# Patient Record
Sex: FEMALE | Race: WHITE | Employment: OTHER | URBAN - METROPOLITAN AREA
[De-identification: names, ages, dates, MRNs, and addresses within clinical notes are randomized per-mention and may not be internally consistent; named-entity substitution may affect disease eponyms.]

---

## 2017-03-15 ENCOUNTER — TELEPHONE (OUTPATIENT)
Dept: INTERNAL MEDICINE CLINIC | Facility: CLINIC | Age: 77
End: 2017-03-15

## 2017-03-15 DIAGNOSIS — Z00.00 ROUTINE HEALTH MAINTENANCE: ICD-10-CM

## 2017-03-15 DIAGNOSIS — E55.9 VITAMIN D DEFICIENCY: Primary | ICD-10-CM

## 2017-03-15 DIAGNOSIS — D72.819 LEUKOPENIA, UNSPECIFIED TYPE: ICD-10-CM

## 2017-03-15 DIAGNOSIS — R53.83 OTHER FATIGUE: ICD-10-CM

## 2017-03-15 DIAGNOSIS — E78.00 HYPERCHOLESTEROLEMIA: ICD-10-CM

## 2017-03-15 NOTE — TELEPHONE ENCOUNTER
Scheduled for a medicare annual with Dr Solomon Cornejo on 3/22  Should pt go for labs prior to appt?  Please call to advise [phone]

## 2017-03-15 NOTE — TELEPHONE ENCOUNTER
Please advise on labs -akbar venegas labs done October 2016 and an order is in system for B 12 and CBC - to DR. Padma Dove

## 2017-03-20 ENCOUNTER — LAB ENCOUNTER (OUTPATIENT)
Dept: LAB | Age: 77
End: 2017-03-20
Attending: INTERNAL MEDICINE
Payer: MEDICARE

## 2017-03-20 DIAGNOSIS — E78.00 HYPERCHOLESTEROLEMIA: ICD-10-CM

## 2017-03-20 DIAGNOSIS — E55.9 VITAMIN D DEFICIENCY: ICD-10-CM

## 2017-03-20 DIAGNOSIS — R53.83 OTHER FATIGUE: ICD-10-CM

## 2017-03-20 DIAGNOSIS — D72.819 LEUKOPENIA, UNSPECIFIED TYPE: ICD-10-CM

## 2017-03-20 LAB
25(OH)D3 SERPL-MCNC: 29.2 NG/ML
ALT SERPL-CCNC: 20 U/L (ref 14–54)
ANION GAP SERPL CALC-SCNC: 7 MMOL/L (ref 0–18)
AST SERPL-CCNC: 26 U/L (ref 15–41)
BASOPHILS # BLD: 0.1 K/UL (ref 0–0.2)
BASOPHILS NFR BLD: 1 %
BUN SERPL-MCNC: 18 MG/DL (ref 8–20)
BUN/CREAT SERPL: 22.8 (ref 10–20)
CALCIUM SERPL-MCNC: 9.3 MG/DL (ref 8.5–10.5)
CHLORIDE SERPL-SCNC: 108 MMOL/L (ref 95–110)
CHOLEST SERPL-MCNC: 219 MG/DL (ref 110–200)
CO2 SERPL-SCNC: 27 MMOL/L (ref 22–32)
CREAT SERPL-MCNC: 0.79 MG/DL (ref 0.5–1.5)
EOSINOPHIL # BLD: 0.2 K/UL (ref 0–0.7)
EOSINOPHIL NFR BLD: 4 %
ERYTHROCYTE [DISTWIDTH] IN BLOOD BY AUTOMATED COUNT: 13.5 % (ref 11–15)
GLUCOSE SERPL-MCNC: 91 MG/DL (ref 70–99)
HCT VFR BLD AUTO: 37.9 % (ref 35–48)
HDLC SERPL-MCNC: 64 MG/DL
HGB BLD-MCNC: 12.7 G/DL (ref 12–16)
LDLC SERPL CALC-MCNC: 144 MG/DL (ref 0–99)
LYMPHOCYTES # BLD: 1.3 K/UL (ref 1–4)
LYMPHOCYTES NFR BLD: 29 %
MCH RBC QN AUTO: 30.7 PG (ref 27–32)
MCHC RBC AUTO-ENTMCNC: 33.5 G/DL (ref 32–37)
MCV RBC AUTO: 91.7 FL (ref 80–100)
MONOCYTES # BLD: 0.3 K/UL (ref 0–1)
MONOCYTES NFR BLD: 7 %
NEUTROPHILS # BLD AUTO: 2.6 K/UL (ref 1.8–7.7)
NEUTROPHILS NFR BLD: 59 %
NONHDLC SERPL-MCNC: 155 MG/DL
OSMOLALITY UR CALC.SUM OF ELEC: 295 MOSM/KG (ref 275–295)
PLATELET # BLD AUTO: 266 K/UL (ref 140–400)
PMV BLD AUTO: 7.5 FL (ref 7.4–10.3)
POTASSIUM SERPL-SCNC: 4.1 MMOL/L (ref 3.3–5.1)
RBC # BLD AUTO: 4.14 M/UL (ref 3.7–5.4)
SODIUM SERPL-SCNC: 142 MMOL/L (ref 136–144)
TRIGL SERPL-MCNC: 54 MG/DL (ref 1–149)
TSH SERPL-ACNC: 0.93 UIU/ML (ref 0.34–5.6)
VIT B12 SERPL-MCNC: 349 PG/ML (ref 181–914)
WBC # BLD AUTO: 4.4 K/UL (ref 4–11)

## 2017-03-20 PROCEDURE — 80061 LIPID PANEL: CPT

## 2017-03-20 PROCEDURE — 84450 TRANSFERASE (AST) (SGOT): CPT

## 2017-03-20 PROCEDURE — 82607 VITAMIN B-12: CPT

## 2017-03-20 PROCEDURE — 82306 VITAMIN D 25 HYDROXY: CPT

## 2017-03-20 PROCEDURE — 36415 COLL VENOUS BLD VENIPUNCTURE: CPT

## 2017-03-20 PROCEDURE — 85025 COMPLETE CBC W/AUTO DIFF WBC: CPT

## 2017-03-20 PROCEDURE — 84443 ASSAY THYROID STIM HORMONE: CPT

## 2017-03-20 PROCEDURE — 84460 ALANINE AMINO (ALT) (SGPT): CPT

## 2017-03-20 PROCEDURE — 80048 BASIC METABOLIC PNL TOTAL CA: CPT

## 2017-03-22 ENCOUNTER — OFFICE VISIT (OUTPATIENT)
Dept: INTERNAL MEDICINE CLINIC | Facility: CLINIC | Age: 77
End: 2017-03-22

## 2017-03-22 VITALS
DIASTOLIC BLOOD PRESSURE: 74 MMHG | TEMPERATURE: 98 F | OXYGEN SATURATION: 98 % | BODY MASS INDEX: 28.1 KG/M2 | SYSTOLIC BLOOD PRESSURE: 138 MMHG | HEIGHT: 68.25 IN | WEIGHT: 185.38 LBS | HEART RATE: 74 BPM

## 2017-03-22 DIAGNOSIS — I34.0 NON-RHEUMATIC MITRAL REGURGITATION: ICD-10-CM

## 2017-03-22 DIAGNOSIS — Z78.0 POSTMENOPAUSE: ICD-10-CM

## 2017-03-22 DIAGNOSIS — E78.00 HYPERCHOLESTEROLEMIA: ICD-10-CM

## 2017-03-22 DIAGNOSIS — Z00.00 ROUTINE HEALTH MAINTENANCE: Primary | ICD-10-CM

## 2017-03-22 DIAGNOSIS — Z12.31 ENCOUNTER FOR SCREENING MAMMOGRAM FOR BREAST CANCER: ICD-10-CM

## 2017-03-22 DIAGNOSIS — E55.9 VITAMIN D DEFICIENCY: ICD-10-CM

## 2017-03-22 DIAGNOSIS — M85.80 OSTEOPENIA, UNSPECIFIED LOCATION: ICD-10-CM

## 2017-03-22 PROCEDURE — 99214 OFFICE O/P EST MOD 30 MIN: CPT | Performed by: INTERNAL MEDICINE

## 2017-03-22 PROCEDURE — 90715 TDAP VACCINE 7 YRS/> IM: CPT | Performed by: INTERNAL MEDICINE

## 2017-03-22 PROCEDURE — G0463 HOSPITAL OUTPT CLINIC VISIT: HCPCS | Performed by: INTERNAL MEDICINE

## 2017-03-22 PROCEDURE — 90471 IMMUNIZATION ADMIN: CPT | Performed by: INTERNAL MEDICINE

## 2017-03-22 RX ORDER — NEPAFENAC 0.3 %
SUSPENSION, DROPS(FINAL DOSAGE FORM)(ML) OPHTHALMIC (EYE) DAILY
Refills: 1 | COMMUNITY
Start: 2017-03-08 | End: 2019-04-10

## 2017-03-22 RX ORDER — BESIFLOXACIN 6 MG/ML
SUSPENSION OPHTHALMIC DAILY
Refills: 1 | COMMUNITY
Start: 2017-03-08 | End: 2019-04-10

## 2017-03-22 RX ORDER — TRIPROLIDINE/PSEUDOEPHEDRINE 2.5MG-60MG
TABLET ORAL DAILY
Refills: 1 | COMMUNITY
Start: 2017-03-08 | End: 2019-04-10

## 2017-03-22 NOTE — PROGRESS NOTES
Kylie Huang is a 68year old female. Patient presents with:  Physical: Patient presents for annual physical exam. Recently had bilateral cataract surgery in March. Last Mammogram April 2016. She reports she stopped fosamax per MD instructions.        HP Comment: wine, occasionally         REVIEW OF SYSTEMS:   GENERAL: feels well otherwise  SKIN: denies any unusual skin lesions  EYES:denies blurred vision or double vision  HEENT: denies nasal congestion, sinus pain or ST  LUNGS: denies shortness of gordon

## 2017-05-10 ENCOUNTER — HOSPITAL ENCOUNTER (OUTPATIENT)
Dept: CV DIAGNOSTICS | Facility: HOSPITAL | Age: 77
Discharge: HOME | End: 2017-05-10
Attending: INTERNAL MEDICINE
Payer: MEDICARE

## 2017-05-10 ENCOUNTER — HOSPITAL ENCOUNTER (OUTPATIENT)
Dept: BONE DENSITY | Facility: HOSPITAL | Age: 77
Discharge: HOME | End: 2017-05-10
Attending: INTERNAL MEDICINE
Payer: MEDICARE

## 2017-05-10 ENCOUNTER — HOSPITAL ENCOUNTER (OUTPATIENT)
Dept: MAMMOGRAPHY | Facility: HOSPITAL | Age: 77
Discharge: HOME | End: 2017-05-10
Attending: INTERNAL MEDICINE
Payer: MEDICARE

## 2017-05-10 DIAGNOSIS — Z12.31 ENCOUNTER FOR SCREENING MAMMOGRAM FOR BREAST CANCER: ICD-10-CM

## 2017-05-10 DIAGNOSIS — I34.0 NON-RHEUMATIC MITRAL REGURGITATION: ICD-10-CM

## 2017-05-10 DIAGNOSIS — Z78.0 POSTMENOPAUSE: ICD-10-CM

## 2017-05-10 PROCEDURE — 77067 SCR MAMMO BI INCL CAD: CPT | Performed by: INTERNAL MEDICINE

## 2017-05-10 PROCEDURE — 77080 DXA BONE DENSITY AXIAL: CPT | Performed by: INTERNAL MEDICINE

## 2017-05-10 PROCEDURE — 93306 TTE W/DOPPLER COMPLETE: CPT | Performed by: INTERNAL MEDICINE

## 2017-06-22 ENCOUNTER — HOSPITAL ENCOUNTER (OUTPATIENT)
Age: 77
Discharge: HOME | End: 2017-06-22
Attending: EMERGENCY MEDICINE
Payer: MEDICARE

## 2017-06-22 ENCOUNTER — APPOINTMENT (OUTPATIENT)
Dept: GENERAL RADIOLOGY | Age: 77
End: 2017-06-22
Attending: EMERGENCY MEDICINE
Payer: MEDICARE

## 2017-06-22 VITALS
WEIGHT: 185 LBS | OXYGEN SATURATION: 97 % | BODY MASS INDEX: 28.04 KG/M2 | HEIGHT: 68 IN | TEMPERATURE: 98 F | HEART RATE: 85 BPM | RESPIRATION RATE: 18 BRPM | SYSTOLIC BLOOD PRESSURE: 131 MMHG | DIASTOLIC BLOOD PRESSURE: 56 MMHG

## 2017-06-22 DIAGNOSIS — J40 BRONCHITIS: Primary | ICD-10-CM

## 2017-06-22 PROCEDURE — 99213 OFFICE O/P EST LOW 20 MIN: CPT

## 2017-06-22 PROCEDURE — 99204 OFFICE O/P NEW MOD 45 MIN: CPT

## 2017-06-22 PROCEDURE — 71020 XR CHEST PA + LAT CHEST (CPT=71020): CPT | Performed by: EMERGENCY MEDICINE

## 2017-06-22 RX ORDER — CODEINE PHOSPHATE AND GUAIFENESIN 10; 100 MG/5ML; MG/5ML
5 SOLUTION ORAL EVERY 6 HOURS PRN
Qty: 100 ML | Refills: 0 | Status: SHIPPED | OUTPATIENT
Start: 2017-06-22 | End: 2018-04-04

## 2017-06-22 RX ORDER — AZITHROMYCIN 250 MG/1
TABLET, FILM COATED ORAL
Qty: 1 PACKAGE | Refills: 0 | Status: SHIPPED | OUTPATIENT
Start: 2017-06-22 | End: 2017-06-27

## 2017-06-22 RX ORDER — BENZONATATE 100 MG/1
100 CAPSULE ORAL 3 TIMES DAILY PRN
Qty: 21 CAPSULE | Refills: 0 | Status: SHIPPED | OUTPATIENT
Start: 2017-06-22 | End: 2018-04-04

## 2017-06-22 NOTE — ED PROVIDER NOTES
Patient presents with:  Cough/URI      HPI:     Ashely Denis is a 68year old female who presents for evaluation of a chief complaint of  a cough Onset of symptoms was 4 days ago. The patient also complains of nasal and sinus congestion.   Care prior to What is the Relevant Clinical Indication / Reason for Exam?  Answer: SINUS  azithromycin (ZITHROMAX Z-NICOLE) 250 MG Oral Tab   Si mg once followed by 250 mg daily x 4 days   Dispense:  1 Package   Refill:  0  benzonatate 100 MG Oral Cap   Sig: Take 1 c today.    Follow Up with:  Felicia Patrick MD  [address] [phone]    In 5 days  if symptoms do not improve

## 2018-03-30 ENCOUNTER — TELEPHONE (OUTPATIENT)
Dept: INTERNAL MEDICINE CLINIC | Facility: CLINIC | Age: 78
End: 2018-03-30

## 2018-03-30 DIAGNOSIS — Z00.00 ANNUAL PHYSICAL EXAM: Primary | ICD-10-CM

## 2018-03-30 DIAGNOSIS — R53.83 OTHER FATIGUE: ICD-10-CM

## 2018-03-30 DIAGNOSIS — E78.00 HYPERCHOLESTEROLEMIA: ICD-10-CM

## 2018-03-30 DIAGNOSIS — E55.9 VITAMIN D DEFICIENCY: ICD-10-CM

## 2018-03-30 NOTE — TELEPHONE ENCOUNTER
Please call pt, has appt with Dr Emeka Guerrero on 4/4/18  Does pt need labs prior?   Please call to advise  Tasked to nursing

## 2018-04-02 NOTE — TELEPHONE ENCOUNTER
Called patient and notified her fasting labs are ordered and she should fast for 12 hours prior. She verbalized understanding.

## 2018-04-03 ENCOUNTER — LAB ENCOUNTER (OUTPATIENT)
Dept: LAB | Age: 78
End: 2018-04-03
Attending: INTERNAL MEDICINE
Payer: MEDICARE

## 2018-04-03 DIAGNOSIS — R53.83 OTHER FATIGUE: ICD-10-CM

## 2018-04-03 DIAGNOSIS — E78.00 HYPERCHOLESTEROLEMIA: ICD-10-CM

## 2018-04-03 DIAGNOSIS — E55.9 VITAMIN D DEFICIENCY: ICD-10-CM

## 2018-04-03 PROCEDURE — 36415 COLL VENOUS BLD VENIPUNCTURE: CPT

## 2018-04-03 PROCEDURE — 80061 LIPID PANEL: CPT

## 2018-04-03 PROCEDURE — 85025 COMPLETE CBC W/AUTO DIFF WBC: CPT

## 2018-04-03 PROCEDURE — 80053 COMPREHEN METABOLIC PANEL: CPT

## 2018-04-03 PROCEDURE — 82306 VITAMIN D 25 HYDROXY: CPT

## 2018-04-03 PROCEDURE — 84443 ASSAY THYROID STIM HORMONE: CPT | Performed by: INTERNAL MEDICINE

## 2018-04-04 ENCOUNTER — OFFICE VISIT (OUTPATIENT)
Dept: INTERNAL MEDICINE CLINIC | Facility: CLINIC | Age: 78
End: 2018-04-04

## 2018-04-04 VITALS
DIASTOLIC BLOOD PRESSURE: 78 MMHG | HEIGHT: 68.5 IN | HEART RATE: 96 BPM | WEIGHT: 188 LBS | SYSTOLIC BLOOD PRESSURE: 142 MMHG | TEMPERATURE: 98 F | BODY MASS INDEX: 28.17 KG/M2

## 2018-04-04 DIAGNOSIS — E55.9 VITAMIN D DEFICIENCY: ICD-10-CM

## 2018-04-04 DIAGNOSIS — Z12.31 ENCOUNTER FOR SCREENING MAMMOGRAM FOR BREAST CANCER: ICD-10-CM

## 2018-04-04 DIAGNOSIS — M85.80 OSTEOPENIA, UNSPECIFIED LOCATION: ICD-10-CM

## 2018-04-04 DIAGNOSIS — Z00.00 ROUTINE HEALTH MAINTENANCE: ICD-10-CM

## 2018-04-04 DIAGNOSIS — E78.00 HYPERCHOLESTEROLEMIA: Primary | ICD-10-CM

## 2018-04-04 DIAGNOSIS — I34.0 MITRAL VALVE INSUFFICIENCY, UNSPECIFIED ETIOLOGY: ICD-10-CM

## 2018-04-04 PROCEDURE — G0463 HOSPITAL OUTPT CLINIC VISIT: HCPCS | Performed by: INTERNAL MEDICINE

## 2018-04-04 PROCEDURE — 99214 OFFICE O/P EST MOD 30 MIN: CPT | Performed by: INTERNAL MEDICINE

## 2018-04-04 RX ORDER — CHOLECALCIFEROL (VITAMIN D3) 125 MCG
500 CAPSULE ORAL DAILY
COMMUNITY

## 2018-04-04 NOTE — PROGRESS NOTES
Yamileth Altamirano is a 68year old female. Patient presents with:  Physical: Patient is here today for a PE. She states that she has been feeling good lately No major issues at this time.        HPI:   Yamileth Altamirano is a 68year old female who presents for Former Smoker                                                              Packs/day: 0.00      Years: 0.00         Types: Cigarettes     Quit date: 1/1/1990  Smokeless tobacco: Former User                     Alcohol use:  Yes              Comment: wine, o vaccine. Tdap done  3/22/17. Encouraged more exercise. BP normal at home.      Mitral regurgitaton  Mild-moderate MR (last Echo 5/10/17) -- Rx given for repeat.      RTC 1 year.     Cecily Dill MD  4/4/2018  10:49 AM

## 2018-04-25 ENCOUNTER — HOSPITAL ENCOUNTER (OUTPATIENT)
Age: 78
Discharge: HOME | End: 2018-04-25
Attending: EMERGENCY MEDICINE
Payer: MEDICARE

## 2018-04-25 VITALS
BODY MASS INDEX: 26.66 KG/M2 | DIASTOLIC BLOOD PRESSURE: 64 MMHG | WEIGHT: 180 LBS | RESPIRATION RATE: 18 BRPM | SYSTOLIC BLOOD PRESSURE: 143 MMHG | TEMPERATURE: 98 F | HEIGHT: 69 IN | OXYGEN SATURATION: 99 % | HEART RATE: 88 BPM

## 2018-04-25 DIAGNOSIS — J06.9 VIRAL UPPER RESPIRATORY TRACT INFECTION WITH COUGH: Primary | ICD-10-CM

## 2018-04-25 DIAGNOSIS — J04.0 ACUTE LARYNGITIS: ICD-10-CM

## 2018-04-25 PROCEDURE — 99214 OFFICE O/P EST MOD 30 MIN: CPT

## 2018-04-25 PROCEDURE — 99213 OFFICE O/P EST LOW 20 MIN: CPT

## 2018-04-25 RX ORDER — BENZONATATE 200 MG/1
200 CAPSULE ORAL 3 TIMES DAILY PRN
Qty: 15 CAPSULE | Refills: 0 | Status: SHIPPED | OUTPATIENT
Start: 2018-04-25 | End: 2018-04-30

## 2018-04-25 NOTE — ED PROVIDER NOTES
Patient Seen in: [facility]    History   Patient presents with:  Cough/URI    Stated Complaint: cough    HPI  Patient complains of 2 days of a runny nose, postnasal drip, dry cough and hoarse voice.   Her voice was almost 18  Temp: 97.9 °F (36.6 °C)  Temp src: Oral  SpO2: 99 %  O2 Device: None (Room air)    Current:/64   Pulse 88   Temp 97.9 °F (36.6 °C) (Oral)   Resp 18   Ht 175.3 cm (5' 9\")   Wt 81.6 kg   SpO2 99%   BMI 26.58 kg/m²         Physical Exam   Constitut respiratory tract infection with cough  (primary encounter diagnosis)  Acute laryngitis    Disposition:  Discharge  4/25/2018 12:53 pm    Follow-up:  Denton Lema MD  [phone]    In 3 days  For follow-up

## 2018-04-25 NOTE — ED INITIAL ASSESSMENT (HPI)
PATIENT ARRIVED AMBULATORY TO ROOM C/O A NON PRODUCTIVE COUGH THAT STARTED 2 DAYS AGO. SLIGHT NASAL CONGESTION. PATIENT STATES \"I FEEL LIKE MY VOICE HAS BEEN GOING SINCE YESTERDAY\" NO FEVERS. NO N/V/D. EASY NON LABORED RESPIRATIONS.

## 2018-04-30 ENCOUNTER — OFFICE VISIT (OUTPATIENT)
Dept: INTERNAL MEDICINE CLINIC | Facility: CLINIC | Age: 78
End: 2018-04-30

## 2018-04-30 VITALS
SYSTOLIC BLOOD PRESSURE: 142 MMHG | BODY MASS INDEX: 28.82 KG/M2 | DIASTOLIC BLOOD PRESSURE: 70 MMHG | HEIGHT: 68.25 IN | TEMPERATURE: 99 F | WEIGHT: 190.19 LBS | HEART RATE: 88 BPM

## 2018-04-30 DIAGNOSIS — J06.9 ACUTE UPPER RESPIRATORY INFECTION, UNSPECIFIED: Primary | ICD-10-CM

## 2018-04-30 PROCEDURE — 99213 OFFICE O/P EST LOW 20 MIN: CPT | Performed by: INTERNAL MEDICINE

## 2018-04-30 PROCEDURE — G0463 HOSPITAL OUTPT CLINIC VISIT: HCPCS | Performed by: INTERNAL MEDICINE

## 2018-04-30 RX ORDER — AZITHROMYCIN 250 MG/1
TABLET, FILM COATED ORAL
Qty: 6 TABLET | Refills: 0 | Status: SHIPPED | OUTPATIENT
Start: 2018-04-30 | End: 2019-04-10

## 2018-04-30 RX ORDER — BENZONATATE 200 MG/1
200 CAPSULE ORAL 3 TIMES DAILY PRN
Qty: 15 CAPSULE | Refills: 0 | Status: SHIPPED | OUTPATIENT
Start: 2018-04-30 | End: 2018-05-05

## 2018-04-30 RX ORDER — POLYMYXIN B SULFATE AND TRIMETHOPRIM 1; 10000 MG/ML; [USP'U]/ML
1 SOLUTION OPHTHALMIC EVERY 6 HOURS
Qty: 1 BOTTLE | Refills: 0 | Status: SHIPPED | OUTPATIENT
Start: 2018-04-30 | End: 2019-04-10

## 2018-04-30 NOTE — PROGRESS NOTES
Divya Barnard is a 68year old female. HPI:   Patient presents with: Follow - Up: went to  last wednesday - acute laryngitis ,cough,  Eye Problem: right eye discharge        Patient for 1 week has had nasal congestion. Postnasal drip. No fever.   Emeli Collins Rfl: 1   aspirin (ASPIRIN ADULT LOW STRENGTH) 81 MG Oral Tab EC Take 1 tablet by mouth daily. Disp:  Rfl:    Calcium Carb-Cholecalciferol (CALCIUM + D3) 600-200 MG-UNIT Oral Tab Take 1 tablet by mouth daily.  Disp:  Rfl:    Multiple Vitamins-Minerals (Tony Sarmiento conjunctivitis. She is not toxic. Since symptoms have been persisting we will go ahead and give her Zithromax Z-NICOLE. Also refill on benzonatate. Lastly polymyxin B trimethoprim eyedrops. Patient to call if she does not feel better in a few days.   Anuradha

## 2018-08-22 ENCOUNTER — HOSPITAL ENCOUNTER (OUTPATIENT)
Dept: CV DIAGNOSTICS | Facility: HOSPITAL | Age: 78
Discharge: HOME | End: 2018-08-22
Attending: INTERNAL MEDICINE
Payer: MEDICARE

## 2018-08-22 ENCOUNTER — HOSPITAL ENCOUNTER (OUTPATIENT)
Dept: MAMMOGRAPHY | Facility: HOSPITAL | Age: 78
Discharge: HOME | End: 2018-08-22
Attending: INTERNAL MEDICINE
Payer: MEDICARE

## 2018-08-22 DIAGNOSIS — I34.0 MITRAL VALVE INSUFFICIENCY, UNSPECIFIED ETIOLOGY: ICD-10-CM

## 2018-08-22 DIAGNOSIS — Z12.31 ENCOUNTER FOR SCREENING MAMMOGRAM FOR BREAST CANCER: ICD-10-CM

## 2018-08-22 PROCEDURE — 93306 TTE W/DOPPLER COMPLETE: CPT | Performed by: INTERNAL MEDICINE

## 2018-08-22 PROCEDURE — 77067 SCR MAMMO BI INCL CAD: CPT | Performed by: INTERNAL MEDICINE

## 2019-04-03 ENCOUNTER — TELEPHONE (OUTPATIENT)
Dept: INTERNAL MEDICINE CLINIC | Facility: CLINIC | Age: 79
End: 2019-04-03

## 2019-04-03 DIAGNOSIS — Z00.00 ROUTINE HEALTH MAINTENANCE: ICD-10-CM

## 2019-04-03 DIAGNOSIS — E78.00 HYPERCHOLESTEROLEMIA: Primary | ICD-10-CM

## 2019-04-03 DIAGNOSIS — R53.83 OTHER FATIGUE: ICD-10-CM

## 2019-04-03 NOTE — TELEPHONE ENCOUNTER
Patient has appointment for yearly physical with Dr. Alanna Damon Wednesday 4/10. Needs lab orders entered.      Please call patient at [phone]

## 2019-04-05 ENCOUNTER — LAB ENCOUNTER (OUTPATIENT)
Dept: LAB | Age: 79
End: 2019-04-05
Attending: INTERNAL MEDICINE
Payer: MEDICARE

## 2019-04-05 ENCOUNTER — TELEPHONE (OUTPATIENT)
Dept: INTERNAL MEDICINE CLINIC | Facility: CLINIC | Age: 79
End: 2019-04-05

## 2019-04-05 DIAGNOSIS — R53.83 OTHER FATIGUE: ICD-10-CM

## 2019-04-05 DIAGNOSIS — E78.00 HYPERCHOLESTEROLEMIA: ICD-10-CM

## 2019-04-05 PROCEDURE — 80061 LIPID PANEL: CPT

## 2019-04-05 PROCEDURE — 84443 ASSAY THYROID STIM HORMONE: CPT

## 2019-04-05 PROCEDURE — 36415 COLL VENOUS BLD VENIPUNCTURE: CPT

## 2019-04-05 PROCEDURE — 85025 COMPLETE CBC W/AUTO DIFF WBC: CPT

## 2019-04-05 PROCEDURE — 80053 COMPREHEN METABOLIC PANEL: CPT

## 2019-04-10 ENCOUNTER — OFFICE VISIT (OUTPATIENT)
Dept: INTERNAL MEDICINE CLINIC | Facility: CLINIC | Age: 79
End: 2019-04-10
Payer: MEDICARE

## 2019-04-10 VITALS
TEMPERATURE: 98 F | SYSTOLIC BLOOD PRESSURE: 178 MMHG | HEIGHT: 68.25 IN | WEIGHT: 186.81 LBS | DIASTOLIC BLOOD PRESSURE: 80 MMHG | HEART RATE: 71 BPM | OXYGEN SATURATION: 97 % | BODY MASS INDEX: 28.31 KG/M2

## 2019-04-10 DIAGNOSIS — Z00.00 ROUTINE HEALTH MAINTENANCE: ICD-10-CM

## 2019-04-10 DIAGNOSIS — Z12.31 ENCOUNTER FOR SCREENING MAMMOGRAM FOR BREAST CANCER: ICD-10-CM

## 2019-04-10 DIAGNOSIS — I34.0 MITRAL VALVE INSUFFICIENCY, UNSPECIFIED ETIOLOGY: ICD-10-CM

## 2019-04-10 DIAGNOSIS — R01.1 MURMUR, CARDIAC: ICD-10-CM

## 2019-04-10 DIAGNOSIS — R03.0 WHITE COAT SYNDROME WITHOUT HYPERTENSION: Primary | ICD-10-CM

## 2019-04-10 DIAGNOSIS — E78.00 HYPERCHOLESTEROLEMIA: ICD-10-CM

## 2019-04-10 DIAGNOSIS — Z78.0 POSTMENOPAUSE: ICD-10-CM

## 2019-04-10 DIAGNOSIS — M85.80 OSTEOPENIA, UNSPECIFIED LOCATION: ICD-10-CM

## 2019-04-10 PROCEDURE — 99214 OFFICE O/P EST MOD 30 MIN: CPT | Performed by: INTERNAL MEDICINE

## 2019-04-10 PROCEDURE — G0463 HOSPITAL OUTPT CLINIC VISIT: HCPCS | Performed by: INTERNAL MEDICINE

## 2019-04-10 NOTE — PROGRESS NOTES
Vikki Gomes is a 66year old female. Patient presents with:  Physical      HPI:   Vikki Gomes is a 66year old female who presents for a complete physical exam.   Feels well. Walks her [place] 1 mile/day.    Checks her BP at home periodically --130 No         REVIEW OF SYSTEMS:   GENERAL: feels well otherwise  SKIN: denies any unusual skin lesions  EYES:denies blurred vision or double vision  HEENT: denies nasal congestion, sinus pain or ST  LUNGS: denies shortness of breath with exertion or cough  C

## 2019-05-09 ENCOUNTER — HOSPITAL ENCOUNTER (OUTPATIENT)
Age: 79
Discharge: HOME | End: 2019-05-09
Attending: EMERGENCY MEDICINE
Payer: MEDICARE

## 2019-05-09 VITALS
BODY MASS INDEX: 25.92 KG/M2 | WEIGHT: 175 LBS | TEMPERATURE: 98 F | HEIGHT: 69 IN | RESPIRATION RATE: 20 BRPM | DIASTOLIC BLOOD PRESSURE: 74 MMHG | OXYGEN SATURATION: 100 % | SYSTOLIC BLOOD PRESSURE: 177 MMHG | HEART RATE: 72 BPM

## 2019-05-09 DIAGNOSIS — H11.31 SUBCONJUNCTIVAL HEMORRHAGE OF RIGHT EYE: Primary | ICD-10-CM

## 2019-05-09 PROCEDURE — 99212 OFFICE O/P EST SF 10 MIN: CPT

## 2019-05-09 NOTE — ED PROVIDER NOTES
Patient Seen in: [facility]    History   Patient presents with:   Eye Visual Problem (opthalmic)    Stated Complaint: eye redness    HPI    Pt complains of right eye redness that started after she sneezed no vision abnorma reviewed from today and agreed except as otherwise stated in HPI.     Physical Exam     ED Triage Vitals [05/09/19 1354]   BP (!) 177/74   Pulse 72   Resp 20   Temp 98.2 °F (36.8 °C)   Temp src Oral   SpO2 100 %   O2 Device None (Room air)       Current:BP

## 2019-05-16 ENCOUNTER — TELEPHONE (OUTPATIENT)
Dept: INTERNAL MEDICINE CLINIC | Facility: CLINIC | Age: 79
End: 2019-05-16

## 2019-05-17 NOTE — TELEPHONE ENCOUNTER
Please let pt know I received her BP readings. Overall okay -- mostly 120's-130's/60's-70's (occ 140's/80's). Continue to exercise, limit sodium. Recommend repeating a week of blood pressure monitoring like this every few months.   If more readings in th

## 2019-09-16 ENCOUNTER — HOSPITAL ENCOUNTER (OUTPATIENT)
Dept: CV DIAGNOSTICS | Facility: HOSPITAL | Age: 79
Discharge: HOME | End: 2019-09-16
Attending: INTERNAL MEDICINE
Payer: MEDICARE

## 2019-09-16 DIAGNOSIS — I34.0 MITRAL VALVE INSUFFICIENCY, UNSPECIFIED ETIOLOGY: ICD-10-CM

## 2019-09-16 PROCEDURE — 93306 TTE W/DOPPLER COMPLETE: CPT | Performed by: INTERNAL MEDICINE

## 2019-09-23 ENCOUNTER — HOSPITAL ENCOUNTER (OUTPATIENT)
Dept: MAMMOGRAPHY | Facility: HOSPITAL | Age: 79
Discharge: HOME | End: 2019-09-23
Attending: INTERNAL MEDICINE
Payer: MEDICARE

## 2019-09-23 ENCOUNTER — HOSPITAL ENCOUNTER (OUTPATIENT)
Dept: BONE DENSITY | Facility: HOSPITAL | Age: 79
Discharge: HOME | End: 2019-09-23
Attending: INTERNAL MEDICINE
Payer: MEDICARE

## 2019-09-23 DIAGNOSIS — Z12.31 ENCOUNTER FOR SCREENING MAMMOGRAM FOR BREAST CANCER: ICD-10-CM

## 2019-09-23 DIAGNOSIS — Z78.0 POSTMENOPAUSE: ICD-10-CM

## 2019-09-23 PROCEDURE — 77067 SCR MAMMO BI INCL CAD: CPT | Performed by: INTERNAL MEDICINE

## 2019-09-23 PROCEDURE — 77080 DXA BONE DENSITY AXIAL: CPT | Performed by: INTERNAL MEDICINE

## 2019-09-23 PROCEDURE — 77063 BREAST TOMOSYNTHESIS BI: CPT | Performed by: INTERNAL MEDICINE

## 2020-02-07 ENCOUNTER — HOSPITAL ENCOUNTER (OUTPATIENT)
Age: 80
Discharge: HOME | End: 2020-02-07
Attending: EMERGENCY MEDICINE
Payer: MEDICARE

## 2020-02-07 VITALS
OXYGEN SATURATION: 97 % | HEART RATE: 80 BPM | WEIGHT: 170 LBS | BODY MASS INDEX: 25 KG/M2 | SYSTOLIC BLOOD PRESSURE: 146 MMHG | DIASTOLIC BLOOD PRESSURE: 65 MMHG | RESPIRATION RATE: 18 BRPM | TEMPERATURE: 98 F

## 2020-02-07 DIAGNOSIS — H11.32 SUBCONJUNCTIVAL HEMORRHAGE OF LEFT EYE: Primary | ICD-10-CM

## 2020-02-07 PROCEDURE — 99213 OFFICE O/P EST LOW 20 MIN: CPT

## 2020-02-07 PROCEDURE — 99212 OFFICE O/P EST SF 10 MIN: CPT

## 2020-02-07 NOTE — ED INITIAL ASSESSMENT (HPI)
Left conjunctival hemorrhage since Wednesday, denies trauma or injury, no drainage, denies contact lens use

## 2020-02-07 NOTE — ED PROVIDER NOTES
Patient Seen in: [facility]      History   Patient presents with:   Eye Visual Problem    Stated Complaint: LEFT RED EYE    HPI    The patient is a 40-year-old female with past history of hypertension, cataracts, chronic l distress  Head: Normocephalic, no swelling or tenderness  Eyes: Nonicteric sclera, diffuse left subconjunctival hemorrhage, most pronounced in the inferior/medial aspect of the left eye. There is no hyphema.   Intraocular pressures are 18-20 in the affecte

## 2020-06-10 ENCOUNTER — OFFICE VISIT (OUTPATIENT)
Dept: INTERNAL MEDICINE CLINIC | Facility: CLINIC | Age: 80
End: 2020-06-10
Payer: MEDICARE

## 2020-06-10 VITALS
HEIGHT: 68 IN | SYSTOLIC BLOOD PRESSURE: 144 MMHG | WEIGHT: 185.19 LBS | TEMPERATURE: 98 F | HEART RATE: 82 BPM | OXYGEN SATURATION: 98 % | BODY MASS INDEX: 28.07 KG/M2 | DIASTOLIC BLOOD PRESSURE: 88 MMHG

## 2020-06-10 DIAGNOSIS — E78.00 HYPERCHOLESTEROLEMIA: ICD-10-CM

## 2020-06-10 DIAGNOSIS — Z00.00 ROUTINE HEALTH MAINTENANCE: ICD-10-CM

## 2020-06-10 DIAGNOSIS — E55.9 VITAMIN D DEFICIENCY: ICD-10-CM

## 2020-06-10 DIAGNOSIS — I34.0 MITRAL VALVE INSUFFICIENCY, UNSPECIFIED ETIOLOGY: ICD-10-CM

## 2020-06-10 DIAGNOSIS — R53.83 OTHER FATIGUE: ICD-10-CM

## 2020-06-10 DIAGNOSIS — Z12.31 ENCOUNTER FOR SCREENING MAMMOGRAM FOR BREAST CANCER: Primary | ICD-10-CM

## 2020-06-10 DIAGNOSIS — M85.80 OSTEOPENIA, UNSPECIFIED LOCATION: ICD-10-CM

## 2020-06-10 DIAGNOSIS — R03.0 WHITE COAT SYNDROME WITHOUT HYPERTENSION: ICD-10-CM

## 2020-06-10 DIAGNOSIS — I34.0 MILD MITRAL REGURGITATION BY PRIOR ECHOCARDIOGRAM: ICD-10-CM

## 2020-06-10 PROCEDURE — G0463 HOSPITAL OUTPT CLINIC VISIT: HCPCS | Performed by: INTERNAL MEDICINE

## 2020-06-10 PROCEDURE — 99214 OFFICE O/P EST MOD 30 MIN: CPT | Performed by: INTERNAL MEDICINE

## 2020-06-10 NOTE — PROGRESS NOTES
Sravan Vidal is a 78year old female. Patient presents with: Well Adult: Medicare Annual      HPI:   Sravan Vidal is a 78year old female who presents for a complete physical exam.     Feels well.   Walks her Yorkie regularly and does exercises at h 1990        Years since quittin.4      Smokeless tobacco: Former User    Alcohol use: Yes      Comment: wine, occasionally    Drug use: No         REVIEW OF SYSTEMS:   GENERAL: feels well otherwise  SKIN: denies any unusual skin lesions  EYES:dai Tdap done  3/22/17. Encouraged more exercise. BP normal at home.      Mitral regurgitaton  Mild MR -- stable on echo 9/16/19. Check repeat this fall. White coat hypertension  Normal at home.       RTC 1 year.     Corazon Lara, 06/10/20, 11:31 AM

## 2020-06-17 ENCOUNTER — LAB ENCOUNTER (OUTPATIENT)
Dept: LAB | Facility: HOSPITAL | Age: 80
End: 2020-06-17
Attending: INTERNAL MEDICINE
Payer: MEDICARE

## 2020-06-17 DIAGNOSIS — E78.00 HYPERCHOLESTEROLEMIA: ICD-10-CM

## 2020-06-17 DIAGNOSIS — E55.9 VITAMIN D DEFICIENCY: ICD-10-CM

## 2020-06-17 DIAGNOSIS — R53.83 OTHER FATIGUE: ICD-10-CM

## 2020-06-17 PROCEDURE — 84443 ASSAY THYROID STIM HORMONE: CPT | Performed by: INTERNAL MEDICINE

## 2020-06-17 PROCEDURE — 36415 COLL VENOUS BLD VENIPUNCTURE: CPT

## 2020-06-17 PROCEDURE — 85025 COMPLETE CBC W/AUTO DIFF WBC: CPT

## 2020-06-17 PROCEDURE — 82306 VITAMIN D 25 HYDROXY: CPT

## 2020-06-17 PROCEDURE — 80061 LIPID PANEL: CPT

## 2020-06-17 PROCEDURE — 80053 COMPREHEN METABOLIC PANEL: CPT

## 2020-06-30 ENCOUNTER — PATIENT MESSAGE (OUTPATIENT)
Dept: INTERNAL MEDICINE CLINIC | Facility: CLINIC | Age: 80
End: 2020-06-30

## 2020-08-19 NOTE — TELEPHONE ENCOUNTER
In regards to testing [facility] has been unsuccessful in reaching Slime Melendez to set up [employer]. My chart sent to pt regarding this as well.

## 2020-10-05 ENCOUNTER — HOSPITAL ENCOUNTER (OUTPATIENT)
Dept: CV DIAGNOSTICS | Facility: HOSPITAL | Age: 80
Discharge: HOME | End: 2020-10-05
Attending: INTERNAL MEDICINE
Payer: MEDICARE

## 2020-10-05 DIAGNOSIS — I34.0 MILD MITRAL REGURGITATION BY PRIOR ECHOCARDIOGRAM: ICD-10-CM

## 2020-10-05 PROCEDURE — 93306 TTE W/DOPPLER COMPLETE: CPT | Performed by: INTERNAL MEDICINE

## 2020-10-22 PROBLEM — I35.0 NONRHEUMATIC AORTIC VALVE STENOSIS: Status: ACTIVE | Noted: 2020-10-22

## 2020-10-29 ENCOUNTER — HOSPITAL ENCOUNTER (OUTPATIENT)
Dept: MAMMOGRAPHY | Facility: HOSPITAL | Age: 80
Discharge: HOME | End: 2020-10-29
Attending: INTERNAL MEDICINE
Payer: MEDICARE

## 2020-10-29 DIAGNOSIS — Z12.31 ENCOUNTER FOR SCREENING MAMMOGRAM FOR BREAST CANCER: ICD-10-CM

## 2020-10-29 PROCEDURE — 77067 SCR MAMMO BI INCL CAD: CPT | Performed by: INTERNAL MEDICINE

## 2020-10-29 PROCEDURE — 77063 BREAST TOMOSYNTHESIS BI: CPT | Performed by: INTERNAL MEDICINE

## 2020-12-02 ENCOUNTER — TELEPHONE (OUTPATIENT)
Dept: INTERNAL MEDICINE CLINIC | Facility: CLINIC | Age: 80
End: 2020-12-02

## 2020-12-02 DIAGNOSIS — Z12.11 SCREENING FOR COLON CANCER: Primary | ICD-10-CM

## 2020-12-02 NOTE — TELEPHONE ENCOUNTER
[facility] - Lisa Report is on MD desk for review. Hoda's Result Read (+) Positive. MD to advise.

## 2020-12-03 NOTE — TELEPHONE ENCOUNTER
Spoke to patient and relayed MD message and instructions, patient verbalizes understanding and agrees with plan. Provided contact information to Dr. Marbella Freeman. Patient agrees to call and schedule. She denies any questions at this time.  Patient doesn't think sh

## 2020-12-03 NOTE — TELEPHONE ENCOUNTER
Cologuard testing is positive -- this could potentially indicate a benign polyp or a colon cancer, though unable to distinguish based on a cologuard test.    She will need to schedule a colonoscopy. Please refer to GI Dr. Sachin Daley.

## 2021-01-04 PROCEDURE — 88305 TISSUE EXAM BY PATHOLOGIST: CPT | Performed by: INTERNAL MEDICINE

## 2021-03-12 DIAGNOSIS — Z23 NEED FOR VACCINATION: ICD-10-CM

## 2021-06-08 ENCOUNTER — PATIENT MESSAGE (OUTPATIENT)
Dept: INTERNAL MEDICINE CLINIC | Facility: CLINIC | Age: 81
End: 2021-06-08

## 2021-06-08 DIAGNOSIS — E78.00 HYPERCHOLESTEROLEMIA: ICD-10-CM

## 2021-06-08 DIAGNOSIS — Z00.00 ROUTINE HEALTH MAINTENANCE: ICD-10-CM

## 2021-06-08 DIAGNOSIS — R53.83 OTHER FATIGUE: ICD-10-CM

## 2021-06-08 DIAGNOSIS — E55.9 VITAMIN D DEFICIENCY: Primary | ICD-10-CM

## 2021-06-08 NOTE — TELEPHONE ENCOUNTER
From: Dominique Stauffer  To: Cecily Dill MD  Sent: 6/8/2021 10:46 AM CDT  Subject: Other    Do I need to have a blood test before my visit on [facility] [address]

## 2021-06-10 ENCOUNTER — LAB ENCOUNTER (OUTPATIENT)
Dept: LAB | Age: 81
End: 2021-06-10
Attending: INTERNAL MEDICINE
Payer: MEDICARE

## 2021-06-10 DIAGNOSIS — R53.83 OTHER FATIGUE: ICD-10-CM

## 2021-06-10 DIAGNOSIS — E78.00 HYPERCHOLESTEROLEMIA: ICD-10-CM

## 2021-06-10 DIAGNOSIS — E55.9 VITAMIN D DEFICIENCY: ICD-10-CM

## 2021-06-10 PROCEDURE — 80061 LIPID PANEL: CPT

## 2021-06-10 PROCEDURE — 36415 COLL VENOUS BLD VENIPUNCTURE: CPT

## 2021-06-10 PROCEDURE — 84443 ASSAY THYROID STIM HORMONE: CPT

## 2021-06-10 PROCEDURE — 85025 COMPLETE CBC W/AUTO DIFF WBC: CPT

## 2021-06-10 PROCEDURE — 82306 VITAMIN D 25 HYDROXY: CPT

## 2021-06-10 PROCEDURE — 80053 COMPREHEN METABOLIC PANEL: CPT

## 2021-06-15 ENCOUNTER — OFFICE VISIT (OUTPATIENT)
Dept: INTERNAL MEDICINE CLINIC | Facility: CLINIC | Age: 81
End: 2021-06-15
Payer: MEDICARE

## 2021-06-15 VITALS
HEART RATE: 88 BPM | SYSTOLIC BLOOD PRESSURE: 162 MMHG | BODY MASS INDEX: 28.34 KG/M2 | HEIGHT: 68 IN | WEIGHT: 187 LBS | DIASTOLIC BLOOD PRESSURE: 76 MMHG | TEMPERATURE: 98 F

## 2021-06-15 DIAGNOSIS — Z78.0 POSTMENOPAUSE: Primary | ICD-10-CM

## 2021-06-15 DIAGNOSIS — I34.0 MITRAL VALVE INSUFFICIENCY, UNSPECIFIED ETIOLOGY: ICD-10-CM

## 2021-06-15 DIAGNOSIS — R03.0 WHITE COAT SYNDROME WITHOUT HYPERTENSION: ICD-10-CM

## 2021-06-15 DIAGNOSIS — Z12.31 ENCOUNTER FOR SCREENING MAMMOGRAM FOR MALIGNANT NEOPLASM OF BREAST: ICD-10-CM

## 2021-06-15 DIAGNOSIS — E78.00 HYPERCHOLESTEROLEMIA: ICD-10-CM

## 2021-06-15 DIAGNOSIS — Z00.00 ROUTINE HEALTH MAINTENANCE: ICD-10-CM

## 2021-06-15 DIAGNOSIS — E55.9 VITAMIN D DEFICIENCY: ICD-10-CM

## 2021-06-15 DIAGNOSIS — M85.80 OSTEOPENIA, UNSPECIFIED LOCATION: ICD-10-CM

## 2021-06-15 DIAGNOSIS — I35.0 NONRHEUMATIC AORTIC VALVE STENOSIS: ICD-10-CM

## 2021-06-15 PROCEDURE — G0439 PPPS, SUBSEQ VISIT: HCPCS | Performed by: INTERNAL MEDICINE

## 2021-06-15 NOTE — PROGRESS NOTES
Bryn Aguila is a [de-identified]year old female. Patient presents with:  Physical: Patient is here today for a PE. She states that she has been feeling good lately. No new or major issues at this time.        HPI:   Bryn Aguila is a [de-identified]year old female who pres Social History:   Social History    Tobacco Use      Smoking status: Former Smoker        Packs/day: 1.00        Years: 0.00        Pack years: 0        Types: Cigarettes        Quit date: 1990        Years since quittin.4      Smokeless toba medications?: 0-No    Does pain affect your day to day activities?: 0-No     Have you had any memory issues?: 0-No    Fall/Risk Scorin          Depression Screening (PHQ-2/PHQ-9): Over the LAST 2 WEEKS                      Advance Directives     Do you DEXA BONE DENSITOMETRY (CPT=77080) 09/23/2019   No flowsheet data found.    Pap and Pelvic      Pap: Every 3 yrs age 21-65 or Pap+HPV every 5 yrs age 33-67, age 72 and older at high risk   No recommendations at this time Update Health Maintenance if applica found.  No flowsheet data found. COPD      Spirometry Testing Annually No results found for this or any previous visit. No flowsheet data found.             REVIEW OF SYSTEMS:   GENERAL: feels well otherwise  SKIN: denies any unusual skin lesions  EYES:d colonoscopy in 3 months but pt is reluctant; encouraged her to consider and schedule.     Mitral regurgitaton, mild AS  Mild MR -- stable on echo 9/16/19 and 10/5/20 (though AV gradient slightly increased). Repeat echo ordered.     White coat hypertension

## 2021-11-02 ENCOUNTER — HOSPITAL ENCOUNTER (OUTPATIENT)
Dept: BONE DENSITY | Facility: HOSPITAL | Age: 81
Discharge: HOME | End: 2021-11-02
Attending: INTERNAL MEDICINE
Payer: MEDICARE

## 2021-11-02 DIAGNOSIS — Z78.0 POSTMENOPAUSE: ICD-10-CM

## 2021-11-02 PROCEDURE — 77080 DXA BONE DENSITY AXIAL: CPT | Performed by: INTERNAL MEDICINE

## 2021-11-03 ENCOUNTER — HOSPITAL ENCOUNTER (OUTPATIENT)
Dept: MAMMOGRAPHY | Age: 81
Discharge: HOME | End: 2021-11-03
Attending: INTERNAL MEDICINE
Payer: MEDICARE

## 2021-11-03 DIAGNOSIS — Z12.31 ENCOUNTER FOR SCREENING MAMMOGRAM FOR MALIGNANT NEOPLASM OF BREAST: ICD-10-CM

## 2021-11-03 PROCEDURE — 77067 SCR MAMMO BI INCL CAD: CPT | Performed by: INTERNAL MEDICINE

## 2021-11-03 PROCEDURE — 77063 BREAST TOMOSYNTHESIS BI: CPT | Performed by: INTERNAL MEDICINE

## 2021-11-03 NOTE — PROGRESS NOTES
Dilshad He,    There was some improvement in the femur bone density on DEXA scan. Let's hold off on fosamax for now. Please be sure to get plenty of regular weight-bearing exercise, take vitamin D, and try to get 1200mg/day of calcium in your diet.   Let's

## 2021-11-15 ENCOUNTER — HOSPITAL ENCOUNTER (OUTPATIENT)
Dept: MAMMOGRAPHY | Facility: HOSPITAL | Age: 81
Discharge: HOME | End: 2021-11-15
Attending: INTERNAL MEDICINE
Payer: MEDICARE

## 2021-11-15 DIAGNOSIS — R92.8 ABNORMAL MAMMOGRAM: ICD-10-CM

## 2021-11-15 PROCEDURE — 77061 BREAST TOMOSYNTHESIS UNI: CPT | Performed by: INTERNAL MEDICINE

## 2021-11-15 PROCEDURE — 77065 DX MAMMO INCL CAD UNI: CPT | Performed by: INTERNAL MEDICINE

## 2022-05-03 ENCOUNTER — HOSPITAL ENCOUNTER (OUTPATIENT)
Age: 82
Discharge: HOME | End: 2022-05-03
Attending: EMERGENCY MEDICINE
Payer: MEDICARE

## 2022-05-03 VITALS
OXYGEN SATURATION: 99 % | HEART RATE: 76 BPM | RESPIRATION RATE: 20 BRPM | SYSTOLIC BLOOD PRESSURE: 166 MMHG | TEMPERATURE: 98 F | DIASTOLIC BLOOD PRESSURE: 75 MMHG

## 2022-05-03 DIAGNOSIS — J06.9 VIRAL URI: Primary | ICD-10-CM

## 2022-05-03 LAB — SARS-COV-2 RNA RESP QL NAA+PROBE: NOT DETECTED

## 2022-05-03 PROCEDURE — 99213 OFFICE O/P EST LOW 20 MIN: CPT

## 2022-05-03 PROCEDURE — 99212 OFFICE O/P EST SF 10 MIN: CPT

## 2022-05-03 RX ORDER — FLUTICASONE PROPIONATE 50 MCG
2 SPRAY, SUSPENSION (ML) NASAL DAILY
Qty: 16 G | Refills: 0 | Status: SHIPPED | OUTPATIENT
Start: 2022-05-03 | End: 2022-06-02

## 2022-06-17 ENCOUNTER — TELEPHONE (OUTPATIENT)
Dept: INTERNAL MEDICINE CLINIC | Facility: CLINIC | Age: 82
End: 2022-06-17

## 2022-06-17 DIAGNOSIS — E78.00 HYPERCHOLESTEROLEMIA: Primary | ICD-10-CM

## 2022-06-17 DIAGNOSIS — R53.83 OTHER FATIGUE: ICD-10-CM

## 2022-06-17 DIAGNOSIS — E55.9 VITAMIN D DEFICIENCY: ICD-10-CM

## 2022-06-17 NOTE — TELEPHONE ENCOUNTER
Lab orders entered per MD protocol as Dr. Anner Duane will not be back in office until 6/21/22. Left voicemail on patient's home phone (OK per HIPPA) informing her this was done.

## 2022-06-17 NOTE — TELEPHONE ENCOUNTER
Pt is calling and states she would like Labs done prior to her 6/21/22 appt with Dr. Aristides Gant.     Please put orders in for a blood draw

## 2022-06-18 ENCOUNTER — LAB ENCOUNTER (OUTPATIENT)
Dept: LAB | Facility: HOSPITAL | Age: 82
End: 2022-06-18
Attending: INTERNAL MEDICINE
Payer: MEDICARE

## 2022-06-18 LAB
ALBUMIN SERPL-MCNC: 3.8 G/DL (ref 3.4–5)
ALBUMIN/GLOB SERPL: 1.2 {RATIO} (ref 1–2)
ALP LIVER SERPL-CCNC: 89 U/L
ALT SERPL-CCNC: 21 U/L
ANION GAP SERPL CALC-SCNC: 8 MMOL/L (ref 0–18)
AST SERPL-CCNC: 21 U/L (ref 15–37)
BASOPHILS # BLD AUTO: 0.06 X10(3) UL (ref 0–0.2)
BASOPHILS NFR BLD AUTO: 1 %
BILIRUB SERPL-MCNC: 0.9 MG/DL (ref 0.1–2)
BUN BLD-MCNC: 21 MG/DL (ref 7–18)
BUN/CREAT SERPL: 28.4 (ref 10–20)
CALCIUM BLD-MCNC: 9.3 MG/DL (ref 8.5–10.1)
CHLORIDE SERPL-SCNC: 109 MMOL/L (ref 98–112)
CHOLEST SERPL-MCNC: 191 MG/DL (ref ?–200)
CO2 SERPL-SCNC: 24 MMOL/L (ref 21–32)
CREAT BLD-MCNC: 0.74 MG/DL
DEPRECATED RDW RBC AUTO: 44.8 FL (ref 35.1–46.3)
EOSINOPHIL # BLD AUTO: 0.28 X10(3) UL (ref 0–0.7)
EOSINOPHIL NFR BLD AUTO: 4.6 %
ERYTHROCYTE [DISTWIDTH] IN BLOOD BY AUTOMATED COUNT: 13 % (ref 11–15)
FASTING PATIENT LIPID ANSWER: YES
FASTING STATUS PATIENT QL REPORTED: YES
GLOBULIN PLAS-MCNC: 3.2 G/DL (ref 2.8–4.4)
GLUCOSE BLD-MCNC: 97 MG/DL (ref 70–99)
HCT VFR BLD AUTO: 39.7 %
HDLC SERPL-MCNC: 66 MG/DL (ref 40–59)
HGB BLD-MCNC: 12.8 G/DL
IMM GRANULOCYTES # BLD AUTO: 0.02 X10(3) UL (ref 0–1)
IMM GRANULOCYTES NFR BLD: 0.3 %
LDLC SERPL CALC-MCNC: 111 MG/DL (ref ?–100)
LYMPHOCYTES # BLD AUTO: 1.49 X10(3) UL (ref 1–4)
LYMPHOCYTES NFR BLD AUTO: 24.6 %
MCH RBC QN AUTO: 30.2 PG (ref 26–34)
MCHC RBC AUTO-ENTMCNC: 32.2 G/DL (ref 31–37)
MCV RBC AUTO: 93.6 FL
MONOCYTES # BLD AUTO: 0.49 X10(3) UL (ref 0.1–1)
MONOCYTES NFR BLD AUTO: 8.1 %
NEUTROPHILS # BLD AUTO: 3.71 X10 (3) UL (ref 1.5–7.7)
NEUTROPHILS # BLD AUTO: 3.71 X10(3) UL (ref 1.5–7.7)
NEUTROPHILS NFR BLD AUTO: 61.4 %
NONHDLC SERPL-MCNC: 125 MG/DL (ref ?–130)
OSMOLALITY SERPL CALC.SUM OF ELEC: 295 MOSM/KG (ref 275–295)
PLATELET # BLD AUTO: 360 10(3)UL (ref 150–450)
POTASSIUM SERPL-SCNC: 4 MMOL/L (ref 3.5–5.1)
PROT SERPL-MCNC: 7 G/DL (ref 6.4–8.2)
RBC # BLD AUTO: 4.24 X10(6)UL
SODIUM SERPL-SCNC: 141 MMOL/L (ref 136–145)
TRIGL SERPL-MCNC: 74 MG/DL (ref 30–149)
TSI SER-ACNC: 1.29 MIU/ML (ref 0.36–3.74)
VIT D+METAB SERPL-MCNC: 32.1 NG/ML (ref 30–100)
VLDLC SERPL CALC-MCNC: 13 MG/DL (ref 0–30)
WBC # BLD AUTO: 6.1 X10(3) UL (ref 4–11)

## 2022-06-18 PROCEDURE — 84443 ASSAY THYROID STIM HORMONE: CPT | Performed by: INTERNAL MEDICINE

## 2022-06-18 PROCEDURE — 80061 LIPID PANEL: CPT | Performed by: INTERNAL MEDICINE

## 2022-06-18 PROCEDURE — 36415 COLL VENOUS BLD VENIPUNCTURE: CPT | Performed by: INTERNAL MEDICINE

## 2022-06-18 PROCEDURE — 82306 VITAMIN D 25 HYDROXY: CPT | Performed by: INTERNAL MEDICINE

## 2022-06-18 PROCEDURE — 80053 COMPREHEN METABOLIC PANEL: CPT | Performed by: INTERNAL MEDICINE

## 2022-06-18 PROCEDURE — 85025 COMPLETE CBC W/AUTO DIFF WBC: CPT | Performed by: INTERNAL MEDICINE

## 2022-06-21 ENCOUNTER — OFFICE VISIT (OUTPATIENT)
Dept: INTERNAL MEDICINE CLINIC | Facility: CLINIC | Age: 82
End: 2022-06-21
Payer: MEDICARE

## 2022-06-21 VITALS
HEIGHT: 68 IN | SYSTOLIC BLOOD PRESSURE: 122 MMHG | TEMPERATURE: 99 F | HEART RATE: 87 BPM | RESPIRATION RATE: 16 BRPM | WEIGHT: 181 LBS | OXYGEN SATURATION: 97 % | BODY MASS INDEX: 27.43 KG/M2 | DIASTOLIC BLOOD PRESSURE: 58 MMHG

## 2022-06-21 DIAGNOSIS — M85.80 OSTEOPENIA, UNSPECIFIED LOCATION: Primary | ICD-10-CM

## 2022-06-21 DIAGNOSIS — E78.00 HYPERCHOLESTEROLEMIA: ICD-10-CM

## 2022-06-21 DIAGNOSIS — I34.0 MITRAL VALVE INSUFFICIENCY, UNSPECIFIED ETIOLOGY: ICD-10-CM

## 2022-06-21 DIAGNOSIS — E55.9 VITAMIN D DEFICIENCY: ICD-10-CM

## 2022-06-21 DIAGNOSIS — R03.0 WHITE COAT SYNDROME WITHOUT HYPERTENSION: ICD-10-CM

## 2022-06-21 DIAGNOSIS — I35.0 NONRHEUMATIC AORTIC VALVE STENOSIS: ICD-10-CM

## 2022-06-21 DIAGNOSIS — Z00.00 ROUTINE HEALTH MAINTENANCE: ICD-10-CM

## 2022-06-21 PROCEDURE — G0439 PPPS, SUBSEQ VISIT: HCPCS | Performed by: INTERNAL MEDICINE

## 2022-06-21 PROCEDURE — 1126F AMNT PAIN NOTED NONE PRSNT: CPT | Performed by: INTERNAL MEDICINE

## 2022-06-21 RX ORDER — CICLOPIROX 1 G/100ML
SHAMPOO TOPICAL
COMMUNITY
Start: 2022-06-11

## 2022-06-21 RX ORDER — CLOBETASOL PROPIONATE 0.05 MG/G
GEL TOPICAL
COMMUNITY
Start: 2022-04-12

## 2022-06-24 ENCOUNTER — TELEPHONE (OUTPATIENT)
Dept: INTERNAL MEDICINE CLINIC | Facility: CLINIC | Age: 82
End: 2022-06-24

## 2022-06-24 NOTE — TELEPHONE ENCOUNTER
[facility] faxed over a notification    Lab records indicate that the patient has had a Cologuard test within the last three years. Last test was 11/27/2020.  CMS will cover the Cologuard test one every three years for patients who meet all the criteria     See fax placed in Dr. Jason Aponte

## 2022-07-04 NOTE — TELEPHONE ENCOUNTER
Noted -- it was positive at that time. Pt did colonoscopy in 1/2021 -- showed adenomatous polyp but was a poor prep. Pt was to have a repeat colonoscopy in 3 months but did not schedule, so thought maybe we could just do another Cologuard. Please let pt know that her insurance will not cover another Cologuard, so I recommend repeating the colonoscopy and calling GI (Dr. Liz Carmona) to reschedule.

## 2022-10-17 ENCOUNTER — PATIENT MESSAGE (OUTPATIENT)
Dept: INTERNAL MEDICINE CLINIC | Facility: CLINIC | Age: 82
End: 2022-10-17

## 2022-10-17 DIAGNOSIS — Z12.31 ENCOUNTER FOR SCREENING MAMMOGRAM FOR MALIGNANT NEOPLASM OF BREAST: Primary | ICD-10-CM

## 2022-10-17 DIAGNOSIS — R92.2 DENSE BREASTS: ICD-10-CM

## 2022-10-17 DIAGNOSIS — I34.0 MILD MITRAL REGURGITATION BY PRIOR ECHOCARDIOGRAM: ICD-10-CM

## 2022-10-17 NOTE — TELEPHONE ENCOUNTER
Per office visit 6/21/22:  \"Mitral regurgitaton, mild AS  Mild MR -- stable on echo 9/16/19 and 10/5/20 (though AV gradient slightly increased). Repeat echo ordered in 2021 but not done. Echo reordered. \"    Orders placed.

## 2022-10-25 ENCOUNTER — HOSPITAL ENCOUNTER (OUTPATIENT)
Dept: CV DIAGNOSTICS | Facility: HOSPITAL | Age: 82
Discharge: HOME | End: 2022-10-25
Attending: INTERNAL MEDICINE
Payer: MEDICARE

## 2022-10-25 DIAGNOSIS — I34.0 MILD MITRAL REGURGITATION BY PRIOR ECHOCARDIOGRAM: ICD-10-CM

## 2022-10-25 PROCEDURE — 93306 TTE W/DOPPLER COMPLETE: CPT | Performed by: INTERNAL MEDICINE

## 2022-11-12 DIAGNOSIS — I35.0 AORTIC STENOSIS, MODERATE: Primary | ICD-10-CM

## 2022-11-12 DIAGNOSIS — I34.0 MILD MITRAL REGURGITATION: ICD-10-CM

## 2022-11-19 ENCOUNTER — HOSPITAL ENCOUNTER (OUTPATIENT)
Dept: MAMMOGRAPHY | Facility: HOSPITAL | Age: 82
Discharge: HOME | End: 2022-11-19
Attending: INTERNAL MEDICINE
Payer: MEDICARE

## 2022-11-19 DIAGNOSIS — Z12.31 ENCOUNTER FOR SCREENING MAMMOGRAM FOR MALIGNANT NEOPLASM OF BREAST: ICD-10-CM

## 2022-11-19 DIAGNOSIS — R92.2 DENSE BREASTS: ICD-10-CM

## 2022-11-19 PROCEDURE — 77067 SCR MAMMO BI INCL CAD: CPT | Performed by: INTERNAL MEDICINE

## 2022-11-19 PROCEDURE — 77063 BREAST TOMOSYNTHESIS BI: CPT | Performed by: INTERNAL MEDICINE

## 2023-06-13 ENCOUNTER — PATIENT MESSAGE (OUTPATIENT)
Dept: INTERNAL MEDICINE CLINIC | Facility: CLINIC | Age: 83
End: 2023-06-13

## 2023-06-13 DIAGNOSIS — E78.00 HYPERCHOLESTEROLEMIA: ICD-10-CM

## 2023-06-13 DIAGNOSIS — E55.9 VITAMIN D DEFICIENCY: Primary | ICD-10-CM

## 2023-06-13 DIAGNOSIS — Z00.00 ROUTINE HEALTH MAINTENANCE: ICD-10-CM

## 2023-06-13 NOTE — TELEPHONE ENCOUNTER
From: Pam Evans  To: Mario Vegas MD  Sent: 6/13/2023 9:35 AM CDT  Subject: Do I need a blood test before my appointment next week    Do I need a blood before next week

## 2023-06-19 ENCOUNTER — LAB ENCOUNTER (OUTPATIENT)
Dept: LAB | Age: 83
End: 2023-06-19
Attending: INTERNAL MEDICINE
Payer: MEDICARE

## 2023-06-19 DIAGNOSIS — Z00.00 ROUTINE HEALTH MAINTENANCE: ICD-10-CM

## 2023-06-19 DIAGNOSIS — E55.9 VITAMIN D DEFICIENCY: ICD-10-CM

## 2023-06-19 DIAGNOSIS — E78.00 HYPERCHOLESTEROLEMIA: ICD-10-CM

## 2023-06-19 LAB
ALBUMIN SERPL-MCNC: 3.8 G/DL (ref 3.4–5)
ALBUMIN/GLOB SERPL: 1.2 {RATIO} (ref 1–2)
ALP LIVER SERPL-CCNC: 94 U/L
ALT SERPL-CCNC: 25 U/L
ANION GAP SERPL CALC-SCNC: 9 MMOL/L (ref 0–18)
AST SERPL-CCNC: 25 U/L (ref 15–37)
BASOPHILS # BLD AUTO: 0.06 X10(3) UL (ref 0–0.2)
BASOPHILS NFR BLD AUTO: 1 %
BILIRUB SERPL-MCNC: 0.6 MG/DL (ref 0.1–2)
BUN BLD-MCNC: 24 MG/DL (ref 7–18)
BUN/CREAT SERPL: 31.2 (ref 10–20)
CALCIUM BLD-MCNC: 9 MG/DL (ref 8.5–10.1)
CHLORIDE SERPL-SCNC: 111 MMOL/L (ref 98–112)
CHOLEST SERPL-MCNC: 197 MG/DL (ref ?–200)
CO2 SERPL-SCNC: 22 MMOL/L (ref 21–32)
CREAT BLD-MCNC: 0.77 MG/DL
DEPRECATED RDW RBC AUTO: 46.9 FL (ref 35.1–46.3)
EOSINOPHIL # BLD AUTO: 0.23 X10(3) UL (ref 0–0.7)
EOSINOPHIL NFR BLD AUTO: 3.8 %
ERYTHROCYTE [DISTWIDTH] IN BLOOD BY AUTOMATED COUNT: 13.2 % (ref 11–15)
FASTING PATIENT LIPID ANSWER: YES
FASTING STATUS PATIENT QL REPORTED: YES
GFR SERPLBLD BASED ON 1.73 SQ M-ARVRAT: 77 ML/MIN/1.73M2 (ref 60–?)
GLOBULIN PLAS-MCNC: 3.1 G/DL (ref 2.8–4.4)
GLUCOSE BLD-MCNC: 90 MG/DL (ref 70–99)
HCT VFR BLD AUTO: 39.8 %
HDLC SERPL-MCNC: 69 MG/DL (ref 40–59)
HGB BLD-MCNC: 12.9 G/DL
IMM GRANULOCYTES # BLD AUTO: 0.02 X10(3) UL (ref 0–1)
IMM GRANULOCYTES NFR BLD: 0.3 %
LDLC SERPL CALC-MCNC: 116 MG/DL (ref ?–100)
LYMPHOCYTES # BLD AUTO: 1.53 X10(3) UL (ref 1–4)
LYMPHOCYTES NFR BLD AUTO: 25.5 %
MCH RBC QN AUTO: 31.2 PG (ref 26–34)
MCHC RBC AUTO-ENTMCNC: 32.4 G/DL (ref 31–37)
MCV RBC AUTO: 96.1 FL
MONOCYTES # BLD AUTO: 0.48 X10(3) UL (ref 0.1–1)
MONOCYTES NFR BLD AUTO: 8 %
NEUTROPHILS # BLD AUTO: 3.67 X10 (3) UL (ref 1.5–7.7)
NEUTROPHILS # BLD AUTO: 3.67 X10(3) UL (ref 1.5–7.7)
NEUTROPHILS NFR BLD AUTO: 61.4 %
NONHDLC SERPL-MCNC: 128 MG/DL (ref ?–130)
OSMOLALITY SERPL CALC.SUM OF ELEC: 298 MOSM/KG (ref 275–295)
PLATELET # BLD AUTO: 380 10(3)UL (ref 150–450)
POTASSIUM SERPL-SCNC: 4.1 MMOL/L (ref 3.5–5.1)
PROT SERPL-MCNC: 6.9 G/DL (ref 6.4–8.2)
RBC # BLD AUTO: 4.14 X10(6)UL
SODIUM SERPL-SCNC: 142 MMOL/L (ref 136–145)
TRIGL SERPL-MCNC: 64 MG/DL (ref 30–149)
TSI SER-ACNC: 1 MIU/ML (ref 0.36–3.74)
VIT D+METAB SERPL-MCNC: 23.3 NG/ML (ref 30–100)
VLDLC SERPL CALC-MCNC: 11 MG/DL (ref 0–30)
WBC # BLD AUTO: 6 X10(3) UL (ref 4–11)

## 2023-06-19 PROCEDURE — 82306 VITAMIN D 25 HYDROXY: CPT

## 2023-06-19 PROCEDURE — 85025 COMPLETE CBC W/AUTO DIFF WBC: CPT

## 2023-06-19 PROCEDURE — 80053 COMPREHEN METABOLIC PANEL: CPT

## 2023-06-19 PROCEDURE — 36415 COLL VENOUS BLD VENIPUNCTURE: CPT

## 2023-06-19 PROCEDURE — 84443 ASSAY THYROID STIM HORMONE: CPT

## 2023-06-19 PROCEDURE — 80061 LIPID PANEL: CPT

## 2023-06-21 ENCOUNTER — OFFICE VISIT (OUTPATIENT)
Dept: INTERNAL MEDICINE CLINIC | Facility: CLINIC | Age: 83
End: 2023-06-21

## 2023-06-21 VITALS
WEIGHT: 179 LBS | DIASTOLIC BLOOD PRESSURE: 80 MMHG | HEIGHT: 68 IN | SYSTOLIC BLOOD PRESSURE: 140 MMHG | OXYGEN SATURATION: 98 % | BODY MASS INDEX: 27.13 KG/M2 | TEMPERATURE: 98 F | HEART RATE: 78 BPM

## 2023-06-21 DIAGNOSIS — I34.0 MITRAL VALVE INSUFFICIENCY, UNSPECIFIED ETIOLOGY: ICD-10-CM

## 2023-06-21 DIAGNOSIS — Z12.31 ENCOUNTER FOR SCREENING MAMMOGRAM FOR MALIGNANT NEOPLASM OF BREAST: Primary | ICD-10-CM

## 2023-06-21 DIAGNOSIS — I35.0 NONRHEUMATIC AORTIC VALVE STENOSIS: ICD-10-CM

## 2023-06-21 DIAGNOSIS — R03.0 WHITE COAT SYNDROME WITHOUT HYPERTENSION: ICD-10-CM

## 2023-06-21 DIAGNOSIS — Z00.00 ROUTINE HEALTH MAINTENANCE: ICD-10-CM

## 2023-06-21 DIAGNOSIS — E78.00 HYPERCHOLESTEROLEMIA: ICD-10-CM

## 2023-06-21 DIAGNOSIS — E55.9 VITAMIN D DEFICIENCY: ICD-10-CM

## 2023-06-21 DIAGNOSIS — Z78.0 POSTMENOPAUSE: ICD-10-CM

## 2023-06-21 DIAGNOSIS — M85.80 OSTEOPENIA, UNSPECIFIED LOCATION: ICD-10-CM

## 2023-08-21 DIAGNOSIS — Z01.818 PREOP EXAMINATION: Primary | ICD-10-CM

## 2023-08-21 DIAGNOSIS — D68.9 COAGULOPATHY (HCC): ICD-10-CM

## 2023-09-08 ENCOUNTER — LAB ENCOUNTER (OUTPATIENT)
Dept: LAB | Age: 83
End: 2023-09-08
Attending: INTERNAL MEDICINE
Payer: MEDICARE

## 2023-09-08 ENCOUNTER — TELEPHONE (OUTPATIENT)
Dept: INTERNAL MEDICINE CLINIC | Facility: CLINIC | Age: 83
End: 2023-09-08

## 2023-09-08 DIAGNOSIS — Z01.818 PREOP EXAMINATION: ICD-10-CM

## 2023-09-08 DIAGNOSIS — D68.9 COAGULOPATHY (HCC): ICD-10-CM

## 2023-09-08 LAB
ALBUMIN SERPL-MCNC: 3.6 G/DL (ref 3.4–5)
ALBUMIN/GLOB SERPL: 1.2 {RATIO} (ref 1–2)
ALP LIVER SERPL-CCNC: 90 U/L
ALT SERPL-CCNC: 22 U/L
ANION GAP SERPL CALC-SCNC: 7 MMOL/L (ref 0–18)
APTT PPP: 27.9 SECONDS (ref 23.3–35.6)
AST SERPL-CCNC: 22 U/L (ref 15–37)
BASOPHILS # BLD AUTO: 0.05 X10(3) UL (ref 0–0.2)
BASOPHILS NFR BLD AUTO: 0.8 %
BILIRUB SERPL-MCNC: 0.7 MG/DL (ref 0.1–2)
BUN BLD-MCNC: 18 MG/DL (ref 7–18)
BUN/CREAT SERPL: 22.5 (ref 10–20)
CALCIUM BLD-MCNC: 8.9 MG/DL (ref 8.5–10.1)
CHLORIDE SERPL-SCNC: 110 MMOL/L (ref 98–112)
CO2 SERPL-SCNC: 24 MMOL/L (ref 21–32)
CREAT BLD-MCNC: 0.8 MG/DL
DEPRECATED RDW RBC AUTO: 45.6 FL (ref 35.1–46.3)
EGFRCR SERPLBLD CKD-EPI 2021: 73 ML/MIN/1.73M2 (ref 60–?)
EOSINOPHIL # BLD AUTO: 0.22 X10(3) UL (ref 0–0.7)
EOSINOPHIL NFR BLD AUTO: 3.5 %
ERYTHROCYTE [DISTWIDTH] IN BLOOD BY AUTOMATED COUNT: 13.2 % (ref 11–15)
FASTING STATUS PATIENT QL REPORTED: YES
GLOBULIN PLAS-MCNC: 3.1 G/DL (ref 2.8–4.4)
GLUCOSE BLD-MCNC: 88 MG/DL (ref 70–99)
HCT VFR BLD AUTO: 39 %
HGB BLD-MCNC: 12.7 G/DL
IMM GRANULOCYTES # BLD AUTO: 0.01 X10(3) UL (ref 0–1)
IMM GRANULOCYTES NFR BLD: 0.2 %
INR BLD: 1.06 (ref 0.85–1.16)
LYMPHOCYTES # BLD AUTO: 1.43 X10(3) UL (ref 1–4)
LYMPHOCYTES NFR BLD AUTO: 22.5 %
MCH RBC QN AUTO: 30.9 PG (ref 26–34)
MCHC RBC AUTO-ENTMCNC: 32.6 G/DL (ref 31–37)
MCV RBC AUTO: 94.9 FL
MONOCYTES # BLD AUTO: 0.43 X10(3) UL (ref 0.1–1)
MONOCYTES NFR BLD AUTO: 6.8 %
MRSA NASAL: NEGATIVE
NEUTROPHILS # BLD AUTO: 4.22 X10 (3) UL (ref 1.5–7.7)
NEUTROPHILS # BLD AUTO: 4.22 X10(3) UL (ref 1.5–7.7)
NEUTROPHILS NFR BLD AUTO: 66.2 %
OSMOLALITY SERPL CALC.SUM OF ELEC: 293 MOSM/KG (ref 275–295)
PLATELET # BLD AUTO: 352 10(3)UL (ref 150–450)
POTASSIUM SERPL-SCNC: 4.1 MMOL/L (ref 3.5–5.1)
PROT SERPL-MCNC: 6.7 G/DL (ref 6.4–8.2)
PROTHROMBIN TIME: 13.7 SECONDS (ref 11.6–14.8)
RBC # BLD AUTO: 4.11 X10(6)UL
SODIUM SERPL-SCNC: 141 MMOL/L (ref 136–145)
STAPH A BY PCR: POSITIVE
WBC # BLD AUTO: 6.4 X10(3) UL (ref 4–11)

## 2023-09-08 PROCEDURE — 93010 ELECTROCARDIOGRAM REPORT: CPT | Performed by: INTERNAL MEDICINE

## 2023-09-08 PROCEDURE — 87640 STAPH A DNA AMP PROBE: CPT

## 2023-09-08 PROCEDURE — 80053 COMPREHEN METABOLIC PANEL: CPT

## 2023-09-08 PROCEDURE — 85730 THROMBOPLASTIN TIME PARTIAL: CPT

## 2023-09-08 PROCEDURE — 87641 MR-STAPH DNA AMP PROBE: CPT

## 2023-09-08 PROCEDURE — 85610 PROTHROMBIN TIME: CPT

## 2023-09-08 PROCEDURE — 85025 COMPLETE CBC W/AUTO DIFF WBC: CPT

## 2023-09-08 PROCEDURE — 93005 ELECTROCARDIOGRAM TRACING: CPT

## 2023-09-08 PROCEDURE — 36415 COLL VENOUS BLD VENIPUNCTURE: CPT

## 2023-09-08 NOTE — TELEPHONE ENCOUNTER
On call telephone call to pt. Lab Services paged me about a lab result. When I called back they were gone for the day. I checked her lab results and found that her nasal swab was positive for Staph but was negative for MRSA. I have sent a prescription for Bactroban ointment to pt's pharmacy to swab into her nares BID for  5 days. Discussed with pt. I will forward this message to Dr Lizeth Markham.

## 2023-09-09 LAB
ATRIAL RATE: 73 BPM
P AXIS: 54 DEGREES
P-R INTERVAL: 162 MS
Q-T INTERVAL: 360 MS
QRS DURATION: 84 MS
QTC CALCULATION (BEZET): 396 MS
R AXIS: 11 DEGREES
T AXIS: 61 DEGREES
VENTRICULAR RATE: 73 BPM

## 2023-09-10 NOTE — TELEPHONE ENCOUNTER
Please call pt with lab results -- her nasal swab was positive for staph (pre-op testing)    I have pended an order for nasal bactroban -- apply small amount with a Q-tip to each nostril BID x 5 days  Please confirm pharmacy

## 2023-09-10 NOTE — TELEPHONE ENCOUNTER
Never mind.   Guess I should've check my phone messages first.  Looks like Dr. PHILIPPE beat me to the punch :)

## 2023-09-14 ENCOUNTER — OFFICE VISIT (OUTPATIENT)
Dept: INTERNAL MEDICINE CLINIC | Facility: CLINIC | Age: 83
End: 2023-09-14

## 2023-09-14 VITALS
BODY MASS INDEX: 27.28 KG/M2 | HEART RATE: 82 BPM | DIASTOLIC BLOOD PRESSURE: 92 MMHG | WEIGHT: 180 LBS | HEIGHT: 68 IN | OXYGEN SATURATION: 97 % | TEMPERATURE: 98 F | SYSTOLIC BLOOD PRESSURE: 162 MMHG

## 2023-09-14 DIAGNOSIS — R03.0 ELEVATED BLOOD PRESSURE READING: ICD-10-CM

## 2023-09-14 DIAGNOSIS — R94.31 ABNORMAL EKG: Primary | ICD-10-CM

## 2023-09-14 DIAGNOSIS — Z01.818 PREOP EXAMINATION: ICD-10-CM

## 2023-09-14 DIAGNOSIS — M16.11 PRIMARY OSTEOARTHRITIS OF RIGHT HIP: ICD-10-CM

## 2023-09-14 PROBLEM — H35.30 MACULAR DEGENERATION: Status: ACTIVE | Noted: 2023-09-14

## 2023-09-14 PROCEDURE — 99214 OFFICE O/P EST MOD 30 MIN: CPT | Performed by: INTERNAL MEDICINE

## 2023-09-14 PROCEDURE — 1126F AMNT PAIN NOTED NONE PRSNT: CPT | Performed by: INTERNAL MEDICINE

## 2023-09-20 ENCOUNTER — HOSPITAL ENCOUNTER (OUTPATIENT)
Dept: NUCLEAR MEDICINE | Facility: HOSPITAL | Age: 83
Discharge: HOME | End: 2023-09-20
Attending: INTERNAL MEDICINE
Payer: MEDICARE

## 2023-09-20 ENCOUNTER — VIRTUAL PHONE E/M (OUTPATIENT)
Dept: INTERNAL MEDICINE CLINIC | Facility: CLINIC | Age: 83
End: 2023-09-20

## 2023-09-20 ENCOUNTER — HOSPITAL ENCOUNTER (OUTPATIENT)
Dept: CV DIAGNOSTICS | Facility: HOSPITAL | Age: 83
Discharge: HOME | End: 2023-09-20
Attending: INTERNAL MEDICINE
Payer: MEDICARE

## 2023-09-20 DIAGNOSIS — R94.39 ABNORMAL STRESS TEST: ICD-10-CM

## 2023-09-20 DIAGNOSIS — I10 PRIMARY HYPERTENSION: Primary | ICD-10-CM

## 2023-09-20 DIAGNOSIS — R94.31 ABNORMAL EKG: ICD-10-CM

## 2023-09-20 DIAGNOSIS — I35.0 NONRHEUMATIC AORTIC VALVE STENOSIS: ICD-10-CM

## 2023-09-20 LAB
% OF MAX PREDICTED HR: 100 %
MAX DIASTOLIC BP: 70 MMHG
MAX HEART RATE: 100 BPM
MAX PREDICTED HEART RATE: 137 BPM
MAX SYSTOLIC BP: 159 MMHG
MAX WORK LOAD: 10

## 2023-09-20 PROCEDURE — 99443 PHONE E/M BY PHYS 21-30 MIN: CPT | Performed by: INTERNAL MEDICINE

## 2023-09-20 PROCEDURE — 93017 CV STRESS TEST TRACING ONLY: CPT | Performed by: INTERNAL MEDICINE

## 2023-09-20 PROCEDURE — 93016 CV STRESS TEST SUPVJ ONLY: CPT | Performed by: INTERNAL MEDICINE

## 2023-09-20 PROCEDURE — 93018 CV STRESS TEST I&R ONLY: CPT | Performed by: INTERNAL MEDICINE

## 2023-09-20 PROCEDURE — 78452 HT MUSCLE IMAGE SPECT MULT: CPT | Performed by: INTERNAL MEDICINE

## 2023-09-20 RX ORDER — AMLODIPINE BESYLATE 2.5 MG/1
2.5 TABLET ORAL DAILY
Qty: 90 TABLET | Refills: 3 | Status: SHIPPED | OUTPATIENT
Start: 2023-09-20

## 2023-09-20 RX ORDER — REGADENOSON 0.08 MG/ML
INJECTION, SOLUTION INTRAVENOUS
Status: COMPLETED
Start: 2023-09-20 | End: 2023-09-20

## 2023-09-20 RX ADMIN — REGADENOSON 0.4 MG: 0.08 INJECTION, SOLUTION INTRAVENOUS at 11:24:00

## 2023-09-20 NOTE — PROGRESS NOTES
Virtual Telephone Check-In    Hoang Sims verbally consents to a Virtual/Telephone Check-In visit on 09/20/23. Patient has been referred to the [facility] website at [email or web address] to review the yearly Consent to Treat document. Patient understands and accepts financial responsibility for any deductible, co-insurance and/or co-pays associated with this service. Duration of the service: 25 minutes      Summary of topics discussed: Follow up of blood pressure.   In the am, 120's/70's  In the pm, 130's-140's/70's  Feels fine otherwise       A/P:    Elevated blood pressure  -evening BP's still high  -start amlodipine 2.5mg/day  -phone visit in 1 week    Abnormal pre-op EKG (9/14/23)  -NucGXT done today  -EKG portion reveals 1-1.5mm ST seg depression in inferolateral leads with prolonged recovery  -nuclear portion still pending  -given abnormal EKG response, rec cardiology eval.    -Refer to Dr. Wylie Flatten    R hip OA  -scheduled for R BEATRIZ on 10/5/23 by Dr. Kenzie Lozano  -abnormal preop EKG on 9/14/23-question of septal infarct (q wave in V2); no previous EKG's available  -may need to postpone surgery pending cardiac w/u     Hx of aortic stenosis  -moderate A.S on last echo in 10/2022 (prev mild A.S in 2020)  -scheduled for repeat echo in 12/2023 (due to [address] [place] [address] scheduling difficulty); pt to see cardiologist Dr. Clementina Xiao; may be able to get echo sooner in his office      Major Morales MD

## 2023-09-28 ENCOUNTER — VIRTUAL PHONE E/M (OUTPATIENT)
Dept: INTERNAL MEDICINE CLINIC | Facility: CLINIC | Age: 83
End: 2023-09-28

## 2023-09-28 DIAGNOSIS — I10 PRIMARY HYPERTENSION: Primary | ICD-10-CM

## 2023-09-28 DIAGNOSIS — R94.39 ABNORMAL STRESS TEST: ICD-10-CM

## 2023-09-28 PROCEDURE — 99442 PHONE E/M BY PHYS 11-20 MIN: CPT | Performed by: INTERNAL MEDICINE

## 2023-09-28 PROCEDURE — 1125F AMNT PAIN NOTED PAIN PRSNT: CPT | Performed by: INTERNAL MEDICINE

## 2023-09-28 NOTE — PROGRESS NOTES
Virtual Telephone Check-In    Silvestre Castañeda verbally consents to a Virtual/Telephone Check-In visit on 09/20/23. Patient has been referred to the [facility] website at [email or web address] to review the yearly Consent to Treat document. Patient understands and accepts financial responsibility for any deductible, co-insurance and/or co-pays associated with this service. Duration of the service: 11 minutes      Summary of topics discussed: Follow up of blood pressure. Started on amlodipine last week  BP's 120's-low 130's/70's.   Feels well    Had echo at Dr. Natali Spaulding office yesterday; results pending       A/P:    Hypertension, primary  -started amlodipine 2.5mg/day on 9/20/23  -BP's now well-controlled    Abnormal pre-op EKG (9/14/23)  -Nuc GXT done 9/20/23 -- EKG portion revealed 1-1.5mm ST seg depression in inferolateral leads with prolonged recovery; nuclear portion with normal perfusion  -saw cardiologist Dr. Ary Martin  -had echo yesterday 9/29/23 at [facility] office at [facility]    R hip OA  -scheduled for R BEATRIZ on 10/5/23 by Dr. Marty Ruiz  -has appt with Dr. Marty Ruiz tomorrow 9/29  -if above echo okay and if okay with Dr. Daisy Overton, pt may proceed with BEATRIZ     Hx of aortic stenosis  -moderate A.S on last echo in 10/2022 (prev mild A.S in 2020)  -scheduled for repeat echo in 12/2023 (due to [address] [place] [address] scheduling difficulty); pt to see cardiologist Dr. Daisy Overton; may be able to get echo sooner in his office      Shwetha Aguirre MD

## 2023-10-02 RX ORDER — ACETAMINOPHEN 500 MG
500 TABLET ORAL EVERY 6 HOURS PRN
COMMUNITY

## 2023-10-02 NOTE — PAT NURSING NOTE
SOPHIE Reeves to follow up re: patient had 2D Echo with Dr. Lauren Villegas on 9/29. Per Dr. Torin Sanon note (9/28), if okay with Dr. Lauren Villegas, pt may proceed with BEATRIZ.

## 2023-10-03 ENCOUNTER — LAB ENCOUNTER (OUTPATIENT)
Dept: LAB | Age: 83
End: 2023-10-03
Attending: ORTHOPAEDIC SURGERY
Payer: MEDICARE

## 2023-10-03 DIAGNOSIS — Z01.818 PREOP TESTING: ICD-10-CM

## 2023-10-03 LAB
ANTIBODY SCREEN: NEGATIVE
RH BLOOD TYPE: POSITIVE
RH BLOOD TYPE: POSITIVE

## 2023-10-03 PROCEDURE — 86901 BLOOD TYPING SEROLOGIC RH(D): CPT

## 2023-10-03 PROCEDURE — 86850 RBC ANTIBODY SCREEN: CPT

## 2023-10-03 PROCEDURE — 86900 BLOOD TYPING SEROLOGIC ABO: CPT

## 2023-10-03 PROCEDURE — 36415 COLL VENOUS BLD VENIPUNCTURE: CPT

## 2023-10-05 ENCOUNTER — ANESTHESIA (OUTPATIENT)
Dept: SURGERY | Facility: HOSPITAL | Age: 83
End: 2023-10-05
Payer: MEDICARE

## 2023-10-05 ENCOUNTER — APPOINTMENT (OUTPATIENT)
Dept: GENERAL RADIOLOGY | Facility: HOSPITAL | Age: 83
End: 2023-10-05
Attending: ORTHOPAEDIC SURGERY
Payer: MEDICARE

## 2023-10-05 ENCOUNTER — HOSPITAL ENCOUNTER (OUTPATIENT)
Facility: HOSPITAL | Age: 83
Discharge: HOME HEALTH | End: 2023-10-07
Attending: ORTHOPAEDIC SURGERY | Admitting: ORTHOPAEDIC SURGERY
Payer: MEDICARE

## 2023-10-05 ENCOUNTER — ANESTHESIA EVENT (OUTPATIENT)
Dept: SURGERY | Facility: HOSPITAL | Age: 83
End: 2023-10-05
Payer: MEDICARE

## 2023-10-05 DIAGNOSIS — Z01.818 PREOP TESTING: Primary | ICD-10-CM

## 2023-10-05 DIAGNOSIS — I34.0 MITRAL VALVE INSUFFICIENCY, UNSPECIFIED ETIOLOGY: ICD-10-CM

## 2023-10-05 PROCEDURE — 72170 X-RAY EXAM OF PELVIS: CPT | Performed by: ORTHOPAEDIC SURGERY

## 2023-10-05 PROCEDURE — 0SR904A REPLACEMENT OF RIGHT HIP JOINT WITH CERAMIC ON POLYETHYLENE SYNTHETIC SUBSTITUTE, UNCEMENTED, OPEN APPROACH: ICD-10-PCS | Performed by: ORTHOPAEDIC SURGERY

## 2023-10-05 DEVICE — EMPHASYS POLYETHYLENE LINER AOX NEUTRAL 52MM 36MM
Type: IMPLANTABLE DEVICE | Status: FUNCTIONAL
Brand: EMPHASYS

## 2023-10-05 DEVICE — BIOLOX DELTA CERAMIC FEMORAL HEAD +1.5 36MM DIA 12/14 TAPER
Type: IMPLANTABLE DEVICE | Site: HIP | Status: FUNCTIONAL
Brand: BIOLOX DELTA

## 2023-10-05 DEVICE — PINNACLE CANCELLOUS BONE SCREW 6.5MM X 30MM
Type: IMPLANTABLE DEVICE | Site: HIP | Status: FUNCTIONAL
Brand: PINNACLE

## 2023-10-05 DEVICE — SUMMIT FEMORAL STEM 12/14 TAPER TAPER ED W/POROCOAT SIZE 6 STD 150MM
Type: IMPLANTABLE DEVICE | Site: HIP | Status: FUNCTIONAL
Brand: SUMMIT POROCOAT

## 2023-10-05 DEVICE — EMPHASYS ACETABULAR SHELL THREE-HOLE 52MM CEMENTLESS
Type: IMPLANTABLE DEVICE | Status: FUNCTIONAL
Brand: EMPHASYS

## 2023-10-05 RX ORDER — HYDROMORPHONE HYDROCHLORIDE 1 MG/ML
0.4 INJECTION, SOLUTION INTRAMUSCULAR; INTRAVENOUS; SUBCUTANEOUS EVERY 5 MIN PRN
Status: DISCONTINUED | OUTPATIENT
Start: 2023-10-05 | End: 2023-10-05 | Stop reason: HOSPADM

## 2023-10-05 RX ORDER — HYDROCODONE BITARTRATE AND ACETAMINOPHEN 10; 325 MG/1; MG/1
1 TABLET ORAL EVERY 4 HOURS PRN
Status: DISCONTINUED | OUTPATIENT
Start: 2023-10-05 | End: 2023-10-07

## 2023-10-05 RX ORDER — CELECOXIB 200 MG/1
200 CAPSULE ORAL ONCE
Status: COMPLETED | OUTPATIENT
Start: 2023-10-05 | End: 2023-10-05

## 2023-10-05 RX ORDER — ACETAMINOPHEN 500 MG
1000 TABLET ORAL ONCE
Status: COMPLETED | OUTPATIENT
Start: 2023-10-05 | End: 2023-10-05

## 2023-10-05 RX ORDER — DEXAMETHASONE SODIUM PHOSPHATE 4 MG/ML
VIAL (ML) INJECTION AS NEEDED
Status: DISCONTINUED | OUTPATIENT
Start: 2023-10-05 | End: 2023-10-05 | Stop reason: SURG

## 2023-10-05 RX ORDER — DIPHENHYDRAMINE HCL 25 MG
25 CAPSULE ORAL EVERY 4 HOURS PRN
Status: DISCONTINUED | OUTPATIENT
Start: 2023-10-05 | End: 2023-10-07

## 2023-10-05 RX ORDER — DIPHENHYDRAMINE HYDROCHLORIDE 50 MG/ML
25 INJECTION INTRAMUSCULAR; INTRAVENOUS ONCE AS NEEDED
Status: ACTIVE | OUTPATIENT
Start: 2023-10-05 | End: 2023-10-05

## 2023-10-05 RX ORDER — HYDROMORPHONE HYDROCHLORIDE 1 MG/ML
0.6 INJECTION, SOLUTION INTRAMUSCULAR; INTRAVENOUS; SUBCUTANEOUS EVERY 5 MIN PRN
Status: DISCONTINUED | OUTPATIENT
Start: 2023-10-05 | End: 2023-10-05 | Stop reason: HOSPADM

## 2023-10-05 RX ORDER — HYDROCODONE BITARTRATE AND ACETAMINOPHEN 10; 325 MG/1; MG/1
2 TABLET ORAL EVERY 4 HOURS PRN
Status: DISCONTINUED | OUTPATIENT
Start: 2023-10-05 | End: 2023-10-07

## 2023-10-05 RX ORDER — HYDROMORPHONE HYDROCHLORIDE 1 MG/ML
0.5 INJECTION, SOLUTION INTRAMUSCULAR; INTRAVENOUS; SUBCUTANEOUS EVERY 4 HOURS PRN
Status: DISCONTINUED | OUTPATIENT
Start: 2023-10-05 | End: 2023-10-07

## 2023-10-05 RX ORDER — HYDROMORPHONE HYDROCHLORIDE 1 MG/ML
0.6 INJECTION, SOLUTION INTRAMUSCULAR; INTRAVENOUS; SUBCUTANEOUS
Status: ACTIVE | OUTPATIENT
Start: 2023-10-05 | End: 2023-10-06

## 2023-10-05 RX ORDER — CEFAZOLIN SODIUM/WATER 2 G/20 ML
2 SYRINGE (ML) INTRAVENOUS EVERY 8 HOURS
Qty: 40 ML | Refills: 0 | Status: COMPLETED | OUTPATIENT
Start: 2023-10-05 | End: 2023-10-06

## 2023-10-05 RX ORDER — ACETAMINOPHEN 325 MG/1
650 TABLET ORAL EVERY 6 HOURS PRN
Status: ACTIVE | OUTPATIENT
Start: 2023-10-05 | End: 2023-10-06

## 2023-10-05 RX ORDER — DOCUSATE SODIUM 100 MG/1
100 CAPSULE, LIQUID FILLED ORAL 2 TIMES DAILY
Status: DISCONTINUED | OUTPATIENT
Start: 2023-10-05 | End: 2023-10-07

## 2023-10-05 RX ORDER — PROCHLORPERAZINE EDISYLATE 5 MG/ML
5 INJECTION INTRAMUSCULAR; INTRAVENOUS ONCE AS NEEDED
Status: ACTIVE | OUTPATIENT
Start: 2023-10-05 | End: 2023-10-05

## 2023-10-05 RX ORDER — DIPHENHYDRAMINE HYDROCHLORIDE 50 MG/ML
12.5 INJECTION INTRAMUSCULAR; INTRAVENOUS EVERY 4 HOURS PRN
Status: ACTIVE | OUTPATIENT
Start: 2023-10-05 | End: 2023-10-06

## 2023-10-05 RX ORDER — MORPHINE SULFATE 4 MG/ML
4 INJECTION, SOLUTION INTRAMUSCULAR; INTRAVENOUS EVERY 10 MIN PRN
Status: DISCONTINUED | OUTPATIENT
Start: 2023-10-05 | End: 2023-10-05 | Stop reason: HOSPADM

## 2023-10-05 RX ORDER — HYDROCODONE BITARTRATE AND ACETAMINOPHEN 7.5; 325 MG/1; MG/1
1 TABLET ORAL EVERY 6 HOURS PRN
Status: DISPENSED | OUTPATIENT
Start: 2023-10-05 | End: 2023-10-06

## 2023-10-05 RX ORDER — ONDANSETRON 2 MG/ML
INJECTION INTRAMUSCULAR; INTRAVENOUS AS NEEDED
Status: DISCONTINUED | OUTPATIENT
Start: 2023-10-05 | End: 2023-10-05 | Stop reason: SURG

## 2023-10-05 RX ORDER — METOCLOPRAMIDE HYDROCHLORIDE 5 MG/ML
10 INJECTION INTRAMUSCULAR; INTRAVENOUS EVERY 8 HOURS PRN
Status: DISCONTINUED | OUTPATIENT
Start: 2023-10-05 | End: 2023-10-07

## 2023-10-05 RX ORDER — HYDROMORPHONE HYDROCHLORIDE 1 MG/ML
0.4 INJECTION, SOLUTION INTRAMUSCULAR; INTRAVENOUS; SUBCUTANEOUS
Status: ACTIVE | OUTPATIENT
Start: 2023-10-05 | End: 2023-10-06

## 2023-10-05 RX ORDER — CEFAZOLIN SODIUM/WATER 2 G/20 ML
2 SYRINGE (ML) INTRAVENOUS ONCE
Status: COMPLETED | OUTPATIENT
Start: 2023-10-05 | End: 2023-10-05

## 2023-10-05 RX ORDER — HYDROCODONE BITARTRATE AND ACETAMINOPHEN 5; 325 MG/1; MG/1
1 TABLET ORAL EVERY 4 HOURS PRN
Status: DISCONTINUED | OUTPATIENT
Start: 2023-10-05 | End: 2023-10-07

## 2023-10-05 RX ORDER — POLYETHYLENE GLYCOL 3350 17 G/17G
17 POWDER, FOR SOLUTION ORAL DAILY PRN
Status: DISCONTINUED | OUTPATIENT
Start: 2023-10-05 | End: 2023-10-07

## 2023-10-05 RX ORDER — MORPHINE SULFATE 4 MG/ML
2 INJECTION, SOLUTION INTRAMUSCULAR; INTRAVENOUS EVERY 10 MIN PRN
Status: DISCONTINUED | OUTPATIENT
Start: 2023-10-05 | End: 2023-10-05 | Stop reason: HOSPADM

## 2023-10-05 RX ORDER — HYDROCODONE BITARTRATE AND ACETAMINOPHEN 7.5; 325 MG/1; MG/1
2 TABLET ORAL EVERY 6 HOURS PRN
Status: ACTIVE | OUTPATIENT
Start: 2023-10-05 | End: 2023-10-06

## 2023-10-05 RX ORDER — EPHEDRINE SULFATE 50 MG/ML
INJECTION INTRAVENOUS AS NEEDED
Status: DISCONTINUED | OUTPATIENT
Start: 2023-10-05 | End: 2023-10-05 | Stop reason: SURG

## 2023-10-05 RX ORDER — SENNOSIDES 8.6 MG
17.2 TABLET ORAL NIGHTLY
Status: DISCONTINUED | OUTPATIENT
Start: 2023-10-05 | End: 2023-10-07

## 2023-10-05 RX ORDER — FAMOTIDINE 10 MG/ML
20 INJECTION, SOLUTION INTRAVENOUS 2 TIMES DAILY
Status: DISCONTINUED | OUTPATIENT
Start: 2023-10-05 | End: 2023-10-07

## 2023-10-05 RX ORDER — MIDAZOLAM HYDROCHLORIDE 1 MG/ML
INJECTION INTRAMUSCULAR; INTRAVENOUS AS NEEDED
Status: DISCONTINUED | OUTPATIENT
Start: 2023-10-05 | End: 2023-10-05 | Stop reason: SURG

## 2023-10-05 RX ORDER — DIPHENHYDRAMINE HYDROCHLORIDE 50 MG/ML
12.5 INJECTION INTRAMUSCULAR; INTRAVENOUS EVERY 4 HOURS PRN
Status: DISCONTINUED | OUTPATIENT
Start: 2023-10-05 | End: 2023-10-07

## 2023-10-05 RX ORDER — SODIUM CHLORIDE, SODIUM LACTATE, POTASSIUM CHLORIDE, CALCIUM CHLORIDE 600; 310; 30; 20 MG/100ML; MG/100ML; MG/100ML; MG/100ML
INJECTION, SOLUTION INTRAVENOUS CONTINUOUS
Status: DISCONTINUED | OUTPATIENT
Start: 2023-10-05 | End: 2023-10-05 | Stop reason: HOSPADM

## 2023-10-05 RX ORDER — MORPHINE SULFATE 10 MG/ML
6 INJECTION, SOLUTION INTRAMUSCULAR; INTRAVENOUS EVERY 10 MIN PRN
Status: DISCONTINUED | OUTPATIENT
Start: 2023-10-05 | End: 2023-10-05 | Stop reason: HOSPADM

## 2023-10-05 RX ORDER — LIDOCAINE HYDROCHLORIDE 10 MG/ML
INJECTION, SOLUTION EPIDURAL; INFILTRATION; INTRACAUDAL; PERINEURAL AS NEEDED
Status: DISCONTINUED | OUTPATIENT
Start: 2023-10-05 | End: 2023-10-05 | Stop reason: SURG

## 2023-10-05 RX ORDER — MORPHINE SULFATE 1 MG/ML
INJECTION, SOLUTION EPIDURAL; INTRATHECAL; INTRAVENOUS AS NEEDED
Status: DISCONTINUED | OUTPATIENT
Start: 2023-10-05 | End: 2023-10-05 | Stop reason: SURG

## 2023-10-05 RX ORDER — TRANEXAMIC ACID 650 MG/1
1300 TABLET ORAL ONCE
Status: COMPLETED | OUTPATIENT
Start: 2023-10-05 | End: 2023-10-05

## 2023-10-05 RX ORDER — HALOPERIDOL 5 MG/ML
0.5 INJECTION INTRAMUSCULAR ONCE AS NEEDED
Status: ACTIVE | OUTPATIENT
Start: 2023-10-05 | End: 2023-10-05

## 2023-10-05 RX ORDER — NALOXONE HYDROCHLORIDE 0.4 MG/ML
0.08 INJECTION, SOLUTION INTRAMUSCULAR; INTRAVENOUS; SUBCUTANEOUS
Status: ACTIVE | OUTPATIENT
Start: 2023-10-05 | End: 2023-10-06

## 2023-10-05 RX ORDER — NALOXONE HYDROCHLORIDE 0.4 MG/ML
0.08 INJECTION, SOLUTION INTRAMUSCULAR; INTRAVENOUS; SUBCUTANEOUS AS NEEDED
Status: DISCONTINUED | OUTPATIENT
Start: 2023-10-05 | End: 2023-10-05 | Stop reason: HOSPADM

## 2023-10-05 RX ORDER — FAMOTIDINE 20 MG/1
20 TABLET, FILM COATED ORAL 2 TIMES DAILY
Status: DISCONTINUED | OUTPATIENT
Start: 2023-10-05 | End: 2023-10-07

## 2023-10-05 RX ORDER — HYDROMORPHONE HYDROCHLORIDE 1 MG/ML
0.2 INJECTION, SOLUTION INTRAMUSCULAR; INTRAVENOUS; SUBCUTANEOUS EVERY 5 MIN PRN
Status: DISCONTINUED | OUTPATIENT
Start: 2023-10-05 | End: 2023-10-05 | Stop reason: HOSPADM

## 2023-10-05 RX ORDER — ONDANSETRON 2 MG/ML
4 INJECTION INTRAMUSCULAR; INTRAVENOUS ONCE AS NEEDED
Status: ACTIVE | OUTPATIENT
Start: 2023-10-05 | End: 2023-10-05

## 2023-10-05 RX ORDER — SODIUM CHLORIDE, SODIUM LACTATE, POTASSIUM CHLORIDE, CALCIUM CHLORIDE 600; 310; 30; 20 MG/100ML; MG/100ML; MG/100ML; MG/100ML
INJECTION, SOLUTION INTRAVENOUS CONTINUOUS
Status: DISCONTINUED | OUTPATIENT
Start: 2023-10-05 | End: 2023-10-07

## 2023-10-05 RX ORDER — NALBUPHINE HYDROCHLORIDE 10 MG/ML
2.5 INJECTION, SOLUTION INTRAMUSCULAR; INTRAVENOUS; SUBCUTANEOUS EVERY 4 HOURS PRN
Status: ACTIVE | OUTPATIENT
Start: 2023-10-05 | End: 2023-10-06

## 2023-10-05 RX ORDER — ONDANSETRON 2 MG/ML
4 INJECTION INTRAMUSCULAR; INTRAVENOUS EVERY 6 HOURS PRN
Status: DISCONTINUED | OUTPATIENT
Start: 2023-10-05 | End: 2023-10-07

## 2023-10-05 RX ORDER — ENEMA 19; 7 G/133ML; G/133ML
1 ENEMA RECTAL ONCE AS NEEDED
Status: DISCONTINUED | OUTPATIENT
Start: 2023-10-05 | End: 2023-10-07

## 2023-10-05 RX ORDER — BUPIVACAINE HYDROCHLORIDE 7.5 MG/ML
INJECTION, SOLUTION INTRASPINAL AS NEEDED
Status: DISCONTINUED | OUTPATIENT
Start: 2023-10-05 | End: 2023-10-05 | Stop reason: SURG

## 2023-10-05 RX ORDER — BISACODYL 10 MG
10 SUPPOSITORY, RECTAL RECTAL
Status: DISCONTINUED | OUTPATIENT
Start: 2023-10-05 | End: 2023-10-07

## 2023-10-05 RX ORDER — DIPHENHYDRAMINE HCL 25 MG
25 CAPSULE ORAL EVERY 4 HOURS PRN
Status: ACTIVE | OUTPATIENT
Start: 2023-10-05 | End: 2023-10-06

## 2023-10-05 RX ORDER — BUPIVACAINE HYDROCHLORIDE 2.5 MG/ML
INJECTION, SOLUTION EPIDURAL; INFILTRATION; INTRACAUDAL AS NEEDED
Status: DISCONTINUED | OUTPATIENT
Start: 2023-10-05 | End: 2023-10-05 | Stop reason: HOSPADM

## 2023-10-05 RX ORDER — OXYCODONE HYDROCHLORIDE 5 MG/1
5 TABLET ORAL EVERY 4 HOURS PRN
Status: DISCONTINUED | OUTPATIENT
Start: 2023-10-05 | End: 2023-10-07

## 2023-10-05 RX ADMIN — CEFAZOLIN SODIUM/WATER 2 G: 2 G/20 ML SYRINGE (ML) INTRAVENOUS at 07:30:00

## 2023-10-05 RX ADMIN — EPHEDRINE SULFATE 10 MG: 50 INJECTION INTRAVENOUS at 08:03:00

## 2023-10-05 RX ADMIN — SODIUM CHLORIDE, SODIUM LACTATE, POTASSIUM CHLORIDE, CALCIUM CHLORIDE: 600; 310; 30; 20 INJECTION, SOLUTION INTRAVENOUS at 07:08:00

## 2023-10-05 RX ADMIN — LIDOCAINE HYDROCHLORIDE 10 MG: 10 INJECTION, SOLUTION EPIDURAL; INFILTRATION; INTRACAUDAL; PERINEURAL at 07:17:00

## 2023-10-05 RX ADMIN — SODIUM CHLORIDE, SODIUM LACTATE, POTASSIUM CHLORIDE, CALCIUM CHLORIDE: 600; 310; 30; 20 INJECTION, SOLUTION INTRAVENOUS at 08:03:00

## 2023-10-05 RX ADMIN — MIDAZOLAM HYDROCHLORIDE 1 MG: 1 INJECTION INTRAMUSCULAR; INTRAVENOUS at 07:11:00

## 2023-10-05 RX ADMIN — SODIUM CHLORIDE, SODIUM LACTATE, POTASSIUM CHLORIDE, CALCIUM CHLORIDE: 600; 310; 30; 20 INJECTION, SOLUTION INTRAVENOUS at 08:42:00

## 2023-10-05 RX ADMIN — BUPIVACAINE HYDROCHLORIDE 1.5 ML: 7.5 INJECTION, SOLUTION INTRASPINAL at 07:20:00

## 2023-10-05 RX ADMIN — MORPHINE SULFATE 0.25 MG: 1 INJECTION, SOLUTION EPIDURAL; INTRATHECAL; INTRAVENOUS at 07:20:00

## 2023-10-05 RX ADMIN — SODIUM CHLORIDE, SODIUM LACTATE, POTASSIUM CHLORIDE, CALCIUM CHLORIDE: 600; 310; 30; 20 INJECTION, SOLUTION INTRAVENOUS at 07:11:00

## 2023-10-05 RX ADMIN — ONDANSETRON 4 MG: 2 INJECTION INTRAMUSCULAR; INTRAVENOUS at 07:25:00

## 2023-10-05 RX ADMIN — MIDAZOLAM HYDROCHLORIDE 1 MG: 1 INJECTION INTRAMUSCULAR; INTRAVENOUS at 07:24:00

## 2023-10-05 RX ADMIN — DEXAMETHASONE SODIUM PHOSPHATE 4 MG: 4 MG/ML VIAL (ML) INJECTION at 07:25:00

## 2023-10-05 NOTE — PHYSICAL THERAPY NOTE
PHYSICAL THERAPY HIP EVALUATION - INPATIENT     Room Number: FKQGI43/XBYXK57-Y  Evaluation Date: 10/5/2023  Type of Evaluation: Initial  Physician Order: PT Eval and Treat    Presenting Problem: s/p R BEATRIZ on 10/5     Reason for Therapy: Mobility Dysfunction and Discharge Planning    PHYSICAL THERAPY ASSESSMENT     Patient is a 80year old female admitted 10/5/2023 for R BEATRIZ. Patient's current functional deficits include weakness, hip rom restrictions, impaired balance and functional mobility, which are below the patient's pre-admission status. Patient will benefit from continued IP PT services to address these deficits in preparation for discharge. RN approved participation with physical therapy. Pt was received resting in bed and agreeable to activity. Niece at bedside. Educated on WBAT, posterior hip precautions, BEATRIZ mobility and exercise protocol, role of PT and PT plan of care. Pt verbalized understanding. Pt with no c/o pain this date. Handouts of precautions and exercises provided and reviewed. Bed mobility: SBA supine>sit and scooting to EOB with HOB elevated. Supervision for static sitting balance at EOB. Transfers: Min A for STS with RW. Verbal cues provided for safe hand placement and BLE placement prior to transfer with poor carryover noted. Initially with posterior lean when standing, able to correct with verbal cues. Poor eccentric control noted on stand>sit, min A for controlled descent. Gait: ambulated 50 ft with RW and Min A. Pt with slow pace, unsteady and shuffled gait, slightly tremulous with posterior lean. Decreased weight bearing and stance on RLE; step-to pattern and unable to progress this date. No LOB. Pt was left resting in bed with needs within reach, ice applied, handoff to RN complete. The patient's Approx Degree of Impairment: 46.58% has been calculated based on documentation in the [facility] '6 clicks' Inpatient Basic Mobility Short Form.   Research supports that patients with this level of impairment may benefit from home with [facility] PT. PT anticipates pt is on track to DC home with [facility] PT and 24 hour supervision. DISCHARGE RECOMMENDATIONS  PT Discharge Recommendations: 24 hour care/supervision;Home with home health PT    PLAN  PT Treatment Plan: Body mechanics; Bed mobility; Energy conservation;Patient education;Gait training;Strengthening;Stair training;Balance training;Transfer training; Endurance  Rehab Potential : Good  Frequency (Obs): BID    PHYSICAL THERAPY MEDICAL/SOCIAL HISTORY     Problem List  Active Problems:    * No active hospital problems. *    HOME SITUATION  Home Situation  Type of Home: House  Home Layout: One level  Stairs to Enter : 3  Railing: No  Lives With: Alone  Drives: Yes  Patient Owned Equipment: None  Patient Regularly Uses: None     Prior Level of Perry: independent with ADLs and mobility without assistive device. SUBJECTIVE  \"I had my other hip replaced 15 years ago\"    PHYSICAL THERAPY EXAMINATION     OBJECTIVE  Precautions: BEATRIZ - posterior;Hip abduction pillow  Fall Risk: High fall risk    WEIGHT BEARING RESTRICTION  R Lower Extremity: Weight Bearing as Tolerated    PAIN ASSESSMENT  Ratin    COGNITION  Overall Cognitive Status:  WFL - within functional limits    RANGE OF MOTION AND STRENGTH ASSESSMENT  Upper extremity ROM and strength are within functional limits. Lower extremity ROM is within functional limits. Lower extremity strength is within functional limits. BALANCE  Static Sitting: Good  Dynamic Sitting: Fair +  Static Standing: Fair -  Dynamic Standing: Poor +    AM-PAC '6-Clicks' INPATIENT SHORT FORM - BASIC MOBILITY  How much difficulty does the patient currently have. ..   Patient Difficulty: Turning over in bed (including adjusting bedclothes, sheets and blankets)?: A Little   Patient Difficulty: Sitting down on and standing up from a chair with arms (e.g., wheelchair, bedside commode, etc.): A Little   Patient Difficulty: Moving from lying on back to sitting on the side of the bed?: A Little   How much help from another person does the patient currently need. .. Help from Another: Moving to and from a bed to a chair (including a wheelchair)?: A Little   Help from Another: Need to walk in hospital room?: A Little   Help from Another: Climbing 3-5 steps with a railing?: A Little     AM-PAC Score:  Raw Score: 18   Approx Degree of Impairment: 46.58%   Standardized Score (AM-PAC Scale): 43.63   CMS Modifier (G-Code): CK    FUNCTIONAL ABILITY STATUS  Functional Mobility/Gait Assessment  Gait Assistance: Minimum assistance  Distance (ft): 50  Assistive Device: Rolling walker  Pattern: Shuffle;R Decreased stance time (L decreased step length, decreased weight bearing RLE, step-to pattern)    Bed Mobility: SBA    Transfers: Min A     Exercise/Education Provided:  Bed mobility  Body mechanics  Energy conservation  Functional activity tolerated  Gait training  Posture  Lower therapeutic exercise: Ankle pumps  Hip AB/AD  Heel slides  LAQ  Quad sets  Transfer training    Patient End of Session: In bed;Needs met;Call light within reach;RN aware of session/findings; All patient questions and concerns addressed; Ice applied; Family present    CURRENT GOALS    Goals to be met by: 10/12/23  Patient Goal Patient's self-stated goal is: go home   Goal #1 Patient is able to demonstrate supine - sit EOB @ level: modified independent   Goal #1   Current Status    Goal #2 Patient is able to demonstrate transfers Sit to/from Stand at assistance level: supervision     Goal #2  Current Status    Goal #3 Patient is able to ambulate 150 feet with assistive device at assistance level: supervision   Goal #3   Current Status    Goal #4 Patient will negotiate 3 stairs/one curb w/ assistive device and supervision   Goal #4   Current Status    Goal #5 Patient verbalizes and/or demonstrates all precautions and safety concerns independently   Goal #5   Current Status Goal #6 Patient independently performs home exercise program for ROM/strengthening per the instructions provided in preparation for discharge.    Goal #6  Current Status        Patient Evaluation Complexity Level:  History Low - no personal factors and/or co-morbidities   Examination of body systems Low - addressing 1-2 elements   Clinical Presentation Low - Stable   Clinical Decision Making Low Complexity       Gait Trainin minutes  Therapeutic Activity: 15 minutes

## 2023-10-05 NOTE — OPERATIVE REPORT
[facility]    BEATRIZ Brief Operative Note    Amadeo Romero Patient Status:  Outpatient in a Bed    1940 MRN [id number]   Location [place] [address] Attending Daniel Lock MD     PCP Santy Henderson MD       Preop DX: right hip degenerative joint disease   Postop DX: right hip degenerative joint disease  Procedure:  right total hip arthroplasty  Surgeon: Norman Spencer MD  Assistants:  Dr. Robert Lara, NP; Nick Alabama  Anesthesia: Spinal Anesthesia  EBL: 350 mL  Fluids: 1200 mL  Findings: DJD  right hip  Drain: None  Specimens: Bone right hip  Implants: Aroda, emphasys   Complications: none  All needle and sponge counts correct prior to leaving the operating room. All assistants were a medical necessity to complete this procedure. Plan: Transfer to recovery room in stable condition. Transition to floor when stable.        Spencer Blackwell MD  [phone] (c)  [phone] (o)  10/5/2023

## 2023-10-05 NOTE — ANESTHESIA PROCEDURE NOTES
Spinal Block    Date/Time: 10/5/2023 7:17 AM    Performed by: Justin Ng CRNA  Authorized by: Fara Parsons DO      General Information and Staff    Start Time:  10/5/2023 7:17 AM  End Time:  10/5/2023 7:20 AM  Anesthesiologist:  Fara Parsons DO  CRNA:  Justin Ng CRNA  Performed by:  CRNA  Patient Location:  OR  Site identification: surface landmarks    Reason for Block: at surgeon's request, post-op pain management and surgical anesthesia    Preanesthetic Checklist: patient identified, IV checked, risks and benefits discussed, monitors and equipment checked, pre-op evaluation, timeout performed, anesthesia consent and sterile technique used      Procedure Details    Patient Position:  Sitting  Prep: ChloraPrep    Monitoring:  Cardiac monitor, heart rate and continuous pulse ox  Approach:  Midline  Location:  L4-5  Injection Technique:  Single-shot    Needle    Needle Type:  Pencil-tip  Needle Gauge:  24 G  Needle Length:  3.5 in    Assessment    Sensory Level:  T10  Events: clear CSF, CSF aspirated, well tolerated and blood negative      Additional Comments

## 2023-10-05 NOTE — PLAN OF CARE
Report received from [facility]. Patient worked with physical therapy and ambulated with standby assist in perez. Neuro assessment WNL, CMS intact. Patient denies pain. Will begin [employer]. Incision is CDI. Saline locked, antibiotic given. Family at bedside and aware of plan.  Report given to receiving RN Agusto.

## 2023-10-05 NOTE — OPERATIVE REPORT
[address] [place] [address] 244, 2229 iMlena BIRD    OPERATIVE REPORT    PATIENT NAME: Chao Tripp  MR#: [id number]    DATE OF PROCEDURE: 10/5/2023  PREOPERATIVE DIAGNOSIS: Degenerative Arthritis (e.g., OA) of the Right hip  POSTOPERATIVE DIAGNOSIS: Degenerative Arthritis (e.g., OA) of the Right hip  SECONDARY DIAGNOSIS: None  OPERATION PERFORMED: Right total hip arthroplasty  SURGEON: Cristo Grider  ASSISTANT(S): 1st: Caryle Balder, 2nd: Aleksandra Hanley and 3rd: Gaby Le  YOB: 1960  ANESTHESIA: Spinal  BLOOD LOSS: 350  DRAIN: None  FLUIDS: 7046  COMPLICATIONS: None  FINDINGS: Degenerative Arthritis (e.g., OA)  SPECIMENS: The Right femoral head was sent to pathology  IMPLANTS:  Femoral Stem: Kanu and Kanu/DePuy - Hinds Porocoat Hip Stem (Size 6, Standard Offset 12/14 Taper , Lot#: [id number])  Femoral Head: Malagasy Dowdy and Kanu/DePuy - ARTICUL/NIMO Biolox Delta Head (36mm, 1.5, 12/14, Lot#: [id number])  Acetabular Cup: Catalog Number Unmatched (See Other Devices) or Missing  Acetabular Liner: Catalog Number Unmatched (See Other Devices) or Missing  Other Devices:  Kanu & Kanu/DePuy - EMPHASYS Acetabular Shell (Three-Hole 52mm Cementless, Lot#: [id number])  Depuy - Emphasys (Polyethylene Liner, 52mm, 36mm, Lot#: [id number])  Kanu and Kanu/DePuy - Cancellous Bone Screw (6.5mm, Length 30mm, Lot#: [id number])  Malagasy Dowdy and Kanu/DePuy - Cancellous Bone Screw (6.5mm, Length 30mm, Lot#: [id number])      DISPOSITION: The patient was taken to the recovery room in stable condition. BMI: 26.3    INDICATIONS: The patient is a 15-year-old woman who presented to the office with progressively worsening right hip pain. Her pain was refractory to all forms on non-operative management including anti-inflammatories, activity modification of the hip and corticosteroid injection.  The patient`s pain progressed to the point that her activities of daily living are limited and she has a very limited range of motion and limited ability to walk. This has negatively affected her ability to perform the basic activities of daily living. Based upon her radiographs, that showed severe degenerative joint disease of the right hip, she was indicated for a right total hip arthroplasty. We reviewed the risks, benefits and alternatives to the procedure and informed consent was obtained. The risks discussed included, but were not limited, to infection, nerve injury (sciatic and femoral nerves), arterial injury (femoral artery and it's branches), deep venous thrombosis, pulmonary embolus, wear, lysis, need for reoperation, incision numbness, dislocation, leg length discrepancy, loss of limb and loss of life. The patient understood these and informed consent was obtained as was medical clearance and there was no contraindications to surgery today. OPERATIVE TECHNIQUE: On Thursday, October 5, 2023, the patient was identified in the holding area and taken to the operating room. Prior to going to the operating room 1300mg of oral tranexamic acid was administered in the holding area for intra-operative and post-operative blood management. After preoperative antibiotics 2 grams of Cefazolin were ordered to be completed within 24 hours of the surgery) were administered, Ms. Gurpreet Hansen was given an Spinal anestheticand a ecehvarria catheter inserted under sterile conditions. The patient was then laid in the lateral decubitus position with her right hip facing up. Her pelvis was securely affixed to the operating room table and the bony prominences on the contralateral side were well padded. We then sterilely prepped and draped the right lower extremity in standard surgical fashion and a timeout was called. It was noted that the right side was the appropriate side for the surgery and we were allowed to proceed.  We then effected an incision over the posterior aspect of the patient's right hip, dissecting down through the dermal and epidermal layers into the subcutaneous tissue. Bovie cautery was used to maintain hemostasis as we dissected sharply down to the level of the deep fascia. The deep fascia was identified and incised in line with our incision and our Charnley retractor was placed deep to this layer. We then identified the external rotators on the posterior aspect of the proximal femur and elevated these muscles and the capsular layer off the posterior aspect of the proximal femur as a single sleeve of tissue using the bovie cautery. This gave us excellent visualization of the hip joint and protection of the sciatic nerve. We then dislocated the hip with flexion, adduction and internal rotation and measured approximately a 15mm neck osteotomy proximal to the lesser trochanter as we templated pre-operatively. A sagittal saw was then used to make this cut and the femoral head was then removed and sent off to pathology. We then placed retractors circumferentially around the acetabulum and debrided the ligamentum teres, labrum and pulvinar. Once these were debrided, we had excellent visualization of the hip joint and we were able to successfully ream up to a size 52 reamer for a 52mm Emphasys acetabular component. At this point we had an excellent bleeding bed of cancellous bone for component implantation. We then opened up our component and implanted in approximately 40 degrees of abduction and 20 degrees of anteversion. Once this was achieved we placed 2 screws through the posterior-superior quadrant of the cup obtaining good purchase with these screws. We then irrigated the wound with pulsatile lavage and placed our Emphasys 52mm polyethylene liner for a 36mm diameter femoral head. We then impacted the liner into place assuring the locking mechanism was engaged and directed our attention towards the femur.     We lifted the femur from the wound using a femoral elevator and removed the lateral aspect of the femoral neck using the box osteotome. We then used a Charnley awl to open the medullary canal and a lateralizing reamer proximally. We then successfully broached up to a size after using tapered reamers up to a size 6 and noted that we had excellent fit and fill proximally within the femur and good rotational and axial stability. We then trialed our 36mm, 1.5mm offset head and noted that we had good range of motion, no impingement and good stability of the hip and that this would be our final implant. We then opened up our size offset Tillman Porocoat Hip Stem primary hip prosthesis and impacted it in approximately 15 degrees of anteversion. Once the stem was fully seated, we then cleaned and dried the trunion of the stem and placed our 36mm, 1.5mm offset trial femoral head in place and impacted it into position. Once it was fully seated, we then reduced the hip and took it through a range of motion. We had excellent range of motion, good stability and no impingement. Our leg lengths appeared to be close to equal, as measured at the medial malleoli, and at this point in time we inserted our final size 36mm, 1.5mm offset head. The mario taper was cleaned and dried. This was impacted into place and once the [facility] taper was engaged, we again reduced the hip and then closed our short external rotator and capsular layer to the posterior aspect of the proximal femur and the gluteus medius tendon. The capsule was closed with a Ethibond #5 in interrupted fashion. Fascia closure was accomplished with a Quill #2 in continuous fashion. The subcutaneous layer was closed with Monocryl #2-0 in interrupted fashion. Skin closure was performed with a Monocryl #3-0 in interrupted fashion. A hemovac drain was not required. A sterile dressing was then placed over the hip. The patient was aroused in the operating room and taken to the recovery room in stable condition. Prior to leaving the OR all needle and sponge counts were correct.     Postoperatively, she will be weight bearing as tolerated. She will work with physical therapy. She will be on deep venous thrombosis prophylaxis for the next three weeks and susie-operative antibiotics for the next 24 hours. Based on medical history and extent of the surgery, the patient will be admitted to the hospital as an inpatient with an anticipated length of stay of 2-3 nights prior to discharge. Surgery was performed on 10/5/2023. The procedure performed was a Primary BEATRIZ. The surgical assistant was Nikhil Angulo. They were an active participant in the case and participated as a surgical assistant in all critical and noncritical steps including:  - Retractor placement and holding  - Operating room setup and patient positioning  - Closure of the various layers of soft tissue and skin  - Insertion of pins and holding screws of guides  - Dressing placement  - Transfer of patient to the stretcher and transport to the recovery room  Nikhil Angulo was a critical part of the case, and the surgery could not be performed without a qualified surgical assistant. I was present for all critical portions of the surgery and a backup surgeon was available at all times in case of emergency.     DICTATED BY: Malia Pinedo,  2023-10-05    SIGNED: 2023-10-05    DISTRIBUTION LIST:  Malia Pinedo

## 2023-10-05 NOTE — INTERVAL H&P NOTE
Pre-op Diagnosis: Right hip degenerative joint disease    The above referenced H&P was reviewed by Essie Carrizales MD on 10/5/2023, the patient was examined and no significant changes have occurred in the patient's condition since the H&P was performed. I discussed with the patient and/or legal representative the potential benefits, risks and side effects of this procedure; the likelihood of the patient achieving goals; and potential problems that might occur during recuperation. I discussed reasonable alternatives to the procedure, including risks, benefits and side effects related to the alternatives and risks related to not receiving this procedure. We will proceed with procedure as planned.

## 2023-10-06 PROBLEM — D64.9 ANEMIA: Status: ACTIVE | Noted: 2023-10-06

## 2023-10-06 PROBLEM — M16.11 PRIMARY OSTEOARTHRITIS OF RIGHT HIP: Status: ACTIVE | Noted: 2023-10-06

## 2023-10-06 LAB
ANION GAP SERPL CALC-SCNC: 4 MMOL/L (ref 0–18)
BUN BLD-MCNC: 23 MG/DL (ref 7–18)
BUN/CREAT SERPL: 28.4 (ref 10–20)
CALCIUM BLD-MCNC: 8.3 MG/DL (ref 8.5–10.1)
CHLORIDE SERPL-SCNC: 112 MMOL/L (ref 98–112)
CO2 SERPL-SCNC: 26 MMOL/L (ref 21–32)
CREAT BLD-MCNC: 0.81 MG/DL
DEPRECATED RDW RBC AUTO: 45.4 FL (ref 35.1–46.3)
EGFRCR SERPLBLD CKD-EPI 2021: 72 ML/MIN/1.73M2 (ref 60–?)
ERYTHROCYTE [DISTWIDTH] IN BLOOD BY AUTOMATED COUNT: 13.2 % (ref 11–15)
GLUCOSE BLD-MCNC: 112 MG/DL (ref 70–99)
HCT VFR BLD AUTO: 32.2 %
HGB BLD-MCNC: 10.7 G/DL
MCH RBC QN AUTO: 31.3 PG (ref 26–34)
MCHC RBC AUTO-ENTMCNC: 33.2 G/DL (ref 31–37)
MCV RBC AUTO: 94.2 FL
OSMOLALITY SERPL CALC.SUM OF ELEC: 298 MOSM/KG (ref 275–295)
PLATELET # BLD AUTO: 272 10(3)UL (ref 150–450)
POTASSIUM SERPL-SCNC: 4.3 MMOL/L (ref 3.5–5.1)
RBC # BLD AUTO: 3.42 X10(6)UL
SODIUM SERPL-SCNC: 142 MMOL/L (ref 136–145)
WBC # BLD AUTO: 8.4 X10(3) UL (ref 4–11)

## 2023-10-06 PROCEDURE — 99232 SBSQ HOSP IP/OBS MODERATE 35: CPT | Performed by: INTERNAL MEDICINE

## 2023-10-06 NOTE — PHYSICAL THERAPY NOTE
PHYSICAL THERAPY HIP TREATMENT NOTE - INPATIENT    Room Number: 433/433-A            Presenting Problem: s/p R BEATRIZ on 10/5  Co-Morbidities : L BEATRIZ 15 years ago    Problem List  Active Problems:    Primary osteoarthritis of right hip    Anemia      PHYSICAL THERAPY ASSESSMENT     Pm session. Min a for bed mobility and transfer;Extra time provided to complete task. EOB sitting balance activity with emphasis on core stabilization. Hip precautions reviewed. Education. Pt amb 2 x 100 ft with RW and CGA;provided cuing for gait pattern as well as for postural awareness. Navigated 4 stairs with CGA. There ex. The patient's Approx Degree of Impairment: 46.58% has been calculated based on documentation in the [facility] '6 clicks' Inpatient Basic Mobility Short Form. Research supports that patients with this level of impairment may benefit from 24 hour care/supervision. Home with Home Health PT    DISCHARGE RECOMMENDATIONS  PT Discharge Recommendations: 24 hour care/supervision    PLAN  PT Treatment Plan: Body mechanics; Bed mobility; Don/doff brace;Gait training  Frequency (Obs): Daily    SUBJECTIVE  Pt reports being ready for PT RX    OBJECTIVE  Precautions: BEATRIZ - posterior    WEIGHT BEARING RESTRICTION        R Lower Extremity: Weight Bearing as Tolerated       PAIN ASSESSMENT   Ratin          BALANCE  Static Sitting: Good  Dynamic Sitting: Fair +  Static Standing: Fair -  Dynamic Standing: Poor +  ACTIVITY TOLERANCE                         O2 WALK       AM-PAC '6-Clicks' INPATIENT SHORT FORM - BASIC MOBILITY  How much difficulty does the patient currently have. ..   Patient Difficulty: Turning over in bed (including adjusting bedclothes, sheets and blankets)?: A Little   Patient Difficulty: Sitting down on and standing up from a chair with arms (e.g., wheelchair, bedside commode, etc.): A Little   Patient Difficulty: Moving from lying on back to sitting on the side of the bed?: A Little   How much help from another person does the patient currently need. .. Help from Another: Moving to and from a bed to a chair (including a wheelchair)?: A Little   Help from Another: Need to walk in hospital room?: A Little   Help from Another: Climbing 3-5 steps with a railing?: A Little     AM-PAC Score:  Raw Score: 18   Approx Degree of Impairment: 46.58%   Standardized Score (AM-PAC Scale): 43.63   CMS Modifier (G-Code): CK    FUNCTIONAL ABILITY STATUS  Functional Mobility/Gait Assessment  Gait Assistance: Contact guard assist  Distance (ft): 2 x 100  Assistive Device: Rolling walker  Pattern: Shuffle  Stairs: Stairs  How Many Stairs: 4  Device: 2 Rails  Assist: Contact guard assist  Pattern: Ascend and Descend    Additional Information:     Exercises AM Session PM Session   Ankle Pumps     20 reps 20 reps   Quad Sets 20 reps  20 reps   Glut Sets 20 reps  20 reps   Patient End of Session: Up in chair;Call light within reach;RN aware of session/findings; All patient questions and concerns addressed    CURRENT GOALS   Patient Goal Patient's self-stated goal is: go home   Goal #1 Patient is able to demonstrate supine - sit EOB @ level: modified independent   Goal #1   Current Status Min a   Goal #2 Patient is able to demonstrate transfers Sit to/from Stand at assistance level: supervision     Goal #2  Current Status Min a   Goal #3 Patient is able to ambulate 150 feet with assistive device at assistance level: supervision   Goal #3   Current Status Pt amb 2 x 100 ft with RW and CGA   Goal #4 Patient will negotiate 3 stairs/one curb w/ assistive device and supervision   Goal #4   Current Status Navigated 4 stairs with CGA   Goal #5 Patient verbalizes and/or demonstrates all precautions and safety concerns independently   Goal #5   Current Status In progress   Goal #6 Patient independently performs home exercise program for ROM/strengthening per the instructions provided in preparation for discharge.    Goal #6  Current Status In progress   Gait-15 minutes; There activity-15 minutes

## 2023-10-06 NOTE — PHYSICAL THERAPY NOTE
PHYSICAL THERAPY HIP TREATMENT NOTE - INPATIENT    Room Number: 433/433-A            Presenting Problem: s/p R BEATRIZ on 10/5  Co-Morbidities : L BEATRIZ 15 years ago    Problem List  Active Problems:    Primary osteoarthritis of right hip    Anemia      PHYSICAL THERAPY ASSESSMENT     Am session. Min a for bed mobility and transfer;Extra time provided to complete task. EOB sitting balance activity with emphasis on core stabilization. Hip precautions reviewed. Education. Pt amb 2 x 75 ft with RW and CGA;provided cuing for gait pattern as well as for postural awareness. There ex. The patient's Approx Degree of Impairment: 46.58% has been calculated based on documentation in the [facility] '6 clicks' Inpatient Basic Mobility Short Form. Research supports that patients with this level of impairment may benefit from 24 hour care/supervision. Home with Home Health PT    DISCHARGE RECOMMENDATIONS  PT Discharge Recommendations: 24 hour care/supervision;Home with home health PT    PLAN  PT Treatment Plan: Bed mobility; Body mechanics; Endurance; Patient education;Gait training  Frequency (Obs): BID    SUBJECTIVE  Pt reports being ready for PT RX    OBJECTIVE  Precautions: BEATRIZ - posterior;Hip abduction pillow    WEIGHT BEARING RESTRICTION        R Lower Extremity: Weight Bearing as Tolerated       PAIN ASSESSMENT   Ratin          BALANCE  Static Sitting: Good  Dynamic Sitting: Fair +  Static Standing: Fair -  Dynamic Standing: Poor +  ACTIVITY TOLERANCE                         O2 WALK       AM-PAC '6-Clicks' INPATIENT SHORT FORM - BASIC MOBILITY  How much difficulty does the patient currently have. ..   Patient Difficulty: Turning over in bed (including adjusting bedclothes, sheets and blankets)?: A Little   Patient Difficulty: Sitting down on and standing up from a chair with arms (e.g., wheelchair, bedside commode, etc.): A Little   Patient Difficulty: Moving from lying on back to sitting on the side of the bed?: A Little   How much help from another person does the patient currently need. .. Help from Another: Moving to and from a bed to a chair (including a wheelchair)?: A Little   Help from Another: Need to walk in hospital room?: A Little   Help from Another: Climbing 3-5 steps with a railing?: A Little     AM-PAC Score:  Raw Score: 18   Approx Degree of Impairment: 46.58%   Standardized Score (AM-PAC Scale): 43.63   CMS Modifier (G-Code): CK    FUNCTIONAL ABILITY STATUS  Functional Mobility/Gait Assessment  Gait Assistance: Contact guard assist  Distance (ft): 2 x 75  Assistive Device: Rolling walker  Pattern: Shuffle    Additional Information:     Exercises AM Session PM Session   Ankle Pumps     20 reps  reps   Quad Sets 20 reps  reps   Glut Sets 20 reps  reps   Patient End of Session: Up in chair;Call light within reach;RN aware of session/findings; All patient questions and concerns addressed    CURRENT GOALS   Patient Goal Patient's self-stated goal is: go home   Goal #1 Patient is able to demonstrate supine - sit EOB @ level: modified independent   Goal #1   Current Status Min a   Goal #2 Patient is able to demonstrate transfers Sit to/from Stand at assistance level: supervision     Goal #2  Current Status Min a   Goal #3 Patient is able to ambulate 150 feet with assistive device at assistance level: supervision   Goal #3   Current Status Pt amb 2 x 75 ft with RW and CGA   Goal #4 Patient will negotiate 3 stairs/one curb w/ assistive device and supervision   Goal #4   Current Status NT   Goal #5 Patient verbalizes and/or demonstrates all precautions and safety concerns independently   Goal #5   Current Status In progress   Goal #6 Patient independently performs home exercise program for ROM/strengthening per the instructions provided in preparation for discharge. Goal #6  Current Status In progress   Gait-15 minutes; There activity-15 minutes

## 2023-10-06 NOTE — PLAN OF CARE
Patient is alert and oriented. RA. SCDs on for DVT prophylaxis. Funk in place, to be removed this AM, then patient will be check void. PRN norco given for pain management. Up x1 with walker. Dressing in place to right hip, intact. Call light within reach, bed alarm active.   Problem: Patient Centered Care  Goal: Patient preferences are identified and integrated in the patient's plan of care  Description: Interventions:  - Provide timely, complete, and accurate information to patient/family  - Incorporate patient and family knowledge, values, beliefs, and cultural backgrounds into the planning and delivery of care  - Encourage patient/family to participate in care and decision-making at the level they choose  - Honor patient and family perspectives and choices  Outcome: Progressing     Problem: PAIN - ADULT  Goal: Verbalizes/displays adequate comfort level or patient's stated pain goal  Description: INTERVENTIONS:  - Encourage pt to monitor pain and request assistance  - Assess pain using appropriate pain scale  - Administer analgesics based on type and severity of pain and evaluate response  - Implement non-pharmacological measures as appropriate and evaluate response  - Consider cultural and social influences on pain and pain management  - Manage/alleviate anxiety  - Utilize distraction and/or relaxation techniques  - Monitor for opioid side effects  - Notify MD/LIP if interventions unsuccessful or patient reports new pain  - Anticipate increased pain with activity and pre-medicate as appropriate  Outcome: Progressing     Problem: RISK FOR INFECTION - ADULT  Goal: Absence of fever/infection during anticipated neutropenic period  Description: INTERVENTIONS  - Monitor WBC  - Administer growth factors as ordered  - Implement neutropenic guidelines  Outcome: Progressing     Problem: SAFETY ADULT - FALL  Goal: Free from fall injury  Description: INTERVENTIONS:  - Assess pt frequently for physical needs  - Identify cognitive and physical deficits and behaviors that affect risk of falls.   - [place] fall precautions as indicated by assessment.  - Educate pt/family on patient safety including physical limitations  - Instruct pt to call for assistance with activity based on assessment  - Modify environment to reduce risk of injury  - Provide assistive devices as appropriate  - Consider OT/PT consult to assist with strengthening/mobility  - Encourage toileting schedule  Outcome: Progressing     Problem: DISCHARGE PLANNING  Goal: Discharge to home or other facility with appropriate resources  Description: INTERVENTIONS:  - Identify barriers to discharge w/pt and caregiver  - Include patient/family/discharge partner in discharge planning  - Arrange for needed discharge resources and transportation as appropriate  - Identify discharge learning needs (meds, wound care, etc)  - Arrange for interpreters to assist at discharge as needed  - Consider post-discharge preferences of patient/family/discharge partner  - Complete POLST form as appropriate  - Assess patient's ability to be responsible for managing their own health  - Refer to Case Management Department for coordinating discharge planning if the patient needs post-hospital services based on physician/LIP order or complex needs related to functional status, cognitive ability or social support system  Outcome: Progressing

## 2023-10-06 NOTE — DISCHARGE INSTRUCTIONS
You are seen in the hospital for scheduled hip replacement with Dr. Matti Luis. You did well with surgery and you were monitored closely in the postoperative setting. You did well with pain control. PT and OT were recommending home health services upon discharge    New medications on discharge:  - Your blood thinner has been changed to aspirin 81 mg twice a day. Take this for 2 weeks, then after 2 weeks you may resume once a day as you have been taking at home  - You can take your Norco for severe episodes of pain  - We also sent for tramadol as an alternative in the event you have significant side effects of Norco (severe constipation, abdominal discomfort, history of confusion in the past)      Do not use both Norco and tramadol at the same time. Try to spread out the medications by at least 4-6 hours for only severe episodes of pain  Try to stick to Tylenol every 4-6 hours as needed for mild-moderate pain      Monitor blood pressures daily; take amlodipine only if sbp>140. Log your blood pressures for your visit with Dr. Calvin Christine in 1-2 weeks for post-hospital follow-up    Follow-up with Dr. Matti Luis in orthopedic clinic    The patient will call for an appointment in the next 2 weeks for follow up    The patient has been instructed on wound care and may shower if wound is clean and dry. ?If the wound has drainage then dry dressing changes should be performed daily until the wound is dry. ?The patient has been instructed to contact the office if increasing drainage, redness, or swelling to the operative wound/extremity occurs. ?Similarly, if they develop fevers and chills they should call the office ASAP. The patient will continue to wear CRISTA hose to both legs for 3 weeks. ? They may remove them at night or if they are causing skin irritation. ?Prescribed DVT prophylaxis should be taken for 2-3 weeks after discharge (as written on prescription).  ?Oral pain medications have been provided and instruction on administration given to the patient. ?If there are any questions or increasing or uncontrolled pain, the patient should call the office [phone]. The patient will resume a normal diet. ? They should anticipate a slight decrease in appetite after surgery, but should notify the office if there are any problems with nausea, vomiting, constipation or diarrhea. ?A stool softner has been ordered and should be taken regularly while on pain medications.      - ambulate with a walker  - take pain medications as needed for pain  - ice as needed  - while taking pain medications, take a stool softener or laxative to keep bowel movements regular  - use incentive spirometer 10x per hour  - monitor for signs of infection (fever, chills, nausea, vomiting, increased pain, swelling, redness, odor)  - contact office with additional questions      [address] [place], [place] [address] Velarde [address], [address]  Phone: [phone]  Fax: [phone]

## 2023-10-06 NOTE — PLAN OF CARE
Pt is alert and oriented x4. On room air. Vital signs stable. Ambulating with 1 assist and a walker. On general diet. Tolerating well. Funk removed at 7am. Pt on voiding trial. Pt complains of pain. Oxycodone and norco given. Safety measures maintained. Will continue to monitor.      Problem: Patient Centered Care  Goal: Patient preferences are identified and integrated in the patient's plan of care  Description: Interventions:  - Provide timely, complete, and accurate information to patient/family  - Incorporate patient and family knowledge, values, beliefs, and cultural backgrounds into the planning and delivery of care  - Encourage patient/family to participate in care and decision-making at the level they choose  - Honor patient and family perspectives and choices  Outcome: Progressing     Problem: Patient/Family Goals  Goal: Patient/Family Long Term Goal  Description: Patient's Long Term Goal: Discharge home    Interventions:  - Identify barriers to discharge w/pt and caregiver  - Include patient/family/discharge partner in discharge planning  - Arrange for needed discharge resources and transportation as appropriate  - Identify discharge learning needs (meds, wound care, etc)  - Arrange for interpreters to assist at discharge as needed  - Consider post-discharge preferences of patient/family/discharge partner  - Complete POLST form as appropriate  - Assess patient's ability to be responsible for managing their own health  - Refer to Case Management Department for coordinating discharge planning if the patient needs post-hospital services based on physician/LIP order or complex needs related to functional status, cognitive ability or social support system    - See additional Care Plan goals for specific interventions  Outcome: Progressing  Goal: Patient/Family Short Term Goal  Description: Patient's Short Term Goal: Pain control    Interventions:   -Assess pain level  -Encourage pt to notify of increasing pain level  -Administer pain medication as prescribed and prn  -Provide non-pharmacological intervention  -Follow plan of care    - See additional Care Plan goals for specific interventions  Outcome: Progressing     Problem: PAIN - ADULT  Goal: Verbalizes/displays adequate comfort level or patient's stated pain goal  Description: INTERVENTIONS:  - Encourage pt to monitor pain and request assistance  - Assess pain using appropriate pain scale  - Administer analgesics based on type and severity of pain and evaluate response  - Implement non-pharmacological measures as appropriate and evaluate response  - Consider cultural and social influences on pain and pain management  - Manage/alleviate anxiety  - Utilize distraction and/or relaxation techniques  - Monitor for opioid side effects  - Notify MD/LIP if interventions unsuccessful or patient reports new pain  - Anticipate increased pain with activity and pre-medicate as appropriate  Outcome: Progressing     Problem: RISK FOR INFECTION - ADULT  Goal: Absence of fever/infection during anticipated neutropenic period  Description: INTERVENTIONS  - Monitor WBC  - Administer growth factors as ordered  - Implement neutropenic guidelines  Outcome: Progressing     Problem: SAFETY ADULT - FALL  Goal: Free from fall injury  Description: INTERVENTIONS:  - Assess pt frequently for physical needs  - Identify cognitive and physical deficits and behaviors that affect risk of falls.   - [place] fall precautions as indicated by assessment.  - Educate pt/family on patient safety including physical limitations  - Instruct pt to call for assistance with activity based on assessment  - Modify environment to reduce risk of injury  - Provide assistive devices as appropriate  - Consider OT/PT consult to assist with strengthening/mobility  - Encourage toileting schedule  Outcome: Progressing     Problem: DISCHARGE PLANNING  Goal: Discharge to home or other facility with appropriate resources  Description: INTERVENTIONS:  - Identify barriers to discharge w/pt and caregiver  - Include patient/family/discharge partner in discharge planning  - Arrange for needed discharge resources and transportation as appropriate  - Identify discharge learning needs (meds, wound care, etc)  - Arrange for interpreters to assist at discharge as needed  - Consider post-discharge preferences of patient/family/discharge partner  - Complete POLST form as appropriate  - Assess patient's ability to be responsible for managing their own health  - Refer to Case Management Department for coordinating discharge planning if the patient needs post-hospital services based on physician/LIP order or complex needs related to functional status, cognitive ability or social support system  Outcome: Progressing

## 2023-10-06 NOTE — PLAN OF CARE
POD 0 of RTHA, pt alert and oriented x4. On room air. VSS. Saline locked. Telfa abd tegaderm to incision. No pain medication given this shift. Wbat ambulating x1 with walker.  Plan is home with 24 hr care and home with [facility] PT.

## 2023-10-06 NOTE — CM/SW NOTE
Department  notified of request for [facility] and [facility], matt referrals started. Assigned CM/SW to follow up with pt/family on further discharge planning.      Nicole Obrien [facility]

## 2023-10-06 NOTE — ANESTHESIA POST-OP FOLLOW-UP NOTE
Patient: Barbra Servinr  Right total hip arthroplasty  Anesthesia type: spinal    Patient location: floor  Last vitals:    10/06/23  1036   BP: 99/76   Pulse: 90   Resp:    Temp:    SpO2:      Level of consciousness: full    Post-anesthesia pain: controlled  Airway patency: patent  Respiratory: unassisted  Cardiovascular: stable and blood pressure at baseline  Hydration: euvolemic    Anesthetic complications: no    Domo Glez MD, 10/06/23, 11:09 AM

## 2023-10-06 NOTE — CM/SW NOTE
CM received e-mail from [address] [place] [address] office, pt pre-op dc plan is:  Home with [facility]    CM requested department  [facility]) to initiate [address] referral for [address] [place] [address] Lb Lagos on admission. F2F entered. Plan: Home with [facility]. Belkys Bradley.  Gabriela Perez RN, BSN  Nurse   [phone]

## 2023-10-07 ENCOUNTER — TELEPHONE (OUTPATIENT)
Dept: INTERNAL MEDICINE CLINIC | Facility: CLINIC | Age: 83
End: 2023-10-07

## 2023-10-07 VITALS
DIASTOLIC BLOOD PRESSURE: 63 MMHG | BODY MASS INDEX: 26.22 KG/M2 | TEMPERATURE: 98 F | WEIGHT: 173 LBS | HEIGHT: 68 IN | OXYGEN SATURATION: 95 % | HEART RATE: 77 BPM | SYSTOLIC BLOOD PRESSURE: 127 MMHG | RESPIRATION RATE: 18 BRPM

## 2023-10-07 LAB
BASOPHILS # BLD AUTO: 0.05 X10(3) UL (ref 0–0.2)
BASOPHILS NFR BLD AUTO: 0.4 %
DEPRECATED RDW RBC AUTO: 45.4 FL (ref 35.1–46.3)
EOSINOPHIL # BLD AUTO: 0.03 X10(3) UL (ref 0–0.7)
EOSINOPHIL NFR BLD AUTO: 0.2 %
ERYTHROCYTE [DISTWIDTH] IN BLOOD BY AUTOMATED COUNT: 13.2 % (ref 11–15)
HCT VFR BLD AUTO: 33.9 %
HGB BLD-MCNC: 11.3 G/DL
IMM GRANULOCYTES # BLD AUTO: 0.07 X10(3) UL (ref 0–1)
IMM GRANULOCYTES NFR BLD: 0.6 %
LYMPHOCYTES # BLD AUTO: 1.29 X10(3) UL (ref 1–4)
LYMPHOCYTES NFR BLD AUTO: 10.5 %
MCH RBC QN AUTO: 31.5 PG (ref 26–34)
MCHC RBC AUTO-ENTMCNC: 33.3 G/DL (ref 31–37)
MCV RBC AUTO: 94.4 FL
MONOCYTES # BLD AUTO: 1.25 X10(3) UL (ref 0.1–1)
MONOCYTES NFR BLD AUTO: 10.1 %
NEUTROPHILS # BLD AUTO: 9.64 X10 (3) UL (ref 1.5–7.7)
NEUTROPHILS # BLD AUTO: 9.64 X10(3) UL (ref 1.5–7.7)
NEUTROPHILS NFR BLD AUTO: 78.2 %
PLATELET # BLD AUTO: 317 10(3)UL (ref 150–450)
RBC # BLD AUTO: 3.59 X10(6)UL
WBC # BLD AUTO: 12.3 X10(3) UL (ref 4–11)

## 2023-10-07 PROCEDURE — 99239 HOSP IP/OBS DSCHRG MGMT >30: CPT | Performed by: INTERNAL MEDICINE

## 2023-10-07 RX ORDER — ACETAMINOPHEN 325 MG/1
650 TABLET ORAL EVERY 6 HOURS PRN
Status: DISCONTINUED | OUTPATIENT
Start: 2023-10-07 | End: 2023-10-07

## 2023-10-07 RX ORDER — ASPIRIN 81 MG/1
81 TABLET ORAL 2 TIMES DAILY
Qty: 28 TABLET | Refills: 0 | Status: SHIPPED | OUTPATIENT
Start: 2023-10-07 | End: 2023-10-21

## 2023-10-07 RX ORDER — TRAMADOL HYDROCHLORIDE 50 MG/1
50 TABLET ORAL EVERY 12 HOURS PRN
Qty: 30 TABLET | Refills: 0 | Status: SHIPPED | OUTPATIENT
Start: 2023-10-07

## 2023-10-07 NOTE — PLAN OF CARE
Post-op day #2. Dressing in place to right hip. Monitoring vital signs- stable at this time. No acute changes noted throughout shift. Tolerating general diet. Voiding up to bathroom. Patient is on Room Air. SCDs and xarelto for DVT prophylaxis. Pain medication controlled with tylenol. Up with standby assist and a walker. Fall precautions maintained- bed alarm on, bed locked in lowest position, call light and personal belongings within reach, non-skid socks in place to bilateral feet. Frequent rounding by nursing staff. Plan for discharge home. Patient cleared by internal medicine, ortho surgery, PT/OT, and social work. Going home with Home Health. Verified that medications- aspirin and tramadol are at patient's pharmacy. IV removed, discharge education provided, patient sent home with all personal belongings, scripts, and discharge instructions. Addressed additional questions.       Problem: Patient Centered Care  Goal: Patient preferences are identified and integrated in the patient's plan of care  Description: Interventions:  - What would you like us to know as we care for you?   - Provide timely, complete, and accurate information to patient/family  - Incorporate patient and family knowledge, values, beliefs, and cultural backgrounds into the planning and delivery of care  - Encourage patient/family to participate in care and decision-making at the level they choose  - Honor patient and family perspectives and choices  Outcome: Adequate for Discharge     Problem: Patient/Family Goals  Goal: Patient/Family Long Term Goal  Description: Patient's Long Term Goal: Discharge home    Interventions:  - Clearance from PT, SW, internal medicine, and ortho surgery  - Discharge planning  - See additional Care Plan goals for specific interventions  Outcome: Adequate for Discharge  Goal: Patient/Family Short Term Goal  Description: Patient's Short Term Goal:    Interventions:   - See additional Care Plan goals for specific interventions  Outcome: Adequate for Discharge     Problem: PAIN - ADULT  Goal: Verbalizes/displays adequate comfort level or patient's stated pain goal  Description: INTERVENTIONS:  - Encourage pt to monitor pain and request assistance  - Assess pain using appropriate pain scale  - Administer analgesics based on type and severity of pain and evaluate response  - Implement non-pharmacological measures as appropriate and evaluate response  - Consider cultural and social influences on pain and pain management  - Manage/alleviate anxiety  - Utilize distraction and/or relaxation techniques  - Monitor for opioid side effects  - Notify MD/LIP if interventions unsuccessful or patient reports new pain  - Anticipate increased pain with activity and pre-medicate as appropriate  Outcome: Adequate for Discharge     Problem: RISK FOR INFECTION - ADULT  Goal: Absence of fever/infection during anticipated neutropenic period  Description: INTERVENTIONS  - Monitor WBC  - Administer growth factors as ordered  - Implement neutropenic guidelines  Outcome: Adequate for Discharge     Problem: SAFETY ADULT - FALL  Goal: Free from fall injury  Description: INTERVENTIONS:  - Assess pt frequently for physical needs  - Identify cognitive and physical deficits and behaviors that affect risk of falls.   - [place] fall precautions as indicated by assessment.  - Educate pt/family on patient safety including physical limitations  - Instruct pt to call for assistance with activity based on assessment  - Modify environment to reduce risk of injury  - Provide assistive devices as appropriate  - Consider OT/PT consult to assist with strengthening/mobility  - Encourage toileting schedule  Outcome: Adequate for Discharge     Problem: DISCHARGE PLANNING  Goal: Discharge to home or other facility with appropriate resources  Description: INTERVENTIONS:  - Identify barriers to discharge w/pt and caregiver  - Include patient/family/discharge partner in discharge planning  - Arrange for needed discharge resources and transportation as appropriate  - Identify discharge learning needs (meds, wound care, etc)  - Arrange for interpreters to assist at discharge as needed  - Consider post-discharge preferences of patient/family/discharge partner  - Complete POLST form as appropriate  - Assess patient's ability to be responsible for managing their own health  - Refer to Case Management Department for coordinating discharge planning if the patient needs post-hospital services based on physician/LIP order or complex needs related to functional status, cognitive ability or social support system  Outcome: Adequate for Discharge

## 2023-10-07 NOTE — TELEPHONE ENCOUNTER
Left VM on patient's cell - [phone]    I was able to call home number - [phone] to speak with Cuco Blackmon directly    Clarified that ASA 81 mg BID should be for a total of 3 weeks rather than 2 weeks per DC instructions/paperwork.  She acknowledged  the change and in agreement

## 2023-10-07 NOTE — PHYSICAL THERAPY NOTE
PHYSICAL THERAPY HIP TREATMENT NOTE - INPATIENT    Room Number: 433/433-A            Presenting Problem: s/p R BEATRIZ on 10/5  Co-Morbidities : L BEATRIZ 15 years ago    Problem List  Active Problems:    Hypercholesterolemia    Primary hypertension    Primary osteoarthritis of right hip    Anemia      PHYSICAL THERAPY ASSESSMENT     RN approved PT session, pt sitting in chair with pillow between her leges. Instructed on importance to maintain hip posterior precautions and observed with activity. Pt able to recall them. Performing sitting thera exs with to improve muscles strength. Sit to stand with SBA and pt amb outside of room with RW and needs cues to increased BERNA with amb. Taking rest break in sitting position on bench and amb back to pt's room. Pt posiiton in chair with all needs in reach and soft pillow to observed hip posterior precautions. Instructed on safety at home and cont with mobility. Pt with good carryover and working towards goals. The patient's Approx Degree of Impairment: 41.77% has been calculated based on documentation in the [facility] '6 clicks' Inpatient Basic Mobility Short Form. Research supports that patients with this level of impairment may benefit from 14 hrs of care/supervision. Fan Lipoma DISCHARGE RECOMMENDATIONS  PT Discharge Recommendations: 24 hour care/supervision    PLAN  PT Treatment Plan: Body mechanics; Patient education;Gait training;Strengthening;Transfer training;Balance training  Frequency (Obs): Daily    SUBJECTIVE  I have a caregiver at home    OBJECTIVE  Precautions: BEATRIZ - posterior    WEIGHT BEARING RESTRICTION        R Lower Extremity: Weight Bearing as Tolerated       PAIN ASSESSMENT   Ratin  Location: R hip area  Management Techniques:  Activity promotion;Breathing techniques;Relaxation;Repositioning    BALANCE  Static Sitting: Good  Dynamic Sitting: Fair +  Static Standing: Fair +  Dynamic Standing: Fair -  ACTIVITY TOLERANCE                         O2 WALK       AM-PAC '6-Clicks' INPATIENT SHORT FORM - BASIC MOBILITY  How much difficulty does the patient currently have. .. Patient Difficulty: Turning over in bed (including adjusting bedclothes, sheets and blankets)?: A Little   Patient Difficulty: Sitting down on and standing up from a chair with arms (e.g., wheelchair, bedside commode, etc.): None   Patient Difficulty: Moving from lying on back to sitting on the side of the bed?: A Little   How much help from another person does the patient currently need. .. Help from Another: Moving to and from a bed to a chair (including a wheelchair)?: A Little   Help from Another: Need to walk in hospital room?: A Little   Help from Another: Climbing 3-5 steps with a railing?: A Little     AM-PAC Score:  Raw Score: 19   Approx Degree of Impairment: 41.77%   Standardized Score (AM-PAC Scale): 45.44   CMS Modifier (G-Code): CK    FUNCTIONAL ABILITY STATUS  Functional Mobility/Gait Assessment  Gait Assistance: Supervision  Distance (ft): 100 ft x2  Assistive Device: Rolling walker  Pattern: Shuffle;R Decreased stance time  Stairs: Other (comment)  How Many Stairs: -  Device: 2 Rails  Assist: Contact guard assist  Pattern: Ascend and Descend    Additional Information:     Exercises AM Session    Ankle Pumps     10 reps    Quad Sets 10 reps    Glut Sets 10 reps    Hip Abd/Add 10 reps    Heel slides 0 reps    Saq 0 reps    Sitting Knee Extension 10 reps    Standing heel/toe raises 0 reps    Hamstring Curls 0 reps    Forward, back steps 0 reps    Short Squats 0 reps      Patient End of Session: Up in chair;Needs met;Call light within reach;RN aware of session/findings; All patient questions and concerns addressed; Alarm set    CURRENT GOALS     CURRENT GOALS   Patient Goal Patient's self-stated goal is: go home   Goal #1 Patient is able to demonstrate supine - sit EOB @ level: modified independent   Goal #1   Current Status CGA   Goal #2 Patient is able to demonstrate transfers Sit to/from Stand at assistance level: supervision     Goal #2  Current Status SBA   Goal #3 Patient is able to ambulate 150 feet with assistive device at assistance level: supervision   Goal #3   Current Status Pt amb 2 x 100 ft with RW and Supervision   Goal #4 Patient will negotiate 3 stairs/one curb w/ assistive device and supervision   Goal #4   Current Status Discussed steps negotiation with correct technique   Goal #5 Patient verbalizes and/or demonstrates all precautions and safety concerns independently   Goal #5   Current Status In progress   Goal #6 Patient independently performs home exercise program for ROM/strengthening per the instructions provided in preparation for discharge. Goal #6  Current Status In progress   Gait-15 minutes; There activity-15 minutes

## 2023-10-07 NOTE — CM/SW NOTE
10/07/23 1100   Discharge disposition   Expected discharge disposition Home-Health   Post Acute Care Provider [address]  ([id number])   Discharge transportation Private car     [id number] notified via Aidin of dc today. [address], [address]  Phone: [phone]  Fax: [phone]    Miracle Briones.  Sandee Mendez RN, BSN  Nurse   [phone]

## 2023-10-07 NOTE — PLAN OF CARE
Patient is POD 3. Alert and oriented x4 on room air. SCDs, TEDs, and xarelto for DVT prophylaxis. Up x1 with walker, voiding freely in bathroom. After PRN norco given before shift change, pt became confused during the nighttime. Needed reorientation. Pt and family stated that pt has become confused in the past around nighttime with pain meds. Order for PRN tylenol obtained, given instead for pain. Saline locked. Call light within reach. Plan for home with [facility] when cleared.   Problem: Patient Centered Care  Goal: Patient preferences are identified and integrated in the patient's plan of care  Description: Interventions:  - What would you like us to know as we care for you?   - Provide timely, complete, and accurate information to patient/family  - Incorporate patient and family knowledge, values, beliefs, and cultural backgrounds into the planning and delivery of care  - Encourage patient/family to participate in care and decision-making at the level they choose  - Honor patient and family perspectives and choices  Outcome: Progressing     Problem: Patient/Family Goals  Goal: Patient/Family Long Term Goal  Description: Patient's Long Term Goal:     Interventions:  -   - See additional Care Plan goals for specific interventions  Outcome: Progressing  Goal: Patient/Family Short Term Goal  Description: Patient's Short Term Goal: Pain free by end of shift    Interventions:   - Pain meds per [facility]  - See additional Care Plan goals for specific interventions  Outcome: Progressing     Problem: PAIN - ADULT  Goal: Verbalizes/displays adequate comfort level or patient's stated pain goal  Description: INTERVENTIONS:  - Encourage pt to monitor pain and request assistance  - Assess pain using appropriate pain scale  - Administer analgesics based on type and severity of pain and evaluate response  - Implement non-pharmacological measures as appropriate and evaluate response  - Consider cultural and social influences on pain and pain management  - Manage/alleviate anxiety  - Utilize distraction and/or relaxation techniques  - Monitor for opioid side effects  - Notify MD/LIP if interventions unsuccessful or patient reports new pain  - Anticipate increased pain with activity and pre-medicate as appropriate  Outcome: Progressing     Problem: RISK FOR INFECTION - ADULT  Goal: Absence of fever/infection during anticipated neutropenic period  Description: INTERVENTIONS  - Monitor WBC  - Administer growth factors as ordered  - Implement neutropenic guidelines  Outcome: Progressing     Problem: SAFETY ADULT - FALL  Goal: Free from fall injury  Description: INTERVENTIONS:  - Assess pt frequently for physical needs  - Identify cognitive and physical deficits and behaviors that affect risk of falls.   - [place] fall precautions as indicated by assessment.  - Educate pt/family on patient safety including physical limitations  - Instruct pt to call for assistance with activity based on assessment  - Modify environment to reduce risk of injury  - Provide assistive devices as appropriate  - Consider OT/PT consult to assist with strengthening/mobility  - Encourage toileting schedule  Outcome: Progressing     Problem: DISCHARGE PLANNING  Goal: Discharge to home or other facility with appropriate resources  Description: INTERVENTIONS:  - Identify barriers to discharge w/pt and caregiver  - Include patient/family/discharge partner in discharge planning  - Arrange for needed discharge resources and transportation as appropriate  - Identify discharge learning needs (meds, wound care, etc)  - Arrange for interpreters to assist at discharge as needed  - Consider post-discharge preferences of patient/family/discharge partner  - Complete POLST form as appropriate  - Assess patient's ability to be responsible for managing their own health  - Refer to Case Management Department for coordinating discharge planning if the patient needs post-hospital services based on physician/LIP order or complex needs related to functional status, cognitive ability or social support system  Outcome: Progressing

## 2023-11-06 ENCOUNTER — OFFICE VISIT (OUTPATIENT)
Dept: INTERNAL MEDICINE CLINIC | Facility: CLINIC | Age: 83
End: 2023-11-06

## 2023-11-06 ENCOUNTER — TELEPHONE (OUTPATIENT)
Dept: INTERNAL MEDICINE CLINIC | Facility: CLINIC | Age: 83
End: 2023-11-06

## 2023-11-06 VITALS
TEMPERATURE: 99 F | OXYGEN SATURATION: 98 % | DIASTOLIC BLOOD PRESSURE: 76 MMHG | WEIGHT: 180.38 LBS | HEART RATE: 79 BPM | HEIGHT: 68 IN | BODY MASS INDEX: 27.34 KG/M2 | SYSTOLIC BLOOD PRESSURE: 152 MMHG

## 2023-11-06 DIAGNOSIS — I35.0 NONRHEUMATIC AORTIC VALVE STENOSIS: ICD-10-CM

## 2023-11-06 DIAGNOSIS — I10 ESSENTIAL (PRIMARY) HYPERTENSION: Primary | ICD-10-CM

## 2023-11-06 DIAGNOSIS — Z96.641 STATUS POST HIP REPLACEMENT, RIGHT: ICD-10-CM

## 2023-11-06 DIAGNOSIS — R60.0 BILATERAL LEG EDEMA: ICD-10-CM

## 2023-11-06 RX ORDER — TRIAMTERENE AND HYDROCHLOROTHIAZIDE 37.5; 25 MG/1; MG/1
1 CAPSULE ORAL EVERY MORNING
Qty: 90 CAPSULE | Refills: 3 | Status: SHIPPED | OUTPATIENT
Start: 2023-11-06

## 2023-11-06 RX ORDER — ASPIRIN 81 MG/1
81 TABLET ORAL DAILY
COMMUNITY

## 2023-11-06 NOTE — TELEPHONE ENCOUNTER
Pt had an echocardiogram at Dr. Bird's office ([facility]) on 9/29/23.  Please call to have copy faxed to us

## 2023-11-06 NOTE — TELEPHONE ENCOUNTER
As FYi to  - called DR Bird office spoke with Gilbert and relayed  message - fax number given and she will fax it today - awaiting fax

## 2023-11-29 ENCOUNTER — PATIENT MESSAGE (OUTPATIENT)
Dept: INTERNAL MEDICINE CLINIC | Facility: CLINIC | Age: 83
End: 2023-11-29

## 2023-11-30 ENCOUNTER — LAB ENCOUNTER (OUTPATIENT)
Dept: LAB | Age: 83
End: 2023-11-30
Attending: INTERNAL MEDICINE
Payer: MEDICARE

## 2023-11-30 ENCOUNTER — OFFICE VISIT (OUTPATIENT)
Dept: INTERNAL MEDICINE CLINIC | Facility: CLINIC | Age: 83
End: 2023-11-30

## 2023-11-30 VITALS
OXYGEN SATURATION: 99 % | RESPIRATION RATE: 20 BRPM | HEIGHT: 68 IN | HEART RATE: 77 BPM | SYSTOLIC BLOOD PRESSURE: 132 MMHG | BODY MASS INDEX: 26.98 KG/M2 | WEIGHT: 178 LBS | TEMPERATURE: 98 F | DIASTOLIC BLOOD PRESSURE: 80 MMHG

## 2023-11-30 DIAGNOSIS — I10 PRIMARY HYPERTENSION: Primary | ICD-10-CM

## 2023-11-30 DIAGNOSIS — E55.9 VITAMIN D DEFICIENCY: ICD-10-CM

## 2023-11-30 DIAGNOSIS — R60.0 BILATERAL LEG EDEMA: ICD-10-CM

## 2023-11-30 DIAGNOSIS — I10 ESSENTIAL (PRIMARY) HYPERTENSION: ICD-10-CM

## 2023-11-30 DIAGNOSIS — E78.00 HYPERCHOLESTEROLEMIA: ICD-10-CM

## 2023-11-30 LAB
ANION GAP SERPL CALC-SCNC: 7 MMOL/L (ref 0–18)
BUN BLD-MCNC: 22 MG/DL (ref 9–23)
BUN/CREAT SERPL: 21 (ref 10–20)
CALCIUM BLD-MCNC: 9.4 MG/DL (ref 8.7–10.4)
CHLORIDE SERPL-SCNC: 103 MMOL/L (ref 98–112)
CO2 SERPL-SCNC: 26 MMOL/L (ref 21–32)
CREAT BLD-MCNC: 1.05 MG/DL
EGFRCR SERPLBLD CKD-EPI 2021: 53 ML/MIN/1.73M2 (ref 60–?)
FASTING STATUS PATIENT QL REPORTED: NO
GLUCOSE BLD-MCNC: 125 MG/DL (ref 70–99)
OSMOLALITY SERPL CALC.SUM OF ELEC: 287 MOSM/KG (ref 275–295)
POTASSIUM SERPL-SCNC: 4 MMOL/L (ref 3.5–5.1)
SODIUM SERPL-SCNC: 136 MMOL/L (ref 136–145)

## 2023-11-30 PROCEDURE — 36415 COLL VENOUS BLD VENIPUNCTURE: CPT

## 2023-11-30 PROCEDURE — 1126F AMNT PAIN NOTED NONE PRSNT: CPT | Performed by: INTERNAL MEDICINE

## 2023-11-30 PROCEDURE — 80048 BASIC METABOLIC PNL TOTAL CA: CPT

## 2023-11-30 PROCEDURE — 99214 OFFICE O/P EST MOD 30 MIN: CPT | Performed by: INTERNAL MEDICINE

## 2023-11-30 NOTE — TELEPHONE ENCOUNTER
From: Phuong Broderick  To: Doug Dumas  Sent: 11/29/2023 4:53 PM CST  Subject: Blood test    Am I to go for a blood test before my appointment

## 2024-01-05 ENCOUNTER — APPOINTMENT (OUTPATIENT)
Dept: ULTRASOUND IMAGING | Age: 84
End: 2024-01-05
Attending: NURSE PRACTITIONER
Payer: MEDICARE

## 2024-01-05 ENCOUNTER — TELEPHONE (OUTPATIENT)
Dept: INTERNAL MEDICINE CLINIC | Facility: CLINIC | Age: 84
End: 2024-01-05

## 2024-01-05 ENCOUNTER — HOSPITAL ENCOUNTER (OUTPATIENT)
Age: 84
Discharge: HOME | End: 2024-01-05
Payer: MEDICARE

## 2024-01-05 VITALS
RESPIRATION RATE: 18 BRPM | SYSTOLIC BLOOD PRESSURE: 147 MMHG | DIASTOLIC BLOOD PRESSURE: 73 MMHG | TEMPERATURE: 98 F | HEART RATE: 93 BPM | OXYGEN SATURATION: 97 %

## 2024-01-05 DIAGNOSIS — M79.89 LEG SWELLING: Primary | ICD-10-CM

## 2024-01-05 LAB
BASOPHILS # BLD AUTO: 0.06 X10(3) UL (ref 0–0.2)
BASOPHILS NFR BLD AUTO: 0.7 %
DEPRECATED RDW RBC AUTO: 44.6 FL (ref 35.1–46.3)
EOSINOPHIL # BLD AUTO: 0.07 X10(3) UL (ref 0–0.7)
EOSINOPHIL NFR BLD AUTO: 0.9 %
ERYTHROCYTE [DISTWIDTH] IN BLOOD BY AUTOMATED COUNT: 13.5 % (ref 11–15)
HCT VFR BLD AUTO: 33.5 %
HCT VFR BLD AUTO: 34.8 %
HGB BLD-MCNC: 11.6 G/DL
HGB BLD-MCNC: 11.9 G/DL
IMM GRANULOCYTES # BLD AUTO: 0.02 X10(3) UL (ref 0–1)
IMM GRANULOCYTES NFR BLD: 0.2 %
LYMPHOCYTES # BLD AUTO: 1.15 X10(3) UL (ref 1–4)
LYMPHOCYTES NFR BLD AUTO: 14.1 %
MCH RBC QN AUTO: 30.2 PG (ref 26–34)
MCH RBC QN AUTO: 32 PG (ref 26–34)
MCHC RBC AUTO-ENTMCNC: 33.3 G/DL (ref 31–37)
MCHC RBC AUTO-ENTMCNC: 35.5 G/DL (ref 31–37)
MCV RBC AUTO: 90.1 FL
MCV RBC AUTO: 90.6 FL (ref 80–100)
MONOCYTES # BLD AUTO: 0.49 X10(3) UL (ref 0.1–1)
MONOCYTES NFR BLD AUTO: 6 %
NEUTROPHILS # BLD AUTO: 6.39 X10 (3) UL (ref 1.5–7.7)
NEUTROPHILS # BLD AUTO: 6.39 X10(3) UL (ref 1.5–7.7)
NEUTROPHILS NFR BLD AUTO: 78.1 %
PLATELET # BLD AUTO: 393 10(3)UL (ref 150–450)
PLATELET # BLD AUTO: 411 X10ˆ3/UL (ref 150–450)
RBC # BLD AUTO: 3.72 X10(6)UL
RBC # BLD AUTO: 3.84 X10ˆ6/UL
WBC # BLD AUTO: 7.8 X10ˆ3/UL (ref 4–11)
WBC # BLD AUTO: 8.2 X10(3) UL (ref 4–11)

## 2024-01-05 PROCEDURE — 99214 OFFICE O/P EST MOD 30 MIN: CPT

## 2024-01-05 PROCEDURE — 93971 EXTREMITY STUDY: CPT | Performed by: NURSE PRACTITIONER

## 2024-01-05 PROCEDURE — 85025 COMPLETE CBC W/AUTO DIFF WBC: CPT | Performed by: NURSE PRACTITIONER

## 2024-01-05 PROCEDURE — 36415 COLL VENOUS BLD VENIPUNCTURE: CPT

## 2024-01-05 RX ORDER — AMLODIPINE BESYLATE 5 MG/1
5 TABLET ORAL DAILY
COMMUNITY

## 2024-01-05 NOTE — ED INITIAL ASSESSMENT (HPI)
Patient arrives ambulatory with c/o right thigh pain since Wednesday and right sided leg bruising and swelling since last night. Had right hip replacement 3 months ago. Called ortho who suggested US to r/o blood clot.

## 2024-01-05 NOTE — TELEPHONE ENCOUNTER
Patient calling for advise  Had right hip surgery in October 2023. No pain in the hip.    2 Days ago out of the blue pain in upper right leg between hip and knee. Black blue near the knee. Small amount of swelling above the knee  No trauma to leg or knee.  Only hurts to walk,  Sitting no pain.   Not warm to touch  Patient wants to be sure it's not a blood clot    Best call back number [phone]

## 2024-01-05 NOTE — ED PROVIDER NOTES
Patient Seen in: [facility]      History     Chief Complaint   Patient presents with    Leg Pain     Stated Complaint: leg pain  recently had hip a replacement    Subjective:   HPI    This is a well-appearing 83-year-old who presents with a history of hypertension, right hip replacement 3 months ago who presents with right leg swelling, ecchymosis.  Patient states it started last night, no new injury.  No shortness of breath or chest pain.  No injury that she recalls.  No hip pain.  She has no history of DVT or PE, but was not placed on prophylactic anticoagulant prior to surgery.    Objective:   Past Medical History:   Diagnosis Date    High blood pressure     Osteopenia     Visual impairment     Readers    White coat hypertension               Past Surgical History:   Procedure Laterality Date    CATARACT  3/2&3/16 2017    COLONOSCOPY & POLYPECTOMY  2021         HIP REPLACEMENT SURGERY Left Around     OTHER SURGICAL HISTORY  [place] 2017    Bilateral cataract surgery                Social History     Socioeconomic History    Marital status:    Tobacco Use    Smoking status: Former     Packs/day: 1.00     Years: 0.00     Additional pack years: 0.00     Total pack years: 0.00     Types: Cigarettes     Quit date: 1990     Years since quittin.0     Passive exposure: Past    Smokeless tobacco: Former   Vaping Use    Vaping Use: Never used   Substance and Sexual Activity    Alcohol use: Yes     Comment: wine, occasionally    Drug use: Never   Other Topics Concern    Caffeine Concern Yes     Comment: coffee/soda - 2cups/day     Social Determinants of Health     Food Insecurity: No Food Insecurity (10/5/2023)    Food Insecurity     Food Insecurity: Never true   Transportation Needs: No Transportation Needs (10/5/2023)    Transportation Needs     Lack of Transportation: No   Housing Stability: Low Risk  (10/5/2023)    Housing Stability     Housing Instability: No              Review of  Systems   Musculoskeletal:  Positive for arthralgias and joint swelling.   All other systems reviewed and are negative.      Positive for stated complaint: leg pain  recently had hip a replacement  Other systems are as noted in HPI.  Constitutional and vital signs reviewed.      All other systems reviewed and negative except as noted above.    Physical Exam     ED Triage Vitals [01/05/24 1037]   /73   Pulse 93   Resp 18   Temp 97.6 °F (36.4 °C)   Temp src Temporal   SpO2 97 %   O2 Device None (Room air)       Current:/73   Pulse 93   Temp 97.6 °F (36.4 °C) (Temporal)   Resp 18   SpO2 97%         Physical Exam  Vitals and nursing note reviewed.   Constitutional:       General: She is awake. She is not in acute distress.     Appearance: Normal appearance. She is not ill-appearing, toxic-appearing or diaphoretic.   HENT:      Head: Normocephalic and atraumatic.      Right Ear: Tympanic membrane, ear canal and external ear normal.      Left Ear: Tympanic membrane, ear canal and external ear normal.      Nose: Nose normal.      Mouth/Throat:      Mouth: Mucous membranes are moist.      Pharynx: Oropharynx is clear. Uvula midline.   Eyes:      General: Lids are normal.      Extraocular Movements: Extraocular movements intact.      Conjunctiva/sclera: Conjunctivae normal.      Pupils: Pupils are equal, round, and reactive to light.   Cardiovascular:      Rate and Rhythm: Normal rate and regular rhythm.      Pulses: Normal pulses.      Heart sounds: Normal heart sounds.   Pulmonary:      Effort: Pulmonary effort is normal.      Breath sounds: Normal breath sounds and air entry. No stridor, decreased air movement or transmitted upper airway sounds.   Musculoskeletal:        Legs:       Comments: +ecchymosis. Swelling from the thigh to the calf, +soft compartments. +pedal pulse    Skin:     General: Skin is warm and dry.      Capillary Refill: Capillary refill takes less than 2 seconds.   Neurological:       General: No focal deficit present.      Mental Status: She is alert and oriented to person, place, and time.   Psychiatric:         Mood and Affect: Mood normal.         Behavior: Behavior normal. Behavior is cooperative.         Thought Content: Thought content normal.         Judgment: Judgment normal.       ED Course   CBC reviewed,  Labs Reviewed   POCT CBC - Abnormal; Notable for the following components:       Result Value    HGB IC 11.6 (*)     HCT IC 34.8 (*)     All other components within normal limits   CBC W AUTO DIFF     Ultrasound and reevaluate.    Ultrasound reviewed, no evidence of acute deep venous thrombosis in the right lower extremity.  Small echogenic focus within the distal right popliteal vein measuring a few millimeters in size may suggest a chronic thrombus formation but is minimal and is more likely not clinically significant.  Small Vinson's cyst as well.    CBC ordered, platelet 411.  Hemoglobin 11.6.  Improved since previous hemoglobin 10/7 was 11.3.      US VENOUS DOPPLER LEG RIGHT - DIAG IMG (CPT=93971)    Result Date: 1/5/2024  CONCLUSION:  1. No evidence of acute deep venous thrombosis in the right lower extremity. 2. Small echogenic focus within the distal right popliteal vein measuring a few mm in size may suggest a site of chronic thrombus formation but is minimal and is more likely not clinically significant. 3. Small probable Vinson's cyst in the popliteal fossa with mild internal debris.     Dictated by (CST): Alfredo Hodges MD on 1/05/2024 at 11:49 AM     Finalized by (CST): Alfredo Hodges MD on 1/05/2024 at 11:54 AM            MDM          Medical Decision Making  Patient is well-appearing on exam, nontoxic in appearance, exam as noted above.  Discussed with the patient and her daughter no evidence of DVT on ultrasound, her CBC here was reassuring.  Hemoglobin stable in addition to platelets.  I did discuss with both patient and daughter close follow-up with both PCP and  orthopedics as recommended.  Any worsening swelling, ecchymosis, any other concerns she should immediately go to the emergency department.  Extremity neurovascular intact at discharge. Case discussed with Dr. Vasques who agrees with plan of care.     Problems Addressed:  Leg swelling: acute illness or injury    Amount and/or Complexity of Data Reviewed  Labs: ordered. Decision-making details documented in ED Course.  Radiology: ordered and independent interpretation performed. Decision-making details documented in ED Course.        Disposition and Plan     Clinical Impression:  1. Leg swelling         Disposition:  Discharge  1/5/2024 12:43 pm    Follow-up:  Malathi Stuart MD  [address] [place] [id number]  [phone]                Medications Prescribed:  Discharge Medication List as of 1/5/2024 12:47 PM

## 2024-01-05 NOTE — DISCHARGE INSTRUCTIONS
Please call your primary care provider and surgeon to schedule a follow-up appointment.  Any worsening symptoms please immediately go to the emergency department.

## 2024-01-05 NOTE — TELEPHONE ENCOUNTER
Patient is calling to let the doctor know that the results of the scan are in and it is Negative.    Patient also wanted the doctor to know that she is making an appt with the hip surgeon.     Any questions call patient

## 2024-01-05 NOTE — TELEPHONE ENCOUNTER
Spoke to patient who reports sudden onset pain in the R thigh on the outside of leg. Reports on the L side of the R thigh is noting bruising from knee half way up. Does report there is swelling in the thigh. Pain is with walking and palpation and 6/10. She is additionally postop R hip sx    Denies hot to touch, redness, shortness of breath, heart palpitations. Denies fall/injury, heavy lifting. Is not on blood thinners.     RN advised Pt to ER for eval of possible clot. She is agreeable to  [facility]. RN advised it is her discretion, however she may get sent to ER anyway. She agrees to be seen today. No appts available in office

## 2024-02-20 ENCOUNTER — HOSPITAL ENCOUNTER (OUTPATIENT)
Dept: MAMMOGRAPHY | Facility: HOSPITAL | Age: 84
Discharge: HOME | End: 2024-02-20
Attending: INTERNAL MEDICINE
Payer: MEDICARE

## 2024-02-20 DIAGNOSIS — Z12.31 ENCOUNTER FOR SCREENING MAMMOGRAM FOR MALIGNANT NEOPLASM OF BREAST: ICD-10-CM

## 2024-02-20 PROCEDURE — 77063 BREAST TOMOSYNTHESIS BI: CPT | Performed by: INTERNAL MEDICINE

## 2024-02-20 PROCEDURE — 77067 SCR MAMMO BI INCL CAD: CPT | Performed by: INTERNAL MEDICINE

## 2024-07-01 ENCOUNTER — TELEPHONE (OUTPATIENT)
Dept: INTERNAL MEDICINE CLINIC | Facility: CLINIC | Age: 84
End: 2024-07-01

## 2024-07-01 DIAGNOSIS — Z78.0 POSTMENOPAUSE: ICD-10-CM

## 2024-07-01 DIAGNOSIS — Z78.0 ASYMPTOMATIC UNCOMPLICATED MENOPAUSE: Primary | ICD-10-CM

## 2024-07-01 NOTE — TELEPHONE ENCOUNTER
Received via fax from [facility] request for Dexascan order.    Placed request in Dr. Stuart mailbox.

## 2024-07-16 ENCOUNTER — LAB ENCOUNTER (OUTPATIENT)
Dept: LAB | Age: 84
End: 2024-07-16
Attending: INTERNAL MEDICINE
Payer: MEDICARE

## 2024-07-16 DIAGNOSIS — E78.00 HYPERCHOLESTEROLEMIA: ICD-10-CM

## 2024-07-16 DIAGNOSIS — E55.9 VITAMIN D DEFICIENCY: ICD-10-CM

## 2024-07-16 DIAGNOSIS — I10 PRIMARY HYPERTENSION: ICD-10-CM

## 2024-07-16 LAB
ALBUMIN SERPL-MCNC: 4.4 G/DL (ref 3.2–4.8)
ALBUMIN/GLOB SERPL: 1.7 {RATIO} (ref 1–2)
ALP LIVER SERPL-CCNC: 72 U/L
ALT SERPL-CCNC: 15 U/L
ANION GAP SERPL CALC-SCNC: 8 MMOL/L (ref 0–18)
AST SERPL-CCNC: 26 U/L (ref ?–34)
BASOPHILS # BLD AUTO: 0.06 X10(3) UL (ref 0–0.2)
BASOPHILS NFR BLD AUTO: 0.8 %
BILIRUB SERPL-MCNC: 1 MG/DL (ref 0.2–1.1)
BUN BLD-MCNC: 30 MG/DL (ref 9–23)
BUN/CREAT SERPL: 22.2 (ref 10–20)
CALCIUM BLD-MCNC: 9.3 MG/DL (ref 8.7–10.4)
CHLORIDE SERPL-SCNC: 108 MMOL/L (ref 98–112)
CHOLEST SERPL-MCNC: 222 MG/DL (ref ?–200)
CO2 SERPL-SCNC: 26 MMOL/L (ref 21–32)
CREAT BLD-MCNC: 1.35 MG/DL
DEPRECATED RDW RBC AUTO: 44.9 FL (ref 35.1–46.3)
EGFRCR SERPLBLD CKD-EPI 2021: 39 ML/MIN/1.73M2 (ref 60–?)
EOSINOPHIL # BLD AUTO: 0.24 X10(3) UL (ref 0–0.7)
EOSINOPHIL NFR BLD AUTO: 3 %
ERYTHROCYTE [DISTWIDTH] IN BLOOD BY AUTOMATED COUNT: 13 % (ref 11–15)
FASTING PATIENT LIPID ANSWER: YES
FASTING STATUS PATIENT QL REPORTED: YES
GLOBULIN PLAS-MCNC: 2.6 G/DL (ref 2–3.5)
GLUCOSE BLD-MCNC: 91 MG/DL (ref 70–99)
HCT VFR BLD AUTO: 37.4 %
HDLC SERPL-MCNC: 62 MG/DL (ref 40–59)
HGB BLD-MCNC: 12.4 G/DL
IMM GRANULOCYTES # BLD AUTO: 0.02 X10(3) UL (ref 0–1)
IMM GRANULOCYTES NFR BLD: 0.3 %
LDLC SERPL CALC-MCNC: 146 MG/DL (ref ?–100)
LYMPHOCYTES # BLD AUTO: 1.64 X10(3) UL (ref 1–4)
LYMPHOCYTES NFR BLD AUTO: 20.8 %
MCH RBC QN AUTO: 31.1 PG (ref 26–34)
MCHC RBC AUTO-ENTMCNC: 33.2 G/DL (ref 31–37)
MCV RBC AUTO: 93.7 FL
MONOCYTES # BLD AUTO: 0.63 X10(3) UL (ref 0.1–1)
MONOCYTES NFR BLD AUTO: 8 %
NEUTROPHILS # BLD AUTO: 5.28 X10 (3) UL (ref 1.5–7.7)
NEUTROPHILS # BLD AUTO: 5.28 X10(3) UL (ref 1.5–7.7)
NEUTROPHILS NFR BLD AUTO: 67.1 %
NONHDLC SERPL-MCNC: 160 MG/DL (ref ?–130)
OSMOLALITY SERPL CALC.SUM OF ELEC: 300 MOSM/KG (ref 275–295)
PLATELET # BLD AUTO: 420 10(3)UL (ref 150–450)
POTASSIUM SERPL-SCNC: 3.9 MMOL/L (ref 3.5–5.1)
PROT SERPL-MCNC: 7 G/DL (ref 5.7–8.2)
RBC # BLD AUTO: 3.99 X10(6)UL
SODIUM SERPL-SCNC: 142 MMOL/L (ref 136–145)
TRIGL SERPL-MCNC: 78 MG/DL (ref 30–149)
TSI SER-ACNC: 1.53 MIU/ML (ref 0.55–4.78)
VIT D+METAB SERPL-MCNC: 26 NG/ML (ref 30–100)
VLDLC SERPL CALC-MCNC: 15 MG/DL (ref 0–30)
WBC # BLD AUTO: 7.9 X10(3) UL (ref 4–11)

## 2024-07-16 PROCEDURE — 36415 COLL VENOUS BLD VENIPUNCTURE: CPT

## 2024-07-16 PROCEDURE — 84443 ASSAY THYROID STIM HORMONE: CPT

## 2024-07-16 PROCEDURE — 80053 COMPREHEN METABOLIC PANEL: CPT

## 2024-07-16 PROCEDURE — 85025 COMPLETE CBC W/AUTO DIFF WBC: CPT

## 2024-07-16 PROCEDURE — 82306 VITAMIN D 25 HYDROXY: CPT

## 2024-07-16 PROCEDURE — 80061 LIPID PANEL: CPT

## 2024-07-23 ENCOUNTER — OFFICE VISIT (OUTPATIENT)
Dept: INTERNAL MEDICINE CLINIC | Facility: CLINIC | Age: 84
End: 2024-07-23
Payer: MEDICARE

## 2024-07-23 VITALS
HEART RATE: 76 BPM | WEIGHT: 183.19 LBS | RESPIRATION RATE: 14 BRPM | HEIGHT: 68 IN | BODY MASS INDEX: 27.76 KG/M2 | OXYGEN SATURATION: 96 % | DIASTOLIC BLOOD PRESSURE: 72 MMHG | SYSTOLIC BLOOD PRESSURE: 158 MMHG

## 2024-07-23 DIAGNOSIS — M85.89 OSTEOPENIA OF MULTIPLE SITES: ICD-10-CM

## 2024-07-23 DIAGNOSIS — E55.9 VITAMIN D DEFICIENCY: ICD-10-CM

## 2024-07-23 DIAGNOSIS — I10 WHITE COAT SYNDROME WITH DIAGNOSIS OF HYPERTENSION: ICD-10-CM

## 2024-07-23 DIAGNOSIS — H35.30 MACULAR DEGENERATION, UNSPECIFIED LATERALITY, UNSPECIFIED TYPE: ICD-10-CM

## 2024-07-23 DIAGNOSIS — I35.0 NONRHEUMATIC AORTIC VALVE STENOSIS: ICD-10-CM

## 2024-07-23 DIAGNOSIS — E78.00 HYPERCHOLESTEROLEMIA: ICD-10-CM

## 2024-07-23 DIAGNOSIS — I10 PRIMARY HYPERTENSION: ICD-10-CM

## 2024-07-23 DIAGNOSIS — N17.9 AKI (ACUTE KIDNEY INJURY) (HCC): Primary | ICD-10-CM

## 2024-07-23 DIAGNOSIS — I34.0 MITRAL VALVE INSUFFICIENCY, UNSPECIFIED ETIOLOGY: ICD-10-CM

## 2024-07-23 PROBLEM — D64.9 ANEMIA: Status: RESOLVED | Noted: 2023-10-06 | Resolved: 2024-07-23

## 2024-07-23 PROBLEM — M16.11 PRIMARY OSTEOARTHRITIS OF RIGHT HIP: Status: RESOLVED | Noted: 2023-10-06 | Resolved: 2024-07-23

## 2024-07-23 PROCEDURE — G0439 PPPS, SUBSEQ VISIT: HCPCS | Performed by: INTERNAL MEDICINE

## 2024-07-23 PROCEDURE — 99214 OFFICE O/P EST MOD 30 MIN: CPT | Performed by: INTERNAL MEDICINE

## 2024-07-23 RX ORDER — CHOLECALCIFEROL (VITAMIN D3) 50 MCG
4000 TABLET ORAL DAILY
Qty: 1 TABLET | Refills: 0 | COMMUNITY
Start: 2024-07-23

## 2024-07-23 RX ORDER — TRIAMTERENE AND HYDROCHLOROTHIAZIDE 37.5; 25 MG/1; MG/1
1 CAPSULE ORAL EVERY MORNING
Qty: 90 CAPSULE | Refills: 3 | Status: SHIPPED | OUTPATIENT
Start: 2024-07-23

## 2024-07-23 NOTE — PROGRESS NOTES
Hoda Busby is a 84 year old female.  Chief Complaint   Patient presents with    Wellness Visit     Annual medicare previous 06/21/23    Lab     Blood work        HPI:   Hoda Busby is a 84 year old female who presents for a complete physical exam.       Hx of   Now with right hip pain -- was referred by her nieces Vondahiro Vincent and Lisbeth Phillip.    Has macular degeneration (bilaterally) -- seeing ophtho at [facility]    BP today 137/68.  Normal at home.    Wt Readings from Last 6 Encounters:   07/23/24 183 lb 3.2 oz (83.1 kg)   11/30/23 178 lb (80.7 kg)   11/06/23 180 lb 6.4 oz (81.8 kg)   10/05/23 173 lb (78.5 kg)   09/14/23 180 lb (81.6 kg)   06/21/23 179 lb (81.2 kg)     Body mass index is 27.86 kg/m².       Current Outpatient Medications   Medication Sig Dispense Refill    triamterene-hydroCHLOROthiazide 37.5-25 MG Oral Cap Take 1 capsule by mouth every morning. 90 capsule 3    Cholecalciferol (VITAMIN D) 50 MCG (2000 UT) Oral Tab Take 4,000 Units by mouth daily. 1 tablet 0    aspirin 81 MG Oral Tab EC Take 1 tablet (81 mg total) by mouth daily.      acetaminophen 500 MG Oral Tab Take 1 tablet (500 mg total) by mouth every 6 (six) hours as needed for Pain.      Vitamin B-12 500 MCG Oral Tab Take 1 tablet (500 mcg total) by mouth daily.      Calcium Carb-Cholecalciferol (CALCIUM + D3) 600-200 MG-UNIT Oral Tab Take 1 tablet by mouth daily.      Multiple Vitamins-Minerals (OCUVITE ADULT 50+) Oral Cap Take 1 capsule by mouth daily.        Past Medical History:    High blood pressure    Osteopenia    Visual impairment    Readers    White coat hypertension      Past Surgical History:   Procedure Laterality Date    Cataract  3/2&3/16 2017    Colonoscopy & polypectomy  1/4/2021         Hip replacement surgery Left Around 2006    Other surgical history  [place] 2017    Bilateral cataract surgery      Family History   Problem Relation Age of Onset    Cancer Father         bone (cause of death)    Stroke  Mother         CVA (cause of death)    Diabetes Mother     Hypertension Mother     Dementia Brother         Alzheimer's    Heart Disease Brother         CAD - x2 brothers    Heart Disease Brother       Social History:   Social History     Socioeconomic History    Marital status:    Tobacco Use    Smoking status: Former     Current packs/day: 0.00     Types: Cigarettes     Start date: 1990     Quit date: 1990     Years since quittin.5     Passive exposure: Past    Smokeless tobacco: Former   Vaping Use    Vaping status: Never Used   Substance and Sexual Activity    Alcohol use: Yes     Comment: wine, occasionally    Drug use: Never   Other Topics Concern    Caffeine Concern Yes     Comment: coffee/soda - 2cups/day     Social Determinants of Health     Food Insecurity: No Food Insecurity (10/5/2023)    Food Insecurity     Food Insecurity: Never true   Transportation Needs: No Transportation Needs (10/5/2023)    Transportation Needs     Lack of Transportation: No   Housing Stability: Low Risk  (10/5/2023)    Housing Stability     Housing Instability: No            General Health     In the past six months, have you lost more than 10 pounds without trying?: 2 - No    Has your appetite been poor?: No    Type of Diet: Balanced    How does the patient maintain a good energy level?: Appropriate Exercise    How would you describe your daily physical activity?: Moderate    How would you describe your current health state?: Good    How do you maintain positive mental well-being?: Social Interaction;Visiting Friends;Visiting Family         Have you had any immunizations at another office such as Influenza, Hepatitis B, Tetanus, or Pneumococcal?: No     Functional Ability     Bathing or Showering: Able without help    Toileting: Able without help    Dressing: Able without help    Eating: Able without help    Driving: Able without help    Preparing your meals: Able without help    Managing money/bills: Able  without help    Taking medications as prescribed: Able without help    Are you able to afford your medications?: Yes    Hearing Problems?: No     Functional Status     Hearing Problems?: No    Vision Problems? : Yes    Difficulty walking?: No    Difficulty dressing or bathing?: No    Problems with daily activities? : No    Memory Problems?: No      Fall/Risk Assessment                                                              Depression Screening (PHQ-2/PHQ-9): Over the LAST 2 WEEKS                      Advance Directives     Do you have a healthcare power of ?: Yes    Do you have a living will?: Yes     Hearing Assessment (Required for AWV/SWV)      Hearing Screening    Time taken: 7/23/2024  1:42 PM  Screening Method: Finger Rub  Finger Rub Result: Pass         Visual Acuity                   Cognitive Assessment     What day of the week is this?: Correct    What month is it?: Correct    What year is it?: Correct    Recall \"Ball\": Correct    Recall \"Flag\": Correct    Recall \"Tree\": Correct        Hoda Busby's SCREENING SCHEDULE   Tests on this list are recommended by your physician but may not be covered, or covered at this frequency, by your insurer. Please check with your insurance carrier before scheduling to verify coverage.   PREVENTATIVE SERVICES  INDICATIONS AND SCHEDULE Internal Lab or Procedure External Lab or Procedure   Diabetes Screening      HbgA1C   Annually No results found for: \"A1C\"      No data to display                Fasting Blood Sugar (FSB)Annually Glucose (mg/dL)   Date Value   07/16/2024 91         No data to display               Cardiovascular Disease Screening     LDL Annually LDL Cholesterol (mg/dL)   Date Value   07/16/2024 146 (H)        EKG One Time y    Colorectal Cancer Screening      Colonoscopy Screen every 10 years Health Maintenance   Topic Date Due    Colorectal Cancer Screening  07/04/2021    Update Health Maintenance if applicable    Flex Sigmoidoscopy  Screen every 5 years No results found for this or any previous visit.      No data to display                 Fecal Occult Blood Annually No results found for: \"FOB\", \"OCCULTSTOOL\"      No data to display                Glaucoma Screening      Ophthalmology Visit Annually y    Bone Density Screening      Dexascan Every two years Last Dexa Scan:    XR DEXA BONE DENSITOMETRY (CPT=77080) 11/02/2021        No data to display               Pap and Pelvic      Pap: Every 3 yrs age 21-65 or Pap+HPV every 5 yrs age 30-65, age 65 and older at high risk   No recommendations at this time Update Health Maintenance if applicable    Chlamydia  Annually if high risk No results found for: \"CHLAMYDIA\"      No data to display                Screening Mammogram      Mammogram  Annually No recommendations at this time Update Health Maintenance if applicable   Immunizations      Influenza No orders found for this or any previous visit. Update Immunization Activity if applicable    Pneumococcal Orders placed or performed in visit on 03/11/15    PNEUMOCOCCAL VACC, 13 PJ IM    Update Immunization Activity if applicable    Hepatitis B No orders found for this or any previous visit. Update Immunization Activity if applicable    Tetanus Orders placed or performed in visit on 03/22/17    TETANUS, DIPHTHERIA TOXOIDS AND ACELLULAR PERTUSIS VACCINE (TDAP), >7 YEARS, IM USE    Update Immunization Activity if applicable    Zoster (Not covered by Medicare Part B) No orders found for this or any previous visit. Update Immunization Activity if applicable     SPECIFIC DISEASE MONITORING Internal Lab or Procedure External Lab or Procedure   Annual Monitoring of Persistent     Medications (ACE/ARB, digoxin, diuretics)    Potassium  Annually Potassium (mmol/L)   Date Value   07/16/2024 3.9     POTASSIUM (P) (mmol/L)   Date Value   03/15/2016 4.4         No data to display                Creatinine  Annually Creatinine (mg/dL)   Date Value   07/16/2024  1.35 (H)         No data to display                Digoxin Serum Conc  Annually No results found for: \"DIGOXIN\"      No data to display                Diabetes      HgbA1C  Annually No results found for: \"A1C\"      No data to display                Creat/alb ratio  Annually      LDL  Annually LDL Cholesterol (mg/dL)   Date Value   07/16/2024 146 (H)         No data to display                 Dilated Eye exam  Annually      No data to display                   No data to display                COPD      Spirometry Testing Annually No results found for this or any previous visit.      No data to display                        REVIEW OF SYSTEMS:   GENERAL: feels well otherwise  SKIN: denies any unusual skin lesions  EYES:denies blurred vision or double vision  HEENT: denies nasal congestion, sinus pain or ST  LUNGS: denies shortness of breath with exertion or cough  CARDIOVASCULAR: denies chest pain, pressure, or palpitations  GI: denies abdominal pain, nausea, vomiting, diarrhea, constipation, hematochezia, or melena  NEURO: denies headaches or dizziness    EXAM:   /72 (BP Location: Right arm, Patient Position: Sitting, Cuff Size: large)   Pulse 76   Resp 14   Ht 5' 8\" (1.727 m)   Wt 183 lb 3.2 oz (83.1 kg)   SpO2 96%   BMI 27.86 kg/m²     GENERAL: well developed, well nourished, in no apparent distress  HEENT: normal oropharynx, normal TM's  EYES: PERRLA, EOMI, conjunctivae are pink  NECK: supple, no cervical or supraclavicular LAD, no carotid bruits  BREAST: no dominant or suspicious mass, no axillary LAD  LUNGS: clear to auscultation  CARDIO: RRR, normal S1S2, 2/6 HSM loudest at apex  GI: soft, NT, ND, NABS, no HSM  EXTREMITIES: no cyanosis, clubbing or edema, +2 DP pulses        ASSESSMENT AND PLAN:     NANCY  -creat 1.35 (GFR 39); prev creat 1.05 (GFR 53) in 11/2023  -pt admits to not drinking enough water  -encouraged her to hydrate; repeat labs in a week    Hypertension, primary  -amlodipine stopped  in 9/2023 (due to low BP while in hospital for hip surgery)  -started on dyazide in 11/2023 (amlodipine not restarted b/c pt had experienced BLE edema after hip surgery, and didn't want to risk side effect of additional LE edema)  left THR (Dr. Lo) many years ago  -BP's at goal at home (pt with white coat syndrome)    Hx of hip OA  -s/p left THR (Dr. Lo) many years ago  -s/p elective R BEATRIZ on 10/5/23 by Dr. Diaz     Hx of aortic stenosis  -moderate A.S on echo in 10/2022 (prev mild A.S in 2020)  -abnormal pre-op EKG 9/2023 --Nuc GXT done 9/20/23 -- EKG portion revealed 1-1.5mm ST seg depression in inferolateral leads with prolonged recovery; nuclear portion with normal perfusion   -had repeat echo 9/29/23 at [facility] office at [place] (mild LVH, normal LV syst function EF 65%, mild LAE, mild pulm HTN, mild MR, mod A.S)  -to see Dr. Bird in a year 8/2024  -on ASA 81mg/day    Hyperlipidemia   Pt with strong family hx of high cholesterol  ; normal TG and HDL  No indication for statin given age     Osteopenia   On Fosamax from 2011 to 4/2016.   DEXA stable 5/10/17.  DEXA in 9/2019 showed slightly more bone loss in spine, but stable in hip.    DEXA 11/2021 -- some improvement in femur.  Hold off on fosamax for now.   Repeat DEXA scheduled    Vitamin D deficiency  Level (low 26); increase vitamin D to 4000u/day    Routine Health Maintenance  -Mammo normal 2/20/24  -PPSV23 sometime before 2013.  Prevnar 13 done in 2015.   -Had both Shingrix shingles vaccine.    -Tdap done  3/22/17.     -Colonoscopy done 1/4/2021 (Dr. Hendricks) -- adenomatous polyp but poor prep.  Was advised for repeat colonoscopy in 3 months but pt was reluctant. Agreed to cologuard screening but insurance would not cover.  Pt has declined repeat colonoscopy; asked her to reconsider; gave her Dr. Hendricks's contact info           RTC 1 yr    Malathi Stuart MD, 07/23/24, 1:53 PM

## 2024-07-30 ENCOUNTER — LAB ENCOUNTER (OUTPATIENT)
Dept: LAB | Age: 84
End: 2024-07-30
Attending: INTERNAL MEDICINE
Payer: MEDICARE

## 2024-07-30 DIAGNOSIS — N17.9 AKI (ACUTE KIDNEY INJURY) (HCC): ICD-10-CM

## 2024-07-30 LAB
ANION GAP SERPL CALC-SCNC: 7 MMOL/L (ref 0–18)
BUN BLD-MCNC: 33 MG/DL (ref 9–23)
BUN/CREAT SERPL: 24.3 (ref 10–20)
CALCIUM BLD-MCNC: 9.8 MG/DL (ref 8.7–10.4)
CHLORIDE SERPL-SCNC: 105 MMOL/L (ref 98–112)
CO2 SERPL-SCNC: 27 MMOL/L (ref 21–32)
CREAT BLD-MCNC: 1.36 MG/DL
EGFRCR SERPLBLD CKD-EPI 2021: 38 ML/MIN/1.73M2 (ref 60–?)
FASTING STATUS PATIENT QL REPORTED: NO
GLUCOSE BLD-MCNC: 99 MG/DL (ref 70–99)
OSMOLALITY SERPL CALC.SUM OF ELEC: 295 MOSM/KG (ref 275–295)
POTASSIUM SERPL-SCNC: 4.2 MMOL/L (ref 3.5–5.1)
SODIUM SERPL-SCNC: 139 MMOL/L (ref 136–145)

## 2024-07-30 PROCEDURE — 36415 COLL VENOUS BLD VENIPUNCTURE: CPT

## 2024-07-30 PROCEDURE — 80048 BASIC METABOLIC PNL TOTAL CA: CPT

## 2024-07-31 DIAGNOSIS — N17.9 AKI (ACUTE KIDNEY INJURY) (HCC): Primary | ICD-10-CM

## 2024-07-31 NOTE — TELEPHONE ENCOUNTER
Please let pt know that her kidney function is still down.    I would like her to stop her triamterene/hydrochlorothiazide, as it may be dehydrating her too much.    I'd like to restart her amlodipine 5mg/day for her blood pressure -- Rx pended.  Continue to check BP at home BID.  Push fluids.    Please ask her to do another blood test and urinalysis in 2 weeks -- and to see me after that so we can follow up the blood pressure and kidney function

## 2024-08-01 RX ORDER — AMLODIPINE BESYLATE 5 MG/1
5 TABLET ORAL DAILY
Qty: 90 TABLET | Refills: 3 | Status: SHIPPED | OUTPATIENT
Start: 2024-08-01 | End: 2025-07-27

## 2024-08-01 NOTE — TELEPHONE ENCOUNTER
Spoke to pt and advised on MD message below; pt verbalized understanding.    Rx sent per MD written and pended to verified pharmacy

## 2024-08-02 ENCOUNTER — HOSPITAL ENCOUNTER (OUTPATIENT)
Dept: BONE DENSITY | Age: 84
Discharge: HOME | End: 2024-08-02
Attending: INTERNAL MEDICINE
Payer: MEDICARE

## 2024-08-02 DIAGNOSIS — Z78.0 ASYMPTOMATIC UNCOMPLICATED MENOPAUSE: ICD-10-CM

## 2024-08-02 DIAGNOSIS — Z78.0 POSTMENOPAUSE: ICD-10-CM

## 2024-08-02 PROCEDURE — 77080 DXA BONE DENSITY AXIAL: CPT | Performed by: INTERNAL MEDICINE

## 2024-08-20 ENCOUNTER — LAB ENCOUNTER (OUTPATIENT)
Dept: LAB | Age: 84
End: 2024-08-20
Attending: INTERNAL MEDICINE
Payer: MEDICARE

## 2024-08-20 ENCOUNTER — TELEPHONE (OUTPATIENT)
Dept: INTERNAL MEDICINE CLINIC | Facility: CLINIC | Age: 84
End: 2024-08-20

## 2024-08-20 DIAGNOSIS — N17.9 AKI (ACUTE KIDNEY INJURY) (HCC): ICD-10-CM

## 2024-08-20 LAB
ALBUMIN SERPL-MCNC: 4.3 G/DL (ref 3.2–4.8)
ANION GAP SERPL CALC-SCNC: 7 MMOL/L (ref 0–18)
BILIRUB UR QL: NEGATIVE
BUN BLD-MCNC: 22 MG/DL (ref 9–23)
BUN/CREAT SERPL: 19.1 (ref 10–20)
CALCIUM BLD-MCNC: 10.1 MG/DL (ref 8.7–10.4)
CHLORIDE SERPL-SCNC: 107 MMOL/L (ref 98–112)
CLARITY UR: CLEAR
CO2 SERPL-SCNC: 25 MMOL/L (ref 21–32)
CREAT BLD-MCNC: 1.15 MG/DL
EGFRCR SERPLBLD CKD-EPI 2021: 47 ML/MIN/1.73M2 (ref 60–?)
GLUCOSE BLD-MCNC: 138 MG/DL (ref 70–99)
GLUCOSE UR-MCNC: NORMAL MG/DL
HGB UR QL STRIP.AUTO: NEGATIVE
KETONES UR-MCNC: NEGATIVE MG/DL
LEUKOCYTE ESTERASE UR QL STRIP.AUTO: NEGATIVE
NITRITE UR QL STRIP.AUTO: NEGATIVE
OSMOLALITY SERPL CALC.SUM OF ELEC: 294 MOSM/KG (ref 275–295)
PH UR: 5.5 [PH] (ref 5–8)
PHOSPHATE SERPL-MCNC: 3.3 MG/DL (ref 2.4–5.1)
POTASSIUM SERPL-SCNC: 4.7 MMOL/L (ref 3.5–5.1)
PROT UR-MCNC: NEGATIVE MG/DL
SODIUM SERPL-SCNC: 139 MMOL/L (ref 136–145)
SP GR UR STRIP: 1.01 (ref 1–1.03)
UROBILINOGEN UR STRIP-ACNC: NORMAL

## 2024-08-20 PROCEDURE — 80069 RENAL FUNCTION PANEL: CPT

## 2024-08-20 PROCEDURE — 36415 COLL VENOUS BLD VENIPUNCTURE: CPT

## 2024-08-20 PROCEDURE — 81003 URINALYSIS AUTO W/O SCOPE: CPT | Performed by: INTERNAL MEDICINE

## 2024-08-21 NOTE — TELEPHONE ENCOUNTER
As FYI to  -called patient   and relayed  message -verbalized understanding.   She states Blood Pressure are good   SBP are 120-130 and diastolic Blood Pressure high 60,s and never higher than 70    She wants to know if she should schedule appointment  To

## 2024-08-21 NOTE — TELEPHONE ENCOUNTER
Please call with labs -- repeat creatinine shows improved kidney function.  UA was normal.   Continue to stay off the dyazide.  Continue amlodipine  How are her BP's?

## 2024-08-22 ENCOUNTER — TELEPHONE (OUTPATIENT)
Dept: INTERNAL MEDICINE CLINIC | Facility: CLINIC | Age: 84
End: 2024-08-22

## 2024-08-22 PROBLEM — M81.0 AGE-RELATED OSTEOPOROSIS WITHOUT CURRENT PATHOLOGICAL FRACTURE: Status: ACTIVE | Noted: 2024-08-22

## 2024-08-22 RX ORDER — ALENDRONATE SODIUM 70 MG/1
70 TABLET ORAL
Qty: 13 TABLET | Refills: 3 | Status: SHIPPED | OUTPATIENT
Start: 2024-08-22

## 2024-08-22 NOTE — TELEPHONE ENCOUNTER
Discussed DEXA results.  She is s/p bilateral BEATRIZ's so forearm was measured -- T-4.0  Rec fosamax 70mg weekly.  Rx sent  Pt is taking the vitamin D 4000 units/day and calcium  Repeat DEXA in 2-3 years

## 2024-10-17 ENCOUNTER — APPOINTMENT (OUTPATIENT)
Dept: GENERAL RADIOLOGY | Facility: HOSPITAL | Age: 84
End: 2024-10-17
Attending: EMERGENCY MEDICINE
Payer: MEDICARE

## 2024-10-17 ENCOUNTER — HOSPITAL ENCOUNTER (EMERGENCY)
Facility: HOSPITAL | Age: 84
Discharge: HOME | End: 2024-10-17
Attending: EMERGENCY MEDICINE
Payer: MEDICARE

## 2024-10-17 VITALS
DIASTOLIC BLOOD PRESSURE: 73 MMHG | RESPIRATION RATE: 18 BRPM | HEART RATE: 86 BPM | OXYGEN SATURATION: 94 % | TEMPERATURE: 98 F | SYSTOLIC BLOOD PRESSURE: 145 MMHG

## 2024-10-17 DIAGNOSIS — J18.9 COMMUNITY ACQUIRED PNEUMONIA, UNSPECIFIED LATERALITY: Primary | ICD-10-CM

## 2024-10-17 LAB
ANION GAP SERPL CALC-SCNC: 8 MMOL/L (ref 0–18)
ATRIAL RATE: 93 BPM
BASOPHILS # BLD AUTO: 0.07 X10(3) UL (ref 0–0.2)
BASOPHILS NFR BLD AUTO: 0.7 %
BUN BLD-MCNC: 16 MG/DL (ref 9–23)
BUN/CREAT SERPL: 17.4 (ref 10–20)
CALCIUM BLD-MCNC: 9.3 MG/DL (ref 8.7–10.4)
CHLORIDE SERPL-SCNC: 107 MMOL/L (ref 98–112)
CO2 SERPL-SCNC: 22 MMOL/L (ref 21–32)
CREAT BLD-MCNC: 0.92 MG/DL
DEPRECATED RDW RBC AUTO: 43.3 FL (ref 35.1–46.3)
EGFRCR SERPLBLD CKD-EPI 2021: 61 ML/MIN/1.73M2 (ref 60–?)
EOSINOPHIL # BLD AUTO: 0.14 X10(3) UL (ref 0–0.7)
EOSINOPHIL NFR BLD AUTO: 1.4 %
ERYTHROCYTE [DISTWIDTH] IN BLOOD BY AUTOMATED COUNT: 13.1 % (ref 11–15)
GLUCOSE BLD-MCNC: 113 MG/DL (ref 70–99)
HCT VFR BLD AUTO: 34.3 %
HGB BLD-MCNC: 11.6 G/DL
IMM GRANULOCYTES # BLD AUTO: 0.04 X10(3) UL (ref 0–1)
IMM GRANULOCYTES NFR BLD: 0.4 %
LYMPHOCYTES # BLD AUTO: 0.84 X10(3) UL (ref 1–4)
LYMPHOCYTES NFR BLD AUTO: 8.2 %
MCH RBC QN AUTO: 30.7 PG (ref 26–34)
MCHC RBC AUTO-ENTMCNC: 33.8 G/DL (ref 31–37)
MCV RBC AUTO: 90.7 FL
MONOCYTES # BLD AUTO: 0.76 X10(3) UL (ref 0.1–1)
MONOCYTES NFR BLD AUTO: 7.4 %
NEUTROPHILS # BLD AUTO: 8.43 X10 (3) UL (ref 1.5–7.7)
NEUTROPHILS # BLD AUTO: 8.43 X10(3) UL (ref 1.5–7.7)
NEUTROPHILS NFR BLD AUTO: 81.9 %
OSMOLALITY SERPL CALC.SUM OF ELEC: 286 MOSM/KG (ref 275–295)
P AXIS: 80 DEGREES
P-R INTERVAL: 148 MS
PLATELET # BLD AUTO: 465 10(3)UL (ref 150–450)
POTASSIUM SERPL-SCNC: 3.9 MMOL/L (ref 3.5–5.1)
Q-T INTERVAL: 322 MS
QRS DURATION: 84 MS
QTC CALCULATION (BEZET): 400 MS
R AXIS: 17 DEGREES
RBC # BLD AUTO: 3.78 X10(6)UL
SARS-COV-2 RNA RESP QL NAA+PROBE: NOT DETECTED
SODIUM SERPL-SCNC: 137 MMOL/L (ref 136–145)
T AXIS: 62 DEGREES
TROPONIN I SERPL HS-MCNC: 9 NG/L
VENTRICULAR RATE: 93 BPM
WBC # BLD AUTO: 10.3 X10(3) UL (ref 4–11)

## 2024-10-17 PROCEDURE — 93005 ELECTROCARDIOGRAM TRACING: CPT

## 2024-10-17 PROCEDURE — 80048 BASIC METABOLIC PNL TOTAL CA: CPT | Performed by: EMERGENCY MEDICINE

## 2024-10-17 PROCEDURE — 99284 EMERGENCY DEPT VISIT MOD MDM: CPT

## 2024-10-17 PROCEDURE — 71045 X-RAY EXAM CHEST 1 VIEW: CPT | Performed by: EMERGENCY MEDICINE

## 2024-10-17 PROCEDURE — 84484 ASSAY OF TROPONIN QUANT: CPT | Performed by: EMERGENCY MEDICINE

## 2024-10-17 PROCEDURE — 85025 COMPLETE CBC W/AUTO DIFF WBC: CPT | Performed by: EMERGENCY MEDICINE

## 2024-10-17 PROCEDURE — 93010 ELECTROCARDIOGRAM REPORT: CPT

## 2024-10-17 PROCEDURE — 36415 COLL VENOUS BLD VENIPUNCTURE: CPT

## 2024-10-17 RX ORDER — DOXYCYCLINE 100 MG/1
100 CAPSULE ORAL 2 TIMES DAILY
Qty: 14 CAPSULE | Refills: 0 | Status: ON HOLD | OUTPATIENT
Start: 2024-10-17 | End: 2024-10-24

## 2024-10-17 RX ORDER — AMOXICILLIN 500 MG/1
1000 TABLET, FILM COATED ORAL 3 TIMES DAILY
Qty: 42 TABLET | Refills: 0 | Status: ON HOLD | OUTPATIENT
Start: 2024-10-17 | End: 2024-10-24

## 2024-10-17 NOTE — ED INITIAL ASSESSMENT (HPI)
C/o SOB with ambulation worsening over the past week and cough. Reports hx of HTN and aortic stenosis.

## 2024-10-17 NOTE — ED PROVIDER NOTES
Patient Seen in: [facility] Emergency Department    History     Chief Complaint   Patient presents with    Difficulty Breathing       HPI    84-year-old female who presents to the emergency department given 1 week of mild dyspnea and dry cough, no chest pain or fevers or hemoptysis nor any lower extremity edema orthopnea.  She also reports rhinorrhea.    History reviewed.   Past Medical History:    High blood pressure    Osteopenia    Primary osteoarthritis of right hip    Visual impairment    Readers    White coat hypertension       History reviewed.   Past Surgical History:   Procedure Laterality Date    Cataract  3/2&3/16 2017    Colonoscopy & polypectomy  2021         Hip replacement surgery Left Around     Other surgical history  [place] 2017    Bilateral cataract surgery         Medications :  Prescriptions Prior to Admission[1]     Family History   Problem Relation Age of Onset    Cancer Father         bone (cause of death)    Stroke Mother         CVA (cause of death)    Diabetes Mother     Hypertension Mother     Dementia Brother         Alzheimer's    Heart Disease Brother         CAD - x2 brothers    Heart Disease Brother        Smoking Status:   Social History     Socioeconomic History    Marital status:    Tobacco Use    Smoking status: Former     Current packs/day: 0.00     Types: Cigarettes     Start date: 1990     Quit date: 1990     Years since quittin.8     Passive exposure: Past    Smokeless tobacco: Former   Vaping Use    Vaping status: Never Used   Substance and Sexual Activity    Alcohol use: Yes     Comment: wine, occasionally    Drug use: Never   Other Topics Concern    Caffeine Concern Yes     Comment: coffee/soda - 2cups/day       Constitutional and vital signs reviewed.      Social History and Family History elements reviewed from today, pertinent positives to the presenting problem noted.    Physical Exam     ED Triage Vitals [10/17/24 0912]   /71    Pulse 99   Resp 20   Temp 98.4 °F (36.9 °C)   Temp src Temporal   SpO2 95 %   O2 Device None (Room air)       All measures to prevent infection transmission during my interaction with the patient were taken. The patient was already wearing a droplet mask on my arrival to the room. Personal protective equipment was worn throughout the duration of the exam.  Handwashing was performed prior to and after the exam.  Stethoscope and any equipment used during my examination was cleaned with super sani-cloth germicidal wipes following the exam.     Physical Exam    General: NAD  Head: Normocephalic and atraumatic.  Mouth/Throat/Ears/Nose: Oropharynx is clear   Eyes: Conjunctivae and EOM are normal.   Neck: Normal range of motion. Supple.   Cardiovascular: Normal rate, regular rhythm, normal heart sounds.  Respiratory/Chest: Clear and equal bilaterally. Exhibits no tenderness.  Gastrointestinal: Soft, non-tender, non-distended. Bowel sounds are normal.   Musculoskeletal:No swelling or deformity.   Neurological: Alert and appropriate. No focal deficits.   Skin: Skin is warm and dry. No pallor.  Psychiatric: Has a normal mood and affect.      ED Course        Labs Reviewed   BASIC METABOLIC PANEL (8) - Abnormal; Notable for the following components:       Result Value    Glucose 113 (*)     All other components within normal limits   CBC WITH DIFFERENTIAL WITH PLATELET - Abnormal; Notable for the following components:    RBC 3.78 (*)     HGB 11.6 (*)     HCT 34.3 (*)     .0 (*)     Neutrophil Absolute Prelim 8.43 (*)     Neutrophil Absolute 8.43 (*)     Lymphocyte Absolute 0.84 (*)     All other components within normal limits   TROPONIN I HIGH SENSITIVITY - Normal   RAPID SARS-COV-2 BY PCR - Normal     EKG    Rate, intervals and axes as noted on EKG Report.  Rate: 93  Rhythm: Sinus Rhythm  Reading: No STEMI.  This is my interpretation.           As Interpreted by me    Imaging Results Available and Reviewed while in  ED: XR CHEST AP PORTABLE  (CPT=71045)    Result Date: 10/17/2024  CONCLUSION:   Patchy bilateral airspace opacities are concerning for pneumonia.  Prominent bronchovascular markings may represent background pulmonary edema.    Dictated by (CST): Miky Lowery MD on 10/17/2024 at 10:14 AM     Finalized by (CST): Miky Lowery MD on 10/17/2024 at 10:16 AM         ED Medications Administered: Medications - No data to display      MDM     Vitals:    10/17/24 0912 10/17/24 0945 10/17/24 1049   BP: 136/71 139/70 145/73   Pulse: 99 84 86   Resp: 20 18 18   Temp: 98.4 °F (36.9 °C)     TempSrc: Temporal     SpO2: 95% 93% 94%     *I personally reviewed and interpreted all ED vitals.    Pulse Ox: 95%, Room air, Normal     Monitor Interpretation:   normal sinus rhythm as interpreted by me.  The cardiac monitor was ordered given dyspnea.      Medical Decision Making      Differential Diagnosis/ Diagnostic Considerations: COVID-19, pneumonia, viral URI    Complicating Factors: The patient already has hypertension to contribute to the complexity of this ED evaluation.    I reviewed prior chart records including office visit note from September 23, 2024.  Patient here with pneumonia on my interpretation of the chest x-ray.  Labs reviewed and unremarkable for acute findings on my interpretation.    Considered admission however patient is without respiratory failure and is normoxic on room air.     Discharged in stable condition.  Patient is comfortable with the plan.    Prescriptions: As below      Disposition and Plan     Clinical Impression:  1. Community acquired pneumonia, unspecified laterality        Disposition:  Discharge    Follow-up:  Malathi Stuart MD  [address] [place] [id number]  [phone]    Schedule an appointment as soon as possible for a visit in 1 day(s)        Medications Prescribed:  Discharge Medication List as of 10/17/2024 10:53 AM        START taking these medications    Details   amoxicillin  500 MG Oral Tab Take 2 tablets (1,000 mg total) by mouth 3 (three) times daily for 7 days., Normal, Disp-42 tablet, R-0      doxycycline 100 MG Oral Cap Take 1 capsule (100 mg total) by mouth 2 (two) times daily for 7 days., Normal, Disp-14 capsule, R-0                              [1] (Not in a hospital admission)

## 2024-10-24 ENCOUNTER — OFFICE VISIT (OUTPATIENT)
Dept: INTERNAL MEDICINE CLINIC | Facility: CLINIC | Age: 84
End: 2024-10-24

## 2024-10-24 ENCOUNTER — HOSPITAL ENCOUNTER (INPATIENT)
Facility: HOSPITAL | Age: 84
LOS: 8 days | Discharge: NURSING, REHAB OR LONG-TERM CARE | End: 2024-11-01
Attending: INTERNAL MEDICINE | Admitting: INTERNAL MEDICINE
Payer: MEDICARE

## 2024-10-24 ENCOUNTER — APPOINTMENT (OUTPATIENT)
Dept: GENERAL RADIOLOGY | Facility: HOSPITAL | Age: 84
End: 2024-10-24
Attending: INTERNAL MEDICINE
Payer: MEDICARE

## 2024-10-24 VITALS
SYSTOLIC BLOOD PRESSURE: 120 MMHG | DIASTOLIC BLOOD PRESSURE: 60 MMHG | BODY MASS INDEX: 26.98 KG/M2 | WEIGHT: 178 LBS | HEART RATE: 100 BPM | TEMPERATURE: 99 F | HEIGHT: 68 IN | OXYGEN SATURATION: 85 %

## 2024-10-24 DIAGNOSIS — J18.9 COMMUNITY ACQUIRED PNEUMONIA, UNSPECIFIED LATERALITY: ICD-10-CM

## 2024-10-24 DIAGNOSIS — J96.01 ACUTE HYPOXIC RESPIRATORY FAILURE (HCC): Primary | ICD-10-CM

## 2024-10-24 LAB
ALBUMIN SERPL-MCNC: 4 G/DL (ref 3.2–4.8)
ALBUMIN/GLOB SERPL: 1.3 {RATIO} (ref 1–2)
ALP LIVER SERPL-CCNC: 122 U/L
ALT SERPL-CCNC: 27 U/L
ANION GAP SERPL CALC-SCNC: 8 MMOL/L (ref 0–18)
AST SERPL-CCNC: 37 U/L (ref ?–34)
BASOPHILS # BLD AUTO: 0.08 X10(3) UL (ref 0–0.2)
BASOPHILS NFR BLD AUTO: 0.5 %
BILIRUB SERPL-MCNC: 1 MG/DL (ref 0.2–1.1)
BUN BLD-MCNC: 20 MG/DL (ref 9–23)
BUN/CREAT SERPL: 21.5 (ref 10–20)
CALCIUM BLD-MCNC: 9.3 MG/DL (ref 8.7–10.4)
CHLORIDE SERPL-SCNC: 107 MMOL/L (ref 98–112)
CO2 SERPL-SCNC: 22 MMOL/L (ref 21–32)
CREAT BLD-MCNC: 0.93 MG/DL
DEPRECATED RDW RBC AUTO: 41.8 FL (ref 35.1–46.3)
EGFRCR SERPLBLD CKD-EPI 2021: 61 ML/MIN/1.73M2 (ref 60–?)
EOSINOPHIL # BLD AUTO: 0.01 X10(3) UL (ref 0–0.7)
EOSINOPHIL NFR BLD AUTO: 0.1 %
ERYTHROCYTE [DISTWIDTH] IN BLOOD BY AUTOMATED COUNT: 12.7 % (ref 11–15)
GLOBULIN PLAS-MCNC: 3.2 G/DL (ref 2–3.5)
GLUCOSE BLD-MCNC: 127 MG/DL (ref 70–99)
HCT VFR BLD AUTO: 32.7 %
HGB BLD-MCNC: 11.3 G/DL
IMM GRANULOCYTES # BLD AUTO: 0.13 X10(3) UL (ref 0–1)
IMM GRANULOCYTES NFR BLD: 0.8 %
LYMPHOCYTES # BLD AUTO: 0.98 X10(3) UL (ref 1–4)
LYMPHOCYTES NFR BLD AUTO: 5.9 %
MCH RBC QN AUTO: 31.2 PG (ref 26–34)
MCHC RBC AUTO-ENTMCNC: 34.6 G/DL (ref 31–37)
MCV RBC AUTO: 90.3 FL
MONOCYTES # BLD AUTO: 0.99 X10(3) UL (ref 0.1–1)
MONOCYTES NFR BLD AUTO: 5.9 %
NEUTROPHILS # BLD AUTO: 14.55 X10 (3) UL (ref 1.5–7.7)
NEUTROPHILS # BLD AUTO: 14.55 X10(3) UL (ref 1.5–7.7)
NEUTROPHILS NFR BLD AUTO: 86.8 %
OSMOLALITY SERPL CALC.SUM OF ELEC: 288 MOSM/KG (ref 275–295)
PLATELET # BLD AUTO: 492 10(3)UL (ref 150–450)
POTASSIUM SERPL-SCNC: 4.7 MMOL/L (ref 3.5–5.1)
PROT SERPL-MCNC: 7.2 G/DL (ref 5.7–8.2)
RBC # BLD AUTO: 3.62 X10(6)UL
SARS-COV-2 RNA RESP QL NAA+PROBE: NOT DETECTED
SODIUM SERPL-SCNC: 137 MMOL/L (ref 136–145)
WBC # BLD AUTO: 16.7 X10(3) UL (ref 4–11)

## 2024-10-24 PROCEDURE — 71046 X-RAY EXAM CHEST 2 VIEWS: CPT | Performed by: INTERNAL MEDICINE

## 2024-10-24 PROCEDURE — 99222 1ST HOSP IP/OBS MODERATE 55: CPT | Performed by: INTERNAL MEDICINE

## 2024-10-24 RX ORDER — HEPARIN SODIUM 5000 [USP'U]/ML
5000 INJECTION, SOLUTION INTRAVENOUS; SUBCUTANEOUS EVERY 12 HOURS SCHEDULED
Status: DISCONTINUED | OUTPATIENT
Start: 2024-10-24 | End: 2024-10-25

## 2024-10-24 RX ORDER — ASPIRIN 81 MG/1
81 TABLET ORAL DAILY
Status: DISCONTINUED | OUTPATIENT
Start: 2024-10-25 | End: 2024-10-31

## 2024-10-24 RX ORDER — ACETAMINOPHEN 325 MG/1
650 TABLET ORAL EVERY 6 HOURS PRN
Status: DISCONTINUED | OUTPATIENT
Start: 2024-10-24 | End: 2024-11-01

## 2024-10-24 RX ORDER — AMLODIPINE BESYLATE 5 MG/1
5 TABLET ORAL DAILY
Status: DISCONTINUED | OUTPATIENT
Start: 2024-10-25 | End: 2024-11-01

## 2024-10-24 NOTE — PROGRESS NOTES
Hoda Busby is a 84 year old female.  Chief Complaint   Patient presents with    Shortness Of Breath     Pt here for ER follow up      HPI:     Here w/daughter  Follow up from ED 10/17/24  Presented with 1 week of dyspnea and cough.   CXR revealed bilateral airspace opacities c/w pneumonia.   Covid negative  WBC 10.3, left shift  O2 sats were 93-94% on RA    Pt discharged home on amoxicillin 1 g TID and doxycycline 100 mg BID x 7 days    Since then, she has continued to cough and has become more short of breath.  She cannot walk even 10 feet without becoming short of breath.   Her daughter brought her here in a wheelchair.  Cough is productive/rattling, though she is not able to bring up the sputum.  She notes feeling chilled at night, though no actual rigors.  No fevers.  No abd pain        Current Outpatient Medications   Medication Sig Dispense Refill    amoxicillin 500 MG Oral Tab Take 2 tablets (1,000 mg total) by mouth 3 (three) times daily for 7 days. 42 tablet 0    doxycycline 100 MG Oral Cap Take 1 capsule (100 mg total) by mouth 2 (two) times daily for 7 days. 14 capsule 0    alendronate 70 MG Oral Tab Take 1 tablet (70 mg total) by mouth every 7 days. 13 tablet 3    amLODIPine 5 MG Oral Tab Take 1 tablet (5 mg total) by mouth daily. 90 tablet 3    triamterene-hydroCHLOROthiazide 37.5-25 MG Oral Cap Take 1 capsule by mouth every morning. 90 capsule 3    Cholecalciferol (VITAMIN D) 50 MCG (2000 UT) Oral Tab Take 4,000 Units by mouth daily. 1 tablet 0    aspirin 81 MG Oral Tab EC Take 1 tablet (81 mg total) by mouth daily.      acetaminophen 500 MG Oral Tab Take 1 tablet (500 mg total) by mouth every 6 (six) hours as needed for Pain.      Vitamin B-12 500 MCG Oral Tab Take 1 tablet (500 mcg total) by mouth daily.      Calcium Carb-Cholecalciferol (CALCIUM + D3) 600-200 MG-UNIT Oral Tab Take 1 tablet by mouth daily.      Multiple Vitamins-Minerals (OCUVITE ADULT 50+) Oral Cap Take 1 capsule by mouth  daily.        Past Medical History:    High blood pressure    Osteopenia    Primary osteoarthritis of right hip    Visual impairment    Readers    White coat hypertension      Social History:  Social History     Socioeconomic History    Marital status:    Tobacco Use    Smoking status: Former     Current packs/day: 0.00     Types: Cigarettes     Start date: 1990     Quit date: 1990     Years since quittin.8     Passive exposure: Past    Smokeless tobacco: Former   Vaping Use    Vaping status: Never Used   Substance and Sexual Activity    Alcohol use: Yes     Comment: wine, occasionally    Drug use: Never   Other Topics Concern    Caffeine Concern Yes     Comment: coffee/soda - 2cups/day     Social Drivers of Health     Food Insecurity: No Food Insecurity (10/5/2023)    Food Insecurity     Food Insecurity: Never true   Transportation Needs: No Transportation Needs (10/5/2023)    Transportation Needs     Lack of Transportation: No   Housing Stability: Low Risk  (10/5/2023)    Housing Stability     Housing Instability: No        REVIEW OF SYSTEMS:   GENERAL HEALTH: feels well otherwise  RESPIRATORY: no SOB  CARDIOVASCULAR: no chest pain/pressure  GI: no nausea, vomiting, diarrhea    Wt Readings from Last 5 Encounters:   10/24/24 178 lb (80.7 kg)   24 183 lb 3.2 oz (83.1 kg)   23 178 lb (80.7 kg)   23 180 lb 6.4 oz (81.8 kg)   10/05/23 173 lb (78.5 kg)     Body mass index is 27.06 kg/m².      EXAM:   /60   Pulse 100   Temp 98.9 °F (37.2 °C)   Ht 5' 8\" (1.727 m)   Wt 178 lb (80.7 kg)   SpO2 (!) 85%   BMI 27.06 kg/m²   GENERAL: well developed, well nourished, mild tachypnea  NECK: supple, no adenopathy, no bruits  LUNGS: b/l lower lobe crackles 1/2 up, no wheezing  CARDIO: RRR, normal S1S2, 2/6 syst murmur apex  GI: soft, NT, ND, NABS  EXTREMITIES: tr BLE edema    ASSESSMENT AND PLAN:     Acute hypoxic respiratory failure  Bilateral pneumonia  -failed outpt treatment with  amox and doxy  -O2 sats 85% on RA  -pt received Prevnar 13  -will admit for IV abx, repeat CXR  -will check legionella Ag. S.pneumo Ag, mycoplasma Ab    The patient indicates understanding of these issues and agrees to the plan.    Malathi Stuart MD, 10/24/24, 3:09 PM

## 2024-10-24 NOTE — PLAN OF CARE
Pt received, O2 sats at 85% on room air. On 3L of oxygen. Pt ambulated within the room, up to the bathroom. Family at bedside and updated on plan of care.   Problem: Patient Centered Care  Goal: Patient preferences are identified and integrated in the patient's plan of care  Description: Interventions:  - What would you like us to know as we care for you?  - Provide timely, complete, and accurate information to patient/family  - Incorporate patient and family knowledge, values, beliefs, and cultural backgrounds into the planning and delivery of care  - Encourage patient/family to participate in care and decision-making at the level they choose  - Honor patient and family perspectives and choices  Outcome: Progressing     Problem: Patient/Family Goals  Goal: Patient/Family Long Term Goal  Description: Patient's Long Term Goal:    Interventions:  - See additional Care Plan goals for specific interventions  Outcome: Progressing  Goal: Patient/Family Short Term Goal  Description: Patient's Short Term Goal:    Interventions:   - See additional Care Plan goals for specific interventions  Outcome: Progressing     Problem: CARDIOVASCULAR - ADULT  Goal: Maintains optimal cardiac output and hemodynamic stability  Description: INTERVENTIONS:  - Monitor vital signs, rhythm, and trends  - Monitor for bleeding, hypotension and signs of decreased cardiac output  - Evaluate effectiveness of vasoactive medications to optimize hemodynamic stability  - Monitor arterial and/or venous puncture sites for bleeding and/or hematoma  - Assess quality of pulses, skin color and temperature  - Assess for signs of decreased coronary artery perfusion - ex. Angina  - Evaluate fluid balance, assess for edema, trend weights  Outcome: Progressing  Goal: Absence of cardiac arrhythmias or at baseline  Description: INTERVENTIONS:  - Continuous cardiac monitoring, monitor vital signs, obtain 12 lead EKG if indicated  - Evaluate effectiveness of  antiarrhythmic and heart rate control medications as ordered  - Initiate emergency measures for life threatening arrhythmias  - Monitor electrolytes and administer replacement therapy as ordered  Outcome: Progressing     Problem: RESPIRATORY - ADULT  Goal: Achieves optimal ventilation and oxygenation  Description: INTERVENTIONS:  - Assess for changes in respiratory status  - Assess for changes in mentation and behavior  - Position to facilitate oxygenation and minimize respiratory effort  - Oxygen supplementation based on oxygen saturation or ABGs  - Provide Smoking Cessation handout, if applicable  - Encourage broncho-pulmonary hygiene including cough, deep breathe, Incentive Spirometry  - Assess the need for suctioning and perform as needed  - Assess and instruct to report SOB or any respiratory difficulty  - Respiratory Therapy support as indicated  - Manage/alleviate anxiety  - Monitor for signs/symptoms of CO2 retention  Outcome: Progressing     Problem: SAFETY ADULT - FALL  Goal: Free from fall injury  Description: INTERVENTIONS:  - Assess pt frequently for physical needs  - Identify cognitive and physical deficits and behaviors that affect risk of falls.  - [place] fall precautions as indicated by assessment.  - Educate pt/family on patient safety including physical limitations  - Instruct pt to call for assistance with activity based on assessment  - Modify environment to reduce risk of injury  - Provide assistive devices as appropriate  - Consider OT/PT consult to assist with strengthening/mobility  - Encourage toileting schedule  Outcome: Progressing

## 2024-10-25 ENCOUNTER — APPOINTMENT (OUTPATIENT)
Dept: CT IMAGING | Facility: HOSPITAL | Age: 84
End: 2024-10-25
Attending: INTERNAL MEDICINE
Payer: MEDICARE

## 2024-10-25 ENCOUNTER — APPOINTMENT (OUTPATIENT)
Dept: CV DIAGNOSTICS | Facility: HOSPITAL | Age: 84
End: 2024-10-25
Attending: INTERNAL MEDICINE
Payer: MEDICARE

## 2024-10-25 PROBLEM — I26.99 ACUTE PULMONARY EMBOLISM (HCC): Status: ACTIVE | Noted: 2024-10-25

## 2024-10-25 LAB
ADENOVIRUS PCR:: NOT DETECTED
ANION GAP SERPL CALC-SCNC: 7 MMOL/L (ref 0–18)
ANION GAP SERPL CALC-SCNC: 8 MMOL/L (ref 0–18)
APTT PPP: 32.7 SECONDS (ref 23–36)
B PARAPERT DNA SPEC QL NAA+PROBE: NOT DETECTED
B PERT DNA SPEC QL NAA+PROBE: NOT DETECTED
BASOPHILS # BLD AUTO: 0.06 X10(3) UL (ref 0–0.2)
BASOPHILS NFR BLD AUTO: 0.5 %
BUN BLD-MCNC: 18 MG/DL (ref 9–23)
BUN BLD-MCNC: 21 MG/DL (ref 9–23)
BUN/CREAT SERPL: 21.7 (ref 10–20)
BUN/CREAT SERPL: 21.9 (ref 10–20)
C PNEUM DNA SPEC QL NAA+PROBE: NOT DETECTED
CALCIUM BLD-MCNC: 8.4 MG/DL (ref 8.7–10.4)
CALCIUM BLD-MCNC: 8.9 MG/DL (ref 8.7–10.4)
CHLORIDE SERPL-SCNC: 106 MMOL/L (ref 98–112)
CHLORIDE SERPL-SCNC: 107 MMOL/L (ref 98–112)
CO2 SERPL-SCNC: 21 MMOL/L (ref 21–32)
CO2 SERPL-SCNC: 22 MMOL/L (ref 21–32)
CORONAVIRUS 229E PCR:: NOT DETECTED
CORONAVIRUS HKU1 PCR:: NOT DETECTED
CORONAVIRUS NL63 PCR:: NOT DETECTED
CORONAVIRUS OC43 PCR:: NOT DETECTED
CREAT BLD-MCNC: 0.83 MG/DL
CREAT BLD-MCNC: 0.96 MG/DL
DEPRECATED RDW RBC AUTO: 41.9 FL (ref 35.1–46.3)
DEPRECATED RDW RBC AUTO: 42.2 FL (ref 35.1–46.3)
EGFRCR SERPLBLD CKD-EPI 2021: 58 ML/MIN/1.73M2 (ref 60–?)
EGFRCR SERPLBLD CKD-EPI 2021: 69 ML/MIN/1.73M2 (ref 60–?)
EOSINOPHIL # BLD AUTO: 0.13 X10(3) UL (ref 0–0.7)
EOSINOPHIL NFR BLD AUTO: 1 %
ERYTHROCYTE [DISTWIDTH] IN BLOOD BY AUTOMATED COUNT: 12.8 % (ref 11–15)
ERYTHROCYTE [DISTWIDTH] IN BLOOD BY AUTOMATED COUNT: 12.9 % (ref 11–15)
FLUAV RNA SPEC QL NAA+PROBE: NOT DETECTED
FLUBV RNA SPEC QL NAA+PROBE: NOT DETECTED
GLUCOSE BLD-MCNC: 108 MG/DL (ref 70–99)
GLUCOSE BLD-MCNC: 130 MG/DL (ref 70–99)
HCT VFR BLD AUTO: 30.8 %
HCT VFR BLD AUTO: 31.6 %
HGB BLD-MCNC: 10.8 G/DL
HGB BLD-MCNC: 10.8 G/DL
IMM GRANULOCYTES # BLD AUTO: 0.11 X10(3) UL (ref 0–1)
IMM GRANULOCYTES NFR BLD: 0.9 %
L PNEUMO AG UR QL: NEGATIVE
LYMPHOCYTES # BLD AUTO: 1.21 X10(3) UL (ref 1–4)
LYMPHOCYTES NFR BLD AUTO: 9.5 %
MCH RBC QN AUTO: 30.5 PG (ref 26–34)
MCH RBC QN AUTO: 31.2 PG (ref 26–34)
MCHC RBC AUTO-ENTMCNC: 34.2 G/DL (ref 31–37)
MCHC RBC AUTO-ENTMCNC: 35.1 G/DL (ref 31–37)
MCV RBC AUTO: 89 FL
MCV RBC AUTO: 89.3 FL
METAPNEUMOVIRUS PCR:: NOT DETECTED
MONOCYTES # BLD AUTO: 1.18 X10(3) UL (ref 0.1–1)
MONOCYTES NFR BLD AUTO: 9.2 %
MYCOPLASMA PNEUMONIA PCR:: NOT DETECTED
NEUTROPHILS # BLD AUTO: 10.08 X10 (3) UL (ref 1.5–7.7)
NEUTROPHILS # BLD AUTO: 10.08 X10(3) UL (ref 1.5–7.7)
NEUTROPHILS NFR BLD AUTO: 78.9 %
OSMOLALITY SERPL CALC.SUM OF ELEC: 284 MOSM/KG (ref 275–295)
OSMOLALITY SERPL CALC.SUM OF ELEC: 286 MOSM/KG (ref 275–295)
PARAINFLUENZA 1 PCR:: NOT DETECTED
PARAINFLUENZA 2 PCR:: NOT DETECTED
PARAINFLUENZA 3 PCR:: NOT DETECTED
PARAINFLUENZA 4 PCR:: NOT DETECTED
PLATELET # BLD AUTO: 444 10(3)UL (ref 150–450)
PLATELET # BLD AUTO: 495 10(3)UL (ref 150–450)
POTASSIUM SERPL-SCNC: 3.5 MMOL/L (ref 3.5–5.1)
POTASSIUM SERPL-SCNC: 3.6 MMOL/L (ref 3.5–5.1)
PROCALCITONIN SERPL-MCNC: 0.23 NG/ML (ref ?–0.05)
RBC # BLD AUTO: 3.46 X10(6)UL
RBC # BLD AUTO: 3.54 X10(6)UL
RHINOVIRUS/ENTERO PCR:: NOT DETECTED
RSV RNA SPEC QL NAA+PROBE: NOT DETECTED
SARS-COV-2 RNA NPH QL NAA+NON-PROBE: NOT DETECTED
SODIUM SERPL-SCNC: 135 MMOL/L (ref 136–145)
SODIUM SERPL-SCNC: 136 MMOL/L (ref 136–145)
STREP PNEUMO ANTIGEN, URINE: NEGATIVE
WBC # BLD AUTO: 12.8 X10(3) UL (ref 4–11)
WBC # BLD AUTO: 13.4 X10(3) UL (ref 4–11)

## 2024-10-25 PROCEDURE — 99223 1ST HOSP IP/OBS HIGH 75: CPT | Performed by: INTERNAL MEDICINE

## 2024-10-25 PROCEDURE — 71260 CT THORAX DX C+: CPT | Performed by: INTERNAL MEDICINE

## 2024-10-25 PROCEDURE — 93306 TTE W/DOPPLER COMPLETE: CPT | Performed by: INTERNAL MEDICINE

## 2024-10-25 PROCEDURE — 99232 SBSQ HOSP IP/OBS MODERATE 35: CPT | Performed by: INTERNAL MEDICINE

## 2024-10-25 RX ORDER — METHYLPREDNISOLONE SODIUM SUCCINATE 40 MG/ML
40 INJECTION INTRAMUSCULAR; INTRAVENOUS EVERY 6 HOURS
Status: DISCONTINUED | OUTPATIENT
Start: 2024-10-25 | End: 2024-10-29

## 2024-10-25 RX ORDER — HEPARIN SODIUM AND DEXTROSE 10000; 5 [USP'U]/100ML; G/100ML
INJECTION INTRAVENOUS CONTINUOUS
Status: DISCONTINUED | OUTPATIENT
Start: 2024-10-25 | End: 2024-10-31

## 2024-10-25 RX ORDER — AZITHROMYCIN 250 MG/1
500 TABLET, FILM COATED ORAL
Status: DISCONTINUED | OUTPATIENT
Start: 2024-10-25 | End: 2024-10-30

## 2024-10-25 RX ORDER — HEPARIN SODIUM 1000 [USP'U]/ML
80 INJECTION, SOLUTION INTRAVENOUS; SUBCUTANEOUS ONCE
Status: COMPLETED | OUTPATIENT
Start: 2024-10-25 | End: 2024-10-25

## 2024-10-25 RX ORDER — FUROSEMIDE 10 MG/ML
20 INJECTION INTRAMUSCULAR; INTRAVENOUS ONCE
Status: COMPLETED | OUTPATIENT
Start: 2024-10-25 | End: 2024-10-25

## 2024-10-25 RX ORDER — HEPARIN SODIUM AND DEXTROSE 10000; 5 [USP'U]/100ML; G/100ML
18 INJECTION INTRAVENOUS ONCE
Status: COMPLETED | OUTPATIENT
Start: 2024-10-25 | End: 2024-10-25

## 2024-10-25 NOTE — CONSULTS
[facility]  part of [facility]    Report of Consultation    Hoda Busby Patient Status:  Inpatient    1940 MRN [id number]   Location [facility]W Attending Malathi Stuart MD   Hosp Day # 1 PCP Malathi Stuart MD     Date of Admission:  10/24/2024    Reason for Consultation:   Acute respiratory failure    History of Present Illness:   Patient is a pleasant 84-year-old female who presents who presents to hospital with chief complaint worsening ongoing cough and dyspnea for 1 week duration.  Initially seen in the emergency department on 10/17/2024 prescribed Augmentin and doxycycline without significant clinical improvement.  Denies significant fevers.  Denies significant productive cough.  Unable to have any recent history of pneumonia.  Patient denies any new medications.  Significant hypoxia upon arrival to emergency department.  Increasing oxygenation requirements currently on 10 L oxygen.  Admits to some mild improvement in dyspnea during hospital course.  CT chest performed on 10/25/2024 with evidence of pulmonary embolism and diffuse multifocal lobar groundglass opacities seen.  Patient denies prior history of pulmonary embolism.  States she has been more sedentary over the course last week.  Denies any recent travel or surgeries.    Past Medical History  Past Medical History:    High blood pressure    Osteopenia    Primary osteoarthritis of right hip    Visual impairment    Readers    White coat hypertension       Past Surgical History  Past Surgical History:   Procedure Laterality Date    Cataract  3/2&3/16 2017    Colonoscopy & polypectomy  2021         Hip replacement surgery Left Around     Other surgical history  [place]     Bilateral cataract surgery       Family History  Family History   Problem Relation Age of Onset    Cancer Father         bone (cause of death)    Stroke Mother         CVA (cause of death)    Diabetes Mother     Hypertension Mother      Dementia Brother         Alzheimer's    Heart Disease Brother         CAD - x2 brothers    Heart Disease Brother        Social History  Social History     Socioeconomic History    Marital status:    Tobacco Use    Smoking status: Former     Current packs/day: 0.00     Types: Cigarettes     Start date: 1990     Quit date: 1990     Years since quittin.8     Passive exposure: Past    Smokeless tobacco: Former   Vaping Use    Vaping status: Never Used   Substance and Sexual Activity    Alcohol use: Yes     Comment: wine, occasionally    Drug use: Never   Other Topics Concern    Caffeine Concern Yes     Comment: coffee/soda - 2cups/day           Current Medications:  Current Facility-Administered Medications   Medication Dose Route Frequency    heparin (Porcine) 84041 units/250mL infusion PE/DVT/THROMBUS CONTINUOUS  200-3,000 Units/hr Intravenous Continuous    azithromycin (Zithromax) tab 500 mg  500 mg Oral Daily    methylPREDNISolone sodium succinate (Solu-MEDROL) injection 40 mg  40 mg Intravenous Q6H    piperacillin-tazobactam (Zosyn) 3.375 g in dextrose 5% 100 mL IVPB-ADDV  3.375 g Intravenous Q8H    amLODIPine (Norvasc) tab 5 mg  5 mg Oral Daily    aspirin DR tab 81 mg  81 mg Oral Daily    acetaminophen (Tylenol) tab 650 mg  650 mg Oral Q6H PRN    dextromethorphan-guaifenesin ER (Mucinex DM)  MG per 12 hr tab 1 tablet  1 tablet Oral BID PRN    influenza virus trivalent high dose PF (Fluzone HD) 0.5 mL injection (ages >/= 65 years) 0.5 mL  0.5 mL Intramuscular Prior to discharge     Medications Prior to Admission   Medication Sig    [] amoxicillin 500 MG Oral Tab Take 2 tablets (1,000 mg total) by mouth 3 (three) times daily for 7 days.    [] doxycycline 100 MG Oral Cap Take 1 capsule (100 mg total) by mouth 2 (two) times daily for 7 days.    alendronate 70 MG Oral Tab Take 1 tablet (70 mg total) by mouth every 7 days.    Cholecalciferol (VITAMIN D) 50 MCG (2000 UT) Oral  Tab Take 4,000 Units by mouth daily.    aspirin 81 MG Oral Tab EC Take 1 tablet (81 mg total) by mouth daily.    Vitamin B-12 500 MCG Oral Tab Take 1 tablet (500 mcg total) by mouth daily.    Calcium Carb-Cholecalciferol (CALCIUM + D3) 600-200 MG-UNIT Oral Tab Take 1 tablet by mouth daily.    Multiple Vitamins-Minerals (OCUVITE ADULT 50+) Oral Cap Take 1 capsule by mouth daily.    amLODIPine 5 MG Oral Tab Take 1 tablet (5 mg total) by mouth daily.    acetaminophen 500 MG Oral Tab Take 1 tablet (500 mg total) by mouth every 6 (six) hours as needed for Pain.       Allergies  Allergies[1]    Review of Systems:   Constitutional: Fatigue  HEENT: denies headache, sore throat, vision loss  Cardio: denies chest pain, chest pressure, palpitations  Respiratory: dyspnea, cough, denies wheezing, hemoptysis   GI: denies nausea, vomiting, abdominal pain  : denies dysuria, hematuria  Musculoskeletal: denies arthralgia, myalgia  Integumentary: denies rash, itching  Neurological: denies syncope, weakness, dizziness,   Psychiatric: denies depression, anxiety  Hematologic: denies bruising        Physical Exam:   Blood pressure 123/62, pulse 94, temperature 98.6 °F (37 °C), temperature source Oral, resp. rate 20, weight 178 lb (80.7 kg), SpO2 95%.    Constitutional: no acute distress  Eyes: PERRL  ENT: nares patent  Neck: neck supple, no JVD  Cardio: RRR, S1 S2  Respiratory: Faint crackles  GI: abdomen soft, non tender, active bowel souds, no organomegaly  Extremities: no clubbing, cyanosis, edema  Neurologic: no gross motor deficits  Skin: warm, dry    Results:   Laboratory Data  Lab Results   Component Value Date    WBC 13.4 (H) 10/25/2024    HGB 10.8 (L) 10/25/2024    HCT 31.6 (L) 10/25/2024    .0 (H) 10/25/2024    CREATSERUM 0.83 10/25/2024    BUN 18 10/25/2024     (L) 10/25/2024    K 3.5 10/25/2024     10/25/2024    CO2 22.0 10/25/2024     (H) 10/25/2024    CA 8.4 (L) 10/25/2024    ALB 4.0 10/24/2024     ALKPHO 122 10/24/2024    TP 7.2 10/24/2024    AST 37 (H) 10/24/2024    ALT 27 10/24/2024    PTT 32.7 10/25/2024    INR 1.06 09/08/2023    PTP 13.7 09/08/2023    TSH 1.526 07/16/2024    PHOS 3.3 08/20/2024    B12 349 03/20/2017         Imaging  CT CHEST PE AORTA (IV ONLY) (CPT=71260)    Result Date: 10/25/2024  CONCLUSION:   1. Acute distal segmental/subsegmental pulmonary embolism involving the right upper lobe as well as a subsegmental pulmonary embolism in the left upper lobe. 2. Multifocal ground-glass opacities involving the right greater than left lungs with some areas of perilobular sparing as well as small consolidations involving the bilateral lower lobes.  Differential diagnosis includes multifocal pneumonia, organizing  pneumonia, or less likely other etiologies. 3. Small right and trace left pleural effusions. 4. Mild centrilobular emphysema. 5. Mild mediastinal and right hilar lymphadenopathy, which is favored to be reactive. 6. Biatrial predominant cardiomegaly with triple-vessel coronary artery calcifications, mitral annular calcifications, and aortic valve leaflet calcifications. 7. Lesser incidental findings described above.   The findings of acute pulmonary embolism as well as impression points 2 and 3 were communicated via secure epic chat to Dr. Holloway  at 2:24 p.m. on 10/25/2024 by Basim West MD  Dictated by (CST): Basim West MD on 10/25/2024 at 2:05 PM     Finalized by (CST): Basim West MD on 10/25/2024 at 2:27 PM          XR CHEST PA + LAT CHEST (CPT=71046)    Result Date: 10/24/2024  CONCLUSION:   Extensive bilateral perihilar and bilateral upper and lower lobe airspace opacities, progressed since the prior exams.  Findings may be secondary to severe pulmonary edema, multifocal pneumonia (either from bacterial or viral etiology) or respiratory distress syndrome.  Continued follow-up imaging recommended to ensure resolution.    Dictated by (CST): Heri Lebron MD on 10/24/2024 at  7:47 PM     Finalized by (CST): Heri Lebron MD on 10/24/2024 at 7:49 PM           Assessment   1.  Acute hypoxemic respiratory failure  2.  Bilateral lung opacities  3.  Acute pulmonary embolism  4.  COPD  5.  Prior nicotine dependence  6.  Hypertension  7.  Severe aortic stenosis    Plan   -Patient presents with evidence of progressive worsening dyspnea with exertion, hypoxia over the course of last week.  Ongoing nonproductive cough present.  Started on antibiotic therapy with doxycycline and Augmentin on 10/17/2024 with no significant improvement clinically.  -Progressive worsening bilateral opacities seen on chest x-ray.  -CT chest on 10/25/2024 with acute segmental pulmonary embolism right upper lobe and left upper lobe.  Multifocal groundglass opacities seen bilaterally.  Pattern less likely due to pulmonary edema differential includes organizing pneumonia cannot rule out infectious process.  Unlikely drug reaction.  -No significant clinical improvement despite antibiotic therapy currently on Zosyn.  ID consultation pending.  Discussed with ID.  Will add azithromycin in the meantime.  -Legionella and strep pneumonia antigen negative  -Currently on 10 L nasal cannula oxygen.  Wean as tolerated.  May need to transition to Vapotherm if worsening hypoxia.  -Given concern for possible organizing pneumonia will monitor response to steroid therapy.  -Anticoagulation with heparin gtt. started.  2D echo pending.  Most recent 2D echo on 10/18/2024 with worsening aortic stenosis now severe in nature.  Cardiology consultation pending.  -Lower extremity venous Doppler pending to evaluate for DVT  -Discussed with family, infectious disease specialist, primary care physician.  Reviewed vitals, labs imaging      Cody Holloway DO  Pulmonary Critical Care Medicine  [facility]  10/25/2024  4:04 PM        [1] No Known Allergies

## 2024-10-25 NOTE — H&P
[facility]  part of [facility]    History and Physical    Hoda Busby Patient Status:  Inpatient    1940 MRN [id number]   Location [facility]5W Attending Malathi Stuart MD   Hosp Day # 0 PCP Malathi Stuart MD     Date:  10/24/2024  Date of Admission:  10/24/2024    History provided by:patient  HPI:   No chief complaint on file.    HPI    Pt is an 85 y/o F with HTN, aortic stenosis admitted with acute respiratory failure due to pneumonia.  The patent was seen in the ED on 10/17/24 with a one week hx of cough and dyspnea.  CXR revealed bilateral airspace opacities c/w pneumonia.   Covid negative  WBC 10.3, left shift  O2 sats were 93-94% on RA     Pt was sent home on amoxicillin 1 g TID and doxycycline 100 mg BID x 7 days     Since then, she has continued to cough and has become more short of breath.  She cannot walk even 10 feet without becoming short of breath.   Her daughter brought her here in a wheelchair.  Cough is productive/rattling, though she is not able to bring up the sputum.  She notes feeling chilled at night, though no actual rigors.  No fevers.  No abd pain.    She was seen in the office today.  O2 sats 85% on RA -- up to 90% on 1.5L O2 NC.  Pt appeared visibly dyspneic       History     Past Medical History:    High blood pressure    Osteopenia    Primary osteoarthritis of right hip    Visual impairment    Readers    White coat hypertension     Past Surgical History:   Procedure Laterality Date    Cataract  3/2&3/16 2017    Colonoscopy & polypectomy  2021         Hip replacement surgery Left Around     Other surgical history  [place] 2017    Bilateral cataract surgery     Family History   Problem Relation Age of Onset    Cancer Father         bone (cause of death)    Stroke Mother         CVA (cause of death)    Diabetes Mother     Hypertension Mother     Dementia Brother         Alzheimer's    Heart Disease Brother         CAD - x2 brothers    Heart  Disease Brother      Social History:  Social History     Socioeconomic History    Marital status:    Tobacco Use    Smoking status: Former     Current packs/day: 0.00     Types: Cigarettes     Start date: 1990     Quit date: 1990     Years since quittin.8     Passive exposure: Past    Smokeless tobacco: Former   Vaping Use    Vaping status: Never Used   Substance and Sexual Activity    Alcohol use: Yes     Comment: wine, occasionally    Drug use: Never   Other Topics Concern    Caffeine Concern Yes     Comment: coffee/soda - 2cups/day     Social Drivers of Health     Food Insecurity: No Food Insecurity (10/24/2024)    Food Insecurity     Food Insecurity: Never true   Transportation Needs: No Transportation Needs (10/24/2024)    Transportation Needs     Lack of Transportation: No   Housing Stability: Low Risk  (10/24/2024)    Housing Stability     Housing Instability: No     Allergies/Medications:   Allergies: Allergies[1]  Medications Prior to Admission   Medication Sig    amoxicillin 500 MG Oral Tab Take 2 tablets (1,000 mg total) by mouth 3 (three) times daily for 7 days.    doxycycline 100 MG Oral Cap Take 1 capsule (100 mg total) by mouth 2 (two) times daily for 7 days.    alendronate 70 MG Oral Tab Take 1 tablet (70 mg total) by mouth every 7 days.    Cholecalciferol (VITAMIN D) 50 MCG (2000 UT) Oral Tab Take 4,000 Units by mouth daily.    aspirin 81 MG Oral Tab EC Take 1 tablet (81 mg total) by mouth daily.    Vitamin B-12 500 MCG Oral Tab Take 1 tablet (500 mcg total) by mouth daily.    Calcium Carb-Cholecalciferol (CALCIUM + D3) 600-200 MG-UNIT Oral Tab Take 1 tablet by mouth daily.    Multiple Vitamins-Minerals (OCUVITE ADULT 50+) Oral Cap Take 1 capsule by mouth daily.    amLODIPine 5 MG Oral Tab Take 1 tablet (5 mg total) by mouth daily.    acetaminophen 500 MG Oral Tab Take 1 tablet (500 mg total) by mouth every 6 (six) hours as needed for Pain.       Review of Systems:   Review of  Systems    Physical Exam:   Vital Signs:  Blood pressure 131/72, pulse 106, temperature 99.3 °F (37.4 °C), temperature source Oral, resp. rate 24, weight 178 lb (80.7 kg), SpO2 92%.  Physical Exam  Cervical Papanicolaou contraindicated  GENERAL: well developed, well nourished, mild tachypnea  NECK: supple, no adenopathy, no bruits  LUNGS: b/l lower lobe crackles 1/2 up, no wheezing  CARDIO: RRR, normal S1S2, 2/6 syst murmur apex  GI: soft, NT, ND, NABS  EXTREMITIES: tr BLE edema    Results:     Lab Results   Component Value Date    WBC 16.7 (H) 10/24/2024    HGB 11.3 (L) 10/24/2024    HCT 32.7 (L) 10/24/2024    .0 (H) 10/24/2024    CREATSERUM 0.93 10/24/2024    BUN 20 10/24/2024     10/24/2024    K 4.7 10/24/2024     10/24/2024    CO2 22.0 10/24/2024     (H) 10/24/2024    CA 9.3 10/24/2024    ALB 4.0 10/24/2024    ALKPHO 122 10/24/2024    BILT 1.0 10/24/2024    TP 7.2 10/24/2024    AST 37 (H) 10/24/2024    ALT 27 10/24/2024    PTT 27.9 09/08/2023    INR 1.06 09/08/2023    TSH 1.526 07/16/2024    PHOS 3.3 08/20/2024    TROPHS 9 10/17/2024    B12 349 03/20/2017     XR CHEST PA + LAT CHEST (CPT=71046)    Result Date: 10/24/2024  CONCLUSION:   Extensive bilateral perihilar and bilateral upper and lower lobe airspace opacities, progressed since the prior exams.  Findings may be secondary to severe pulmonary edema, multifocal pneumonia (either from bacterial or viral etiology) or respiratory distress syndrome.  Continued follow-up imaging recommended to ensure resolution.    Dictated by (CST): Heri Lebron MD on 10/24/2024 at 7:47 PM     Finalized by (CST): Heri Lebron MD on 10/24/2024 at 7:49 PM               Assessment/Plan:       Acute hypoxic respiratory failure  Bilateral pneumonia  -failed outpt treatment with amox and doxy  -Covid negative 10/17  -O2 sats 85% on RA in office  -pt received Prevnar 13 in 2015  -WBC 16K - cont to trend  -repeat CXR today revealed extensive bilateral  perihilar and bilateral upper and lower lobe airspace opacities, progressed since 10/17.    -Findings moreso c/w pneumonia than CHF  -given CXR findings, will repeat Covid testing  -pt just had an echo at Dr. Bird's office 10/18/24 -- will call for results  -start IV zosyn  -O2 protocol; wean as tolerated  -will check legionella Ag. S.pneumo Ag, mycoplasma Ab    Hypertension, primary  -(dyazide stopped due to NANCY)  -on amlodipine 5mg/day     Hx of hip OA  -s/p left THR (Dr. Lo) many years ago  -s/p elective R BEATRIZ on 10/5/23 by Dr. Diaz     Hx of aortic stenosis  -seeing cardiology Dr. Bird  -moderate A.S on echo in 10/2022 (prev mild A.S in 2020)  -abnormal pre-op EKG 9/2023 --Nuc GXT done 9/20/23 -- EKG portion revealed 1-1.5mm ST seg depression in inferolateral leads with prolonged recovery; nuclear portion with normal perfusion   -Echo 9/29/23 --progression of A.S. from mild to moderate  -repeat Echo done 10/18/24 at Dr. Bird's office (results not in Epic); will f/u     Hyperlipidemia   Pt with strong family hx of high cholesterol  ; normal TG and HDL  No indication for statin given age     Osteopenia   On Fosamax from 2011 to 4/2016.     DEXA stable 5/10/17.    DEXA in 9/2019 showed slightly more bone loss in spine, but stable in hip.    DEXA 11/2021 -- some improvement in femur.  Hold off on fosamax for now.   DEXA 8/2024 -- osteoporosis of left forearm  -restarted Fosamax 8/2024     Vitamin D deficiency  On vitamin D 4000u/day    VTE proph  -[facility]    Malathi Stuart MD  10/24/2024         [1] No Known Allergies

## 2024-10-25 NOTE — PROGRESS NOTES
[facility]  part of [facility]    Progress Note    Hoda Busby Patient Status:  Inpatient    1940 MRN [id number]   Location [facility]5W Attending Malathi Stuart MD   Hosp Day # 1 PCP Malathi Stuart MD       SUBJECTIVE:  Pt feeling much better today  Feels more energetic with oxygen  Appetite is good; no diarrhea  Still with nonproductive cough    OBJECTIVE:  /65 (BP Location: Right arm)   Pulse 92   Temp 98.7 °F (37.1 °C) (Oral)   Resp 20   Wt 178 lb (80.7 kg)   SpO2 92%   BMI 27.06 kg/m²     Intake/Output:  I/O last 3 completed shifts:  In: 322 [P.O.:222; IV PIGGYBACK:100]  Out: 300 [Urine:300]    Wt Readings from Last 3 Encounters:   10/24/24 178 lb (80.7 kg)   10/24/24 178 lb (80.7 kg)   24 183 lb 3.2 oz (83.1 kg)       Exam  Gen: No acute distress, alert and oriented x3  Pulm: Lungs CTAB, no rhonchi or wheezing  CV: Heart RRR,4/6 systolic murmur radiating to carotids  Abd: Abdomen soft, nontender, nondistended, no organomegaly, bowel sounds present  MSK: trace BLE edema  Skin: no rashes or lesions  Neuro: A&O x 3    Labs:   Recent Labs   Lab 10/24/24  1659 10/25/24  0456   RBC 3.62* 3.46*   HGB 11.3* 10.8*   HCT 32.7* 30.8*   MCV 90.3 89.0   MCH 31.2 31.2   MCHC 34.6 35.1   RDW 12.7 12.8   NEPRELIM 14.55* 10.08*   WBC 16.7* 12.8*   .0* 444.0         Recent Labs   Lab 10/24/24  1659 10/25/24  0456   * 108*   BUN 20 21   CREATSERUM 0.93 0.96   EGFRCR 61 58*   CA 9.3 8.9   ALB 4.0  --     136   K 4.7 3.6    107   CO2 22.0 21.0   ALKPHO 122  --    AST 37*  --    ALT 27  --    BILT 1.0  --    TP 7.2  --          Imaging:  XR CHEST PA + LAT CHEST (CPT=71046)    Result Date: 10/24/2024  CONCLUSION:   Extensive bilateral perihilar and bilateral upper and lower lobe airspace opacities, progressed since the prior exams.  Findings may be secondary to severe pulmonary edema, multifocal pneumonia (either from bacterial or viral  etiology) or respiratory distress syndrome.  Continued follow-up imaging recommended to ensure resolution.    Dictated by (CST): Heri Lebron MD on 10/24/2024 at 7:47 PM     Finalized by (CST): Heri Lebron MD on 10/24/2024 at 7:49 PM                 Assessment and Plan:       Acute hypoxic respiratory failure  Bilateral pneumonia  -failed outpt treatment with amox and doxy  -Covid negative 10/17  -O2 sats 85% on RA in office  -pt received Prevnar 13 in 2015  -WBC 16K >12.8 today  -repeat CXR today revealed extensive bilateral perihilar and bilateral upper and lower lobe airspace opacities, progressed since 10/17.    -Findings moreso c/w pneumonia than CHF  Covid, pneumonia, legionella negative  -pt just had an echo at Dr. Bird's office 10/18/24 -- LVEF 65%, grade 2 diastolic dysfunction, severe aortic stenosis, mild to moderate mitral regurgitation, mild to moderate TR, PASP 45mmHg  - IV zosyn day #2  -O2 protocol; wean as tolerated  -await mycoplasma Ab  Plan for repeat cxr in am  Will add acapella to see if oxygenation improves; would consider CT chest if still persistent hypoxia         Hypertension, primary  -(dyazide stopped due to NANCY)  -on amlodipine 5mg/day     Hx of hip OA  -s/p left THR (Dr. Lo) many years ago  -s/p elective R BEATRIZ on 10/5/23 by Dr. Diaz     Hx of aortic stenosis  -seeing cardiology Dr. Bird  -moderate A.S on echo in 10/2022 (prev mild A.S in 2020)  -abnormal pre-op EKG 9/2023 --Nuc GXT done 9/20/23 -- EKG portion revealed 1-1.5mm ST seg depression in inferolateral leads with prolonged recovery; nuclear portion with normal perfusion   -Echo 9/29/23 --progression of A.S. from mild to moderate  -repeat Echo done 10/18/24 at Dr. Bird's office with severe aortic stenosis; will need outpt follow up    Hyperlipidemia   Pt with strong family hx of high cholesterol  ; normal TG and HDL  No indication for statin given age     Osteopenia   On Fosamax from 2011 to 4/2016.      DEXA stable 5/10/17.    DEXA in 9/2019 showed slightly more bone loss in spine, but stable in hip.    DEXA 11/2021 -- some improvement in femur.  Hold off on fosamax for now.   DEXA 8/2024 -- osteoporosis of left forearm  -restarted Fosamax 8/2024     Vitamin D deficiency  On vitamin D 4000u/day     VTE proph  -[facility]         Nadia Steel DO  10/25/2024  7:29 AM

## 2024-10-25 NOTE — PLAN OF CARE
Problem: Patient Centered Care  Goal: Patient preferences are identified and integrated in the patient's plan of care  Description: Interventions:  - What would you like us to know as we care for you? From Home Alone  - Provide timely, complete, and accurate information to patient/family  - Incorporate patient and family knowledge, values, beliefs, and cultural backgrounds into the planning and delivery of care  - Encourage patient/family to participate in care and decision-making at the level they choose  - Honor patient and family perspectives and choices  Outcome: Progressing     Problem: Patient/Family Goals  Goal: Patient/Family Long Term Goal  Description: Patient's Long Term Goal: Leave hospital and return home.     Interventions:  - Oxygen therapy, IV antibiotics, Pulmonary and Cardiology consult, IV diuresis, imaging  - See additional Care Plan goals for specific interventions  Outcome: Progressing  Goal: Patient/Family Short Term Goal  Description: Patient's Short Term Goal: Wean oxygen, treat PNA, anticoagulation, feel better     Interventions:   - Monitor vital signs   - Monitor appropriate labs   - Monitor blood glucose levels   - Pain management   - Administer medications per order   - Follow MD orders   - Diagnostics per order   - Update/inform patient and family on plan of care   - Discharge planning     - See additional Care Plan goals for specific interventions  Outcome: Progressing     Problem: CARDIOVASCULAR - ADULT  Goal: Maintains optimal cardiac output and hemodynamic stability  Description: INTERVENTIONS:  - Monitor vital signs, rhythm, and trends  - Monitor for bleeding, hypotension and signs of decreased cardiac output  - Evaluate effectiveness of vasoactive medications to optimize hemodynamic stability  - Monitor arterial and/or venous puncture sites for bleeding and/or hematoma  - Assess quality of pulses, skin color and temperature  - Assess for signs of decreased coronary artery  perfusion - ex. Angina  - Evaluate fluid balance, assess for edema, trend weights  Outcome: Progressing  Goal: Absence of cardiac arrhythmias or at baseline  Description: INTERVENTIONS:  - Continuous cardiac monitoring, monitor vital signs, obtain 12 lead EKG if indicated  - Evaluate effectiveness of antiarrhythmic and heart rate control medications as ordered  - Initiate emergency measures for life threatening arrhythmias  - Monitor electrolytes and administer replacement therapy as ordered  Outcome: Progressing     Problem: RESPIRATORY - ADULT  Goal: Achieves optimal ventilation and oxygenation  Description: INTERVENTIONS:  - Assess for changes in respiratory status  - Assess for changes in mentation and behavior  - Position to facilitate oxygenation and minimize respiratory effort  - Oxygen supplementation based on oxygen saturation or ABGs  - Provide Smoking Cessation handout, if applicable  - Encourage broncho-pulmonary hygiene including cough, deep breathe, Incentive Spirometry  - Assess the need for suctioning and perform as needed  - Assess and instruct to report SOB or any respiratory difficulty  - Respiratory Therapy support as indicated  - Manage/alleviate anxiety  - Monitor for signs/symptoms of CO2 retention  Outcome: Progressing     Problem: SAFETY ADULT - FALL  Goal: Free from fall injury  Description: INTERVENTIONS:  - Assess pt frequently for physical needs  - Identify cognitive and physical deficits and behaviors that affect risk of falls.  - [place] fall precautions as indicated by assessment.  - Educate pt/family on patient safety including physical limitations  - Instruct pt to call for assistance with activity based on assessment  - Modify environment to reduce risk of injury  - Provide assistive devices as appropriate  - Consider OT/PT consult to assist with strengthening/mobility  - Encourage toileting schedule  Outcome: Progressing    A&O x 4.   Received patient on 4L/min saturating 93%.  Per patient, she feels like her breathing is improving. Patient visibly short of breath with activity and at rest. Patient notably desaturated down to 77% with ambulation to bathroom, O2 increased to 6L to achieve SpO2 > 92%, patient continued to desaturate, O2 up to 10L HFNC. Pulmonary MD notified. Saturating at 94% on 10 L HFNC.  Order for CT chest (PE) received and completed. Per orders, heparin gtt started, see MAR. Next PTT to be collected at 2200.  IV steriod, IV lasix administered per orders. Echo completed at bedside. Plan for dopplers of lower extremities. Safety precautions observed and maintained. Call light and personal items within reach. Rounding preformed.

## 2024-10-25 NOTE — CM/SW NOTE
10/25/24 1200   CM/SW Referral Data   Referral Source    Reason for Referral Discharge planning   Informant Patient   Medical Hx   Does patient have an established PCP? Yes  (Malathi Stuart MD)   Significant Past Medical/Mental Health Hx See history and physical   Patient Info   Patient's Current Mental Status at Time of Assessment Alert;Oriented   Patient's Home Environment House   Number of Levels in Home 1   Number of Stair in Home 3 to enter   Patient lives with Alone   Patient Status Prior to Admission   Independent with ADLs and Mobility Yes   Services in place prior to admission DME/Supplies at home   Type of DME/Supplies Straight Cane;Wheeled Walker;Shower Chair   Discharge Needs   Anticipated D/C needs To be determined     CM self-referred for discharge planning. Above assessment completed with patient at bedside.  Address on file and PCP verified.    Patient lives alone and is independent with ADLS, requires no assistive device for ambulation, and still drives.  Patient reports no history of home healthcare or rehab stay.      Patient has no history of home oxygen and is requiring supplemental oxygen while inpatient. Nursing attempting to wean.  CM/SW will continue to follow over course of stay to assess patient oxygen need.    / to remain available for support and/or discharge planning.     Plan: Home, pending supplemental oxygen weaning, medical clearance    Betty Sharma RN, BSN  Nurse   [phone]

## 2024-10-25 NOTE — CONSULTS
[facility]  Cardiology Consultation    Hoda Busby Patient Status:  Inpatient    1940 MRN [id number]   Location [facility]5W Attending Malathi Stuart MD   Hosp Day # 1 PCP Malathi Stuart MD     Date of Admission:  10/24/2024  Date of Consult:  10/25/2024  Reason for Consultation:   PE    History of Present Illness:   Patient is an 84-year-old female with a history of severe aortic stenosis and HTN who presented with acute respiratory failure due to pneumonia, subsequent found to have bilateral PE.  She reports developing a cough about 2 weeks ago that continue to worsen, eventually experience shortness of breath with minimal activity and even at rest.  She denies any orthopnea, lower extremity edema, palpitations, dizziness, or edema.  She came into the ED about a week ago where she was treated for pneumonia with oral antibiotics however due to worsening symptoms came back yesterday.  Now on broad-spectrum IV antibiotics, today had a CT of the chest which noted bilateral upper lobe pulmonary emboli.  Notably, patient just had a transthoracic echocardiogram a week ago in our office which noted progression of her aortic stenosis to severe range, normal LVEF, and grade 2 diastolic dysfunction.  Her PA systolic pressure was elevated at 45 mmHg.    Cardiac history:  Severe aortic stenosis  HTN    Past Medical History  Past Medical History:    High blood pressure    Osteopenia    Primary osteoarthritis of right hip    Visual impairment    Readers    White coat hypertension     Past Surgical History  Past Surgical History:   Procedure Laterality Date    Cataract  3/2&3/16 2017    Colonoscopy & polypectomy  2021         Hip replacement surgery Left Around 2006    Other surgical history  [place] 2017    Bilateral cataract surgery     Family History  2 brothers with CABG in their 50s.  Mother  of congestive heart failure in her 50s.  Family History   Problem Relation Age of  Onset    Cancer Father         bone (cause of death)    Stroke Mother         CVA (cause of death)    Diabetes Mother     Hypertension Mother     Dementia Brother         Alzheimer's    Heart Disease Brother         CAD - x2 brothers    Heart Disease Brother      Social History  Lives at home alone.  Quit smoking over 30 years ago.  Has a few glasses of wine throughout the week.  Pediatric History   Patient Parents    Not on file     Other Topics Concern     Service Not Asked    Blood Transfusions Not Asked    Caffeine Concern Yes     Comment: coffee/soda - 2cups/day    Occupational Exposure Not Asked    Hobby Hazards Not Asked    Sleep Concern Not Asked    Stress Concern Not Asked    Weight Concern Not Asked    Special Diet Not Asked    Back Care Not Asked    Exercise Not Asked    Bike Helmet Not Asked    Seat Belt Not Asked    Self-Exams Not Asked   Social History Narrative    Not on file         Current Medications:  Current Facility-Administered Medications   Medication Dose Route Frequency    heparin (Porcine) 1000 UNIT/ML injection - BOLUS IV 6,500 Units  80 Units/kg Intravenous Once    heparin (Porcine) 04340 units/250mL infusion (PE/DVT/THROMBUS) INITIAL DOSE  18 Units/kg/hr Intravenous Once    heparin (Porcine) 63475 units/250mL infusion PE/DVT/THROMBUS CONTINUOUS  200-3,000 Units/hr Intravenous Continuous    piperacillin-tazobactam (Zosyn) 3.375 g in dextrose 5% 100 mL IVPB-ADDV  3.375 g Intravenous Q8H    amLODIPine (Norvasc) tab 5 mg  5 mg Oral Daily    aspirin DR tab 81 mg  81 mg Oral Daily    acetaminophen (Tylenol) tab 650 mg  650 mg Oral Q6H PRN    dextromethorphan-guaifenesin ER (Mucinex DM)  MG per 12 hr tab 1 tablet  1 tablet Oral BID PRN    influenza virus trivalent high dose PF (Fluzone HD) 0.5 mL injection (ages >/= 65 years) 0.5 mL  0.5 mL Intramuscular Prior to discharge     Medications Prior to Admission   Medication Sig    [] amoxicillin 500 MG Oral Tab Take 2 tablets  (1,000 mg total) by mouth 3 (three) times daily for 7 days.    [] doxycycline 100 MG Oral Cap Take 1 capsule (100 mg total) by mouth 2 (two) times daily for 7 days.    alendronate 70 MG Oral Tab Take 1 tablet (70 mg total) by mouth every 7 days.    Cholecalciferol (VITAMIN D) 50 MCG (2000 UT) Oral Tab Take 4,000 Units by mouth daily.    aspirin 81 MG Oral Tab EC Take 1 tablet (81 mg total) by mouth daily.    Vitamin B-12 500 MCG Oral Tab Take 1 tablet (500 mcg total) by mouth daily.    Calcium Carb-Cholecalciferol (CALCIUM + D3) 600-200 MG-UNIT Oral Tab Take 1 tablet by mouth daily.    Multiple Vitamins-Minerals (OCUVITE ADULT 50+) Oral Cap Take 1 capsule by mouth daily.    amLODIPine 5 MG Oral Tab Take 1 tablet (5 mg total) by mouth daily.    acetaminophen 500 MG Oral Tab Take 1 tablet (500 mg total) by mouth every 6 (six) hours as needed for Pain.     Allergies  Allergies[1]    Review of Systems:     14 point review of systems completed and negative except as noted.    Physical Exam:   Patient Vitals for the past 24 hrs:   BP Temp Temp src Pulse Resp SpO2 Weight   10/25/24 1442 -- -- -- 94 -- 95 % --   10/25/24 1230 123/62 98.6 °F (37 °C) Oral 85 20 96 % --   10/25/24 1126 -- -- -- 84 -- 90 % --   10/25/24 0814 129/72 98.3 °F (36.8 °C) Oral 90 22 93 % --   10/25/24 0605 122/65 98.7 °F (37.1 °C) Oral 92 20 92 % --   10/25/24 0035 129/76 98.8 °F (37.1 °C) Oral 106 22 95 % --   10/24/24 2144 -- -- -- -- -- 95 % --   10/24/24 2140 -- -- -- 90 -- 95 % --   10/24/24 2135 -- -- -- 92 -- 94 % --   10/24/24 2130 -- -- -- 99 -- 93 % --   10/24/24 2125 -- -- -- 101 -- (!) 85 % --   10/24/24 2120 -- -- -- 100 -- (!) 76 % --   10/24/24 2115 -- -- -- 96 -- (!) 81 % --   10/24/24 2110 -- -- -- 106 -- 95 % --   10/24/24 2105 -- -- -- 89 -- 92 % --   10/24/24 2100 -- -- -- 87 -- 94 % --   10/24/24 2055 -- -- -- 86 -- 94 % --   10/24/24 2050 -- -- -- 89 -- 93 % --   10/24/24 2045 -- -- -- 87 -- 93 % --   10/24/24 2040 -- --  -- 91 -- 93 % --   10/24/24 2035 -- -- -- 89 -- 94 % --   10/24/24 2030 -- -- -- 85 -- 93 % --   10/24/24 2025 -- -- -- 103 -- 94 % --   10/24/24 2020 -- -- -- 106 -- 93 % --   10/24/24 2015 -- -- -- 103 -- 94 % --   10/24/24 2010 -- -- -- 106 -- 92 % --   10/24/24 2005 -- -- -- 106 -- 93 % --   10/24/24 2000 131/72 99.3 °F (37.4 °C) Oral 106 24 92 % --   10/24/24 1955 -- -- -- 107 -- -- --   10/24/24 1952 -- -- -- 104 -- -- --   10/24/24 1950 -- -- -- 111 -- -- --   10/24/24 1700 -- -- -- -- -- 93 % --   10/24/24 1651 -- -- -- -- -- -- 178 lb (80.7 kg)   10/24/24 1647 115/56 98.6 °F (37 °C) Oral 100 24 (!) 85 % --     Intake/Output:   Last 3 shifts:   Intake/Output                   10/23/24 0700 - 10/24/24 0659 (Not Admitted) 10/24/24 0700 - 10/25/24 0659 10/25/24 0700 - 10/26/24 0659       Intake    P.O.  --  222  240    P.O. -- 222 240    IV PIGGYBACK  --  100  100    Volume (mL) (piperacillin-tazobactam (Zosyn) 3.375 g in dextrose 5% 100 mL IVPB-ADDV) -- 100 100    Total Intake -- 322 340       Output    Urine  --  300  --    Urine -- 300 --    Urine Occurrence -- 2 x 1 x    Total Output -- 300 --       Net I/O     -- 22 340          Scheduled Meds:    heparin  80 Units/kg Intravenous Once    initial dose (PE/DVT/THROMBUS) heparin  18 Units/kg/hr Intravenous Once    piperacillin-tazobactam  3.375 g Intravenous Q8H    amLODIPine  5 mg Oral Daily    aspirin  81 mg Oral Daily     Continuous Infusions:    continuous dose heparin       General: Alert and oriented x 3. No apparent distress.   HEENT: Normocephalic, anicteric sclera, neck supple, no thyromegaly or adenopathy.  Neck: No JVD, carotids 2+, no bruits.  Cardiac: Regular rate and rhythm. S1, S2 normal. 4/6 WADE.  Lungs: Bilateral crackles.  Normal excursions and effort.  Abdomen: Soft, non-tender. No organosplenomegally, mass or rebound, BS-present.  Extremities: Without clubbing or cyanosis. No edema.    Neurologic: Alert and oriented, normal affect. No  focal defects  Skin: Warm and dry.     Results:   Laboratory Data:  Lab Results   Component Value Date    WBC 12.8 (H) 10/25/2024    HGB 10.8 (L) 10/25/2024    HCT 30.8 (L) 10/25/2024    .0 10/25/2024    CREATSERUM 0.96 10/25/2024    BUN 21 10/25/2024     10/25/2024    K 3.6 10/25/2024     10/25/2024    CO2 21.0 10/25/2024     (H) 10/25/2024    CA 8.9 10/25/2024    ALB 4.0 10/24/2024    ALKPHO 122 10/24/2024    TP 7.2 10/24/2024    AST 37 (H) 10/24/2024    ALT 27 10/24/2024    PTT 27.9 09/08/2023    INR 1.06 09/08/2023    PTP 13.7 09/08/2023    TSH 1.526 07/16/2024    PHOS 3.3 08/20/2024    B12 349 03/20/2017         Recent Labs   Lab 10/24/24  1659 10/25/24  0456   * 108*   BUN 20 21   CREATSERUM 0.93 0.96   CA 9.3 8.9    136   K 4.7 3.6    107   CO2 22.0 21.0     Recent Labs   Lab 10/24/24  1659 10/25/24  0456   RBC 3.62* 3.46*   HGB 11.3* 10.8*   HCT 32.7* 30.8*   MCV 90.3 89.0   MCH 31.2 31.2   MCHC 34.6 35.1   RDW 12.7 12.8   NEPRELIM 14.55* 10.08*   WBC 16.7* 12.8*   .0* 444.0       No results for input(s): \"BNPML\" in the last 168 hours.    No results for input(s): \"TROP\", \"CK\" in the last 168 hours.    Imaging:  CT CHEST PE AORTA (IV ONLY) (CPT=71260)    Result Date: 10/25/2024  CONCLUSION:   1. Acute distal segmental/subsegmental pulmonary embolism involving the right upper lobe as well as a subsegmental pulmonary embolism in the left upper lobe. 2. Multifocal ground-glass opacities involving the right greater than left lungs with some areas of perilobular sparing as well as small consolidations involving the bilateral lower lobes.  Differential diagnosis includes multifocal pneumonia, organizing  pneumonia, or less likely other etiologies. 3. Small right and trace left pleural effusions. 4. Mild centrilobular emphysema. 5. Mild mediastinal and right hilar lymphadenopathy, which is favored to be reactive. 6. Biatrial predominant cardiomegaly with  triple-vessel coronary artery calcifications, mitral annular calcifications, and aortic valve leaflet calcifications. 7. Lesser incidental findings described above.   The findings of acute pulmonary embolism as well as impression points 2 and 3 were communicated via secure epic chat to Dr. Holloway  at 2:24 p.m. on 10/25/2024 by Basim West MD  Dictated by (CST): Basim West MD on 10/25/2024 at 2:05 PM     Finalized by (CST): Basim West MD on 10/25/2024 at 2:27 PM          XR CHEST PA + LAT CHEST (CPT=71046)    Result Date: 10/24/2024  CONCLUSION:   Extensive bilateral perihilar and bilateral upper and lower lobe airspace opacities, progressed since the prior exams.  Findings may be secondary to severe pulmonary edema, multifocal pneumonia (either from bacterial or viral etiology) or respiratory distress syndrome.  Continued follow-up imaging recommended to ensure resolution.    Dictated by (CST): Heri Lebron MD on 10/24/2024 at 7:47 PM     Finalized by (CST): Heri Lebron MD on 10/24/2024 at 7:49 PM           Impression:   Assessment:  Multifocal pneumonia  Bilateral pulmonary emboli  Severe aortic stenosis  HTN    Plan:  - Continue broad-spectrum IV antibiotics for underlying pneumonia  - Start IV heparin, limited TTE to assess for RV strain  - Empirically give Lasix 20 mg IV today  - Long discussion regarding findings of severe aortic stenosis, would recommend outpatient TAVR evaluation when she recovers from acute issues    Thank you for allowing me to participate in the care of your patient.    Viktor Bird MD  [facility]  10/25/2024         [1] No Known Allergies

## 2024-10-25 NOTE — IMAGING NOTE
[facility]  Outside Information  Continuity of Care Document  10/18/2024  Hoda Busby - 84 y.o. Female; born [birth year]Trans Thoracic Echocardiogram, generated on Oct. 24, 2024October 24, 2024   Findings    Findings - Right Atrium  The right atrium is mildly enlarged.    Findings - Left Ventricle  The left ventricle is normal in size. Left ventricular diastolic dimension is 5.1 cms. Left ventricular systolic dimension is 3.3 cms. Left ventricular diastolic septal thickness is 1.1 cms. Left ventricular diastolic postero basal free wall thickness is 1.1 cms. Global left ventricular systolic function is normal. The left ventricular fractional shortening is 36.4%. The left ventricular ejection fraction is 65%. The left ventricle diastolic function is impaired (Grade II) with an elevated left atrial pressure. Left ventricular outflow tract diameter is 2.0 cms. Left ventricular outflow tract VTI is 36.3 cms. Left ventricular outflow tract peak velocity is 1.4 m/s. Left ventricular peak gradient is 8 mmHg. Left ventricular outflow tract mean velocity is 1.2 m/s. Left ventricular mean gradient is 6 mmHg.    Findings - Right Ventricle  Right ventricular systolic function is normal. Right ventricular diastolic dimension is 3.1 cms. Right ventricular systolic pressure is 50 mmHg.    Findings - Atrial Septum  The atrial septum is normal.    Findings - Ventricular Septum  The ventricular septum is normal.    Findings - Pulmonary Artery  The estimated pulmonary artery systolic pressure is 50 mmHg assuming a right atrial pressure of 8 mmHg.    Findings - Pulmonary Vein  The pulmonary vein appears normal.    Findings - IVC  The inferior vena cava is mildly increased in size. The inferior vena cava changes greater than 50 percent during respiration.    Findings - Pulmonic Valve  The peak velocity is 1.7 m/s. The mean velocity is 1.1 m/s. The peak trans pulmonic gradient is 12.0 mmHg. The mean  trans pulmonic gradient is 6.0 mmHg.    Findings - Tricuspid Valve  Evidence of tricuspid regurgitation is present. Moderate (2+) tricuspid regurgitation. The peak tricuspid regurgitant velocity is 3.2 m/s. The peak trans tricuspid gradient is 42.0 mmHg.    Findings - Mitral Valve  Mild mitral annular calcification is noted. Mild calcification of the mitral valve is noted. The mean trans mitral gradient is 3.0 mmHg. Mitral regurgitation is noted. Moderate (2+) mitral regurgitation. The deceleration time is 218 ms. The peak mitral gradient is 5 mmHg. The E velocity is 1.1 m/s. The A velocity is 1.1 m/s.    Findings - Aortic Valve  Severe aortic valve stenosis is present. Aortic valve area continuity equation is 1.1 cm². The trans-aortic peak velocity is 4.4 m/s. The trans-aortic peak gradient is 77 mmHg. The trans-aortic mean velocity is 3.3 m/s. The trans-aortic mean gradient is 49.0 mmHg. Aortic valve VTI measures 105.0 cms. Severe calcification of the aortic valve is noted. LVOT Diameter is 2.0 cms.    Findings - Aorta  Aortic root diameter is 2.8 cms. Ascending aorta diameter is 3.0 cms.    Findings - Pericardium  The pericardium is normal in appearance with no pericardial effusion noted..    Impressions    Impression - TTE  The left ventricle is normal in size. Left ventricular wall thickness is mildly increased. Global left ventricular systolic function is normal. The left ventricular ejection fraction is 65%. No regional wall motion abnormalities. The left ventricle diastolic function is impaired (Grade II) with an elevated left atrial pressure.   Impression - TTE  Moderately to severely calcified and restricted trileaflet aortic valve. Severe aortic stenosis. Peak velocity 4.42 m/s. Mean gradient 47 mm Hg. KIKE (VTI) 0.91 cm². DI 0.29. Trivial aortic regurgitation.   Impression - TTE  Moderate mitral annular calcification. Mild to moderate mitral regurgitation.   Impression - TTE  The left atrium is moderately  to severely dilated.   Impression - TTE  Mild to moderate tricuspid regurgitation.   Impression - TTE  The pulmonary artery systolic pressure is mildly increased, estimated at 45 mm Hg.   Patient Demographics    Patient Demographics  Patient Address Patient Name Communication   [address] [place] [address] Hoda Busby [phone] (Home)     Patient Demographics  Language Race / Ethnicity Marital Status   Unknown Unknown / Unknown Unknown     Document Information    Service Providers Document Coverage Dates   Viktor Bird MD  [phone] (Work)  [address] [place] [address]

## 2024-10-25 NOTE — PLAN OF CARE
Problem: Patient Centered Care  Goal: Patient preferences are identified and integrated in the patient's plan of care  Description: Interventions:  - What would you like us to know as we care for you?   - Provide timely, complete, and accurate information to patient/family  - Incorporate patient and family knowledge, values, beliefs, and cultural backgrounds into the planning and delivery of care  - Encourage patient/family to participate in care and decision-making at the level they choose  - Honor patient and family perspectives and choices  Outcome: Progressing     Problem: Patient/Family Goals  Goal: Patient/Family Long Term Goal  Description: Patient's Long Term Goal: Discharge home    Interventions:  - Supplemental O2  _IV antibiotics  RX     - See additional Care Plan goals for specific interventions  Outcome: Progressing  Goal: Patient/Family Short Term Goal  Description: Patient's Short Term Goal: to breath better.     Interventions:   - Supplemental O2  _IV antibiotics  RX   - See additional Care Plan goals for specific interventions  Outcome: Progressing     Problem: CARDIOVASCULAR - ADULT  Goal: Maintains optimal cardiac output and hemodynamic stability  Description: INTERVENTIONS:  - Monitor vital signs, rhythm, and trends  - Monitor for bleeding, hypotension and signs of decreased cardiac output  - Evaluate effectiveness of vasoactive medications to optimize hemodynamic stability  - Monitor arterial and/or venous puncture sites for bleeding and/or hematoma  - Assess quality of pulses, skin color and temperature  - Assess for signs of decreased coronary artery perfusion - ex. Angina  - Evaluate fluid balance, assess for edema, trend weights  Outcome: Progressing  Goal: Absence of cardiac arrhythmias or at baseline  Description: INTERVENTIONS:  - Continuous cardiac monitoring, monitor vital signs, obtain 12 lead EKG if indicated  - Evaluate effectiveness of antiarrhythmic and heart rate control  medications as ordered  - Initiate emergency measures for life threatening arrhythmias  - Monitor electrolytes and administer replacement therapy as ordered  Outcome: Progressing     Problem: RESPIRATORY - ADULT  Goal: Achieves optimal ventilation and oxygenation  Description: INTERVENTIONS:  - Assess for changes in respiratory status  - Assess for changes in mentation and behavior  - Position to facilitate oxygenation and minimize respiratory effort  - Oxygen supplementation based on oxygen saturation or ABGs  - Provide Smoking Cessation handout, if applicable  - Encourage broncho-pulmonary hygiene including cough, deep breathe, Incentive Spirometry  - Assess the need for suctioning and perform as needed  - Assess and instruct to report SOB or any respiratory difficulty  - Respiratory Therapy support as indicated  - Manage/alleviate anxiety  - Monitor for signs/symptoms of CO2 retention  Outcome: Progressing     Problem: SAFETY ADULT - FALL  Goal: Free from fall injury  Description: INTERVENTIONS:  - Assess pt frequently for physical needs  - Identify cognitive and physical deficits and behaviors that affect risk of falls.  - [place] fall precautions as indicated by assessment.  - Educate pt/family on patient safety including physical limitations  - Instruct pt to call for assistance with activity based on assessment  - Modify environment to reduce risk of injury  - Provide assistive devices as appropriate  - Consider OT/PT consult to assist with strengthening/mobility  - Encourage toileting schedule  Outcome: Progressing

## 2024-10-25 NOTE — HISTORICAL OFFICE NOTE
[facility]  Outside Information  Continuity of Care Document  9/23/2024  Hoda Busby - 84 y.o. Female; born [birth year]Summary of episode note, generated on Oct. 17, 2024October 17, 2024   Additional Documents     ClinicalDocument.xml - Referral Note (Last Received: 9/23/2024 10:44 AM)   Plain Text Document (Last Received: 9/23/2024 10:44 AM)   Continuity of Care Document (Last Received: 10/17/2024  9:07 AM) - Currently Viewing  CHIEF COMPLAINT    CHIEF COMPLAINT  Reason for Visit/Chief Complaint   f/u   Patient is an 83-year-old female with a history of HTN and aortic stenosis who presents for preoperative clearance for planned right total hip arthroplasty on 10/5/2023. In general she reports feeling well, her primary issue is hip pain when she ambulates and is able to walk about a block before having to stop. She denies any chest pain, shortness of breath, palpitations, edema, dizziness, or syncope. Her last echocardiogram was on 10/2022 which noted moderate aortic stenosis with mean gradient of 30 mmHg. She then had a nuclear stress test a week ago that came back unremarkable.9/23/2024  Overall feels well, no cardiac symptoms. Mobility much improved since hip arthroplasty. No chest pain or shortness of breath.Cardiac history:  Moderate aortic stenosis  HTN     PROBLEMS  Reconcile with Patient's ChartPROBLEMS  Problem Effective Dates Date resolved Problem Status   History of mitral valve insufficiency, [SNOMED-CT: 213160969] 9/22/2023 - Active   Hypercholesterolemia, familial , [SNOMED-CT: 773223810] 9/22/2023 - Active   Hypertension (HTN), primary, [SNOMED-CT: 93412593] 9/22/2023 - Active     ENCOUNTER    ENCOUNTER  Problem Effective Dates Date resolved Problem Status   Aortic stenosis, moderate, [SNOMED-CT: 422612035] 9/25/2023 - Active   Hypertension (HTN), primary, [SNOMED-CT: 34939124] 9/22/2023 - Active     VITAL SIGNS    VITAL SIGNS  Date / Time: 9/23/2024   BP  Systolic 130 mmHg   BP Diastolic 80 mmHg   Height 69 inches   Weight 183 lbs   Pulse Rate 85 bpm   BSA (Body Surface Area) 2 cc/m2   BMI (Body Mass Index) 27 cc/m2   Blood Pressure 130 / 80 mmHg     PHYSICAL EXAMINATION    PHYSICAL EXAMINATION  Header Details   Constitutional 99%o2   Vitals Left Arm Sitting  / 80 mmHg, Pulse rate 85 bpm, Height in 5' 9\", BMI: 27, Weight in 182.98 lbs (or) 83 kgs, BSA : 2.03 cc/m²   General Appearance No Acute Distress, Normal Body habitus   Cardiovascular      ALLERGIES, ADVERSE REACTIONS, ALERTS    No data available    MEDICATIONS ADMINISTERED DURING VISIT    No data available    MEDICATIONS  Reconcile with Patient's ChartMEDICATIONS  Medication Start Date Route/Frequency Status   amLODIPine 5 mg tablet, [RxNorm: 139849] 9/23/2024 Take 1 tablet orally once a day. Active   aspirin 81 mg tablet,delayed release, [RxNorm: 704951] 9/22/2023 Take 1 tablet orally once a day. Active   Calcium 600 + D(3) 600 mg-10 mcg (400 unit) tablet, [RxNorm: 754360] 9/25/2023 Take 1 tablet orally once a day. Active   Mendronate Sodium , [RxNorm: 0] 9/23/2024 take 1 tablet once a week Active   PreserVision AREDS-2 250 mg-90 mg-40 mg-1 mg capsule, [RxNorm: 0] 9/25/2023 Take 1 capsule orally once a day. Active   Vitamin B-12 500 mcg tablet, [RxNorm: 805128] 9/22/2023 Take 1 tablet orally once a day. Active   Vitamin D3 50 mcg (2,000 unit) tablet, [RxNorm: 972170] 9/25/2023 Take 1 tablet orally once a day. Active     ASSESSMENT    Moderate aortic stenosis  - Denies any chest pain or shortness of breath  - Repeat transthoracic echocardiogram for annual surveillance  - If remains in moderate range will need repeat echo in 1 year prior to follow-upHTN  - Normotensive today  - Continue amlodipine 5 mg dailyPlan  Transthoracic echocardiogram to reassess aortic stenosis, if remains in moderate range then we will need to schedule repeat echo prior to 1 year follow-up  Follow-up with me in 1 year or sooner      FAMILY HISTORY    FAMILY HISTORY  Relationship Age Diagnosis   Mother 0 Stroke; Hypertension   Brother 0 Heart problem; HHD (hypertensive heart disease)   She had 2 brothers had bypass surgery in the 50s. Mother  of congestive heart failure in her 50s.     GENERAL STATUS    No data available    PAST MEDICAL HISTORY    PAST MEDICAL HISTORY  Problem Date diagonsed Date resolved Status   History of mitral valve insufficiency, [SNOMED-CT: 861933218] 2023 - Active   Hypercholesterolemia, familial , [SNOMED-CT: 249204158] 2023 - Active   Hypertension (HTN), primary, [SNOMED-CT: 68329363] 2023 - Active     HISTORY OF PRESENT ILLNESS    Patient is an 83-year-old female with a history of HTN and aortic stenosis who presents for preoperative clearance for planned right total hip arthroplasty on 10/5/2023. In general she reports feeling well, her primary issue is hip pain when she ambulates and is able to walk about a block before having to stop. She denies any chest pain, shortness of breath, palpitations, edema, dizziness, or syncope. Her last echocardiogram was on 10/2022 which noted moderate aortic stenosis with mean gradient of 30 mmHg. She then had a nuclear stress test a week ago that came back unremarkable.2024  Overall feels well, no cardiac symptoms. Mobility much improved since hip arthroplasty. No chest pain or shortness of breath.Cardiac history:  Moderate aortic stenosis  HTN     IMMUNIZATIONS    No data available    PLAN OF CARE    PLAN OF CARE  Planned Care Date   Echocardiography - Complete 1900   Referral Visit - Malathi Stuart ([email or web address]) : 1900     PROCEDURES    No data available    RESULTS    RESULTS  Name Result Date Location - Ordered By   Trans Thoracic Echocardiogram 1.The left ventricle is normal in size. Left ventricular wall thickness is mildly increased. Global left ventricular systolic function is normal. The left ventricular ejection  fraction is 65%. No regional wall motion abnormalties. The left ventricle diastolic function is impaired (Grade II) with an elevated left atrial pressure. 2.Moderately calcified trileaflet aortic valve. Moderate aortic stenosis. Peak velocity 3.69 m/s. Mean gradient 31 mm Hg. KIKE (VTI) 0.96 cm². DI 0.3.3.Mild mitral annular calcification. Mild mitral regurgitation.4.The left atrium is mildly dilated.5.Mild pulmonary hypertension with an estimated pulmonary artery systolic pressure of 44 mm Hg. 9/26/2023 10:00:00 AM Viktor Bird MD     REVIEW OF SYSTEMS    REVIEW OF SYSTEMS  Header Details   Cardiovascular No history of Chest pain, Palpitations, Edema   Respiratory No history of SOB   Neurological No history of Numbness, Limb weakness     SOCIAL HISTORY    SOCIAL HISTORY  Social History Element Description Effective Dates   Smoking status Former smoker -     FUNCTIONAL STATUS    No data available    INSTRUCTIONS    INSTRUCTIONS  NAME TYPE DATE   Recommend low sodium diet, daily exercise and maintain a normal BMI. Recommend monitor blood pressure at home. Goals 9/23/2024     MEDICAL EQUIPMENT    No data available    Goals Sections    No data available    REASON FOR REFERRAL           Health Concerns Section    No data available    COGNITIVE/MENTAL STATUS    No data available    Patient Demographics    Patient Demographics  Patient Address Patient Name Communication   [address] [place] [address] Hoda Busby [phone] (Mobile)     Patient Demographics  Language Race / Ethnicity Marital Status   English - Spoken (Preferred) White / Not  or       Document Information    Primary Care Provider Other Service Providers Document Coverage Dates   Viktor Bird  NPI: [id number]  [phone] (Work)  [address], [address]  [place] [address]  [place] [address]  Interpreting Physicians  [facility]  [phone] (Work)  [address] [place]  [place] [address] Apryl Wilkins  NPI: [id number]  [phone] (Work)  [address], [address]  [place] [address]  [place] [address]  Nurses     Dotty GUNNER Briggsi  NPI: [id number]  [phone] (Work)  [address], [address]  [place] [address]  [place] [address]  Nurses Sep. 23, 2024September 23, 2024      Organization   [facility]  [phone] (Work)  [address], [address]  [place] [address]  [place] [address]     Encounter Providers Encounter Date    Sep. 23, 2024September 23, 2024     Legal Authenticator    Viktor Bird  NPI: [id number]  [phone] (Work)  [address], [address]  [place] [address]  [place] [address]

## 2024-10-26 ENCOUNTER — APPOINTMENT (OUTPATIENT)
Dept: ULTRASOUND IMAGING | Facility: HOSPITAL | Age: 84
End: 2024-10-26
Attending: INTERNAL MEDICINE
Payer: MEDICARE

## 2024-10-26 LAB
ANION GAP SERPL CALC-SCNC: 9 MMOL/L (ref 0–18)
APTT PPP: 123.8 SECONDS (ref 23–36)
APTT PPP: 72.3 SECONDS (ref 23–36)
APTT PPP: >240 SECONDS (ref 23–36)
BASOPHILS # BLD AUTO: 0.02 X10(3) UL (ref 0–0.2)
BASOPHILS NFR BLD AUTO: 0.2 %
BUN BLD-MCNC: 16 MG/DL (ref 9–23)
BUN/CREAT SERPL: 18.2 (ref 10–20)
CALCIUM BLD-MCNC: 8.4 MG/DL (ref 8.7–10.4)
CHLORIDE SERPL-SCNC: 107 MMOL/L (ref 98–112)
CO2 SERPL-SCNC: 23 MMOL/L (ref 21–32)
CREAT BLD-MCNC: 0.88 MG/DL
DEPRECATED RDW RBC AUTO: 41.5 FL (ref 35.1–46.3)
EGFRCR SERPLBLD CKD-EPI 2021: 65 ML/MIN/1.73M2 (ref 60–?)
EOSINOPHIL # BLD AUTO: 0 X10(3) UL (ref 0–0.7)
EOSINOPHIL NFR BLD AUTO: 0 %
ERYTHROCYTE [DISTWIDTH] IN BLOOD BY AUTOMATED COUNT: 12.7 % (ref 11–15)
ERYTHROCYTE [SEDIMENTATION RATE] IN BLOOD: 49 MM/HR
GLUCOSE BLD-MCNC: 151 MG/DL (ref 70–99)
HCT VFR BLD AUTO: 29.5 %
HGB BLD-MCNC: 10.4 G/DL
IMM GRANULOCYTES # BLD AUTO: 0.09 X10(3) UL (ref 0–1)
IMM GRANULOCYTES NFR BLD: 0.9 %
LYMPHOCYTES # BLD AUTO: 0.73 X10(3) UL (ref 1–4)
LYMPHOCYTES NFR BLD AUTO: 7.1 %
MCH RBC QN AUTO: 31.4 PG (ref 26–34)
MCHC RBC AUTO-ENTMCNC: 35.3 G/DL (ref 31–37)
MCV RBC AUTO: 89.1 FL
MONOCYTES # BLD AUTO: 0.2 X10(3) UL (ref 0.1–1)
MONOCYTES NFR BLD AUTO: 1.9 %
NEUTROPHILS # BLD AUTO: 9.23 X10 (3) UL (ref 1.5–7.7)
NEUTROPHILS # BLD AUTO: 9.23 X10(3) UL (ref 1.5–7.7)
NEUTROPHILS NFR BLD AUTO: 89.9 %
OSMOLALITY SERPL CALC.SUM OF ELEC: 292 MOSM/KG (ref 275–295)
PLATELET # BLD AUTO: 398 10(3)UL (ref 150–450)
PLATELET # BLD AUTO: 398 10(3)UL (ref 150–450)
POTASSIUM SERPL-SCNC: 3.6 MMOL/L (ref 3.5–5.1)
RBC # BLD AUTO: 3.31 X10(6)UL
SODIUM SERPL-SCNC: 139 MMOL/L (ref 136–145)
WBC # BLD AUTO: 10.3 X10(3) UL (ref 4–11)

## 2024-10-26 PROCEDURE — 99232 SBSQ HOSP IP/OBS MODERATE 35: CPT | Performed by: INTERNAL MEDICINE

## 2024-10-26 PROCEDURE — 93970 EXTREMITY STUDY: CPT | Performed by: INTERNAL MEDICINE

## 2024-10-26 PROCEDURE — 99233 SBSQ HOSP IP/OBS HIGH 50: CPT | Performed by: INTERNAL MEDICINE

## 2024-10-26 RX ORDER — ATORVASTATIN CALCIUM 20 MG/1
20 TABLET, FILM COATED ORAL NIGHTLY
Status: DISCONTINUED | OUTPATIENT
Start: 2024-10-26 | End: 2024-11-01

## 2024-10-26 RX ORDER — FUROSEMIDE 10 MG/ML
20 INJECTION INTRAMUSCULAR; INTRAVENOUS ONCE
Status: COMPLETED | OUTPATIENT
Start: 2024-10-26 | End: 2024-10-26

## 2024-10-26 NOTE — CONSULTS
[facility]  part of [facility] ID CONSULT NOTE    Hoda Busby Patient Status:  Inpatient    1940 MRN [id number]   Location [facility]5W Attending Malathi Stuart MD   Hosp Day # 2 PCP Malathi Stuart MD       Reason for Consultation:  Multifocal pneumonia    ASSESSMENT:    Antibiotics: Zosyn, azithromycin     # Acute leukocytosis with multifocal pneumonia with hypoxia   -Strep/legionella negative   -RVP negative  # Acute PE  # HTN    PLAN:  -  Continue on zosyn and azithromycin.  -  FU URIEL/ANCA.  -  Follow fever curve, wbc. On Solu-Medrol.  -  Reviewed labs, micro, imaging reports, available old records.  -  Case d/w patient, RN.    History of Present Illness:  Hoda Busby is an 84 year old female with a history of HTN, aortic stenosis, who presented to [facility] ED on 10/24 with increased shortness of breath. Was seen in ED on 10/17 with a week of cough, COVID-19 negative and discharged from ED on amoxicillin and doxycycline x 1 week. Denies any fevers or chills. Has had poor appetite. Notes productive cough. Was having shortness of breath when walking in her house so came to  ED. Denies any recent travel. No sick contacts. On arrival, Tmax 99.3, wbc 16.7, CXR with extensive infiltrates, RVP negative, started on zosyn. Had CT chest showing acute PE, was on 3L and up to 10L NC yesterday. Azithromycin added and started on solu-medrol. Feels better this AM. ID consulted.    History:  Past Medical History:    Acute pulmonary embolism (HCC)    High blood pressure    Osteopenia    Primary osteoarthritis of right hip    Visual impairment    Readers    White coat hypertension     Past Surgical History:   Procedure Laterality Date    Cataract  3/2&3/16 2017    Colonoscopy & polypectomy  2021         Hip replacement surgery Left Around     Other surgical history  [place] 2017    Bilateral cataract surgery     Family History   Problem Relation Age of Onset     Cancer Father         bone (cause of death)    Stroke Mother         CVA (cause of death)    Diabetes Mother     Hypertension Mother     Dementia Brother         Alzheimer's    Heart Disease Brother         CAD - x2 brothers    Heart Disease Brother       reports that she quit smoking about 34 years ago. Her smoking use included cigarettes. She started smoking about 34 years ago. She has been exposed to tobacco smoke. She has quit using smokeless tobacco. She reports current alcohol use. She reports that she does not use drugs.    Allergies:  Allergies[1]    Medications:    Current Facility-Administered Medications:     heparin (Porcine) 94932 units/250mL infusion PE/DVT/THROMBUS CONTINUOUS, 200-3,000 Units/hr, Intravenous, Continuous    azithromycin (Zithromax) tab 500 mg, 500 mg, Oral, Daily    methylPREDNISolone sodium succinate (Solu-MEDROL) injection 40 mg, 40 mg, Intravenous, Q6H    piperacillin-tazobactam (Zosyn) 3.375 g in dextrose 5% 100 mL IVPB-ADDV, 3.375 g, Intravenous, Q8H    amLODIPine (Norvasc) tab 5 mg, 5 mg, Oral, Daily    aspirin DR tab 81 mg, 81 mg, Oral, Daily    acetaminophen (Tylenol) tab 650 mg, 650 mg, Oral, Q6H PRN    dextromethorphan-guaifenesin ER (Mucinex DM)  MG per 12 hr tab 1 tablet, 1 tablet, Oral, BID PRN    influenza virus trivalent high dose PF (Fluzone HD) 0.5 mL injection (ages >/= 65 years) 0.5 mL, 0.5 mL, Intramuscular, Prior to discharge    Review of Systems:  CONSTITUTIONAL:  No weight loss, +Fatigue  HEENT:  Eyes:  No visual loss, blurred vision, double vision or yellow sclerae. Ears, Nose, Throat:  No hearing loss, sneezing, congestion, runny nose or sore throat.  SKIN:  No rash or itching.  CARDIOVASCULAR:  No chest pain, chest pressure or chest discomfort  RESPIRATORY: +cough, shortness of breath  GASTROINTESTINAL:  No anorexia, nausea, vomiting or diarrhea. No abdominal pain or blood.  GENITOURINARY:  No Burning on urination.   NEUROLOGICAL:  No headache,  dizziness, syncope, paralysis, ataxia, numbness or tingling in the extremities.  MUSCULOSKELETAL:  No muscle, back pain, joint pain or stiffness.  HEMATOLOGIC:  No anemia, bleeding or bruising.  ALLERGIES:  No history of asthma, hives, eczema or rhinitis.    Physical Exam:  Vital signs: Blood pressure 128/67, pulse 91, temperature 97.8 °F (36.6 °C), temperature source Oral, resp. rate 22, weight 178 lb (80.7 kg), SpO2 94%.    General: Alert, oriented, NAD  HEENT: Moist mucous membranes.   Neck: No lymphadenopathy.  Supple.  Cardiovascular: RRR  Respiratory: Lungs with bilateral rhonchi  Abdomen: Soft, no TTP  Musculoskeletal: No edema noted  Integument: No lesions. No erythema.  Lines: PIV+    Laboratory Data:  Recent Labs   Lab 10/26/24  0545   RBC 3.31*   HGB 10.4*   HCT 29.5*   MCV 89.1   MCH 31.4   MCHC 35.3   RDW 12.7   NEPRELIM 9.23*   WBC 10.3   .0  398.0     Recent Labs   Lab 10/24/24  1659 10/25/24  0456 10/25/24  1443 10/26/24  0545   * 108* 130* 151*   BUN 20 21 18 16   CREATSERUM 0.93 0.96 0.83 0.88   CA 9.3 8.9 8.4* 8.4*   ALB 4.0  --   --   --     136 135* 139   K 4.7 3.6 3.5 3.6    107 106 107   CO2 22.0 21.0 22.0 23.0   ALKPHO 122  --   --   --    AST 37*  --   --   --    ALT 27  --   --   --    BILT 1.0  --   --   --    TP 7.2  --   --   --        Microbiology: Reviewed in EMR    Radiology: Reviewed    Thank you for allowing us to participate in the care of this patient. Please do not hesitate to call if you have any questions.   We will continue to follow with you and will make further recommendations based on his progress.    [facility]  [phone]  10/26/2024         [1] No Known Allergies

## 2024-10-26 NOTE — PROGRESS NOTES
Progress Note  Hoda Busby Patient Status:  Inpatient    1940 MRN [id number]   Location [facility]5W Attending Malathi Stuart MD   Hosp Day # 2 PCP Malathi Stuart MD     Subjective:  No acute events overnight.  Resting in bed this morning, on high flow NC at 12L, reports intermittent congested cough and exertional dyspnea which feels that is slightly improved today.  Denies any chest pain, palpitations or dizziness at this time.     Objective:  /67 (BP Location: Right arm)   Pulse 91   Temp 97.8 °F (36.6 °C) (Oral)   Resp 22   Wt 178 lb (80.7 kg)   SpO2 94%   BMI 27.06 kg/m²     Telemetry: SR, HR 80s      Intake/Output:    Intake/Output Summary (Last 24 hours) at 10/26/2024 0949  Last data filed at 10/25/2024 2154  Gross per 24 hour   Intake 517 ml   Output 300 ml   Net 217 ml       Last 3 Weights   10/24/24 1651 178 lb (80.7 kg)   10/24/24 1504 178 lb (80.7 kg)   24 1336 183 lb 3.2 oz (83.1 kg)       Labs:  Recent Labs   Lab 10/25/24  0456 10/25/24  1443 10/26/24  0545   * 130* 151*   BUN 21 18 16   CREATSERUM 0.96 0.83 0.88   EGFRCR 58* 69 65   CA 8.9 8.4* 8.4*    135* 139   K 3.6 3.5 3.6    106 107   CO2 21.0 22.0 23.0     Recent Labs   Lab 10/24/24  1659 10/25/24  0456 10/25/24  1443 10/26/24  0545   RBC 3.62* 3.46* 3.54* 3.31*   HGB 11.3* 10.8* 10.8* 10.4*   HCT 32.7* 30.8* 31.6* 29.5*   MCV 90.3 89.0 89.3 89.1   MCH 31.2 31.2 30.5 31.4   MCHC 34.6 35.1 34.2 35.3   RDW 12.7 12.8 12.9 12.7   NEPRELIM 14.55* 10.08*  --  9.23*   WBC 16.7* 12.8* 13.4* 10.3   .0* 444.0 495.0* 398.0  398.0         No results for input(s): \"TROP\", \"TROPHS\", \"CK\" in the last 168 hours.    Review of Systems   Constitutional: Negative.   Cardiovascular:  Positive for dyspnea on exertion.   Respiratory:  Positive for cough and shortness of breath.        Physical Exam:    Gen: alert, oriented x 3, NAD  Heent: pupils equal, reactive. Mucous membranes moist.   Neck: no  jvd  Cardiac: regular rate and rhythm, normal S1,S2, 2/6 systolic murmur, no gallop or rub  Lungs: fine bibasilar crackles, on O2 at 12L   Abd: soft, NT/ND +bs  Ext: no edema  Skin: Warm, dry  Neuro: No focal deficits        Medications:     azithromycin  500 mg Oral Daily    methylPREDNISolone  40 mg Intravenous Q6H    piperacillin-tazobactam  3.375 g Intravenous Q8H    amLODIPine  5 mg Oral Daily    aspirin  81 mg Oral Daily      continuous dose heparin 1,100 Units/hr (10/26/24 0750)     Assessment:  Multifocal pneumonia -presented with worsening cough and dyspnea   On IV abx , steroids-pulm following   Acute bilateral pulmonary emboli   Echo 10/25/24 showed preserved LVEF 55-60%, no rwma, RV cavity was increased with normal systolic function, mod PHTN   On IV heparin    Severe aortic stenosis with mean gradient of 49 mmHg by recent TTE 10/18/24  Plan for op structural heart eval for possible TAVR  HTN-controlled   On amlodipine   HLP,  7/2024-not on statin, will review   Chronic anemia, hgb stable      Plan:  Will order additional dose of IV lasix today, patient states that she felt better after the diuretic yesterday, renal function remains stable.  Monitor electrolytes and renal function.  Continue abx, steroids per ID/Pulm.  Continue heparin gtt for PE.  Patient agreeable to start low dose statin for hyperlipidemia, .  Plan for op eval for possible TAVR with now sev aortic stenosis.     Plan of care discussed with patient, RN.    KALYAN Pink  10/26/2024  9:49 AM  [phone]        CARDIOLOGIST ADDENDUM:    I agree with the findings above.  I have personally performed the Assessment and Plan in its entirety, as detailed above with my revision and updates.    Carlos Stephens M.D.   Cardiology/Electrophysiology   10/26/2024  L3  -----------------------------------------

## 2024-10-26 NOTE — PROGRESS NOTES
[facility]  part of [facility]    Progress Note    Hoda Busby Patient Status:  Inpatient    1940 MRN [id number]   Location [facility]5W Attending Malathi Stuart MD   Hosp Day # 2 PCP Malathi Stuart MD       SUBJECTIVE:  Feels okay at rest, no dyspnea    OBJECTIVE:  Vital signs in last 24 hours:  /55 (BP Location: Right arm)   Pulse 83   Temp 97.3 °F (36.3 °C) (Oral)   Resp 20   Wt 178 lb (80.7 kg)   SpO2 100%   BMI 27.06 kg/m²     Intake/Output:    Intake/Output Summary (Last 24 hours) at 10/26/2024 1109  Last data filed at 10/26/2024 0900  Gross per 24 hour   Intake 757 ml   Output 715 ml   Net 42 ml       Wt Readings from Last 3 Encounters:   10/24/24 178 lb (80.7 kg)   10/24/24 178 lb (80.7 kg)   24 183 lb 3.2 oz (83.1 kg)       EXAM:  GENERAL: well developed, well nourished, in no apparent distress  LUNGS: bilateral lower lung crackles, no wheezing  CARDIO: RRR, normal S1S2, without murmur or gallop  GI: soft, NT, ND, NABS  EXTREMITIES: no LE edema, no calf tenderness    Data Review:     Labs:   Lab Results   Component Value Date    WBC 10.3 10/26/2024    HGB 10.4 10/26/2024    HCT 29.5 10/26/2024    .0 10/26/2024    .0 10/26/2024    CREATSERUM 0.88 10/26/2024    BUN 16 10/26/2024     10/26/2024    K 3.6 10/26/2024     10/26/2024    CO2 23.0 10/26/2024     10/26/2024    CA 8.4 10/26/2024    .8 10/26/2024         Imaging:  US VENOUS DOPPLER LEG BILAT - DIAG IMG (CPT=93970)    Result Date: 10/26/2024  CONCLUSION: No bilateral lower extremity DVT.    Dictated by (CST): Arash Rueda MD on 10/26/2024 at 11:05 AM     Finalized by (CST): Arash Rueda MD on 10/26/2024 at 11:05 AM          CT CHEST PE AORTA (IV ONLY) (CPT=71260)    Result Date: 10/25/2024  CONCLUSION:   1. Acute distal segmental/subsegmental pulmonary embolism involving the right upper lobe as well as a subsegmental pulmonary embolism in  the left upper lobe. 2. Multifocal ground-glass opacities involving the right greater than left lungs with some areas of perilobular sparing as well as small consolidations involving the bilateral lower lobes.  Differential diagnosis includes multifocal pneumonia, organizing  pneumonia, or less likely other etiologies. 3. Small right and trace left pleural effusions. 4. Mild centrilobular emphysema. 5. Mild mediastinal and right hilar lymphadenopathy, which is favored to be reactive. 6. Biatrial predominant cardiomegaly with triple-vessel coronary artery calcifications, mitral annular calcifications, and aortic valve leaflet calcifications. 7. Lesser incidental findings described above.   The findings of acute pulmonary embolism as well as impression points 2 and 3 were communicated via secure epic chat to Dr. Holloway  at 2:24 p.m. on 10/25/2024 by Basim West MD  Dictated by (CST): Basim West MD on 10/25/2024 at 2:05 PM     Finalized by (CST): Basim West MD on 10/25/2024 at 2:27 PM          XR CHEST PA + LAT CHEST (CPT=71046)    Result Date: 10/24/2024  CONCLUSION:   Extensive bilateral perihilar and bilateral upper and lower lobe airspace opacities, progressed since the prior exams.  Findings may be secondary to severe pulmonary edema, multifocal pneumonia (either from bacterial or viral etiology) or respiratory distress syndrome.  Continued follow-up imaging recommended to ensure resolution.    Dictated by (CST): Heri Lebron MD on 10/24/2024 at 7:47 PM     Finalized by (CST): Heri Lebron MD on 10/24/2024 at 7:49 PM          CARD ECHO 2D DOPPLER (CPT=93306)    Result Date: 10/25/2024  Transthoracic Echocardiogram Name:Hoda Busby Date:    10/25/2024    :     1940    Ht:     (68in)     BP: MRN:     [id number]       Age:     84years       Wt:     (178lb)    HR: Loc:     [facility]          Gndr:    F             BSA:    1.95m^2 Sonographer: Juanita LIRA Ordering:    Cody Holloway Consulting:   Seth Garcia SIMONE ---------------------------------------------------------------------------- Procedure information:  A transthoracic echocardiogram, limited study was performed. Additional evaluation included M-mode and limited 2D.  Image quality was adequate. ---------------------------------------------------------------------------- Conclusions: 1. Left ventricle: The cavity size was normal. Wall thickness was mildly    increased. Systolic function was normal. The estimated ejection fraction    was 55-60%, by biplane method of disks. Wall motion is normal; there are    no regional wall motion abnormalities. Unable to assess LV diastolic    function. 2. Right ventricle: The cavity size was increased. Systolic function was    normal. 3. Left atrium: The atrium was dilated. 4. Right atrium: The atrium was dilated. 5. Mitral valve: The annulus was mildly to moderately calcified. 6. Pulmonary arteries: The peak systolic pressure is 57mm Hg. Impressions:  The right ventricular systolic pressure was increased consistent with moderate pulmonary hypertension. No previous study was available for comparison. * ---------------------------------------------------------------------------- * Findings: Left ventricle:  The cavity size was normal. Wall thickness was mildly increased. Systolic function was normal. The estimated ejection fraction was 55-60%, by biplane method of disks. Wall motion is normal; there are no regional wall motion abnormalities. Unable to assess LV diastolic function. Left atrium:  Well visualized. The atrium was dilated. Right ventricle:  The cavity size was increased. Systolic function was normal. Right atrium:  Well visualized. The atrium was dilated. Mitral valve:  Well visualized. The annulus was mildly to moderately calcified. Aortic valve:  Well visualized. The leaflets were moderately calcified. Tricuspid valve:  Well visualized. The annulus is normal-sized. The leaflets are normal thickness.  No echocardiographic evidence for tricuspid prolapse. Doppler:  Transvalvular velocity was within the normal range. There was no evidence for stenosis. There was mild regurgitation. Pulmonic valve:   Well visualized. Pericardium:  A trivial pericardial effusion was identified. There was no evidence of hemodynamic compromise. Pleura:  No evidence of pleural fluid accumulation. Pulmonary arteries: Moderate pulmonary hypertension was present. Systemic veins:  Central venous respirophasic diameter changes are in the normal range (>50%). Inferior vena cava: The IVC was dilated. ---------------------------------------------------------------------------- Measurements  Left ventricle                    Value        Ref  IVS thickness, ED, PLAX       (H) 1.3   cm     0.6 - 0.9  LV ID, ED, PLAX                   4.7   cm     3.8 - 5.2  LV ID, ES, PLAX                   3.1   cm     2.2 - 3.5  LV PW thickness, ED, PLAX     (H) 1.2   cm     0.6 - 0.9  IVS/LV PW ratio, ED, PLAX         1.08         ---------  LV PW/LV ID ratio, ED, PLAX       0.26         ---------  LV ejection fraction              63    %      54 - 74  Stroke volume/bsa, 2D             52    ml/m^2 ---------  LV end-diastolic volume, 1-p      58    ml     48 - 140  A4C  LV ejection fraction, 1-p A4C     59    %      46 - 78  Stroke volume, 1-p A4C            34    ml     ---------  LV end-diastolic volume/bsa,      30    ml/m^2 30 - 82  1-p A4C  Stroke volume/bsa, 1-p A4C        18    ml/m^2 ---------  LV end-diastolic volume, 2-p      61    ml     46 - 106  LV end-systolic volume, 2-p       24    ml     14 - 42  LV ejection fraction, 2-p         60    %      54 - 74  Stroke volume, 2-p                37    ml     ---------  LV end-diastolic volume/bsa,      31    ml/m^2 29 - 61  2-p  LV end-systolic volume/bsa,       12    ml/m^2 8 - 24  2-p  Stroke volume/bsa, 2-p            18.8  ml/m^2 ---------  LVOT                              Value        Ref  LVOT ID                            2     cm     ---------  LVOT peak velocity, S             1.5   m/sec  ---------  LVOT VTI, S                       32.2  cm     ---------  LVOT peak gradient, S             9     mm Hg  ---------  LVOT mean gradient, S             5     mm Hg  ---------  Stroke volume (SV), LVOT DP       101   ml     ---------  Stroke index (SV/bsa), LVOT       52    ml/m^2 ---------  DP  Aortic valve                      Value        Ref  Aortic leaflet separation, MM     1.0   cm     ---------  Pulmonary artery                  Value        Ref  PA pressure, S, DP                57    mm Hg  ---------  Tricuspid valve                   Value        Ref  Tricuspid regurg peak         (H) 3.49  m/sec  <=2.8  velocity  Tricuspid peak RV-RA gradient     49    mm Hg  ---------  Systemic veins                    Value        Ref  Estimated CVP                     8     mm Hg  ---------  Inferior vena cava                Value        Ref  ID                            (H) 2.3   cm     <=2.1  Right ventricle                   Value        Ref  TAPSE, 2D                         3.25  cm     >=1.70  TAPSE, MM                         2.05  cm     >=1.70  RV pressure, S, DP                57    mm Hg  ---------  RV s', lateral                    21.8  cm/sec >=9.5 Legend: (L)  and  (H)  migel values outside specified reference range. ---------------------------------------------------------------------------- Prepared and electronically signed by Manjinder Chavis 10/25/2024 17:20          Meds:   Current Facility-Administered Medications   Medication Dose Route Frequency    atorvastatin (Lipitor) tab 20 mg  20 mg Oral Nightly    heparin (Porcine) 35773 units/250mL infusion PE/DVT/THROMBUS CONTINUOUS  200-3,000 Units/hr Intravenous Continuous    azithromycin (Zithromax) tab 500 mg  500 mg Oral Daily    methylPREDNISolone sodium succinate (Solu-MEDROL) injection 40 mg  40 mg Intravenous Q6H    piperacillin-tazobactam  (Zosyn) 3.375 g in dextrose 5% 100 mL IVPB-ADDV  3.375 g Intravenous Q8H    amLODIPine (Norvasc) tab 5 mg  5 mg Oral Daily    aspirin DR tab 81 mg  81 mg Oral Daily    acetaminophen (Tylenol) tab 650 mg  650 mg Oral Q6H PRN    dextromethorphan-guaifenesin ER (Mucinex DM)  MG per 12 hr tab 1 tablet  1 tablet Oral BID PRN    influenza virus trivalent high dose PF (Fluzone HD) 0.5 mL injection (ages >/= 65 years) 0.5 mL  0.5 mL Intramuscular Prior to discharge       Assessment & Plan    Acute hypoxic respiratory failure  Bilateral pneumonia/pneumonitis  -failed outpt treatment with amox and doxy  -Covid negative 10/17  -O2 sats 85% on RA in office  -pt received Prevnar 13 in 2015  -repeat CXR 10/24 revealed extensive bilateral perihilar and bilateral upper and lower lobe airspace opacities, progressed since 10/17.    -repeat Covid negative: S.pneumo, legionella negative  -Mycoplasma Ab's pending (though unlikely given full course of doxycycline w/o improvement)  -recent echo at Dr. Bird's office 10/18/24 -- LVEF 65%, grade 2 diastolic dysfunction, severe aortic stenosis,, mild to mod MR, PASP 45mmHg  -empiric lasix per cardiology, with some improvement in dyspnea, though CHF alone does not explain sx and CT findings  -CT revealed small b/l pulmonary emboli, but again not enough to explain lung findings and degree of hypoxia  - procalcitonin slightly elevated  - on empiric IV zosyn day #3 and azithro day #2 -- though lack of response to outpatient abx and degree of hypoxia is unusual for typical bacterial pneumonia  -Dr. Holloway's pulm consult much appreciated  -started empiric IV solumedrol 10/25/24  -ddx: vasculitis (ANCA and CTD panel ordered - pending) or interstitial pneumonitis (ESR added to am labs today, though pt already received 3 doses of solumedrol)  -pt denies recent travel, exposure to birds/pets, arthralgias (other than her mild hand OA), fevers  -d/w pulm Dr. Sanchez today -- if above testing  unrevealing, may need bronch/bx    Bilateral pulmonary emboli  -CT chest 10/25/24 -- acute distal segmental/subsegmental pulmonary emboli in [place] and subsegmental PE in [place]  -unclear etiology; no recent hx of long travel; no family/personal hx of blood clots  -BLE venous dopplers done this am - results pending  -on heparin gtt - cont for now -- will transition to DOAC once above w/u complete (may need bronch)      Hypertension, primary  -(dyazide stopped due to NANCY)  -on amlodipine 5mg/day     Hx of hip OA  -s/p left THR (Dr. Lo) many years ago  -s/p elective R BEATRIZ on 10/5/23 by Dr. Diaz     Hx of aortic stenosis  -seeing cardiology Dr. Bird  -moderate A.S on echo in 10/2022 (prev mild A.S in 2020)  -abnormal pre-op EKG 9/2023 --Nuc GXT done 9/20/23 -- EKG portion revealed 1-1.5mm ST seg depression in inferolateral leads with prolonged recovery; nuclear portion with normal perfusion   -Echo 9/29/23 --progression of A.S. from mild to moderate  -recent echo at Dr. Bird's office 10/18/24 -- LVEF 65%, grade 2 diastolic dysfunction, severe aortic stenosis (mean aortic gradient signif worse since 9/2023 -- 31>49 mmHg),  -pt will need TAVR as outpt  -cardiology consult appreciated  -pt given IV lasix 20mg x 1 yest and again today    Hyperlipidemia   Pt with strong family hx of high cholesterol  ; normal TG and HDL  No indication for statin given age     Osteopenia   On Fosamax from 2011 to 4/2016.     DEXA stable 5/10/17.    DEXA in 9/2019 showed slightly more bone loss in spine, but stable in hip.    DEXA 11/2021 -- some improvement in femur.  Hold off on fosamax for now.   DEXA 8/2024 -- osteoporosis of left forearm  -restarted Fosamax 8/2024     Vitamin D deficiency  On vitamin D 4000u/day     VTE proph  -heparin gtt as above       D/w pt and Dr. Sanchez and RN Aura  Offered to call pt's nieceVonda (RN), but pt states she will call her      Malathi Stuart MD  10/26/2024  11:09 AM

## 2024-10-26 NOTE — PLAN OF CARE
Problem: Patient Centered Care  Goal: Patient preferences are identified and integrated in the patient's plan of care  Description: Interventions:  - What would you like us to know as we care for you? From home alone.  - Provide timely, complete, and accurate information to patient/family  - Incorporate patient and family knowledge, values, beliefs, and cultural backgrounds into the planning and delivery of care  - Encourage patient/family to participate in care and decision-making at the level they choose  - Honor patient and family perspectives and choices  Outcome: Progressing     Problem: CARDIOVASCULAR - ADULT  Goal: Maintains optimal cardiac output and hemodynamic stability  Description: INTERVENTIONS:  - Monitor vital signs, rhythm, and trends  - Monitor for bleeding, hypotension and signs of decreased cardiac output  - Evaluate effectiveness of vasoactive medications to optimize hemodynamic stability  - Monitor arterial and/or venous puncture sites for bleeding and/or hematoma  - Assess quality of pulses, skin color and temperature  - Assess for signs of decreased coronary artery perfusion - ex. Angina  - Evaluate fluid balance, assess for edema, trend weights  Outcome: Progressing  Goal: Absence of cardiac arrhythmias or at baseline  Description: INTERVENTIONS:  - Continuous cardiac monitoring, monitor vital signs, obtain 12 lead EKG if indicated  - Evaluate effectiveness of antiarrhythmic and heart rate control medications as ordered  - Initiate emergency measures for life threatening arrhythmias  - Monitor electrolytes and administer replacement therapy as ordered  Outcome: Progressing     Problem: RESPIRATORY - ADULT  Goal: Achieves optimal ventilation and oxygenation  Description: INTERVENTIONS:  - Assess for changes in respiratory status  - Assess for changes in mentation and behavior  - Position to facilitate oxygenation and minimize respiratory effort  - Oxygen supplementation based on oxygen  saturation or ABGs  - Provide Smoking Cessation handout, if applicable  - Encourage broncho-pulmonary hygiene including cough, deep breathe, Incentive Spirometry  - Assess the need for suctioning and perform as needed  - Assess and instruct to report SOB or any respiratory difficulty  - Respiratory Therapy support as indicated  - Manage/alleviate anxiety  - Monitor for signs/symptoms of CO2 retention  Outcome: Progressing     Problem: SAFETY ADULT - FALL  Goal: Free from fall injury  Description: INTERVENTIONS:  - Assess pt frequently for physical needs  - Identify cognitive and physical deficits and behaviors that affect risk of falls.  - [place] fall precautions as indicated by assessment.  - Educate pt/family on patient safety including physical limitations  - Instruct pt to call for assistance with activity based on assessment  - Modify environment to reduce risk of injury  - Provide assistive devices as appropriate  - Consider OT/PT consult to assist with strengthening/mobility  - Encourage toileting schedule  Outcome: Progressing    A&O x 4. No complaints of pain.   Currently on 12L/min via HFNC saturating  94-96%. Dry, productive cough noted. IV lasix x 1 given, IV steroids and antibiotics administered per orders. Patient weaned down to 10 L/min and saturating %. Per patient, \"breathing feels better than yesterday.\"   Heparin gtt infusing per orders. See MAR. Next PTT order placed for tomorrow AM draw.   No active signs of bleeding noted.   Safety precautions observed and maintained.   Rounding preformed.

## 2024-10-26 NOTE — PROGRESS NOTES
[facility]  part of [facility]    Progress Note      Assessment and Plan:   1.  Subacute respiratory failure with diffuse lung injury and pulmonary emboli-the etiology is uncertain.  The radiographic pattern suggests seen nonspecific interstitial pneumonitis or hypersensitivity pneumonitis.  The story is atypical for an acute pneumonia as she does not have significant fever or phlegm.  Could have been a partially treated infection.  Await mycoplasma screen but I think this is unlikely as the patient had received full course of empiric therapy.  The sedimentation rate is only modestly elevated; however, the patient had received steroid yesterday.  Tobacco history is distant.  Would screen for fungal infections but I think this less likely.  The lower extremity Dopplers are negative for clot.  The patient looks well.  There was no peripheral eosinophilia on admission.    Recommendations:  1.  Ongoing heparin drip  2.  Await ANCA  3.  Await mycoplasma screen  4.  Ongoing steroid  5.  Will follow clinically and if clinical syndrome does not improve with empiric steroid, would contemplate bronchoscopy although would be hesitant in patient with severe aortic stenosis  6.  Will hold on transition to NOAC until we are sure that the patient is responding to the steroids.  7.  Chest x-ray Monday    2.  Severe aortic stenosis with grade 2 diastolic dysfunction and moderate pulmonary hypertension-follow-up cardiology    3.  Other medical problems-hypertension, osteoarthritis, dyslipidemia    Subjective:   Hoda Busby is a(n) 84 year old female without new complaint    Objective:   Blood pressure 112/51, pulse 85, temperature 97.7 °F (36.5 °C), temperature source Oral, resp. rate 20, weight 178 lb (80.7 kg), SpO2 97%.    Physical Exam alert white female  HEENT examination is unremarkable with pupils equal round and reactive to light and accommodation.   Neck without adenopathy, thyromegaly, JVD nor  bruit.   Lungs few bibasilar crackles to auscultation and percussion.  Cardiac regular rate and rhythm no murmur.   Abdomen nontender, without hepatosplenomegaly and no mass appreciable.   Extremities without clubbing cyanosis nor edema.   Neurologic grossly intact with symmetric tone and strength and reflex.  Skin without gross abnormality     Results:     Lab Results   Component Value Date    WBC 10.3 10/26/2024    HGB 10.4 10/26/2024    HCT 29.5 10/26/2024    .0 10/26/2024    .0 10/26/2024    CREATSERUM 0.88 10/26/2024    BUN 16 10/26/2024     10/26/2024    K 3.6 10/26/2024     10/26/2024    CO2 23.0 10/26/2024     10/26/2024    CA 8.4 10/26/2024    PTT 72.3 10/26/2024    ESRML 49 10/26/2024       Stanton Sanchez MD  Medical Director, Critical Care, [facility]  Medical Director, [facility]  Pager: [phone]

## 2024-10-27 LAB
ANION GAP SERPL CALC-SCNC: 7 MMOL/L (ref 0–18)
APTT PPP: 77 SECONDS (ref 23–36)
BASOPHILS # BLD AUTO: 0.03 X10(3) UL (ref 0–0.2)
BASOPHILS NFR BLD AUTO: 0.2 %
BUN BLD-MCNC: 24 MG/DL (ref 9–23)
BUN/CREAT SERPL: 25.3 (ref 10–20)
CALCIUM BLD-MCNC: 8.7 MG/DL (ref 8.7–10.4)
CHLORIDE SERPL-SCNC: 108 MMOL/L (ref 98–112)
CO2 SERPL-SCNC: 24 MMOL/L (ref 21–32)
CREAT BLD-MCNC: 0.95 MG/DL
DEPRECATED HBV CORE AB SER IA-ACNC: 134 NG/ML
DEPRECATED RDW RBC AUTO: 41.1 FL (ref 35.1–46.3)
EGFRCR SERPLBLD CKD-EPI 2021: 59 ML/MIN/1.73M2 (ref 60–?)
EOSINOPHIL # BLD AUTO: 0 X10(3) UL (ref 0–0.7)
EOSINOPHIL NFR BLD AUTO: 0 %
ERYTHROCYTE [DISTWIDTH] IN BLOOD BY AUTOMATED COUNT: 12.9 % (ref 11–15)
GLUCOSE BLD-MCNC: 142 MG/DL (ref 70–99)
HCT VFR BLD AUTO: 29.7 %
HGB BLD-MCNC: 10.2 G/DL
IMM GRANULOCYTES # BLD AUTO: 0.2 X10(3) UL (ref 0–1)
IMM GRANULOCYTES NFR BLD: 1 %
IRON SATN MFR SERPL: 24 %
IRON SERPL-MCNC: 53 UG/DL
LYMPHOCYTES # BLD AUTO: 0.97 X10(3) UL (ref 1–4)
LYMPHOCYTES NFR BLD AUTO: 5 %
MCH RBC QN AUTO: 30 PG (ref 26–34)
MCHC RBC AUTO-ENTMCNC: 34.3 G/DL (ref 31–37)
MCV RBC AUTO: 87.4 FL
MONOCYTES # BLD AUTO: 0.83 X10(3) UL (ref 0.1–1)
MONOCYTES NFR BLD AUTO: 4.3 %
NEUTROPHILS # BLD AUTO: 17.25 X10 (3) UL (ref 1.5–7.7)
NEUTROPHILS # BLD AUTO: 17.25 X10(3) UL (ref 1.5–7.7)
NEUTROPHILS NFR BLD AUTO: 89.5 %
OSMOLALITY SERPL CALC.SUM OF ELEC: 294 MOSM/KG (ref 275–295)
PLATELET # BLD AUTO: 532 10(3)UL (ref 150–450)
PLATELET # BLD AUTO: 532 10(3)UL (ref 150–450)
POTASSIUM SERPL-SCNC: 3.5 MMOL/L (ref 3.5–5.1)
RBC # BLD AUTO: 3.4 X10(6)UL
SODIUM SERPL-SCNC: 139 MMOL/L (ref 136–145)
TIBC SERPL-MCNC: 224 UG/DL (ref 250–425)
TRANSFERRIN SERPL-MCNC: 150 MG/DL (ref 250–380)
WBC # BLD AUTO: 19.3 X10(3) UL (ref 4–11)

## 2024-10-27 PROCEDURE — 99232 SBSQ HOSP IP/OBS MODERATE 35: CPT | Performed by: INTERNAL MEDICINE

## 2024-10-27 RX ORDER — FUROSEMIDE 10 MG/ML
20 INJECTION INTRAMUSCULAR; INTRAVENOUS ONCE
Status: COMPLETED | OUTPATIENT
Start: 2024-10-27 | End: 2024-10-27

## 2024-10-27 NOTE — PROGRESS NOTES
[facility]  part of [facility]    Progress Note    Hoda Busby Patient Status:  Inpatient    1940 MRN [id number]   Location [facility]5W Attending Malathi Stuart MD   Hosp Day # 3 PCP Malathi Stuart MD       SUBJECTIVE:  Feels better today.  Still winded when walking to the bathroom, but much less so today    OBJECTIVE:  Vital signs in last 24 hours:  /68 (BP Location: Right arm)   Pulse 86   Temp 97.4 °F (36.3 °C) (Oral)   Resp 20   Wt 178 lb (80.7 kg)   SpO2 98%   BMI 27.06 kg/m²     Intake/Output:    Intake/Output Summary (Last 24 hours) at 10/27/2024 0945  Last data filed at 10/27/2024 0945  Gross per 24 hour   Intake 2124.3 ml   Output 950 ml   Net 1174.3 ml       Wt Readings from Last 3 Encounters:   10/24/24 178 lb (80.7 kg)   10/24/24 178 lb (80.7 kg)   24 183 lb 3.2 oz (83.1 kg)       EXAM:  GENERAL: well developed, well nourished, in no apparent distress  LUNGS: R base crackles, L clear  CARDIO: RRR, normal S1S2, 2/6 WADE RUSB  EXTREMITIES: no LE edema    Data Review:     Labs:   Lab Results   Component Value Date    WBC 19.3 10/27/2024    HGB 10.2 10/27/2024    HCT 29.7 10/27/2024    .0 10/27/2024    .0 10/27/2024    CREATSERUM 0.95 10/27/2024    BUN 24 10/27/2024     10/27/2024    K 3.5 10/27/2024     10/27/2024    CO2 24.0 10/27/2024     10/27/2024    CA 8.7 10/27/2024    PTT 77.0 10/27/2024         Imaging:  US VENOUS DOPPLER LEG BILAT - DIAG IMG (CPT=93970)    Result Date: 10/26/2024  CONCLUSION: No bilateral lower extremity DVT.    Dictated by (CST): Arahs Rueda MD on 10/26/2024 at 11:05 AM     Finalized by (CST): Arash Rueda MD on 10/26/2024 at 11:05 AM          CT CHEST PE AORTA (IV ONLY) (CPT=71260)    Result Date: 10/25/2024  CONCLUSION:   1. Acute distal segmental/subsegmental pulmonary embolism involving the right upper lobe as well as a subsegmental pulmonary embolism in the left upper  lobe. 2. Multifocal ground-glass opacities involving the right greater than left lungs with some areas of perilobular sparing as well as small consolidations involving the bilateral lower lobes.  Differential diagnosis includes multifocal pneumonia, organizing  pneumonia, or less likely other etiologies. 3. Small right and trace left pleural effusions. 4. Mild centrilobular emphysema. 5. Mild mediastinal and right hilar lymphadenopathy, which is favored to be reactive. 6. Biatrial predominant cardiomegaly with triple-vessel coronary artery calcifications, mitral annular calcifications, and aortic valve leaflet calcifications. 7. Lesser incidental findings described above.   The findings of acute pulmonary embolism as well as impression points 2 and 3 were communicated via secure epic chat to Dr. Holloway  at 2:24 p.m. on 10/25/2024 by Basim West MD  Dictated by (CST): Basim West MD on 10/25/2024 at 2:05 PM     Finalized by (CST): Basim West MD on 10/25/2024 at 2:27 PM          CARD ECHO 2D DOPPLER (CPT=93306)    Result Date: 10/25/2024  Transthoracic Echocardiogram Name:Hoda Busby Date:    10/25/2024    :     1940    Ht:     (68in)     BP: MRN:     [id number]       Age:     84years       Wt:     (178lb)    HR: Loc:     [facility]          Gndr:    F             BSA:    1.95m^2 Sonographer: [facility] Ordering:    Cody Holloway Consulting:  Seth Garcia ---------------------------------------------------------------------------- Procedure information:  A transthoracic echocardiogram, limited study was performed. Additional evaluation included M-mode and limited 2D.  Image quality was adequate. ---------------------------------------------------------------------------- Conclusions: 1. Left ventricle: The cavity size was normal. Wall thickness was mildly    increased. Systolic function was normal. The estimated ejection fraction    was 55-60%, by biplane method of disks. Wall motion is  normal; there are    no regional wall motion abnormalities. Unable to assess LV diastolic    function. 2. Right ventricle: The cavity size was increased. Systolic function was    normal. 3. Left atrium: The atrium was dilated. 4. Right atrium: The atrium was dilated. 5. Mitral valve: The annulus was mildly to moderately calcified. 6. Pulmonary arteries: The peak systolic pressure is 57mm Hg. Impressions:  The right ventricular systolic pressure was increased consistent with moderate pulmonary hypertension. No previous study was available for comparison. * ---------------------------------------------------------------------------- * Findings: Left ventricle:  The cavity size was normal. Wall thickness was mildly increased. Systolic function was normal. The estimated ejection fraction was 55-60%, by biplane method of disks. Wall motion is normal; there are no regional wall motion abnormalities. Unable to assess LV diastolic function. Left atrium:  Well visualized. The atrium was dilated. Right ventricle:  The cavity size was increased. Systolic function was normal. Right atrium:  Well visualized. The atrium was dilated. Mitral valve:  Well visualized. The annulus was mildly to moderately calcified. Aortic valve:  Well visualized. The leaflets were moderately calcified. Tricuspid valve:  Well visualized. The annulus is normal-sized. The leaflets are normal thickness. No echocardiographic evidence for tricuspid prolapse. Doppler:  Transvalvular velocity was within the normal range. There was no evidence for stenosis. There was mild regurgitation. Pulmonic valve:   Well visualized. Pericardium:  A trivial pericardial effusion was identified. There was no evidence of hemodynamic compromise. Pleura:  No evidence of pleural fluid accumulation. Pulmonary arteries: Moderate pulmonary hypertension was present. Systemic veins:  Central venous respirophasic diameter changes are in the normal range (>50%). Inferior vena cava:  The IVC was dilated. ---------------------------------------------------------------------------- Measurements  Left ventricle                    Value        Ref  IVS thickness, ED, PLAX       (H) 1.3   cm     0.6 - 0.9  LV ID, ED, PLAX                   4.7   cm     3.8 - 5.2  LV ID, ES, PLAX                   3.1   cm     2.2 - 3.5  LV PW thickness, ED, PLAX     (H) 1.2   cm     0.6 - 0.9  IVS/LV PW ratio, ED, PLAX         1.08         ---------  LV PW/LV ID ratio, ED, PLAX       0.26         ---------  LV ejection fraction              63    %      54 - 74  Stroke volume/bsa, 2D             52    ml/m^2 ---------  LV end-diastolic volume, 1-p      58    ml     48 - 140  A4C  LV ejection fraction, 1-p A4C     59    %      46 - 78  Stroke volume, 1-p A4C            34    ml     ---------  LV end-diastolic volume/bsa,      30    ml/m^2 30 - 82  1-p A4C  Stroke volume/bsa, 1-p A4C        18    ml/m^2 ---------  LV end-diastolic volume, 2-p      61    ml     46 - 106  LV end-systolic volume, 2-p       24    ml     14 - 42  LV ejection fraction, 2-p         60    %      54 - 74  Stroke volume, 2-p                37    ml     ---------  LV end-diastolic volume/bsa,      31    ml/m^2 29 - 61  2-p  LV end-systolic volume/bsa,       12    ml/m^2 8 - 24  2-p  Stroke volume/bsa, 2-p            18.8  ml/m^2 ---------  LVOT                              Value        Ref  LVOT ID                           2     cm     ---------  LVOT peak velocity, S             1.5   m/sec  ---------  LVOT VTI, S                       32.2  cm     ---------  LVOT peak gradient, S             9     mm Hg  ---------  LVOT mean gradient, S             5     mm Hg  ---------  Stroke volume (SV), LVOT DP       101   ml     ---------  Stroke index (SV/bsa), LVOT       52    ml/m^2 ---------  DP  Aortic valve                      Value        Ref  Aortic leaflet separation, MM     1.0   cm     ---------  Pulmonary artery                  Value         Ref  PA pressure, S, DP                57    mm Hg  ---------  Tricuspid valve                   Value        Ref  Tricuspid regurg peak         (H) 3.49  m/sec  <=2.8  velocity  Tricuspid peak RV-RA gradient     49    mm Hg  ---------  Systemic veins                    Value        Ref  Estimated CVP                     8     mm Hg  ---------  Inferior vena cava                Value        Ref  ID                            (H) 2.3   cm     <=2.1  Right ventricle                   Value        Ref  TAPSE, 2D                         3.25  cm     >=1.70  TAPSE, MM                         2.05  cm     >=1.70  RV pressure, S, DP                57    mm Hg  ---------  RV s', lateral                    21.8  cm/sec >=9.5 Legend: (L)  and  (H)  migel values outside specified reference range. ---------------------------------------------------------------------------- Prepared and electronically signed by Manjinder Chavis 10/25/2024 17:20          Meds:   Current Facility-Administered Medications   Medication Dose Route Frequency    atorvastatin (Lipitor) tab 20 mg  20 mg Oral Nightly    heparin (Porcine) 77731 units/250mL infusion PE/DVT/THROMBUS CONTINUOUS  200-3,000 Units/hr Intravenous Continuous    azithromycin (Zithromax) tab 500 mg  500 mg Oral Daily    methylPREDNISolone sodium succinate (Solu-MEDROL) injection 40 mg  40 mg Intravenous Q6H    piperacillin-tazobactam (Zosyn) 3.375 g in dextrose 5% 100 mL IVPB-ADDV  3.375 g Intravenous Q8H    amLODIPine (Norvasc) tab 5 mg  5 mg Oral Daily    aspirin DR tab 81 mg  81 mg Oral Daily    acetaminophen (Tylenol) tab 650 mg  650 mg Oral Q6H PRN    dextromethorphan-guaifenesin ER (Mucinex DM)  MG per 12 hr tab 1 tablet  1 tablet Oral BID PRN    influenza virus trivalent high dose PF (Fluzone HD) 0.5 mL injection (ages >/= 65 years) 0.5 mL  0.5 mL Intramuscular Prior to discharge       Assessment & Plan    Acute hypoxic respiratory failure  Bilateral  pneumonia/pneumonitis  -failed outpt treatment with amox and doxy x 7d  -Covid negative 10/17  -repeat CXR 10/24 revealed extensive bilateral perihilar and bilateral upper and lower lobe airspace opacities, progressed since 10/17.    -Covid negative 10/24/24: S.pneumo, legionella negative  -Mycoplasma Ab's pending (though unlikely given full course of doxycycline w/o improvement)  -Echo 10/24/24 - normal EF (55-60%), no obvious vegetatons  -empiric lasix (20mg IV daily x 3 days) per cardiology, with some improvement in dyspnea, though CHF alone does not explain sx and CT findings  -CT revealed small b/l pulmonary emboli, but again not enough to explain lung findings and degree of hypoxia  - procalcitonin slightly elevated  - on empiric IV zosyn day #4 and azithro day #3 -- though lack of response to outpatient abx and degree of hypoxia is unusual for typical bacterial pneumonia  -Dr. Holloway's pulm consult much appreciated  -started empiric IV solumedrol 10/25/24- on 40mg IV q6hrs  -ddx: vasculitis (ANCA and CTD panel pending) or interstitial pneumonitis (ESR elevated 49 though was likely higher on admission, as pt had already received 3 doses of solumedrol)  -pt denies recent travel, exposure to birds/pets, arthralgias (other than her mild hand OA), fevers  -d/w pulm Dr. Sanchez -- if above testing unrevealing, may need bronch/bx  -wean O2 as tolerated - down to 8L NC  -clinically improving      Bilateral pulmonary emboli  -CT chest 10/25/24 -- acute distal segmental/subsegmental pulmonary emboli in [place] and subsegmental PE in [place]  -unclear etiology; no recent hx of long travel; no family/personal hx of blood clots  -BLE venous dopplers negative  -unclear etiology of PE; will do hypercoag w/u as outpt.   Consider malignancy w/u if no other cause found (colonoscopy 1/2021 revealed adenomatous polyp but poor prep; pt declined repeat colonoscopy; Medicare would not cover Cologuard); UTD on mammo  -on heparin gtt - cont  for now -- will transition to DOAC once above w/u complete (may need bronch as above)     Anemia  -Hgb stable in the 10's  -likely 2/2 acute illness  -check iron studies     Hypertension, primary  -(dyazide stopped due to NANCY)  -on amlodipine 5mg/day     Hx of hip OA  -s/p left THR (Dr. Lo) many years ago  -s/p elective R BEATRIZ on 10/5/23 by Dr. Diaz     Hx of aortic stenosis  -seeing cardiology Dr. Bird  -moderate A.S on echo in 10/2022 (prev mild A.S in 2020)  -abnormal pre-op EKG 9/2023 --Nuc GXT done 9/20/23 -- EKG portion revealed 1-1.5mm ST seg depression in inferolateral leads with prolonged recovery; nuclear portion with normal perfusion   -Echo 9/29/23 --progression of A.S. from mild to moderate  -recent echo at Dr. Bird's office 10/18/24 -- LVEF 65%, grade 2 diastolic dysfunction, severe aortic stenosis (mean aortic gradient signif worse since 9/2023 -- 31>49 mmHg),  -pt will need TAVR as outpt  -cardiology consult appreciated  -pt given IV lasix 20mg x 1 on 10/25, 10/26, 10/27/24     Hyperlipidemia   Pt with strong family hx of high cholesterol  ; normal TG and HDL  No indication for statin given age     Osteopenia   On Fosamax from 2011 to 4/2016.     DEXA stable 5/10/17.    DEXA in 9/2019 showed slightly more bone loss in spine, but stable in hip.    DEXA 11/2021 -- some improvement in femur.  Hold off on fosamax for now.   DEXA 8/2024 -- osteoporosis of left forearm  -restarted Fosamax 8/2024     Vitamin D deficiency  On vitamin D 4000u/day     VTE proph  -heparin gtt as above                      Malathi Stuart MD  10/27/2024  9:45 AM

## 2024-10-27 NOTE — PROGRESS NOTES
Progress Note  Hoda Busby Patient Status:  Inpatient    1940 MRN [id number]   Location [facility]5W Attending Malathi Stuart MD   Hosp Day # 3 PCP Malathi Stuart MD     Subjective:  Pt resting comfortably without any cardiac complaints.     Objective:  /68 (BP Location: Right arm)   Pulse 86   Temp 97.4 °F (36.3 °C) (Oral)   Resp 20   Wt 178 lb (80.7 kg)   SpO2 98%   BMI 27.06 kg/m²     Telemetry: NSR      Intake/Output:    Intake/Output Summary (Last 24 hours) at 10/27/2024 0905  Last data filed at 10/27/2024 0728  Gross per 24 hour   Intake 1884.3 ml   Output 950 ml   Net 934.3 ml       Last 3 Weights   10/24/24 1651 178 lb (80.7 kg)   10/24/24 1504 178 lb (80.7 kg)   24 1336 183 lb 3.2 oz (83.1 kg)       Labs:  Recent Labs   Lab 10/25/24  1443 10/26/24  0545 10/27/24  0535   * 151* 142*   BUN 18 16 24*   CREATSERUM 0.83 0.88 0.95   EGFRCR 69 65 59*   CA 8.4* 8.4* 8.7   * 139 139   K 3.5 3.6 3.5    107 108   CO2 22.0 23.0 24.0     Recent Labs   Lab 10/25/24  0456 10/25/24  1443 10/26/24  0545 10/27/24  0535   RBC 3.46* 3.54* 3.31* 3.40*   HGB 10.8* 10.8* 10.4* 10.2*   HCT 30.8* 31.6* 29.5* 29.7*   MCV 89.0 89.3 89.1 87.4   MCH 31.2 30.5 31.4 30.0   MCHC 35.1 34.2 35.3 34.3   RDW 12.8 12.9 12.7 12.9   NEPRELIM 10.08*  --  9.23* 17.25*   WBC 12.8* 13.4* 10.3 19.3*   .0 495.0* 398.0  398.0 532.0*  532.0*         No results for input(s): \"TROP\", \"TROPHS\", \"CK\" in the last 168 hours.  Lab Results   Component Value Date    INR 1.06 2023       Diagnostics:   Echo 10/2024    Conclusions:     1. Left ventricle: The cavity size was normal. Wall thickness was mildly      increased. Systolic function was normal. The estimated ejection fraction      was 55-60%, by biplane method of disks. Wall motion is normal; there are      no regional wall motion abnormalities. Unable to assess LV diastolic      function.   2. Right ventricle: The cavity size  was increased. Systolic function was      normal.   3. Left atrium: The atrium was dilated.   4. Right atrium: The atrium was dilated.   5. Mitral valve: The annulus was mildly to moderately calcified.   6. Pulmonary arteries: The peak systolic pressure is 57mm Hg.   Impressions:  The right ventricular systolic pressure was increased   consistent with moderate pulmonary hypertension. No previous study was   available for comparison.   *     Review of Systems   Respiratory:  Positive for shortness of breath. Negative for cough, hemoptysis, sputum production and wheezing.    Cardiovascular: Negative.          Physical Exam:    Physical Exam  Vitals reviewed.   Constitutional:       General: She is not in acute distress.  Neck:      Vascular: No JVD.   Cardiovascular:      Rate and Rhythm: Normal rate and regular rhythm.      Pulses: Normal pulses.      Heart sounds: S1 normal and S2 normal. Murmur heard.      Systolic murmur is present with a grade of 2/6.      No friction rub.   Pulmonary:      Effort: Pulmonary effort is normal. No respiratory distress.      Breath sounds: No stridor. Examination of the right-lower field reveals rales. Examination of the left-lower field reveals rales. Rales present. No wheezing or rhonchi.      Comments: On 10L of O2  Chest:      Chest wall: No tenderness.   Abdominal:      General: Bowel sounds are normal.      Palpations: Abdomen is soft.      Tenderness: There is no abdominal tenderness.   Musculoskeletal:      Cervical back: Normal range of motion.      Right lower leg: No edema.      Left lower leg: No edema.   Neurological:      Mental Status: She is alert and oriented to person, place, and time.   Psychiatric:         Mood and Affect: Mood normal.         Medications:   atorvastatin  20 mg Oral Nightly    azithromycin  500 mg Oral Daily    methylPREDNISolone  40 mg Intravenous Q6H    piperacillin-tazobactam  3.375 g Intravenous Q8H    amLODIPine  5 mg Oral Daily    aspirin   81 mg Oral Daily      continuous dose heparin 1,100 Units/hr (10/26/24 1037)       Assessment/Plan:    Multifocal pneumonia  - presented with worsening cough and dyspnea   - on IV antibiotics, steroids  - pulmonary following    Acute bilateral pulmonary emboli  - Echo from 10/25/24 with LVEF 55-60%, no RWMA, RV cavity was increased with normal systolic function and mod PHTN  - Venous ultrasound negative for DVT  - on IV heparin  - transition to DOAC when okay with pulmonary    Severe aortic stenosis   - Echo from 10/18/24 as OP showed mean gradient of 49mmHg  - plan for OP structural heart eval for possible TAVR    HTN   - well controlled on amlodipine    HLD  -   - started on statin yesterday    Chronic anemia  - hgb stable     Plan;  -continue amlodipine  - improvement in breathing after receiving IV lasix last two days. Pt with rales on bases, and on 10L of O2 via NC. Will give a 20mg of IV lasix today.   - continue heparin gtt per PE protocol, pulmonary waiting to see if  respiratory symptom improvement with steroid before transitioning to DOAC  - Antibiotic and steroid per ID pulmonary  - plan for evaluation for TAVR  as OP for severe aortic stenosis   - strict intake and output monitoring  - electrolyte replacement per protocol    KALYAN Horne  [facility]  10/27/2024  9:05 AM        CARDIOLOGIST ADDENDUM:     I agree with the findings above.  I have personally performed the Assessment and Plan in its entirety, as detailed above with my revisions and updates.     Carlos Stephens M.D.   Cardiology/Electrophysiology   10/27/2024  L3  -----------------------------------------

## 2024-10-27 NOTE — PLAN OF CARE
Received pt in stable condition, Heparin gtt infusing, no complications noted. PTT therapeutic, next PTT in the AM. VSS, on 10L high flow O2, pt has a lot of SOB upon exertion, have to be increased up to 15 L High flow O2 upon ambulation. Pt on IV Zosyn Q 8 hrs. & IV Solu-medrol Q 6 hrs. Continuous telemetry & pulse oximetry monitoring in place. PRN Mucinex-DM given for cough management. No pain voiced from pt. Frequent rounding. Call light placed within pt.'s reach. Will continue to monitor for any new, acute changes.   Problem: Patient Centered Care  Goal: Patient preferences are identified and integrated in the patient's plan of care  Description: Interventions:  - What would you like us to know as we care for you? I come from home alone.  - Provide timely, complete, and accurate information to patient/family  - Incorporate patient and family knowledge, values, beliefs, and cultural backgrounds into the planning and delivery of care  - Encourage patient/family to participate in care and decision-making at the level they choose  - Honor patient and family perspectives and choices  Outcome: Progressing     Problem: CARDIOVASCULAR - ADULT  Goal: Maintains optimal cardiac output and hemodynamic stability  Description: INTERVENTIONS:  - Monitor vital signs, rhythm, and trends  - Monitor for bleeding, hypotension and signs of decreased cardiac output  - Evaluate effectiveness of vasoactive medications to optimize hemodynamic stability  - Monitor arterial and/or venous puncture sites for bleeding and/or hematoma  - Assess quality of pulses, skin color and temperature  - Assess for signs of decreased coronary artery perfusion - ex. Angina  - Evaluate fluid balance, assess for edema, trend weights  Outcome: Progressing  Goal: Absence of cardiac arrhythmias or at baseline  Description: INTERVENTIONS:  - Continuous cardiac monitoring, monitor vital signs, obtain 12 lead EKG if indicated  - Evaluate effectiveness of  antiarrhythmic and heart rate control medications as ordered  - Initiate emergency measures for life threatening arrhythmias  - Monitor electrolytes and administer replacement therapy as ordered  Outcome: Progressing     Problem: RESPIRATORY - ADULT  Goal: Achieves optimal ventilation and oxygenation  Description: INTERVENTIONS:  - Assess for changes in respiratory status  - Assess for changes in mentation and behavior  - Position to facilitate oxygenation and minimize respiratory effort  - Oxygen supplementation based on oxygen saturation or ABGs  - Provide Smoking Cessation handout, if applicable  - Encourage broncho-pulmonary hygiene including cough, deep breathe, Incentive Spirometry  - Assess the need for suctioning and perform as needed  - Assess and instruct to report SOB or any respiratory difficulty  - Respiratory Therapy support as indicated  - Manage/alleviate anxiety  - Monitor for signs/symptoms of CO2 retention  Outcome: Progressing     Problem: SAFETY ADULT - FALL  Goal: Free from fall injury  Description: INTERVENTIONS:  - Assess pt frequently for physical needs  - Identify cognitive and physical deficits and behaviors that affect risk of falls.  - [place] fall precautions as indicated by assessment.  - Educate pt/family on patient safety including physical limitations  - Instruct pt to call for assistance with activity based on assessment  - Modify environment to reduce risk of injury  - Provide assistive devices as appropriate  - Consider OT/PT consult to assist with strengthening/mobility  - Encourage toileting schedule  Outcome: Progressing

## 2024-10-27 NOTE — PROGRESS NOTES
[facility]  part of [facility]    Progress Note      Assessment and Plan:   1.  Subacute respiratory failure with diffuse lung injury and pulmonary emboli-the etiology is uncertain.  The radiographic pattern suggests seen nonspecific interstitial pneumonitis or hypersensitivity pneumonitis.  The story is atypical for an acute pneumonia as she does not have significant fever or phlegm.  Could have been a partially treated infection.  Await mycoplasma screen but I think this is unlikely as the patient had received full course of empiric therapy.  The sedimentation rate is only modestly elevated; however, the patient had received steroid yesterday.  Tobacco history is distant.  Would screen for fungal infections but I think this less likely.  The lower extremity Dopplers are negative for clot.  The patient looks well.  There was no peripheral eosinophilia on admission.    The patient's oxygen requirements are now down to 2 L and she notes that she is feeling much better.  It seems as though the empiric trial of steroids is working.    Recommendations:  1.  Ongoing heparin drip-May be able to transition to NOAC as soon as tomorrow.  2.  Await ANCA  3.  Await mycoplasma screen  4.  Ongoing steroid-we will continue every 6 hours today and then decrease to twice daily tomorrow  5.  Will follow clinically and if clinical syndrome does not improve with empiric steroid, would contemplate bronchoscopy although would be hesitant in patient with severe aortic stenosis  6.  Chest x-ray tomorrow  7.  Histo urinary antigen, fungal antibody survey, Fungitell.    2.  Severe aortic stenosis with grade 2 diastolic dysfunction and moderate pulmonary hypertension-follow-up cardiology    3.  Other medical problems-hypertension, osteoarthritis, dyslipidemia    Subjective:   Hoda Busby is a(n) 84 year old female without new complaint and tells me that her breathing is a little bit better.  Still with dyspnea on  exertion with ambulation across room.    Objective:   Blood pressure 136/68, pulse 86, temperature 97.4 °F (36.3 °C), temperature source Oral, resp. rate 20, weight 178 lb (80.7 kg), SpO2 98%.    Physical Exam alert white female  HEENT examination is unremarkable with pupils equal round and reactive to light and accommodation.   Neck without adenopathy, thyromegaly, JVD nor bruit.   Lungs few bibasilar crackles to auscultation and percussion.  Cardiac regular rate and rhythm no murmur.   Abdomen nontender, without hepatosplenomegaly and no mass appreciable.   Extremities without clubbing cyanosis nor edema.   Neurologic grossly intact with symmetric tone and strength and reflex.  Skin without gross abnormality     Results:     Lab Results   Component Value Date    WBC 19.3 10/27/2024    HGB 10.2 10/27/2024    HCT 29.7 10/27/2024    .0 10/27/2024    .0 10/27/2024    CREATSERUM 0.95 10/27/2024    BUN 24 10/27/2024     10/27/2024    K 3.5 10/27/2024     10/27/2024    CO2 24.0 10/27/2024     10/27/2024    CA 8.7 10/27/2024    PTT 77.0 10/27/2024       Stanton Sanchez MD  Medical Director, Critical Care, [facility]  Medical Director, [facility]  Pager: [phone]

## 2024-10-27 NOTE — PLAN OF CARE
Problem: Patient Centered Care  Goal: Patient preferences are identified and integrated in the patient's plan of care  Description: Interventions:  - What would you like us to know as we care for you? I come from home alone.  - Provide timely, complete, and accurate information to patient/family  - Incorporate patient and family knowledge, values, beliefs, and cultural backgrounds into the planning and delivery of care  - Encourage patient/family to participate in care and decision-making at the level they choose  - Honor patient and family perspectives and choices  Outcome: Progressing     Problem: Patient/Family Goals  Goal: Patient/Family Long Term Goal  Description: Patient's Long Term Goal:     Interventions:  -   - See additional Care Plan goals for specific interventions  Outcome: Progressing  Goal: Patient/Family Short Term Goal  Description: Patient's Short Term Goal:     Interventions:   -   - See additional Care Plan goals for specific interventions  Outcome: Progressing     Problem: CARDIOVASCULAR - ADULT  Goal: Maintains optimal cardiac output and hemodynamic stability  Description: INTERVENTIONS:  - Monitor vital signs, rhythm, and trends  - Monitor for bleeding, hypotension and signs of decreased cardiac output  - Evaluate effectiveness of vasoactive medications to optimize hemodynamic stability  - Monitor arterial and/or venous puncture sites for bleeding and/or hematoma  - Assess quality of pulses, skin color and temperature  - Assess for signs of decreased coronary artery perfusion - ex. Angina  - Evaluate fluid balance, assess for edema, trend weights  Outcome: Progressing  Goal: Absence of cardiac arrhythmias or at baseline  Description: INTERVENTIONS:  - Continuous cardiac monitoring, monitor vital signs, obtain 12 lead EKG if indicated  - Evaluate effectiveness of antiarrhythmic and heart rate control medications as ordered  - Initiate emergency measures for life threatening arrhythmias  -  Monitor electrolytes and administer replacement therapy as ordered  Outcome: Progressing     Problem: RESPIRATORY - ADULT  Goal: Achieves optimal ventilation and oxygenation  Description: INTERVENTIONS:  - Assess for changes in respiratory status  - Assess for changes in mentation and behavior  - Position to facilitate oxygenation and minimize respiratory effort  - Oxygen supplementation based on oxygen saturation or ABGs  - Provide Smoking Cessation handout, if applicable  - Encourage broncho-pulmonary hygiene including cough, deep breathe, Incentive Spirometry  - Assess the need for suctioning and perform as needed  - Assess and instruct to report SOB or any respiratory difficulty  - Respiratory Therapy support as indicated  - Manage/alleviate anxiety  - Monitor for signs/symptoms of CO2 retention  Outcome: Progressing     Problem: SAFETY ADULT - FALL  Goal: Free from fall injury  Description: INTERVENTIONS:  - Assess pt frequently for physical needs  - Identify cognitive and physical deficits and behaviors that affect risk of falls.  - [place] fall precautions as indicated by assessment.  - Educate pt/family on patient safety including physical limitations  - Instruct pt to call for assistance with activity based on assessment  - Modify environment to reduce risk of injury  - Provide assistive devices as appropriate  - Consider OT/PT consult to assist with strengthening/mobility  - Encourage toileting schedule  Outcome: Progressing

## 2024-10-28 ENCOUNTER — APPOINTMENT (OUTPATIENT)
Dept: GENERAL RADIOLOGY | Facility: HOSPITAL | Age: 84
End: 2024-10-28
Attending: INTERNAL MEDICINE
Payer: MEDICARE

## 2024-10-28 PROBLEM — J18.9 PNEUMONIA: Status: ACTIVE | Noted: 2024-10-28

## 2024-10-28 LAB
ALBUMIN SERPL-MCNC: 3.6 G/DL (ref 3.2–4.8)
ANION GAP SERPL CALC-SCNC: 10 MMOL/L (ref 0–18)
ANION GAP SERPL CALC-SCNC: 9 MMOL/L (ref 0–18)
APTT PPP: 69.7 SECONDS (ref 23–36)
BASOPHILS # BLD AUTO: 0.03 X10(3) UL (ref 0–0.2)
BASOPHILS NFR BLD AUTO: 0.1 %
BUN BLD-MCNC: 24 MG/DL (ref 9–23)
BUN BLD-MCNC: 26 MG/DL (ref 9–23)
BUN/CREAT SERPL: 23.5 (ref 10–20)
BUN/CREAT SERPL: 25.7 (ref 10–20)
CALCIUM BLD-MCNC: 8.9 MG/DL (ref 8.7–10.4)
CALCIUM BLD-MCNC: 9.3 MG/DL (ref 8.7–10.4)
CHLORIDE SERPL-SCNC: 106 MMOL/L (ref 98–112)
CHLORIDE SERPL-SCNC: 107 MMOL/L (ref 98–112)
CO2 SERPL-SCNC: 24 MMOL/L (ref 21–32)
CO2 SERPL-SCNC: 24 MMOL/L (ref 21–32)
CREAT BLD-MCNC: 1.01 MG/DL
CREAT BLD-MCNC: 1.02 MG/DL
DEPRECATED RDW RBC AUTO: 41.8 FL (ref 35.1–46.3)
DEPRECATED RDW RBC AUTO: 42.3 FL (ref 35.1–46.3)
EGFRCR SERPLBLD CKD-EPI 2021: 54 ML/MIN/1.73M2 (ref 60–?)
EGFRCR SERPLBLD CKD-EPI 2021: 55 ML/MIN/1.73M2 (ref 60–?)
EOSINOPHIL # BLD AUTO: 0 X10(3) UL (ref 0–0.7)
EOSINOPHIL NFR BLD AUTO: 0 %
ERYTHROCYTE [DISTWIDTH] IN BLOOD BY AUTOMATED COUNT: 12.7 % (ref 11–15)
ERYTHROCYTE [DISTWIDTH] IN BLOOD BY AUTOMATED COUNT: 12.9 % (ref 11–15)
GLUCOSE BLD-MCNC: 133 MG/DL (ref 70–99)
GLUCOSE BLD-MCNC: 134 MG/DL (ref 70–99)
HCT VFR BLD AUTO: 32.9 %
HCT VFR BLD AUTO: 34.3 %
HGB BLD-MCNC: 11 G/DL
HGB BLD-MCNC: 11.6 G/DL
IMM GRANULOCYTES # BLD AUTO: 0.25 X10(3) UL (ref 0–1)
IMM GRANULOCYTES NFR BLD: 1.2 %
LYMPHOCYTES # BLD AUTO: 1 X10(3) UL (ref 1–4)
LYMPHOCYTES NFR BLD AUTO: 5 %
M PNEUMO IGG ABS: <100 U/ML
M PNEUMO IGM ABS: <770 U/ML
MCH RBC QN AUTO: 30.1 PG (ref 26–34)
MCH RBC QN AUTO: 30.5 PG (ref 26–34)
MCHC RBC AUTO-ENTMCNC: 33.4 G/DL (ref 31–37)
MCHC RBC AUTO-ENTMCNC: 33.8 G/DL (ref 31–37)
MCV RBC AUTO: 88.9 FL
MCV RBC AUTO: 91.1 FL
MONOCYTES # BLD AUTO: 0.78 X10(3) UL (ref 0.1–1)
MONOCYTES NFR BLD AUTO: 3.9 %
NEUTROPHILS # BLD AUTO: 17.95 X10 (3) UL (ref 1.5–7.7)
NEUTROPHILS # BLD AUTO: 17.95 X10(3) UL (ref 1.5–7.7)
NEUTROPHILS NFR BLD AUTO: 89.8 %
OSMOLALITY SERPL CALC.SUM OF ELEC: 296 MOSM/KG (ref 275–295)
OSMOLALITY SERPL CALC.SUM OF ELEC: 297 MOSM/KG (ref 275–295)
PHOSPHATE SERPL-MCNC: 3.6 MG/DL (ref 2.4–5.1)
PLATELET # BLD AUTO: 578 10(3)UL (ref 150–450)
PLATELET # BLD AUTO: 610 10(3)UL (ref 150–450)
POTASSIUM SERPL-SCNC: 3.6 MMOL/L (ref 3.5–5.1)
POTASSIUM SERPL-SCNC: 3.7 MMOL/L (ref 3.5–5.1)
RBC # BLD AUTO: 3.61 X10(6)UL
RBC # BLD AUTO: 3.86 X10(6)UL
SODIUM SERPL-SCNC: 140 MMOL/L (ref 136–145)
SODIUM SERPL-SCNC: 140 MMOL/L (ref 136–145)
WBC # BLD AUTO: 20 X10(3) UL (ref 4–11)
WBC # BLD AUTO: 20 X10(3) UL (ref 4–11)

## 2024-10-28 PROCEDURE — 71046 X-RAY EXAM CHEST 2 VIEWS: CPT | Performed by: INTERNAL MEDICINE

## 2024-10-28 PROCEDURE — 99233 SBSQ HOSP IP/OBS HIGH 50: CPT | Performed by: INTERNAL MEDICINE

## 2024-10-28 PROCEDURE — 99232 SBSQ HOSP IP/OBS MODERATE 35: CPT | Performed by: INTERNAL MEDICINE

## 2024-10-28 RX ORDER — POTASSIUM CHLORIDE 1500 MG/1
20 TABLET, EXTENDED RELEASE ORAL ONCE
Status: COMPLETED | OUTPATIENT
Start: 2024-10-28 | End: 2024-10-28

## 2024-10-28 RX ORDER — DOCUSATE SODIUM 100 MG/1
100 CAPSULE, LIQUID FILLED ORAL 2 TIMES DAILY
Status: DISCONTINUED | OUTPATIENT
Start: 2024-10-28 | End: 2024-11-01

## 2024-10-28 RX ORDER — POLYETHYLENE GLYCOL 3350 17 G/17G
17 POWDER, FOR SOLUTION ORAL DAILY
Status: DISCONTINUED | OUTPATIENT
Start: 2024-10-28 | End: 2024-11-01

## 2024-10-28 RX ORDER — FUROSEMIDE 10 MG/ML
10 INJECTION INTRAMUSCULAR; INTRAVENOUS ONCE
Status: COMPLETED | OUTPATIENT
Start: 2024-10-28 | End: 2024-10-28

## 2024-10-28 RX ORDER — FUROSEMIDE 10 MG/ML
20 INJECTION INTRAMUSCULAR; INTRAVENOUS ONCE
Status: DISCONTINUED | OUTPATIENT
Start: 2024-10-28 | End: 2024-10-28

## 2024-10-28 NOTE — PROGRESS NOTES
INFECTIOUS DISEASE PROGRESS NOTE  [facility]  part of [facility] ID PROGRESS NOTE    Hoda Busby Patient Status:  Inpatient    1940 MRN [id number]   Location [facility]5W Attending Malathi Stuart MD   Hosp Day # 4 PCP Malathi Stuart MD     Subjective:  ROS reviewed. Feels better. Sitting in chair. On 6L NC. Appetite much improved. No more \"coughing fits\".    ASSESSMENT:    Antibiotics: Zosyn, azithromycin      # Acute leukocytosis with multifocal pneumonia with hypoxia               -Strep/legionella negative               -RVP negative   -FU URIEL/ANCA   -FU urine histo, fungitell  # Leukocytosis  # Acute PE  # HTN     PLAN:  -  Continue on zosyn and azithromycin.  -  Follow fever curve, wbc. On Solu-Medrol.  -  Reviewed labs, micro, imaging reports, available old records.  -  Case d/w patient, RN.     History of Present Illness:  Hoda Busby is an 84 year old female with a history of HTN, aortic stenosis, who presented to [facility] ED on 10/24 with increased shortness of breath. Was seen in ED on 10/17 with a week of cough, COVID-19 negative and discharged from ED on amoxicillin and doxycycline x 1 week. Denies any fevers or chills. Has had poor appetite. Notes productive cough. Was having shortness of breath when walking in her house so came to  ED. Denies any recent travel. No sick contacts. On arrival, Tmax 99.3, wbc 16.7, CXR with extensive infiltrates, RVP negative, started on zosyn. Had CT chest showing acute PE, was on 3L and up to 10L NC yesterday. Azithromycin added and started on solu-medrol. Feels better this AM. ID consulted.    Physical Exam:  /56 (BP Location: Left arm)   Pulse 89   Temp 97.6 °F (36.4 °C) (Oral)   Resp 22   Wt 178 lb (80.7 kg)   SpO2 94%   BMI 27.06 kg/m²     Gen:   Awake, in bed  HEENT:  EOMI, neck supple  CV/lungs:  RRR, CTAB  Abdom:  Soft, NT/ND, +BS  Skin/extrem:  No rashes, no c/c/e  Lines:  PIV+    Laboratory  Data: Reviewed    Microbiology: Reviewed    Radiology: Reviewed      [facility]  [phone]  10/28/2024

## 2024-10-28 NOTE — PROGRESS NOTES
[facility] - CARDIOLOGY PROGRESS NOTE  Hoda Busby Patient Status:  Inpatient    1940 MRN [id number]   Location [facility]5W Attending Malathi Stuart MD   Hosp Day # 4 PCP Malathi Stuart MD     Assessment:    Multifocal pneumonia  - presented with worsening cough and dyspnea   - on IV antibiotics, steroids  - pulmonary following     Acute bilateral pulmonary emboli  - Echo from 10/25/24 with LVEF 55-60%, no RWMA, RV cavity was increased with normal systolic function and mod PHTN  - Venous ultrasound negative for DVT  - on IV heparin  - transition to DOAC when okay with pulmonary     Severe aortic stenosis   - Echo from 10/18/24 as OP showed mean gradient of 49mmHg  - plan for OP structural heart eval for possible TAVR     HTN   - well controlled on amlodipine     HLD  -   - started on statin yesterday     Chronic anemia  - hgb stable      Plan;  -continue amlodipine  - improvement in breathing after receiving IV lasix last two days. Pt with rales on bases, and on 10L of O2 via NC. Will give a 20mg of IV lasix today.   - continue heparin gtt per PE protocol, pulmonary waiting to see if  respiratory symptom improvement with steroid before transitioning to DOAC  - Antibiotic and steroid per ID pulmonary  - plan for evaluation for TAVR  as OP for severe aortic stenosis   - strict intake and output monitoring  - electrolyte replacement per protocol     KALYAN Horne  [facility] [place]  10/27/2024  9:05 AM      Rhys Eli MD  10/28/2024  3:26 PM        Wt Readings from Last 2 Encounters:   10/24/24 178 lb (80.7 kg)   10/24/24 178 lb (80.7 kg)       Physical Exam:    General: Alert and oriented x 3,  No apparent distress.  HEENT: No focal deficits.  Neck: No JVD, carotids 2+ no bruits.  Cardiac: Regular rate and rhythm, S1, S2 normal, 3/6 WADE  Lungs: Clear without  wheezes, rales, rhonchi.  Normal excursions and effort.  Abdomen: Soft, non-tender.   Extremities: Without clubbing, cyanosis or edema.  Peripheral pulses are 2+.  Skin: Warm and dry.     Labs:  Lab Results   Component Value Date    WBC 20.0 10/28/2024    HGB 11.0 10/28/2024    HCT 32.9 10/28/2024    .0 10/28/2024     Lab Results   Component Value Date    INR 1.06 09/08/2023     Lab Results   Component Value Date     10/28/2024    K 3.7 10/28/2024     10/28/2024    CO2 24.0 10/28/2024    BUN 26 10/28/2024    CREATSERUM 1.01 10/28/2024     10/28/2024    CA 8.9 10/28/2024    ALB 3.6 10/28/2024        No results found for: \"TROP\", \"CKMB\"     Medications:     polyethylene glycol (PEG 3350)  17 g Oral Daily    docusate sodium  100 mg Oral BID    atorvastatin  20 mg Oral Nightly    azithromycin  500 mg Oral Daily    methylPREDNISolone  40 mg Intravenous Q6H    piperacillin-tazobactam  3.375 g Intravenous Q8H    amLODIPine  5 mg Oral Daily    aspirin  81 mg Oral Daily      continuous dose heparin 1,100 Units/hr (10/28/24 1211)       Rhys Eli MD  10/28/2024  3:26 PM

## 2024-10-28 NOTE — PROGRESS NOTES
[facility]  part of [facility]    Progress Note    Hoda Busby Patient Status:  Inpatient    1940 MRN [id number]   Location [facility]5W Attending Malathi Stuart MD   Hosp Day # 4 PCP Malathi Stuart MD       Subjective:     Constitutional:  Negative for fever.   HENT:  Negative for congestion.    Cardiovascular:  Negative for chest pain.   Gastrointestinal: Negative.    Neurological: Negative.    Psychiatric/Behavioral: Negative.       Up to the chair earlier on 2 L of oxygen and later on 6 L  Desaturated with ambulation  Dry cough  No fever or chills  No hemoptysis  No skin lesions or joint pain  Objective:   Blood pressure 107/56, pulse 89, temperature 97.6 °F (36.4 °C), temperature source Oral, resp. rate 22, weight 178 lb (80.7 kg), SpO2 94%.  Physical Exam    Results:   Lab Results   Component Value Date    WBC 20.0 (H) 10/28/2024    HGB 11.0 (L) 10/28/2024    HCT 32.9 (L) 10/28/2024    .0 (H) 10/28/2024    CREATSERUM 1.01 10/28/2024    BUN 26 (H) 10/28/2024     10/28/2024    K 3.7 10/28/2024     10/28/2024    CO2 24.0 10/28/2024     (H) 10/28/2024    CA 8.9 10/28/2024    ALB 3.6 10/28/2024    ALKPHO 122 10/24/2024    BILT 1.0 10/24/2024    TP 7.2 10/24/2024    AST 37 (H) 10/24/2024    ALT 27 10/24/2024    PTT 69.7 (H) 10/28/2024    INR 1.06 2023    TSH 1.526 2024    ESRML 49 (H) 10/26/2024    PHOS 3.6 10/28/2024    TROPHS 9 10/17/2024    B12 349 2017       XR CHEST PA + LAT CHEST (CPT=71046)    Result Date: 10/28/2024  CONCLUSION: Cardiac enlargement with secondary signs of moderate interstitial edema unchanged since previous exam.    Dictated by (CST): Nadeem rBand MD on 10/28/2024 at 8:58 AM     Finalized by (CST): Nadeem Brand MD on 10/28/2024 at 8:59 AM               Assessment & Plan:       1-acute to subacute respiratory failure with hypoxia  Chest CT with impressive ILD changes /bilateral pneumonitis with  GGO's / NOT a UIP pattern  More suggestive for NSIP or HP or  (cryptogenic organizing pneumonia )    No history of smoking or vaping or occupational exposure  Denied history of autoimmune disease  No birds exposures   Not on meds to cause ILD    Respiratory panel negative  Legionella and pneumococcal serology negative  Minimal elevation in procalcitonin    Still with high O2 requirement  To complete the course of antibiotics  Currently on steroid with Solu-Medrol 40 every 6hr     ANCA and connective tissue panel pending  Serology for histo and blasto also pending  Patient might require bronchoscopy if results are inconclusive    2-small subsegmental PE  Continue with IV heparin for now              Martita Baez MD  10/28/2024

## 2024-10-28 NOTE — PROGRESS NOTES
[facility]  part of [facility]    Progress Note    Hoda Busby Patient Status:  Inpatient    1940 MRN [id number]   Location [facility]5W Attending Malathi Stuart MD   Hosp Day # 4 PCP Malathi Stuart MD       SUBJECTIVE:  She is feeling better--less cough; no sob. Feels tired/shaky when she gets up. Per RN-desats with exertion    OBJECTIVE:  /76 (BP Location: Right arm)   Pulse 88   Temp 98 °F (36.7 °C) (Oral)   Resp 20   Wt 178 lb (80.7 kg)   SpO2 91%   BMI 27.06 kg/m²     Intake/Output:  I/O last 3 completed shifts:  In: 2721.5 [P.O.:2000; I.V.:421.5; IV PIGGYBACK:300]  Out: 1525 [Urine:1525]    Wt Readings from Last 3 Encounters:   10/24/24 178 lb (80.7 kg)   10/24/24 178 lb (80.7 kg)   24 183 lb 3.2 oz (83.1 kg)       Exam  Gen: No acute distress, alert and oriented x3  Pulm: mild b/l rhonchi  CV: Heart RRR, +4/6 systolic murmur  Abd: Abdomen soft, nontender, nondistended, no organomegaly, bowel sounds present  MSK: Full range of motion in extremities, no clubbing, no cyanosis, no edema  Skin: no rashes or lesions  Neuro: A&O x 3, 5/5 strength in all 4 extremities.     Labs:   Recent Labs   Lab 10/26/24  0545 10/27/24  0535 10/28/24  0452   RBC 3.31* 3.40* 3.61*   HGB 10.4* 10.2* 11.0*   HCT 29.5* 29.7* 32.9*   MCV 89.1 87.4 91.1   MCH 31.4 30.0 30.5   MCHC 35.3 34.3 33.4   RDW 12.7 12.9 12.7   NEPRELIM 9.23* 17.25* 17.95*   WBC 10.3 19.3* 20.0*   .0  398.0 532.0*  532.0* 578.0*         Recent Labs   Lab 10/24/24  1659 10/25/24  0456 10/26/24  0545 10/27/24  0535 10/28/24  0452   *   < > 151* 142* 134*   BUN 20   < > 16 24* 26*   CREATSERUM 0.93   < > 0.88 0.95 1.01   EGFRCR 61   < > 65 59* 55*   CA 9.3   < > 8.4* 8.7 8.9   ALB 4.0  --   --   --  3.6      < > 139 139 140   K 4.7   < > 3.6 3.5 3.7      < > 107 108 106   CO2 22.0   < > 23.0 24.0 24.0   ALKPHO 122  --   --   --   --    AST 37*  --   --   --   --    ALT 27   --   --   --   --    BILT 1.0  --   --   --   --    TP 7.2  --   --   --   --     < > = values in this interval not displayed.         Imaging:  US VENOUS DOPPLER LEG BILAT - DIAG IMG (CPT=93970)    Result Date: 10/26/2024  CONCLUSION: No bilateral lower extremity DVT.    Dictated by (CST): Arash Rueda MD on 10/26/2024 at 11:05 AM     Finalized by (CST): Arash Rueda MD on 10/26/2024 at 11:05 AM          CT CHEST PE AORTA (IV ONLY) (CPT=71260)    Result Date: 10/25/2024  CONCLUSION:   1. Acute distal segmental/subsegmental pulmonary embolism involving the right upper lobe as well as a subsegmental pulmonary embolism in the left upper lobe. 2. Multifocal ground-glass opacities involving the right greater than left lungs with some areas of perilobular sparing as well as small consolidations involving the bilateral lower lobes.  Differential diagnosis includes multifocal pneumonia, organizing  pneumonia, or less likely other etiologies. 3. Small right and trace left pleural effusions. 4. Mild centrilobular emphysema. 5. Mild mediastinal and right hilar lymphadenopathy, which is favored to be reactive. 6. Biatrial predominant cardiomegaly with triple-vessel coronary artery calcifications, mitral annular calcifications, and aortic valve leaflet calcifications. 7. Lesser incidental findings described above.   The findings of acute pulmonary embolism as well as impression points 2 and 3 were communicated via secure epic chat to Dr. Holloway  at 2:24 p.m. on 10/25/2024 by Basim West MD  Dictated by (CST): Basim West MD on 10/25/2024 at 2:05 PM     Finalized by (CST): Basim West MD on 10/25/2024 at 2:27 PM                 Assessment and Plan:       Acute hypoxic respiratory failure  Bilateral pneumonia/pneumonitis  -failed outpt treatment with amox and doxy x 7d  -Covid negative 10/17  -repeat CXR 10/24 revealed extensive bilateral perihilar and bilateral upper and lower lobe airspace opacities,  progressed since 10/17.    -Covid negative 10/24/24: S.pneumo, legionella negative  -Mycoplasma Ab's pending (though unlikely given full course of doxycycline w/o improvement)  -Echo 10/24/24 - normal EF (55-60%), no obvious vegetatons  -empiric lasix (20mg IV daily x 3 days) per cardiology, with some improvement in dyspnea, though CHF alone does not explain sx and CT findings  -CT revealed small b/l pulmonary emboli, but again not enough to explain lung findings and degree of hypoxia  - procalcitonin slightly elevated  - on empiric IV zosyn day #5 and azithro day #4 -- though lack of response to outpatient abx and degree of hypoxia is unusual for typical bacterial pneumonia  -Dr. Holloway's pulm consult much appreciated  -started empiric IV solumedrol 10/25/24- on 40mg IV q6hrs  -ddx: vasculitis (ANCA and CTD panel pending) or interstitial pneumonitis (ESR elevated 49 though was likely higher on admission, as pt had already received 3 doses of solumedrol)  -pt denies recent travel, exposure to birds/pets, arthralgias (other than her mild hand OA), fevers  -d/w pulm Dr. Sanchez -- if above testing unrevealing, may need bronch/bx  -wean O2 as tolerated - down to 2LNC but then up to 6L with exertion       Bilateral pulmonary emboli  -CT chest 10/25/24 -- acute distal segmental/subsegmental pulmonary emboli in [place] and subsegmental PE in [place]  -unclear etiology; no recent hx of long travel; no family/personal hx of blood clots  -BLE venous dopplers negative  -unclear etiology of PE; will do hypercoag w/u as outpt.   Consider malignancy w/u if no other cause found (colonoscopy 1/2021 revealed adenomatous polyp but poor prep; pt declined repeat colonoscopy; Medicare would not cover Cologuard); UTD on mammo  -on heparin gtt - cont for now -- will transition to DOAC once above w/u complete (may need bronch as above)      Anemia  -Hgb stable in the 10's  -likely 2/2 acute illness  -check iron studies     Hypertension,  primary  -(dyazide stopped due to NANCY)  -on amlodipine 5mg/day     Hx of hip OA  -s/p left THR (Dr. Lo) many years ago  -s/p elective R BEATRIZ on 10/5/23 by Dr. Diaz     Hx of aortic stenosis  -seeing cardiology Dr. Bird  -moderate A.S on echo in 10/2022 (prev mild A.S in 2020)  -abnormal pre-op EKG 9/2023 --Nuc GXT done 9/20/23 -- EKG portion revealed 1-1.5mm ST seg depression in inferolateral leads with prolonged recovery; nuclear portion with normal perfusion   -Echo 9/29/23 --progression of A.S. from mild to moderate  -recent echo at Dr. Bird's office 10/18/24 -- LVEF 65%, grade 2 diastolic dysfunction, severe aortic stenosis (mean aortic gradient signif worse since 9/2023 -- 31>49 mmHg),  -pt will need TAVR as outpt  -cardiology consult appreciated  -pt given IV lasix 20mg x 1 on 10/25, 10/26, 10/27/24     Hyperlipidemia   Pt with strong family hx of high cholesterol  ; normal TG and HDL  No indication for statin given age     Osteopenia   On Fosamax from 2011 to 4/2016.     DEXA stable 5/10/17.    DEXA in 9/2019 showed slightly more bone loss in spine, but stable in hip.    DEXA 11/2021 -- some improvement in femur.  Hold off on fosamax for now.   DEXA 8/2024 -- osteoporosis of left forearm  -restarted Fosamax 8/2024     Vitamin D deficiency  On vitamin D 4000u/day     VTE proph  -heparin gtt as above                Nadia Steel DO  10/28/2024  8:23 AM

## 2024-10-28 NOTE — PLAN OF CARE
Pt alert and oriented, ambulated within the room, up to the chair and the bathroom. On 6L highflow O2, 10L while ambulating. Bed and chair alarm in place, frequent rounding by staff, call light within reach.   Problem: Patient Centered Care  Goal: Patient preferences are identified and integrated in the patient's plan of care  Description: Interventions:  - What would you like us to know as we care for you? I come from home alone.  - Provide timely, complete, and accurate information to patient/family  - Incorporate patient and family knowledge, values, beliefs, and cultural backgrounds into the planning and delivery of care  - Encourage patient/family to participate in care and decision-making at the level they choose  - Honor patient and family perspectives and choices  Outcome: Progressing     Problem: Patient/Family Goals  Goal: Patient/Family Long Term Goal  Description: Patient's Long Term Goal:    Interventions:  - See additional Care Plan goals for specific interventions  Outcome: Progressing  Goal: Patient/Family Short Term Goal  Description: Patient's Short Term Goal:    Interventions:   - See additional Care Plan goals for specific interventions  Outcome: Progressing     Problem: CARDIOVASCULAR - ADULT  Goal: Maintains optimal cardiac output and hemodynamic stability  Description: INTERVENTIONS:  - Monitor vital signs, rhythm, and trends  - Monitor for bleeding, hypotension and signs of decreased cardiac output  - Evaluate effectiveness of vasoactive medications to optimize hemodynamic stability  - Monitor arterial and/or venous puncture sites for bleeding and/or hematoma  - Assess quality of pulses, skin color and temperature  - Assess for signs of decreased coronary artery perfusion - ex. Angina  - Evaluate fluid balance, assess for edema, trend weights  Outcome: Progressing  Goal: Absence of cardiac arrhythmias or at baseline  Description: INTERVENTIONS:  - Continuous cardiac monitoring, monitor vital  signs, obtain 12 lead EKG if indicated  - Evaluate effectiveness of antiarrhythmic and heart rate control medications as ordered  - Initiate emergency measures for life threatening arrhythmias  - Monitor electrolytes and administer replacement therapy as ordered  Outcome: Progressing     Problem: RESPIRATORY - ADULT  Goal: Achieves optimal ventilation and oxygenation  Description: INTERVENTIONS:  - Assess for changes in respiratory status  - Assess for changes in mentation and behavior  - Position to facilitate oxygenation and minimize respiratory effort  - Oxygen supplementation based on oxygen saturation or ABGs  - Provide Smoking Cessation handout, if applicable  - Encourage broncho-pulmonary hygiene including cough, deep breathe, Incentive Spirometry  - Assess the need for suctioning and perform as needed  - Assess and instruct to report SOB or any respiratory difficulty  - Respiratory Therapy support as indicated  - Manage/alleviate anxiety  - Monitor for signs/symptoms of CO2 retention  Outcome: Progressing     Problem: SAFETY ADULT - FALL  Goal: Free from fall injury  Description: INTERVENTIONS:  - Assess pt frequently for physical needs  - Identify cognitive and physical deficits and behaviors that affect risk of falls.  - [place] fall precautions as indicated by assessment.  - Educate pt/family on patient safety including physical limitations  - Instruct pt to call for assistance with activity based on assessment  - Modify environment to reduce risk of injury  - Provide assistive devices as appropriate  - Consider OT/PT consult to assist with strengthening/mobility  - Encourage toileting schedule  Outcome: Progressing

## 2024-10-28 NOTE — PLAN OF CARE
Received pt in stable condition, Heparin gtt infusing, no complications noted. PTT therapeutic (see MAR), next PTT in the AM. VSS, on 4 L high flow O2, pt has a lot of SOB upon exertion, have to be increased up to 15 L High flow O2 upon ambulation, but overall, showing improvement w/ resting O2 status. Pt on IV Zosyn Q 8 hrs. & IV Solu-medrol Q 6 hrs. Continuous telemetry & pulse oximetry monitoring in place. PRN Mucinex-DM given for cough management. No pain voiced from pt. Frequent rounding. Call light placed within pt.'s reach. Will continue to monitor for any new, acute changes.   Problem: Patient Centered Care  Goal: Patient preferences are identified and integrated in the patient's plan of care  Description: Interventions:  - What would you like us to know as we care for you? I come from home alone.  - Provide timely, complete, and accurate information to patient/family  - Incorporate patient and family knowledge, values, beliefs, and cultural backgrounds into the planning and delivery of care  - Encourage patient/family to participate in care and decision-making at the level they choose  - Honor patient and family perspectives and choices  Outcome: Progressing     Problem: CARDIOVASCULAR - ADULT  Goal: Maintains optimal cardiac output and hemodynamic stability  Description: INTERVENTIONS:  - Monitor vital signs, rhythm, and trends  - Monitor for bleeding, hypotension and signs of decreased cardiac output  - Evaluate effectiveness of vasoactive medications to optimize hemodynamic stability  - Monitor arterial and/or venous puncture sites for bleeding and/or hematoma  - Assess quality of pulses, skin color and temperature  - Assess for signs of decreased coronary artery perfusion - ex. Angina  - Evaluate fluid balance, assess for edema, trend weights  Outcome: Progressing  Goal: Absence of cardiac arrhythmias or at baseline  Description: INTERVENTIONS:  - Continuous cardiac monitoring, monitor vital signs,  obtain 12 lead EKG if indicated  - Evaluate effectiveness of antiarrhythmic and heart rate control medications as ordered  - Initiate emergency measures for life threatening arrhythmias  - Monitor electrolytes and administer replacement therapy as ordered  Outcome: Progressing     Problem: RESPIRATORY - ADULT  Goal: Achieves optimal ventilation and oxygenation  Description: INTERVENTIONS:  - Assess for changes in respiratory status  - Assess for changes in mentation and behavior  - Position to facilitate oxygenation and minimize respiratory effort  - Oxygen supplementation based on oxygen saturation or ABGs  - Provide Smoking Cessation handout, if applicable  - Encourage broncho-pulmonary hygiene including cough, deep breathe, Incentive Spirometry  - Assess the need for suctioning and perform as needed  - Assess and instruct to report SOB or any respiratory difficulty  - Respiratory Therapy support as indicated  - Manage/alleviate anxiety  - Monitor for signs/symptoms of CO2 retention  Outcome: Progressing     Problem: SAFETY ADULT - FALL  Goal: Free from fall injury  Description: INTERVENTIONS:  - Assess pt frequently for physical needs  - Identify cognitive and physical deficits and behaviors that affect risk of falls.  - [place] fall precautions as indicated by assessment.  - Educate pt/family on patient safety including physical limitations  - Instruct pt to call for assistance with activity based on assessment  - Modify environment to reduce risk of injury  - Provide assistive devices as appropriate  - Consider OT/PT consult to assist with strengthening/mobility  - Encourage toileting schedule  Outcome: Progressing

## 2024-10-29 ENCOUNTER — TELEPHONE (OUTPATIENT)
Dept: INTERNAL MEDICINE CLINIC | Facility: CLINIC | Age: 84
End: 2024-10-29

## 2024-10-29 ENCOUNTER — APPOINTMENT (OUTPATIENT)
Dept: GENERAL RADIOLOGY | Facility: HOSPITAL | Age: 84
End: 2024-10-29
Attending: NURSE PRACTITIONER
Payer: MEDICARE

## 2024-10-29 LAB
ALBUMIN SERPL-MCNC: 3.1 G/DL (ref 3.2–4.8)
ALBUMIN/GLOB SERPL: 1.2 {RATIO} (ref 1–2)
ALP LIVER SERPL-CCNC: 81 U/L
ALT SERPL-CCNC: 33 U/L
ANION GAP SERPL CALC-SCNC: 6 MMOL/L (ref 0–18)
ANTI-MPO ANTIBODIES: <0.2 UNITS
ANTI-PR3 ANTIBODIES: <0.2 UNITS
APTT PPP: 84.9 SECONDS (ref 23–36)
AST SERPL-CCNC: 29 U/L (ref ?–34)
BASOPHILS # BLD AUTO: 0.02 X10(3) UL (ref 0–0.2)
BASOPHILS NFR BLD AUTO: 0.1 %
BILIRUB SERPL-MCNC: 0.5 MG/DL (ref 0.2–1.1)
BUN BLD-MCNC: 25 MG/DL (ref 9–23)
BUN/CREAT SERPL: 25 (ref 10–20)
CALCIUM BLD-MCNC: 8.5 MG/DL (ref 8.7–10.4)
CHLORIDE SERPL-SCNC: 107 MMOL/L (ref 98–112)
CO2 SERPL-SCNC: 27 MMOL/L (ref 21–32)
CREAT BLD-MCNC: 1 MG/DL
DEPRECATED RDW RBC AUTO: 42.6 FL (ref 35.1–46.3)
EGFRCR SERPLBLD CKD-EPI 2021: 56 ML/MIN/1.73M2 (ref 60–?)
EOSINOPHIL # BLD AUTO: 0 X10(3) UL (ref 0–0.7)
EOSINOPHIL NFR BLD AUTO: 0 %
ERYTHROCYTE [DISTWIDTH] IN BLOOD BY AUTOMATED COUNT: 12.9 % (ref 11–15)
GLOBULIN PLAS-MCNC: 2.5 G/DL (ref 2–3.5)
GLUCOSE BLD-MCNC: 130 MG/DL (ref 70–99)
HCT VFR BLD AUTO: 30.5 %
HGB BLD-MCNC: 10.1 G/DL
IMM GRANULOCYTES # BLD AUTO: 0.26 X10(3) UL (ref 0–1)
IMM GRANULOCYTES NFR BLD: 1.8 %
LYMPHOCYTES # BLD AUTO: 0.71 X10(3) UL (ref 1–4)
LYMPHOCYTES NFR BLD AUTO: 5 %
MCH RBC QN AUTO: 29.6 PG (ref 26–34)
MCHC RBC AUTO-ENTMCNC: 33.1 G/DL (ref 31–37)
MCV RBC AUTO: 89.4 FL
MONOCYTES # BLD AUTO: 0.65 X10(3) UL (ref 0.1–1)
MONOCYTES NFR BLD AUTO: 4.6 %
NEUTROPHILS # BLD AUTO: 12.45 X10 (3) UL (ref 1.5–7.7)
NEUTROPHILS # BLD AUTO: 12.45 X10(3) UL (ref 1.5–7.7)
NEUTROPHILS NFR BLD AUTO: 88.5 %
OSMOLALITY SERPL CALC.SUM OF ELEC: 296 MOSM/KG (ref 275–295)
PLATELET # BLD AUTO: 458 10(3)UL (ref 150–450)
POTASSIUM SERPL-SCNC: 3.9 MMOL/L (ref 3.5–5.1)
PROT SERPL-MCNC: 5.6 G/DL (ref 5.7–8.2)
RBC # BLD AUTO: 3.41 X10(6)UL
SODIUM SERPL-SCNC: 140 MMOL/L (ref 136–145)
WBC # BLD AUTO: 14.1 X10(3) UL (ref 4–11)

## 2024-10-29 PROCEDURE — 99232 SBSQ HOSP IP/OBS MODERATE 35: CPT | Performed by: INTERNAL MEDICINE

## 2024-10-29 PROCEDURE — 71045 X-RAY EXAM CHEST 1 VIEW: CPT | Performed by: NURSE PRACTITIONER

## 2024-10-29 RX ORDER — POTASSIUM CHLORIDE 750 MG/1
10 TABLET, EXTENDED RELEASE ORAL DAILY
Status: DISCONTINUED | OUTPATIENT
Start: 2024-10-29 | End: 2024-10-29

## 2024-10-29 RX ORDER — POTASSIUM CHLORIDE 750 MG/1
10 TABLET, EXTENDED RELEASE ORAL ONCE
Status: COMPLETED | OUTPATIENT
Start: 2024-10-29 | End: 2024-10-29

## 2024-10-29 RX ORDER — FUROSEMIDE 10 MG/ML
10 INJECTION INTRAMUSCULAR; INTRAVENOUS ONCE
Status: COMPLETED | OUTPATIENT
Start: 2024-10-29 | End: 2024-10-29

## 2024-10-29 RX ORDER — METHYLPREDNISOLONE SODIUM SUCCINATE 40 MG/ML
40 INJECTION INTRAMUSCULAR; INTRAVENOUS EVERY 8 HOURS
Status: DISCONTINUED | OUTPATIENT
Start: 2024-10-29 | End: 2024-10-30

## 2024-10-29 RX ORDER — FUROSEMIDE 10 MG/ML
20 INJECTION INTRAMUSCULAR; INTRAVENOUS ONCE
Status: DISCONTINUED | OUTPATIENT
Start: 2024-10-29 | End: 2024-10-29

## 2024-10-29 NOTE — PROGRESS NOTES
[facility]  part of [facility]     Progress Note    Hoda Busby Patient Status:  Inpatient    1940 MRN [id number]   Location [facility]5W Attending Malathi Stuart MD   Hosp Day # 5 PCP Malathi Stuart MD       Subjective:   Patient seen and examined.  Admits to some gradual improvement in dyspnea.  On 10 L oxygen.  Occasional cough primarily nonproductive in nature    Objective:   Blood pressure 111/56, pulse 91, temperature 97.3 °F (36.3 °C), temperature source Oral, resp. rate 22, weight 178 lb (80.7 kg), SpO2 98%.  Intake/Output:   Last 3 shifts: I/O last 3 completed shifts:  In: 1884.9 [P.O.:1520; I.V.:264.9; IV PIGGYBACK:100]  Out: 2250 [Urine:2250]   This shift: I/O this shift:  In: 698 [P.O.:698]  Out: 400 [Urine:400]     Vent Settings:      Hemodynamic parameters (last 24 hours):      Scheduled Meds:   Current Facility-Administered Medications   Medication Dose Route Frequency    polyethylene glycol (PEG 3350) (Miralax) 17 g oral packet 17 g  17 g Oral Daily    docusate sodium (Colace) cap 100 mg  100 mg Oral BID    atorvastatin (Lipitor) tab 20 mg  20 mg Oral Nightly    heparin (Porcine) 28799 units/250mL infusion PE/DVT/THROMBUS CONTINUOUS  200-3,000 Units/hr Intravenous Continuous    azithromycin (Zithromax) tab 500 mg  500 mg Oral Daily    methylPREDNISolone sodium succinate (Solu-MEDROL) injection 40 mg  40 mg Intravenous Q6H    piperacillin-tazobactam (Zosyn) 3.375 g in dextrose 5% 100 mL IVPB-ADDV  3.375 g Intravenous Q8H    amLODIPine (Norvasc) tab 5 mg  5 mg Oral Daily    aspirin DR tab 81 mg  81 mg Oral Daily    acetaminophen (Tylenol) tab 650 mg  650 mg Oral Q6H PRN    dextromethorphan-guaifenesin ER (Mucinex DM)  MG per 12 hr tab 1 tablet  1 tablet Oral BID PRN    influenza virus trivalent high dose PF (Fluzone HD) 0.5 mL injection (ages >/= 65 years) 0.5 mL  0.5 mL Intramuscular Prior to discharge       Continuous Infusions:    continuous dose  heparin 1,100 Units/hr (10/29/24 1251)       Physical Exam  Constitutional: no acute distress  Eyes: PERRL  ENT: nares pateint  Neck: supple, no JVD  Cardio: RRR, S1 S2  Respiratory: Scattered crackles  GI: abdomen soft, non tender, active bowel sounds, no organomegaly  Extremities: no clubbing, cyanosis, edema  Neurologic: no gross motor deficits  Skin: warm, dry      Results:     Lab Results   Component Value Date    WBC 14.1 10/29/2024    HGB 10.1 10/29/2024    HCT 30.5 10/29/2024    .0 10/29/2024    CREATSERUM 1.00 10/29/2024    BUN 25 10/29/2024     10/29/2024    K 3.9 10/29/2024     10/29/2024    CO2 27.0 10/29/2024     10/29/2024    CA 8.5 10/29/2024    ALB 3.1 10/29/2024    ALKPHO 81 10/29/2024    BILT 0.5 10/29/2024    TP 5.6 10/29/2024    AST 29 10/29/2024    ALT 33 10/29/2024    PTT 84.9 10/29/2024       XR CHEST PA + LAT CHEST (CPT=71046)    Result Date: 10/28/2024  CONCLUSION: Cardiac enlargement with secondary signs of moderate interstitial edema unchanged since previous exam.    Dictated by (CST): Nadeem Brand MD on 10/28/2024 at 8:58 AM     Finalized by (CST): Nadeem Brand MD on 10/28/2024 at 8:59 AM                 Assessment   1.  Acute hypoxemic respiratory failure  2.  Bilateral lung opacities  3.  Acute pulmonary embolism  4.  COPD  5.  Prior nicotine dependence  6.  Hypertension  7.  Severe aortic stenosis     Plan   -Patient presents with evidence of progressive worsening dyspnea with exertion, hypoxia over the course of last week.  Ongoing nonproductive cough present.  Started on antibiotic therapy with doxycycline and Augmentin on 10/17/2024 with no significant improvement clinically.  -Progressive worsening bilateral opacities seen on chest x-ray.  -CT chest on 10/25/2024 with acute segmental pulmonary embolism right upper lobe and left upper lobe.  Multifocal groundglass opacities seen bilaterally.  Pattern less likely due to pulmonary edema differential  includes organizing pneumonia cannot rule out infectious process.  Unlikely drug reaction.  Cannot rule out underlying ILD.  -No significant clinical improvement despite antibiotic therapy.  Antibiotics per ID  -Legionella and strep pneumonia antigen negative  -Currently on 10 L nasal cannula oxygen.  Wean as tolerated.    -Gradually wean steroid therapy.  -Continue anticoagulation with heparin gtt.  -ANCA and connective tissue panel pending  -Fungal serologies pending  -Hold off bronchoscopy at this time especially given high oxygenation requirements.  Discussed with primary care physician.  -Patient is complex and high risk for further deterioration    Cody Holloway, DO  Pulmonary Critical Care Medicine  [facility]

## 2024-10-29 NOTE — PROGRESS NOTES
[facility]  part of [facility]    Progress Note    Hoda Busby Patient Status:  Inpatient    1940 MRN [id number]   Location [facility]5W Attending Malathi Stuart MD   Hosp Day # 5 PCP Malathi Stuart MD       SUBJECTIVE:  Feels okay at rest, but SOB with walking across room    OBJECTIVE:  Vital signs in last 24 hours:  /80 (BP Location: Right arm)   Pulse 73   Temp 98.1 °F (36.7 °C) (Oral)   Resp 24   Wt 178 lb (80.7 kg)   SpO2 92%   BMI 27.06 kg/m²     Intake/Output:    Intake/Output Summary (Last 24 hours) at 10/29/2024 0850  Last data filed at 10/29/2024 0805  Gross per 24 hour   Intake 840 ml   Output 1650 ml   Net -810 ml       Wt Readings from Last 3 Encounters:   10/24/24 178 lb (80.7 kg)   10/24/24 178 lb (80.7 kg)   24 183 lb 3.2 oz (83.1 kg)       EXAM:  GENERAL: well developed, well nourished, in no apparent distress  LUNGS: clear to auscultation, no crackles or wheezing  CARDIO: RRR, normal S1S2, 2/6 WADE throughout precordium, loudest at RUSB  EXTREMITIES: no cyanosis, clubbing or edema    Data Review:     Labs:   Lab Results   Component Value Date    WBC 14.1 10/29/2024    HGB 10.1 10/29/2024    HCT 30.5 10/29/2024    .0 10/29/2024    CREATSERUM 1.00 10/29/2024    BUN 25 10/29/2024     10/29/2024    K 3.9 10/29/2024     10/29/2024    CO2 27.0 10/29/2024     10/29/2024    CA 8.5 10/29/2024    ALB 3.1 10/29/2024    ALKPHO 81 10/29/2024    BILT 0.5 10/29/2024    TP 5.6 10/29/2024    AST 29 10/29/2024    ALT 33 10/29/2024    PTT 84.9 10/29/2024         Imaging:  XR CHEST PA + LAT CHEST (CPT=71046)    Result Date: 10/28/2024  CONCLUSION: Cardiac enlargement with secondary signs of moderate interstitial edema unchanged since previous exam.    Dictated by (CST): Nadeem Brand MD on 10/28/2024 at 8:58 AM     Finalized by (CST): Nadeem Brand MD on 10/28/2024 at 8:59 AM          CARD ECHO 2D DOPPLER  (CPT=93306)    Result Date: 10/25/2024  Transthoracic Echocardiogram Name:Hoda Busyb Date:    10/25/2024    :     1940    Ht:     (68in)     BP: MRN:     [id number]       Age:     84years       Wt:     (178lb)    HR: Loc:     [facility]          Gndr:    F             BSA:    1.95m^2 Sonographer: Juanita LIRA Ordering:    Cody Holloway Consulting:  Seth Garcia ---------------------------------------------------------------------------- Procedure information:  A transthoracic echocardiogram, limited study was performed. Additional evaluation included M-mode and limited 2D.  Image quality was adequate. ---------------------------------------------------------------------------- Conclusions: 1. Left ventricle: The cavity size was normal. Wall thickness was mildly    increased. Systolic function was normal. The estimated ejection fraction    was 55-60%, by biplane method of disks. Wall motion is normal; there are    no regional wall motion abnormalities. Unable to assess LV diastolic    function. 2. Right ventricle: The cavity size was increased. Systolic function was    normal. 3. Left atrium: The atrium was dilated. 4. Right atrium: The atrium was dilated. 5. Mitral valve: The annulus was mildly to moderately calcified. 6. Pulmonary arteries: The peak systolic pressure is 57mm Hg. Impressions:  The right ventricular systolic pressure was increased consistent with moderate pulmonary hypertension. No previous study was available for comparison. * ---------------------------------------------------------------------------- * Findings: Left ventricle:  The cavity size was normal. Wall thickness was mildly increased. Systolic function was normal. The estimated ejection fraction was 55-60%, by biplane method of disks. Wall motion is normal; there are no regional wall motion abnormalities. Unable to assess LV diastolic function. Left atrium:  Well visualized. The atrium was dilated. Right ventricle:  The cavity  size was increased. Systolic function was normal. Right atrium:  Well visualized. The atrium was dilated. Mitral valve:  Well visualized. The annulus was mildly to moderately calcified. Aortic valve:  Well visualized. The leaflets were moderately calcified. Tricuspid valve:  Well visualized. The annulus is normal-sized. The leaflets are normal thickness. No echocardiographic evidence for tricuspid prolapse. Doppler:  Transvalvular velocity was within the normal range. There was no evidence for stenosis. There was mild regurgitation. Pulmonic valve:   Well visualized. Pericardium:  A trivial pericardial effusion was identified. There was no evidence of hemodynamic compromise. Pleura:  No evidence of pleural fluid accumulation. Pulmonary arteries: Moderate pulmonary hypertension was present. Systemic veins:  Central venous respirophasic diameter changes are in the normal range (>50%). Inferior vena cava: The IVC was dilated. ---------------------------------------------------------------------------- Measurements  Left ventricle                    Value        Ref  IVS thickness, ED, PLAX       (H) 1.3   cm     0.6 - 0.9  LV ID, ED, PLAX                   4.7   cm     3.8 - 5.2  LV ID, ES, PLAX                   3.1   cm     2.2 - 3.5  LV PW thickness, ED, PLAX     (H) 1.2   cm     0.6 - 0.9  IVS/LV PW ratio, ED, PLAX         1.08         ---------  LV PW/LV ID ratio, ED, PLAX       0.26         ---------  LV ejection fraction              63    %      54 - 74  Stroke volume/bsa, 2D             52    ml/m^2 ---------  LV end-diastolic volume, 1-p      58    ml     48 - 140  A4C  LV ejection fraction, 1-p A4C     59    %      46 - 78  Stroke volume, 1-p A4C            34    ml     ---------  LV end-diastolic volume/bsa,      30    ml/m^2 30 - 82  1-p A4C  Stroke volume/bsa, 1-p A4C        18    ml/m^2 ---------  LV end-diastolic volume, 2-p      61    ml     46 - 106  LV end-systolic volume, 2-p       24    ml     14 -  42  LV ejection fraction, 2-p         60    %      54 - 74  Stroke volume, 2-p                37    ml     ---------  LV end-diastolic volume/bsa,      31    ml/m^2 29 - 61  2-p  LV end-systolic volume/bsa,       12    ml/m^2 8 - 24  2-p  Stroke volume/bsa, 2-p            18.8  ml/m^2 ---------  LVOT                              Value        Ref  LVOT ID                           2     cm     ---------  LVOT peak velocity, S             1.5   m/sec  ---------  LVOT VTI, S                       32.2  cm     ---------  LVOT peak gradient, S             9     mm Hg  ---------  LVOT mean gradient, S             5     mm Hg  ---------  Stroke volume (SV), LVOT DP       101   ml     ---------  Stroke index (SV/bsa), LVOT       52    ml/m^2 ---------  DP  Aortic valve                      Value        Ref  Aortic leaflet separation, MM     1.0   cm     ---------  Pulmonary artery                  Value        Ref  PA pressure, S, DP                57    mm Hg  ---------  Tricuspid valve                   Value        Ref  Tricuspid regurg peak         (H) 3.49  m/sec  <=2.8  velocity  Tricuspid peak RV-RA gradient     49    mm Hg  ---------  Systemic veins                    Value        Ref  Estimated CVP                     8     mm Hg  ---------  Inferior vena cava                Value        Ref  ID                            (H) 2.3   cm     <=2.1  Right ventricle                   Value        Ref  TAPSE, 2D                         3.25  cm     >=1.70  TAPSE, MM                         2.05  cm     >=1.70  RV pressure, S, DP                57    mm Hg  ---------  RV s', lateral                    21.8  cm/sec >=9.5 Legend: (L)  and  (H)  migel values outside specified reference range. ---------------------------------------------------------------------------- Prepared and electronically signed by Manjinder Chavis 10/25/2024 17:20          Meds:   Current Facility-Administered Medications   Medication Dose Route  Frequency    polyethylene glycol (PEG 3350) (Miralax) 17 g oral packet 17 g  17 g Oral Daily    docusate sodium (Colace) cap 100 mg  100 mg Oral BID    atorvastatin (Lipitor) tab 20 mg  20 mg Oral Nightly    heparin (Porcine) 30129 units/250mL infusion PE/DVT/THROMBUS CONTINUOUS  200-3,000 Units/hr Intravenous Continuous    azithromycin (Zithromax) tab 500 mg  500 mg Oral Daily    methylPREDNISolone sodium succinate (Solu-MEDROL) injection 40 mg  40 mg Intravenous Q6H    piperacillin-tazobactam (Zosyn) 3.375 g in dextrose 5% 100 mL IVPB-ADDV  3.375 g Intravenous Q8H    amLODIPine (Norvasc) tab 5 mg  5 mg Oral Daily    aspirin DR tab 81 mg  81 mg Oral Daily    acetaminophen (Tylenol) tab 650 mg  650 mg Oral Q6H PRN    dextromethorphan-guaifenesin ER (Mucinex DM)  MG per 12 hr tab 1 tablet  1 tablet Oral BID PRN    influenza virus trivalent high dose PF (Fluzone HD) 0.5 mL injection (ages >/= 65 years) 0.5 mL  0.5 mL Intramuscular Prior to discharge       Assessment & Plan    Acute hypoxic respiratory failure  Bilateral pneumonia/pneumonitis  -failed outpt treatment with amox and doxycycline x 7d  -repeat CXR 10/24 revealed extensive bilateral perihilar and bilateral upper and lower lobe airspace opacities, progressed since 10/17.    -Covid negative 10/17 and 10/24/24  -S.pneumo, legionella negative  -Mycoplasma IgM/IgG negative  -Echo 10/24/24 - normal EF (55-60%), no obvious vegetatons  -CT with bilateral ground glass opacities, small consolidations of BLL  - ID following; on empiric IV zosyn day #6 and azithro day #5 -- though lack of response to outpatient abx and degree of hypoxia is unusual for typical bacterial pneumonia  -per pulm, findings suspicious for NSIP (vs cryptogenic organizing pneumonia vs hypersensitivity pneumonitis); NOT thought to be c/w UIP pattern  -started empiric IV solumedrol 10/25/24- on 40mg IV q6hrs  -ddx: vasculitis (ANCA and CTD panel pending) or interstitial pneumonitis as above  (ESR elevated 49 though was likely higher on admission, as pt had already received 3 doses of solumedrol)  -anti-histone and anti-[place] Ab's pending  -fungal serologies pending  -pt denies recent travel, exposure to birds/pets, arthralgias (other than her mild hand OA), fevers  -d/w pulm Dr. Sanchez -- if above testing unrevealing, may need bronch/bx  -wean O2 as tolerated - still with significant O2 requirements -- 6-9 L HFNC   -lungs clear on exam today     Bilateral pulmonary emboli  -CT chest 10/25/24 -- acute distal segmental/subsegmental pulmonary emboli in [place] and subsegmental PE in [place]  -unclear etiology; no recent hx of long travel; no family/personal hx of blood clots  -BLE venous dopplers negative  -unclear etiology of PE; will do hypercoag w/u as outpt.   Consider malignancy w/u if no other cause found (colonoscopy 1/2021 revealed adenomatous polyp but poor prep; pt declined repeat colonoscopy; Medicare would not cover Cologuard); UTD on mammo  -on heparin gtt - cont for now -- will transition to DOAC once above w/u complete (may need bronch as above)      Anemia  -Hgb stable in the 10's-11's  -likely 2/2 acute illness  -check iron studies     Hypertension, primary  -(dyazide stopped due to NANCY)  -on amlodipine 5mg/day     Hx of hip OA  -s/p left THR (Dr. Lo) many years ago  -s/p elective R BEATRIZ on 10/5/23 by Dr. Diaz     Hx of aortic stenosis  -seeing cardiology Dr. Bird  -moderate A.S on echo in 10/2022 (prev mild A.S in 2020)  -abnormal pre-op EKG 9/2023 --Nuc GXT done 9/20/23 -- EKG portion revealed 1-1.5mm ST seg depression in inferolateral leads with prolonged recovery; nuclear portion with normal perfusion   -Echo 9/29/23 --progression of A.S. from mild to moderate  -recent echo at Dr. Bird's office 10/18/24 -- LVEF 65%, grade 2 diastolic dysfunction, severe aortic stenosis (mean aortic gradient signif worse since 9/2023 -- 31>49 mmHg),  -pt will need TAVR evaluation as outpt  -cardiology  consult appreciated  -pt given IV lasix 20mg/day from 10/25-10/28     Hyperlipidemia   Pt with strong family hx of high cholesterol  ; normal TG and HDL  No indication for statin given age     Osteopenia   On Fosamax from 2011 to 4/2016.     DEXA stable 5/10/17.    DEXA in 9/2019 showed slightly more bone loss in spine, but stable in hip.    DEXA 11/2021 -- some improvement in femur.  Hold off on fosamax for now.   DEXA 8/2024 -- osteoporosis of left forearm  -restarted Fosamax 8/2024     Vitamin D deficiency  On vitamin D 4000u/day     VTE proph  -heparin gtt as above                            Malathi Stuart MD  10/29/2024  8:50 AM

## 2024-10-29 NOTE — PROGRESS NOTES
[facility] Cardiology Progress Note    Hoda Busby Patient Status:  Inpatient    1940 MRN [id number]   Location [facility]5W Attending Malathi Stuart MD   Hosp Day # 5 PCP Malathi Stuart MD     Subjective:  Patient remains short of breath increasing oxygen requirements to 10L nc while walking  O2 prev at 6L nc       Objective:  /80 (BP Location: Right arm)   Pulse 73   Temp 98.1 °F (36.7 °C) (Oral)   Resp 24   Wt 178 lb (80.7 kg)   SpO2 92%   BMI 27.06 kg/m²     Telemetry: SR rate 70-80's       Intake/Output:    Intake/Output Summary (Last 24 hours) at 10/29/2024 0853  Last data filed at 10/29/2024 0805  Gross per 24 hour   Intake 840 ml   Output 1650 ml   Net -810 ml       Last 3 Weights   10/24/24 1651 178 lb (80.7 kg)   10/24/24 1504 178 lb (80.7 kg)   24 1336 183 lb 3.2 oz (83.1 kg)       Labs:  Recent Labs   Lab 10/28/24  0452 10/28/24  1620 10/29/24  0512   * 133* 130*   BUN 26* 24* 25*   CREATSERUM 1.01 1.02 1.00   EGFRCR 55* 54* 56*   CA 8.9 9.3 8.5*    140 140   K 3.7 3.6 3.9    107 107   CO2 24.0 24.0 27.0     Recent Labs   Lab 10/27/24  0535 10/28/24  0452 10/28/24  1620 10/29/24  0512   RBC 3.40* 3.61* 3.86 3.41*   HGB 10.2* 11.0* 11.6* 10.1*   HCT 29.7* 32.9* 34.3* 30.5*   MCV 87.4 91.1 88.9 89.4   MCH 30.0 30.5 30.1 29.6   MCHC 34.3 33.4 33.8 33.1   RDW 12.9 12.7 12.9 12.9   NEPRELIM 17.25* 17.95*  --  12.45*   WBC 19.3* 20.0* 20.0* 14.1*   .0*  532.0* 578.0* 610.0* 458.0*         No results for input(s): \"TROP\", \"TROPHS\", \"CK\" in the last 168 hours.    Diagnostics:       10/25/24  CT chest PE aorta  CONCLUSION:      1. Acute distal segmental/subsegmental pulmonary embolism involving the right upper lobe as well as a subsegmental pulmonary embolism in the left upper lobe.   2. Multifocal ground-glass opacities involving the right greater than left lungs with some areas of perilobular sparing as well as small consolidations  involving the bilateral lower lobes.  Differential diagnosis includes multifocal pneumonia, organizing    pneumonia, or less likely other etiologies.   3. Small right and trace left pleural effusions.   4. Mild centrilobular emphysema.   5. Mild mediastinal and right hilar lymphadenopathy, which is favored to be reactive.   6. Biatrial predominant cardiomegaly with triple-vessel coronary artery calcifications, mitral annular calcifications, and aortic valve leaflet calcifications.   7. Lesser incidental findings described above.         The findings of acute pulmonary embolism as well as impression points 2 and 3 were communicated via secure epic chat to Dr. Holloway  at 2:24 p.m. on 10/25/2024 by Basim West MD      10/28/24 CxR  Impression   CONCLUSION: Cardiac enlargement with secondary signs of moderate interstitial edema unchanged since previous exam.             Physical Exam    /64 (BP Location: Left arm)   Pulse 80   Temp 97.3 °F (36.3 °C) (Oral)   Resp 22   Wt 178 lb (80.7 kg)   SpO2 100%   BMI 27.06 kg/m²   Temp (24hrs), Av.8 °F (36.6 °C), Min:97.3 °F (36.3 °C), Max:98.1 °F (36.7 °C)           General: Alert and oriented in no apparent distress laying in bed w/hob elevated  HEENT: No focal deficits.  Neck: No JVD, carotids 2+ bilat bruit versus transmission of AV murmur  Cardiac: Regular rate and rhythm, S1, S2 normal, 4/6 primitivo rsb 3/6 primitivo lsb no rub or gallop.  Lungs: Clear without wheezes, rales, rhonchi or dullness.  Normal excursions and effort.  Abdomen: Soft, non-tender.   Extremities: Bilat LE edema trace to 1+ bilat Without clubbing, cyanosis .  Peripheral pulses are 1-2+.  Neurologic: Alert and oriented, normal affect.  Skin: Warm and dry.           Intake/Output Summary (Last 24 hours) at 10/29/2024 0853  Last data filed at 10/29/2024 0805  Gross per 24 hour   Intake 840 ml   Output 1650 ml   Net -810 ml     Wt Readings from Last 3 Encounters:   10/24/24 178 lb (80.7 kg)    10/24/24 178 lb (80.7 kg)   07/23/24 183 lb 3.2 oz (83.1 kg)          Medications:   polyethylene glycol (PEG 3350)  17 g Oral Daily    docusate sodium  100 mg Oral BID    atorvastatin  20 mg Oral Nightly    azithromycin  500 mg Oral Daily    methylPREDNISolone  40 mg Intravenous Q6H    piperacillin-tazobactam  3.375 g Intravenous Q8H    amLODIPine  5 mg Oral Daily    aspirin  81 mg Oral Daily      continuous dose heparin 1,100 Units/hr (10/28/24 1211)         Assessment:    Acute bilateral PE   Continue heparin gtt for PE  Ok to transition to DOAC pending pulmonary  Pulmonary plan for poss bronchoscopy  DVT - negative per venous doppler  Multifocal pneumonia - pw worsening cough and dyspnea  Oxygen sats % with increasing oxygen requirements  Elevated pap per echo 57 est  Poss bronchoscopy per pulm  Severe aortic stenosis  Echo EF 55-60% RV cavity increastd w/normal systolic function mod pulm HTN est 57  + since admit for I/O + 1 liter since admit   O2 with increasing oxygen requirements  Give lasix 20 mg IV and K 10 meq   Monitor lytes  Strict I/O and daily weights  2 gm sodium and 2 L fluid restriction    Plan:    Give lasix 20 mg IV and K 10 meq   Monitor lytes  Electrolytye replacement per protocol   Strict I/O and daily weights  2 gm sodium and 2 L fluid restriction  Anbx and steroids per pulmonary and ID  Continue heparin   TAVR work up as OP for severe Aortic stenosis     Sima Lackey NP  10/29/2024  8:53 AM  Ph [phone] ([place])  Ph [phone] ([place])

## 2024-10-29 NOTE — PLAN OF CARE
Pt alert and oriented, ambulated within the room, up to the chair and in the bathroom. On 6L of O2 and 10L with ambulation. Heparin gtt continued. Pt updated on plan of care, call light within reach.  Problem: Patient Centered Care  Goal: Patient preferences are identified and integrated in the patient's plan of care  Description: Interventions:  - What would you like us to know as we care for you? I come from home alone.  - Provide timely, complete, and accurate information to patient/family  - Incorporate patient and family knowledge, values, beliefs, and cultural backgrounds into the planning and delivery of care  - Encourage patient/family to participate in care and decision-making at the level they choose  - Honor patient and family perspectives and choices  Outcome: Progressing     Problem: Patient/Family Goals  Goal: Patient/Family Long Term Goal  Description: Patient's Long Term Goal:    Interventions:  - See additional Care Plan goals for specific interventions  Outcome: Progressing  Goal: Patient/Family Short Term Goal  Description: Patient's Short Term Goal:    Interventions:   - See additional Care Plan goals for specific interventions  Outcome: Progressing     Problem: CARDIOVASCULAR - ADULT  Goal: Maintains optimal cardiac output and hemodynamic stability  Description: INTERVENTIONS:  - Monitor vital signs, rhythm, and trends  - Monitor for bleeding, hypotension and signs of decreased cardiac output  - Evaluate effectiveness of vasoactive medications to optimize hemodynamic stability  - Monitor arterial and/or venous puncture sites for bleeding and/or hematoma  - Assess quality of pulses, skin color and temperature  - Assess for signs of decreased coronary artery perfusion - ex. Angina  - Evaluate fluid balance, assess for edema, trend weights  Outcome: Progressing  Goal: Absence of cardiac arrhythmias or at baseline  Description: INTERVENTIONS:  - Continuous cardiac monitoring, monitor vital signs,  obtain 12 lead EKG if indicated  - Evaluate effectiveness of antiarrhythmic and heart rate control medications as ordered  - Initiate emergency measures for life threatening arrhythmias  - Monitor electrolytes and administer replacement therapy as ordered  Outcome: Progressing     Problem: RESPIRATORY - ADULT  Goal: Achieves optimal ventilation and oxygenation  Description: INTERVENTIONS:  - Assess for changes in respiratory status  - Assess for changes in mentation and behavior  - Position to facilitate oxygenation and minimize respiratory effort  - Oxygen supplementation based on oxygen saturation or ABGs  - Provide Smoking Cessation handout, if applicable  - Encourage broncho-pulmonary hygiene including cough, deep breathe, Incentive Spirometry  - Assess the need for suctioning and perform as needed  - Assess and instruct to report SOB or any respiratory difficulty  - Respiratory Therapy support as indicated  - Manage/alleviate anxiety  - Monitor for signs/symptoms of CO2 retention  Outcome: Progressing     Problem: SAFETY ADULT - FALL  Goal: Free from fall injury  Description: INTERVENTIONS:  - Assess pt frequently for physical needs  - Identify cognitive and physical deficits and behaviors that affect risk of falls.  - [place] fall precautions as indicated by assessment.  - Educate pt/family on patient safety including physical limitations  - Instruct pt to call for assistance with activity based on assessment  - Modify environment to reduce risk of injury  - Provide assistive devices as appropriate  - Consider OT/PT consult to assist with strengthening/mobility  - Encourage toileting schedule  Outcome: Progressing

## 2024-10-29 NOTE — TELEPHONE ENCOUNTER
Vonda requests call back to discuss her aunt who's been in the hospital for the past week     [phone]

## 2024-10-29 NOTE — PLAN OF CARE
Problem: Patient Centered Care  Goal: Patient preferences are identified and integrated in the patient's plan of care  Description: Interventions:  - What would you like us to know as we care for you? I come from home alone.  - Provide timely, complete, and accurate information to patient/family  - Incorporate patient and family knowledge, values, beliefs, and cultural backgrounds into the planning and delivery of care  - Encourage patient/family to participate in care and decision-making at the level they choose  - Honor patient and family perspectives and choices  Outcome: Progressing     Problem: Patient/Family Goals  Goal: Patient/Family Long Term Goal  Description: Patient's Long Term Goal:     Interventions:  -   - See additional Care Plan goals for specific interventions  Outcome: Progressing  Goal: Patient/Family Short Term Goal  Description: Patient's Short Term Goal:     Interventions:   -  - See additional Care Plan goals for specific interventions  Outcome: Progressing     Problem: CARDIOVASCULAR - ADULT  Goal: Maintains optimal cardiac output and hemodynamic stability  Description: INTERVENTIONS:  - Monitor vital signs, rhythm, and trends  - Monitor for bleeding, hypotension and signs of decreased cardiac output  - Evaluate effectiveness of vasoactive medications to optimize hemodynamic stability  - Monitor arterial and/or venous puncture sites for bleeding and/or hematoma  - Assess quality of pulses, skin color and temperature  - Assess for signs of decreased coronary artery perfusion - ex. Angina  - Evaluate fluid balance, assess for edema, trend weights  Outcome: Progressing  Goal: Absence of cardiac arrhythmias or at baseline  Description: INTERVENTIONS:  - Continuous cardiac monitoring, monitor vital signs, obtain 12 lead EKG if indicated  - Evaluate effectiveness of antiarrhythmic and heart rate control medications as ordered  - Initiate emergency measures for life threatening arrhythmias  -  Monitor electrolytes and administer replacement therapy as ordered  Outcome: Progressing     Problem: RESPIRATORY - ADULT  Goal: Achieves optimal ventilation and oxygenation  Description: INTERVENTIONS:  - Assess for changes in respiratory status  - Assess for changes in mentation and behavior  - Position to facilitate oxygenation and minimize respiratory effort  - Oxygen supplementation based on oxygen saturation or ABGs  - Provide Smoking Cessation handout, if applicable  - Encourage broncho-pulmonary hygiene including cough, deep breathe, Incentive Spirometry  - Assess the need for suctioning and perform as needed  - Assess and instruct to report SOB or any respiratory difficulty  - Respiratory Therapy support as indicated  - Manage/alleviate anxiety  - Monitor for signs/symptoms of CO2 retention  Outcome: Progressing     Problem: SAFETY ADULT - FALL  Goal: Free from fall injury  Description: INTERVENTIONS:  - Assess pt frequently for physical needs  - Identify cognitive and physical deficits and behaviors that affect risk of falls.  - [place] fall precautions as indicated by assessment.  - Educate pt/family on patient safety including physical limitations  - Instruct pt to call for assistance with activity based on assessment  - Modify environment to reduce risk of injury  - Provide assistive devices as appropriate  - Consider OT/PT consult to assist with strengthening/mobility  - Encourage toileting schedule  Outcome: Progressing

## 2024-10-29 NOTE — PLAN OF CARE
Pt alert and oriented, ambulated within the room, up to the chair and in the bathroom. Heparin gtt continued. Frequent rounding by staff, updated on plan of care. Call light within reach.   Problem: Patient Centered Care  Goal: Patient preferences are identified and integrated in the patient's plan of care  Description: Interventions:  - What would you like us to know as we care for you? I come from home alone.  - Provide timely, complete, and accurate information to patient/family  - Incorporate patient and family knowledge, values, beliefs, and cultural backgrounds into the planning and delivery of care  - Encourage patient/family to participate in care and decision-making at the level they choose  - Honor patient and family perspectives and choices  10/29/2024 1552 by Jackie Brewer RN  Outcome: Progressing  10/29/2024 1410 by Jackie Brewer RN  Outcome: Progressing     Problem: Patient/Family Goals  Goal: Patient/Family Long Term Goal  Description: Patient's Long Term Goal:    Interventions:  - See additional Care Plan goals for specific interventions  10/29/2024 1552 by Jackie Brewer RN  Outcome: Progressing  10/29/2024 1410 by Jackie Brewer RN  Outcome: Progressing  Goal: Patient/Family Short Term Goal  Description: Patient's Short Term Goal:    Interventions:   - See additional Care Plan goals for specific interventions  10/29/2024 1552 by Jackie Brewer RN  Outcome: Progressing  10/29/2024 1410 by Jackie Brewer RN  Outcome: Progressing     Problem: CARDIOVASCULAR - ADULT  Goal: Maintains optimal cardiac output and hemodynamic stability  Description: INTERVENTIONS:  - Monitor vital signs, rhythm, and trends  - Monitor for bleeding, hypotension and signs of decreased cardiac output  - Evaluate effectiveness of vasoactive medications to optimize hemodynamic stability  - Monitor arterial and/or venous puncture sites for bleeding and/or hematoma  - Assess quality of pulses, skin color and temperature  -  Assess for signs of decreased coronary artery perfusion - ex. Angina  - Evaluate fluid balance, assess for edema, trend weights  10/29/2024 1552 by Jackie Brewer RN  Outcome: Progressing  10/29/2024 1410 by Jackie Brewer RN  Outcome: Progressing  Goal: Absence of cardiac arrhythmias or at baseline  Description: INTERVENTIONS:  - Continuous cardiac monitoring, monitor vital signs, obtain 12 lead EKG if indicated  - Evaluate effectiveness of antiarrhythmic and heart rate control medications as ordered  - Initiate emergency measures for life threatening arrhythmias  - Monitor electrolytes and administer replacement therapy as ordered  10/29/2024 1552 by Jackie Brewer RN  Outcome: Progressing  10/29/2024 1410 by Jackie Brewer RN  Outcome: Progressing     Problem: RESPIRATORY - ADULT  Goal: Achieves optimal ventilation and oxygenation  Description: INTERVENTIONS:  - Assess for changes in respiratory status  - Assess for changes in mentation and behavior  - Position to facilitate oxygenation and minimize respiratory effort  - Oxygen supplementation based on oxygen saturation or ABGs  - Provide Smoking Cessation handout, if applicable  - Encourage broncho-pulmonary hygiene including cough, deep breathe, Incentive Spirometry  - Assess the need for suctioning and perform as needed  - Assess and instruct to report SOB or any respiratory difficulty  - Respiratory Therapy support as indicated  - Manage/alleviate anxiety  - Monitor for signs/symptoms of CO2 retention  10/29/2024 1552 by Jackie Brewer RN  Outcome: Progressing  10/29/2024 1410 by Jackie Brewer RN  Outcome: Progressing     Problem: SAFETY ADULT - FALL  Goal: Free from fall injury  Description: INTERVENTIONS:  - Assess pt frequently for physical needs  - Identify cognitive and physical deficits and behaviors that affect risk of falls.  - [place] fall precautions as indicated by assessment.  - Educate pt/family on patient safety including physical  limitations  - Instruct pt to call for assistance with activity based on assessment  - Modify environment to reduce risk of injury  - Provide assistive devices as appropriate  - Consider OT/PT consult to assist with strengthening/mobility  - Encourage toileting schedule  10/29/2024 1552 by Jackie Brewer, RN  Outcome: Progressing  10/29/2024 1410 by Jackie Brewer, RN  Outcome: Progressing

## 2024-10-30 LAB
ALBUMIN SERPL-MCNC: 3.2 G/DL (ref 3.2–4.8)
ALBUMIN/GLOB SERPL: 1.4 {RATIO} (ref 1–2)
ALP LIVER SERPL-CCNC: 74 U/L
ALT SERPL-CCNC: 39 U/L
ANION GAP SERPL CALC-SCNC: 5 MMOL/L (ref 0–18)
ANTIHISTONE ABS: 0.6 UNITS
APTT PPP: 84.2 SECONDS (ref 23–36)
AST SERPL-CCNC: 28 U/L (ref ?–34)
BASOPHILS # BLD AUTO: 0.05 X10(3) UL (ref 0–0.2)
BASOPHILS NFR BLD AUTO: 0.3 %
BILIRUB SERPL-MCNC: 0.5 MG/DL (ref 0.2–1.1)
BUN BLD-MCNC: 27 MG/DL (ref 9–23)
BUN/CREAT SERPL: 27.6 (ref 10–20)
CALCIUM BLD-MCNC: 8.6 MG/DL (ref 8.7–10.4)
CHLORIDE SERPL-SCNC: 106 MMOL/L (ref 98–112)
CO2 SERPL-SCNC: 28 MMOL/L (ref 21–32)
CREAT BLD-MCNC: 0.98 MG/DL
DEPRECATED RDW RBC AUTO: 41.9 FL (ref 35.1–46.3)
DSDNA IGG SERPL IA-ACNC: 1.1 IU/ML
EGFRCR SERPLBLD CKD-EPI 2021: 57 ML/MIN/1.73M2 (ref 60–?)
ENA AB SER QL IA: 0.2 UG/L
ENA AB SER QL IA: NEGATIVE
EOSINOPHIL # BLD AUTO: 0.01 X10(3) UL (ref 0–0.7)
EOSINOPHIL NFR BLD AUTO: 0.1 %
ERYTHROCYTE [DISTWIDTH] IN BLOOD BY AUTOMATED COUNT: 12.8 % (ref 11–15)
FUNGITELL RESULT: NEGATIVE
FUNGITELL RESULT: NEGATIVE
FUNGITELL VALUE: <31.25 PG/ML
FUNGITELL VALUE: <31.25 PG/ML
GLOBULIN PLAS-MCNC: 2.3 G/DL (ref 2–3.5)
GLUCOSE BLD-MCNC: 129 MG/DL (ref 70–99)
HCT VFR BLD AUTO: 31.2 %
HGB BLD-MCNC: 10.5 G/DL
IMM GRANULOCYTES # BLD AUTO: 0.44 X10(3) UL (ref 0–1)
IMM GRANULOCYTES NFR BLD: 2.7 %
LYMPHOCYTES # BLD AUTO: 0.95 X10(3) UL (ref 1–4)
LYMPHOCYTES NFR BLD AUTO: 5.7 %
MCH RBC QN AUTO: 30 PG (ref 26–34)
MCHC RBC AUTO-ENTMCNC: 33.7 G/DL (ref 31–37)
MCV RBC AUTO: 89.1 FL
MONOCYTES # BLD AUTO: 0.84 X10(3) UL (ref 0.1–1)
MONOCYTES NFR BLD AUTO: 5.1 %
NEUTROPHILS # BLD AUTO: 14.27 X10 (3) UL (ref 1.5–7.7)
NEUTROPHILS # BLD AUTO: 14.27 X10(3) UL (ref 1.5–7.7)
NEUTROPHILS NFR BLD AUTO: 86.1 %
OSMOLALITY SERPL CALC.SUM OF ELEC: 295 MOSM/KG (ref 275–295)
PLATELET # BLD AUTO: 459 10(3)UL (ref 150–450)
POTASSIUM SERPL-SCNC: 4.5 MMOL/L (ref 3.5–5.1)
PROT SERPL-MCNC: 5.5 G/DL (ref 5.7–8.2)
RBC # BLD AUTO: 3.5 X10(6)UL
SODIUM SERPL-SCNC: 139 MMOL/L (ref 136–145)
WBC # BLD AUTO: 16.6 X10(3) UL (ref 4–11)

## 2024-10-30 PROCEDURE — 99232 SBSQ HOSP IP/OBS MODERATE 35: CPT | Performed by: INTERNAL MEDICINE

## 2024-10-30 PROCEDURE — 99233 SBSQ HOSP IP/OBS HIGH 50: CPT | Performed by: INTERNAL MEDICINE

## 2024-10-30 RX ORDER — METHYLPREDNISOLONE SODIUM SUCCINATE 40 MG/ML
40 INJECTION INTRAMUSCULAR; INTRAVENOUS EVERY 12 HOURS
Status: DISCONTINUED | OUTPATIENT
Start: 2024-10-30 | End: 2024-11-01

## 2024-10-30 RX ORDER — FUROSEMIDE 20 MG/1
20 TABLET ORAL DAILY
Status: DISCONTINUED | OUTPATIENT
Start: 2024-10-30 | End: 2024-11-01

## 2024-10-30 NOTE — CM/SW NOTE
CM requested CHIARA Guevara perform and document walk test to evaluate patient need for home oxygen.    The following verbiage will need to be included in physician progress note after documentation of walk test: I had a face to face visit with this patient and I evaluated the patient's O2 saturations.  The patient is mobile in their home and is in need of a portable oxygen tank.  The home oxygen will improve the patient's condition.      CM/SW will enter order for home oxygen requiring physician co-sign, pending walk test results.    CM sent HME referral via [employer]. Walk test, verbiage, and signed order will be uploaded to referral once received.    CM req FLAVIA Apodaca submit home healthcare referrals via [employer].  Orders (RN and PT) and face to face entered.  Patient may benefit from additional monitoring at home upon discharge.  CM will confirm patient approval and choice [facility] prior to discharge.    / to remain available for support and/or discharge planning.     Plan: Home with Home Healthcare (pending list/choice agency) and HME home oxygen (pending walk test, verbiage, signed order) - once medically cleared    Betty Sharma RN, BSN  Nurse   [phone]

## 2024-10-30 NOTE — PROGRESS NOTES
Oxygen Walk results:   10/30/24 1250   Mobility   O2 walk? Yes   SPO2% on Room Air at Rest   (n/a)   SPO2% on Oxygen at Rest 95   At rest oxygen flow (liters per minute) 4.5   SPO2% Ambulation on Room Air   (n/a)   SPO2% Ambulation on Oxygen 77   Ambulation oxygen flow (liters per minute) 10

## 2024-10-30 NOTE — PROGRESS NOTES
INFECTIOUS DISEASE PROGRESS NOTE  [facility]  part of [facility] ID PROGRESS NOTE    Hdoa Busby Patient Status:  Inpatient    1940 MRN [id number]   Location [facility]5W Attending Malathi Stuart MD   Hosp Day # 6 PCP Malathi Stuart MD     Subjective:  ROS reviewed. Feels better. No BM in two days. On 6L NC. Has been ambulating.    ASSESSMENT:    Antibiotics: Zosyn, azithromycin      # Acute leukocytosis with multifocal pneumonia with hypoxia               -Strep/legionella negative               -RVP negative   -FU URIEL negative, FU ANCA   -FU urine histo, fungitell  # Leukocytosis  # Acute PE  # HTN     PLAN:  -  Continue on zosyn\, day 6. Stop azithromycin.  -  Follow fever curve, wbc. On Solu-Medrol.  -  Reviewed labs, micro, imaging reports, available old records.  -  Case d/w patient, RN.     History of Present Illness:  Hoda Busby is an 84 year old female with a history of HTN, aortic stenosis, who presented to [facility] ED on 10/24 with increased shortness of breath. Was seen in ED on 10/17 with a week of cough, COVID-19 negative and discharged from ED on amoxicillin and doxycycline x 1 week. Denies any fevers or chills. Has had poor appetite. Notes productive cough. Was having shortness of breath when walking in her house so came to  ED. Denies any recent travel. No sick contacts. On arrival, Tmax 99.3, wbc 16.7, CXR with extensive infiltrates, RVP negative, started on zosyn. Had CT chest showing acute PE, was on 3L and up to 10L NC yesterday. Azithromycin added and started on solu-medrol. Feels better this AM. ID consulted.    Physical Exam:  /73 (BP Location: Right arm)   Pulse 91   Temp 98 °F (36.7 °C) (Oral)   Resp 19   Wt 178 lb (80.7 kg)   SpO2 93%   BMI 27.06 kg/m²     Gen:   Awake, in chair  HEENT:  EOMI, neck supple  CV/lungs:  RRR, CTAB  Abdom:  Soft, no TTP  Skin/extrem:  No rashes, no c/c/e  Lines:  PIV+    Laboratory Data:  Reviewed    Microbiology: Reviewed    Radiology: Reviewed      [facility]NE [facility]  [phone]  10/30/2024

## 2024-10-30 NOTE — PROGRESS NOTES
[facility]  part of [facility]    Progress Note    Hoda Busby Patient Status:  Inpatient    1940 MRN [id number]   Location [facility]5W Attending Malathi Stuart MD   Hosp Day # 6 PCP Malathi Stuart MD       SUBJECTIVE:  Feels less winded walking to BR (though O2 turned up to 10L)    OBJECTIVE:  Vital signs in last 24 hours:  /88 (BP Location: Right arm)   Pulse 83   Temp 97.9 °F (36.6 °C) (Oral)   Resp 20   Wt 178 lb (80.7 kg)   SpO2 95%   BMI 27.06 kg/m²     Intake/Output:    Intake/Output Summary (Last 24 hours) at 10/30/2024 0808  Last data filed at 10/30/2024 0530  Gross per 24 hour   Intake 1120 ml   Output 1625 ml   Net -505 ml       Wt Readings from Last 3 Encounters:   10/24/24 178 lb (80.7 kg)   10/24/24 178 lb (80.7 kg)   24 183 lb 3.2 oz (83.1 kg)       EXAM:  GENERAL: well developed, well nourished, sl tachypneic  LUNGS: clear to auscultation, no crackles or wheezing  CARDIO: RRR, normal S1S2, 2/6 WADE throughout precordium  EXTREMITIES: trace BLE edema    Data Review:     Labs:   Lab Results   Component Value Date    WBC 16.6 10/30/2024    HGB 10.5 10/30/2024    HCT 31.2 10/30/2024    .0 10/30/2024    CREATSERUM 0.98 10/30/2024    BUN 27 10/30/2024     10/30/2024    K 4.5 10/30/2024     10/30/2024    CO2 28.0 10/30/2024     10/30/2024    CA 8.6 10/30/2024    ALB 3.2 10/30/2024    ALKPHO 74 10/30/2024    BILT 0.5 10/30/2024    TP 5.5 10/30/2024    AST 28 10/30/2024    ALT 39 10/30/2024    PTT 84.2 10/30/2024         Imaging:  XR CHEST AP PORTABLE  (CPT=71045)    Result Date: 10/29/2024  CONCLUSION:   Unchanged interstitial and alveolar opacities compared to 10/28/2024 and improved from 10/24/2024.  Findings may be secondary to infection and/or edema.  Recommend radiographic follow-up to resolution.  Trace left pleural effusion, unchanged.  Right pleural effusion has resolved.    Dictated by (CST): Beatriz Torres,  MD on 10/29/2024 at 3:22 PM     Finalized by (CST): Beatriz Torres MD on 10/29/2024 at 3:25 PM          XR CHEST PA + LAT CHEST (CPT=71046)    Result Date: 10/28/2024  CONCLUSION: Cardiac enlargement with secondary signs of moderate interstitial edema unchanged since previous exam.    Dictated by (CST): Nadeem Brand MD on 10/28/2024 at 8:58 AM     Finalized by (CST): Nadeem Brand MD on 10/28/2024 at 8:59 AM          CARD ECHO 2D DOPPLER (CPT=93306)    Result Date: 10/25/2024  Transthoracic Echocardiogram Name:Hoda Busby Date:    10/25/2024    :     1940    Ht:     (68in)     BP: MRN:     [id number]       Age:     84years       Wt:     (178lb)    HR: Loc:     EM          Gndr:    F             BSA:    1.95m^2 Sonographer: Juanita LIRA Ordering:    Cody Holloway Consulting:  Seth Garcia ---------------------------------------------------------------------------- Procedure information:  A transthoracic echocardiogram, limited study was performed. Additional evaluation included M-mode and limited 2D.  Image quality was adequate. ---------------------------------------------------------------------------- Conclusions: 1. Left ventricle: The cavity size was normal. Wall thickness was mildly    increased. Systolic function was normal. The estimated ejection fraction    was 55-60%, by biplane method of disks. Wall motion is normal; there are    no regional wall motion abnormalities. Unable to assess LV diastolic    function. 2. Right ventricle: The cavity size was increased. Systolic function was    normal. 3. Left atrium: The atrium was dilated. 4. Right atrium: The atrium was dilated. 5. Mitral valve: The annulus was mildly to moderately calcified. 6. Pulmonary arteries: The peak systolic pressure is 57mm Hg. Impressions:  The right ventricular systolic pressure was increased consistent with moderate pulmonary hypertension. No previous study was available for comparison. *  ---------------------------------------------------------------------------- * Findings: Left ventricle:  The cavity size was normal. Wall thickness was mildly increased. Systolic function was normal. The estimated ejection fraction was 55-60%, by biplane method of disks. Wall motion is normal; there are no regional wall motion abnormalities. Unable to assess LV diastolic function. Left atrium:  Well visualized. The atrium was dilated. Right ventricle:  The cavity size was increased. Systolic function was normal. Right atrium:  Well visualized. The atrium was dilated. Mitral valve:  Well visualized. The annulus was mildly to moderately calcified. Aortic valve:  Well visualized. The leaflets were moderately calcified. Tricuspid valve:  Well visualized. The annulus is normal-sized. The leaflets are normal thickness. No echocardiographic evidence for tricuspid prolapse. Doppler:  Transvalvular velocity was within the normal range. There was no evidence for stenosis. There was mild regurgitation. Pulmonic valve:   Well visualized. Pericardium:  A trivial pericardial effusion was identified. There was no evidence of hemodynamic compromise. Pleura:  No evidence of pleural fluid accumulation. Pulmonary arteries: Moderate pulmonary hypertension was present. Systemic veins:  Central venous respirophasic diameter changes are in the normal range (>50%). Inferior vena cava: The IVC was dilated. ---------------------------------------------------------------------------- Measurements  Left ventricle                    Value        Ref  IVS thickness, ED, PLAX       (H) 1.3   cm     0.6 - 0.9  LV ID, ED, PLAX                   4.7   cm     3.8 - 5.2  LV ID, ES, PLAX                   3.1   cm     2.2 - 3.5  LV PW thickness, ED, PLAX     (H) 1.2   cm     0.6 - 0.9  IVS/LV PW ratio, ED, PLAX         1.08         ---------  LV PW/LV ID ratio, ED, PLAX       0.26         ---------  LV ejection fraction              63    %      54 -  74  Stroke volume/bsa, 2D             52    ml/m^2 ---------  LV end-diastolic volume, 1-p      58    ml     48 - 140  A4C  LV ejection fraction, 1-p A4C     59    %      46 - 78  Stroke volume, 1-p A4C            34    ml     ---------  LV end-diastolic volume/bsa,      30    ml/m^2 30 - 82  1-p A4C  Stroke volume/bsa, 1-p A4C        18    ml/m^2 ---------  LV end-diastolic volume, 2-p      61    ml     46 - 106  LV end-systolic volume, 2-p       24    ml     14 - 42  LV ejection fraction, 2-p         60    %      54 - 74  Stroke volume, 2-p                37    ml     ---------  LV end-diastolic volume/bsa,      31    ml/m^2 29 - 61  2-p  LV end-systolic volume/bsa,       12    ml/m^2 8 - 24  2-p  Stroke volume/bsa, 2-p            18.8  ml/m^2 ---------  LVOT                              Value        Ref  LVOT ID                           2     cm     ---------  LVOT peak velocity, S             1.5   m/sec  ---------  LVOT VTI, S                       32.2  cm     ---------  LVOT peak gradient, S             9     mm Hg  ---------  LVOT mean gradient, S             5     mm Hg  ---------  Stroke volume (SV), LVOT DP       101   ml     ---------  Stroke index (SV/bsa), LVOT       52    ml/m^2 ---------  DP  Aortic valve                      Value        Ref  Aortic leaflet separation, MM     1.0   cm     ---------  Pulmonary artery                  Value        Ref  PA pressure, S, DP                57    mm Hg  ---------  Tricuspid valve                   Value        Ref  Tricuspid regurg peak         (H) 3.49  m/sec  <=2.8  velocity  Tricuspid peak RV-RA gradient     49    mm Hg  ---------  Systemic veins                    Value        Ref  Estimated CVP                     8     mm Hg  ---------  Inferior vena cava                Value        Ref  ID                            (H) 2.3   cm     <=2.1  Right ventricle                   Value        Ref  TAPSE, 2D                         3.25  cm     >=1.70   TAPSE, MM                         2.05  cm     >=1.70  RV pressure, S, DP                57    mm Hg  ---------  RV s', lateral                    21.8  cm/sec >=9.5 Legend: (L)  and  (H)  migel values outside specified reference range. ---------------------------------------------------------------------------- Prepared and electronically signed by Manjinder Chavis 10/25/2024 17:20          Meds:   Current Facility-Administered Medications   Medication Dose Route Frequency    methylPREDNISolone sodium succinate (Solu-MEDROL) injection 40 mg  40 mg Intravenous Q8H    polyethylene glycol (PEG 3350) (Miralax) 17 g oral packet 17 g  17 g Oral Daily    docusate sodium (Colace) cap 100 mg  100 mg Oral BID    atorvastatin (Lipitor) tab 20 mg  20 mg Oral Nightly    heparin (Porcine) 73728 units/250mL infusion PE/DVT/THROMBUS CONTINUOUS  200-3,000 Units/hr Intravenous Continuous    azithromycin (Zithromax) tab 500 mg  500 mg Oral Daily    piperacillin-tazobactam (Zosyn) 3.375 g in dextrose 5% 100 mL IVPB-ADDV  3.375 g Intravenous Q8H    amLODIPine (Norvasc) tab 5 mg  5 mg Oral Daily    aspirin DR tab 81 mg  81 mg Oral Daily    acetaminophen (Tylenol) tab 650 mg  650 mg Oral Q6H PRN    dextromethorphan-guaifenesin ER (Mucinex DM)  MG per 12 hr tab 1 tablet  1 tablet Oral BID PRN    influenza virus trivalent high dose PF (Fluzone HD) 0.5 mL injection (ages >/= 65 years) 0.5 mL  0.5 mL Intramuscular Prior to discharge       Assessment & Plan    Acute hypoxic respiratory failure  Bilateral pneumonia/pneumonitis  -failed outpt treatment with amox and doxycycline x 7d  -repeat CXR 10/24 revealed extensive bilateral perihilar and bilateral upper and lower lobe airspace opacities, progressed since 10/17.    -Covid negative 10/17 and 10/24/24  -S.pneumo, legionella negative  -Mycoplasma IgM/IgG negative  -ANCA panel negative  -Echo 10/24/24 - normal EF (55-60%), no obvious vegetatons  -CT with bilateral ground glass opacities,  small consolidations of BLL  - ID following; on empiric IV zosyn day #7 and azithro day #6-- though lack of response to outpatient abx and degree of hypoxia is unusual for typical bacterial pneumonia  -per pulm, findings suspicious for NSIP (vs cryptogenic organizing pneumonia vs hypersensitivity pneumonitis); NOT thought to be c/w UIP pattern  -on empiric IV solumedrol since 10/25/24- on 40mg IV q6hrs > q8hrs  -CTD panel pending  -anti-histone and anti-[place] Ab's pending  -fungal serologies pending  -pt denies recent travel, exposure to birds/pets, arthralgias (other than her mild hand OA), fevers  -d/w pulm Dr. Sanchez -- if above testing unrevealing, may need bronch/bx though somewhat high risk given degree of hypoxia and severe A.S.  -wean O2 as tolerated - still with significant O2 requirements -- 6 L HFNC   -repeat CXR 10/29 improved from 10/24     Bilateral pulmonary emboli  -CT chest 10/25/24 -- acute distal segmental/subsegmental pulmonary emboli in [place] and subsegmental PE in [place]  -unclear etiology; no recent hx of long travel; no family/personal hx of blood clots  -BLE venous dopplers negative  -unclear etiology of PE; will do hypercoag w/u as outpt.   Consider malignancy w/u if no other cause found (colonoscopy 1/2021 revealed adenomatous polyp but poor prep; pt declined repeat colonoscopy; Medicare would not cover Cologuard); UTD on mammo  -on heparin gtt - cont for now -- will transition to DOAC once above w/u complete (in case bronch needed)     Severe aortic stenosis  -seeing cardiology Dr. Bird  -moderate A.S on echo in 10/2022 (prev mild A.S in 2020)  -abnormal pre-op EKG 9/2023 --Nuc GXT done 9/20/23 -- EKG portion revealed 1-1.5mm ST seg depression in inferolateral leads with prolonged recovery; nuclear portion with normal perfusion   -Echo 9/29/23 --progression of A.S. from mild to moderate  -recent echo at Dr. Bird's office 10/18/24 -- LVEF 65%, grade 2 diastolic dysfunction, severe aortic  stenosis (mean aortic gradient signif worse since 9/2023 -- 31>49 mmHg),  -pt will need TAVR evaluation as outpt  -cardiology consult appreciated  -pt given IV lasix 20mg/day from 10/25-10/28; received lasix 10mg IV x 1 on 10/29; decent net diuresis in past 2 days     Anemia  -Hgb stable in the 10's-11's  -likely 2/2 acute illness  -normal iron studies (c/w anemia of chronic disease; low TIBC)     Hypertension, primary  -(dyazide stopped due to NANCY)  -on amlodipine 5mg/day     Hx of hip OA  -s/p left THR (Dr. Lo) many years ago  -s/p elective R BEATRIZ on 10/5/23 by Dr. Diaz     Hyperlipidemia   Pt with strong family hx of high cholesterol  ; normal TG and HDL  No indication for statin given age     Osteopenia   On Fosamax from 2011 to 4/2016.     DEXA stable 5/10/17.    DEXA in 9/2019 showed slightly more bone loss in spine, but stable in hip.    DEXA 11/2021 -- some improvement in femur.  Hold off on fosamax for now.   DEXA 8/2024 -- osteoporosis of left forearm  -restarted Fosamax 8/2024     Vitamin D deficiency  On vitamin D 4000u/day     VTE proph  -heparin gtt as above                                             Malathi Stuart MD  10/30/2024  8:08 AM

## 2024-10-30 NOTE — PLAN OF CARE
Hoda Busby is A&O x 4.  Lives at home alone.    Stand-by assist with walker d/t multiple lines.  Continent of bowel and bladder.  VSS:  stable.  Pain: denies.  Plan:  IV and PO ABX, IV solumederol, heparin gtt, monitor VS, incentive spirometer, 2 liter fluid restriction with I&Os  Bed in lowest position, alarms on, and call light within reach.  Will continue to monitor and treat.    Problem: Patient Centered Care  Goal: Patient preferences are identified and integrated in the patient's plan of care  Description: Interventions:  - What would you like us to know as we care for you? I come from home alone.  - Provide timely, complete, and accurate information to patient/family  - Incorporate patient and family knowledge, values, beliefs, and cultural backgrounds into the planning and delivery of care  - Encourage patient/family to participate in care and decision-making at the level they choose  - Honor patient and family perspectives and choices  Outcome: Progressing     Problem: Patient/Family Goals  Goal: Patient/Family Long Term Goal  Description: Patient's Long Term Goal: Discharge Home    Interventions:  - Plan of care compliance; medication management, care, and education     - See additional Care Plan goals for specific interventions  Outcome: Progressing  Goal: Patient/Family Short Term Goal  Description: Patient's Short Term Goal: remain free from shortness of breath    Interventions:   - Plan of care compliance; medication management, care, and education     - See additional Care Plan goals for specific interventions  Outcome: Progressing     Problem: CARDIOVASCULAR - ADULT  Goal: Maintains optimal cardiac output and hemodynamic stability  Description: INTERVENTIONS:  - Monitor vital signs, rhythm, and trends  - Monitor for bleeding, hypotension and signs of decreased cardiac output  - Evaluate effectiveness of vasoactive medications to optimize hemodynamic stability  - Monitor arterial and/or venous  puncture sites for bleeding and/or hematoma  - Assess quality of pulses, skin color and temperature  - Assess for signs of decreased coronary artery perfusion - ex. Angina  - Evaluate fluid balance, assess for edema, trend weights  Outcome: Progressing  Goal: Absence of cardiac arrhythmias or at baseline  Description: INTERVENTIONS:  - Continuous cardiac monitoring, monitor vital signs, obtain 12 lead EKG if indicated  - Evaluate effectiveness of antiarrhythmic and heart rate control medications as ordered  - Initiate emergency measures for life threatening arrhythmias  - Monitor electrolytes and administer replacement therapy as ordered  Outcome: Progressing     Problem: RESPIRATORY - ADULT  Goal: Achieves optimal ventilation and oxygenation  Description: INTERVENTIONS:  - Assess for changes in respiratory status  - Assess for changes in mentation and behavior  - Position to facilitate oxygenation and minimize respiratory effort  - Oxygen supplementation based on oxygen saturation or ABGs  - Provide Smoking Cessation handout, if applicable  - Encourage broncho-pulmonary hygiene including cough, deep breathe, Incentive Spirometry  - Assess the need for suctioning and perform as needed  - Assess and instruct to report SOB or any respiratory difficulty  - Respiratory Therapy support as indicated  - Manage/alleviate anxiety  - Monitor for signs/symptoms of CO2 retention  Outcome: Progressing     Problem: GASTROINTESTINAL - ADULT  Goal: Maintains or returns to baseline bowel function  Description: INTERVENTIONS:  - Assess bowel function  - Maintain adequate hydration with IV or PO as ordered and tolerated  - Evaluate effectiveness of GI medications  - Encourage mobilization and activity  - Obtain nutritional consult as needed  - Establish a toileting routine/schedule  - Consider collaborating with pharmacy to review patient's medication profile  Outcome: Progressing     Problem: SAFETY ADULT - FALL  Goal: Free from fall  injury  Description: INTERVENTIONS:  - Assess pt frequently for physical needs  - Identify cognitive and physical deficits and behaviors that affect risk of falls.  - [place] fall precautions as indicated by assessment.  - Educate pt/family on patient safety including physical limitations  - Instruct pt to call for assistance with activity based on assessment  - Modify environment to reduce risk of injury  - Provide assistive devices as appropriate  - Consider OT/PT consult to assist with strengthening/mobility  - Encourage toileting schedule  Outcome: Progressing

## 2024-10-30 NOTE — PROGRESS NOTES
[facility]  part of [facility]     Progress Note    Hoda Busby Patient Status:  Inpatient    1940 MRN [id number]   Location [facility]5W Attending Malathi Stuart MD   Hosp Day # 6 PCP Malathi Stuart MD       Subjective:   Patient seen and examined.  Admits to some gradual improvement in dyspnea.  On 10 L oxygen.  Occasional cough primarily nonproductive in nature    Objective:   Blood pressure 124/73, pulse 91, temperature 98 °F (36.7 °C), temperature source Oral, resp. rate 19, weight 178 lb (80.7 kg), SpO2 93%.  Intake/Output:   Last 3 shifts: I/O last 3 completed shifts:  In: 1600 [P.O.:1600]  Out: 2375 [Urine:2375]   This shift: I/O this shift:  In: 430 [P.O.:420; I.V.:10]  Out: 880 [Urine:880]     Vent Settings:      Hemodynamic parameters (last 24 hours):      Scheduled Meds:   Current Facility-Administered Medications   Medication Dose Route Frequency    furosemide (Lasix) tab 20 mg  20 mg Oral Daily    methylPREDNISolone sodium succinate (Solu-MEDROL) injection 40 mg  40 mg Intravenous Q8H    polyethylene glycol (PEG 3350) (Miralax) 17 g oral packet 17 g  17 g Oral Daily    docusate sodium (Colace) cap 100 mg  100 mg Oral BID    atorvastatin (Lipitor) tab 20 mg  20 mg Oral Nightly    heparin (Porcine) 19168 units/250mL infusion PE/DVT/THROMBUS CONTINUOUS  200-3,000 Units/hr Intravenous Continuous    piperacillin-tazobactam (Zosyn) 3.375 g in dextrose 5% 100 mL IVPB-ADDV  3.375 g Intravenous Q8H    amLODIPine (Norvasc) tab 5 mg  5 mg Oral Daily    aspirin DR tab 81 mg  81 mg Oral Daily    acetaminophen (Tylenol) tab 650 mg  650 mg Oral Q6H PRN    dextromethorphan-guaifenesin ER (Mucinex DM)  MG per 12 hr tab 1 tablet  1 tablet Oral BID PRN    influenza virus trivalent high dose PF (Fluzone HD) 0.5 mL injection (ages >/= 65 years) 0.5 mL  0.5 mL Intramuscular Prior to discharge       Continuous Infusions:    continuous dose heparin 1,100 Units/hr (10/30/24  1434)       Physical Exam  Constitutional: no acute distress  Eyes: PERRL  ENT: nares pateint  Neck: supple, no JVD  Cardio: RRR, S1 S2  Respiratory: Scattered crackles  GI: abdomen soft, non tender, active bowel sounds, no organomegaly  Extremities: no clubbing, cyanosis, edema  Neurologic: no gross motor deficits  Skin: warm, dry      Results:     Lab Results   Component Value Date    WBC 16.6 10/30/2024    HGB 10.5 10/30/2024    HCT 31.2 10/30/2024    .0 10/30/2024    CREATSERUM 0.98 10/30/2024    BUN 27 10/30/2024     10/30/2024    K 4.5 10/30/2024     10/30/2024    CO2 28.0 10/30/2024     10/30/2024    CA 8.6 10/30/2024    ALB 3.2 10/30/2024    ALKPHO 74 10/30/2024    BILT 0.5 10/30/2024    TP 5.5 10/30/2024    AST 28 10/30/2024    ALT 39 10/30/2024    PTT 84.2 10/30/2024       XR CHEST AP PORTABLE  (CPT=71045)    Result Date: 10/29/2024  CONCLUSION:   Unchanged interstitial and alveolar opacities compared to 10/28/2024 and improved from 10/24/2024.  Findings may be secondary to infection and/or edema.  Recommend radiographic follow-up to resolution.  Trace left pleural effusion, unchanged.  Right pleural effusion has resolved.    Dictated by (CST): Beatriz Torres MD on 10/29/2024 at 3:22 PM     Finalized by (CST): Beatriz Torres MD on 10/29/2024 at 3:25 PM                 Assessment   1.  Acute hypoxemic respiratory failure  2.  Bilateral lung opacities  3.  Acute pulmonary embolism  4.  COPD  5.  Prior nicotine dependence  6.  Hypertension  7.  Severe aortic stenosis     Plan   -Patient presents with evidence of progressive worsening dyspnea with exertion, hypoxia over the course of last week.  Ongoing nonproductive cough present.  Started on antibiotic therapy with doxycycline and Augmentin on 10/17/2024 with no significant improvement clinically.  -CT chest on 10/25/2024 with acute segmental pulmonary embolism right upper lobe and left upper lobe.  Multifocal groundglass  opacities seen bilaterally.  Pattern less likely due to pulmonary edema differential includes organizing pneumonia cannot rule out infectious process.  Unlikely drug reaction.  Cannot rule out underlying ILD.  -No significant clinical improvement despite antibiotic therapy.  Antibiotics per ID  -Legionella and strep pneumonia antigen negative  -Currently on 5 L nasal cannula oxygen.  Wean as tolerated.    -Gradually wean steroid therapy.  -Continue anticoagulation with heparin gtt.  -URIEL and ANCA negative  -Fungal serologies pending  -Hold off bronchoscopy at this time especially given high oxygenation requirements.  Discussed with primary care physician.  -Possible discharge in next 1 to 2 days with home oxygen if continue clinically improved.  Will complete course of antibiotic therapy.  Will require slow taper of steroid therapy.    Cody Holloway, DO  Pulmonary Critical Care Medicine  [facility]

## 2024-10-30 NOTE — DIETARY NOTE
BRIEF NUTRITION NOTE    Patient seen today for LOS follow up. Pt remains at no nutrition risk. Pt reports that appetite has improved since prior to admission. This admission pt has consumed % x10 meals. Pt was unaware of dietary changes from general to cardiac with a fluid restriction. Pt was provided with verbal and written Heart Healthy Diet education from the [facility]. Sources of dietary fiber were discussed with pt to aid in correction constipation. Last reported BM was 10/24. Pt has been receiving Miralax since 10/28-10/30. Pt remains wt stable this admission (178# 10/24/24; 178# 11/30/23). Current diet is appropriate. RD will follow up per protocol.     Wt Readings from Last 6 Encounters:   10/24/24 80.7 kg (178 lb)   10/24/24 80.7 kg (178 lb)   07/23/24 83.1 kg (183 lb 3.2 oz)   11/30/23 80.7 kg (178 lb)   11/06/23 81.8 kg (180 lb 6.4 oz)   10/05/23 78.5 kg (173 lb)      Patient Weight(s) for the past 336 hrs:   Weight   10/24/24 1651 80.7 kg (178 lb)      Percent Meals Eaten (last 6 days)       Date/Time Percent Meals Eaten (%)    10/24/24 1800 75 %    10/25/24 0900 40 %    10/25/24 1553 100 %    10/25/24 1800 50 %    10/26/24 0900 80 %    10/26/24 1300 100 %    10/26/24 1808 100 %    10/27/24 0945 100 %    10/27/24 1337 100 %    10/28/24 1100 100 %    10/28/24 1339 0 %     Percent Meals Eaten (%): declined lunch at 10/28/24 1339    10/28/24 1749 --     Percent Meals Eaten (%): dinner at bedside at 10/28/24 1749    10/29/24 0945 100 %    10/29/24 1349 100 %    10/29/24 1732 100 %    10/30/24 0900 100 %           Zoya Sullivan  Dietetic Intern  P: [phone]

## 2024-10-30 NOTE — PLAN OF CARE
Problem: Patient Centered Care  Goal: Patient preferences are identified and integrated in the patient's plan of care  Description: Interventions:  - What would you like us to know as we care for you? I come from home alone.  - Provide timely, complete, and accurate information to patient/family  - Incorporate patient and family knowledge, values, beliefs, and cultural backgrounds into the planning and delivery of care  - Encourage patient/family to participate in care and decision-making at the level they choose  - Honor patient and family perspectives and choices  Outcome: Progressing     Problem: Patient/Family Goals  Goal: Patient/Family Long Term Goal  Description: Patient's Long Term Goal:     Interventions:  -   - See additional Care Plan goals for specific interventions  Outcome: Progressing  Goal: Patient/Family Short Term Goal  Description: Patient's Short Term Goal:    Interventions:   -   - See additional Care Plan goals for specific interventions  Outcome: Progressing     Problem: CARDIOVASCULAR - ADULT  Goal: Maintains optimal cardiac output and hemodynamic stability  Description: INTERVENTIONS:  - Monitor vital signs, rhythm, and trends  - Monitor for bleeding, hypotension and signs of decreased cardiac output  - Evaluate effectiveness of vasoactive medications to optimize hemodynamic stability  - Monitor arterial and/or venous puncture sites for bleeding and/or hematoma  - Assess quality of pulses, skin color and temperature  - Assess for signs of decreased coronary artery perfusion - ex. Angina  - Evaluate fluid balance, assess for edema, trend weights  Outcome: Progressing  Goal: Absence of cardiac arrhythmias or at baseline  Description: INTERVENTIONS:  - Continuous cardiac monitoring, monitor vital signs, obtain 12 lead EKG if indicated  - Evaluate effectiveness of antiarrhythmic and heart rate control medications as ordered  - Initiate emergency measures for life threatening arrhythmias  -  Monitor electrolytes and administer replacement therapy as ordered  Outcome: Progressing     Problem: RESPIRATORY - ADULT  Goal: Achieves optimal ventilation and oxygenation  Description: INTERVENTIONS:  - Assess for changes in respiratory status  - Assess for changes in mentation and behavior  - Position to facilitate oxygenation and minimize respiratory effort  - Oxygen supplementation based on oxygen saturation or ABGs  - Provide Smoking Cessation handout, if applicable  - Encourage broncho-pulmonary hygiene including cough, deep breathe, Incentive Spirometry  - Assess the need for suctioning and perform as needed  - Assess and instruct to report SOB or any respiratory difficulty  - Respiratory Therapy support as indicated  - Manage/alleviate anxiety  - Monitor for signs/symptoms of CO2 retention  Outcome: Progressing     Problem: SAFETY ADULT - FALL  Goal: Free from fall injury  Description: INTERVENTIONS:  - Assess pt frequently for physical needs  - Identify cognitive and physical deficits and behaviors that affect risk of falls.  - [place] fall precautions as indicated by assessment.  - Educate pt/family on patient safety including physical limitations  - Instruct pt to call for assistance with activity based on assessment  - Modify environment to reduce risk of injury  - Provide assistive devices as appropriate  - Consider OT/PT consult to assist with strengthening/mobility  - Encourage toileting schedule  Outcome: Progressing

## 2024-10-30 NOTE — CM/SW NOTE
Department  notified of request for HHC, aidin referrals started. Assigned CM/SW to follow up with pt/family on further discharge planning.     Constanza Pop, DSC

## 2024-10-30 NOTE — PROGRESS NOTES
[facility] - CARDIOLOGY PROGRESS NOTE      Hoda Busby Patient Status:  Inpatient    1940 MRN [id number]   Location [facility]5W Attending Malathi Stuart MD   Hosp Day # 6 PCP Malathi Stuart MD         Assessment and Plan:   Acute bilateral PE   Continue heparin gtt for PE  Ok to transition to DOAC pending pulmonary  Pulmonary plan for poss bronchoscopy  DVT - negative per venous doppler  Multifocal pneumonia - pw worsening cough and dyspnea  Oxygen sats % with increasing oxygen requirements  Elevated pap per echo 57 est  Poss bronchoscopy per pulm  Severe aortic stenosis  Echo EF 55-60% RV cavity increastd w/normal systolic function mod pulm HTN est 57  + since admit for I/O + 1 liter since admit   Still requiring oxygen.  Will give oral Lasix and monitor response with follow-up  Strict I/O and daily weights  2 gm sodium and 2 L fluid restriction     Plan:     Will continue oral diuretic and monitor response  Electrolytye replacement per protocol   Strict I/O and daily weights  Anbx and steroids per pulmonary and ID  Continue heparin till okay with DOAC  Plan TAVR work up as OP for severe Aortic stenosis   Subjective:   Hoda Busby is a(n) 84 year old female is still mildly short of breath on oxygen and heparin but feeling better  Objective:   Blood pressure 103/57, pulse 90, temperature 98 °F (36.7 °C), temperature source Oral, resp. rate 19, weight 178 lb (80.7 kg), SpO2 96%.  Intake/Output:        Last 24 hours:   Intake/Output Summary (Last 24 hours) at 10/30/2024 1121  Last data filed at 10/30/2024 1050  Gross per 24 hour   Intake 834 ml   Output 2455 ml   Net -1621 ml      This shift: I/O this shift:  In: 190 [P.O.:180; I.V.:10]  Out: 830 [Urine:830]    Exam  Gen: Comfortable, in no acute distress,    Psych.alert and oriented x3  HEENT: atraumatic, normal conjunctiva, moist mucosa  Neck:supple,no JVD, no bruits  Lungs:  clear to ascultation, normal respiratory effort  Cardiac: regular rate and rhythm, nl S1,S2, 2/6 systolic ejection murmur  Abd: Abdomen soft, nontender, nondistended,  bowel sounds present  Ext:  no clubbing, no cyanosis,no edema  Neuro: no focal deficits  Skin: no rashes or lesions        Scheduled Meds:    methylPREDNISolone  40 mg Intravenous Q8H    polyethylene glycol (PEG 3350)  17 g Oral Daily    docusate sodium  100 mg Oral BID    atorvastatin  20 mg Oral Nightly    azithromycin  500 mg Oral Daily    piperacillin-tazobactam  3.375 g Intravenous Q8H    amLODIPine  5 mg Oral Daily    aspirin  81 mg Oral Daily       Results:     Lab Results   Component Value Date    WBC 16.6 (H) 10/30/2024    HGB 10.5 (L) 10/30/2024    HCT 31.2 (L) 10/30/2024    .0 (H) 10/30/2024    CREATSERUM 0.98 10/30/2024    BUN 27 (H) 10/30/2024     10/30/2024    K 4.5 10/30/2024     10/30/2024    CO2 28.0 10/30/2024     (H) 10/30/2024    CA 8.6 (L) 10/30/2024    ALB 3.2 10/30/2024    ALKPHO 74 10/30/2024    BILT 0.5 10/30/2024    TP 5.5 (L) 10/30/2024    AST 28 10/30/2024    ALT 39 10/30/2024    PTT 84.2 (H) 10/30/2024    INR 1.06 09/08/2023    TSH 1.526 07/16/2024    ESRML 49 (H) 10/26/2024    PHOS 3.6 10/28/2024    B12 349 03/20/2017       XR CHEST AP PORTABLE  (CPT=71045)    Result Date: 10/29/2024  CONCLUSION:   Unchanged interstitial and alveolar opacities compared to 10/28/2024 and improved from 10/24/2024.  Findings may be secondary to infection and/or edema.  Recommend radiographic follow-up to resolution.  Trace left pleural effusion, unchanged.  Right pleural effusion has resolved.    Dictated by (CST): Beatriz Torres MD on 10/29/2024 at 3:22 PM     Finalized by (CST): Beatriz Torres MD on 10/29/2024 at 3:25 PM                 Viktor Christianson MD  [facility]  10/30/2024

## 2024-10-31 LAB
ANION GAP SERPL CALC-SCNC: 4 MMOL/L (ref 0–18)
APTT PPP: 95 SECONDS (ref 23–36)
ASPER FLAVUS: NEGATIVE
ASPER FUMIGATUS: NEGATIVE
ASPER NIGER: NEGATIVE
BLASTOMYCES ABS QN DID: NEGATIVE
BUN BLD-MCNC: 26 MG/DL (ref 9–23)
BUN/CREAT SERPL: 24.3 (ref 10–20)
CALCIUM BLD-MCNC: 8.9 MG/DL (ref 8.7–10.4)
CHLORIDE SERPL-SCNC: 108 MMOL/L (ref 98–112)
CO2 SERPL-SCNC: 27 MMOL/L (ref 21–32)
CREAT BLD-MCNC: 1.07 MG/DL
EGFRCR SERPLBLD CKD-EPI 2021: 51 ML/MIN/1.73M2 (ref 60–?)
ENA JO1 AB SER IA-ACNC: <0.3 U/ML
ENA SCL70 IGG SER IA-ACNC: <0.6 U/ML
GLUCOSE BLD-MCNC: 126 MG/DL (ref 70–99)
OSMOLALITY SERPL CALC.SUM OF ELEC: 294 MOSM/KG (ref 275–295)
POTASSIUM SERPL-SCNC: 4.9 MMOL/L (ref 3.5–5.1)
SODIUM SERPL-SCNC: 139 MMOL/L (ref 136–145)
UR GALACT AG SCR: NEGATIVE

## 2024-10-31 PROCEDURE — 99232 SBSQ HOSP IP/OBS MODERATE 35: CPT | Performed by: INTERNAL MEDICINE

## 2024-10-31 NOTE — PHYSICAL THERAPY NOTE
PHYSICAL THERAPY EVALUATION - INPATIENT     Room Number: 516/516-A  Evaluation Date: 10/31/2024  Type of Evaluation: Initial   Physician Order: PT Eval and Treat    Presenting Problem: acute hypoxic respiratory failure, pneumonia, bilateral PE s/p anticoagulation  Co-Morbidities : Hx hip OA, COPD, HTN  Reason for Therapy: Mobility Dysfunction and Discharge Planning    PHYSICAL THERAPY ASSESSMENT   Patient is a 84 year old female admitted 10/24/2024 for acute hypoxic respiratory failure, pneumonia, bilateral PE s/p anticoagulation. Prior to admission, patient's baseline is Independent with ADLs/iADLs/functional mobility, driving. Patient is currently functioning below baseline with bed mobility, transfers, gait, stair negotiation, standing prolonged periods, and performing household tasks.  Patient is requiring contact guard assist and minimal assist as a result of the following impairments: decreased functional strength, decreased endurance/aerobic capacity, impaired standing balance, decreased muscular endurance, medical status, and increased O2 needs from baseline.  Physical Therapy will continue to follow for duration of hospitalization.    Patient will benefit from continued skilled PT Services to promote return to prior level of function and safety with continuous assistance and gradual rehabilitative therapy .    PLAN DURING HOSPITALIZATION  Nursing Mobility Recommendation : 1 Assist  PT Device Recommendation: Rolling walker  PT Treatment Plan: Bed mobility;Body mechanics;Coordination;Endurance;Energy conservation;Patient education;Gait training;Strengthening;Transfer training;Balance training  Rehab Potential : Good  Frequency (Obs): 3-5x/week     PHYSICAL THERAPY MEDICAL/SOCIAL HISTORY     Problem List  Active Problems:    Acute pulmonary embolism (HCC)    Pneumonia    HOME SITUATION  Type of Home: House  Home Layout: One level  Stairs to Enter : 3   Railing: Yes    Lives With: Alone    Drives: Yes   Patient  Regularly Uses: Reading glasses (no home O2; owns cane and RW)     SUBJECTIVE  \"I was a super active person three weeks ago.\"    PHYSICAL THERAPY EXAMINATION   OBJECTIVE  Precautions: Bed/chair alarm  Fall Risk:  (moderate fall risk)    PAIN ASSESSMENT  Ratin    COGNITION  Overall Cognitive Status:  WFL - within functional limits  Safety Judgement:  decreased awareness of need for safety    RANGE OF MOTION AND STRENGTH ASSESSMENT  Upper extremity ROM and strength are within functional limits   Lower extremity ROM is within functional limits   Lower extremity strength is within functional limits     BALANCE  Static Sitting: Fair +  Dynamic Sitting: Fair  Static Standing: Fair -  Dynamic Standing: Poor +    ACTIVITY TOLERANCE  Pulse: 88  Heart Rate Source: Monitor    O2 WALK  Oxygen Therapy  SPO2% Ambulation on Oxygen: 77 (recovered to 92% within 2 minutes with seated rest break and cues for pursed lip breathing)  Ambulation oxygen flow (liters per minute): 3    AM-PAC '6-Clicks' INPATIENT SHORT FORM - BASIC MOBILITY  How much difficulty does the patient currently have...  Patient Difficulty: Turning over in bed (including adjusting bedclothes, sheets and blankets)?: A Little   Patient Difficulty: Sitting down on and standing up from a chair with arms (e.g., wheelchair, bedside commode, etc.): A Little   Patient Difficulty: Moving from lying on back to sitting on the side of the bed?: A Little   How much help from another person does the patient currently need...   Help from Another: Moving to and from a bed to a chair (including a wheelchair)?: A Little   Help from Another: Need to walk in hospital room?: A Little   Help from Another: Climbing 3-5 steps with a railing?: A Lot     AM-PAC Score:  Raw Score: 17   Approx Degree of Impairment: 50.57%   Standardized Score (AM-PAC Scale): 42.13   CMS Modifier (G-Code): CK    FUNCTIONAL ABILITY STATUS  Functional Mobility/Gait Assessment  Gait Assistance: Minimum  assistance  Distance (ft): 25 ft  Assistive Device: Rolling walker  Pattern: Shuffle (decreased bonnie speed, decreased step length, slightly shaky, unsteady - no overt LOB)  Sit to Stand: contact guard assist from toilet    Exercise/Education Provided:  Body mechanics  Energy conservation  Functional activity tolerated  Gait training  Posture  Transfer training    Skilled Therapy Provided: Patient in bathroom upon arrival. RN approved activity. Educated patient on POC and benefits of mobilization. Agreeable to participate. Patient reporting no pain. Educated patient on energy conservation, activity pacing, pursed lip breathing, and Rate of Perceived Exertion (RPE scale). Patient currently reporting activity as 10/10 on the RPE scale. SpO2 on 3 L/min 77% with short distance of ambulation, recovering to 92% within 2 minutes with seated rest break and cues for pursed lip breathing - RN aware. Patient demonstrating mildly impulsive tendencies, requiring increased cues for safety.     The patient's Approx Degree of Impairment: 50.57% has been calculated based on documentation in the [facility] '6 clicks' Inpatient Basic Mobility Short Form.  Research supports that patients with this level of impairment may benefit from rehab.  Final disposition will be made by interdisciplinary medical team.    Patient End of Session: Up in chair;Call light within reach;Needs met;RN aware of session/findings;All patient questions and concerns addressed;Hospital anti-slip socks;Alarm set    CURRENT GOALS  Goals to be met by: 11/14/24  Patient Goal Patient's self-stated goal is: none stated   Goal #1 Patient is able to demonstrate supine - sit EOB @ level: supervision     Goal #1   Current Status    Goal #2 Patient is able to demonstrate transfers Sit to/from Stand at assistance level: supervision with walker - rolling     Goal #2  Current Status    Goal #3 Patient is able to ambulate 75 feet with assist device: walker - rolling at assistance  level: supervision   Goal #3   Current Status    Goal #4 Patient to demonstrate independence with home activity/exercise instructions provided to patient in preparation for discharge.   Goal #4   Current Status      Patient Evaluation Complexity Level:  History Low - no personal factors and/or co-morbidities   Examination of body systems Moderate - addressing a total of 3 or more elements   Clinical Presentation  Moderate - Evolving   Clinical Decision Making  Low Complexity     Gait Training: 10 minutes

## 2024-10-31 NOTE — PROGRESS NOTES
[facility]  part of [facility]    Progress Note    Hoda Busby Patient Status:  Inpatient    1940 MRN [id number]   Location [facility]5W Attending Malathi Stuart MD   Hosp Day # 7 PCP Malathi Stuart MD       SUBJECTIVE:  Feels okay at rest; still quite SOB with ambulation    OBJECTIVE:  Vital signs in last 24 hours:  /63 (BP Location: Right arm)   Pulse 89   Temp 97.7 °F (36.5 °C) (Oral)   Resp 20   Wt 183 lb 9.6 oz (83.3 kg)   SpO2 96%   BMI 27.92 kg/m²     Intake/Output:    Intake/Output Summary (Last 24 hours) at 10/31/2024 1104  Last data filed at 10/31/2024 0919  Gross per 24 hour   Intake 1672 ml   Output 600 ml   Net 1072 ml       Wt Readings from Last 3 Encounters:   10/31/24 183 lb 9.6 oz (83.3 kg)   10/24/24 178 lb (80.7 kg)   24 183 lb 3.2 oz (83.1 kg)       EXAM:  GENERAL: well developed, well nourished, in no apparent distress  LUNGS: clear to auscultation, +insp squeak right base  CARDIO: RRR, normal S1S2, 2/6 WADE throughout precordium  EXTREMITIES: trace BLE edema    Data Review:     Labs:   Lab Results   Component Value Date    CREATSERUM 1.07 10/31/2024    BUN 26 10/31/2024     10/31/2024    K 4.9 10/31/2024     10/31/2024    CO2 27.0 10/31/2024     10/31/2024    CA 8.9 10/31/2024    PTT 95.0 10/31/2024         Imaging:  XR CHEST AP PORTABLE  (CPT=71045)    Result Date: 10/29/2024  CONCLUSION:   Unchanged interstitial and alveolar opacities compared to 10/28/2024 and improved from 10/24/2024.  Findings may be secondary to infection and/or edema.  Recommend radiographic follow-up to resolution.  Trace left pleural effusion, unchanged.  Right pleural effusion has resolved.    Dictated by (CST): Beatriz Torres MD on 10/29/2024 at 3:22 PM     Finalized by (CST): Beatriz Torres MD on 10/29/2024 at 3:25 PM          CARD ECHO 2D DOPPLER (CPT=93306)    Result Date: 10/25/2024  Transthoracic Echocardiogram Name:Kehinde  Hoda DAIGLE Date:    10/25/2024    :     1940    Ht:     (68in)     BP: MRN:     [id number]       Age:     84years       Wt:     (178lb)    HR: Loc:     [facility]          Gndr:    F             BSA:    1.95m^2 Sonographer: Juanita LIRA Ordering:    Cody Holloway Consulting:  Seth Garcia ---------------------------------------------------------------------------- Procedure information:  A transthoracic echocardiogram, limited study was performed. Additional evaluation included M-mode and limited 2D.  Image quality was adequate. ---------------------------------------------------------------------------- Conclusions: 1. Left ventricle: The cavity size was normal. Wall thickness was mildly    increased. Systolic function was normal. The estimated ejection fraction    was 55-60%, by biplane method of disks. Wall motion is normal; there are    no regional wall motion abnormalities. Unable to assess LV diastolic    function. 2. Right ventricle: The cavity size was increased. Systolic function was    normal. 3. Left atrium: The atrium was dilated. 4. Right atrium: The atrium was dilated. 5. Mitral valve: The annulus was mildly to moderately calcified. 6. Pulmonary arteries: The peak systolic pressure is 57mm Hg. Impressions:  The right ventricular systolic pressure was increased consistent with moderate pulmonary hypertension. No previous study was available for comparison. * ---------------------------------------------------------------------------- * Findings: Left ventricle:  The cavity size was normal. Wall thickness was mildly increased. Systolic function was normal. The estimated ejection fraction was 55-60%, by biplane method of disks. Wall motion is normal; there are no regional wall motion abnormalities. Unable to assess LV diastolic function. Left atrium:  Well visualized. The atrium was dilated. Right ventricle:  The cavity size was increased. Systolic function was normal. Right atrium:  Well visualized.  The atrium was dilated. Mitral valve:  Well visualized. The annulus was mildly to moderately calcified. Aortic valve:  Well visualized. The leaflets were moderately calcified. Tricuspid valve:  Well visualized. The annulus is normal-sized. The leaflets are normal thickness. No echocardiographic evidence for tricuspid prolapse. Doppler:  Transvalvular velocity was within the normal range. There was no evidence for stenosis. There was mild regurgitation. Pulmonic valve:   Well visualized. Pericardium:  A trivial pericardial effusion was identified. There was no evidence of hemodynamic compromise. Pleura:  No evidence of pleural fluid accumulation. Pulmonary arteries: Moderate pulmonary hypertension was present. Systemic veins:  Central venous respirophasic diameter changes are in the normal range (>50%). Inferior vena cava: The IVC was dilated. ---------------------------------------------------------------------------- Measurements  Left ventricle                    Value        Ref  IVS thickness, ED, PLAX       (H) 1.3   cm     0.6 - 0.9  LV ID, ED, PLAX                   4.7   cm     3.8 - 5.2  LV ID, ES, PLAX                   3.1   cm     2.2 - 3.5  LV PW thickness, ED, PLAX     (H) 1.2   cm     0.6 - 0.9  IVS/LV PW ratio, ED, PLAX         1.08         ---------  LV PW/LV ID ratio, ED, PLAX       0.26         ---------  LV ejection fraction              63    %      54 - 74  Stroke volume/bsa, 2D             52    ml/m^2 ---------  LV end-diastolic volume, 1-p      58    ml     48 - 140  A4C  LV ejection fraction, 1-p A4C     59    %      46 - 78  Stroke volume, 1-p A4C            34    ml     ---------  LV end-diastolic volume/bsa,      30    ml/m^2 30 - 82  1-p A4C  Stroke volume/bsa, 1-p A4C        18    ml/m^2 ---------  LV end-diastolic volume, 2-p      61    ml     46 - 106  LV end-systolic volume, 2-p       24    ml     14 - 42  LV ejection fraction, 2-p         60    %      54 - 74  Stroke volume, 2-p                 37    ml     ---------  LV end-diastolic volume/bsa,      31    ml/m^2 29 - 61  2-p  LV end-systolic volume/bsa,       12    ml/m^2 8 - 24  2-p  Stroke volume/bsa, 2-p            18.8  ml/m^2 ---------  LVOT                              Value        Ref  LVOT ID                           2     cm     ---------  LVOT peak velocity, S             1.5   m/sec  ---------  LVOT VTI, S                       32.2  cm     ---------  LVOT peak gradient, S             9     mm Hg  ---------  LVOT mean gradient, S             5     mm Hg  ---------  Stroke volume (SV), LVOT DP       101   ml     ---------  Stroke index (SV/bsa), LVOT       52    ml/m^2 ---------  DP  Aortic valve                      Value        Ref  Aortic leaflet separation, MM     1.0   cm     ---------  Pulmonary artery                  Value        Ref  PA pressure, S, DP                57    mm Hg  ---------  Tricuspid valve                   Value        Ref  Tricuspid regurg peak         (H) 3.49  m/sec  <=2.8  velocity  Tricuspid peak RV-RA gradient     49    mm Hg  ---------  Systemic veins                    Value        Ref  Estimated CVP                     8     mm Hg  ---------  Inferior vena cava                Value        Ref  ID                            (H) 2.3   cm     <=2.1  Right ventricle                   Value        Ref  TAPSE, 2D                         3.25  cm     >=1.70  TAPSE, MM                         2.05  cm     >=1.70  RV pressure, S, DP                57    mm Hg  ---------  RV s', lateral                    21.8  cm/sec >=9.5 Legend: (L)  and  (H)  migel values outside specified reference range. ---------------------------------------------------------------------------- Prepared and electronically signed by Manjinder Chavis 10/25/2024 17:20          Meds:   Current Facility-Administered Medications   Medication Dose Route Frequency    apixaban (Eliquis) tab 10 mg  10 mg Oral BID    Followed by    [START ON  11/7/2024] apixaban (Eliquis) tab 5 mg  5 mg Oral BID    furosemide (Lasix) tab 20 mg  20 mg Oral Daily    methylPREDNISolone sodium succinate (Solu-MEDROL) injection 40 mg  40 mg Intravenous Q12H    polyethylene glycol (PEG 3350) (Miralax) 17 g oral packet 17 g  17 g Oral Daily    docusate sodium (Colace) cap 100 mg  100 mg Oral BID    atorvastatin (Lipitor) tab 20 mg  20 mg Oral Nightly    piperacillin-tazobactam (Zosyn) 3.375 g in dextrose 5% 100 mL IVPB-ADDV  3.375 g Intravenous Q8H    amLODIPine (Norvasc) tab 5 mg  5 mg Oral Daily    acetaminophen (Tylenol) tab 650 mg  650 mg Oral Q6H PRN    dextromethorphan-guaifenesin ER (Mucinex DM)  MG per 12 hr tab 1 tablet  1 tablet Oral BID PRN    influenza virus trivalent high dose PF (Fluzone HD) 0.5 mL injection (ages >/= 65 years) 0.5 mL  0.5 mL Intramuscular Prior to discharge       Assessment & Plan       Acute hypoxic respiratory failure  Bilateral pneumonia/pneumonitis  -failed outpt treatment with amox and doxycycline x 7d  -repeat CXR 10/24 revealed extensive bilateral perihilar and bilateral upper and lower lobe airspace opacities, progressed since 10/17.    -Covid negative 10/17 and 10/24/24  -S.pneumo, legionella negative  -Mycoplasma IgM/IgG negative  -ANCA panel negative  -URIEL/connective tissue disease panel negative  -Fungitell-beta-D glucan negative  -Echo 10/24/24 - normal EF (55-60%), no obvious vegetatons  -CT with bilateral ground glass opacities, small consolidations of BLL  - ID following; s/p zithromax x 6 days; on zosyn IV (day 8) - to stop today 10/31/24  -per pulm, findings suspicious for NSIP (vs cryptogenic organizing pneumonia vs hypersensitivity pneumonitis); NOT thought to be c/w UIP pattern  -on empiric IV solumedrol since 10/25/24- on 40mg IV q6hrs > q8hrs >q12 hrs  -anti-histone and anti-K[place]a Ab's pending  -fungal Ab and histo galactomanan Ag pending  -pt denies recent travel, exposure to birds/pets, arthralgias (other than her mild  hand OA), fevers  -wean O2 as tolerated - O2 requirements decreasing (3-4L at rest) though still needs 10L w/ambulation as she continues to desat  -repeat CXR 10/29 improved from 10/24  -no indication for bronch at this point  -further recs per Dr. Holloway     Bilateral pulmonary emboli  -CT chest 10/25/24 -- acute distal segmental/subsegmental pulmonary emboli in [place] and subsegmental PE in [place]  -unclear etiology; no recent hx of long travel; no family/personal hx of blood clots  -BLE venous dopplers negative  -unclear etiology of PE; will do hypercoag w/u as outpt.   Consider malignancy w/u if no other cause found (colonoscopy 1/2021 revealed adenomatous polyp but poor prep; pt declined repeat colonoscopy; Medicare would not cover Cologuard); UTD on mammo  -stopped heparin gtt -- changed to eliquis today, though will cost $580/month. Pulfatmata Liz looking into cost of Xarelto which has previously been less expensive     Severe aortic stenosis  -seeing cardiology Dr. Bird  -moderate A.S on echo in 10/2022 (prev mild A.S in 2020)  -abnormal pre-op EKG 9/2023 --Nuc GXT done 9/20/23 -- EKG portion revealed 1-1.5mm ST seg depression in inferolateral leads with prolonged recovery; nuclear portion with normal perfusion   -Echo 9/29/23 --progression of A.S. from mild to moderate  -recent echo at Dr. Bird's office 10/18/24 -- LVEF 65%, grade 2 diastolic dysfunction, severe aortic stenosis (mean aortic gradient signif worse since 9/2023 -- 31>49 mmHg),  -pt will need TAVR evaluation as outpt  -cardiology consult appreciated  -pt given IV lasix 20mg/day from 10/25-10/28; received lasix 10mg IV x 1 on 10/29  -now on lasix 20g po daily     Anemia  -Hgb stable in the 10's-11's  -likely 2/2 acute illness  -normal iron studies (c/w anemia of chronic disease; low TIBC)     Hypertension, primary  -(dyazide stopped due to NANCY)  -on amlodipine 5mg/day     Hx of hip OA  -s/p left THR (Dr. Lo) many years ago  -s/p elective R  BEATRIZ on 10/5/23 by Dr. Diaz     Hyperlipidemia   Pt with strong family hx of high cholesterol  ; normal TG and HDL  No indication for statin given age     Osteopenia   On Fosamax from 2011 to 4/2016.     DEXA stable 5/10/17.    DEXA in 9/2019 showed slightly more bone loss in spine, but stable in hip.    DEXA 11/2021 -- some improvement in femur.  Hold off on fosamax for now.   DEXA 8/2024 -- osteoporosis of left forearm  -restarted Fosamax 8/2024     Vitamin D deficiency  On vitamin D 4000u/day     VTE proph   -eliquis as above    D/w RN, APN Agusto Ruiz Stuart MD  10/31/2024  11:04 AM

## 2024-10-31 NOTE — PROGRESS NOTES
[facility]  part of [facility]     Progress Note    Hoda Busby Patient Status:  Inpatient    1940 MRN [id number]   Location [facility]5W Attending Malathi Stuart MD   Hosp Day # 7 PCP Malathi Stuart MD       Subjective:   Patient seen and examined.  Admits to some gradual improvement in dyspnea.  On 4 L oxygen.  Tolerating some ambulation    Objective:   Blood pressure 115/63, pulse 89, temperature 97.7 °F (36.5 °C), temperature source Oral, resp. rate 20, weight 183 lb 9.6 oz (83.3 kg), SpO2 96%.  Intake/Output:   Last 3 shifts: I/O last 3 completed shifts:  In: 1666 [P.O.:1656; I.V.:10]  Out: 2530 [Urine:2530]   This shift: I/O this shift:  In: 636 [P.O.:436; IV PIGGYBACK:200]  Out: -      Vent Settings:      Hemodynamic parameters (last 24 hours):      Scheduled Meds:   Current Facility-Administered Medications   Medication Dose Route Frequency    apixaban (Eliquis) tab 10 mg  10 mg Oral BID    Followed by    [START ON 2024] apixaban (Eliquis) tab 5 mg  5 mg Oral BID    furosemide (Lasix) tab 20 mg  20 mg Oral Daily    methylPREDNISolone sodium succinate (Solu-MEDROL) injection 40 mg  40 mg Intravenous Q12H    polyethylene glycol (PEG 3350) (Miralax) 17 g oral packet 17 g  17 g Oral Daily    docusate sodium (Colace) cap 100 mg  100 mg Oral BID    atorvastatin (Lipitor) tab 20 mg  20 mg Oral Nightly    piperacillin-tazobactam (Zosyn) 3.375 g in dextrose 5% 100 mL IVPB-ADDV  3.375 g Intravenous Q8H    amLODIPine (Norvasc) tab 5 mg  5 mg Oral Daily    acetaminophen (Tylenol) tab 650 mg  650 mg Oral Q6H PRN    dextromethorphan-guaifenesin ER (Mucinex DM)  MG per 12 hr tab 1 tablet  1 tablet Oral BID PRN    influenza virus trivalent high dose PF (Fluzone HD) 0.5 mL injection (ages >/= 65 years) 0.5 mL  0.5 mL Intramuscular Prior to discharge       Continuous Infusions:         Physical Exam  Constitutional: no acute distress  Eyes: PERRL  ENT: nares  pateint  Neck: supple, no JVD  Cardio: RRR, S1 S2  Respiratory: Scattered crackles  GI: abdomen soft, non tender, active bowel sounds, no organomegaly  Extremities: no clubbing, cyanosis, edema  Neurologic: no gross motor deficits  Skin: warm, dry      Results:     Lab Results   Component Value Date    CREATSERUM 1.07 10/31/2024    BUN 26 10/31/2024     10/31/2024    K 4.9 10/31/2024     10/31/2024    CO2 27.0 10/31/2024     10/31/2024    CA 8.9 10/31/2024    PTT 95.0 10/31/2024       XR CHEST AP PORTABLE  (CPT=71045)    Result Date: 10/29/2024  CONCLUSION:   Unchanged interstitial and alveolar opacities compared to 10/28/2024 and improved from 10/24/2024.  Findings may be secondary to infection and/or edema.  Recommend radiographic follow-up to resolution.  Trace left pleural effusion, unchanged.  Right pleural effusion has resolved.    Dictated by (CST): Beatriz Torres MD on 10/29/2024 at 3:22 PM     Finalized by (CST): Beatriz Torres MD on 10/29/2024 at 3:25 PM                 Assessment   1.  Acute hypoxemic respiratory failure  2.  Bilateral lung opacities  3.  Acute pulmonary embolism  4.  COPD  5.  Prior nicotine dependence  6.  Hypertension  7.  Severe aortic stenosis     Plan   -Patient presents with evidence of progressive worsening dyspnea with exertion, hypoxia over the course of last week.  Ongoing nonproductive cough present.  Started on antibiotic therapy with doxycycline and Augmentin on 10/17/2024 with no significant improvement clinically.  -CT chest on 10/25/2024 with acute segmental pulmonary embolism right upper lobe and left upper lobe.  Multifocal groundglass opacities seen bilaterally.  Pattern less likely due to pulmonary edema differential includes organizing pneumonia cannot rule out infectious process.  Unlikely drug reaction.  Cannot rule out underlying ILD.  -No significant clinical improvement despite antibiotic therapy.  Antibiotics course to be completed  today.  -Legionella and strep pneumonia antigen negative  -Currently on 5 L nasal cannula oxygen.  Wean as tolerated.    -Gradually wean steroid therapy.  -Started on anticoagulation with heparin GTT.  Transition to DOAC therapy today.  -URIEL and ANCA negative  -Fungal serologies pending  -Hold off bronchoscopy at this time especially given high oxygenation requirements.  Discussed with primary care physician.  -May potentially be optimized for discharge within next 24 hours from pulmonary perspective.  Patient prefers to go to rehab.  PT evaluation pending for potential placement recommendations.  Will need to evaluate for home oxygen if discharged home with oxygen.  Will require slow taper of prednisone over several months duration.    Cody Holloway, DO  Pulmonary Critical Care Medicine  [facility]

## 2024-10-31 NOTE — OCCUPATIONAL THERAPY NOTE
OCCUPATIONAL THERAPY EVALUATION - INPATIENT     Room Number: 516/516-A  Evaluation Date: 10/31/2024  Type of Evaluation: Initial  Presenting Problem: acute hypoxic respiratory failure, B PNA, B PE s/p AC  Hx hip OA, COPD, HTN     Physician Order: IP Consult to Occupational Therapy  Reason for Therapy: ADL/IADL Dysfunction and Discharge Planning    OCCUPATIONAL THERAPY ASSESSMENT   Patient is a 84 year old female admitted 10/24/2024 for acute hypoxic respiratory failure, B PNA, B PE s/p AC.  Prior to admission, patient's baseline is independent with I/ADLs, including driving, and with fx mobility/transfers without a device.  Patient is currently functioning below baseline with lower body dressing, transfers, dynamic standing balance, functional standing tolerance, energy conservation strategies, and aerobic capacity.  Patient is requiring up to min assist for ADLs as a result of the following impairments: decreased functional strength, decreased functional reach, decreased endurance, pain, impaired balance, decreased muscular endurance, and increased O2 needs from baseline. Occupational Therapy will continue to follow for duration of hospitalization.    Patient will benefit from continued skilled OT Services to promote return to prior level of function and safety with continuous assistance and gradual rehabilitative therapy.    PLAN DURING HOSPITALIZATION     OT Treatment Plan: Balance activities;Energy conservation/work simplification techniques;ADL training;Functional transfer training;Endurance training;Patient/Family education;Patient/Family training;Equipment eval/education;Compensatory technique education     OCCUPATIONAL THERAPY MEDICAL/SOCIAL HISTORY   Problem List  Active Problems:    Acute pulmonary embolism (HCC)    Pneumonia    HOME SITUATION  Type of Home: House  Home Layout: One level  Lives With: Alone  Shower/Tub and Equipment: Shower chair  Drives: Yes  Patient Regularly Uses: Reading glasses (no  home O2; owns cane and RW)  Stairs in Home: 3 STEPHANIE    SUBJECTIVE  Patient agreeable to OT evaluation.    OCCUPATIONAL THERAPY EXAMINATION      OBJECTIVE  Precautions: Bed/chair alarm  Fall Risk: -- (moderate fall risk)      PAIN ASSESSMENT  Ratin      ACTIVITY TOLERANCE  Pulse: 90  Heart Rate Source: Monitor                   O2 SATURATIONS  Oxygen Therapy  SPO2% Ambulation on Oxygen: 77 (recovered to 92% within 2 min seated recovery period and PLB)  Ambulation oxygen flow (liters per minute): 3    COGNITION  Overall Cognitive Status:  WFL - within functional limits    SENSATION  Light touch:  intact    RANGE OF MOTION   Upper extremity ROM is within functional limits     STRENGTH ASSESSMENT  Upper extremity strength is within functional limits     COORDINATION  Gross Motor: WFL   Fine Motor: WFL     ACTIVITIES OF DAILY LIVING ASSESSMENT  AM-PAC ‘6-Clicks’ Inpatient Daily Activity Short Form  How much help from another person does the patient currently need…  -   Putting on and taking off regular lower body clothing?: A Little  -   Bathing (including washing, rinsing, drying)?: A Little  -   Toileting, which includes using toilet, bedpan or urinal? : A Little  -   Putting on and taking off regular upper body clothing?: None  -   Taking care of personal grooming such as brushing teeth?: A Little  -   Eating meals?: None    AM-PAC Score:  Score: 20  Approx Degree of Impairment: 38.32%  Standardized Score (AM-PAC Scale): 42.03  CMS Modifier (G-Code): CJ    BED MOBILITY  Not Tested - patient in bathroom upon therapist arrival    FUNCTIONAL TRANSFER ASSESSMENT  Toilet Transfer: contact guard assist  Sit to Stand from Toilet: contact guard assist  Chair Transfer: contact guard assist    FUNCTIONAL MOBILITY  min assist for in-room fx mobility using RW  Comments:   Desaturated with only in-room mobility, see vitals above. Patient mildly impulsive and benefited from cues for slower pace and safety.    ACTIVITIES OF DAILY  LIVING  Eating: independent (per obs)   Grooming: stand-by assist standing at sink  UB Dressing: independent (per obs)   LB Dressing: min assist  Toileting: supervision seated    EDUCATION PROVIDED  Patient Education : Role of Occupational Therapy; Discharge Recommendations; Plan of Care; Functional Transfer Techniques; Fall Prevention; Posture/Positioning; Proper Body Mechanics; Energy Conservation  Patient's Response to Education: Returned Demonstration; Verbalized Understanding; Requires Further Education    The patient's Approx Degree of Impairment: 38.32% has been calculated based on documentation in the [facility] '6 clicks' Inpatient Daily Activity Short Form.  Research supports that patients with this level of impairment may benefit from  OT.  Final disposition will be made by interdisciplinary medical team.     Patient End of Session: Up in chair;Needs met;RN aware of session/findings;Call light within reach;All patient questions and concerns addressed;Hospital anti-slip socks;Alarm set    OT Goals  Patients self stated goal is: none stated     Patient will complete functional transfer with MI  Comment:     Patient will complete toileting with MI  Comment:     Patient will complete LB dressing with MI  Comment:    Patient will complete item retrieval with MI  Comment:    Patient will be able to verbalize and/or implement at least 2 energy conservation strategies during ADLs/mobility  Comment:     Goals  on: 24  Frequency: 3-5x/week    Patient Evaluation Complexity Level:   Occupational Profile/Medical History LOW - Brief history including review of medical or therapy records    Specific performance deficits impacting engagement in ADL/IADL MODERATE  3 - 5 performance deficits   Client Assessment/Performance Deficits MODERATE - Comorbidities and min to mod modifications of tasks    Clinical Decision Making LOW - Analysis of occupational profile, problem-focused assessments, limited treatment options     Overall Complexity LOW     OT Session Time  Self-Care Home Management: 10 minutes    Ela Wang OTR/L  [facility]  #[id number]

## 2024-10-31 NOTE — CM/SW NOTE
10/31/24 1600   Discharge Needs   Anticipated D/C needs Subacute rehab   Services Requested   Submitted to [facility] Yes   PASRR Level 1 Submitted Yes     Anticipated therapy need: Gradual Rehabilitative Therapy.    [facility] referrals started in [place].  IP admission order uploaded. PASSR uploaded.  Upload therapy notes when available.    If patient is agreeable to [facility], provide list.    Tessa Armstrong MBA BSN RN CRRN   RN Case Manager  [phone]

## 2024-10-31 NOTE — SPIRITUAL CARE NOTE
Spiritual Care Visit Note    Patient Name: Hoda Busby Date of Spiritual Care Visit: 10/31/24   : 1940 Primary Dx: <principal problem not specified>       Referred By:      Spiritual Care Taxonomy:    Intended Effects: Promote a sense of peace    Methods: Explore spiritual/Zoroastrian beliefs    Interventions: Active listening;Ask guided questions;Share words of hope and inspiration    Visit Type/Summary:     - Spiritual Care: Responded to a request for spiritual care and assessed the patient for spiritual care needs. Consulted with RN prior to visit.  remains available as needed for follow up.    Spiritual Care support can be requested via an [facility] consult. For urgent/immediate needs, please contact the On Call  at: [place]: ext [phone]           Rev. Isabell Hubbard

## 2024-10-31 NOTE — PLAN OF CARE
A&Ox4. Pt ambulates standby with walker, voiding up to bathroom, tolerating diet, on 3L via NC. Frequent rounding by nursing staff. Safety precautions maintained/call light within reach.    Problem: Patient Centered Care  Goal: Patient preferences are identified and integrated in the patient's plan of care  Description: Interventions:  - What would you like us to know as we care for you? I come from home alone.  - Provide timely, complete, and accurate information to patient/family  - Incorporate patient and family knowledge, values, beliefs, and cultural backgrounds into the planning and delivery of care  - Encourage patient/family to participate in care and decision-making at the level they choose  - Honor patient and family perspectives and choices  Outcome: Progressing     Problem: RESPIRATORY - ADULT  Goal: Achieves optimal ventilation and oxygenation  Description: INTERVENTIONS:  - Assess for changes in respiratory status  - Assess for changes in mentation and behavior  - Position to facilitate oxygenation and minimize respiratory effort  - Oxygen supplementation based on oxygen saturation or ABGs  - Provide Smoking Cessation handout, if applicable  - Encourage broncho-pulmonary hygiene including cough, deep breathe, Incentive Spirometry  - Assess the need for suctioning and perform as needed  - Assess and instruct to report SOB or any respiratory difficulty  - Respiratory Therapy support as indicated  - Manage/alleviate anxiety  - Monitor for signs/symptoms of CO2 retention  Outcome: Progressing     Problem: SAFETY ADULT - FALL  Goal: Free from fall injury  Description: INTERVENTIONS:  - Assess pt frequently for physical needs  - Identify cognitive and physical deficits and behaviors that affect risk of falls.  - [place] fall precautions as indicated by assessment.  - Educate pt/family on patient safety including physical limitations  - Instruct pt to call for assistance with activity based on assessment  -  Modify environment to reduce risk of injury  - Provide assistive devices as appropriate  - Consider OT/PT consult to assist with strengthening/mobility  - Encourage toileting schedule  Outcome: Progressing

## 2024-10-31 NOTE — PROGRESS NOTES
Progress Note  Hoda Busby Patient Status:  Inpatient    1940 MRN [id number]   Location [facility]5W Attending Malathi Stuart MD   Hosp Day # 7 PCP Malathi Stuart MD     SUBJECTIVE:    Still with dyspnea on exertion though seems to be slightly improving. Denies chest pain.     VITALS:  /63 (BP Location: Right arm)   Pulse 89   Temp 97.7 °F (36.5 °C) (Oral)   Resp 20   Wt 183 lb 9.6 oz (83.3 kg)   SpO2 96%   BMI 27.92 kg/m²     INTAKE/OUTPUT:    Intake/Output Summary (Last 24 hours) at 10/31/2024 1048  Last data filed at 10/31/2024 0919  Gross per 24 hour   Intake 1672 ml   Output 915 ml   Net 757 ml     Last 3 Weights   10/31/24 0609 183 lb 9.6 oz (83.3 kg)   10/24/24 1651 178 lb (80.7 kg)   10/24/24 1504 178 lb (80.7 kg)   24 1336 183 lb 3.2 oz (83.1 kg)     LABS:  Recent Labs   Lab 10/29/24  0512 10/30/24  0438 10/31/24  0429   * 129* 126*   BUN 25* 27* 26*   CREATSERUM 1.00 0.98 1.07*   EGFRCR 56* 57* 51*   CA 8.5* 8.6* 8.9    139 139   K 3.9 4.5 4.9    106 108   CO2 27.0 28.0 27.0     Recent Labs   Lab 10/28/24  0452 10/28/24  1620 10/29/24  0512 10/30/24  0438   RBC 3.61* 3.86 3.41* 3.50*   HGB 11.0* 11.6* 10.1* 10.5*   HCT 32.9* 34.3* 30.5* 31.2*   MCV 91.1 88.9 89.4 89.1   MCH 30.5 30.1 29.6 30.0   MCHC 33.4 33.8 33.1 33.7   RDW 12.7 12.9 12.9 12.8   NEPRELIM 17.95*  --  12.45* 14.27*   WBC 20.0* 20.0* 14.1* 16.6*   .0* 610.0* 458.0* 459.0*     No results for input(s): \"TROP\", \"CK\" in the last 168 hours.    DIAGNOSTICS:    TELEMETRY:     ECHO 10/25/2024:  Conclusions:  1. Left ventricle: The cavity size was normal. Wall thickness was mildly      increased. Systolic function was normal. The estimated ejection fraction      was 55-60%, by biplane method of disks. Wall motion is normal; there are      no regional wall motion abnormalities. Unable to assess LV diastolic      function.   2. Right ventricle: The cavity size was increased.  Systolic function was      normal.   3. Left atrium: The atrium was dilated.   4. Right atrium: The atrium was dilated.   5. Mitral valve: The annulus was mildly to moderately calcified.   6. Pulmonary arteries: The peak systolic pressure is 57mm Hg.   Impressions:  The right ventricular systolic pressure was increased   consistent with moderate pulmonary hypertension. No previous study was   available for comparison.     [facility] ECHO 10/18/2024:  Findings - Right Atrium  The right atrium is mildly enlarged.    Findings - Left Ventricle  The left ventricle is normal in size. Left ventricular diastolic dimension is 5.1 cms. Left ventricular systolic dimension is 3.3 cms. Left ventricular diastolic septal thickness is 1.1 cms. Left ventricular diastolic postero basal free wall thickness is 1.1 cms. Global left ventricular systolic function is normal. The left ventricular fractional shortening is 36.4%. The left ventricular ejection fraction is 65%. The left ventricle diastolic function is impaired (Grade II) with an elevated left atrial pressure. Left ventricular outflow tract diameter is 2.0 cms. Left ventricular outflow tract VTI is 36.3 cms. Left ventricular outflow tract peak velocity is 1.4 m/s. Left ventricular peak gradient is 8 mmHg. Left ventricular outflow tract mean velocity is 1.2 m/s. Left ventricular mean gradient is 6 mmHg.    Findings - Right Ventricle  Right ventricular systolic function is normal. Right ventricular diastolic dimension is 3.1 cms. Right ventricular systolic pressure is 50 mmHg.    Findings - Atrial Septum  The atrial septum is normal.    Findings - Ventricular Septum  The ventricular septum is normal.    Findings - Pulmonary Artery  The estimated pulmonary artery systolic pressure is 50 mmHg assuming a right atrial pressure of 8 mmHg.    Findings - Pulmonary Vein  The pulmonary vein appears normal.    Findings - IVC  The inferior vena cava is mildly increased in size. The inferior vena  cava changes greater than 50 percent during respiration.    Findings - Pulmonic Valve  The peak velocity is 1.7 m/s. The mean velocity is 1.1 m/s. The peak trans pulmonic gradient is 12.0 mmHg. The mean trans pulmonic gradient is 6.0 mmHg.    Findings - Tricuspid Valve  Evidence of tricuspid regurgitation is present. Moderate (2+) tricuspid regurgitation. The peak tricuspid regurgitant velocity is 3.2 m/s. The peak trans tricuspid gradient is 42.0 mmHg.    Findings - Mitral Valve  Mild mitral annular calcification is noted. Mild calcification of the mitral valve is noted. The mean trans mitral gradient is 3.0 mmHg. Mitral regurgitation is noted. Moderate (2+) mitral regurgitation. The deceleration time is 218 ms. The peak mitral gradient is 5 mmHg. The E velocity is 1.1 m/s. The A velocity is 1.1 m/s.    Findings - Aortic Valve  Severe aortic valve stenosis is present. Aortic valve area continuity equation is 1.1 cm². The trans-aortic peak velocity is 4.4 m/s. The trans-aortic peak gradient is 77 mmHg. The trans-aortic mean velocity is 3.3 m/s. The trans-aortic mean gradient is 49.0 mmHg. Aortic valve VTI measures 105.0 cms. Severe calcification of the aortic valve is noted. LVOT Diameter is 2.0 cms.    Findings - Aorta  Aortic root diameter is 2.8 cms. Ascending aorta diameter is 3.0 cms.    Findings - Pericardium    ROS: Negative unless noted above     PHYSICAL EXAM:  General: Alert and oriented x 3. No apparent distress.  HEENT: Normocephalic, sclera are nonicteric. Hearing appropriate bilaterally.  Neck: No JVD or Carotid bruits. Trachea midline.   Cardiac: Regular rate and rhythm. S1, S2 auscultated. No murmurs, rubs, or gallops appreciated.   Lungs: a/p dullness. Chest expansion symmetrical. Increased effort. 4L NC  Abdomen: Soft, non-tender, +BS. No hepatosplenomegaly or appreciable masses.   Extremities: Without clubbing, cyanosis or edema.  Peripheral pulses are 2+.  Neurologic: Motor and sensory nerves  grossly intact.   Psych: Appropriate affect   Skin: Warm and dry. No obvious lesions, wounds, or ulcerations.     MEDICATIONS:   apixaban  10 mg Oral BID    Followed by    [START ON 11/7/2024] apixaban  5 mg Oral BID    furosemide  20 mg Oral Daily    methylPREDNISolone  40 mg Intravenous Q12H    polyethylene glycol (PEG 3350)  17 g Oral Daily    docusate sodium  100 mg Oral BID    atorvastatin  20 mg Oral Nightly    piperacillin-tazobactam  3.375 g Intravenous Q8H    amLODIPine  5 mg Oral Daily     ASSESSMENT:    Acute Hypoxic Respiratory Failure   Acute Bilateral PE  Acute Bilateral PNA  - A/c per primary and pulmonary   - s/p ABX, on Steroids  - ECHO with preserved ejection fraction, no wma, normal RVSP but mod PHTN  - Venous BLE dopplers negative   - Unclear PE etiology, hypercoag workup o/p     Severe Aortic Stenosis  - MG increased from 31>47 since 9/2023 to outpatient ECHO on 10/2024  - Plans for o/p TAVR evaluation  - Appears compensated on low dose PO Lasix, s/p IV Lasix     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 Hopefully be able to start to wean oxygen today. On exam appears euvolemic, dyspnea may be more related to PE and acute lung disease rather than volume.     Plan of care discussed with patient and RN.     Cindy Ramírez, KALYAN  10/31/2024  10:48 AM  [phone] ([place])  [phone] ([place])

## 2024-10-31 NOTE — PLAN OF CARE
Problem: Patient Centered Care  Goal: Patient preferences are identified and integrated in the patient's plan of care  Description: Interventions:  - What would you like us to know as we care for you? I come from home alone.  - Provide timely, complete, and accurate information to patient/family  - Incorporate patient and family knowledge, values, beliefs, and cultural backgrounds into the planning and delivery of care  - Encourage patient/family to participate in care and decision-making at the level they choose  - Honor patient and family perspectives and choices  Outcome: Progressing     Problem: Patient/Family Goals  Goal: Patient/Family Long Term Goal  Description: Patient's Long Term Goal:     Interventions:  -   - See additional Care Plan goals for specific interventions  Outcome: Progressing  Goal: Patient/Family Short Term Goal  Description: Patient's Short Term Goal:     Interventions:   - See additional Care Plan goals for specific interventions  Outcome: Progressing     Problem: CARDIOVASCULAR - ADULT  Goal: Maintains optimal cardiac output and hemodynamic stability  Description: INTERVENTIONS:  - Monitor vital signs, rhythm, and trends  - Monitor for bleeding, hypotension and signs of decreased cardiac output  - Evaluate effectiveness of vasoactive medications to optimize hemodynamic stability  - Monitor arterial and/or venous puncture sites for bleeding and/or hematoma  - Assess quality of pulses, skin color and temperature  - Assess for signs of decreased coronary artery perfusion - ex. Angina  - Evaluate fluid balance, assess for edema, trend weights  Outcome: Progressing  Goal: Absence of cardiac arrhythmias or at baseline  Description: INTERVENTIONS:  - Continuous cardiac monitoring, monitor vital signs, obtain 12 lead EKG if indicated  - Evaluate effectiveness of antiarrhythmic and heart rate control medications as ordered  - Initiate emergency measures for life threatening arrhythmias  -  Monitor electrolytes and administer replacement therapy as ordered  Outcome: Progressing     Problem: RESPIRATORY - ADULT  Goal: Achieves optimal ventilation and oxygenation  Description: INTERVENTIONS:  - Assess for changes in respiratory status  - Assess for changes in mentation and behavior  - Position to facilitate oxygenation and minimize respiratory effort  - Oxygen supplementation based on oxygen saturation or ABGs  - Provide Smoking Cessation handout, if applicable  - Encourage broncho-pulmonary hygiene including cough, deep breathe, Incentive Spirometry  - Assess the need for suctioning and perform as needed  - Assess and instruct to report SOB or any respiratory difficulty  - Respiratory Therapy support as indicated  - Manage/alleviate anxiety  - Monitor for signs/symptoms of CO2 retention  Outcome: Progressing     Problem: GASTROINTESTINAL - ADULT  Goal: Maintains or returns to baseline bowel function  Description: INTERVENTIONS:  - Assess bowel function  - Maintain adequate hydration with IV or PO as ordered and tolerated  - Evaluate effectiveness of GI medications  - Encourage mobilization and activity  - Obtain nutritional consult as needed  - Establish a toileting routine/schedule  - Consider collaborating with pharmacy to review patient's medication profile  Outcome: Progressing     Problem: SAFETY ADULT - FALL  Goal: Free from fall injury  Description: INTERVENTIONS:  - Assess pt frequently for physical needs  - Identify cognitive and physical deficits and behaviors that affect risk of falls.  - [place] fall precautions as indicated by assessment.  - Educate pt/family on patient safety including physical limitations  - Instruct pt to call for assistance with activity based on assessment  - Modify environment to reduce risk of injury  - Provide assistive devices as appropriate  - Consider OT/PT consult to assist with strengthening/mobility  - Encourage toileting schedule  Outcome: Progressing

## 2024-11-01 ENCOUNTER — APPOINTMENT (OUTPATIENT)
Dept: GENERAL RADIOLOGY | Facility: HOSPITAL | Age: 84
End: 2024-11-01
Attending: INTERNAL MEDICINE
Payer: MEDICARE

## 2024-11-01 VITALS
OXYGEN SATURATION: 98 % | DIASTOLIC BLOOD PRESSURE: 62 MMHG | BODY MASS INDEX: 27 KG/M2 | WEIGHT: 174.88 LBS | RESPIRATION RATE: 18 BRPM | HEART RATE: 90 BPM | SYSTOLIC BLOOD PRESSURE: 113 MMHG | TEMPERATURE: 98 F

## 2024-11-01 PROBLEM — J96.01 ACUTE RESPIRATORY FAILURE WITH HYPOXIA (HCC): Status: ACTIVE | Noted: 2024-11-01

## 2024-11-01 LAB
ANION GAP SERPL CALC-SCNC: 3 MMOL/L (ref 0–18)
APTT PPP: 30.6 SECONDS (ref 23–36)
BASOPHILS # BLD: 0 X10(3) UL (ref 0–0.2)
BASOPHILS NFR BLD: 0 %
BUN BLD-MCNC: 26 MG/DL (ref 9–23)
BUN/CREAT SERPL: 24.8 (ref 10–20)
CALCIUM BLD-MCNC: 8.9 MG/DL (ref 8.7–10.4)
CHLORIDE SERPL-SCNC: 107 MMOL/L (ref 98–112)
CO2 SERPL-SCNC: 30 MMOL/L (ref 21–32)
CREAT BLD-MCNC: 1.05 MG/DL
DEPRECATED RDW RBC AUTO: 43.8 FL (ref 35.1–46.3)
EGFRCR SERPLBLD CKD-EPI 2021: 52 ML/MIN/1.73M2 (ref 60–?)
EOSINOPHIL # BLD: 0 X10(3) UL (ref 0–0.7)
EOSINOPHIL NFR BLD: 0 %
ERYTHROCYTE [DISTWIDTH] IN BLOOD BY AUTOMATED COUNT: 13.3 % (ref 11–15)
GLUCOSE BLD-MCNC: 129 MG/DL (ref 70–99)
HCT VFR BLD AUTO: 31.2 %
HGB BLD-MCNC: 10.3 G/DL
LYMPHOCYTES NFR BLD: 1.18 X10(3) UL (ref 1–4)
LYMPHOCYTES NFR BLD: 7 %
MCH RBC QN AUTO: 30.1 PG (ref 26–34)
MCHC RBC AUTO-ENTMCNC: 33 G/DL (ref 31–37)
MCV RBC AUTO: 91.2 FL
METAMYELOCYTES # BLD: 0.34 X10(3) UL
METAMYELOCYTES NFR BLD: 2 %
MONOCYTES # BLD: 0.67 X10(3) UL (ref 0.1–1)
MONOCYTES NFR BLD: 4 %
NEUTROPHILS # BLD AUTO: 14.45 X10 (3) UL (ref 1.5–7.7)
NEUTROPHILS NFR BLD: 85 %
NEUTS BAND NFR BLD: 2 %
NEUTS HYPERSEG # BLD: 14.62 X10(3) UL (ref 1.5–7.7)
OSMOLALITY SERPL CALC.SUM OF ELEC: 296 MOSM/KG (ref 275–295)
PLATELET # BLD AUTO: 457 10(3)UL (ref 150–450)
PLATELET MORPHOLOGY: NORMAL
POTASSIUM SERPL-SCNC: 5.1 MMOL/L (ref 3.5–5.1)
RBC # BLD AUTO: 3.42 X10(6)UL
SARS-COV-2 RNA RESP QL NAA+PROBE: NOT DETECTED
SODIUM SERPL-SCNC: 140 MMOL/L (ref 136–145)
TOTAL CELLS COUNTED BLD: 100
WBC # BLD AUTO: 16.8 X10(3) UL (ref 4–11)

## 2024-11-01 PROCEDURE — 99232 SBSQ HOSP IP/OBS MODERATE 35: CPT | Performed by: INTERNAL MEDICINE

## 2024-11-01 PROCEDURE — 71045 X-RAY EXAM CHEST 1 VIEW: CPT | Performed by: INTERNAL MEDICINE

## 2024-11-01 PROCEDURE — 99239 HOSP IP/OBS DSCHRG MGMT >30: CPT | Performed by: INTERNAL MEDICINE

## 2024-11-01 RX ORDER — PSEUDOEPHEDRINE HCL 30 MG
100 TABLET ORAL 2 TIMES DAILY
Qty: 180 CAPSULE | Refills: 0 | Status: SHIPPED | OUTPATIENT
Start: 2024-11-01

## 2024-11-01 RX ORDER — PREDNISONE 20 MG/1
40 TABLET ORAL
Status: DISCONTINUED | OUTPATIENT
Start: 2024-11-02 | End: 2024-11-01

## 2024-11-01 RX ORDER — PREDNISONE 20 MG/1
40 TABLET ORAL
Qty: 60 TABLET | Refills: 0 | Status: SHIPPED | OUTPATIENT
Start: 2024-11-02

## 2024-11-01 RX ORDER — POLYETHYLENE GLYCOL 3350 17 G/17G
17 POWDER, FOR SOLUTION ORAL DAILY
Qty: 30 EACH | Refills: 0 | Status: SHIPPED | OUTPATIENT
Start: 2024-11-02

## 2024-11-01 RX ORDER — FUROSEMIDE 20 MG/1
20 TABLET ORAL DAILY
Qty: 90 TABLET | Refills: 0 | Status: SHIPPED | OUTPATIENT
Start: 2024-11-02

## 2024-11-01 RX ORDER — ATORVASTATIN CALCIUM 20 MG/1
20 TABLET, FILM COATED ORAL NIGHTLY
Qty: 90 TABLET | Refills: 0 | Status: SHIPPED | OUTPATIENT
Start: 2024-11-01

## 2024-11-01 NOTE — PROGRESS NOTES
INFECTIOUS DISEASE PROGRESS NOTE  [facility]  part of [facility] ID PROGRESS NOTE    Hoda Busby Patient Status:  Inpatient    1940 MRN [id number]   Location [facility]5W Attending Malathi Stuart MD   Hosp Day # 8 PCP Malathi Stuart MD     Subjective:  ROS reviewed. Feels better. On 5L HFNC. Was on 3L NC overnight. Sitting in chair.    ASSESSMENT:    Antibiotics: OFF  Zosyn, azithromycin      # Acute leukocytosis with multifocal pneumonia with hypoxia               -Strep/legionella negative               -RVP negative   -URIEL negative, ANCA negative   -Fungal ab negative, urine histo negative, fungitell negative  # Leukocytosis  # Acute PE  # HTN     PLAN:  -  Completed seven day course of zosyn 10/31. Will continue to monitor off abx.  -  Follow fever curve, wbc. On Solu-Medrol.  -  Reviewed labs, micro, imaging reports, available old records.  -  Case d/w patient, RN.     History of Present Illness:  Hoda Busby is an 84 year old female with a history of HTN, aortic stenosis, who presented to [facility] ED on 10/24 with increased shortness of breath. Was seen in ED on 10/17 with a week of cough, COVID-19 negative and discharged from ED on amoxicillin and doxycycline x 1 week. Denies any fevers or chills. Has had poor appetite. Notes productive cough. Was having shortness of breath when walking in her house so came to  ED. Denies any recent travel. No sick contacts. On arrival, Tmax 99.3, wbc 16.7, CXR with extensive infiltrates, RVP negative, started on zosyn. Had CT chest showing acute PE, was on 3L and up to 10L NC yesterday. Azithromycin added and started on solu-medrol. Feels better this AM. ID consulted.    Physical Exam:  BP 97/52 (BP Location: Right arm)   Pulse 92   Temp 97 °F (36.1 °C) (Oral)   Resp 18   Wt 174 lb 14.4 oz (79.3 kg)   SpO2 92%   BMI 26.59 kg/m²     Gen:   Awake, in chair  HEENT:  EOMI, neck supple  CV/lungs:  Regular rate and  rhythm, CTAB  Abdom:  Soft, no TTP  Skin/extrem:  No rashes, no c/c/e  Lines:  PIV+    Laboratory Data: Reviewed    Microbiology: Reviewed    Radiology: Reviewed      [facility]  [phone]  11/1/2024

## 2024-11-01 NOTE — CONGREGATE LIVING REVIEW
Critical access hospital Living Authorization    The [facility] Review Committee has reviewed this case and the patient IS APPROVED for discharge to a facility for Short Term Skilled once the following procedure is followed:     - The physician discharge instructions (contained within the RAÚL note for SNF) must inlcude the below appropriate and approved COVID instructions to the facility    For questions regarding CLRC approval process, please contact the CM assigned to the case.  For questions regarding RN discharge workflow, please contact the unit Clinical Leader.

## 2024-11-01 NOTE — PHYSICAL THERAPY NOTE
PHYSICAL THERAPY TREATMENT NOTE - INPATIENT     Room Number: 516/516-A       Presenting Problem: acute hypoxic respiratory failure, pneumonia, bilateral PE s/p anticoagulation  Co-Morbidities : Hx hip OA, COPD, HTN    Problem List  Active Problems:    Acute pulmonary embolism (HCC)    Pneumonia      PHYSICAL THERAPY ASSESSMENT   Patient demonstrates fair progress this session, goals  remain in progress.      Patient is requiring contact guard assist as a result of the following impairments: decreased functional strength, decreased endurance/aerobic capacity, impaired standing balance, decreased muscular endurance, medical status, and increased O2 needs from baseline.     Patient continues to function below baseline with bed mobility, transfers, gait, stair negotiation, standing prolonged periods, and performing household tasks.  Next session anticipate patient to progress bed mobility, transfers, gait, and standing prolonged periods.  Physical Therapy will continue to follow patient for duration of hospitalization.    Patient continues to benefit from continued skilled PT services: to promote return to prior level of function and safety with continuous assistance and gradual rehabilitative therapy .    PLAN DURING HOSPITALIZATION  Nursing Mobility Recommendation : 1 Assist  PT Device Recommendation: Rolling walker  PT Treatment Plan: Bed mobility;Body mechanics;Coordination;Endurance;Energy conservation;Patient education;Gait training;Strengthening;Transfer training;Balance training  Frequency (Obs): 3-5x/week     SUBJECTIVE  \"I keep wanting to breathe through my mouth!\"    OBJECTIVE  Precautions: Bed/chair alarm    PAIN ASSESSMENT   Ratin    BALANCE  Static Sitting: Fair +  Dynamic Sitting: Fair  Static Standing: Fair -  Dynamic Standing: Poor +    ACTIVITY TOLERANCE  Pulse: 92  Heart Rate Source: Monitor     O2 WALK  Oxygen Therapy  SPO2% on Oxygen at Rest: 96  At rest oxygen flow (liters per minute): 5  (HFNC)  SPO2% Ambulation on Oxygen: 83 (recovered to 94% within 2 minutes with seated rest break and max cues for pursed lip breathing)  Ambulation oxygen flow (liters per minute): 5    AM-PAC '6-Clicks' INPATIENT SHORT FORM - BASIC MOBILITY  How much difficulty does the patient currently have...  Patient Difficulty: Turning over in bed (including adjusting bedclothes, sheets and blankets)?: A Little   Patient Difficulty: Sitting down on and standing up from a chair with arms (e.g., wheelchair, bedside commode, etc.): A Little   Patient Difficulty: Moving from lying on back to sitting on the side of the bed?: A Little   How much help from another person does the patient currently need...   Help from Another: Moving to and from a bed to a chair (including a wheelchair)?: A Little   Help from Another: Need to walk in hospital room?: A Little   Help from Another: Climbing 3-5 steps with a railing?: A Lot     AM-PAC Score:  Raw Score: 17   Approx Degree of Impairment: 50.57%   Standardized Score (AM-PAC Scale): 42.13   CMS Modifier (G-Code): CK    FUNCTIONAL ABILITY STATUS  Functional Mobility/Gait Assessment  Gait Assistance: Minimum assistance  Distance (ft): 25 ft  Assistive Device: Rolling walker  Pattern: Shuffle (decreased bonnie speed, decreased step length, slightly shaky, unsteady - no overt LOB)  Sit to Stand: contact guard assist from bedside chair and toilet; cues provided for appropriate UE placement placement during transfers     Skilled Therapy Provided: Patient sitting in bedside chair upon arrival. RN approved activity. Educated patient on POC and benefits of mobilization. Agreeable to participate. Patient reporting no pain. Re-enforced education on energy conservation, activity pacing, and pursed lip breathing. Patient on 5 L/min via HFNC throughout session, SpO2 96% at rest and 83% with activity. Patient benefits from seated rest breaks and cues for pursed lip breathing in order for SpO2 to recover  to 94% within 2 minutes. Encouraged patient to perform light BLE therex while sitting in bedside chair when therapy not present.     The patient's Approx Degree of Impairment: 50.57% has been calculated based on documentation in the [facility] '6 clicks' Inpatient Daily Activity Short Form.  Research supports that patients with this level of impairment may benefit from rehab.  Final disposition will be made by interdisciplinary medical team.    Patient End of Session: Up in chair;With  staff;Needs met;Call light within reach;RN aware of session/findings;Hospital anti-slip socks;All patient questions and concerns addressed;Alarm set    CURRENT GOALS   Goals to be met by: 24  Patient Goal Patient's self-stated goal is: none stated   Goal #1 Patient is able to demonstrate supine - sit EOB @ level: supervision      Goal #1   Current Status  NT   Goal #2 Patient is able to demonstrate transfers Sit to/from Stand at assistance level: supervision with walker - rolling      Goal #2  Current Status  CGA with RW   Goal #3 Patient is able to ambulate 75 feet with assist device: walker - rolling at assistance level: supervision   Goal #3   Current Status  CGA 25 ft x 2 with RW   Goal #4 Patient to demonstrate independence with home activity/exercise instructions provided to patient in preparation for discharge.   Goal #4   Current Status  Ongoing     Gait Trainin minutes  Therapeutic Activity: 10 minutes

## 2024-11-01 NOTE — CM/SW NOTE
CM met with patient at bedside to provide list of accepting [facility] facilities.  Patient confirmed choice [facility] is [facility].    CM reserved [facility] via [facility] and confirmed with liaison Libra - semi private room is available today.    CM met with patient at bedside and confirmed she is agreeable to semi private room.    RN Shayy notified CM that patient is medically cleared for discharge today.    CM arranged [facility] for 3:30 PM .  PCS form completed.      [facility] Room Number: 209-2  Number for report: [phone]    Patient cleared for discharge from CM/SW standpoint.    Betty Sharma RN, BSN  Nurse   [phone]

## 2024-11-01 NOTE — PLAN OF CARE
Problem: Patient Centered Care  Goal: Patient preferences are identified and integrated in the patient's plan of care  Description: Interventions:  - What would you like us to know as we care for you? I come from home alone.  - Provide timely, complete, and accurate information to patient/family  - Incorporate patient and family knowledge, values, beliefs, and cultural backgrounds into the planning and delivery of care  - Encourage patient/family to participate in care and decision-making at the level they choose  - Honor patient and family perspectives and choices  Outcome: Adequate for Discharge     Problem: Patient/Family Goals  Goal: Patient/Family Long Term Goal  Description: Patient's Long Term Goal:     Interventions:  -   - See additional Care Plan goals for specific interventions  Outcome: Adequate for Discharge  Goal: Patient/Family Short Term Goal  Description: Patient's Short Term Goal:     Interventions:   -   - See additional Care Plan goals for specific interventions  Outcome: Adequate for Discharge     Problem: CARDIOVASCULAR - ADULT  Goal: Maintains optimal cardiac output and hemodynamic stability  Description: INTERVENTIONS:  - Monitor vital signs, rhythm, and trends  - Monitor for bleeding, hypotension and signs of decreased cardiac output  - Evaluate effectiveness of vasoactive medications to optimize hemodynamic stability  - Monitor arterial and/or venous puncture sites for bleeding and/or hematoma  - Assess quality of pulses, skin color and temperature  - Assess for signs of decreased coronary artery perfusion - ex. Angina  - Evaluate fluid balance, assess for edema, trend weights  Outcome: Adequate for Discharge  Goal: Absence of cardiac arrhythmias or at baseline  Description: INTERVENTIONS:  - Continuous cardiac monitoring, monitor vital signs, obtain 12 lead EKG if indicated  - Evaluate effectiveness of antiarrhythmic and heart rate control medications as ordered  - Initiate emergency  measures for life threatening arrhythmias  - Monitor electrolytes and administer replacement therapy as ordered  Outcome: Adequate for Discharge     Problem: RESPIRATORY - ADULT  Goal: Achieves optimal ventilation and oxygenation  Description: INTERVENTIONS:  - Assess for changes in respiratory status  - Assess for changes in mentation and behavior  - Position to facilitate oxygenation and minimize respiratory effort  - Oxygen supplementation based on oxygen saturation or ABGs  - Provide Smoking Cessation handout, if applicable  - Encourage broncho-pulmonary hygiene including cough, deep breathe, Incentive Spirometry  - Assess the need for suctioning and perform as needed  - Assess and instruct to report SOB or any respiratory difficulty  - Respiratory Therapy support as indicated  - Manage/alleviate anxiety  - Monitor for signs/symptoms of CO2 retention  Outcome: Adequate for Discharge     Problem: GASTROINTESTINAL - ADULT  Goal: Maintains or returns to baseline bowel function  Description: INTERVENTIONS:  - Assess bowel function  - Maintain adequate hydration with IV or PO as ordered and tolerated  - Evaluate effectiveness of GI medications  - Encourage mobilization and activity  - Obtain nutritional consult as needed  - Establish a toileting routine/schedule  - Consider collaborating with pharmacy to review patient's medication profile  Outcome: Adequate for Discharge     Problem: PAIN - ADULT  Goal: Verbalizes/displays adequate comfort level or patient's stated pain goal  Description: INTERVENTIONS:  - Encourage pt to monitor pain and request assistance  - Assess pain using appropriate pain scale  - Administer analgesics based on type and severity of pain and evaluate response  - Implement non-pharmacological measures as appropriate and evaluate response  - Consider cultural and social influences on pain and pain management  - Manage/alleviate anxiety  - Utilize distraction and/or relaxation techniques  -  Monitor for opioid side effects  - Notify MD/LIP if interventions unsuccessful or patient reports new pain  - Anticipate increased pain with activity and pre-medicate as appropriate  Outcome: Adequate for Discharge     Problem: RISK FOR INFECTION - ADULT  Goal: Absence of fever/infection during anticipated neutropenic period  Description: INTERVENTIONS  - Monitor WBC  - Administer growth factors as ordered  - Implement neutropenic guidelines  Outcome: Adequate for Discharge     Problem: SAFETY ADULT - FALL  Goal: Free from fall injury  Description: INTERVENTIONS:  - Assess pt frequently for physical needs  - Identify cognitive and physical deficits and behaviors that affect risk of falls.  - [place] fall precautions as indicated by assessment.  - Educate pt/family on patient safety including physical limitations  - Instruct pt to call for assistance with activity based on assessment  - Modify environment to reduce risk of injury  - Provide assistive devices as appropriate  - Consider OT/PT consult to assist with strengthening/mobility  - Encourage toileting schedule  Outcome: Adequate for Discharge     Problem: DISCHARGE PLANNING  Goal: Discharge to home or other facility with appropriate resources  Description: INTERVENTIONS:  - Identify barriers to discharge w/pt and caregiver  - Include patient/family/discharge partner in discharge planning  - Arrange for needed discharge resources and transportation as appropriate  - Identify discharge learning needs (meds, wound care, etc)  - Arrange for interpreters to assist at discharge as needed  - Consider post-discharge preferences of patient/family/discharge partner  - Complete POLST form as appropriate  - Assess patient's ability to be responsible for managing their own health  - Refer to Case Management Department for coordinating discharge planning if the patient needs post-hospital services based on physician/LIP order or complex needs related to functional status,  cognitive ability or social support system  Outcome: Adequate for Discharge

## 2024-11-01 NOTE — DISCHARGE SUMMARY
Discharge Summary     Hoda Busby Patient Status:  Inpatient    1940 MRN [id number]   Location [facility]W Attending Malathi Stuart MD   Hosp Day # 8 PCP Malathi Stuart MD     Date of Admission: 10/24/2024  Date of Discharge: ***  Discharge Disposition: Home or Self Care    Discharge Diagnosis: ***    History of Present Illness: ***  {H&P Notes :8307}          Brief Synopsis: ***    Lace+ Score: 57  59-90 High Risk  29-58 Medium Risk  0-28   Low Risk       TCM Follow-Up Recommendation:  {Care Managers will evaluate the need for follow-up for all patients ages 50+, and high/moderate risk patients ages 25-49. Low risk patients (LACE < 29) will only be evaluated if the \"Still recommend for TCM follow-up\" option is selected from this list.:7396}    Procedures during hospitalization:   ***    Incidental or significant findings and recommendations (brief descriptions):  ***    Lab/Test results pending at Discharge:   ***    Consultants:  ***    Discharge Medication List:     Discharge Medications        START taking these medications        Instructions Prescription details   atorvastatin 20 MG Tabs  Commonly known as: Lipitor      Take 1 tablet (20 mg total) by mouth nightly.   Quantity: 90 tablet  Refills: 0     docusate sodium 100 MG Caps  Commonly known as: COLACE      Take 100 mg by mouth 2 (two) times daily.   Quantity: 180 capsule  Refills: 0     furosemide 20 MG Tabs  Commonly known as: Lasix  Start taking on: 2024      Take 1 tablet (20 mg total) by mouth daily.   Quantity: 90 tablet  Refills: 0     Polyethylene Glycol 3350 17 g Pack  Commonly known as: MIRALAX  Start taking on: 2024      Take 17 g by mouth daily.   Quantity: 30 each  Refills: 0     predniSONE 20 MG Tabs  Commonly known as: Deltasone  Start taking on: 2024      Take 2 tablets (40 mg total) by mouth daily with breakfast.   Quantity: 60 tablet  Refills: 0     rivaroxaban 15 & 20 MG  Tbpk      Take As Directed based on package instructions: Days 1-21: 15 mg by mouth twice daily Days 22-30: 20 mg by mouth once daily   Quantity: 1 each  Refills: 0            CONTINUE taking these medications        Instructions Prescription details   acetaminophen 500 MG Tabs  Commonly known as: Tylenol Extra Strength      Take 1 tablet (500 mg total) by mouth every 6 (six) hours as needed for Pain.   Refills: 0     alendronate 70 MG Tabs  Commonly known as: Fosamax  Notes to patient: Resume home regimen      Take 1 tablet (70 mg total) by mouth every 7 days.   Quantity: 13 tablet  Refills: 3     amLODIPine 5 MG Tabs  Commonly known as: Norvasc      Take 1 tablet (5 mg total) by mouth daily.   Quantity: 90 tablet  Refills: 3     aspirin 81 MG Tbec      Take 1 tablet (81 mg total) by mouth daily.   Refills: 0     Calcium + D3 600-200 MG-UNIT Tabs      Take 1 tablet by mouth daily.   Refills: 0     cyanocobalamin 500 MCG Tabs  Commonly known as: Vitamin B12      Take 1 tablet (500 mcg total) by mouth daily.   Refills: 0     Ocuvite Adult 50+ Caps      Take 1 capsule by mouth daily.   Refills: 0     Vitamin D 50 MCG (2000 UT) Tabs      Take 4,000 Units by mouth daily.   Quantity: 1 tablet  Refills: 0            STOP taking these medications      amoxicillin 500 MG Tabs  Commonly known as: AMOXIL        doxycycline 100 MG Caps  Commonly known as: Vibramycin                  Where to Get Your Medications        These medications were sent to [facility] - [place], [place] - [address] [phone], [phone]  [address], [facility] [address]      Phone: [phone]   rivaroxaban 15 & 20 MG Tbpk       Please  your prescriptions at the location directed by your doctor or nurse    Bring a paper prescription for each of these medications  atorvastatin 20 MG Tabs  docusate sodium 100 MG Caps  furosemide 20 MG Tabs  Polyethylene Glycol 3350 17 g  Pack  predniSONE 20 MG Tabs         Follow-up appointment:   Malathi Stuart MD  [address]  [facility] [address] [phone]    Follow up in 1 week(s)      Cody Holloway DO  [address]  [address]  [facility] [address] [phone]    Follow up in 2 week(s)      Appointments for Next 30 Days 11/1/2024 - 12/1/2024      None            Supplementary Documentation:   [place] reviewed[place] ***    Vital signs:  Temp:  [97 °F (36.1 °C)-98.6 °F (37 °C)] 98.1 °F (36.7 °C)  Pulse:  [] 90  Resp:  [16-18] 18  BP: ()/(52-78) 113/62  SpO2:  [80 %-100 %] 98 %    Physical Exam:    General:  NAD  Cardiovascular:  S1, S2    -----------------------------------------------------------------------------------------------  PATIENT DISCHARGE INSTRUCTIONS: See electronic chart    Tip: Documentation requirements: For split shared discharge, BOTH providers need to document specific floor, unit, and time spent on the discharge.  The note needs to be signed by the provider with > 50% of time and bill under their NPI.   Time spent:  ***         Nadia Steel DO         0     furosemide 20 MG Tabs  Commonly known as: Lasix  Start taking on: November 2, 2024      Take 1 tablet (20 mg total) by mouth daily.   Quantity: 90 tablet  Refills: 0     Polyethylene Glycol 3350 17 g Pack  Commonly known as: MIRALAX  Start taking on: November 2, 2024      Take 17 g by mouth daily.   Quantity: 30 each  Refills: 0     predniSONE 20 MG Tabs  Commonly known as: Deltasone  Start taking on: November 2, 2024      Take 2 tablets (40 mg total) by mouth daily with breakfast.   Quantity: 60 tablet  Refills: 0     rivaroxaban 15 & 20 MG Tbpk      Take As Directed based on package instructions: Days 1-21: 15 mg by mouth twice daily Days 22-30: 20 mg by mouth once daily   Quantity: 1 each  Refills: 0            CONTINUE taking these medications        Instructions Prescription details   acetaminophen 500 MG Tabs  Commonly known as: Tylenol Extra Strength      Take 1 tablet (500 mg total) by mouth every 6 (six) hours as needed for Pain.   Refills: 0     alendronate 70 MG Tabs  Commonly known as: Fosamax  Notes to patient: Resume home regimen      Take 1 tablet (70 mg total) by mouth every 7 days.   Quantity: 13 tablet  Refills: 3     amLODIPine 5 MG Tabs  Commonly known as: Norvasc      Take 1 tablet (5 mg total) by mouth daily.   Quantity: 90 tablet  Refills: 3     aspirin 81 MG Tbec      Take 1 tablet (81 mg total) by mouth daily.   Refills: 0     Calcium + D3 600-200 MG-UNIT Tabs      Take 1 tablet by mouth daily.   Refills: 0     cyanocobalamin 500 MCG Tabs  Commonly known as: Vitamin B12      Take 1 tablet (500 mcg total) by mouth daily.   Refills: 0     Ocuvite Adult 50+ Caps      Take 1 capsule by mouth daily.   Refills: 0     Vitamin D 50 MCG (2000 UT) Tabs      Take 4,000 Units by mouth daily.   Quantity: 1 tablet  Refills: 0            STOP taking these medications      amoxicillin 500 MG Tabs  Commonly known as: AMOXIL        doxycycline 100 MG Caps  Commonly known as: Vibramycin                   Where to Get Your Medications        These medications were sent to [facility] - [place], [place] - [address] [phone], [phone]  [address], [facility] [address]      Phone: [phone]   rivaroxaban 15 & 20 MG Tbpk       Please  your prescriptions at the location directed by your doctor or nurse    Bring a paper prescription for each of these medications  atorvastatin 20 MG Tabs  docusate sodium 100 MG Caps  furosemide 20 MG Tabs  Polyethylene Glycol 3350 17 g Pack  predniSONE 20 MG Tabs         Follow-up appointment:   Malathi Stuart MD  [address] [id number] [phone]    Follow up in 1 week(s)      Cody Holloway DO  [address]  [address]  [facility] [address] [phone]    Follow up in 2 week(s)      Appointments for Next 30 Days 11/1/2024 - 12/1/2024      None            Supplementary Documentation:   [place]    Vital signs:  Temp:  [97 °F (36.1 °C)-98.6 °F (37 °C)] 98.1 °F (36.7 °C)  Pulse:  [] 90  Resp:  [16-18] 18  BP: ()/(52-78) 113/62  SpO2:  [80 %-100 %] 98 %    Physical Exam:    General:  NAD  Cardiovascular:  S1, S2    -----------------------------------------------------------------------------------------------  PATIENT DISCHARGE INSTRUCTIONS: See electronic chart    Tip: Documentation requirements: For split shared discharge, BOTH providers need to document specific floor, unit, and time spent on the discharge.  The note needs to be signed by the provider with > 50% of time and bill under their NPI.   Time spent:  >30 min       Nadia Steel DO

## 2024-11-01 NOTE — DISCHARGE PLANNING
Discharge order in per MD. Discharge instructions given to patient and family verbally and in writing. Patient educated on important follow ups and oxygen use. PIVs removed. Patient discharged [facility]. RN to RN report given to Caty. Scripts placed in transfer packet

## 2024-11-01 NOTE — PROGRESS NOTES
[facility]  part of [facility]     Progress Note    Hoda Busby Patient Status:  Inpatient    1940 MRN [id number]   Location [facility] Attending Malathi Stuart MD   Hosp Day # 8 PCP Malathi Stuart MD       Subjective:   Patient seen and examined.  Dyspnea gradually improving per patient.  Tolerating some ambulation.    Objective:   Blood pressure 97/52, pulse 96, temperature 97 °F (36.1 °C), temperature source Oral, resp. rate 18, weight 174 lb 14.4 oz (79.3 kg), SpO2 90%.  Intake/Output:   Last 3 shifts: I/O last 3 completed shifts:  In: 1478 [P.O.:1168; I.V.:10; IV PIGGYBACK:300]  Out: 650 [Urine:650]   This shift: I/O this shift:  In: -   Out: 225 [Urine:225]     Vent Settings:      Hemodynamic parameters (last 24 hours):      Scheduled Meds:   Current Facility-Administered Medications   Medication Dose Route Frequency    rivaroxaban (Xarelto) tab 15 mg  15 mg Oral BID with meals    Followed by    [START ON 2024] rivaroxaban (Xarelto) tab 20 mg  20 mg Oral Daily with food    [START ON 2024] predniSONE (Deltasone) tab 40 mg  40 mg Oral Daily with breakfast    furosemide (Lasix) tab 20 mg  20 mg Oral Daily    polyethylene glycol (PEG 3350) (Miralax) 17 g oral packet 17 g  17 g Oral Daily    docusate sodium (Colace) cap 100 mg  100 mg Oral BID    atorvastatin (Lipitor) tab 20 mg  20 mg Oral Nightly    amLODIPine (Norvasc) tab 5 mg  5 mg Oral Daily    acetaminophen (Tylenol) tab 650 mg  650 mg Oral Q6H PRN    dextromethorphan-guaifenesin ER (Mucinex DM)  MG per 12 hr tab 1 tablet  1 tablet Oral BID PRN    influenza virus trivalent high dose PF (Fluzone HD) 0.5 mL injection (ages >/= 65 years) 0.5 mL  0.5 mL Intramuscular Prior to discharge       Continuous Infusions:         Physical Exam  Constitutional: no acute distress  Eyes: PERRL  ENT: nares pateint  Neck: supple, no JVD  Cardio: RRR, S1 S2  Respiratory: Scattered crackles  GI: abdomen soft, non  tender, active bowel sounds, no organomegaly  Extremities: no clubbing, cyanosis, edema  Neurologic: no gross motor deficits  Skin: warm, dry      Results:     Lab Results   Component Value Date    WBC 16.8 11/01/2024    HGB 10.3 11/01/2024    HCT 31.2 11/01/2024    .0 11/01/2024    CREATSERUM 1.05 11/01/2024    BUN 26 11/01/2024     11/01/2024    K 5.1 11/01/2024     11/01/2024    CO2 30.0 11/01/2024     11/01/2024    CA 8.9 11/01/2024    PTT 30.6 11/01/2024       No results found.          Assessment   1.  Acute hypoxemic respiratory failure  2.  Bilateral lung opacities  3.  Acute pulmonary embolism  4.  COPD  5.  Prior nicotine dependence  6.  Hypertension  7.  Severe aortic stenosis     Plan   -Patient presents with evidence of progressive worsening dyspnea with exertion, hypoxia over the course of last week.  Ongoing nonproductive cough present.  Started on antibiotic therapy with doxycycline and Augmentin on 10/17/2024 with no significant improvement clinically.  -CT chest on 10/25/2024 with acute segmental pulmonary embolism right upper lobe and left upper lobe.  Multifocal groundglass opacities seen bilaterally.  Pattern less likely due to pulmonary edema differential includes organizing pneumonia cannot rule out infectious process.  Unlikely drug reaction.  Cannot rule out underlying ILD.  -No significant clinical improvement despite antibiotic therapy.  Antibiotics course to be completed today.  -Legionella and strep pneumonia antigen negative  -Currently on 3 L nasal cannula oxygen.  Wean as tolerated.    -Gradually wean steroid therapy.  -Started on anticoagulation with heparin GTT.  Transition to DOAC therapy today.  -URIEL and ANCA negative  -Fungal serologies pending  -Hold off bronchoscopy at this time especially given high oxygenation requirements.  Discussed with primary care physician.  -Awaiting discharge to rehab.  Will require oxygen upon discharge.  Currently on 3 L.   Recommend prednisone 40 mg daily upon discharge.  Follow-up in pulm clinic in 2 weeks time    Cody Holloway, DO  Pulmonary Critical Care Medicine  [facility]

## 2024-11-01 NOTE — PLAN OF CARE
Problem: Patient Centered Care  Goal: Patient preferences are identified and integrated in the patient's plan of care  Description: Interventions:  - What would you like us to know as we care for you? From home alone   - Provide timely, complete, and accurate information to patient/family  - Incorporate patient and family knowledge, values, beliefs, and cultural backgrounds into the planning and delivery of care  - Encourage patient/family to participate in care and decision-making at the level they choose  - Honor patient and family perspectives and choices  Outcome: Progressing     Problem: Patient/Family Goals  Goal: Patient/Family Long Term Goal  Description: Patient's Long Term Goal: To discharge from hospital     Interventions:  -  Monitor vital signs   - Monitor appropriate labs   - Pain management   - Administer medications per order   - Follow MD orders   - Diagnostics per order   - Update/inform patient and family on plan of care   - Discharge planning    - See additional Care Plan goals for specific interventions  Outcome: Progressing  Goal: Patient/Family Short Term Goal  Description: Patient's Short Term Goal: To wean oxygen as tolerated     Interventions:   - Monitor vital signs   - Monitor appropriate labs   - Pain management   - Administer medications per order   - Follow MD orders   - Diagnostics per order   - Update/inform patient and family on plan of care   - See additional Care Plan goals for specific interventions  Outcome: Progressing     Problem: CARDIOVASCULAR - ADULT  Goal: Maintains optimal cardiac output and hemodynamic stability  Description: INTERVENTIONS:  - Monitor vital signs, rhythm, and trends  - Monitor for bleeding, hypotension and signs of decreased cardiac output  - Evaluate effectiveness of vasoactive medications to optimize hemodynamic stability  - Monitor arterial and/or venous puncture sites for bleeding and/or hematoma  - Assess quality of pulses, skin color and  temperature  - Assess for signs of decreased coronary artery perfusion - ex. Angina  - Evaluate fluid balance, assess for edema, trend weights  Outcome: Progressing  Goal: Absence of cardiac arrhythmias or at baseline  Description: INTERVENTIONS:  - Continuous cardiac monitoring, monitor vital signs, obtain 12 lead EKG if indicated  - Evaluate effectiveness of antiarrhythmic and heart rate control medications as ordered  - Initiate emergency measures for life threatening arrhythmias  - Monitor electrolytes and administer replacement therapy as ordered  Outcome: Progressing     Problem: RESPIRATORY - ADULT  Goal: Achieves optimal ventilation and oxygenation  Description: INTERVENTIONS:  - Assess for changes in respiratory status  - Assess for changes in mentation and behavior  - Position to facilitate oxygenation and minimize respiratory effort  - Oxygen supplementation based on oxygen saturation or ABGs  - Provide Smoking Cessation handout, if applicable  - Encourage broncho-pulmonary hygiene including cough, deep breathe, Incentive Spirometry  - Assess the need for suctioning and perform as needed  - Assess and instruct to report SOB or any respiratory difficulty  - Respiratory Therapy support as indicated  - Manage/alleviate anxiety  - Monitor for signs/symptoms of CO2 retention  Outcome: Progressing     Problem: GASTROINTESTINAL - ADULT  Goal: Maintains or returns to baseline bowel function  Description: INTERVENTIONS:  - Assess bowel function  - Maintain adequate hydration with IV or PO as ordered and tolerated  - Evaluate effectiveness of GI medications  - Encourage mobilization and activity  - Obtain nutritional consult as needed  - Establish a toileting routine/schedule  - Consider collaborating with pharmacy to review patient's medication profile  Outcome: Progressing     Problem: PAIN - ADULT  Goal: Verbalizes/displays adequate comfort level or patient's stated pain goal  Description: INTERVENTIONS:  -  Encourage pt to monitor pain and request assistance  - Assess pain using appropriate pain scale  - Administer analgesics based on type and severity of pain and evaluate response  - Implement non-pharmacological measures as appropriate and evaluate response  - Consider cultural and social influences on pain and pain management  - Manage/alleviate anxiety  - Utilize distraction and/or relaxation techniques  - Monitor for opioid side effects  - Notify MD/LIP if interventions unsuccessful or patient reports new pain  - Anticipate increased pain with activity and pre-medicate as appropriate  Outcome: Progressing     Problem: RISK FOR INFECTION - ADULT  Goal: Absence of fever/infection during anticipated neutropenic period  Description: INTERVENTIONS  - Monitor WBC  - Administer growth factors as ordered  - Implement neutropenic guidelines  Outcome: Progressing     Problem: SAFETY ADULT - FALL  Goal: Free from fall injury  Description: INTERVENTIONS:  - Assess pt frequently for physical needs  - Identify cognitive and physical deficits and behaviors that affect risk of falls.  - Bailey fall precautions as indicated by assessment.  - Educate pt/family on patient safety including physical limitations  - Instruct pt to call for assistance with activity based on assessment  - Modify environment to reduce risk of injury  - Provide assistive devices as appropriate  - Consider OT/PT consult to assist with strengthening/mobility  - Encourage toileting schedule  Outcome: Progressing     Problem: DISCHARGE PLANNING  Goal: Discharge to home or other facility with appropriate resources  Description: INTERVENTIONS:  - Identify barriers to discharge w/pt and caregiver  - Include patient/family/discharge partner in discharge planning  - Arrange for needed discharge resources and transportation as appropriate  - Identify discharge learning needs (meds, wound care, etc)  - Arrange for interpreters to assist at discharge as needed  -  Consider post-discharge preferences of patient/family/discharge partner  - Complete POLST form as appropriate  - Assess patient's ability to be responsible for managing their own health  - Refer to Case Management Department for coordinating discharge planning if the patient needs post-hospital services based on physician/LIP order or complex needs related to functional status, cognitive ability or social support system  Outcome: Progressing     Monitoring vital signs, stable at this time. No acute changes at this moment. Fall precautions in place- bed alarm on, bed locked in lowest position, call light within reach. Frequent rounding by nursing staff.

## 2024-11-01 NOTE — PROGRESS NOTES
Progress Note  Hoda Busby Patient Status:  Inpatient    1940 MRN [id number]   Location [facility]5W Attending Malathi Stuart MD   Hosp Day # 8 PCP Malathi Stuart MD     SUBJECTIVE:    Dyspnea improving. Denies chest pain.     VITALS:  BP 97/52 (BP Location: Right arm)   Pulse 96   Temp 97 °F (36.1 °C) (Oral)   Resp 18   Wt 174 lb 14.4 oz (79.3 kg)   SpO2 92%   BMI 26.59 kg/m²     INTAKE/OUTPUT:    Intake/Output Summary (Last 24 hours) at 2024 1002  Last data filed at 2024 0829  Gross per 24 hour   Intake 602 ml   Output 375 ml   Net 227 ml     Last 3 Weights   24 0540 174 lb 14.4 oz (79.3 kg)   10/31/24 0609 183 lb 9.6 oz (83.3 kg)   10/24/24 1651 178 lb (80.7 kg)   10/24/24 1504 178 lb (80.7 kg)   24 1336 183 lb 3.2 oz (83.1 kg)     LABS:  Recent Labs   Lab 10/30/24  0438 10/31/24  0429 24  0454   * 126* 129*   BUN 27* 26* 26*   CREATSERUM 0.98 1.07* 1.05*   EGFRCR 57* 51* 52*   CA 8.6* 8.9 8.9    139 140   K 4.5 4.9 5.1    108 107   CO2 28.0 27.0 30.0     Recent Labs   Lab 10/29/24  0512 10/30/24  0438 24  0454   RBC 3.41* 3.50* 3.42*   HGB 10.1* 10.5* 10.3*   HCT 30.5* 31.2* 31.2*   MCV 89.4 89.1 91.2   MCH 29.6 30.0 30.1   MCHC 33.1 33.7 33.0   RDW 12.9 12.8 13.3   NEPRELIM 12.45* 14.27* 14.45*   WBC 14.1* 16.6* 16.8*   .0* 459.0* 457.0*     No results for input(s): \"TROP\", \"CK\" in the last 168 hours.    DIAGNOSTICS:    TELEMETRY:     ECHO 10/25/2024:  Conclusions:  1. Left ventricle: The cavity size was normal. Wall thickness was mildly      increased. Systolic function was normal. The estimated ejection fraction      was 55-60%, by biplane method of disks. Wall motion is normal; there are      no regional wall motion abnormalities. Unable to assess LV diastolic      function.   2. Right ventricle: The cavity size was increased. Systolic function was      normal.   3. Left atrium: The atrium was dilated.   4. Right  atrium: The atrium was dilated.   5. Mitral valve: The annulus was mildly to moderately calcified.   6. Pulmonary arteries: The peak systolic pressure is 57mm Hg.   Impressions:  The right ventricular systolic pressure was increased   consistent with moderate pulmonary hypertension. No previous study was   available for comparison.     [facility] ECHO 10/18/2024:  Findings - Right Atrium  The right atrium is mildly enlarged.    Findings - Left Ventricle  The left ventricle is normal in size. Left ventricular diastolic dimension is 5.1 cms. Left ventricular systolic dimension is 3.3 cms. Left ventricular diastolic septal thickness is 1.1 cms. Left ventricular diastolic postero basal free wall thickness is 1.1 cms. Global left ventricular systolic function is normal. The left ventricular fractional shortening is 36.4%. The left ventricular ejection fraction is 65%. The left ventricle diastolic function is impaired (Grade II) with an elevated left atrial pressure. Left ventricular outflow tract diameter is 2.0 cms. Left ventricular outflow tract VTI is 36.3 cms. Left ventricular outflow tract peak velocity is 1.4 m/s. Left ventricular peak gradient is 8 mmHg. Left ventricular outflow tract mean velocity is 1.2 m/s. Left ventricular mean gradient is 6 mmHg.    Findings - Right Ventricle  Right ventricular systolic function is normal. Right ventricular diastolic dimension is 3.1 cms. Right ventricular systolic pressure is 50 mmHg.    Findings - Atrial Septum  The atrial septum is normal.    Findings - Ventricular Septum  The ventricular septum is normal.    Findings - Pulmonary Artery  The estimated pulmonary artery systolic pressure is 50 mmHg assuming a right atrial pressure of 8 mmHg.    Findings - Pulmonary Vein  The pulmonary vein appears normal.    Findings - IVC  The inferior vena cava is mildly increased in size. The inferior vena cava changes greater than 50 percent during respiration.    Findings - Pulmonic Valve  The  peak velocity is 1.7 m/s. The mean velocity is 1.1 m/s. The peak trans pulmonic gradient is 12.0 mmHg. The mean trans pulmonic gradient is 6.0 mmHg.    Findings - Tricuspid Valve  Evidence of tricuspid regurgitation is present. Moderate (2+) tricuspid regurgitation. The peak tricuspid regurgitant velocity is 3.2 m/s. The peak trans tricuspid gradient is 42.0 mmHg.    Findings - Mitral Valve  Mild mitral annular calcification is noted. Mild calcification of the mitral valve is noted. The mean trans mitral gradient is 3.0 mmHg. Mitral regurgitation is noted. Moderate (2+) mitral regurgitation. The deceleration time is 218 ms. The peak mitral gradient is 5 mmHg. The E velocity is 1.1 m/s. The A velocity is 1.1 m/s.    Findings - Aortic Valve  Severe aortic valve stenosis is present. Aortic valve area continuity equation is 1.1 cm². The trans-aortic peak velocity is 4.4 m/s. The trans-aortic peak gradient is 77 mmHg. The trans-aortic mean velocity is 3.3 m/s. The trans-aortic mean gradient is 49.0 mmHg. Aortic valve VTI measures 105.0 cms. Severe calcification of the aortic valve is noted. LVOT Diameter is 2.0 cms.    Findings - Aorta  Aortic root diameter is 2.8 cms. Ascending aorta diameter is 3.0 cms.    Findings - Pericardium    ROS: Negative unless noted above     PHYSICAL EXAM:  General: Alert and oriented x 3. No apparent distress.  HEENT: Normocephalic, sclera are nonicteric. Hearing appropriate bilaterally.  Neck: No JVD or Carotid bruits. Trachea midline.   Cardiac: Regular rate and rhythm. S1, S2 auscultated. No murmurs, rubs, or gallops appreciated.   Lungs: a/p dullness. Chest expansion symmetrical. Increased effort. 4L NC  Abdomen: Soft, non-tender, +BS. No hepatosplenomegaly or appreciable masses.   Extremities: Without clubbing, cyanosis. +BLE non-pitting ankle edema. Peripheral pulses are 2+.  Neurologic: Motor and sensory nerves grossly intact.   Psych: Appropriate affect   Skin: Warm and dry. No  obvious lesions, wounds, or ulcerations.     MEDICATIONS:   rivaroxaban  15 mg Oral BID with meals    Followed by    [START ON 11/23/2024] rivaroxaban  20 mg Oral Daily with food    [START ON 11/2/2024] predniSONE  40 mg Oral Daily with breakfast    furosemide  20 mg Oral Daily    polyethylene glycol (PEG 3350)  17 g Oral Daily    docusate sodium  100 mg Oral BID    atorvastatin  20 mg Oral Nightly    amLODIPine  5 mg Oral Daily     ASSESSMENT:    Acute Hypoxic Respiratory Failure   Acute Bilateral PE  Acute Bilateral PNA  - A/c per primary and pulmonary   - s/p ABX, on Steroids  - ECHO with preserved ejection fraction, no wma, normal RVSP but mod PHTN  - Venous BLE dopplers negative   - Unclear PE etiology, hypercoag workup o/p     Severe Aortic Stenosis  - MG increased from 31>47 since 9/2023 to outpatient ECHO on 10/2024  - Plans for o/p TAVR evaluation  - Appears compensated on low dose PO Lasix, s/p IV Lasix     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 On exam appears euvolemic, dyspnea may be more related to PE and acute lung disease rather than volume. Stable for discharge to rehab form a cardiac standpoint. Cardiology will sign off, please call with questions or concerns.     Plan of care discussed with patient and RN.     Cindy Rod Desiree, KALYAN  11/1/2024  10:19 AM  [phone] ([place])  [phone] ([place])        L3  Seen and examined bedside. Agree with the present management, will sign off.  ECHO with preserved ejection fraction, no wma, normal RVSP but mod PHTN  Venous BLE dopplers negative   Started on Xarelto DVT/PE dosing.  Outpatient hypercoagulable workup.    Thank you for allowing me to participate in the care of your patient, feel free to contact me if you have any questions.    Manjinder Chavis MD  [facility] [place]  Interventional  Cardiology.

## 2024-11-01 NOTE — PROGRESS NOTES
[facility]  part of [facility]    Progress Note    Hoda Busby Patient Status:  Inpatient    1940 MRN [id number]   Location [facility]W Attending Malathi Stuart MD   Hosp Day # 8 PCP Malathi Stuart MD       SUBJECTIVE:    Feeling much better; still sob when exerting herself    OBJECTIVE:  BP 97/52 (BP Location: Right arm)   Pulse 96   Temp 97 °F (36.1 °C) (Oral)   Resp 18   Wt 174 lb 14.4 oz (79.3 kg)   SpO2 90%   BMI 26.59 kg/m²     Intake/Output:  I/O last 3 completed shifts:  In: 1478 [P.O.:1168; I.V.:10; IV PIGGYBACK:300]  Out: 650 [Urine:650]    Wt Readings from Last 3 Encounters:   24 174 lb 14.4 oz (79.3 kg)   10/24/24 178 lb (80.7 kg)   24 183 lb 3.2 oz (83.1 kg)       Exam  Gen: No acute distress, alert and oriented x3  Pulm: Lungs CTAB, no rhonchi or wheezing  CV: Heart RRR, 5/6 murmurs  Abd: Abdomen soft, nontender, nondistended, no organomegaly, bowel sounds present  MSK: trace BLE edema      Labs:   Recent Labs   Lab 10/29/24  0512 10/30/24  0438 11/01/24  0454   RBC 3.41* 3.50* 3.42*   HGB 10.1* 10.5* 10.3*   HCT 30.5* 31.2* 31.2*   MCV 89.4 89.1 91.2   MCH 29.6 30.0 30.1   MCHC 33.1 33.7 33.0   RDW 12.9 12.8 13.3   NEPRELIM 12.45* 14.27* 14.45*   WBC 14.1* 16.6* 16.8*   .0* 459.0* 457.0*         Recent Labs   Lab 10/28/24  0452 10/28/24  1620 10/29/24  0512 10/30/24  0438 10/31/24  0429 24  0454   *   < > 130* 129* 126* 129*   BUN 26*   < > 25* 27* 26* 26*   CREATSERUM 1.01   < > 1.00 0.98 1.07* 1.05*   EGFRCR 55*   < > 56* 57* 51* 52*   CA 8.9   < > 8.5* 8.6* 8.9 8.9   ALB 3.6  --  3.1* 3.2  --   --       < > 140 139 139 140   K 3.7   < > 3.9 4.5 4.9 5.1      < > 107 106 108 107   CO2 24.0   < > 27.0 28.0 27.0 30.0   ALKPHO  --   --  81 74  --   --    AST  --   --  29 28  --   --    ALT  --   --  33 39  --   --    BILT  --   --  0.5 0.5  --   --    TP  --   --  5.6* 5.5*  --   --     < > = values in  this interval not displayed.         Imaging:  XR CHEST AP PORTABLE  (CPT=71045)    Result Date: 10/29/2024  CONCLUSION:   Unchanged interstitial and alveolar opacities compared to 10/28/2024 and improved from 10/24/2024.  Findings may be secondary to infection and/or edema.  Recommend radiographic follow-up to resolution.  Trace left pleural effusion, unchanged.  Right pleural effusion has resolved.    Dictated by (CST): Beatriz Torres MD on 10/29/2024 at 3:22 PM     Finalized by (CST): Beatriz Torres MD on 10/29/2024 at 3:25 PM                 Assessment and Plan:       Acute hypoxic respiratory failure  Bilateral pneumonia/pneumonitis  -failed outpt treatment with amox and doxycycline x 7d  -repeat CXR 10/24 revealed extensive bilateral perihilar and bilateral upper and lower lobe airspace opacities, progressed since 10/17.    -Covid negative 10/17 and 10/24/24  -S.pneumo, legionella negative  -Mycoplasma IgM/IgG negative  -ANCA panel negative  -URIEL/connective tissue disease panel negative  -Fungitell-beta-D glucan negative  -Echo 10/24/24 - normal EF (55-60%), no obvious vegetatons  -CT with bilateral ground glass opacities, small consolidations of BLL  - ID following; s/p zithromax x 6 days; on zosyn IV (day 8) - to stop today 10/31/24  -per pulm, findings suspicious for NSIP (vs cryptogenic organizing pneumonia vs hypersensitivity pneumonitis); NOT thought to be c/w UIP pattern  -on empiric IV solumedrol since 10/25/24- on 40mg IV q6hrs > q8hrs >q12 hrs>prednisone 40mg daily  -anti-histone and anti-[place] Ab's negative  -fungal Ab negative and histo galactomanan Ag pending  -pt denies recent travel, exposure to birds/pets, arthralgias (other than her mild hand OA), fevers  -wean O2 as tolerated - O2 requirements decreasing (2-3L at rest) though still needs 10L w/ambulation as she continues to desat  -repeat CXR 10/29 improved from 10/24  -no indication for bronch at this point  -further recs per Dr. Holloway      Bilateral pulmonary emboli  -CT chest 10/25/24 -- acute distal segmental/subsegmental pulmonary emboli in [place] and subsegmental PE in [place]  -unclear etiology; no recent hx of long travel; no family/personal hx of blood clots  -BLE venous dopplers negative  -unclear etiology of PE; will do hypercoag w/u as outpt.   Consider malignancy w/u if no other cause found (colonoscopy 1/2021 revealed adenomatous polyp but poor prep; pt declined repeat colonoscopy; Medicare would not cover Cologuard); UTD on mammo  -stopped heparin gtt -- changed to eliquis today, though will cost $580/month. Changed to xarelto as this is more cost effective; will need 15mg bid x 21 days (End date 11/22/2024), then 20mg daily starting 11/23/24     Severe aortic stenosis  -seeing cardiology Dr. Bird  -moderate A.S on echo in 10/2022 (prev mild A.S in 2020)  -abnormal pre-op EKG 9/2023 --Nuc GXT done 9/20/23 -- EKG portion revealed 1-1.5mm ST seg depression in inferolateral leads with prolonged recovery; nuclear portion with normal perfusion   -Echo 9/29/23 --progression of A.S. from mild to moderate  -recent echo at Dr. Bird's office 10/18/24 -- LVEF 65%, grade 2 diastolic dysfunction, severe aortic stenosis (mean aortic gradient signif worse since 9/2023 -- 31>49 mmHg),  -pt will need TAVR evaluation as outpt  -cardiology consult appreciated  -pt given IV lasix 20mg/day from 10/25-10/28; received lasix 10mg IV x 1 on 10/29  -now on lasix 20g po daily     Anemia  -Hgb stable in the 10's-11's  -likely 2/2 acute illness  -normal iron studies (c/w anemia of chronic disease; low TIBC)     Hypertension, primary  -(dyazide stopped due to NANCY)  -on amlodipine 5mg/day     Hx of hip OA  -s/p left THR (Dr. Lo) many years ago  -s/p elective R BEATRIZ on 10/5/23 by Dr. Diaz     Hyperlipidemia   Pt with strong family hx of high cholesterol  ; normal TG and HDL  No indication for statin given age     Osteopenia   On Fosamax from 2011 to  4/2016.     DEXA stable 5/10/17.    DEXA in 9/2019 showed slightly more bone loss in spine, but stable in hip.    DEXA 11/2021 -- some improvement in femur.  Hold off on fosamax for now.   DEXA 8/2024 -- osteoporosis of left forearm  -restarted Fosamax 8/2024     Vitamin D deficiency  On vitamin D 4000u/day    BLE edema  Advised compression     VTE proph            -eliquis as above          Nadia Steel DO  11/1/2024  9:05 AM

## 2024-11-04 ENCOUNTER — INITIAL APN SNF VISIT (OUTPATIENT)
Dept: INTERNAL MEDICINE CLINIC | Facility: SKILLED NURSING FACILITY | Age: 84
End: 2024-11-04

## 2024-11-04 VITALS
HEART RATE: 92 BPM | RESPIRATION RATE: 18 BRPM | TEMPERATURE: 97 F | OXYGEN SATURATION: 97 % | DIASTOLIC BLOOD PRESSURE: 46 MMHG | SYSTOLIC BLOOD PRESSURE: 91 MMHG

## 2024-11-04 DIAGNOSIS — J44.9 CHRONIC OBSTRUCTIVE PULMONARY DISEASE, UNSPECIFIED COPD TYPE (HCC): ICD-10-CM

## 2024-11-04 DIAGNOSIS — M85.89 OSTEOPENIA OF MULTIPLE SITES: ICD-10-CM

## 2024-11-04 DIAGNOSIS — R53.81 PHYSICAL DECONDITIONING: ICD-10-CM

## 2024-11-04 DIAGNOSIS — J96.01 ACUTE RESPIRATORY FAILURE WITH HYPOXIA (HCC): ICD-10-CM

## 2024-11-04 DIAGNOSIS — I26.99 ACUTE PULMONARY EMBOLISM WITHOUT ACUTE COR PULMONALE, UNSPECIFIED PULMONARY EMBOLISM TYPE (HCC): Primary | ICD-10-CM

## 2024-11-04 DIAGNOSIS — E78.00 HYPERCHOLESTEROLEMIA: ICD-10-CM

## 2024-11-04 DIAGNOSIS — I35.0 SEVERE AORTIC STENOSIS: ICD-10-CM

## 2024-11-04 DIAGNOSIS — I10 PRIMARY HYPERTENSION: ICD-10-CM

## 2024-11-04 DIAGNOSIS — D64.9 ANEMIA, UNSPECIFIED TYPE: ICD-10-CM

## 2024-11-04 DIAGNOSIS — J18.9 PNEUMONIA DUE TO INFECTIOUS ORGANISM, UNSPECIFIED LATERALITY, UNSPECIFIED PART OF LUNG: ICD-10-CM

## 2024-11-04 NOTE — PROGRESS NOTES
Hoda Busby  : 1940  Age 84 year old  female patient is admitted to [facility] for rehabilitation and strengthening.      [facility] Admission 10/24/24 - 24    Reason for visit: F/u on PE, hypoxia, pneumonia, COPD, generalized weakness     HPI    Pt is an 85 y/o F with HTN, aortic stenosis admitted with acute respiratory failure due to pneumonia and PE. The patent was initially seen in the ED on 10/17/24 with a one week hx of cough and dyspnea. CXR revealed bilateral airspace opacities c/w pneumonia. Covid negative. WBC 10.3, left shift. O2 sats were 93-94% on RA   Pt was sent home on amoxicillin 1 g TID and doxycycline 100 mg BID x 7 days.   Since then, she has continued to cough and has become more short of breath.  She could not walk even 10 feet without becoming short of breath.   Her daughter brought her here in a wheelchair. Cough is productive/rattling, though she is not able to bring up the sputum.  She notes feeling chilled at night, though no actual rigors. She was seen in the office that day and O2 sats 85% on RA -- up to 90% on 1.5L O2 NC.  Pt appeared visibly dyspneic. She was sent to [facility] for further eval and treatment. CT chest in hospital revealed Acute distal segmental/subsegmental pulmonary embolism involving the right upper lobe as well as a subsegmental pulmonary embolism in the left upper lobe. Pt was started on anticoagulation therapy, was on 10L of O2 with gradual wean, also started on steroid therapy. Pt was treated, stabilized and discharged to [facility] on 3L of O2 for rehab and strengthening.     Pt seen and examined as initial APRN Visit. Pt is sitting up in chair with 3L of O2 in place. Pt appears comfortable in NAD. Pt reports feeling well, denies chest pain or sob. Reports participating and progressing in PT daily. Denies appetite change, n/v or abdominal pain. Denies constipation or diarrhea. She has no other issues/concerns. Vss     Past Medical  History:    Acute pulmonary embolism (HCC)    High blood pressure    Osteopenia    Primary osteoarthritis of right hip    Visual impairment    Readers    White coat hypertension     Past Surgical History:   Procedure Laterality Date    Cataract  3/2&3/16 2017    Colonoscopy & polypectomy  2021         Hip replacement surgery Left Around     Other surgical history  [place]     Bilateral cataract surgery     Family History   Problem Relation Age of Onset    Cancer Father         bone (cause of death)    Stroke Mother         CVA (cause of death)    Diabetes Mother     Hypertension Mother     Dementia Brother         Alzheimer's    Heart Disease Brother         CAD - x2 brothers    Heart Disease Brother      Social History     Socioeconomic History    Marital status:    Tobacco Use    Smoking status: Former     Current packs/day: 0.00     Types: Cigarettes     Start date: 1990     Quit date: 1990     Years since quittin.8     Passive exposure: Past    Smokeless tobacco: Former   Vaping Use    Vaping status: Never Used   Substance and Sexual Activity    Alcohol use: Yes     Comment: wine, occasionally    Drug use: Never   Other Topics Concern    Caffeine Concern Yes     Comment: coffee/soda - 2cups/day     Social Drivers of Health     Food Insecurity: No Food Insecurity (10/24/2024)    Food Insecurity     Food Insecurity: Never true   Transportation Needs: No Transportation Needs (10/24/2024)    Transportation Needs     Lack of Transportation: No   Housing Stability: Low Risk  (10/24/2024)    Housing Stability     Housing Instability: No       IMMUNIZATIONS  Immunization History   Administered Date(s) Administered    Covid-19 Vaccine Pfizer 30 mcg/0.3 ml 2021, 03/10/2021, 10/04/2021    Covid-19 Vaccine Pfizer Bivalent 30mcg/0.3mL 2022    Covid-19 Vaccine Pfizer Wilber-Sucrose 30 mcg/0.3 ml 2022    FLU VAC High Dose 65 YRS & Older PRSV Free (86617) 10/04/2017, 10/14/2019,  10/05/2022, 11/06/2023    FLUZONE 6 months and older PFS 0.5 ml (20207) 10/07/2015, 09/29/2020    Fluvirin, 3 Years & >, Im 10/22/2012, 10/02/2014    Fluzone Vaccine Medicare () 10/01/2017, 10/14/2018    High Dose Fluzone Influenza Vaccine, 65yr+ PF 0.5mL (56832) 10/19/2016, 10/02/2017, 10/15/2018, 10/14/2019, 10/05/2022    Influenza 10/15/2016, 10/19/2021    Influenza Virus Vaccine, Split 04/18/2022    Pfizer Covid-19 Vaccine 30mcg/0.3ml 12yrs+ 12/08/2023    Pneumococcal (Prevnar 13) 03/11/2015    Pneumococcal Vaccine, Conjugate 01/01/2001    RSV, recombinant, RSVpreF, adjuvanted (Arexvy) 02/13/2024    TDAP 03/22/2017    Zoster Vaccine Recombinant Adjuvanted (Shingrix) 07/22/2020, 09/29/2020   Deferred Date(s) Deferred    High Dose Fluzone Influenza Vaccine, 65yr+ PF 0.5mL (95668) 11/01/2024        ALLERGIES:  Allergies[1]    CODE STATUS:  Full Code    ADVANCED CARE PLANNING TEAM: Will need family care plan     CURRENT MEDICATIONS - reviewed and updated on [facility]    SUBJECTIVE    Pt seen and examined as initial APRN Visit. Pt is sitting up in chair with 3L of O2 in place. Pt appears comfortable in NAD. Pt reports feeling well, denies chest pain or sob. Reports participating and progressing in PT daily. Denies appetite change, n/v or abdominal pain. Denies constipation or diarrhea. She has no other issues/concerns. Vss     PHYSICAL EXAM:  Vitals:    11/04/24 1337   BP: 91/46   Pulse: 92   Resp: 18   Temp: 97.3 °F (36.3 °C)   SpO2: 97%      GENERAL HEALTH:well developed, well nourished, in no apparent distress   LINES, TUBES, DRAINS:  none  SKIN: no rashes, no suspicious lesions  WOUND: see wound assessment,   EYES: PERRLA, EOMI, sclera anicteric, conjunctiva normal; there is no nystagmus, no drainage from eyes  HENT: normocephalic; normal nose, no nasal drainage, mucous membranes pink, moist, pharynx no exudate, no visible cerumen.   NECK:supple, FROM; no JVD, no TMG, no carotid bruits   BREAST: deferred exam    RESPIRATORY:diminished at the bases   CARDIOVASCULAR: S1, S2 normal, RRR; no S3, no S4 and positive for murmur   ABDOMEN:  normal active BS+, soft, nondistended; no organomegaly, no masses; no bruits; nontender, no guarding, no rebound tenderness.  :no suprapubic distension  LYMPHATIC:no lymphedema  MUSCULOSKELETAL: no acute synovitis upper or lower extremity   EXTREMITIES/VASCULAR:edema right LE and edema left LE  NEUROLOGIC:intact; no sensorimotor deficit  PSYCHIATRIC: alert and oriented x 3; affect appropriate     MEDICAL DECISION MAKING  Capable     DIAGNOSTICS REVIEWED AT THIS VISIT:  [facility] medical records    SEE PLAN BELOW    Acute hypoxic respiratory failure  Bilateral pneumonia/pneumonitis  COPD   Bilateral pulm emboli   - failed outpt treatment with amox and doxycycline x 7d  - repeat CXR 10/24 revealed extensive bilateral perihilar and bilateral upper and lower lobe airspace opacities, progressed since 10/17.    - Covid negative 10/17 and 10/24/24  - S.pneumo, legionella negative  - Mycoplasma IgM/IgG negative  - ANCA panel negative  - URIEL/connective tissue disease panel negative  - Fungitell-beta-D glucan negative  - Echo 10/24/24 - normal EF (55-60%), no obvious vegetatons  - T with bilateral ground glass opacities, small consolidations of BLL  - ID followed in hospital s/p zithromax x 6 days; Completed Zosyn IV  - per pulm, findings suspicious for NSIP (vs cryptogenic organizing pneumonia vs hypersensitivity pneumonitis); NOT thought to be c/w UIP pattern  - on empiric IV solumedrol since 10/25/24- on 40mg IV q6hrs > q8hrs >q12 hrs  - continue prednisone 40 mg daily   - pt denied recent travel, exposure to birds/pets, arthralgias (other than her mild hand OA), fevers  - wean O2 as tolerated   - repeat CXR 10/29 improved from 10/24  - pt was started on hep gtt, then transitioned to Xarelto   - continue xarelto  - unclear etiology of PE; will do hypercoag w/u as outpt.   Consider malignancy w/u if no  other cause found (colonoscopy 1/2021 revealed adenomatous polyp but poor prep; pt declined repeat colonoscopy; Medicare would not cover Cologuard); UTD on mammo  - weekly labs in rehab   - monitor      Severe aortic stenosis  HLD  - seeing cardiology Dr. Bird  - moderate A.S on echo in 10/2022 (prev mild A.S in 2020)  -abnormal pre-op EKG 9/2023 --Nuc GXT done 9/20/23 -- EKG portion revealed 1-1.5mm ST seg depression in inferolateral leads with prolonged recovery; nuclear portion with normal perfusion   - most recent echo at Dr. Bird's office 10/18/24 -- LVEF 65%, grade 2 diastolic dysfunction, severe aortic stenosis (mean aortic gradient signif worse since 9/2023 -- 31>49 mmHg)  - pt will need TAVR evaluation as outpt  - cardiology consult in rehab   - continue lasix 20g po daily  - continue asa 81 mg bid   - continue xarelto for pe as above   - continue atorvastatin 20 mg hs   - monitor      Anemia  - Hgb stable in the 10's-11's  - likely 2/2 acute illness per hospital records   - normal iron studies (c/w anemia of chronic disease; low TIBC)     Hypertension  - dyazide stopped due to NANCY   - continue amlodipine 5 mg daily      Hx of hip OA  - s/p left THR (Dr. Lo) many years ago  - s/p elective R BEATRIZ on 10/5/23 by Dr. Diaz     Osteopenia   - continue Fosamax 70 mg weekly     Physical Deconditioning/Impaired mobility and ADLs/At risk for falling  - Fall Precautions  - PT/OT/ST to evaluate and treat  - [facility] team to establish care plan meeting with patient and POA/family as appropriate  - Anticipate DC on or before TBD; SW to assist patient/family w/ DC planning  - DC Plan:  TBD     Pain Management  - Offer to pre-medicate 30-60 min prior to therapy  - Physiatry evaluation with management appreciated  - Acetaminophen 500 mg every 6 hrs prn      Vitamins/supplements as r/t deficiencies  - [facility] RD to follow while in rehab; supplementation/diet as per [facility] RD  - May continue home supplements  - continue  Cyanocobalamin Oral Tablet 500 MCG daily   - continue Vitamin D3 Tablet 50 MCG (2000 UT) daily   - continue Calcium 600+D3 Oral Tablet 600-5 MG-MCG daily   - continue mvt daily     LABS  - CBC/CMP weekly while in [facility]    FOLLOW UP APPOINTMENTS  No future appointments.     60 minutes spent w/ patient and staff, including but not limited to reviewing present status, needs, abilities with disciplines, reviewing medical records, vital signs, labs, completing med reconciliation and entering orders for continued care in [facility]      Note to patient: The 21st Century Cures Act makes medical notes like these available to patients in the interest of transparency. However, this is a medical document intended as peer to peer communication. It is written in medical language and may contain abbreviations or verbiage that are unfamiliar. It may appear blunt or direct. Medical documents are intended to carry relevant information, facts as evident, and the clinical opinion of the practitioner who signs the document.     Jose F Meehan, APRN  11/04/24         [1] No Known Allergies

## 2024-11-07 ENCOUNTER — SNF VISIT (OUTPATIENT)
Dept: INTERNAL MEDICINE CLINIC | Facility: SKILLED NURSING FACILITY | Age: 84
End: 2024-11-07

## 2024-11-07 VITALS
DIASTOLIC BLOOD PRESSURE: 76 MMHG | OXYGEN SATURATION: 97 % | HEART RATE: 85 BPM | TEMPERATURE: 96 F | SYSTOLIC BLOOD PRESSURE: 118 MMHG | RESPIRATION RATE: 20 BRPM

## 2024-11-07 DIAGNOSIS — E78.00 HYPERCHOLESTEROLEMIA: ICD-10-CM

## 2024-11-07 DIAGNOSIS — E78.5 HYPERLIPIDEMIA, UNSPECIFIED HYPERLIPIDEMIA TYPE: ICD-10-CM

## 2024-11-07 DIAGNOSIS — J44.9 CHRONIC OBSTRUCTIVE PULMONARY DISEASE, UNSPECIFIED COPD TYPE (HCC): ICD-10-CM

## 2024-11-07 DIAGNOSIS — I10 PRIMARY HYPERTENSION: Primary | ICD-10-CM

## 2024-11-07 DIAGNOSIS — H35.30 MACULAR DEGENERATION, UNSPECIFIED LATERALITY, UNSPECIFIED TYPE: ICD-10-CM

## 2024-11-07 DIAGNOSIS — M85.89 OSTEOPENIA OF MULTIPLE SITES: ICD-10-CM

## 2024-11-07 DIAGNOSIS — E55.9 VITAMIN D DEFICIENCY: ICD-10-CM

## 2024-11-07 DIAGNOSIS — J96.01 ACUTE RESPIRATORY FAILURE WITH HYPOXIA (HCC): ICD-10-CM

## 2024-11-07 DIAGNOSIS — I26.99 ACUTE PULMONARY EMBOLISM WITHOUT ACUTE COR PULMONALE, UNSPECIFIED PULMONARY EMBOLISM TYPE (HCC): ICD-10-CM

## 2024-11-07 DIAGNOSIS — I35.0 SEVERE AORTIC STENOSIS: ICD-10-CM

## 2024-11-07 DIAGNOSIS — I34.0 MITRAL VALVE INSUFFICIENCY, UNSPECIFIED ETIOLOGY: ICD-10-CM

## 2024-11-07 PROCEDURE — 99309 SBSQ NF CARE MODERATE MDM 30: CPT

## 2024-11-07 NOTE — PROGRESS NOTES
Hoda Busby  : 1940  Age 84 year old  female patient is admitted to [facility] for rehabilitation and strengthening.       [facility] Admission 10/24/24 - 24     Reason for visit: F/u on PE, hypoxia, pneumonia, COPD, generalized weakness      HPI     Pt is an 83 y/o F with HTN, aortic stenosis admitted with acute respiratory failure due to pneumonia and PE. The patent was initially seen in the ED on 10/17/24 with a one week hx of cough and dyspnea. CXR revealed bilateral airspace opacities c/w pneumonia. Covid negative. WBC 10.3, left shift. O2 sats were 93-94% on RA   Pt was sent home on amoxicillin 1 g TID and doxycycline 100 mg BID x 7 days.   Since then, she has continued to cough and has become more short of breath.  She could not walk even 10 feet without becoming short of breath.   Her daughter brought her here in a wheelchair. Cough is productive/rattling, though she is not able to bring up the sputum.  She notes feeling chilled at night, though no actual rigors. She was seen in the office that day and O2 sats 85% on RA -- up to 90% on 1.5L O2 NC.  Pt appeared visibly dyspneic. She was sent to [facility] for further eval and treatment. CT chest in hospital revealed Acute distal segmental/subsegmental pulmonary embolism involving the right upper lobe as well as a subsegmental pulmonary embolism in the left upper lobe. Pt was started on anticoagulation therapy, was on 10L of O2 with gradual wean, also started on steroid therapy. Pt was treated, stabilized and discharged to [facility] on 3L of O2 for rehab and strengthening.     Pt seen and examined in follow up. Pt is sitting up in the chair in the lounge room and is watching TV. Pt reports feeling well. She's currently on 1L of O2, nursing reports patient's O2 sats have been sustaining above 90% while on RA, however, when ambulating, O2 sats drop to 70s requiring about 2-3L of O2 to stay above 90% with ambulation. Pt denies chest  pain or sob. She's been eating well, no appetite change, n/v or abdominal pain. She's been participating and progressing in PT daily. Denies constipation or diarrhea. She has no other issues/concerns. Vss     ALLERGIES:  Allergies[1]    IMMUNIZATIONS  Immunization History   Administered Date(s) Administered    Covid-19 Vaccine Pfizer 30 mcg/0.3 ml 02/17/2021, 03/10/2021, 10/04/2021    Covid-19 Vaccine Pfizer Bivalent 30mcg/0.3mL 12/06/2022    Covid-19 Vaccine Pfizer Wilber-Sucrose 30 mcg/0.3 ml 04/18/2022    FLU VAC High Dose 65 YRS & Older PRSV Free (72539) 10/04/2017, 10/14/2019, 10/05/2022, 11/06/2023    FLUZONE 6 months and older PFS 0.5 ml (75641) 10/07/2015, 09/29/2020    Fluvirin, 3 Years & >, Im 10/22/2012, 10/02/2014    Fluzone Vaccine Medicare () 10/01/2017, 10/14/2018    High Dose Fluzone Influenza Vaccine, 65yr+ PF 0.5mL (11586) 10/19/2016, 10/02/2017, 10/15/2018, 10/14/2019, 10/05/2022    Influenza 10/15/2016, 10/19/2021    Influenza Virus Vaccine, Split 04/18/2022    Pfizer Covid-19 Vaccine 30mcg/0.3ml 12yrs+ 12/08/2023    Pneumococcal (Prevnar 13) 03/11/2015    Pneumococcal Vaccine, Conjugate 01/01/2001    RSV, recombinant, RSVpreF, adjuvanted (Arexvy) 02/13/2024    TDAP 03/22/2017    Zoster Vaccine Recombinant Adjuvanted (Shingrix) 07/22/2020, 09/29/2020   Deferred Date(s) Deferred    High Dose Fluzone Influenza Vaccine, 65yr+ PF 0.5mL (85490) 11/01/2024        CODE STATUS:  Full Code    ADVANCED CARE PLANNING TEAM: Will need family care plan    CURRENT MEDICATIONS - reviewed and updated on SNF EMR     SUBJECTIVE    Pt seen and examined in follow up. Pt is sitting up in the chair in the lounge room and is watching TV. Pt reports feeling well. She's currently on 1L of O2, nursing reports patient's O2 sats have been sustaining above 90% while on RA, however, when ambulating, O2 sats drop to 70s requiring about 2-3L of O2 to stay above 90% with ambulation. Pt denies chest pain or sob. She's been  eating well, no appetite change, n/v or abdominal pain. She's been participating and progressing in PT daily. Denies constipation or diarrhea. She has no other issues/concerns. Vss     PHYSICAL EXAM:  Vitals:    11/07/24 1607   BP: 118/76   Pulse: 85   Resp: 20   Temp: (!) 96.3 °F (35.7 °C)   SpO2: 97%      GENERAL HEALTH:well developed, well nourished, in no apparent distress   LINES, TUBES, DRAINS:  O2 per NC in place   SKIN: no rashes, no suspicious lesions  WOUND: see wound assessment,   EYES: PERRLA, EOMI, sclera anicteric, conjunctiva normal; there is no nystagmus, no drainage from eyes  HENT: normocephalic; normal nose, no nasal drainage, mucous membranes pink, moist, pharynx no exudate, no visible cerumen.   NECK:supple, FROM; no JVD, no TMG, no carotid bruits   BREAST: deferred exam   RESPIRATORY:diminished at the bases   CARDIOVASCULAR: S1, S2 normal, RRR; no S3, no S4 and positive for murmur   ABDOMEN:  normal active BS+, soft, nondistended; no organomegaly, no masses; no bruits; nontender, no guarding, no rebound tenderness.  :no suprapubic distension  LYMPHATIC:no lymphedema  MUSCULOSKELETAL: no acute synovitis upper or lower extremity   EXTREMITIES/VASCULAR:edema right LE and edema left LE  NEUROLOGIC:intact; no sensorimotor deficit  PSYCHIATRIC: alert and oriented x 3; affect appropriate     DIAGNOSTICS REVIEWED AT THIS VISIT:  Labs 11/04/24: wbc 12.62, hgb 8.7, plt 370, gluc 78, na 137, k 4.3, bun 26, creat 0.91    SEE PLAN BELOW    Acute hypoxic respiratory failure  Bilateral pneumonia/pneumonitis  COPD   Bilateral pulm emboli   - failed outpt treatment with amox and doxycycline x 7d  - repeat CXR 10/24 revealed extensive bilateral perihilar and bilateral upper and lower lobe airspace opacities, progressed since 10/17.    - Covid negative 10/17 and 10/24/24  - S.pneumo, legionella negative  - Mycoplasma IgM/IgG negative  - ANCA panel negative  - URIEL/connective tissue disease panel negative  -  Fungitell-beta-D glucan negative  - Echo 10/24/24 - normal EF (55-60%), no obvious vegetatons  - T with bilateral ground glass opacities, small consolidations of BLL  - ID followed in hospital s/p zithromax x 6 days; Completed Zosyn IV  - per pulm, findings suspicious for NSIP (vs cryptogenic organizing pneumonia vs hypersensitivity pneumonitis); NOT thought to be c/w UIP pattern  - on empiric IV solumedrol since 10/25/24- on 40mg IV q6hrs > q8hrs >q12 hrs  - continue prednisone 40 mg daily   - pt denied recent travel, exposure to birds/pets, arthralgias (other than her mild hand OA), fevers  - wean O2 as tolerated   - repeat CXR 10/29 improved from 10/24  - pt was started on hep gtt, then transitioned to Xarelto   - continue xarelto  - unclear etiology of PE; will do hypercoag w/u as outpt.   Consider malignancy w/u if no other cause found (colonoscopy 1/2021 revealed adenomatous polyp but poor prep; pt declined repeat colonoscopy; Medicare would not cover Cologuard); UTD on mammo  - weekly labs in rehab   - currently on 1L of O2 at rest, 2-3L of O2 with ambulation. Weaning slowly   - monitor      Severe aortic stenosis  HLD  - seeing cardiology Dr. Bird  - moderate A.S on echo in 10/2022 (prev mild A.S in 2020)  -abnormal pre-op EKG 9/2023 --Nuc GXT done 9/20/23 -- EKG portion revealed 1-1.5mm ST seg depression in inferolateral leads with prolonged recovery; nuclear portion with normal perfusion   - most recent echo at Dr. Bird's office 10/18/24 -- LVEF 65%, grade 2 diastolic dysfunction, severe aortic stenosis (mean aortic gradient signif worse since 9/2023 -- 31>49 mmHg)  - pt will need TAVR evaluation as outpt  - cardiology consult in rehab   - continue lasix 20g po daily  - continue asa 81 mg bid   - continue xarelto for pe as above   - continue atorvastatin 20 mg hs   - monitor      Anemia  - Hgb stable in the 10's-11's  - likely 2/2 acute illness per hospital records   - normal iron studies (c/w anemia  of chronic disease; low TIBC)     Hypertension  - dyazide stopped due to NANCY   - continue amlodipine 5 mg daily      Hx of hip OA  - s/p left THR (Dr. Lo) many years ago  - s/p elective R BEATRIZ on 10/5/23 by Dr. Diaz     Osteopenia   - continue Fosamax 70 mg weekly      Physical Deconditioning/Impaired mobility and ADLs/At risk for falling  - Fall Precautions  - PT/OT/ST to evaluate and treat  - [facility] team to establish care plan meeting with patient and POA/family as appropriate  - Anticipate DC on or before TBD; SW to assist patient/family w/ DC planning  - DC Plan:  TBD     Pain Management  - Offer to pre-medicate 30-60 min prior to therapy  - Physiatry evaluation with management appreciated  - Acetaminophen 500 mg every 6 hrs prn      Vitamins/supplements as r/t deficiencies  - [facility] RD to follow while in rehab; supplementation/diet as per [facility] RD  - May continue home supplements  - continue Cyanocobalamin Oral Tablet 500 MCG daily   - continue Vitamin D3 Tablet 50 MCG (2000 UT) daily   - continue Calcium 600+D3 Oral Tablet 600-5 MG-MCG daily   - continue mvt daily      LABS  - CBC/CMP weekly while in [facility]    FOLLOW UP APPOINTMENTS  No future appointments.     35 minutes spent w/ patient and staff, including but not limited to/ reviewing present status, needs, abilities with disciplines, reviewing medical records, vital signs, labs, completing medication reconciliation and entering orders for continued care in [facility]     Note to patient: The 21st Century Cures Act makes medical notes like these available to patients in the interest of transparency. However, this is a medical document intended as peer to peer communication. It is written in medical language and may contain abbreviations or verbiage that are unfamiliar. It may appear blunt or direct. Medical documents are intended to carry relevant information, facts as evident, and the clinical opinion of the practitioner who signs the document.     Jose F Meehan  KALYAN   11/07/24         [1] No Known Allergies

## 2024-11-11 ENCOUNTER — EXTERNAL FACILITY (OUTPATIENT)
Dept: PULMONOLOGY | Facility: CLINIC | Age: 84
End: 2024-11-11

## 2024-11-11 ENCOUNTER — SNF VISIT (OUTPATIENT)
Dept: INTERNAL MEDICINE CLINIC | Facility: SKILLED NURSING FACILITY | Age: 84
End: 2024-11-11

## 2024-11-11 VITALS
OXYGEN SATURATION: 96 % | DIASTOLIC BLOOD PRESSURE: 52 MMHG | HEART RATE: 81 BPM | RESPIRATION RATE: 18 BRPM | SYSTOLIC BLOOD PRESSURE: 114 MMHG | TEMPERATURE: 98 F

## 2024-11-11 DIAGNOSIS — D64.9 ANEMIA, UNSPECIFIED TYPE: ICD-10-CM

## 2024-11-11 DIAGNOSIS — I26.94 MULTIPLE SUBSEGMENTAL PULMONARY EMBOLI WITHOUT ACUTE COR PULMONALE (HCC): ICD-10-CM

## 2024-11-11 DIAGNOSIS — I35.0 NONRHEUMATIC AORTIC VALVE STENOSIS: ICD-10-CM

## 2024-11-11 DIAGNOSIS — D64.9 ANEMIA, UNSPECIFIED TYPE: Primary | ICD-10-CM

## 2024-11-11 DIAGNOSIS — E78.5 HYPERLIPIDEMIA, UNSPECIFIED HYPERLIPIDEMIA TYPE: ICD-10-CM

## 2024-11-11 DIAGNOSIS — M85.89 OSTEOPENIA OF MULTIPLE SITES: ICD-10-CM

## 2024-11-11 DIAGNOSIS — I26.99 ACUTE PULMONARY EMBOLISM WITHOUT ACUTE COR PULMONALE, UNSPECIFIED PULMONARY EMBOLISM TYPE (HCC): ICD-10-CM

## 2024-11-11 DIAGNOSIS — J96.01 ACUTE RESPIRATORY FAILURE WITH HYPOXIA (HCC): ICD-10-CM

## 2024-11-11 DIAGNOSIS — I10 PRIMARY HYPERTENSION: ICD-10-CM

## 2024-11-11 DIAGNOSIS — E55.9 VITAMIN D DEFICIENCY: ICD-10-CM

## 2024-11-11 DIAGNOSIS — J84.9 INTERSTITIAL LUNG DISEASE (HCC): Primary | ICD-10-CM

## 2024-11-11 DIAGNOSIS — I34.0 MITRAL VALVE INSUFFICIENCY, UNSPECIFIED ETIOLOGY: ICD-10-CM

## 2024-11-11 RX ORDER — FERROUS SULFATE 324(65)MG
1 TABLET, DELAYED RELEASE (ENTERIC COATED) ORAL
COMMUNITY

## 2024-11-11 NOTE — PROGRESS NOTES
Hoda Busby  : 1940  Age 84 year old  female patient is admitted to [facility] for rehabilitation and strengthening.       [facility] Admission 10/24/24 - 24     Reason for visit: F/u on PE, hypoxia, pneumonia, COPD, generalized weakness      HPI     Pt is an 83 y/o F with HTN, aortic stenosis admitted with acute respiratory failure due to pneumonia and PE. The patent was initially seen in the ED on 10/17/24 with a one week hx of cough and dyspnea. CXR revealed bilateral airspace opacities c/w pneumonia. Covid negative. WBC 10.3, left shift. O2 sats were 93-94% on RA   Pt was sent home on amoxicillin 1 g TID and doxycycline 100 mg BID x 7 days.   Since then, she has continued to cough and has become more short of breath.  She could not walk even 10 feet without becoming short of breath.   Her daughter brought her here in a wheelchair. Cough is productive/rattling, though she is not able to bring up the sputum.  She notes feeling chilled at night, though no actual rigors. She was seen in the office that day and O2 sats 85% on RA -- up to 90% on 1.5L O2 NC.  Pt appeared visibly dyspneic. She was sent to [facility] for further eval and treatment. CT chest in hospital revealed Acute distal segmental/subsegmental pulmonary embolism involving the right upper lobe as well as a subsegmental pulmonary embolism in the left upper lobe. Pt was started on anticoagulation therapy, was on 10L of O2 with gradual wean, also started on steroid therapy. Pt was treated, stabilized and discharged to [facility] on 3L of O2 for rehab and strengthening.     Pt seen and examined in follow up. Pt is sitting in the dining area, on RA, appears comfortable in NAD. Pt reports feeling well, states she's been using oxygen supplementation only at night or with activity, but at rest she's on RA with no dyspnea. Also denies chest pain. She reports participating and progressing in PT daily. Denies appetite change, n/v or  abdominal pain. Denies constipation or diarrhea. No other issues/concerns. Vss     ALLERGIES:  Allergies[1]    IMMUNIZATIONS  Immunization History   Administered Date(s) Administered    Covid-19 Vaccine Pfizer 30 mcg/0.3 ml 02/17/2021, 03/10/2021, 10/04/2021    Covid-19 Vaccine Pfizer Bivalent 30mcg/0.3mL 12/06/2022    Covid-19 Vaccine Pfizer Wilber-Sucrose 30 mcg/0.3 ml 04/18/2022    FLU VAC High Dose 65 YRS & Older PRSV Free (41354) 10/04/2017, 10/14/2019, 10/05/2022, 11/06/2023    FLUZONE 6 months and older PFS 0.5 ml (86668) 10/07/2015, 09/29/2020    Fluvirin, 3 Years & >, Im 10/22/2012, 10/02/2014    Fluzone Vaccine Medicare () 10/01/2017, 10/14/2018    High Dose Fluzone Influenza Vaccine, 65yr+ PF 0.5mL (00806) 10/19/2016, 10/02/2017, 10/15/2018, 10/14/2019, 10/05/2022    Influenza 10/15/2016, 10/19/2021    Influenza Virus Vaccine, Split 04/18/2022    Pfizer Covid-19 Vaccine 30mcg/0.3ml 12yrs+ 12/08/2023    Pneumococcal (Prevnar 13) 03/11/2015    Pneumococcal Vaccine, Conjugate 01/01/2001    RSV, recombinant, RSVpreF, adjuvanted (Arexvy) 02/13/2024    TDAP 03/22/2017    Zoster Vaccine Recombinant Adjuvanted (Shingrix) 07/22/2020, 09/29/2020   Deferred Date(s) Deferred    High Dose Fluzone Influenza Vaccine, 65yr+ PF 0.5mL ([id number]) 11/01/2024        CODE STATUS:  Full Code    ADVANCED CARE PLANNING TEAM: Will need family care plan    CURRENT MEDICATIONS - reviewed and updated on SNF EMR     SUBJECTIVE    Pt seen and examined in follow up. Pt is sitting in the dining area, on RA, appears comfortable in NAD. Pt reports feeling well, states she's been using oxygen supplementation only at night or with activity, but at rest she's on RA with no dyspnea. Also denies chest pain. She reports participating and progressing in PT daily. Denies appetite change, n/v or abdominal pain. Denies constipation or diarrhea. No other issues/concerns. Vss     PHYSICAL EXAM:  Vitals:    11/11/24 1330   BP: 114/52   Pulse: 81    Resp: 18   Temp: 97.5 °F (36.4 °C)   SpO2: 96%      GENERAL HEALTH:well developed, well nourished, in no apparent distress   LINES, TUBES, DRAINS:  O2 per NC in place   SKIN: no rashes, no suspicious lesions  WOUND: see wound assessment,   EYES: PERRLA, EOMI, sclera anicteric, conjunctiva normal; there is no nystagmus, no drainage from eyes  HENT: normocephalic; normal nose, no nasal drainage, mucous membranes pink, moist, pharynx no exudate, no visible cerumen.   NECK:supple, FROM; no JVD, no TMG, no carotid bruits   BREAST: deferred exam   RESPIRATORY:diminished at the bases   CARDIOVASCULAR: S1, S2 normal, RRR; no S3, no S4 and positive for murmur   ABDOMEN:  normal active BS+, soft, nondistended; no organomegaly, no masses; no bruits; nontender, no guarding, no rebound tenderness.  :no suprapubic distension  LYMPHATIC:no lymphedema  MUSCULOSKELETAL: no acute synovitis upper or lower extremity   EXTREMITIES/VASCULAR:edema right LE and edema left LE  NEUROLOGIC:intact; no sensorimotor deficit  PSYCHIATRIC: alert and oriented x 3; affect appropriate     DIAGNOSTICS REVIEWED AT THIS VISIT:  Labs 11/04/24: wbc 12.62, hgb 8.7, plt 370, gluc 78, na 137, k 4.3, bun 26, creat 0.91   Labs 11/11/24: wbc 11.68, hgb 7.3, plt 346, gluc 81, na 144, k 4.0, bun 33, creat 1.06    SEE PLAN BELOW    Worsening anemia   - baseline hgb at 11-12  - has been steadily dropping to 8.7. now 7.3  - check OB  - start ferrous sulfate 325 mg daily   - repeat CBC on 11/13/24  - monitor     Acute hypoxic respiratory failure  Bilateral pneumonia/pneumonitis  COPD   Bilateral pulm emboli   - failed outpt treatment with amox and doxycycline x 7d  - repeat CXR 10/24 revealed extensive bilateral perihilar and bilateral upper and lower lobe airspace opacities, progressed since 10/17.    - Covid negative 10/17 and 10/24/24  - S.pneumo, legionella negative  - Mycoplasma IgM/IgG negative  - ANCA panel negative  - URIEL/connective tissue disease panel  negative  - Fungitell-beta-D glucan negative  - Echo 10/24/24 - normal EF (55-60%), no obvious vegetatons  - T with bilateral ground glass opacities, small consolidations of BLL  - ID followed in hospital s/p zithromax x 6 days; Completed Zosyn IV  - per pulm, findings suspicious for NSIP (vs cryptogenic organizing pneumonia vs hypersensitivity pneumonitis); NOT thought to be c/w UIP pattern  - on empiric IV solumedrol since 10/25/24- on 40mg IV q6hrs > q8hrs >q12 hrs  - continue prednisone 40 mg daily   - pt denied recent travel, exposure to birds/pets, arthralgias (other than her mild hand OA), fevers  - wean O2 as tolerated   - repeat CXR 10/29 improved from 10/24  - pt was started on hep gtt, then transitioned to Xarelto   - continue xarelto  - unclear etiology of PE; will do hypercoag w/u as outpt.   Consider malignancy w/u if no other cause found (colonoscopy 1/2021 revealed adenomatous polyp but poor prep; pt declined repeat colonoscopy; Medicare would not cover Cologuard); UTD on mammo  - weekly labs in rehab   - On 1L of O2 at rest, 2-3L of O2 with ambulation. Weaning slowly 11/07/24  - On RA at rest as of 11/11/24. On supplemental O2 at night and with ambulation   - monitor      Severe aortic stenosis  HLD  - seeing cardiology Dr. Bird  - moderate A.S on echo in 10/2022 (prev mild A.S in 2020)  -abnormal pre-op EKG 9/2023 --Nuc GXT done 9/20/23 -- EKG portion revealed 1-1.5mm ST seg depression in inferolateral leads with prolonged recovery; nuclear portion with normal perfusion   - most recent echo at Dr. Bird's office 10/18/24 -- LVEF 65%, grade 2 diastolic dysfunction, severe aortic stenosis (mean aortic gradient signif worse since 9/2023 -- 31>49 mmHg)  - pt will need TAVR evaluation as outpt  - cardiology consult in rehab   - continue lasix 20g po daily  - continue asa 81 mg bid   - continue xarelto for pe as above   - continue atorvastatin 20 mg hs   - monitor      Anemia  - Hgb stable in the  10's-11's  - likely 2/2 acute illness per hospital records   - normal iron studies (c/w anemia of chronic disease; low TIBC)     Hypertension  - dyazide stopped due to NANCY   - continue amlodipine 5 mg daily      Hx of hip OA  - s/p left THR (Dr. Lo) many years ago  - s/p elective R BEATRIZ on 10/5/23 by Dr. Diaz     Osteopenia   - continue Fosamax 70 mg weekly      Physical Deconditioning/Impaired mobility and ADLs/At risk for falling  - Fall Precautions  - PT/OT/ST to evaluate and treat  - [facility] team to establish care plan meeting with patient and POA/family as appropriate  - Anticipate DC on or before TBD; SW to assist patient/family w/ DC planning  - DC Plan:  TBD     Pain Management  - Offer to pre-medicate 30-60 min prior to therapy  - Physiatry evaluation with management appreciated  - Acetaminophen 500 mg every 6 hrs prn      Vitamins/supplements as r/t deficiencies  - [facility] to follow while in rehab; supplementation/diet as per [facility]  - May continue home supplements  - continue Cyanocobalamin Oral Tablet 500 MCG daily   - continue Vitamin D3 Tablet 50 MCG (2000 UT) daily   - continue Calcium 600+D3 Oral Tablet 600-5 MG-MCG daily   - continue mvt daily      LABS  - CBC/CMP weekly while in [facility]    FOLLOW UP APPOINTMENTS  No future appointments.     35 minutes spent w/ patient and staff, including but not limited to/ reviewing present status, needs, abilities with disciplines, reviewing medical records, vital signs, labs, completing medication reconciliation and entering orders for continued care in [facility]     Note to patient: The 21st Century Cures Act makes medical notes like these available to patients in the interest of transparency. However, this is a medical document intended as peer to peer communication. It is written in medical language and may contain abbreviations or verbiage that are unfamiliar. It may appear blunt or direct. Medical documents are intended to carry relevant information, facts as  evident, and the clinical opinion of the practitioner who signs the document.     Jose F Meehan, APRN   11/11/24           [1] No Known Allergies

## 2024-11-12 NOTE — PROGRESS NOTES
Pulmonary Consult Note  [facility] External Facility - [facility]    History of Present Illness:   Hoda Busby is a(n) 84 year old female who I am now evaluating for respiratory failure, diffuse lung injury, and PE at [facility]. The patient was recently hospitalized with respiratory failure. CT scan of the chest demonstrated multifocal ground-glass opacities involving R>L lung as well as acute pulmonary emboli. CT findings concerning for NSIP or HSP. She received empiric antibiotics. Connective tissue disease labs, ANCA, fungal antibodies, Strep and Legionella antigens were negative. She improved with trial of steroids and was discharged on prednisone 40 mg daily with plans to taper. This was her first VTE event and she is on Xarelto. Hemoglobin today is 7.3 which is down from 8.7 on 11/4 and 10.3 on 11/1. She denies blood in stool and melena. She is feeling much better and is no longer requiring supplemental oxygen. She admits to dyspnea on exertion but no shortness of breath at rest. She has ongoing cough which is improving. She is unable to expectorate. No wheezing. No fever or chills. Has appetite. Leg swelling is improved. No known history of lung disease. She is former smoker, quit 30 years ago after having smoked 1 ppd for 15 years.     Past Medical History: PE, HTN, aortic stenosis, osteoporosis, osteoarthritis, anemia, macular degeneration    Past Surgical History:  Past Surgical History:   Procedure Laterality Date    Cataract  3/2&3/16 2017    Colonoscopy & polypectomy  1/4/2021         Hip replacement surgery Left Around 2006    Other surgical history  [place] 2017    Bilateral cataract surgery     Social History:  -Tobacco: Former smoker, quit 30 years ago after having smoked 1 ppd for 15 years  -Alcohol: None    Allergies: NKDA    Medications: Reviewed on [facility] EMR; pertinent pulmonary medications include: prednisone, xarelto    Review of Systems: Vision notable for visual impariment. Ears nose and  throat normal. Bowel normal. Bladder function normal. No thyroid disease. No depression. No rash. Muscles and joints unremarkable. No weight loss or weight gain.     Physical Exam:  /55, HR 90, RR 18, T 97.7, sat 94% on room air   Constitutional: Awake, alert, NAD  HEENT: Head NC/AT, PEERL, MMM  Cardio: RRR, S1S2, +murmur  Respiratory: Thorax symmetrical with no labored breathing. Normal effort. Bibasilar crackles.  GI: NABS. Abd soft, non-tender.  Extremities: No clubbing or cyanosis. No LE edema. No calf tenderness.  Neurologic: A&Ox3. No gross motor deficits.  Skin: Warm, dry.  Psych: Calm, cooperative. Pleasant affect.    Results:  -Labs 11/11/24: wbc 11.68, hgb 7.3, plt 346, cr 1.06, bun 33, na 144, k 4.0, co2 26    -CTA chest 10/25/24:  1. Acute distal segmental/subsegmental pulmonary embolism involving the right upper lobe as well as a subsegmental pulmonary embolism in the left upper lobe.   2. Multifocal ground-glass opacities involving the right greater than left lungs with some areas of perilobular sparing as well as small consolidations involving the bilateral lower lobes.  Differential diagnosis includes multifocal pneumonia, organizing    pneumonia, or less likely other etiologies.   3. Small right and trace left pleural effusions.   4. Mild centrilobular emphysema.   5. Mild mediastinal and right hilar lymphadenopathy, which is favored to be reactive.   6. Biatrial predominant cardiomegaly with triple-vessel coronary artery calcifications, mitral annular calcifications, and aortic valve leaflet calcifications.   7. Lesser incidental findings described above.     Assessment and Plan:  1. Diffuse lung injury - etiology is uncertain. Pattern on imaging more c/w NSIP or HSP. Connective tissue disease labs, ANCA, fungal antibodies all negative. Clinically improving with empiric steroids. Off oxygen.    Plan:  -Prednisone 40 mg daily  -Will discuss prednisone taper with Dr. Holloway and follow up  thereafter    2. Pulmonary emboli - 1st event. Unprovoked. She is on Xarelto. Hemoglobin in downtrending.    Plan:  -Continue Xarelto  -Closely monitor hemoglobin and for signs/symptoms of bleeding    3. Anemia - hemoglobin 7.3 today (8.7 on 11/4 and 10.3 on 11/1). No overt signs of bleeding. She is on Xarelto and aspirin 81 mg.     Plan:  -Agree with hemoccult as ordered by primary service  -Check iron studies and ferritin  -Add PPI while on prednisone  -Unclear indication for aspirin 81 mg - consider discontinuation, defer to primary service    4. Severe aortic stenosis - follows with cardiology. Plans for outpatient TAVR eval.    Plan:  -Follow up cardiology for TAVR eval    Dilip Diehl PA-C  Pulmonary Medicine

## 2024-11-18 ENCOUNTER — EXTERNAL FACILITY (OUTPATIENT)
Dept: PULMONOLOGY | Facility: CLINIC | Age: 84
End: 2024-11-18

## 2024-11-18 DIAGNOSIS — D64.9 ANEMIA, UNSPECIFIED TYPE: ICD-10-CM

## 2024-11-18 DIAGNOSIS — J84.9 INTERSTITIAL LUNG DISEASE (HCC): Primary | ICD-10-CM

## 2024-11-18 DIAGNOSIS — I26.94 MULTIPLE SUBSEGMENTAL PULMONARY EMBOLI WITHOUT ACUTE COR PULMONALE (HCC): ICD-10-CM

## 2024-11-18 NOTE — PROGRESS NOTES
Pulmonary Progress Note  SNF External Facility - [facility]     History of Present Illness:   Hoda Busby is a(n) 84 year old female who I am seeing for follow up at [facility]. The patient was recently hospitalized with respiratory failure. CT scan of the chest demonstrated multifocal ground-glass opacities involving R>L lung as well as acute pulmonary emboli. CT findings concerning for NSIP or HSP. She received empiric antibiotics. Connective tissue disease labs, ANCA, fungal antibodies, Strep and Legionella antigens were negative. She improved with trial of steroids and was discharged on prednisone 40 mg daily with plans to taper. This was her first VTE event and she is on Xarelto.     Interval History: Seen and examined with niece at bedside. Patient states dyspnea is improving. No shortness of breath at rest. Cough is significantly improved and now only minimal. Per RN, she desaturates during PT and is requiring supplemental oxygen. Otherwise, she is not requiring O2 at rest. Hemoglobin is 7.0 today. She reports dark stools for at least the last week. Of note, she was started on iron supplementation 1 weeks ago. Denies chest pain and pleurisy. Appetite is good. Denies fever and chills. Denies leg swelling. Denies blood in stool.      Review of Systems: Vision notable for visual impariment. Ears nose and throat normal. Bowel normal. Bladder function normal. No thyroid disease. No depression. No rash. Muscles and joints unremarkable. No weight loss or weight gain.      Physical Exam:  /44, HR 92, RR 17, sat 93% on room air   Constitutional: Awake, alert, NAD  HEENT: Head NC/AT, PEERL, MMM  Cardio: RRR, S1S2, +murmur  Respiratory: Thorax symmetrical with no labored breathing. Normal effort. CTAB.  GI: NABS. Abd soft, non-tender.  Extremities: No clubbing or cyanosis. Trace RLE edema. 1+ LLE edema. No calf tenderness.  Neurologic: A&Ox3. No gross motor deficits.  Skin: Warm, dry.  Psych: Calm,  cooperative. Pleasant affect.     Results:  -Labs 11/11/24: wbc 11.68, hgb 7.3, plt 346, cr 1.06, bun 33, na 144, k 4.0, co2 26    -Hgb 11/18/24: 7.0     -CTA chest 10/25/24:  1. Acute distal segmental/subsegmental pulmonary embolism involving the right upper lobe as well as a subsegmental pulmonary embolism in the left upper lobe.   2. Multifocal ground-glass opacities involving the right greater than left lungs with some areas of perilobular sparing as well as small consolidations involving the bilateral lower lobes.  Differential diagnosis includes multifocal pneumonia, organizing    pneumonia, or less likely other etiologies.   3. Small right and trace left pleural effusions.   4. Mild centrilobular emphysema.   5. Mild mediastinal and right hilar lymphadenopathy, which is favored to be reactive.   6. Biatrial predominant cardiomegaly with triple-vessel coronary artery calcifications, mitral annular calcifications, and aortic valve leaflet calcifications.   7. Lesser incidental findings described above.      Assessment and Plan:  1. Diffuse lung injury - etiology is uncertain. Pattern on imaging more c/w NSIP or HSP. Connective tissue disease labs, ANCA, fungal antibodies all negative. Clinically improving with empiric steroids. Per RN, desaturates with activity.     Plan:  -O2 if needed to maintain oxygen saturations >92%  -Prednisone 40 mg daily with plans to continue for 4-6 weeks followed by taper  -Bactrim DS MWF for PCP prophylaxis while on prednisone  -Omeprazole 20 mg daily for GI prophylaxis while on prednisone  -Follow up with Dr. Holloway in pulmonary clinic 2 weeks after [facility] discharge    2. Pulmonary emboli - 1st event. Unprovoked. She is on Xarelto. Hemoglobin continues to downtrend.      Plan:  -Continue Xarelto  -Closely monitor hemoglobin and for signs/symptoms of bleeding     3. Anemia - hemoglobin 7.0 today (8.7 on 11/4 and 10.3 on 11/1). She is on Xarelto and aspirin 81 mg. Unclear indication  for aspirin, will stop. Iron studies ordered last week but not collected. Patient reports dark stools but she was started on iron supplementation last week.     Plan:  -Agree with hemoccult as ordered by primary service  -Check iron studies and ferritin  -Stop aspirin 81 mg  -Follow hemoglobin; recommend ER for hemoglobin <7 as will require transfusion     4. Severe aortic stenosis - follows with cardiology. Plans for outpatient TAVR eval.     Plan:  -Follow up cardiology for eventual TAVR eval     D/w RN.     JUDY [facility]  Pulmonary Medicine

## 2024-11-21 ENCOUNTER — SNF VISIT (OUTPATIENT)
Dept: INTERNAL MEDICINE CLINIC | Facility: SKILLED NURSING FACILITY | Age: 84
End: 2024-11-21

## 2024-11-21 DIAGNOSIS — H35.30 MACULAR DEGENERATION, UNSPECIFIED LATERALITY, UNSPECIFIED TYPE: ICD-10-CM

## 2024-11-21 DIAGNOSIS — D64.9 ANEMIA, UNSPECIFIED TYPE: Primary | ICD-10-CM

## 2024-11-21 DIAGNOSIS — E55.9 VITAMIN D DEFICIENCY: ICD-10-CM

## 2024-11-21 DIAGNOSIS — I35.0 NONRHEUMATIC AORTIC VALVE STENOSIS: ICD-10-CM

## 2024-11-21 DIAGNOSIS — I10 PRIMARY HYPERTENSION: ICD-10-CM

## 2024-11-21 DIAGNOSIS — E78.00 HYPERCHOLESTEROLEMIA: ICD-10-CM

## 2024-11-21 DIAGNOSIS — J96.01 ACUTE RESPIRATORY FAILURE WITH HYPOXIA (HCC): ICD-10-CM

## 2024-11-21 DIAGNOSIS — I26.99 ACUTE PULMONARY EMBOLISM WITHOUT ACUTE COR PULMONALE, UNSPECIFIED PULMONARY EMBOLISM TYPE (HCC): ICD-10-CM

## 2024-11-24 NOTE — PROGRESS NOTES
Hoda Busby  : 1940  Age 84 year old  female patient is admitted to [facility] for rehabilitation and strengthening.       [facility] Admission 10/24/24 - 24     Reason for visit: F/u on PE, hypoxia, pneumonia, COPD, generalized weakness      HPI     Pt is an 83 y/o F with HTN, aortic stenosis admitted with acute respiratory failure due to pneumonia and PE. The patent was initially seen in the ED on 10/17/24 with a one week hx of cough and dyspnea. CXR revealed bilateral airspace opacities c/w pneumonia. Covid negative. WBC 10.3, left shift. O2 sats were 93-94% on RA   Pt was sent home on amoxicillin 1 g TID and doxycycline 100 mg BID x 7 days.   Since then, she has continued to cough and has become more short of breath.  She could not walk even 10 feet without becoming short of breath.   Her daughter brought her here in a wheelchair. Cough is productive/rattling, though she is not able to bring up the sputum.  She notes feeling chilled at night, though no actual rigors. She was seen in the office that day and O2 sats 85% on RA -- up to 90% on 1.5L O2 NC.  Pt appeared visibly dyspneic. She was sent to [facility] for further eval and treatment. CT chest in hospital revealed Acute distal segmental/subsegmental pulmonary embolism involving the right upper lobe as well as a subsegmental pulmonary embolism in the left upper lobe. Pt was started on anticoagulation therapy, was on 10L of O2 with gradual wean, also started on steroid therapy. Pt was treated, stabilized and discharged to [facility] on 3L of O2 for rehab and strengthening    Pt seen and examined in follow up. Pt reports overall improvement and progress in PT, however still has dyspnea on exertion. Denies chest pain. Pt is also concerned about low hemoglobin levels. She's trying to provide an OB specimen today but feels constipated. Otherwise she denies having black or bloody stools. Pt denies appetite change, n/v or abdominal pain.  She has no other issues/concerns. Vss     ALLERGIES:  Allergies[1]    IMMUNIZATIONS  Immunization History   Administered Date(s) Administered    Covid-19 Vaccine Pfizer 30 mcg/0.3 ml 02/17/2021, 03/10/2021, 10/04/2021    Covid-19 Vaccine Pfizer Bivalent 30mcg/0.3mL 12/06/2022    Covid-19 Vaccine Pfizer Wilber-Sucrose 30 mcg/0.3 ml 04/18/2022    FLU VAC High Dose 65 YRS & Older PRSV Free (71513) 10/04/2017, 10/14/2019, 10/05/2022, 11/06/2023    FLUZONE 6 months and older PFS 0.5 ml (93154) 10/07/2015, 09/29/2020    Fluvirin, 3 Years & >, Im 10/22/2012, 10/02/2014    Fluzone Vaccine Medicare () 10/01/2017, 10/14/2018    High Dose Fluzone Influenza Vaccine, 65yr+ PF 0.5mL (64413) 10/19/2016, 10/02/2017, 10/15/2018, 10/14/2019, 10/05/2022    Influenza 10/15/2016, 10/19/2021    Influenza Virus Vaccine, Split 04/18/2022    Pfizer Covid-19 Vaccine 30mcg/0.3ml 12yrs+ 12/08/2023    Pneumococcal (Prevnar 13) 03/11/2015    Pneumococcal Vaccine, Conjugate 01/01/2001    RSV, recombinant, RSVpreF, adjuvanted (Arexvy) 02/13/2024    TDAP 03/22/2017    Zoster Vaccine Recombinant Adjuvanted (Shingrix) 07/22/2020, 09/29/2020   Deferred Date(s) Deferred    High Dose Fluzone Influenza Vaccine, 65yr+ PF 0.5mL (63500) 11/01/2024        CODE STATUS:  Full Code    ADVANCED CARE PLANNING TEAM: Will need family care plan    CURRENT MEDICATIONS - reviewed and updated on SNF EMR     SUBJECTIVE    Pt seen and examined in follow up. Pt reports overall improvement and progress in PT, however still has dyspnea on exertion. Denies chest pain. Pt is also concerned about low hemoglobin levels. She's trying to provide an OB specimen today but feels constipated. Otherwise she denies having black or bloody stools. Pt denies appetite change, n/v or abdominal pain. She has no other issues/concerns. Vss     PHYSICAL EXAM:    GENERAL HEALTH:well developed, well nourished, in no apparent distress   LINES, TUBES, DRAINS:  O2 per NC in place   SKIN: no  rashes, no suspicious lesions  WOUND: see wound assessment,   EYES: PERRLA, EOMI, sclera anicteric, conjunctiva normal; there is no nystagmus, no drainage from eyes  HENT: normocephalic; normal nose, no nasal drainage, mucous membranes pink, moist, pharynx no exudate, no visible cerumen.   NECK:supple, FROM; no JVD, no TMG, no carotid bruits   BREAST: deferred exam   RESPIRATORY:diminished at the bases   CARDIOVASCULAR: S1, S2 normal, RRR; no S3, no S4 and positive for murmur   ABDOMEN:  normal active BS+, soft, nondistended; no organomegaly, no masses; no bruits; nontender, no guarding, no rebound tenderness.  :no suprapubic distension  LYMPHATIC:no lymphedema  MUSCULOSKELETAL: no acute synovitis upper or lower extremity   EXTREMITIES/VASCULAR:edema right LE and edema left LE  NEUROLOGIC:intact; no sensorimotor deficit  PSYCHIATRIC: alert and oriented x 3; affect appropriate     DIAGNOSTICS REVIEWED AT THIS VISIT:    Labs 11/04/24: wbc 12.62, hgb 8.7, plt 370, gluc 78, na 137, k 4.3, bun 26, creat 0.91   Labs 11/11/24: wbc 11.68, hgb 7.3, plt 346, gluc 81, na 144, k 4.0, bun 33, creat 1.06  Labs 11/13/24: wbc 11.06, hgb 7.1, plt 360   Labs 11/18/24: wbc 7.98, hgb 6.3, plt 411, gluc 80, na 143, k 3.9, bun 28, creat 0.99   Repeat hgb 11/18/24: hgb 7.0   Labs 11/20/24: wbc 11.84, hgb 7.1, plt 552, Ferritin 128, iron 53, , % transferrin sat 22    SEE PLAN BELOW    Worsening anemia   - baseline hgb at 11-12  - has been steadily dropping to 8.7. now 7.3  - check OB  - start ferrous sulfate 325 mg daily   - repeat CBC on 11/13/24 hgb 7.3 and has not much improved since as of 11/21/24 with labs as above   - still awaiting stool specimen  - will possibly need GI or hematology referral pending OB results   - monitor     Constipation   - start miralax daily and prn      Acute hypoxic respiratory failure  Bilateral pneumonia/pneumonitis  COPD   Bilateral pulm emboli   - failed outpt treatment with amox and doxycycline  x 7d  - repeat CXR 10/24 revealed extensive bilateral perihilar and bilateral upper and lower lobe airspace opacities, progressed since 10/17.    - Covid negative 10/17 and 10/24/24  - S.pneumo, legionella negative  - Mycoplasma IgM/IgG negative  - ANCA panel negative  - URIEL/connective tissue disease panel negative  - Fungitell-beta-D glucan negative  - Echo 10/24/24 - normal EF (55-60%), no obvious vegetatons  - T with bilateral ground glass opacities, small consolidations of BLL  - ID followed in hospital s/p zithromax x 6 days; Completed Zosyn IV  - per pulm, findings suspicious for NSIP (vs cryptogenic organizing pneumonia vs hypersensitivity pneumonitis); NOT thought to be c/w UIP pattern  - on empiric IV solumedrol since 10/25/24- on 40mg IV q6hrs > q8hrs >q12 hrs  - continue prednisone 40 mg daily   - pt denied recent travel, exposure to birds/pets, arthralgias (other than her mild hand OA), fevers  - wean O2 as tolerated   - repeat CXR 10/29 improved from 10/24  - pt was started on hep gtt, then transitioned to Xarelto   - continue xarelto  - unclear etiology of PE; will do hypercoag w/u as outpt.   Consider malignancy w/u if no other cause found (colonoscopy 1/2021 revealed adenomatous polyp but poor prep; pt declined repeat colonoscopy; Medicare would not cover Cologuard); UTD on mammo  - weekly labs in rehab   - On 1L of O2 at rest, 2-3L of O2 with ambulation. Weaning slowly 11/07/24  - On RA at rest as of 11/11/24. On supplemental O2 at night and with ambulation   - monitor      Severe aortic stenosis  HLD  - seeing cardiology Dr. Bird  - moderate A.S on echo in 10/2022 (prev mild A.S in 2020)  -abnormal pre-op EKG 9/2023 --Nuc GXT done 9/20/23 -- EKG portion revealed 1-1.5mm ST seg depression in inferolateral leads with prolonged recovery; nuclear portion with normal perfusion   - most recent echo at Dr. Bird's office 10/18/24 -- LVEF 65%, grade 2 diastolic dysfunction, severe aortic stenosis  (mean aortic gradient signif worse since 9/2023 -- 31>49 mmHg)  - pt will need TAVR evaluation as outpt  - cardiology consult in rehab   - continue lasix 20g po daily  - continue asa 81 mg bid   - continue xarelto for pe as above   - continue atorvastatin 20 mg hs   - monitor      Anemia  - Hgb stable in the 10's-11's  - likely 2/2 acute illness per hospital records   - normal iron studies (c/w anemia of chronic disease; low TIBC)     Hypertension  - dyazide stopped due to NANCY   - continue amlodipine 5 mg daily      Hx of hip OA  - s/p left THR (Dr. Lo) many years ago  - s/p elective R BEATRIZ on 10/5/23 by Dr. Diaz     Osteopenia   - continue Fosamax 70 mg weekly      Physical Deconditioning/Impaired mobility and ADLs/At risk for falling  - Fall Precautions  - PT/OT/ST to evaluate and treat  - [facility] team to establish care plan meeting with patient and POA/family as appropriate  - Anticipate DC on or before TBD; SW to assist patient/family w/ DC planning  - DC Plan:  TBD     Pain Management  - Offer to pre-medicate 30-60 min prior to therapy  - Physiatry evaluation with management appreciated  - Acetaminophen 500 mg every 6 hrs prn      Vitamins/supplements as r/t deficiencies  - [facility] RD to follow while in rehab; supplementation/diet as per [facility] RD  - May continue home supplements  - continue Cyanocobalamin Oral Tablet 500 MCG daily   - continue Vitamin D3 Tablet 50 MCG (2000 UT) daily   - continue Calcium 600+D3 Oral Tablet 600-5 MG-MCG daily   - continue mvt daily      LABS  - CBC/CMP weekly while in [facility]    FOLLOW UP APPOINTMENTS  No future appointments.     35 minutes spent w/ patient and staff, including but not limited to/ reviewing present status, needs, abilities with disciplines, reviewing medical records, vital signs, labs, completing medication reconciliation and entering orders for continued care in [facility]     Note to patient: The 21st Century Cures Act makes medical notes like these available to patients in  the interest of transparency. However, this is a medical document intended as peer to peer communication. It is written in medical language and may contain abbreviations or verbiage that are unfamiliar. It may appear blunt or direct. Medical documents are intended to carry relevant information, facts as evident, and the clinical opinion of the practitioner who signs the document.     Jose F Meehan, APRN   11/21/24         [1] No Known Allergies

## 2024-11-25 ENCOUNTER — HOSPITAL ENCOUNTER (INPATIENT)
Facility: HOSPITAL | Age: 84
LOS: 4 days | Discharge: NURSING, REHAB OR LONG-TERM CARE | DRG: 378 | End: 2024-11-30
Attending: EMERGENCY MEDICINE | Admitting: INTERNAL MEDICINE
Payer: MEDICARE

## 2024-11-25 ENCOUNTER — EXTERNAL FACILITY (OUTPATIENT)
Dept: PULMONOLOGY | Facility: CLINIC | Age: 84
End: 2024-11-25

## 2024-11-25 ENCOUNTER — SNF VISIT (OUTPATIENT)
Dept: INTERNAL MEDICINE CLINIC | Facility: SKILLED NURSING FACILITY | Age: 84
End: 2024-11-25

## 2024-11-25 VITALS
TEMPERATURE: 97 F | HEART RATE: 94 BPM | DIASTOLIC BLOOD PRESSURE: 57 MMHG | OXYGEN SATURATION: 93 % | SYSTOLIC BLOOD PRESSURE: 104 MMHG | RESPIRATION RATE: 18 BRPM

## 2024-11-25 DIAGNOSIS — I26.99 ACUTE PULMONARY EMBOLISM WITHOUT ACUTE COR PULMONALE, UNSPECIFIED PULMONARY EMBOLISM TYPE (HCC): Primary | ICD-10-CM

## 2024-11-25 DIAGNOSIS — I10 PRIMARY HYPERTENSION: ICD-10-CM

## 2024-11-25 DIAGNOSIS — I10 HYPERTENSION, UNSPECIFIED TYPE: ICD-10-CM

## 2024-11-25 DIAGNOSIS — E78.00 HYPERCHOLESTEROLEMIA: ICD-10-CM

## 2024-11-25 DIAGNOSIS — I26.94 MULTIPLE SUBSEGMENTAL PULMONARY EMBOLI WITHOUT ACUTE COR PULMONALE (HCC): ICD-10-CM

## 2024-11-25 DIAGNOSIS — D64.9 ANEMIA, UNSPECIFIED TYPE: ICD-10-CM

## 2024-11-25 DIAGNOSIS — J84.9 INTERSTITIAL LUNG DISEASE (HCC): Primary | ICD-10-CM

## 2024-11-25 DIAGNOSIS — G47.00 INSOMNIA, UNSPECIFIED TYPE: ICD-10-CM

## 2024-11-25 DIAGNOSIS — I35.0 SEVERE AORTIC STENOSIS: ICD-10-CM

## 2024-11-25 DIAGNOSIS — E78.5 HYPERLIPIDEMIA, UNSPECIFIED HYPERLIPIDEMIA TYPE: ICD-10-CM

## 2024-11-25 DIAGNOSIS — J96.01 ACUTE RESPIRATORY FAILURE WITH HYPOXIA (HCC): ICD-10-CM

## 2024-11-25 DIAGNOSIS — K92.2 GASTROINTESTINAL HEMORRHAGE, UNSPECIFIED GASTROINTESTINAL HEMORRHAGE TYPE: Primary | ICD-10-CM

## 2024-11-25 LAB
ALBUMIN SERPL-MCNC: 3.7 G/DL (ref 3.2–4.8)
ALBUMIN/GLOB SERPL: 1.9 {RATIO} (ref 1–2)
ALP LIVER SERPL-CCNC: 51 U/L
ALT SERPL-CCNC: 32 U/L
ANION GAP SERPL CALC-SCNC: 9 MMOL/L (ref 0–18)
ANTIBODY SCREEN: NEGATIVE
AST SERPL-CCNC: 24 U/L (ref ?–34)
BASOPHILS # BLD AUTO: 0.01 X10(3) UL (ref 0–0.2)
BASOPHILS NFR BLD AUTO: 0.1 %
BILIRUB SERPL-MCNC: 0.6 MG/DL (ref 0.2–1.1)
BUN BLD-MCNC: 29 MG/DL (ref 9–23)
BUN/CREAT SERPL: 21.5 (ref 10–20)
CALCIUM BLD-MCNC: 9.3 MG/DL (ref 8.7–10.4)
CHLORIDE SERPL-SCNC: 106 MMOL/L (ref 98–112)
CO2 SERPL-SCNC: 24 MMOL/L (ref 21–32)
CREAT BLD-MCNC: 1.35 MG/DL
DEPRECATED RDW RBC AUTO: 48.6 FL (ref 35.1–46.3)
EGFRCR SERPLBLD CKD-EPI 2021: 39 ML/MIN/1.73M2 (ref 60–?)
EOSINOPHIL # BLD AUTO: 0 X10(3) UL (ref 0–0.7)
EOSINOPHIL NFR BLD AUTO: 0 %
ERYTHROCYTE [DISTWIDTH] IN BLOOD BY AUTOMATED COUNT: 14.8 % (ref 11–15)
GLOBULIN PLAS-MCNC: 2 G/DL (ref 2–3.5)
GLUCOSE BLD-MCNC: 143 MG/DL (ref 70–99)
HCT VFR BLD AUTO: 22.3 %
HCT VFR BLD AUTO: 24.5 %
HGB BLD-MCNC: 7 G/DL
HGB BLD-MCNC: 7.8 G/DL
IMM GRANULOCYTES # BLD AUTO: 0.13 X10(3) UL (ref 0–1)
IMM GRANULOCYTES NFR BLD: 0.8 %
LYMPHOCYTES # BLD AUTO: 0.59 X10(3) UL (ref 1–4)
LYMPHOCYTES NFR BLD AUTO: 3.8 %
MCH RBC QN AUTO: 28.2 PG (ref 26–34)
MCHC RBC AUTO-ENTMCNC: 31.4 G/DL (ref 31–37)
MCV RBC AUTO: 89.9 FL
MONOCYTES # BLD AUTO: 0.09 X10(3) UL (ref 0.1–1)
MONOCYTES NFR BLD AUTO: 0.6 %
MRSA DNA SPEC QL NAA+PROBE: NEGATIVE
NEUTROPHILS # BLD AUTO: 14.76 X10 (3) UL (ref 1.5–7.7)
NEUTROPHILS # BLD AUTO: 14.76 X10(3) UL (ref 1.5–7.7)
NEUTROPHILS NFR BLD AUTO: 94.7 %
OSMOLALITY SERPL CALC.SUM OF ELEC: 296 MOSM/KG (ref 275–295)
PLATELET # BLD AUTO: 510 10(3)UL (ref 150–450)
POTASSIUM SERPL-SCNC: 4.3 MMOL/L (ref 3.5–5.1)
PROT SERPL-MCNC: 5.7 G/DL (ref 5.7–8.2)
RBC # BLD AUTO: 2.48 X10(6)UL
RH BLOOD TYPE: POSITIVE
SODIUM SERPL-SCNC: 139 MMOL/L (ref 136–145)
WBC # BLD AUTO: 15.6 X10(3) UL (ref 4–11)

## 2024-11-25 PROCEDURE — 30233N1 TRANSFUSION OF NONAUTOLOGOUS RED BLOOD CELLS INTO PERIPHERAL VEIN, PERCUTANEOUS APPROACH: ICD-10-PCS | Performed by: INTERNAL MEDICINE

## 2024-11-25 PROCEDURE — 99223 1ST HOSP IP/OBS HIGH 75: CPT | Performed by: INTERNAL MEDICINE

## 2024-11-25 RX ORDER — POLYETHYLENE GLYCOL 3350 17 G/17G
17 POWDER, FOR SOLUTION ORAL DAILY
Status: DISCONTINUED | OUTPATIENT
Start: 2024-11-26 | End: 2024-11-30

## 2024-11-25 RX ORDER — DOXEPIN HYDROCHLORIDE 50 MG/1
1 CAPSULE ORAL DAILY
Status: DISCONTINUED | OUTPATIENT
Start: 2024-11-26 | End: 2024-11-30

## 2024-11-25 RX ORDER — MAGNESIUM OXIDE 400 MG (241.3 MG MAGNESIUM) TABLET
500 TABLET DAILY
Status: DISCONTINUED | OUTPATIENT
Start: 2024-11-26 | End: 2024-11-30

## 2024-11-25 RX ORDER — METHYLPREDNISOLONE SODIUM SUCCINATE 40 MG/ML
40 INJECTION INTRAMUSCULAR; INTRAVENOUS DAILY
Status: DISCONTINUED | OUTPATIENT
Start: 2024-11-25 | End: 2024-11-26

## 2024-11-25 RX ORDER — CHOLECALCIFEROL (VITAMIN D3) 25 MCG
4000 TABLET ORAL DAILY
Status: DISCONTINUED | OUTPATIENT
Start: 2024-11-26 | End: 2024-11-30

## 2024-11-25 RX ORDER — SULFAMETHOXAZOLE AND TRIMETHOPRIM 800; 160 MG/1; MG/1
1 TABLET ORAL
Status: DISCONTINUED | OUTPATIENT
Start: 2024-11-27 | End: 2024-11-30

## 2024-11-25 RX ORDER — ATORVASTATIN CALCIUM 20 MG/1
20 TABLET, FILM COATED ORAL NIGHTLY
Status: DISCONTINUED | OUTPATIENT
Start: 2024-11-25 | End: 2024-11-30

## 2024-11-25 RX ORDER — FUROSEMIDE 20 MG/1
20 TABLET ORAL DAILY
Status: DISCONTINUED | OUTPATIENT
Start: 2024-11-26 | End: 2024-11-30

## 2024-11-25 RX ORDER — AMLODIPINE BESYLATE 5 MG/1
5 TABLET ORAL DAILY
Status: DISCONTINUED | OUTPATIENT
Start: 2024-11-26 | End: 2024-11-30

## 2024-11-25 RX ORDER — MULTIVIT-MIN/IRON FUM/FOLIC AC 7.5 MG-4
1 TABLET ORAL DAILY
COMMUNITY

## 2024-11-25 RX ORDER — SULFAMETHOXAZOLE AND TRIMETHOPRIM 800; 160 MG/1; MG/1
1 TABLET ORAL
Status: ON HOLD | COMMUNITY

## 2024-11-25 RX ORDER — ZOLPIDEM TARTRATE 5 MG/1
5 TABLET ORAL NIGHTLY PRN
Status: DISCONTINUED | OUTPATIENT
Start: 2024-11-25 | End: 2024-11-30

## 2024-11-25 NOTE — CONSULTS
Discharge Summary    Johanny Smith  :  1992  MRN:  [id number]    Admit date:  1/15/Farhat 14 Day Quinlan Hernández Yanique  [facility]  [facility] of [facility]    Report of Consultation    Hoda Busby Patient Status:  Emergency    1940 MRN [id number]   Location [facility] EMERGENCY DEPARTMENT Attending Carlyle Bolton MD   Hosp Day # 0 PCP Malathi Stuart MD     Date of Admission:  2024  Date of Consult: 2024    Reason for Consultation:   Consults  Anemia with possible GI blood loss with positive Hemoccult  Patient with ILD on steroid  Recent small PE on Xarelto    History provided by:patient  HPI:     Chief Complaint   Patient presents with    Abnormal Labs     HPI    This is a very pleasant 84-year-old female with recent hospitalization for hypoxemic respiratory failure with ILD likely NSIP or HP and patient placed on steroid and clinically improving and currently on 40 mg daily at rehab at the [facility], also small subsegmental PE placed on Xarelto and patient followed by Dr. Holloway .    Patient presented from the rehab/[facility] with worsening fatigue and dizziness with reported hemoglobin of 6.2  Denied syncope with no chest pain or shortness of breath or cough and currently on room air with 95% O2 sat .    Denied abdominal pain or vomiting or diarrhea  No melena but Hemoccult stool in ER was positive  No hematemesis or hematochezia or hematuria  No significant lower extremity edema or pain or calf tenderness  No fever or chills or cough or sputum    History     Past Medical History:    Acute pulmonary embolism (HCC)    High blood pressure    Osteopenia    Primary osteoarthritis of right hip    Visual impairment    Readers    White coat hypertension     Past Surgical History:   Procedure Laterality Date    Cataract  3/2&3/16 2017    Colonoscopy & polypectomy  2021         Hip replacement surgery Left Around     Other surgical history  [place]     Bilateral cataract surgery     Family History   Problem Relation Age of Onset    Cancer Father         bone (cause of death)     Stroke Mother         CVA (cause of death)    Diabetes Mother     Hypertension Mother     Dementia Brother         Alzheimer's    Heart Disease Brother         CAD - x2 brothers    Heart Disease Brother      Social History:  Social History     Socioeconomic History    Marital status:    Tobacco Use    Smoking status: Former     Current packs/day: 0.00     Types: Cigarettes     Start date: 1990     Quit date: 1990     Years since quittin.9     Passive exposure: Past    Smokeless tobacco: Former   Vaping Use    Vaping status: Never Used   Substance and Sexual Activity    Alcohol use: Yes     Comment: wine, occasionally    Drug use: Never   Other Topics Concern    Caffeine Concern Yes     Comment: coffee/soda - 2cups/day     Social Drivers of Health     Food Insecurity: No Food Insecurity (10/24/2024)    Food Insecurity     Food Insecurity: Never true   Transportation Needs: No Transportation Needs (10/24/2024)    Transportation Needs     Lack of Transportation: No   Housing Stability: Low Risk  (10/24/2024)    Housing Stability     Housing Instability: No     Allergies/Medications:   Allergies: Allergies[1]  (Not in a hospital admission)      Review of Systems:     Constitutional:  Positive for fatigue. Negative for chills, fever and unexpected weight change.   HENT: Negative.     Eyes: Negative.    Respiratory: Negative.     Cardiovascular: Negative.    Gastrointestinal: Negative.    Musculoskeletal: Negative.    Skin: Negative.    Neurological:  Positive for dizziness. Negative for seizures, syncope, weakness and light-headedness.   Hematological: Negative.    Psychiatric/Behavioral: Negative.         Physical Exam:   Vital Signs:   height is 5' 8\" (1.727 m) and weight is 170 lb (77.1 kg). Her oral temperature is 98.2 °F (36.8 °C). Her blood pressure is 128/64 and her pulse is 85. Her respiration is 20 and oxygen saturation is 96%.   Physical Exam  Constitutional:       General: She is not in  [facility] , [address]  [address] [place], [place] [address]TYLER PIERRE AM .[place] [id number]      Phone: [phone]   Gutenbergze 14 Day [facility] acute distress.     Appearance: Normal appearance.   HENT:      Head: Atraumatic.      Nose: Nose normal.      Mouth/Throat:      Mouth: Mucous membranes are moist.   Eyes:      General: No scleral icterus.  Cardiovascular:      Rate and Rhythm: Normal rate.      Heart sounds: No murmur heard.     No gallop.   Pulmonary:      Effort: No respiratory distress.      Breath sounds: No stridor. No wheezing, rhonchi or rales.   Chest:      Chest wall: No tenderness.   Abdominal:      General: Abdomen is flat. Bowel sounds are normal. There is no distension.      Palpations: Abdomen is soft. There is no mass.      Tenderness: There is no guarding.   Musculoskeletal:      Cervical back: Normal range of motion.      Right lower leg: No edema.      Left lower leg: No edema.   Skin:     General: Skin is dry.   Neurological:      General: No focal deficit present.      Mental Status: She is oriented to person, place, and time.         Results:     Lab Results   Component Value Date    WBC 15.6 (H) 11/25/2024    HGB 7.0 (L) 11/25/2024    HCT 22.3 (L) 11/25/2024    .0 (H) 11/25/2024    CREATSERUM 1.35 (H) 11/25/2024    BUN 29 (H) 11/25/2024     11/25/2024    K 4.3 11/25/2024     11/25/2024    CO2 24.0 11/25/2024     (H) 11/25/2024    CA 9.3 11/25/2024    ALB 3.7 11/25/2024    ALKPHO 51 (L) 11/25/2024    BILT 0.6 11/25/2024    TP 5.7 11/25/2024    AST 24 11/25/2024    ALT 32 11/25/2024    PTT 30.6 11/01/2024    INR 1.06 09/08/2023    TSH 1.526 07/16/2024    ESRML 49 (H) 10/26/2024    PHOS 3.6 10/28/2024    TROPHS 9 10/17/2024    B12 349 03/20/2017         Impression:      1-worsening anemia with positive stool Hemoccult indicating GI blood loss  Patient on anticoagulation with Xarelto for recent small subsegmental PE    Plan ;  Hold Xarelto  Transfuse PRBC  Follow-up H&H  PPI IV  GI consult /patient likely will need EGD and possible colonoscopy    2- ILD with recent hypoxemic respiratory failure  Recent  hospitalization /likely NSIP or HP or   ANCA  and other autoimmune serology were negative  Patient on prednisone 40 mg daily  Clinically better and currently off oxygen  Follow-up chest x-ray    3-recent small subsegmental PE  Negative lower extremity Doppler  Currently on Xarelto no acute stop for now since patient with anemia and possible GI blood loss  To resume when cleared by GI    D/w pt and niece at bedside  D/w staff       Martita Baez MD  11/25/2024         [1] No Known Allergies

## 2024-11-25 NOTE — ED PROVIDER NOTES
Patient Seen in: [facility] Emergency Department    History     Chief Complaint   Patient presents with    Abnormal Labs     Stated Complaint: low hgb    HPI  Patient complains of low hgb, recently started on Eliquis for PE, hgb trending down.  Risk factors for GI bleeding include: eliquis.   Associated symptoms: fatigue, winded.  Patient denies chest pain, shortness of breath or dizziness.    Past Medical History:    Acute pulmonary embolism (HCC)    High blood pressure    Osteopenia    Primary osteoarthritis of right hip    Visual impairment    Readers    White coat hypertension       Past Surgical History:   Procedure Laterality Date    Cataract  3/2&3/16 2017    Colonoscopy & polypectomy  2021         Hip replacement surgery Left Around     Other surgical history  [place]     Bilateral cataract surgery            Family History   Problem Relation Age of Onset    Cancer Father         bone (cause of death)    Stroke Mother         CVA (cause of death)    Diabetes Mother     Hypertension Mother     Dementia Brother         Alzheimer's    Heart Disease Brother         CAD - x2 brothers    Heart Disease Brother        Social History     Socioeconomic History    Marital status:    Tobacco Use    Smoking status: Former     Current packs/day: 0.00     Types: Cigarettes     Start date: 1990     Quit date: 1990     Years since quittin.9     Passive exposure: Past    Smokeless tobacco: Former   Vaping Use    Vaping status: Never Used   Substance and Sexual Activity    Alcohol use: Yes     Comment: wine, occasionally    Drug use: Never   Other Topics Concern    Caffeine Concern Yes     Comment: coffee/soda - 2cups/day     Social Drivers of Health     Food Insecurity: No Food Insecurity (10/24/2024)    Food Insecurity     Food Insecurity: Never true   Transportation Needs: No Transportation Needs (10/24/2024)    Transportation Needs     Lack of Transportation: No   Housing Stability:  Low Risk  (10/24/2024)    Housing Stability     Housing Instability: No       Review of Systems    Positive for stated complaint: low hgb  Other systems are as noted in HPI.  Constitutional and vital signs reviewed.      All other systems reviewed and negative except as noted above.    PSFH elements reviewed from today and agreed except as otherwise stated in HPI.    Physical Exam     ED Triage Vitals [11/25/24 1440]   /51   Pulse 93   Resp 22   Temp 98.2 °F (36.8 °C)   Temp src Oral   SpO2 96 %   O2 Device None (Room air)       Current:/64   Pulse 85   Temp 98.2 °F (36.8 °C) (Oral)   Resp 20   Ht 172.7 cm (5' 8\")   Wt 77.1 kg   SpO2 96%   BMI 25.85 kg/m²   PULSE OX nl  GENERAL: appears tired  HEAD: normocephalic, atraumatic  EYES: PERRLA, EOMI,THROAT: mmm, no lesions  NECK: supple, no meningeal signs  LUNGS: no resp distress, cta bilateral  CARDIO: RRR without murmur  GI: soft, non-tender, normal bowel sounds  RECTAL: heme + black stool.  EXTREMITIES: moves all 4 spont, no edema  NEURO: alert, oriented x 3, 2-12 intact, no focal deficits appreciated  SKIN: good skin turgor, no  rashes  PSYCH: calm, cooperative,    Differential includes:  pud with bleeding ulcer vs. gastritis vs. AVM vs. colitis s.  diverticulosis vs. medication side effect         ED Course     Labs Reviewed   CBC WITH DIFFERENTIAL WITH PLATELET - Abnormal; Notable for the following components:       Result Value    WBC 15.6 (*)     RBC 2.48 (*)     HGB 7.0 (*)     HCT 22.3 (*)     RDW-SD 48.6 (*)     .0 (*)     Neutrophil Absolute Prelim 14.76 (*)     Neutrophil Absolute 14.76 (*)     Lymphocyte Absolute 0.59 (*)     Monocyte Absolute 0.09 (*)     All other components within normal limits   COMP METABOLIC PANEL (14) - Abnormal; Notable for the following components:    Glucose 143 (*)     BUN 29 (*)     Creatinine 1.35 (*)     BUN/CREA Ratio 21.5 (*)     Calculated Osmolality 296 (*)     eGFR-Cr 39 (*)     Alkaline  Phosphatase 51 (*)     All other components within normal limits   TYPE AND SCREEN    Narrative:     The following orders were created for panel order Type and screen.  Procedure                               Abnormality         Status                     ---------                               -----------         ------                     ABORH (Blood Type)[[id number]]                               In process                 Antibody Screen[[id number]]                                  In process                   Please view results for these tests on the individual orders.   ABORH (BLOOD TYPE)   ANTIBODY SCREEN   PREPARE RBC   RAINBOW DRAW LAVENDER   RAINBOW DRAW LIGHT GREEN   RAINBOW DRAW BLUE   RAINBOW DRAW GOLD   ED/MRSA SCREEN BY PCR-CC       MDM     @No results found.    Monitor Interpretation:  Nsr 78    Radiology Interpretation:      Medical Decision Making  Ppi, gi consult transfuse 1 unit prbc's.  Consulted pulm and GI Dr. Bills accepts admission    Problems Addressed:  Gastrointestinal hemorrhage, unspecified gastrointestinal hemorrhage type: acute illness or injury    Amount and/or Complexity of Data Reviewed  Independent Historian: caregiver  External Data Reviewed: labs.     Details: Hgb 6.8 at outside  Labs: ordered. Decision-making details documented in ED Course.    Risk  Decision regarding hospitalization.            Disposition and Plan     Clinical Impression:  1. Gastrointestinal hemorrhage, unspecified gastrointestinal hemorrhage type        Disposition:  Admit    Follow-up:  No follow-up provider specified.    Medications Prescribed:  Current Discharge Medication List          Hospital Problems       Present on Admission  Date Reviewed: 10/24/2024            ICD-10-CM Noted POA    GI bleed K92.2 11/25/2024 Unknown

## 2024-11-25 NOTE — PROGRESS NOTES
Pulmonary Progress Note  SNF External Facility - [facility]     History of Present Illness:   Hoda Busby is a(n) 84 year old female who I am seeing for follow up at [facility]. The patient was recently hospitalized with respiratory failure. CT scan of the chest demonstrated multifocal ground-glass opacities involving R>L lung as well as acute pulmonary emboli. CT findings concerning for NSIP or HSP. She received empiric antibiotics. Connective tissue disease labs, ANCA, fungal antibodies, Strep and Legionella antigens were negative. She improved with trial of steroids and was discharged on prednisone 40 mg daily with plans to taper after 4-6 weeks. This was her first VTE event and she is on Xarelto.      Interval History: Seen and examined. She complains of ongoing dyspnea on exertion though improving. No shortness of breath at rest. Cough is resolved. She is no longer requiring supplemental oxygen. Hemoglobin is 6.2 today. She denies dizziness and lightheadedness. She denies blood in stool and melena. She denies chest pain and pleurisy. Appetite is good.     Review of Systems: Vision notable for visual impariment. Ears nose and throat normal. Bowel normal. Bladder function normal. No thyroid disease. No depression. No rash. Muscles and joints unremarkable. No weight loss or weight gain.      Physical Exam:  /63, HR 83, RR 20, T 98.5, sat 96% on room air   Constitutional: Awake, alert, NAD  HEENT: Head NC/AT, PEERL, MMM  Cardio: RRR, S1S2, +murmur  Respiratory: Thorax symmetrical with no labored breathing. Normal effort. CTAB.  Extremities: No clubbing or cyanosis. Trace bilateral LE edema. No calf tenderness.  Neurologic: A&Ox3. No gross motor deficits.  Skin: Warm, dry. Pale-appearing.  Psych: Calm, cooperative. Pleasant affect.     Results:  Labs:  -Labs 11/11/24: wbc 11.68, hgb 7.3, plt 346, cr 1.06, bun 33, na 144, k 4.0, co2 26  -Hgb 11/18/24: 7.0  -Labs 11/20/24: ferritin 128, iron 53,  TIBC 243, % trans sat 22%  -Labs 11/25/24: wbc 9.30, hgb 6.2, plt 515     Imaging:  -CTA chest 10/25/24:  1. Acute distal segmental/subsegmental pulmonary embolism involving the right upper lobe as well as a subsegmental pulmonary embolism in the left upper lobe.   2. Multifocal ground-glass opacities involving the right greater than left lungs with some areas of perilobular sparing as well as small consolidations involving the bilateral lower lobes.  Differential diagnosis includes multifocal pneumonia, organizing    pneumonia, or less likely other etiologies.   3. Small right and trace left pleural effusions.   4. Mild centrilobular emphysema.   5. Mild mediastinal and right hilar lymphadenopathy, which is favored to be reactive.   6. Biatrial predominant cardiomegaly with triple-vessel coronary artery calcifications, mitral annular calcifications, and aortic valve leaflet calcifications.   7. Lesser incidental findings described above.      Assessment and Plan:  1. Diffuse lung injury - etiology is uncertain. Pattern on imaging more c/w NSIP or HSP. Connective tissue disease labs, ANCA, fungal antibodies all negative. Clinically improving with empiric steroids. Weaned off oxygen.     Plan:  -O2 if needed to maintain oxygen saturations >92%  -Prednisone 40 mg daily with plans to continue for 4-6 weeks followed by taper as per Dr. Holloway  -[facility] for PCP prophylaxis while on prednisone  -Omeprazole 20 mg daily for GI prophylaxis while on prednisone  -Follow up with Dr. Holloway in pulmonary clinic 1-2 weeks after discharge     2. Pulmonary emboli - 1st event. Unprovoked. She is on Xarelto. Hemoglobin continues to downtrend (6.2 today).     Plan:  -Will need to hold Xarelto for now while evaluating anemia     3. Anemia - hemoglobin 6.2 today (8.7 on 11/4 and 10.3 on 11/1). She is on Xarelto. Had been on aspirin 81 mg which I discontinued last week. Iron studies 11/20/24 not c/w iron deficiency although  started on PO ferrous sulfate 1 week prior. No overt GI bleeding.     Plan:  -Hold Xarelto for now  -ER evaluation as patient will requiring blood transfusion     4. Severe aortic stenosis - follows with cardiology. Plans for outpatient TAVR eval.     Plan:  -Follow up cardiology for eventual TAVR eval     D/w RN.     CORRINE Luke-C  Pulmonary Medicine

## 2024-11-25 NOTE — ED INITIAL ASSESSMENT (HPI)
Hoda is here via [facility] from [facility] for evaluation of abnormal labs.  Hgb 6.2 today.  She does take Xarelto for history of PE.  Denies any abnormal bleeding.

## 2024-11-25 NOTE — PROGRESS NOTES
Hoda Busby  : 1940  Age 84 year old  female patient is admitted to [facility] for rehabilitation and strengthening.       [facility] Admission 10/24/24 - 24     Reason for visit: F/u on PE, hypoxia, pneumonia, COPD, generalized weakness      HPI     Pt is an 85 y/o F with HTN, aortic stenosis admitted with acute respiratory failure due to pneumonia and PE. The patent was initially seen in the ED on 10/17/24 with a one week hx of cough and dyspnea. CXR revealed bilateral airspace opacities c/w pneumonia. Covid negative. WBC 10.3, left shift. O2 sats were 93-94% on RA   Pt was sent home on amoxicillin 1 g TID and doxycycline 100 mg BID x 7 days.   Since then, she has continued to cough and has become more short of breath.  She could not walk even 10 feet without becoming short of breath.   Her daughter brought her here in a wheelchair. Cough is productive/rattling, though she is not able to bring up the sputum.  She notes feeling chilled at night, though no actual rigors. She was seen in the office that day and O2 sats 85% on RA -- up to 90% on 1.5L O2 NC.  Pt appeared visibly dyspneic. She was sent to [facility] for further eval and treatment. CT chest in hospital revealed Acute distal segmental/subsegmental pulmonary embolism involving the right upper lobe as well as a subsegmental pulmonary embolism in the left upper lobe. Pt was started on anticoagulation therapy, was on 10L of O2 with gradual wean, also started on steroid therapy. Pt was treated, stabilized and discharged to [facility] on 3L of O2 for rehab and strengthening    Pt seen and examined in follow up. Pt reports feeling well, she's especially happy to feel that her dyspnea on exertion in PT is improving. She reports participating and progressing in PT daily. Denies chest pain. She's been eating well. Stool specimen was collected for OB study today. Denies n/v or abdominal pain. Denies constipation or diarrhea. Vss      ALLERGIES:  Allergies[1]    IMMUNIZATIONS  Immunization History   Administered Date(s) Administered    Covid-19 Vaccine Pfizer 30 mcg/0.3 ml 02/17/2021, 03/10/2021, 10/04/2021    Covid-19 Vaccine Pfizer Bivalent 30mcg/0.3mL 12/06/2022    Covid-19 Vaccine Pfizer Wilber-Sucrose 30 mcg/0.3 ml 04/18/2022    FLU VAC High Dose 65 YRS & Older PRSV Free (40714) 10/04/2017, 10/14/2019, 10/05/2022, 11/06/2023    FLUZONE 6 months and older PFS 0.5 ml (30158) 10/07/2015, 09/29/2020    Fluvirin, 3 Years & >, Im 10/22/2012, 10/02/2014    Fluzone Vaccine Medicare () 10/01/2017, 10/14/2018    High Dose Fluzone Influenza Vaccine, 65yr+ PF 0.5mL (80030) 10/19/2016, 10/02/2017, 10/15/2018, 10/14/2019, 10/05/2022    Influenza 10/15/2016, 10/19/2021    Influenza Virus Vaccine, Split 04/18/2022    Pfizer Covid-19 Vaccine 30mcg/0.3ml 12yrs+ 12/08/2023    Pneumococcal (Prevnar 13) 03/11/2015    Pneumococcal Vaccine, Conjugate 01/01/2001    RSV, recombinant, RSVpreF, adjuvanted (Arexvy) 02/13/2024    TDAP 03/22/2017    Zoster Vaccine Recombinant Adjuvanted (Shingrix) 07/22/2020, 09/29/2020   Deferred Date(s) Deferred    High Dose Fluzone Influenza Vaccine, 65yr+ PF 0.5mL (25865) 11/01/2024        CODE STATUS:  Full Code    ADVANCED CARE PLANNING TEAM: Will need family care plan    CURRENT MEDICATIONS - reviewed and updated on SNF EMR     SUBJECTIVE    Pt seen and examined in follow up. Pt reports feeling well, she's especially happy to feel that her dyspnea on exertion in PT is improving. She reports participating and progressing in PT daily. Denies chest pain. She's been eating well. Stool specimen was collected for OB study today. Denies n/v or abdominal pain. Denies constipation or diarrhea. Vss     PHYSICAL EXAM:  Vitals:    11/25/24 1204   BP: 104/57   Pulse: 94   Resp: 18   Temp: 97.2 °F (36.2 °C)   SpO2: 93%      GENERAL HEALTH:well developed, well nourished, in no apparent distress   LINES, TUBES, DRAINS:  O2 per NC in place    SKIN: no rashes, no suspicious lesions  WOUND: see wound assessment,   EYES: PERRLA, EOMI, sclera anicteric, conjunctiva normal; there is no nystagmus, no drainage from eyes  HENT: normocephalic; normal nose, no nasal drainage, mucous membranes pink, moist, pharynx no exudate, no visible cerumen.   NECK:supple, FROM; no JVD, no TMG, no carotid bruits   BREAST: deferred exam   RESPIRATORY:diminished at the bases   CARDIOVASCULAR: S1, S2 normal, RRR; no S3, no S4 and positive for murmur   ABDOMEN:  normal active BS+, soft, nondistended; no organomegaly, no masses; no bruits; nontender, no guarding, no rebound tenderness.  :no suprapubic distension  LYMPHATIC:no lymphedema  MUSCULOSKELETAL: no acute synovitis upper or lower extremity   EXTREMITIES/VASCULAR:edema right LE and edema left LE  NEUROLOGIC:intact; no sensorimotor deficit  PSYCHIATRIC: alert and oriented x 3; affect appropriate     DIAGNOSTICS REVIEWED AT THIS VISIT:  Labs 11/04/24: wbc 12.62, hgb 8.7, plt 370, gluc 78, na 137, k 4.3, bun 26, creat 0.91   Labs 11/11/24: wbc 11.68, hgb 7.3, plt 346, gluc 81, na 144, k 4.0, bun 33, creat 1.06  Labs 11/13/24: wbc 11.06, hgb 7.1, plt 360   Labs 11/18/24: wbc 7.98, hgb 6.3, plt 411, gluc 80, na 143, k 3.9, bun 28, creat 0.99   Repeat hgb 11/18/24: hgb 7.0   Labs 11/20/24: wbc 11.84, hgb 7.1, plt 552, Ferritin 128, iron 53, , % transferrin sat 22    SEE PLAN BELOW     Worsening anemia   - baseline hgb at 11-12  - has been steadily dropping to 8.7. now 7.3  - check OB  - start ferrous sulfate 325 mg daily   - repeat CBC on 11/13/24 hgb 7.3 and has not much improved since as of 11/21/24 with labs as above   - still awaiting stool specimen  - will possibly need GI or hematology referral pending OB results   - monitor      Constipation   - miralax daily and prn      Acute hypoxic respiratory failure  Bilateral pneumonia/pneumonitis  COPD   Bilateral pulm emboli   - failed outpt treatment with amox and  doxycycline x 7d  - repeat CXR 10/24 revealed extensive bilateral perihilar and bilateral upper and lower lobe airspace opacities, progressed since 10/17.    - Covid negative 10/17 and 10/24/24  - S.pneumo, legionella negative  - Mycoplasma IgM/IgG negative  - ANCA panel negative  - URIEL/connective tissue disease panel negative  - Fungitell-beta-D glucan negative  - Echo 10/24/24 - normal EF (55-60%), no obvious vegetatons  - T with bilateral ground glass opacities, small consolidations of BLL  - ID followed in hospital s/p zithromax x 6 days; Completed Zosyn IV  - per pulm, findings suspicious for NSIP (vs cryptogenic organizing pneumonia vs hypersensitivity pneumonitis); NOT thought to be c/w UIP pattern  - on empiric IV solumedrol since 10/25/24- on 40mg IV q6hrs > q8hrs >q12 hrs  - continue prednisone 40 mg daily   - pt denied recent travel, exposure to birds/pets, arthralgias (other than her mild hand OA), fevers  - wean O2 as tolerated   - repeat CXR 10/29 improved from 10/24  - pt was started on hep gtt, then transitioned to Xarelto   - continue xarelto  - unclear etiology of PE; will do hypercoag w/u as outpt.   Consider malignancy w/u if no other cause found (colonoscopy 1/2021 revealed adenomatous polyp but poor prep; pt declined repeat colonoscopy; Medicare would not cover Cologuard); UTD on mammo  - weekly labs in rehab   - On 1L of O2 at rest, 2-3L of O2 with ambulation. Weaning slowly 11/07/24  - On RA at rest as of 11/11/24. On supplemental O2 at night and with ambulation   - monitor      Severe aortic stenosis  HLD  - seeing cardiology Dr. Bird  - moderate A.S on echo in 10/2022 (prev mild A.S in 2020)  -abnormal pre-op EKG 9/2023 --Nuc GXT done 9/20/23 -- EKG portion revealed 1-1.5mm ST seg depression in inferolateral leads with prolonged recovery; nuclear portion with normal perfusion   - most recent echo at Dr. Bird's office 10/18/24 -- LVEF 65%, grade 2 diastolic dysfunction, severe aortic  stenosis (mean aortic gradient signif worse since 9/2023 -- 31>49 mmHg)  - pt will need TAVR evaluation as outpt  - cardiology consult in rehab   - continue lasix 20g po daily  - continue asa 81 mg bid   - continue xarelto for pe as above   - continue atorvastatin 20 mg hs   - monitor      Anemia  - Hgb stable in the 10's-11's  - likely 2/2 acute illness per hospital records   - normal iron studies (c/w anemia of chronic disease; low TIBC)     Hypertension  - dyazide stopped due to NANCY   - continue amlodipine 5 mg daily      Hx of hip OA  - s/p left THR (Dr. Lo) many years ago  - s/p elective R BEATRIZ on 10/5/23 by Dr. Diaz     Osteopenia   - continue Fosamax 70 mg weekly      Physical Deconditioning/Impaired mobility and ADLs/At risk for falling  - Fall Precautions  - PT/OT/ST to evaluate and treat  - [facility] team to establish care plan meeting with patient and POA/family as appropriate  - Anticipate DC on or before TBD; SW to assist patient/family w/ DC planning  - DC Plan:  TBD     Pain Management  - Offer to pre-medicate 30-60 min prior to therapy  - Physiatry evaluation with management appreciated  - Acetaminophen 500 mg every 6 hrs prn      Vitamins/supplements as r/t deficiencies  - [facility] RD to follow while in rehab; supplementation/diet as per [facility] RD  - May continue home supplements  - continue Cyanocobalamin Oral Tablet 500 MCG daily   - continue Vitamin D3 Tablet 50 MCG (2000 UT) daily   - continue Calcium 600+D3 Oral Tablet 600-5 MG-MCG daily   - continue mvt daily      LABS  - CBC/CMP weekly while in [facility]    FOLLOW UP APPOINTMENTS  No future appointments.     35 minutes spent w/ patient and staff, including but not limited to/ reviewing present status, needs, abilities with disciplines, reviewing medical records, vital signs, labs, completing medication reconciliation and entering orders for continued care in [facility]     Note to patient: The 21st Century Cures Act makes medical notes like these available to  patients in the interest of transparency. However, this is a medical document intended as peer to peer communication. It is written in medical language and may contain abbreviations or verbiage that are unfamiliar. It may appear blunt or direct. Medical documents are intended to carry relevant information, facts as evident, and the clinical opinion of the practitioner who signs the document.     Jose F Meehan, APRN   11/25/24         [1] No Known Allergies

## 2024-11-25 NOTE — ED QUICK NOTES
Orders for admission, patient is aware of plan and ready to go upstairs. Any questions, please call ED RN sharon at extension [phone].     Patient Covid vaccination status: Fully vaccinated     COVID Test Ordered in ED: None    COVID Suspicion at Admission: N/A    Running Infusions:  None    Mental Status/LOC at time of transport: aox4    Other pertinent information:   CIWA score: N/A   NIH score:  N/A

## 2024-11-26 ENCOUNTER — ANESTHESIA (OUTPATIENT)
Dept: ENDOSCOPY | Facility: HOSPITAL | Age: 84
DRG: 378 | End: 2024-11-26
Payer: MEDICARE

## 2024-11-26 ENCOUNTER — APPOINTMENT (OUTPATIENT)
Dept: GENERAL RADIOLOGY | Facility: HOSPITAL | Age: 84
DRG: 378 | End: 2024-11-26
Attending: INTERNAL MEDICINE
Payer: MEDICARE

## 2024-11-26 ENCOUNTER — ANESTHESIA EVENT (OUTPATIENT)
Dept: ENDOSCOPY | Facility: HOSPITAL | Age: 84
DRG: 378 | End: 2024-11-26
Payer: MEDICARE

## 2024-11-26 LAB
ANION GAP SERPL CALC-SCNC: 5 MMOL/L (ref 0–18)
BASOPHILS # BLD AUTO: 0.01 X10(3) UL (ref 0–0.2)
BASOPHILS NFR BLD AUTO: 0.1 %
BUN BLD-MCNC: 25 MG/DL (ref 9–23)
BUN/CREAT SERPL: 23.1 (ref 10–20)
CALCIUM BLD-MCNC: 8.3 MG/DL (ref 8.7–10.4)
CHLORIDE SERPL-SCNC: 111 MMOL/L (ref 98–112)
CO2 SERPL-SCNC: 27 MMOL/L (ref 21–32)
CREAT BLD-MCNC: 1.08 MG/DL
DEPRECATED RDW RBC AUTO: 48.3 FL (ref 35.1–46.3)
EGFRCR SERPLBLD CKD-EPI 2021: 51 ML/MIN/1.73M2 (ref 60–?)
EOSINOPHIL # BLD AUTO: 0.07 X10(3) UL (ref 0–0.7)
EOSINOPHIL NFR BLD AUTO: 0.7 %
ERYTHROCYTE [DISTWIDTH] IN BLOOD BY AUTOMATED COUNT: 15 % (ref 11–15)
GLUCOSE BLD-MCNC: 83 MG/DL (ref 70–99)
HCT VFR BLD AUTO: 20.8 %
HCT VFR BLD AUTO: 27.7 %
HGB BLD-MCNC: 6.5 G/DL
HGB BLD-MCNC: 8.7 G/DL
IMM GRANULOCYTES # BLD AUTO: 0.07 X10(3) UL (ref 0–1)
IMM GRANULOCYTES NFR BLD: 0.7 %
LYMPHOCYTES # BLD AUTO: 1.87 X10(3) UL (ref 1–4)
LYMPHOCYTES NFR BLD AUTO: 19.9 %
MCH RBC QN AUTO: 28 PG (ref 26–34)
MCHC RBC AUTO-ENTMCNC: 31.3 G/DL (ref 31–37)
MCV RBC AUTO: 89.7 FL
MONOCYTES # BLD AUTO: 0.69 X10(3) UL (ref 0.1–1)
MONOCYTES NFR BLD AUTO: 7.3 %
NEUTROPHILS # BLD AUTO: 6.71 X10 (3) UL (ref 1.5–7.7)
NEUTROPHILS # BLD AUTO: 6.71 X10(3) UL (ref 1.5–7.7)
NEUTROPHILS NFR BLD AUTO: 71.3 %
OSMOLALITY SERPL CALC.SUM OF ELEC: 300 MOSM/KG (ref 275–295)
PLATELET # BLD AUTO: 417 10(3)UL (ref 150–450)
POTASSIUM SERPL-SCNC: 4 MMOL/L (ref 3.5–5.1)
RBC # BLD AUTO: 2.32 X10(6)UL
SARS-COV-2 RNA RESP QL NAA+PROBE: NOT DETECTED
SODIUM SERPL-SCNC: 143 MMOL/L (ref 136–145)
WBC # BLD AUTO: 9.4 X10(3) UL (ref 4–11)

## 2024-11-26 PROCEDURE — 0DB68ZX EXCISION OF STOMACH, VIA NATURAL OR ARTIFICIAL OPENING ENDOSCOPIC, DIAGNOSTIC: ICD-10-PCS | Performed by: INTERNAL MEDICINE

## 2024-11-26 PROCEDURE — 0W3P8ZZ CONTROL BLEEDING IN GASTROINTESTINAL TRACT, VIA NATURAL OR ARTIFICIAL OPENING ENDOSCOPIC: ICD-10-PCS | Performed by: INTERNAL MEDICINE

## 2024-11-26 PROCEDURE — 71045 X-RAY EXAM CHEST 1 VIEW: CPT | Performed by: INTERNAL MEDICINE

## 2024-11-26 PROCEDURE — 99232 SBSQ HOSP IP/OBS MODERATE 35: CPT | Performed by: INTERNAL MEDICINE

## 2024-11-26 PROCEDURE — 99222 1ST HOSP IP/OBS MODERATE 55: CPT | Performed by: INTERNAL MEDICINE

## 2024-11-26 RX ORDER — SODIUM CHLORIDE 9 MG/ML
INJECTION, SOLUTION INTRAVENOUS ONCE
Status: DISCONTINUED | OUTPATIENT
Start: 2024-11-26 | End: 2024-11-30

## 2024-11-26 RX ORDER — PREDNISONE 20 MG/1
40 TABLET ORAL
Status: DISCONTINUED | OUTPATIENT
Start: 2024-11-27 | End: 2024-11-30

## 2024-11-26 RX ORDER — MIDAZOLAM HYDROCHLORIDE 1 MG/ML
INJECTION INTRAMUSCULAR; INTRAVENOUS AS NEEDED
Status: DISCONTINUED | OUTPATIENT
Start: 2024-11-26 | End: 2024-11-26 | Stop reason: SURG

## 2024-11-26 RX ORDER — SODIUM CHLORIDE, SODIUM LACTATE, POTASSIUM CHLORIDE, CALCIUM CHLORIDE 600; 310; 30; 20 MG/100ML; MG/100ML; MG/100ML; MG/100ML
INJECTION, SOLUTION INTRAVENOUS CONTINUOUS PRN
Status: DISCONTINUED | OUTPATIENT
Start: 2024-11-26 | End: 2024-11-26 | Stop reason: SURG

## 2024-11-26 RX ADMIN — MIDAZOLAM HYDROCHLORIDE 2 MG: 1 INJECTION INTRAMUSCULAR; INTRAVENOUS at 13:09:00

## 2024-11-26 RX ADMIN — SODIUM CHLORIDE, SODIUM LACTATE, POTASSIUM CHLORIDE, CALCIUM CHLORIDE: 600; 310; 30; 20 INJECTION, SOLUTION INTRAVENOUS at 13:09:00

## 2024-11-26 NOTE — CONSULTS
[facility]  part of [facility]    Report of Consultation    Hoda Busby Patient Status:  Inpatient    1940 MRN [id number]   Location [facility] ENDOSCOPY LAB SUITES Attending Malathi Stuart MD   Hosp Day # 0 PCP Malathi Stuart MD     Date of Admission:  2024  Date of Consult:  2024  Reason for Consultation:   Anemia, melena    History of Present Illness:   Patient is a 84 year old female with past medical history significant for hypertension, osteoarthritis, recently diagnosed small pulmonary embolism with possible interstitial lung disease who was admitted to [facility] secondary to declining hemoglobin levels.  Patient was recently admitted in  with pneumonia/interstitial lung disease.  It was during that time she was noted to have pulmonary emboli that were small.  She was put on anticoagulation with Xarelto.  Upon discharge her hemoglobin was 10.3.  While outpatient, since discharge, she has had some progressive fatigue.  Here hemoglobin was noted to be dropping with hemoglobin on the  of 7.0.  There has been some suggestion that she has been having some dark stools concerning for melena.  She denies any abdominal pain.  No nausea or vomiting.  No diarrhea or constipation.  Her weight has been stable.  Her last colonoscopy was in  with a few small polyps removed.  She does not recall of any recent upper endoscopy.  She denies any significant NSAID use.  Since this admission patient has received 2 units of packed red blood cells given drop in hemoglobin to 6.5.    Past Medical History  Past Medical History:    Acute pulmonary embolism (HCC)    High blood pressure    Osteopenia    Primary osteoarthritis of right hip    Visual impairment    Readers    White coat hypertension       Past Surgical History  Past Surgical History:   Procedure Laterality Date    Cataract  3/2&3/16 2017    Colonoscopy & polypectomy  2021          Hip replacement surgery Left Around 2006    Other surgical history  [place] 2017    Bilateral cataract surgery       Family History  Family History   Problem Relation Age of Onset    Cancer Father         bone (cause of death)    Stroke Mother         CVA (cause of death)    Diabetes Mother     Hypertension Mother     Dementia Brother         Alzheimer's    Heart Disease Brother         CAD - x2 brothers    Heart Disease Brother        Social History  Pediatric History   Patient Parents    Not on file     Other Topics Concern     Service Not Asked    Blood Transfusions Not Asked    Caffeine Concern Yes     Comment: coffee/soda - 2cups/day    Occupational Exposure Not Asked    Hobby Hazards Not Asked    Sleep Concern Not Asked    Stress Concern Not Asked    Weight Concern Not Asked    Special Diet Not Asked    Back Care Not Asked    Exercise Not Asked    Bike Helmet Not Asked    Seat Belt Not Asked    Self-Exams Not Asked   Social History Narrative    Not on file           Current Medications:  Current Facility-Administered Medications   Medication Dose Route Frequency    sodium chloride 0.9% infusion   Intravenous Once    pantoprazole (Protonix) 40 mg in sodium chloride 0.9% PF 10 mL IV push  40 mg Intravenous Q12H    methylPREDNISolone sodium succinate (Solu-MEDROL) injection 40 mg  40 mg Intravenous Daily    amLODIPine (Norvasc) tab 5 mg  5 mg Oral Daily    atorvastatin (Lipitor) tab 20 mg  20 mg Oral Nightly    calcium carbonate-vitamin D (Oyster Shell-D) 250-3.125 MG-MCG per tab 2 tablet  2 tablet Oral Daily    cholecalciferol (Vitamin D3) tab 4,000 Units  4,000 Units Oral Daily    furosemide (Lasix) tab 20 mg  20 mg Oral Daily    multivitamin (Tab-A-Gaurav/Beta Carotene) tab 1 tablet  1 tablet Oral Daily    polyethylene glycol (PEG 3350) (Miralax) 17 g oral packet 17 g  17 g Oral Daily    [START ON 11/27/2024] sulfamethoxazole-trimethoprim DS (Bactrim DS) 800-160 MG per tab 1 tablet  1 tablet Oral Once  per day on Monday Wednesday Friday    cyanocobalamin (Vitamin B12) tab 500 mcg  500 mcg Oral Daily    zolpidem (Ambien) tab 5 mg  5 mg Oral Nightly PRN     Medications Prior to Admission   Medication Sig    rivaroxaban (XARELTO) 20 MG Oral Tab Take 1 tablet (20 mg total) by mouth daily with food.    sulfamethoxazole-trimethoprim -160 MG Oral Tab per tablet Take 1 tablet by mouth 3 (three) times a week. Monday, Wednesday, Friday    omeprazole 20 MG Oral Capsule Delayed Release Take 1 capsule (20 mg total) by mouth daily.    Multiple Vitamins-Minerals (MULTI-VITAMIN/MINERALS) Oral Tab Take 1 tablet by mouth daily.    ferrous sulfate 325 (65 FE) MG Oral Tab EC Take 1 tablet (325 mg total) by mouth daily.    atorvastatin 20 MG Oral Tab Take 1 tablet (20 mg total) by mouth nightly.    furosemide 20 MG Oral Tab Take 1 tablet (20 mg total) by mouth daily.    polyethylene glycol, PEG 3350, 17 g Oral Powd Pack Take 17 g by mouth daily.    predniSONE 20 MG Oral Tab Take 2 tablets (40 mg total) by mouth daily with breakfast.    alendronate 70 MG Oral Tab Take 1 tablet (70 mg total) by mouth every 7 days.    amLODIPine 5 MG Oral Tab Take 1 tablet (5 mg total) by mouth daily.    Cholecalciferol (VITAMIN D) 50 MCG (2000 UT) Oral Tab Take 4,000 Units by mouth daily.    Vitamin B-12 500 MCG Oral Tab Take 1 tablet (500 mcg total) by mouth daily.    Calcium Carb-Cholecalciferol (CALCIUM + D3) 600-200 MG-UNIT Oral Tab Take 1 tablet by mouth daily.       Allergies  Allergies[1]    Review of Systems:   GENERAL HEALTH: feels well otherwise, denies fever or weight loss  SKIN: denies any unusual skin lesions or rashes  EYES: no visual complaints or deficits  HEENT: denies mouth sores  RESPIRATORY: Mild shortness of breath   CARDIOVASCULAR:no chest pain   GI: Refer to HPI,    Physical Exam:   Blood pressure 127/68, pulse 88, temperature 98.3 °F (36.8 °C), temperature source Oral, resp. rate 19, height 5' 8\" (1.727 m), weight 170 lb  (77.1 kg), SpO2 96%.  GENERAL: well developed, in no apparent distress  SKIN: no rashes,no suspicious lesions  HEENT: atraumatic, normocephalic, oropharynx clear  NECK: supple,no adenopathy, no masses  LUNGS: Decreased breath sounds  CARDIO: RRR without murmur  GI: normal active BS, no tenderness on palpation, no masses or ascites, normal liver span/no organomegaly  EXTREMITIES: no calf tenderness  MUSCULOSKELETAL: no calf tenderness  PSYCH: normal affect        Results:     Laboratory Data:  Recent Labs   Lab 11/25/24  1450 11/25/24  2102 11/26/24  0558   WBC 15.6*  --  9.4   HGB 7.0* 7.8* 6.5*   HCT 22.3* 24.5* 20.8*   .0*  --  417.0   CREATSERUM 1.35*  --  1.08*   BUN 29*  --  25*     --  143   K 4.3  --  4.0     --  111   CO2 24.0  --  27.0   *  --  83   CA 9.3  --  8.3*   ALB 3.7  --   --    ALKPHO 51*  --   --    TP 5.7  --   --    AST 24  --   --    ALT 32  --   --        No results for input(s): \"JENNIFER\", \"LIP\", \"GGT\", \"PSA\", \"DDIMER\", \"ESRML\", \"ESRPF\", \"CRP\", \"BNP\", \"TROP\", \"CK\", \"CKMB\", \"URIEL\", \"RPR\", \"B12\", \"ETOH\", \"POCGLU\" in the last 168 hours.    Invalid input(s): \"RF\"     Imaging:  XR CHEST AP PORTABLE  (CPT=71045)    Result Date: 11/26/2024  CONCLUSION:  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    Dictated by (CST): Viktor Jennings MD on 11/26/2024 at 7:00 AM     Finalized by (CST): Viktor Jennings MD on 11/26/2024 at 7:01 AM            Impression:   Acute on chronic anemia  Dark stools/possible melena  -Patient has known anemia that was felt to be related to chronic disease in the past.  -She has had an acute drop and with dark stools raising concerns for GI bleeding.  Upper GI bleed being the most likely given the black stools and history.  -No previous EGD  -Last colonoscopy 2021.  Small polyps.  No other bleeding source    3.   History of pulmonary embolism -normally on Xarelto  4.    Interstitial lung disease-stable.  Chest x-ray stable.  Not on oxygen.  Not short of breath.  5.   Aortic stenosis        Recommendations:  N.p.o.  Plan for EGD.  If clearance given from a cardiopulmonary perspective  Hold Xarelto  Monitor H&H and transfuse as necessary.  PPI twice daily for now  Avoid NSAIDs    Time spent in direct patient contact and decision making as well as counseling/coordination of care:  70 minutes  Thank you for allowing me to participate in the care of your patient.    Boston Nuno MD      11/26/2024         [1] No Known Allergies

## 2024-11-26 NOTE — ANESTHESIA POSTPROCEDURE EVALUATION
Patient: Hoda Busby    Procedure Summary       Date: 11/26/24 Room / Location: [facility] ENDOSCOPY 05 / [facility] ENDOSCOPY    Anesthesia Start: 1309 Anesthesia Stop: 1328    Procedure: ESOPHAGOGASTRODUODENOSCOPY (EGD) Diagnosis: (gastric ulcer, gastric arteriomalformation)    Surgeons: Boston Nuno MD Anesthesiologist: Le Patel CRNA    Anesthesia Type: MAC ASA Status: 4 - Emergent            Anesthesia Type: MAC    Vitals Value Taken Time   BP 95/51 11/26/24 1330   Temp 37 11/26/24 1334   Pulse 72 11/26/24 1334   Resp 16 11/26/24 1334   SpO2 95 % 11/26/24 1334   Vitals shown include unfiled device data.    [facility] AN Post Evaluation:   Patient Evaluated in PACU  Patient Participation: complete - patient participated  Level of Consciousness: awake  Pain Score: 0  Pain Management: adequate  Airway Patency:patent  Yes    Nausea/Vomiting: none  Cardiovascular Status: acceptable  Respiratory Status: acceptable  Postoperative Hydration acceptable      Le Patel CRNA  11/26/2024 1:34 PM

## 2024-11-26 NOTE — H&P
[facility]  part of [facility]    History and Physical    Hoda Busby Patient Status:  Observation    1940 MRN [id number]   Location [facility] 4W/SW/SE Attending Malathi Stuart MD   Hosp Day # 0 PCP Malathi Stuart MD     Date:  2024  Date of Admission:  2024    History provided by:patient  HPI:     Chief Complaint   Patient presents with    Abnormal Labs     HPI    Patient is an 84-year-old female with a past medical history of severe aortic stenosis, recently diagnosed interstitial pneumonitis on prednisone, recently diagnosed small right pulmonary emboli on Xarelto, hypertension admitted with severe symptomatic anemia and GI bleed.    Patient was admitted to the hospital last month with bilateral pneumonia unresponsive to outpatient antibiotics.  At the time of admission, she underwent CT chest which showed significant bilateral interstitial infiltrates along with small subsegmental right-sided pulmonary emboli.  Patient was started on anticoagulation for the pulmonary emboli.  She was seen by pulmonary and started empirically on IV antibiotics.  However, after extensive workup, it was determined that she most likely had a noninfectious interstitial pneumonitis and was started on IV Solu-Medrol which was then transitioned to prednisone (currently 40mg/d).  She was also seen by cardiology as she had some mild CHF related to her aortic stenosis.  She is currently following with cardiology and will need eventual TAVR.  During that admission, she had some mild anemia with a hemoglobin in the 10's-11's but this was stable.  Her baseline hemoglobin was in the 12's.  Iron studies were consistent with anemia of chronic disease.  The mild anemia was attributed to her acute illness.  Hemoglobin on the day of discharge was 10.3  Following that hospital stay, she was transferred to [facility].  She initially did well with PT but in the past 1 to 2 weeks, she seems  to have plateaued and was unable to make further progress due to severe fatigue.  She was weaned off of her oxygen about a week ago though was still using it at night.    The patient's hemoglobin had decreased to 8.7 on 11/4 then trended down to 7.1 x 11/11.  Repeat hemoglobin on 11/13 was 7.6 and then down to 6.3 on 11/18 though a repeat later that day was 7.0.  She continued to remain low in the 7's.  She was seen by the family medicine APN on 11/25 and was started on FeSO4 and stool OB was ordered.  She was then seen by the pulmonary PA Dilip Diehl who sent the pt to the ED for evaluation due to concern for GI bleeding.    In the ED, Hgb was 7.0.  Rectal exam revealed black stool that was heme positive.  Was given 1 unit of PRBCs.  Xarelto was held.  The patient was started on IV Protonix.  GI was consulted and the patient was admitted for further evaluation and treatment.    The patient notes mild dyspnea on exertion.  She denies any chest pain or pressure.  She denies abdominal pain or nausea.  She denies diarrhea.  She had a couple of episodes of lightheadedness during her physical therapy sessions but none in the past week.    History     Past Medical History:    Acute pulmonary embolism (HCC)    High blood pressure    Osteopenia    Primary osteoarthritis of right hip    Visual impairment    Readers    White coat hypertension     Past Surgical History:   Procedure Laterality Date    Cataract  3/2&3/16 2017    Colonoscopy & polypectomy  1/4/2021         Hip replacement surgery Left Around 2006    Other surgical history  [place] 2017    Bilateral cataract surgery     Family History   Problem Relation Age of Onset    Cancer Father         bone (cause of death)    Stroke Mother         CVA (cause of death)    Diabetes Mother     Hypertension Mother     Dementia Brother         Alzheimer's    Heart Disease Brother         CAD - x2 brothers    Heart Disease Brother      Social History:  Social History      Socioeconomic History    Marital status:    Tobacco Use    Smoking status: Former     Current packs/day: 0.00     Types: Cigarettes     Start date: 1990     Quit date: 1990     Years since quittin.9     Passive exposure: Past    Smokeless tobacco: Former   Vaping Use    Vaping status: Never Used   Substance and Sexual Activity    Alcohol use: Yes     Comment: wine, occasionally    Drug use: Never   Other Topics Concern    Caffeine Concern Yes     Comment: coffee/soda - 2cups/day     Social Drivers of Health     Food Insecurity: No Food Insecurity (2024)    Food Insecurity     Food Insecurity: Never true   Transportation Needs: No Transportation Needs (2024)    Transportation Needs     Lack of Transportation: No   Housing Stability: Low Risk  (2024)    Housing Stability     Housing Instability: No     Allergies/Medications:   Allergies: Allergies[1]  Medications Prior to Admission   Medication Sig    rivaroxaban (XARELTO) 20 MG Oral Tab Take 1 tablet (20 mg total) by mouth daily with food.    sulfamethoxazole-trimethoprim -160 MG Oral Tab per tablet Take 1 tablet by mouth 3 (three) times a week. Monday, Wednesday, Friday    omeprazole 20 MG Oral Capsule Delayed Release Take 1 capsule (20 mg total) by mouth daily.    Multiple Vitamins-Minerals (MULTI-VITAMIN/MINERALS) Oral Tab Take 1 tablet by mouth daily.    ferrous sulfate 325 (65 FE) MG Oral Tab EC Take 1 tablet (325 mg total) by mouth daily.    atorvastatin 20 MG Oral Tab Take 1 tablet (20 mg total) by mouth nightly.    furosemide 20 MG Oral Tab Take 1 tablet (20 mg total) by mouth daily.    polyethylene glycol, PEG 3350, 17 g Oral Powd Pack Take 17 g by mouth daily.    predniSONE 20 MG Oral Tab Take 2 tablets (40 mg total) by mouth daily with breakfast.    alendronate 70 MG Oral Tab Take 1 tablet (70 mg total) by mouth every 7 days.    amLODIPine 5 MG Oral Tab Take 1 tablet (5 mg total) by mouth daily.     Cholecalciferol (VITAMIN D) 50 MCG (2000 UT) Oral Tab Take 4,000 Units by mouth daily.    Vitamin B-12 500 MCG Oral Tab Take 1 tablet (500 mcg total) by mouth daily.    Calcium Carb-Cholecalciferol (CALCIUM + D3) 600-200 MG-UNIT Oral Tab Take 1 tablet by mouth daily.       Review of Systems:   Review of Systems    Physical Exam:   Vital Signs:  Blood pressure 116/57, pulse 86, temperature 98.2 °F (36.8 °C), temperature source Oral, resp. rate 18, height 5' 8\" (1.727 m), weight 170 lb (77.1 kg), SpO2 95%.  Physical Exam  Cervical Papanicolaou contraindicated  General: The patient is alert and oriented x 3 and in no acute distress.  She appears pale  Neck: Soft, supple, no lymphadenopathy  CV: Regular rate and rhythm, normal S1-S2, 2 out of 6 holosystolic murmur throughout the precordium  Lungs: Clear to auscultation bilaterally without crackles or wheezes  Abdomen: Soft, nontender, nondistended, normal bowel sounds  Extremities: No lower extremity edema, +2 DP pulses, SCDs in place    Results:     Lab Results   Component Value Date    WBC 9.4 11/26/2024    HGB 6.5 (LL) 11/26/2024    HCT 20.8 (L) 11/26/2024    .0 11/26/2024    CREATSERUM 1.08 (H) 11/26/2024    BUN 25 (H) 11/26/2024     11/26/2024    K 4.0 11/26/2024     11/26/2024    CO2 27.0 11/26/2024    GLU 83 11/26/2024    CA 8.3 (L) 11/26/2024    ALB 3.7 11/25/2024    ALKPHO 51 (L) 11/25/2024    BILT 0.6 11/25/2024    TP 5.7 11/25/2024    AST 24 11/25/2024    ALT 32 11/25/2024    PTT 30.6 11/01/2024    INR 1.06 09/08/2023    TSH 1.526 07/16/2024    ESRML 49 (H) 10/26/2024    PHOS 3.6 10/28/2024    TROPHS 9 10/17/2024    B12 349 03/20/2017     XR CHEST AP PORTABLE  (CPT=71045)    Result Date: 11/26/2024  CONCLUSION:  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    Dictated by (CST): Viktor Jennings MD on 11/26/2024 at 7:00 AM     Finalized by (CST): Dick  MD Viktor on 11/26/2024 at 7:01 AM               Assessment/Plan:      Gastrointestinal hemorrhage, unspecified gastrointestinal hemorrhage type  Acute blood loss anemia  -Hgb was 10's-11's last month in hospital  -Hgb then trended down at [facility] -- 8.7 on 11/4 > 7.3 on 11/11 > 7.1 on 11/13 > 7.6 on 11/15 > 6.3 on 11/18 (repeat Hgb 7.0 on 11/18) > 7.1 on 11/20 -- pt was started on FeSO4 and stool OB was ordered on 11/25.  Pt then seen by pulfatmata CASTLE at [facility]; Hgb 6.2. Was sent to ED for evalutation  -Hgb 7.0 in ED - received 1U PRBCs -- repeat Hgb 7.8, but then down to 6.5 this am. Receiving another 1U PRBC's this am  -GI consulted for endoscopy  -on IV protonix  -Xarelto on hold since admission  -last colonoscopy 1/2021 revealed adenomatous polyp but poor prep; pt declined repeat colonoscopy    Interstitial pneumonitis  -dx'ed 10/2024 - presented with cough and severe hypoxia  -unresponsive to abx (amox/doxy, then IV zosyn/zithromax)  -CXR 10/24/24 extensive bilateral perihilar and bilateral upper and lower lobe   -S.pneumo, legionella Ag , mycoplasma IgM/G, Fungitell-beta-D glucan negative  -ANCA and URIEL/connective tissue disease panels negative  -anti-histone and anti-Keara Ab's negative  -Echo 10/24/24 - normal EF (55-60%), no obvious vegetatons  -CT with bilateral ground glass opacities, small consolidations of BLL  -per pulm, findings suspicious for NSIP (vs cryptogenic organizing pneumonia vs hypersensitivity pneumonitis); NOT thought to be c/w UIP pattern  -on empiric steroids (solumedrol then prednisone) since 10/25/24 - on prednisone 40mg/day on admission  -currently on IV solumedrol per Dr. Baez while NPO       Bilateral pulmonary emboli  -dx'ed at time of interstitial pneumonitis  -CT chest 10/25/24 -- acute distal segmental/subsegmental pulmonary emboli in [place] and subsegmental PE in [place]  -unclear etiology; no recent hx of long travel; no family/personal hx of blood clots  -BLE venous dopplers negative  -unclear  etiology of PE; will do hypercoag w/u as outpt.   Consider malignancy w/u if no other cause found (colonoscopy 1/2021 revealed adenomatous polyp but poor prep; pt declined repeat colonoscopy; Medicare would not cover Cologuard); UTD on mammo  -on xarelto as outpt -- on hold due to GIB as above  -consider IVC filter if unable to resume eliquis     Severe aortic stenosis  -sees cardiology Dr. Bird  -moderate A.S on echo in 10/2022 (prev mild A.S in 2020)  -abnormal pre-op EKG 9/2023 --Nuc GXT done 9/20/23 -- EKG portion revealed 1-1.5mm ST seg depression in inferolateral leads with prolonged recovery; nuclear portion with normal perfusion   -Echo 9/29/23 --progression of A.S. from mild to moderate  -recent echo at Dr. Bird's office 10/18/24 -- LVEF 65%, grade 2 diastolic dysfunction, severe aortic stenosis (mean aortic gradient signif worse since 9/2023 -- 31>49 mmHg),  -seen by cardiology during last admission in 10/2024; pt will need TAVR evaluation as outpt  -on lasix 20g po daily as of 10/2024     Hx recent Anemia 10/2024  -baseline Hgb 12's; Hgb decreased to 10's-11's during hosp for pneumonitis in 10/2024 but was stable at that time and was attributed to acute illness.  -normal iron studies  (c/w anemia of chronic disease; low TIBC) on 10/27/24  -now with acute blood loss anemia as above.     Hypertension, primary  -(dyazide stopped due to NANCY)  -on amlodipine 5mg/day     Hx of hip OA  -s/p left THR (Dr. Lo) many years ago  -s/p elective R BEATRIZ on 10/5/23 by Dr. Diaz     Hyperlipidemia   Pt with strong family hx of high cholesterol  ; normal TG and HDL  No indication for statin given age     Osteopenia   On Fosamax from 2011 to 4/2016.     DEXA stable 5/10/17.    DEXA in 9/2019 showed slightly more bone loss in spine, but stable in hip.    DEXA 11/2021 -- some improvement in femur.  Hold off on fosamax for now.   DEXA 8/2024 -- osteoporosis of left forearm  -restarted Fosamax 8/2024      Vitamin D deficiency  On vitamin D 4000u/day     BLE edema  Advised compression     VTE proph            -SCDs    Discussed with pt and with her niece, Vonda (RN) with pt's permission      **Certification      PHYSICIAN Certification of Need for Inpatient Hospitalization - Initial Certification    Patient will require inpatient services that will reasonably be expected to span two midnight's based on the clinical documentation in H+P.   Based on patients current state of illness, I anticipate that, after discharge, patient will require TBD.        Malathi Stuart MD  11/26/2024         [1] No Known Allergies

## 2024-11-26 NOTE — PLAN OF CARE
1PRBC infused, repeat Hgb 7.8. NPO since midnight. No episodes of bleeding overnight. Safety precautions in place. Call light and belongings at bedside and within reach.    Patient with critical hemoglobin of 6.5 this morning, MD notified and ordered 1U PRBC to be transfused, will endorse to oncoming RN.     Problem: SAFETY ADULT - FALL  Goal: Free from fall injury  Description: INTERVENTIONS:  - Assess pt frequently for physical needs  - Identify cognitive and physical deficits and behaviors that affect risk of falls.  - [place] fall precautions as indicated by assessment.  - Educate pt/family on patient safety including physical limitations  - Instruct pt to call for assistance with activity based on assessment  - Modify environment to reduce risk of injury  - Provide assistive devices as appropriate  - Consider OT/PT consult to assist with strengthening/mobility  - Encourage toileting schedule  Outcome: Progressing     Problem: METABOLIC/FLUID AND ELECTROLYTES - ADULT  Goal: Electrolytes maintained within normal limits  Description: INTERVENTIONS:  - Monitor labs and rhythm and assess patient for signs and symptoms of electrolyte imbalances  - Administer electrolyte replacement as ordered  - Monitor response to electrolyte replacements, including rhythm and repeat lab results as appropriate  - Fluid restriction as ordered  - Instruct patient on fluid and nutrition restrictions as appropriate  Outcome: Progressing  Goal: Hemodynamic stability and optimal renal function maintained  Description: INTERVENTIONS:  - Monitor labs and assess for signs and symptoms of volume excess or deficit  - Monitor intake, output and patient weight  - Monitor urine specific gravity, serum osmolarity and serum sodium as indicated or ordered  - Monitor response to interventions for patient's volume status, including labs, urine output, blood pressure (other measures as available)  - Encourage oral intake as appropriate  - Instruct  patient on fluid and nutrition restrictions as appropriate  Outcome: Progressing     Problem: HEMATOLOGIC - ADULT  Goal: Maintains hematologic stability  Description: INTERVENTIONS  - Assess for signs and symptoms of bleeding or hemorrhage  - Monitor labs and vital signs for trends  - Administer supportive blood products/factors, fluids and medications as ordered and appropriate  - Administer supportive blood products/factors as ordered and appropriate  Outcome: Progressing  Goal: Free from bleeding injury  Description: (Example usage: patient with low platelets)  INTERVENTIONS:  - Avoid intramuscular injections, enemas and rectal medication administration  - Ensure safe mobilization of patient  - Hold pressure on venipuncture sites to achieve adequate hemostasis  - Assess for signs and symptoms of internal bleeding  - Monitor lab trends  - Patient is to report abnormal signs of bleeding to staff  - Avoid use of toothpicks and dental floss  - Use electric shaver for shaving  - Use soft bristle tooth brush  - Limit straining and forceful nose blowing  Outcome: Progressing

## 2024-11-26 NOTE — PLAN OF CARE
Problem: SAFETY ADULT - FALL  Goal: Free from fall injury  Description: INTERVENTIONS:  - Assess pt frequently for physical needs  - Identify cognitive and physical deficits and behaviors that affect risk of falls.  - [place] fall precautions as indicated by assessment.  - Educate pt/family on patient safety including physical limitations  - Instruct pt to call for assistance with activity based on assessment  - Modify environment to reduce risk of injury  - Provide assistive devices as appropriate  - Consider OT/PT consult to assist with strengthening/mobility  - Encourage toileting schedule  Outcome: Progressing     Problem: GASTROINTESTINAL - ADULT  Goal: Minimal or absence of nausea and vomiting  Description: INTERVENTIONS:  - Maintain adequate hydration with IV or PO as ordered and tolerated  - Nasogastric tube to low intermittent suction as ordered  - Evaluate effectiveness of ordered antiemetic medications  - Provide nonpharmacologic comfort measures as appropriate  - Advance diet as tolerated, if ordered  - Obtain nutritional consult as needed  - Evaluate fluid balance  Outcome: Progressing  Goal: Maintains or returns to baseline bowel function  Description: INTERVENTIONS:  - Assess bowel function  - Maintain adequate hydration with IV or PO as ordered and tolerated  - Evaluate effectiveness of GI medications  - Encourage mobilization and activity  - Obtain nutritional consult as needed  - Establish a toileting routine/schedule  - Consider collaborating with pharmacy to review patient's medication profile  Outcome: Progressing  Goal: Maintains adequate nutritional intake (undernourished)  Description: INTERVENTIONS:  - Monitor percentage of each meal consumed  - Identify factors contributing to decreased intake, treat as appropriate  - Assist with meals as needed  - Monitor I&O, WT and lab values  - Obtain nutritional consult as needed  - Optimize oral hygiene and moisture  - Encourage food from home;  allow for food preferences  - Enhance eating environment  Outcome: Progressing  Goal: Achieves appropriate nutritional intake (bariatric)  Description: INTERVENTIONS:  - Monitor for over-consumption  - Identify factors contributing to increased intake, treat as appropriate  - Monitor I&O, WT and lab values  - Obtain nutritional consult as needed  - Evaluate psychosocial factors contributing to over-consumption  Outcome: Progressing     Problem: METABOLIC/FLUID AND ELECTROLYTES - ADULT  Goal: Electrolytes maintained within normal limits  Description: INTERVENTIONS:  - Monitor labs and rhythm and assess patient for signs and symptoms of electrolyte imbalances  - Administer electrolyte replacement as ordered  - Monitor response to electrolyte replacements, including rhythm and repeat lab results as appropriate  - Fluid restriction as ordered  - Instruct patient on fluid and nutrition restrictions as appropriate  Outcome: Progressing  Goal: Hemodynamic stability and optimal renal function maintained  Description: INTERVENTIONS:  - Monitor labs and assess for signs and symptoms of volume excess or deficit  - Monitor intake, output and patient weight  - Monitor urine specific gravity, serum osmolarity and serum sodium as indicated or ordered  - Monitor response to interventions for patient's volume status, including labs, urine output, blood pressure (other measures as available)  - Encourage oral intake as appropriate  - Instruct patient on fluid and nutrition restrictions as appropriate  Outcome: Progressing     Problem: HEMATOLOGIC - ADULT  Goal: Maintains hematologic stability  Description: INTERVENTIONS  - Assess for signs and symptoms of bleeding or hemorrhage  - Monitor labs and vital signs for trends  - Administer supportive blood products/factors, fluids and medications as ordered and appropriate  - Administer supportive blood products/factors as ordered and appropriate  Outcome: Progressing  Goal: Free from  bleeding injury  Description: (Example usage: patient with low platelets)  INTERVENTIONS:  - Avoid intramuscular injections, enemas and rectal medication administration  - Ensure safe mobilization of patient  - Hold pressure on venipuncture sites to achieve adequate hemostasis  - Assess for signs and symptoms of internal bleeding  - Monitor lab trends  - Patient is to report abnormal signs of bleeding to staff  - Avoid use of toothpicks and dental floss  - Use electric shaver for shaving  - Use soft bristle tooth brush  - Limit straining and forceful nose blowing  Outcome: Progressing     Pt a&o, on RA. Hgb 6.5 this morning, 1U prbc given. EGD completed. Started on full liquids. On tele, no calls. Transitioning to po steroids tomorrow morning, Up self/walker. Call light within reach, frequent rounding.

## 2024-11-26 NOTE — PROGRESS NOTES
[facility]  part of [facility]     Progress Note    Hoda Busby Patient Status:  Inpatient    1940 MRN [id number]   Location [facility] ENDOSCOPY LAB SUITES Attending Malathi Stuart MD   Hosp Day # 0 PCP Malathi Stuart MD       Subjective:   Patient seen and examined.  Resting in bed.  Denies significant dyspnea.  Denies significant cough    Objective:   Blood pressure 90/52, pulse 71, temperature 98.3 °F (36.8 °C), temperature source Oral, resp. rate 16, height 5' 8\" (1.727 m), weight 170 lb (77.1 kg), SpO2 96%.  Intake/Output:   Last 3 shifts: I/O last 3 completed shifts:  In: 521.7 [Blood:521.7]  Out: -    This shift: I/O this shift:  In: 306 [Blood:306]  Out: -      Vent Settings:      Hemodynamic parameters (last 24 hours):      Scheduled Meds:   Current Facility-Administered Medications   Medication Dose Route Frequency    sodium chloride 0.9% infusion   Intravenous Once    pantoprazole (Protonix) 40 mg in sodium chloride 0.9% PF 10 mL IV push  40 mg Intravenous Q12H    methylPREDNISolone sodium succinate (Solu-MEDROL) injection 40 mg  40 mg Intravenous Daily    amLODIPine (Norvasc) tab 5 mg  5 mg Oral Daily    atorvastatin (Lipitor) tab 20 mg  20 mg Oral Nightly    calcium carbonate-vitamin D (Oyster Shell-D) 250-3.125 MG-MCG per tab 2 tablet  2 tablet Oral Daily    cholecalciferol (Vitamin D3) tab 4,000 Units  4,000 Units Oral Daily    furosemide (Lasix) tab 20 mg  20 mg Oral Daily    multivitamin (Tab-A-Gaurav/Beta Carotene) tab 1 tablet  1 tablet Oral Daily    polyethylene glycol (PEG 3350) (Miralax) 17 g oral packet 17 g  17 g Oral Daily    [START ON 2024] sulfamethoxazole-trimethoprim DS (Bactrim DS) 800-160 MG per tab 1 tablet  1 tablet Oral Once per day on     cyanocobalamin (Vitamin B12) tab 500 mcg  500 mcg Oral Daily    zolpidem (Ambien) tab 5 mg  5 mg Oral Nightly PRN       Continuous Infusions:     Physical  Exam  Constitutional: no acute distress  Eyes: PERRL  ENT: nares pateint  Neck: supple, no JVD  Cardio: RRR, S1 S2  Respiratory: clear to auscultation bilaterally, no wheezing, rales, rhonchi, crackles  GI: abdomen soft, non tender, active bowel sounds, no organomegaly  Extremities: no clubbing, cyanosis, edema  Neurologic: no gross motor deficits  Skin: warm, dry      Results:     Lab Results   Component Value Date    WBC 9.4 11/26/2024    HGB 6.5 11/26/2024    HCT 20.8 11/26/2024    .0 11/26/2024    CREATSERUM 1.08 11/26/2024    BUN 25 11/26/2024     11/26/2024    K 4.0 11/26/2024     11/26/2024    CO2 27.0 11/26/2024    GLU 83 11/26/2024    CA 8.3 11/26/2024    ALB 3.7 11/25/2024    ALKPHO 51 11/25/2024    BILT 0.6 11/25/2024    TP 5.7 11/25/2024    AST 24 11/25/2024    ALT 32 11/25/2024       XR CHEST AP PORTABLE  (CPT=71045)    Result Date: 11/26/2024  CONCLUSION:  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    Dictated by (CST): Viktor Jennings MD on 11/26/2024 at 7:00 AM     Finalized by (CST): Viktor Jennings MD on 11/26/2024 at 7:01 AM                 Assessment   1.  Acute blood loss anemia  2.  ILD  3.  Recent pulmonary embolism  4.  Prior nicotine dependence  5.  Hypertension  6.  Severe aortic stenosis     Plan   -Patient presents with evidence of worsening anemia GI evaluated patient with endoscopic evaluation revealing evidence of antral ulcer with no active bleeding seen.  -Appears to be improved on steroid therapy.  Had been on 40 mg prednisone since discharge from recent hospitalization on 11/1/2024.  Plan to repeat CT high-resolution chest to further evaluate.  -CT chest on 10/25/2024 with evidence of segmental pulmonary embolism in right upper lobe and left upper lobe.  Multifocal groundglass opacities seen bilaterally.  Differential includes ILD including organizing pneumonia, hypersensitivity  pneumonitis, NSIP.  -Continue prednisone 40 mg at this time.  Will gradually taper.  -Recommend outpatient pulmonary follow-up and pulmonary function testing      Cody Holloway, DO  Pulmonary Critical Care Medicine  [facility]

## 2024-11-26 NOTE — OPERATIVE REPORT
EGD Operative Report    Hoda Busby Patient Status:  Inpatient    1940 MRN [id number]   Location [facility] ENDOSCOPY LAB SUITES Attending Malathi Stuart MD   Hosp Day #   0 PCP Malathi Stuart MD       Pre-op Diagnosis: gi bleed/anemia/melena     Post-Op Diagnosis:   Normal esophagus and Z-line  Mild gastritis  Gastric antral ulcer 15 mm clean-based.  Nonbleeding.  Biopsies taken from ulcer edge.  Gastric antral AVM, 3mm status post APC  Normal duodenal bulb and post bulbar duodenum      Procedure Performed: EGD with biopsies and control bleeding    Informed Consent: Informed consent for both the procedure and sedation were obtained from the patient. The potentially life-threatening complications of sedation, bleeding,  Perforation, transfusion or repeat endoscopy were reviewed along with the possible need for hospitalization, surgical management, transfusion or repeat endoscopy should one of these complications arise. The patient understands and is agreeable to proceed.  Sedation Type: MAC-Patient received sedation with monitored anesthesia provided by an anesthesiologist  Moderate Sedation Time: None.  Deep sedation provided by anesthesia.  Procedure Description: The patient was placed in the left lateral decubitus position.  A bite block was placed in the patient’s mouth.  The endoscope was inserted through the mouth and advanced under direct visualization to the 3rd portion of the duodenum and was then withdrawn to examine the duodenal bulb and gastric antrum.  The endoscope was then retroflexed to examine the angulus, GE junction, cardia, body and fundus and then withdrawn to examine the esophagus. The endoscope was then removed from the patient. The patient tolerated the procedure well with no immediate complications and was transferred to the  recovery area in stable condition.  Findings:    ESOPHAGUS:  Normal esophagus.  The Z-line and GE junction noted at 40cms from the incisors and was normal.  No obvious hiatal hernia noted.    STOMACH: There was evidence of of a antral ulcer located at about the 10 o'clock position in the prepyloric antrum.  This was clean-based.  No bleeding and no high risk bleeding stigmata was noted.  This ulcer measured approximately 15 mm.  Biopsies were taken from the ulcer edge with cold forceps.  In the proximal antrum, there was a small AVM measuring approximately 3mm.  This was nonbleeding.  This was cauterized using APC with hemostasis achieved.   DUODENUM: Normal visualized duodenal bulb and postbulbar duodenum    Recommendations:   Follow-up pathology results  PPI twice daily for 8 weeks  Monitor hemoglobin levels and transfuse as necessary  Iron replacement  Repeat EGD in 8 weeks to assure ulcer healing  Can consider restarting xarelto tomorrow if no signs of bleeding  Discharge:  The patient was given an after visit summary detailing the procedure, findings, recommendations and follow up plans.  Boston Nuno MD  11/26/2024  1:34 PM

## 2024-11-26 NOTE — ANESTHESIA PREPROCEDURE EVALUATION
Anesthesia PreOp Note    HPI:     Hoda Busby is a 84 year old female who presents for preoperative consultation requested by: Boston Nuno MD    Date of Surgery: 11/26/2024    Procedure(s):  ESOPHAGOGASTRODUODENOSCOPY (EGD)  Indication: gi bleed/anemia/melena    Relevant Problems   No relevant active problems       NPO:                         History Review:  Patient Active Problem List    Diagnosis Date Noted    GI bleed 11/25/2024    Gastrointestinal hemorrhage, unspecified gastrointestinal hemorrhage type 11/25/2024    Acute respiratory failure with hypoxia (HCC) 11/01/2024    Pneumonia 10/28/2024    Acute pulmonary embolism (HCC) 10/25/2024    Age-related osteoporosis without current pathological fracture 08/22/2024    Primary hypertension 09/20/2023    Macular degeneration 09/14/2023    Nonrheumatic aortic valve stenosis 10/22/2020    Mitral valve insufficiency 03/22/2017    Hypercholesterolemia 03/15/2017    White coat syndrome with diagnosis of hypertension 03/04/2015    Vitamin D deficiency 02/27/2014    Osteopenia of multiple sites 02/14/2013       Past Medical History:    Acute pulmonary embolism (HCC)    High blood pressure    Osteopenia    Primary osteoarthritis of right hip    Visual impairment    Readers    White coat hypertension       Past Surgical History:   Procedure Laterality Date    Cataract  3/2&3/16 2017    Colonoscopy & polypectomy  1/4/2021         Hip replacement surgery Left Around 2006    Other surgical history  [place] 2017    Bilateral cataract surgery       Prescriptions Prior to Admission[1]  Current Medications and Prescriptions Ordered in Epic[2]    Allergies[3]    Family History   Problem Relation Age of Onset    Cancer Father         bone (cause of death)    Stroke Mother         CVA (cause of death)    Diabetes Mother     Hypertension Mother     Dementia Brother         Alzheimer's    Heart Disease Brother         CAD - x2 brothers    Heart Disease Brother      Social  History     Socioeconomic History    Marital status:    Tobacco Use    Smoking status: Former     Current packs/day: 0.00     Types: Cigarettes     Start date: 1990     Quit date: 1990     Years since quittin.9     Passive exposure: Past    Smokeless tobacco: Former   Vaping Use    Vaping status: Never Used   Substance and Sexual Activity    Alcohol use: Yes     Comment: wine, occasionally    Drug use: Never   Other Topics Concern    Caffeine Concern Yes     Comment: coffee/soda - 2cups/day       Available pre-op labs reviewed.  Lab Results   Component Value Date    WBC 9.4 2024    RBC 2.32 (L) 2024    HGB 6.5 (LL) 2024    HCT 20.8 (L) 2024    MCV 89.7 2024    MCH 28.0 2024    MCHC 31.3 2024    RDW 15.0 2024    .0 2024     Lab Results   Component Value Date     2024    K 4.0 2024     2024    CO2 27.0 2024    BUN 25 (H) 2024    CREATSERUM 1.08 (H) 2024    GLU 83 2024    CA 8.3 (L) 2024          Vital Signs:  Body mass index is 25.85 kg/m².   height is 1.727 m (5' 8\") and weight is 77.1 kg (170 lb). Her oral temperature is 98.1 °F (36.7 °C). Her blood pressure is 120/72 and her pulse is 86. Her respiration is 18 and oxygen saturation is 100%.   Vitals:    24 0408 24 0912 24 0939 24 1056   BP: 116/57 117/62 120/68 120/72   Pulse: 86 79 78 86   Resp: 18 16 16 18   Temp: 98.2 °F (36.8 °C) 98.2 °F (36.8 °C) 98.3 °F (36.8 °C) 98.1 °F (36.7 °C)   TempSrc: Oral Oral Oral Oral   SpO2: 95% 94% 95% 100%   Weight:       Height:            Anesthesia Evaluation     Patient summary reviewed and Nursing notes reviewed    No history of anesthetic complications   Airway   Mallampati: II  TM distance: >3 FB  Neck ROM: full  Dental - Dentition appears grossly intact     Pulmonary - negative ROS and normal exam    breath sounds clear to auscultation  Cardiovascular - normal  exam  (+) hypertension, valvular problems/murmurs AS, POPE    Rhythm: regular  Rate: normal  ROS comment: Severe aortic stenosis. Mean gradient is 47.   -Can only walk walk 5-10 ft without getting SOB.    Neuro/Psych - negative ROS     GI/Hepatic/Renal - negative ROS     Endo/Other - negative ROS   Abdominal  - normal exam                 Anesthesia Plan:   ASA:  4  Emergent    Plan:   MAC  Plan Comments: MAC w. GA as backup. Discussed with patient and niece the high risk of anethesia given her current cardiac status. Pt aware of the risks.   Informed Consent Plan and Risks Discussed With:  Patient and Other (Niece)  Discussed plan with:  Surgeon      I have informed Hoda OXANA Kehinde and/or legal guardian or family member of the nature of the anesthetic plan, benefits, risks including possible dental damage if relevant, major complications, and any alternative forms of anesthetic management.   All of the patient's questions were answered to the best of my ability. The patient desires the anesthetic management as planned.  [place] CRNA  11/26/2024 11:47 AM  Present on Admission:  **None**           [1]   Medications Prior to Admission   Medication Sig Dispense Refill Last Dose/Taking    rivaroxaban (XARELTO) 20 MG Oral Tab Take 1 tablet (20 mg total) by mouth daily with food.   11/25/2024 at  8:51 AM    sulfamethoxazole-trimethoprim -160 MG Oral Tab per tablet Take 1 tablet by mouth 3 (three) times a week. Monday, Wednesday, Friday 11/25/2024 at  8:51 AM    omeprazole 20 MG Oral Capsule Delayed Release Take 1 capsule (20 mg total) by mouth daily.   11/25/2024 at  8:51 AM    Multiple Vitamins-Minerals (MULTI-VITAMIN/MINERALS) Oral Tab Take 1 tablet by mouth daily.   11/25/2024 at  8:50 AM    ferrous sulfate 325 (65 FE) MG Oral Tab EC Take 1 tablet (325 mg total) by mouth daily.   11/25/2024 at  8:51 AM    atorvastatin 20 MG Oral Tab Take 1 tablet (20 mg total) by mouth nightly. 90 tablet 0 11/24/2024 at   9:00 AM    furosemide 20 MG Oral Tab Take 1 tablet (20 mg total) by mouth daily. 90 tablet 0 11/25/2024 at  8:50 AM    polyethylene glycol, PEG 3350, 17 g Oral Powd Pack Take 17 g by mouth daily. 30 each 0 11/25/2024 at  8:49 AM    predniSONE 20 MG Oral Tab Take 2 tablets (40 mg total) by mouth daily with breakfast. 60 tablet 0 11/25/2024 at  8:51 AM    alendronate 70 MG Oral Tab Take 1 tablet (70 mg total) by mouth every 7 days. 13 tablet 3 11/24/2024 at  9:00 AM    amLODIPine 5 MG Oral Tab Take 1 tablet (5 mg total) by mouth daily. 90 tablet 3 11/24/2024 at  9:00 AM    Cholecalciferol (VITAMIN D) 50 MCG (2000 UT) Oral Tab Take 4,000 Units by mouth daily. 1 tablet 0 11/25/2024 at  8:51 AM    Vitamin B-12 500 MCG Oral Tab Take 1 tablet (500 mcg total) by mouth daily.   11/25/2024 at  8:51 AM    Calcium Carb-Cholecalciferol (CALCIUM + D3) 600-200 MG-UNIT Oral Tab Take 1 tablet by mouth daily.   11/25/2024 at  8:50 AM   [2]   Current Facility-Administered Medications Ordered in Epic   Medication Dose Route Frequency Provider Last Rate Last Admin    sodium chloride 0.9% infusion   Intravenous Once Jigar Apple MD        pantoprazole (Protonix) 40 mg in sodium chloride 0.9% PF 10 mL IV push  40 mg Intravenous Q12H Jigar Apple MD   40 mg at 11/26/24 0841    methylPREDNISolone sodium succinate (Solu-MEDROL) injection 40 mg  40 mg Intravenous Daily Jigar Apple MD   40 mg at 11/26/24 0840    amLODIPine (Norvasc) tab 5 mg  5 mg Oral Daily Jigar Apple MD        atorvastatin (Lipitor) tab 20 mg  20 mg Oral Nightly Jigar Apple MD   20 mg at 11/25/24 2134    calcium carbonate-vitamin D (Oyster Shell-D) 250-3.125 MG-MCG per tab 2 tablet  2 tablet Oral Daily Jigar Apple MD        cholecalciferol (Vitamin D3) tab 4,000 Units  4,000 Units Oral Daily O'Micaela, Jigar, MD        furosemide (Lasix) tab 20 mg  20 mg Oral Daily Jigar Apple MD        multivitamin (Tab-A-Gaurav/Beta Carotene)  tab 1 tablet  1 tablet Oral Daily Jigar Apple MD        polyethylene glycol (PEG 3350) (Miralax) 17 g oral packet 17 g  17 g Oral Daily Jigar Apple MD        [START ON 11/27/2024] sulfamethoxazole-trimethoprim DS (Bactrim DS) 800-160 MG per tab 1 tablet  1 tablet Oral Once per day on Monday Wednesday Friday Jigar Apple MD        cyanocobalamin (Vitamin B12) tab 500 mcg  500 mcg Oral Daily Jigar Apple MD        zolpidem (Ambien) tab 5 mg  5 mg Oral Nightly PRN Jigar Apple MD   5 mg at 11/25/24 1908     No current [facility]-ordered outpatient medications on file.   [3] No Known Allergies

## 2024-11-27 ENCOUNTER — APPOINTMENT (OUTPATIENT)
Dept: CT IMAGING | Facility: HOSPITAL | Age: 84
DRG: 378 | End: 2024-11-27
Attending: INTERNAL MEDICINE
Payer: MEDICARE

## 2024-11-27 LAB
ANION GAP SERPL CALC-SCNC: 6 MMOL/L (ref 0–18)
BASOPHILS # BLD AUTO: 0.01 X10(3) UL (ref 0–0.2)
BASOPHILS NFR BLD AUTO: 0.1 %
BLOOD TYPE BARCODE: 6200
BUN BLD-MCNC: 24 MG/DL (ref 9–23)
BUN/CREAT SERPL: 22.4 (ref 10–20)
CALCIUM BLD-MCNC: 8.4 MG/DL (ref 8.7–10.4)
CHLORIDE SERPL-SCNC: 109 MMOL/L (ref 98–112)
CO2 SERPL-SCNC: 27 MMOL/L (ref 21–32)
CREAT BLD-MCNC: 1.07 MG/DL
DEPRECATED RDW RBC AUTO: 51 FL (ref 35.1–46.3)
EGFRCR SERPLBLD CKD-EPI 2021: 51 ML/MIN/1.73M2 (ref 60–?)
EOSINOPHIL # BLD AUTO: 0.09 X10(3) UL (ref 0–0.7)
EOSINOPHIL NFR BLD AUTO: 0.9 %
ERYTHROCYTE [DISTWIDTH] IN BLOOD BY AUTOMATED COUNT: 15.6 % (ref 11–15)
GLUCOSE BLD-MCNC: 84 MG/DL (ref 70–99)
GLUCOSE BLDC GLUCOMTR-MCNC: 113 MG/DL (ref 70–99)
HCT VFR BLD AUTO: 23.6 %
HCT VFR BLD AUTO: 25.5 %
HCT VFR BLD AUTO: 27.8 %
HGB BLD-MCNC: 7.9 G/DL
HGB BLD-MCNC: 8 G/DL
HGB BLD-MCNC: 9.1 G/DL
HGB BLD-MCNC: 9.7 G/DL
IMM GRANULOCYTES # BLD AUTO: 0.08 X10(3) UL (ref 0–1)
IMM GRANULOCYTES NFR BLD: 0.8 %
LYMPHOCYTES # BLD AUTO: 1.85 X10(3) UL (ref 1–4)
LYMPHOCYTES NFR BLD AUTO: 17.9 %
MCH RBC QN AUTO: 28.4 PG (ref 26–34)
MCHC RBC AUTO-ENTMCNC: 31.4 G/DL (ref 31–37)
MCV RBC AUTO: 90.4 FL
MONOCYTES # BLD AUTO: 0.8 X10(3) UL (ref 0.1–1)
MONOCYTES NFR BLD AUTO: 7.8 %
NEUTROPHILS # BLD AUTO: 7.49 X10 (3) UL (ref 1.5–7.7)
NEUTROPHILS # BLD AUTO: 7.49 X10(3) UL (ref 1.5–7.7)
NEUTROPHILS NFR BLD AUTO: 72.5 %
OSMOLALITY SERPL CALC.SUM OF ELEC: 297 MOSM/KG (ref 275–295)
PLATELET # BLD AUTO: 436 10(3)UL (ref 150–450)
POTASSIUM SERPL-SCNC: 4 MMOL/L (ref 3.5–5.1)
RBC # BLD AUTO: 2.82 X10(6)UL
SODIUM SERPL-SCNC: 142 MMOL/L (ref 136–145)
UNIT VOLUME: 350 ML
WBC # BLD AUTO: 10.3 X10(3) UL (ref 4–11)

## 2024-11-27 PROCEDURE — 99232 SBSQ HOSP IP/OBS MODERATE 35: CPT | Performed by: INTERNAL MEDICINE

## 2024-11-27 PROCEDURE — 70450 CT HEAD/BRAIN W/O DYE: CPT | Performed by: INTERNAL MEDICINE

## 2024-11-27 RX ORDER — SODIUM CHLORIDE 9 MG/ML
INJECTION, SOLUTION INTRAVENOUS CONTINUOUS
Status: DISCONTINUED | OUTPATIENT
Start: 2024-11-27 | End: 2024-11-27

## 2024-11-27 RX ORDER — SODIUM CHLORIDE 9 MG/ML
INJECTION, SOLUTION INTRAVENOUS CONTINUOUS
Status: DISCONTINUED | OUTPATIENT
Start: 2024-11-27 | End: 2024-11-30

## 2024-11-27 RX ORDER — SODIUM CHLORIDE 9 MG/ML
INJECTION, SOLUTION INTRAVENOUS ONCE
Status: DISCONTINUED | OUTPATIENT
Start: 2024-11-27 | End: 2024-11-30

## 2024-11-27 RX ORDER — PANTOPRAZOLE SODIUM 40 MG/1
40 TABLET, DELAYED RELEASE ORAL
Status: DISCONTINUED | OUTPATIENT
Start: 2024-11-27 | End: 2024-11-30

## 2024-11-27 NOTE — SIGNIFICANT EVENT
Significant Event - Fall Note    Date/Time of Fall: November 27, 2024 at 0747    Fall huddle completed: Yes    Description of patient fall:     Patient fell from: Standing     Activity when fall occurred: Ambulating with assistance or assistive devices and Reaching for item     Where did fall occur: Bathroom     Was the fall assisted: Assisted to floor    Who witnessed the fall: Staff    Patient narrative of fall: \" I don't know what happened, I just became dizzy all of a sudden\"    Staff narrative of fall: This RN found patient and pct seated on the floor in the bathroom after calling for assistance. PCT stated patient was in a standing position with the walker in front of her and reached over for the soap dispenser, fell forward and bumped her head on the wall. PCT assisted patient to the floor due to her dizziness.     Name of Provider notified of fall: Malathi Stuart MD    Family notification: Family notified    Factors contributing to fall:     Physical: Dizziness     Psychological: Alert and Oriented     Environmental: N/A         Medications received in the past 8 hours:   Medication(s) Administered in past 8 Hours from 11/27/2024 0312 to 11/27/2024 1112       Date/Time Order Dose Route Action Action by Comments    11/27/2024 1012 CST calcium carbonate-vitamin D (Oyster Shell-D) 250-3.125 MG-MCG per tab 2 tablet 2 tablet Oral Given Lois Romero RN --    11/27/2024 1013 CST cholecalciferol (Vitamin D3) tab 4,000 Units 4,000 Units Oral Given Lois Romero RN --    11/27/2024 1013 CST cyanocobalamin (Vitamin B12) tab 500 mcg 500 mcg Oral Given Lois Romero RN --    11/27/2024 1023 CST multivitamin (Tab-A-Gaurav/Beta Carotene) tab 1 tablet 1 tablet Oral Given Lois Romero RN --    11/27/2024 1012 CST pantoprazole (Protonix) 40 mg in sodium chloride 0.9% PF 10 mL IV push 40 mg Intravenous Given Lois Romero RN --    11/27/2024 1012 CST polyethylene glycol (PEG 3350) (Miralax) 17 g oral packet 17 g  17 g Oral Given Lois Romero RN --    11/27/2024 1023 CST predniSONE (Deltasone) tab 40 mg 40 mg Oral Given Lois Romero RN --    11/27/2024 1002 CST sodium chloride 0.9 % IV bolus 250 mL 250 mL Intravenous New Bag Lois Romero RN --    11/27/2024 1023 CST sulfamethoxazole-trimethoprim DS (Bactrim DS) 800-160 MG per tab 1 tablet 1 tablet Oral Given Lois Romero RN --            Was patient identified as high fall risk prior to fall: Admitted with low hemoglobin, however no previous issues with dizziness or ambulating. Hgb this morning stable.                What interventions were in place prior to fall: Assistive devices (wheelchair, commode, walker, gait belt), Bed in lowest position, Call light within reach, [place] footwear, Personal items within reach, and Rounding    Interventions post fall: Fall alert wristband, Patient/family involved in fall prevention plan, and Initiated PT/OT therapy    Additional comments:     No initial c/o pain post fall. MD orders for PT/OT. BP low while seated, unable to stand d/t dizziness while working with pt/ot. Orders given for 250cc bolus, 1U prbc. A few hours later started complaining of headache, stat ct head ordered by MD.

## 2024-11-27 NOTE — PROGRESS NOTES
[facility]  part of [facility]    Progress Note    Hoda Busby Patient Status:  Inpatient    1940 MRN [id number]   Location [facility] 4W/SW/SE Attending Malathi Stuart MD   Hosp Day # 1 PCP Malathi Stuart MD       SUBJECTIVE:  Lightheaded in bathroom today; started to fall forward but caught by staff. Lightly bumped forehead on wall; no HA    OBJECTIVE:  Vital signs in last 24 hours:  BP (!) 83/40 (BP Location: Right arm)   Pulse 82   Temp 98.2 °F (36.8 °C) (Oral)   Resp 18   Ht 5' 8\" (1.727 m)   Wt 170 lb (77.1 kg)   SpO2 97%   BMI 25.85 kg/m²     Intake/Output:    Intake/Output Summary (Last 24 hours) at 2024 0901  Last data filed at 2024 0300  Gross per 24 hour   Intake 666 ml   Output 0 ml   Net 666 ml       Wt Readings from Last 3 Encounters:   24 170 lb (77.1 kg)   24 174 lb 14.4 oz (79.3 kg)   10/24/24 178 lb (80.7 kg)       EXAM:  GENERAL: well developed, well nourished, in no apparent distress  LUNGS: clear to auscultation  CARDIO: RRR, normal S1S2, 2/6 WADE throughout  GI: soft, NT, ND, NABS  EXTREMITIES: no edema    Data Review:     Labs:   Lab Results   Component Value Date    WBC 10.3 2024    HGB 8.0 2024    HCT 25.5 2024    .0 2024    CREATSERUM 1.07 2024    BUN 24 2024     2024    K 4.0 2024     2024    CO2 27.0 2024    GLU 84 2024    CA 8.4 2024         Imaging:  XR CHEST AP PORTABLE  (CPT=71045)    Result Date: 2024  CONCLUSION:  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    Dictated by (CST): Viktor Jennings MD on 2024 at 7:00 AM     Finalized by (CST): Viktor Jennings MD on 2024 at 7:01 AM                   Meds:   Current Facility-Administered Medications   Medication Dose Route Frequency    sodium ferric gluconate (Ferrlecit)  125 mg in sodium chloride 0.9% 100mL IVPB premix  125 mg Intravenous Once    sodium chloride 0.9 % IV bolus 500 mL  500 mL Intravenous Once    Followed by    sodium chloride 0.9% infusion   Intravenous Continuous    sodium chloride 0.9% infusion   Intravenous Once    predniSONE (Deltasone) tab 40 mg  40 mg Oral Daily with breakfast    pantoprazole (Protonix) 40 mg in sodium chloride 0.9% PF 10 mL IV push  40 mg Intravenous Q12H    amLODIPine (Norvasc) tab 5 mg  5 mg Oral Daily    atorvastatin (Lipitor) tab 20 mg  20 mg Oral Nightly    calcium carbonate-vitamin D (Oyster Shell-D) 250-3.125 MG-MCG per tab 2 tablet  2 tablet Oral Daily    cholecalciferol (Vitamin D3) tab 4,000 Units  4,000 Units Oral Daily    furosemide (Lasix) tab 20 mg  20 mg Oral Daily    multivitamin (Tab-A-Gaurav/Beta Carotene) tab 1 tablet  1 tablet Oral Daily    polyethylene glycol (PEG 3350) (Miralax) 17 g oral packet 17 g  17 g Oral Daily    sulfamethoxazole-trimethoprim DS (Bactrim DS) 800-160 MG per tab 1 tablet  1 tablet Oral Once per day on Monday Wednesday Friday    cyanocobalamin (Vitamin B12) tab 500 mcg  500 mcg Oral Daily    zolpidem (Ambien) tab 5 mg  5 mg Oral Nightly PRN       Assessment & Plan    Gastrointestinal hemorrhage, unspecified gastrointestinal hemorrhage type    -Hgb was 10's-11's last month in hospital  -Hgb 7.0 in ED 11/25/24  -last colonoscopy 1/2021 revealed adenomatous polyp but poor prep; pt declined repeat colonoscopy  -EGD 11/26/24 (Dr. Nuno) -  15mm clean-based, nonbleeding gastric antral ulcer (bx done); 3mm gastric antral AVM s/p APC.  -on IV protonix  -d/w Dr. Nuno; okay to restart xarelto - will wait one more day to ensure Hgb is stable    Acute blood loss anemia  -Hgb 7.0 in ED - received 1U PRBCs -- repeat Hgb 7.8, but then down to 6.5 yesterday (11/26) and received another 1U PRBC's yesterday (11/26)  -Hgb improved to 7.9, now 8.0  -given near syncope this am, current orthostasis (pt was unable to stand  with PT) and hypotension while sitting in chair (83/40), as well as severe aortic stenosis, will transfuse one more (#3) unit of PRBC's.  Will given 250cc bolus of 0.9 NS now prior to blood tx.    Hypotension  -as above  -hold lasix and amlodipine for now; d/w RN    Near syncope  Assisted fall  -pt felt lightheaded in bathroom  -started to fall forward but was caught by staff; lightly bumped her head; no visible injury; no HA     Interstitial pneumonitis  -dx'ed 10/2024 - presented with cough and severe hypoxia  -unresponsive to abx (amox/doxy, then IV zosyn/zithromax)  -CXR 10/24/24 extensive bilateral perihilar and bilateral upper and lower lobe   -S.pneumo, legionella Ag , mycoplasma IgM/G, Fungitell-beta-D glucan negative  -ANCA and URIEL/connective tissue disease panels negative  -anti-histone and anti-[place] Ab's negative  -Echo 10/24/24 - normal EF (55-60%), no obvious vegetatons  -CT with bilateral ground glass opacities, small consolidations of BLL  -per pulm, findings suspicious for NSIP (vs cryptogenic organizing pneumonia vs hypersensitivity pneumonitis); NOT thought to be c/w UIP pattern  -on empiric steroids (solumedrol then prednisone) since 10/25/24 - on prednisone 40mg/day        Bilateral pulmonary emboli  -dx'ed at time of interstitial pneumonitis  -CT chest 10/25/24 -- acute distal segmental/subsegmental pulmonary emboli in [place] and subsegmental PE in [place]  -unclear etiology; no recent hx of long travel; no family/personal hx of blood clots  -BLE venous dopplers negative  -unclear etiology of PE; will do hypercoag w/u as outpt.   Consider malignancy w/u if no other cause found (colonoscopy 1/2021 revealed adenomatous polyp but poor prep; pt declined repeat colonoscopy; Medicare would not cover Cologuard); UTD on mammo  -started on xarelto (held 2/2 GIB) - restart once BP stabilized     Severe aortic stenosis  -sees cardiology Dr. Bird  -moderate A.S on echo in 10/2022 (prev mild A.S in  2020)  -abnormal pre-op EKG 9/2023 --Nuc GXT done 9/20/23 -- EKG portion revealed 1-1.5mm ST seg depression in inferolateral leads with prolonged recovery; nuclear portion with normal perfusion   -Echo 9/29/23 --progression of A.S. from mild to moderate  -recent echo at Dr. Bird's office 10/18/24 -- LVEF 65%, grade 2 diastolic dysfunction, severe aortic stenosis (mean aortic gradient signif worse since 9/2023 -- 31>49 mmHg),  -seen by cardiology during last admission in 10/2024; pt will need TAVR evaluation as outpt  -on lasix 20g po daily as of 10/2024     Hx recent Anemia 10/2024  -baseline Hgb 12's; Hgb decreased to 10's-11's during hosp for pneumonitis in 10/2024 but was stable at that time and was attributed to acute illness.  -normal iron studies  (c/w anemia of chronic disease; low TIBC) on 10/27/24  -now with acute blood loss anemia as above.     Hypertension, primary  -(dyazide stopped due to NANCY)  -on amlodipine 5mg/day     Hx of hip OA  -s/p left THR (Dr. Lo) many years ago  -s/p elective R BEATRIZ on 10/5/23 by Dr. Diaz     Hyperlipidemia   Pt with strong family hx of high cholesterol  ; normal TG and HDL  No indication for statin given age     Osteopenia   On Fosamax from 2011 to 4/2016.     DEXA stable 5/10/17.    DEXA in 9/2019 showed slightly more bone loss in spine, but stable in hip.    DEXA 11/2021 -- some improvement in femur.  Hold off on fosamax for now.   DEXA 8/2024 -- osteoporosis of left forearm  -restarted Fosamax 8/2024     Vitamin D deficiency  On vitamin D 4000u/day     BLE edema  Advised compression     VTE proph            -SCDs                Malathi Stuart MD  11/27/2024  9:01 AM

## 2024-11-27 NOTE — PLAN OF CARE
Patient tolerating full liquid diet. Hemoglobin stable. New IV placed. No bleeding noted.     Problem: Patient Centered Care  Goal: Patient preferences are identified and integrated in the patient's plan of care  Description: Interventions:  - What would you like us to know as we care for you? I am super sweet and cheerful.   - Provide timely, complete, and accurate information to patient/family  - Incorporate patient and family knowledge, values, beliefs, and cultural backgrounds into the planning and delivery of care  - Encourage patient/family to participate in care and decision-making at the level they choose  - Honor patient and family perspectives and choices  Outcome: Progressing     Problem: Patient/Family Goals  Goal: Patient/Family Long Term Goal  Description: Patient's Long Term Goal:     Interventions:  - See additional Care Plan goals for specific interventions  Outcome: Progressing  Goal: Patient/Family Short Term Goal  Description: Patient's Short Term Goal:     Interventions:   - See additional Care Plan goals for specific interventions  Outcome: Progressing     Problem: SAFETY ADULT - FALL  Goal: Free from fall injury  Description: INTERVENTIONS:  - Assess pt frequently for physical needs  - Identify cognitive and physical deficits and behaviors that affect risk of falls.  - [place] fall precautions as indicated by assessment.  - Educate pt/family on patient safety including physical limitations  - Instruct pt to call for assistance with activity based on assessment  - Modify environment to reduce risk of injury  - Provide assistive devices as appropriate  - Consider OT/PT consult to assist with strengthening/mobility  - Encourage toileting schedule  Outcome: Progressing     Problem: GASTROINTESTINAL - ADULT  Goal: Minimal or absence of nausea and vomiting  Description: INTERVENTIONS:  - Maintain adequate hydration with IV or PO as ordered and tolerated  - Nasogastric tube to low intermittent  suction as ordered  - Evaluate effectiveness of ordered antiemetic medications  - Provide nonpharmacologic comfort measures as appropriate  - Advance diet as tolerated, if ordered  - Obtain nutritional consult as needed  - Evaluate fluid balance  Outcome: Progressing  Goal: Maintains or returns to baseline bowel function  Description: INTERVENTIONS:  - Assess bowel function  - Maintain adequate hydration with IV or PO as ordered and tolerated  - Evaluate effectiveness of GI medications  - Encourage mobilization and activity  - Obtain nutritional consult as needed  - Establish a toileting routine/schedule  - Consider collaborating with pharmacy to review patient's medication profile  Outcome: Progressing  Goal: Maintains adequate nutritional intake (undernourished)  Description: INTERVENTIONS:  - Monitor percentage of each meal consumed  - Identify factors contributing to decreased intake, treat as appropriate  - Assist with meals as needed  - Monitor I&O, WT and lab values  - Obtain nutritional consult as needed  - Optimize oral hygiene and moisture  - Encourage food from home; allow for food preferences  - Enhance eating environment  Outcome: Progressing  Goal: Achieves appropriate nutritional intake (bariatric)  Description: INTERVENTIONS:  - Monitor for over-consumption  - Identify factors contributing to increased intake, treat as appropriate  - Monitor I&O, WT and lab values  - Obtain nutritional consult as needed  - Evaluate psychosocial factors contributing to over-consumption  Outcome: Progressing     Problem: METABOLIC/FLUID AND ELECTROLYTES - ADULT  Goal: Electrolytes maintained within normal limits  Description: INTERVENTIONS:  - Monitor labs and rhythm and assess patient for signs and symptoms of electrolyte imbalances  - Administer electrolyte replacement as ordered  - Monitor response to electrolyte replacements, including rhythm and repeat lab results as appropriate  - Fluid restriction as ordered  -  Instruct patient on fluid and nutrition restrictions as appropriate  Outcome: Progressing  Goal: Hemodynamic stability and optimal renal function maintained  Description: INTERVENTIONS:  - Monitor labs and assess for signs and symptoms of volume excess or deficit  - Monitor intake, output and patient weight  - Monitor urine specific gravity, serum osmolarity and serum sodium as indicated or ordered  - Monitor response to interventions for patient's volume status, including labs, urine output, blood pressure (other measures as available)  - Encourage oral intake as appropriate  - Instruct patient on fluid and nutrition restrictions as appropriate  Outcome: Progressing     Problem: HEMATOLOGIC - ADULT  Goal: Maintains hematologic stability  Description: INTERVENTIONS  - Assess for signs and symptoms of bleeding or hemorrhage  - Monitor labs and vital signs for trends  - Administer supportive blood products/factors, fluids and medications as ordered and appropriate  - Administer supportive blood products/factors as ordered and appropriate  Outcome: Progressing  Goal: Free from bleeding injury  Description: (Example usage: patient with low platelets)  INTERVENTIONS:  - Avoid intramuscular injections, enemas and rectal medication administration  - Ensure safe mobilization of patient  - Hold pressure on venipuncture sites to achieve adequate hemostasis  - Assess for signs and symptoms of internal bleeding  - Monitor lab trends  - Patient is to report abnormal signs of bleeding to staff  - Avoid use of toothpicks and dental floss  - Use electric shaver for shaving  - Use soft bristle tooth brush  - Limit straining and forceful nose blowing  Outcome: Progressing

## 2024-11-27 NOTE — PROGRESS NOTES
[facility]  part of [facility]     Progress Note    Hoda Busby Patient Status:  Inpatient    1940 MRN [id number]   Location [facility] ENDOSCOPY LAB SUITES Attending Malathi Stuart MD   Hosp Day # 1 PCP Malathi Stuart MD       Subjective:   Patient seen and examined.  Resting in bed.  Denies significant dyspnea or cough.  Tolerating diet    Objective:   Blood pressure (!) 83/40, pulse 82, temperature 98.2 °F (36.8 °C), temperature source Oral, resp. rate 18, height 5' 8\" (1.727 m), weight 170 lb (77.1 kg), SpO2 97%.  Intake/Output:   Last 3 shifts: I/O last 3 completed shifts:  In: 1187.7 [P.O.:360; Blood:827.7]  Out: 0    This shift: No intake/output data recorded.     Vent Settings:      Hemodynamic parameters (last 24 hours):      Scheduled Meds:   Current Facility-Administered Medications   Medication Dose Route Frequency    sodium chloride 0.9 % IV bolus 250 mL  250 mL Intravenous Once    Followed by    sodium chloride 0.9% infusion   Intravenous Continuous    sodium chloride 0.9% infusion   Intravenous Once    sodium chloride 0.9% infusion   Intravenous Once    predniSONE (Deltasone) tab 40 mg  40 mg Oral Daily with breakfast    pantoprazole (Protonix) 40 mg in sodium chloride 0.9% PF 10 mL IV push  40 mg Intravenous Q12H    amLODIPine (Norvasc) tab 5 mg  5 mg Oral Daily    atorvastatin (Lipitor) tab 20 mg  20 mg Oral Nightly    calcium carbonate-vitamin D (Oyster Shell-D) 250-3.125 MG-MCG per tab 2 tablet  2 tablet Oral Daily    cholecalciferol (Vitamin D3) tab 4,000 Units  4,000 Units Oral Daily    furosemide (Lasix) tab 20 mg  20 mg Oral Daily    multivitamin (Tab-A-Gaurav/Beta Carotene) tab 1 tablet  1 tablet Oral Daily    polyethylene glycol (PEG 3350) (Miralax) 17 g oral packet 17 g  17 g Oral Daily    sulfamethoxazole-trimethoprim DS (Bactrim DS) 800-160 MG per tab 1 tablet  1 tablet Oral Once per day on     cyanocobalamin (Vitamin B12)  tab 500 mcg  500 mcg Oral Daily    zolpidem (Ambien) tab 5 mg  5 mg Oral Nightly PRN       Continuous Infusions:    sodium chloride         Physical Exam  Constitutional: no acute distress  Eyes: PERRL  ENT: nares pateint  Neck: supple, no JVD  Cardio: RRR, S1 S2  Respiratory: clear to auscultation bilaterally, no wheezing, rales, rhonchi, crackles  GI: abdomen soft, non tender, active bowel sounds, no organomegaly  Extremities: no clubbing, cyanosis, edema  Neurologic: no gross motor deficits  Skin: warm, dry      Results:     Lab Results   Component Value Date    WBC 10.3 11/27/2024    HGB 8.0 11/27/2024    HCT 25.5 11/27/2024    .0 11/27/2024    CREATSERUM 1.07 11/27/2024    BUN 24 11/27/2024     11/27/2024    K 4.0 11/27/2024     11/27/2024    CO2 27.0 11/27/2024    GLU 84 11/27/2024    CA 8.4 11/27/2024       XR CHEST AP PORTABLE  (CPT=71045)    Result Date: 11/26/2024  CONCLUSION:  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    Dictated by (CST): Viktor Jennings MD on 11/26/2024 at 7:00 AM     Finalized by (CST): Viktor Jennings MD on 11/26/2024 at 7:01 AM                 Assessment   1.  Acute blood loss anemia  2.  ILD  3.  Recent pulmonary embolism  4.  Prior nicotine dependence  5.  Hypertension  6.  Severe aortic stenosis     Plan   -Patient presents with evidence of worsening anemia GI evaluated patient with endoscopic evaluation revealing evidence of antral ulcer with no active bleeding seen.  -Appears to be improved on steroid therapy.  Had been on 40 mg prednisone since discharge from recent hospitalization on 11/1/2024.  Plan to repeat CT high-resolution chest to further evaluate.  -CT chest on 10/25/2024 with evidence of segmental pulmonary embolism in right upper lobe and left upper lobe.  Multifocal groundglass opacities seen bilaterally.  Differential includes ILD including organizing  pneumonia, hypersensitivity pneumonitis, NSIP.  -Continue prednisone 40 mg at this time.  Will gradually taper.  -Recommend outpatient pulmonary follow-up and pulmonary function testing  -Okay for discharge from pulmonary perspective.  Follow-up in pulmonary clinic in 2 weeks time.  Recommend repeat CT high-resolution chest at 6-week interval from most recent CT chest.  Continue 40 mg prednisone upon discharge.      Cody Holloway, DO  Pulmonary Critical Care Medicine  [facility]

## 2024-11-27 NOTE — OCCUPATIONAL THERAPY NOTE
OCCUPATIONAL THERAPY EVALUATION - INPATIENT     Room Number: 441/441-A  Evaluation Date: 11/27/2024  Type of Evaluation: Initial       Physician Order: IP Consult to Occupational Therapy  Reason for Therapy: ADL/IADL Dysfunction and Discharge Planning    OCCUPATIONAL THERAPY ASSESSMENT   RN cleared pt for participation in OT session, which was completed in collaboration with PT. Upon arrival, pt was seated in bedside chair  and agreeable to activity. MD and PCT present during session. Pt was left in chair and alarm was activated. Call light and all needs left in reach. Handoff given to RN.    Patient is a 84 year old female admitted 11/25/2024 for GI hemorrhage.  Prior to admission, pt was at [facility].  Patient is currently requiring up to min-mod A for ADLs overall along with CGA for STS, and not able to toelrate more than 15 seconds standing due to dizziness. Vitals below. Eval limited but appropriate to DC back to [facility].               BP: (!) 83/40 (99/41) in standing             Education provided  Educated pt about role of OT and hospital therapy process as well as proper safety techniques including proper hand placement, body mechanics, safety techniques, deep breathing, orthostatic hypotension. Pt verbalized/demonstrated proper carryover.    Patient will benefit from continued skilled OT Services to promote return to prior level of function and safety with continuous assistance and gradual rehabilitative therapy    PLAN  OT Treatment Plan: Balance activities;Energy conservation/work simplification techniques;Continued evaluation;Compensatory technique education;Fine motor coordination activities;Neuromuscluar reeducation;Equipment eval/education;Patient/Family training;Patient/Family education;Cognitive reorientation;Endurance training;UE strengthening/ROM;Functional transfer training;Visual perceptual training;IADL training;ADL training      OCCUPATIONAL THERAPY MEDICAL/SOCIAL HISTORY     Problem List  Principal  Problem:    Gastrointestinal hemorrhage, unspecified gastrointestinal hemorrhage type  Active Problems:    GI bleed      Past Medical History  Past Medical History:    Acute pulmonary embolism (HCC)    High blood pressure    Osteopenia    Primary osteoarthritis of right hip    Visual impairment    Readers    White coat hypertension       Past Surgical History  Past Surgical History:   Procedure Laterality Date    Cataract  3/2&3/16 2017    Colonoscopy & polypectomy  2021         Hip replacement surgery Left Around 2006    Other surgical history  [place] 2017    Bilateral cataract surgery       HOME SITUATION  Type of Home: House (was most recenlty at [facility], has plans to move into [facility])  Home Layout: One level  Lives With: Alone                              SUBJECTIVE  \"Ya I am just like out of it.\"    OCCUPATIONAL THERAPY EXAMINATION      OBJECTIVE  Precautions: Bed/chair alarm  Fall Risk: High fall risk    PAIN ASSESSMENT  Ratin      RANGE OF MOTION   Upper extremity ROM is within functional limits     STRENGTH ASSESSMENT  Upper extremity strength is within functional limits     COORDINATION  Gross Motor: WFL   Fine Motor: WFL     ACTIVITIES OF DAILY LIVING ASSESSMENT  AM-PAC ‘6-Clicks’ Inpatient Daily Activity Short Form  How much help from another person does the patient currently need…  -   Putting on and taking off regular lower body clothing?: A Little  -   Bathing (including washing, rinsing, drying)?: A Little  -   Toileting, which includes using toilet, bedpan or urinal? : A Little  -   Putting on and taking off regular upper body clothing?: A Little  -   Taking care of personal grooming such as brushing teeth?: A Little  -   Eating meals?: None    AM-PAC Score:  Score: 19  Approx Degree of Impairment: 42.8%  Standardized Score (AM-PAC Scale): 40.22  CMS Modifier (G-Code): CK      FUNCTIONAL TRANSFER ASSESSMENT  STS: CGA    FUNCTIONAL ADL ASSESSMENT  Overall min-mod A    The patient's Approx Degree of  Impairment: 42.8% has been calculated based on documentation in the [facility] '6 clicks' Inpatient Daily Activity Short Form.  Research supports that patients with this level of impairment may benefit from HH; however was at [facility]. Final disposition will be made by interdisciplinary medical team.    OT Goals  Patients self stated goal is: to go to rehab     Patient will complete functional transfer with SBA  Comment:     Patient will complete toileting with SBA  Comment:     Patient will tolerate standing for 3 minutes in prep for adls with sba   Comment:    Patient will complete item retrieval with sba  Comment:          Goals  on:   Frequency: 3-5X/WK    Patient Evaluation Complexity Level:   Occupational Profile/Medical History MODERATE - Expanded review of history including review of medical or therapy record   Specific performance deficits impacting engagement in ADL/IADL MODERATE  3 - 5 performance deficits   Client Assessment/Performance Deficits MODERATE - Comorbidities and min to mod modifications of tasks    Clinical Decision Making MODERATE - Analysis of occupational profile, detailed assessments, several treatment options    Overall Complexity MODERATE     OT Session Time: 20 minutes  Self-Care Home Management: 10 minutes           Calista Thomson OT  [facility]  Inpatient Rehabilitation  Occupational Therapy  [phone]

## 2024-11-27 NOTE — CM/SW NOTE
11/27/24 1300   CM/MARCIA Referral Data   Referral Source    Reason for Referral Readmission;Discharge planning   Informant Patient;EMR   Readmission Assessment   Factors that patient feels contributed to this readmission Acute/Chronic Clinical Presentation   Pt's living situation prior to admission? Subacute rehab   Pt's level of independence at discharge? Some assist (mod)   Pt. received education on diagnoses at time of discharge? Yes   Did you know who and how to call someone if you felt worse? Yes   Did any new symptoms or issues develop after you were discharged? Yes   ----Post D/C symptoms: Symptoms/issue related to previous hospitalization No   Did you understand your discharge instructions? Yes   Were medications taken as indicated on discharge instructions? Yes   Was full assessment completed by SW/DANIEL on prior admission? Yes   Was the recommended discharge plan achieved? Yes   Was pt. discharged w/out services? No   Medical Hx   Does patient have an established PCP? Yes  (Malathi Stuart)   Patient Info   Patient's Current Mental Status at Time of Assessment Alert;Oriented   Patient's Home Environment House   Number of Levels in Home 1   Number of Stair in Home 4 steps to enter home   Patient lives with Alone   Patient Status Prior to Admission   Independent with ADLs and Mobility Yes   Services in place prior to admission DME/Supplies at home   Type of DME/Supplies Wheelchair;Raised Toilet Seat;Grab Bars;Standard Walker   Discharge Needs   Anticipated D/C needs Subacute rehab   Services Requested   Submitted to [facility] Yes   PASRR Level 1 Submitted No - Current within 90 days     Pt discussed during nursing rounds. Dx GI bleed. From [facility] for [facility], home alone prior to last admission. Independent w/o device prior to last admission. Anticipated therapy need: [facility] sent [facility]. Pt confirmed she would like to return to  for G[facility]  once medically stable.    Plan: PP for GRACIE pending [facility] review and medical clearance.    / to remain available for support and/or discharge planning.     Viktor Mcdermott, BSN    [phone]

## 2024-11-27 NOTE — CDS QUERY
How to answer this Query:     1.) DON'T CLICK COSIGN BUTTON FIRST  2.) Click \"3 dots...\" to the right of cosign button and click EDIT on the toolbar.  2.) Type an \"X\" in the bracket for the diagnosis that applies. (You may also add additional clinical details as you feel necessary to substantiate your response).   3.) Finally click \"Sign\" to complete response.  Thank You                    Dear Dr. Stuart,   Clinical information (provided below) includes diagnosis of Heart Failure.   Please clarify the status of CHF.     (    ) Chronic Diastolic Heart Failure    (   x ) Other - please specify:  I really don't know.  Would ask cardiology     Clinical Indicators:   H&P: HPI - admitted to the hospital last month.   seen by cardiology as she had some mild CHF related to her aortic stenosis   Home medications include Lasix po 20mg daily  10/24/2024 Cardiac echo: 1.  Left ventricle: The cavity size was normal. Wall thickness was mildly      increased. Systolic function was normal. The estimated ejection fraction      was 55-60%, by biplane method of disks. Wall motion is normal; there are      no regional wall motion abnormalities. Unable to assess LV diastolic      function.     Treatment: Lasix po 20mg daily ordered but not given 11-26  Risk factors: Aortic stenosis, ILD, HTN     If you have any questions, please contact Clinical  Rhiannon Busby RN CCDS  [phone]     Thank You!      THIS FORM IS A PERMANENT PART OF THE MEDICAL RECORD

## 2024-11-27 NOTE — PHYSICAL THERAPY NOTE
PHYSICAL THERAPY EVALUATION - INPATIENT     Room Number: 441/441-A  Evaluation Date: 11/27/2024  Type of Evaluation: Initial   Physician Order: PT Eval and Treat    Presenting Problem: GI hemorrhage     Reason for Therapy: Mobility Dysfunction and Discharge Planning    PHYSICAL THERAPY ASSESSMENT   Patient is a 84 year old female admitted 11/25/2024 for GI hemorrhage.  Prior to admission, patient's baseline is independent at home, was recently at [facility].  Patient is currently functioning below baseline with bed mobility, transfers, and gait.  Patient is requiring contact guard assist and minimal assist as a result of the following impairments: decreased functional strength, decreased endurance/aerobic capacity, pain, and medical status.  Physical Therapy will continue to follow for duration of hospitalization.    Patient will benefit from continued skilled PT Services to promote return to prior level of function and safety with continuous assistance and gradual rehabilitative therapy .    PLAN DURING HOSPITALIZATION  Nursing Mobility Recommendation : 1 Assist (pending BP)     PT Treatment Plan: Bed mobility;Body mechanics;Endurance;Energy conservation;Family education;Gait training;Range of motion;Stoop training;Stair training;Transfer training;Balance training  Rehab Potential : Good  Frequency (Obs): 3-5x/week     PHYSICAL THERAPY MEDICAL/SOCIAL HISTORY   History related to current admission: GI bleed HGB 8.1     Problem List  Principal Problem:    Gastrointestinal hemorrhage, unspecified gastrointestinal hemorrhage type  Active Problems:    GI bleed      HOME SITUATION  Type of Home: House (was most recenlty at [facility], has plans to move into [facility])  Home Layout: One level  Stairs to Enter : 4                  Lives With: Alone              Prior Level of Tow: independent     SUBJECTIVE  \"I just got dizzy in the bathroom.\" - fall early AM    PHYSICAL THERAPY EXAMINATION   OBJECTIVE  Precautions: Bed/chair  alarm  Fall Risk: High fall risk    WEIGHT BEARING RESTRICTION       PAIN ASSESSMENT     Location: denies       COGNITION  Overall Cognitive Status:  WFL - within functional limits    BALANCE  Static Sitting: Fair +  Dynamic Sitting: Fair  Static Standing: Fair -  Dynamic Standing: Poor +    ACTIVITY TOLERANCE           BP: (!) 83/40 (99/41 after stand attempt)  BP Location: Right arm  BP Method: Automatic  Patient Position: Sitting    O2 WALK       AM-PAC '6-Clicks' INPATIENT SHORT FORM - BASIC MOBILITY  How much difficulty does the patient currently have...  Patient Difficulty: Turning over in bed (including adjusting bedclothes, sheets and blankets)?: None   Patient Difficulty: Sitting down on and standing up from a chair with arms (e.g., wheelchair, bedside commode, etc.): None   Patient Difficulty: Moving from lying on back to sitting on the side of the bed?: None   How much help from another person does the patient currently need...   Help from Another: Moving to and from a bed to a chair (including a wheelchair)?: A Little   Help from Another: Need to walk in hospital room?: A Lot   Help from Another: Climbing 3-5 steps with a railing?: Total     AM-PAC Score:  Raw Score: 18   Approx Degree of Impairment: 46.58%   Standardized Score (AM-PAC Scale): 43.63   CMS Modifier (G-Code): CK    FUNCTIONAL ABILITY STATUS  Functional Mobility/Gait Assessment  Gait Assistance: Not tested  Distance (ft):  (unable due to low BP)  Sit to Stand: contact guard assist    Exercise/Education Provided:  Bed mobility  Functional activity tolerated  Transfer training    The patient's Approx Degree of Impairment: 46.58% has been calculated based on documentation in the [facility] '6 clicks' Inpatient Basic Mobility Short Form.  Research supports that patients with this level of impairment may benefit from home with home care.  Final disposition will be made by interdisciplinary medical team.    Patient End of Session: Up in chair;Needs  met;Call light within reach;RN aware of session/findings;All patient questions and concerns addressed;Alarm set;With  staff (LEs elevated)    CURRENT GOALS  Goals to be met by: 12/12  Patient Goal Patient's self-stated goal is: unstated    Goal #1 Patient is able to demonstrate supine - sit EOB @ level: supervision     Goal #1   Current Status    Goal #2 Patient is able to demonstrate transfers Sit to/from Stand at assistance level: supervision with walker - rolling     Goal #2  Current Status    Goal #3 Patient is able to ambulate 50 feet with assist device: walker - rolling at assistance level: supervision   Goal #3   Current Status    Goal #4 Patient will negotiate 2 stairs/one curb w/ assistive device and supervision   Goal #4   Current Status    Goal #5 Patient to demonstrate independence with home activity/exercise instructions provided to patient in preparation for discharge.   Goal #5   Current Status    Goal #6    Goal #6  Current Status      Patient Evaluation Complexity Level:  History Moderate - 1 or 2 personal factors and/or co-morbidities   Examination of body systems Moderate - addressing a total of 3 or more elements   Clinical Presentation  Moderate - Evolving   Clinical Decision Making  Moderate Complexity     Therapeutic Activity:  8 minutes

## 2024-11-27 NOTE — PROGRESS NOTES
[facility]  Gastroenterology Progress Note    Hoda Busby Patient Status:  Inpatient    1940 MRN [id number]   Location [facility] 4W/SW/SE Attending Malathi Stuart MD   Hosp Day # 1 PCP Malathi Stuart MD     Subjective:  Hoda Busby is a(n) 84 year old female.    Current complaints:   Feeling okay.  Did have a fall this morning.  Blood pressure was on the lower side.  No melena or bright blood per rectum.    Objective:  Blood pressure 98/46, pulse 82, temperature 98.2 °F (36.8 °C), temperature source Oral, resp. rate 18, height 5' 8\" (1.727 m), weight 170 lb (77.1 kg), SpO2 97%.  Respiratory: no labored breathing  CV: RRR  Abdomen: nondistended, soft, nontender  Extremities: no calf tenderness  Neurologic: Ox3    Labs:   Recent Labs   Lab 24  1450 24  2102 24  0558 24  1555 24  2351 24  0542   WBC 15.6*  --  9.4  --   --  10.3   HGB 7.0*   < > 6.5* 8.7* 7.9* 8.0*   HCT 22.3*   < > 20.8* 27.7* 23.6* 25.5*   .0*  --  417.0  --   --  436.0   CREATSERUM 1.35*  --  1.08*  --   --  1.07*   BUN 29*  --  25*  --   --  24*     --  143  --   --  142   K 4.3  --  4.0  --   --  4.0     --  111  --   --  109   CO2 24.0  --  27.0  --   --  27.0   *  --  83  --   --  84   CA 9.3  --  8.3*  --   --  8.4*   ALB 3.7  --   --   --   --   --    ALKPHO 51*  --   --   --   --   --    BILT 0.6  --   --   --   --   --    TP 5.7  --   --   --   --   --    AST 24  --   --   --   --   --    ALT 32  --   --   --   --   --     < > = values in this interval not displayed.       No results for input(s): \"JENNIFER\", \"LIP\", \"GGT\", \"PSA\", \"DDIMER\", \"ESRML\", \"ESRPF\", \"CRP\", \"BNP\", \"TROP\", \"CK\", \"CKMB\", \"URIEL\", \"RPR\", \"B12\", \"ETOH\", \"POCGLU\" in the last 168 hours.    Invalid input(s): \"RF\"     Imaging:  XR CHEST AP PORTABLE  (CPT=71045)    Result Date: 2024  CONCLUSION:  Extensive basilar predominant pulmonary interstitial lung disease and fibrosis, with  or without superimposed infectious process.    Dictated by (CST): Viktor Jennings MD on 11/26/2024 at 7:00 AM     Finalized by (CST): Viktor Jennings MD on 11/26/2024 at 7:01 AM            Problem list:  Patient Active Problem List   Diagnosis    Osteopenia of multiple sites    Vitamin D deficiency    White coat syndrome with diagnosis of hypertension    Hypercholesterolemia    Mitral valve insufficiency    Nonrheumatic aortic valve stenosis    Macular degeneration    Primary hypertension    Age-related osteoporosis without current pathological fracture    Acute pulmonary embolism (HCC)    Pneumonia    Acute respiratory failure with hypoxia (HCC)    GI bleed    Gastrointestinal hemorrhage, unspecified gastrointestinal hemorrhage type       Assessment/Plan:  Acute on chronic anemia/acute blood loss anemia  Dark stools/possible melena  Acute GI bleed  -Patient has known anemia that was felt to be related to chronic disease in the past.  -EGD yesterday with gastric antral clean-based ulcer 15 mm.  Biopsies taken.  No malignancy and no H. pylori.  Also with a single gastric AVM status post APC  -Last colonoscopy 2021.  Small polyps.  No other bleeding source no evidence of further bleeding.  Hemoglobin has been stable.       3.   History of pulmonary embolism -normally on Xarelto  4.   Interstitial lung disease-stable.  Chest x-ray stable.  Not on oxygen.  Not short of breath.  5.   Aortic stenosis           Recommendations:  Monitor H&H and transfuse as necessary  Continue PPI twice daily for 8 weeks  Okay to advance to soft diet if no signs of overt bleeding  4.   Workup for hypotension     Boston Nuno MD    11/27/2024  12:20 PM

## 2024-11-28 LAB
ANION GAP SERPL CALC-SCNC: 5 MMOL/L (ref 0–18)
BASOPHILS # BLD AUTO: 0.01 X10(3) UL (ref 0–0.2)
BASOPHILS NFR BLD AUTO: 0.1 %
BLOOD TYPE BARCODE: 6200
BUN BLD-MCNC: 23 MG/DL (ref 9–23)
BUN/CREAT SERPL: 21.5 (ref 10–20)
CALCIUM BLD-MCNC: 8.3 MG/DL (ref 8.7–10.4)
CHLORIDE SERPL-SCNC: 112 MMOL/L (ref 98–112)
CO2 SERPL-SCNC: 26 MMOL/L (ref 21–32)
CREAT BLD-MCNC: 1.07 MG/DL
DEPRECATED RDW RBC AUTO: 54.7 FL (ref 35.1–46.3)
EGFRCR SERPLBLD CKD-EPI 2021: 51 ML/MIN/1.73M2 (ref 60–?)
EOSINOPHIL # BLD AUTO: 0.08 X10(3) UL (ref 0–0.7)
EOSINOPHIL NFR BLD AUTO: 0.7 %
ERYTHROCYTE [DISTWIDTH] IN BLOOD BY AUTOMATED COUNT: 17.3 % (ref 11–15)
GLUCOSE BLD-MCNC: 94 MG/DL (ref 70–99)
HCT VFR BLD AUTO: 26.1 %
HCT VFR BLD AUTO: 30 %
HGB BLD-MCNC: 8.3 G/DL
HGB BLD-MCNC: 9.4 G/DL
IMM GRANULOCYTES # BLD AUTO: 0.1 X10(3) UL (ref 0–1)
IMM GRANULOCYTES NFR BLD: 0.9 %
LYMPHOCYTES # BLD AUTO: 1.47 X10(3) UL (ref 1–4)
LYMPHOCYTES NFR BLD AUTO: 12.6 %
MCH RBC QN AUTO: 27.4 PG (ref 26–34)
MCHC RBC AUTO-ENTMCNC: 31.8 G/DL (ref 31–37)
MCV RBC AUTO: 86.1 FL
MONOCYTES # BLD AUTO: 0.74 X10(3) UL (ref 0.1–1)
MONOCYTES NFR BLD AUTO: 6.3 %
NEUTROPHILS # BLD AUTO: 9.31 X10 (3) UL (ref 1.5–7.7)
NEUTROPHILS # BLD AUTO: 9.31 X10(3) UL (ref 1.5–7.7)
NEUTROPHILS NFR BLD AUTO: 79.4 %
OSMOLALITY SERPL CALC.SUM OF ELEC: 299 MOSM/KG (ref 275–295)
PLATELET # BLD AUTO: 351 10(3)UL (ref 150–450)
POTASSIUM SERPL-SCNC: 3.7 MMOL/L (ref 3.5–5.1)
RBC # BLD AUTO: 3.03 X10(6)UL
SODIUM SERPL-SCNC: 143 MMOL/L (ref 136–145)
UNIT VOLUME: 350 ML
WBC # BLD AUTO: 11.7 X10(3) UL (ref 4–11)

## 2024-11-28 PROCEDURE — 99232 SBSQ HOSP IP/OBS MODERATE 35: CPT | Performed by: INTERNAL MEDICINE

## 2024-11-28 RX ORDER — FERROUS SULFATE 325(65) MG
325 TABLET, DELAYED RELEASE (ENTERIC COATED) ORAL 2 TIMES DAILY WITH MEALS
Status: DISCONTINUED | OUTPATIENT
Start: 2024-11-28 | End: 2024-11-28

## 2024-11-28 RX ORDER — FERROUS SULFATE 325(65) MG
325 TABLET, DELAYED RELEASE (ENTERIC COATED) ORAL
Status: DISCONTINUED | OUTPATIENT
Start: 2024-11-29 | End: 2024-11-30

## 2024-11-28 NOTE — PROGRESS NOTES
[facility]  part of [facility]    Progress Note    Hoda Busby Patient Status:  Inpatient    1940 MRN [id number]   Location [facility] 4W/SW/SE Attending Malathi Stuart MD   Hosp Day # 2 PCP Malathi Stuart MD       SUBJECTIVE:  Feels better today; no dizziness.  Still feels a little weak    OBJECTIVE:  Vital signs in last 24 hours:  /72 (BP Location: Right arm)   Pulse 64   Temp 98.2 °F (36.8 °C) (Oral)   Resp 16   Ht 5' 8\" (1.727 m)   Wt 170 lb (77.1 kg)   SpO2 97%   BMI 25.85 kg/m²     Intake/Output:    Intake/Output Summary (Last 24 hours) at 2024 0953  Last data filed at 2024 0503  Gross per 24 hour   Intake 1287.08 ml   Output --   Net 1287.08 ml       Wt Readings from Last 3 Encounters:   24 170 lb (77.1 kg)   24 174 lb 14.4 oz (79.3 kg)   10/24/24 178 lb (80.7 kg)       EXAM:  GENERAL: well developed, well nourished, in no apparent distress; color appears better  LUNGS: clear to auscultation  CARDIO: RRR, normal S1S2, 2/6 syst murmur throughout precordium  GI: soft, NT, ND, NABS  EXTREMITIES: no LE edema    Data Review:     Labs:   Lab Results   Component Value Date    WBC 11.7 2024    HGB 8.3 2024    HCT 26.1 2024    .0 2024    CREATSERUM 1.07 2024    BUN 23 2024     2024    K 3.7 2024     2024    CO2 26.0 2024    GLU 94 2024    CA 8.3 2024         Imaging:  CT BRAIN OR HEAD (CPT=70450)    Result Date: 2024  CONCLUSION:   1. No acute intracranial hemorrhage, midline shift, mass effect, or hydrocephalus. 2. No transcortical area of hypoattenuation to suggest an acute infarct.  If there is clinical concern for an acute infarct, recommend further evaluation with an MRI of the brain. 3. Scattered foci and patchy areas of hypoattenuation involving the periventricular subcortical white matter, which are age indeterminate but favored to  reflect mild-to-moderate chronic microvascular ischemic changes. 4. Moderate temporal lobe predominant parenchymal volume loss, which can be seen in neurodegenerative diseases. 5. Lesser incidental findings described above.    Dictated by (CST): Basim West MD on 11/27/2024 at 12:48 PM     Finalized by (CST): Basim West MD on 11/27/2024 at 12:54 PM                   Meds:   Current Facility-Administered Medications   Medication Dose Route Frequency    ferrous sulfate DR tab 325 mg  325 mg Oral BID with meals    sodium chloride 0.9% infusion   Intravenous Continuous    sodium chloride 0.9% infusion   Intravenous Once    pantoprazole (Protonix) DR tab 40 mg  40 mg Oral BID AC    sodium chloride 0.9% infusion   Intravenous Once    predniSONE (Deltasone) tab 40 mg  40 mg Oral Daily with breakfast    [Held by provider] amLODIPine (Norvasc) tab 5 mg  5 mg Oral Daily    atorvastatin (Lipitor) tab 20 mg  20 mg Oral Nightly    calcium carbonate-vitamin D (Oyster Shell-D) 250-3.125 MG-MCG per tab 2 tablet  2 tablet Oral Daily    cholecalciferol (Vitamin D3) tab 4,000 Units  4,000 Units Oral Daily    [Held by provider] furosemide (Lasix) tab 20 mg  20 mg Oral Daily    multivitamin (Tab-A-Gaurav/Beta Carotene) tab 1 tablet  1 tablet Oral Daily    polyethylene glycol (PEG 3350) (Miralax) 17 g oral packet 17 g  17 g Oral Daily    sulfamethoxazole-trimethoprim DS (Bactrim DS) 800-160 MG per tab 1 tablet  1 tablet Oral Once per day on Monday Wednesday Friday    cyanocobalamin (Vitamin B12) tab 500 mcg  500 mcg Oral Daily    zolpidem (Ambien) tab 5 mg  5 mg Oral Nightly PRN       Assessment & Plan    Gastrointestinal hemorrhage, unspecified gastrointestinal hemorrhage type   -Hgb was 10's-11's last month in hospital when admitted wtih  -Hgb 7.0 in ED 11/25/24  -last colonoscopy 1/2021 revealed adenomatous polyp but poor prep; pt declined repeat colonoscopy  -EGD 11/26/24 (Dr. Nuno) -  15mm clean-based, nonbleeding gastric  antral ulcer (bx done); 3mm gastric antral AVM s/p APC.  -on protonix 40mg BID - to continue x 8 weeks (EOT 1/22/25)  -per Dr. Nuno, will need repeat EGD in 2 months (1/2025)     Acute blood loss anemia  -Hgb 7.0 in ED - received 1U PRBCs -- repeat Hgb 7.8, but then down to 6.5 on 11/26 so received another 1U PRBC's   -Hgb 8.0 yesterday (11/27) but given hypotension, near syncope, and hx of severe A.S., she received another 1U PRC (#3)  -Hgb increased to 9.1>9.7>8.3 this am.  Pt actually looks much better clinically.  Had a BM yesterday (no melena per RN)  -will give one dose of IV Ferrlecit this am. Repeat another H/H this afternoon  -start FeSO4 325mg daily tomorrow (hold off today, as still need to eval for melena)     Hypotension  -resolved with blood tx  -/72 this am; will hold amlodipine and lasix x 1 more day     Near syncope  Assisted fall  -pt felt lightheaded in bathroom on 11/27  -started to fall forward but was caught by staff; lightly bumped her head; no visible injury; no HA  -developed HA later in the day -- stat CT brain neg for bleed.  Feels fine today 11/28     Interstitial pneumonitis  -dx'ed 10/2024 - presented with cough and severe hypoxia  -unresponsive to abx (amox/doxy, then IV zosyn/zithromax)  -CXR 10/24/24 extensive bilateral perihilar and bilateral upper and lower lobe   -S.pneumo, legionella Ag , mycoplasma IgM/G, Fungitell-beta-D glucan negative  -ANCA and URIEL/connective tissue disease panels negative  -anti-histone and anti-K[place] Ab's negative  -Echo 10/24/24 - normal EF (55-60%), no obvious vegetatons  -CT with bilateral ground glass opacities, small consolidations of BLL  -per pulm, findings suspicious for NSIP (vs cryptogenic organizing pneumonia vs hypersensitivity pneumonitis); NOT thought to be c/w UIP pattern  -on empiric steroids (solumedrol then prednisone) since 10/25/24 - on prednisone 40mg/day     Bilateral pulmonary emboli  -dx'ed at time of interstitial  pneumonitis  -CT chest 10/25/24 -- acute distal segmental/subsegmental pulmonary emboli in [place] and subsegmental PE in [place]  -unclear etiology; no recent hx of long travel; no family/personal hx of blood clots  -BLE venous dopplers negative  -unclear etiology of PE; will do hypercoag w/u as outpt.   Consider malignancy w/u if no other cause found (colonoscopy 1/2021 revealed adenomatous polyp but poor prep; pt declined repeat colonoscopy; Medicare would not cover Cologuard); UTD on mammo  -started on xarelto (held 2/2 GIB) - restart xarelto today     Severe aortic stenosis  -sees cardiology Dr. Bird  -moderate A.S on echo in 10/2022 (prev mild A.S in 2020)  -abnormal pre-op EKG 9/2023 --Nuc GXT done 9/20/23 -- EKG portion revealed 1-1.5mm ST seg depression in inferolateral leads with prolonged recovery; nuclear portion with normal perfusion   -Echo 9/29/23 --progression of A.S. from mild to moderate  -recent echo at Dr. Bird's office 10/18/24 -- LVEF 65%, grade 2 diastolic dysfunction, severe aortic stenosis (mean aortic gradient signif worse since 9/2023 -- 31>49 mmHg),  -seen by cardiology during last admission in 10/2024; pt will need TAVR evaluation as outpt  -on lasix 20mg po daily as of 10/2024     Hx recent Anemia 10/2024  -baseline Hgb 12's; Hgb decreased to 10's-11's during hosp for pneumonitis in 10/2024 but was stable at that time and was attributed to acute illness.  -normal iron studies  (c/w anemia of chronic disease; low TIBC) on 10/27/24  -now with acute blood loss anemia as above.     Hypertension, primary  -(dyazide stopped due to NANCY)  -on amlodipine 5mg/day (on hold for now due to hypotension -- can restart if BP starts creeping up again)     Hx of hip OA  -s/p left THR (Dr. Lo) many years ago  -s/p elective R BEATRIZ on 10/5/23 by Dr. Diaz     Hyperlipidemia   Pt with strong family hx of high cholesterol  ; normal TG and HDL  No indication for statin given age     Osteopenia   On  Fosamax from 2011 to 4/2016.     DEXA stable 5/10/17.    DEXA in 9/2019 showed slightly more bone loss in spine, but stable in hip.    DEXA 11/2021 -- some improvement in femur.  Hold off on fosamax for now.   DEXA 8/2024 -- osteoporosis of left forearm  -restarted Fosamax 8/2024     Vitamin D deficiency  On vitamin D 4000u/day     BLE edema  Advised compression     VTE proph            -SCDs; to restart xarelto as above    D/w pt, tres Ferrari, and RN                        Malathi Stuart MD  11/28/2024  9:53 AM

## 2024-11-28 NOTE — PLAN OF CARE
Problem: SAFETY ADULT - FALL  Goal: Free from fall injury  Description: INTERVENTIONS:  - Assess pt frequently for physical needs  - Identify cognitive and physical deficits and behaviors that affect risk of falls.  - [place] fall precautions as indicated by assessment.  - Educate pt/family on patient safety including physical limitations  - Instruct pt to call for assistance with activity based on assessment  - Modify environment to reduce risk of injury  - Provide assistive devices as appropriate  - Consider OT/PT consult to assist with strengthening/mobility  - Encourage toileting schedule  Outcome: Progressing     Problem: GASTROINTESTINAL - ADULT  Goal: Minimal or absence of nausea and vomiting  Description: INTERVENTIONS:  - Maintain adequate hydration with IV or PO as ordered and tolerated  - Nasogastric tube to low intermittent suction as ordered  - Evaluate effectiveness of ordered antiemetic medications  - Provide nonpharmacologic comfort measures as appropriate  - Advance diet as tolerated, if ordered  - Obtain nutritional consult as needed  - Evaluate fluid balance  Outcome: Progressing  Goal: Maintains or returns to baseline bowel function  Description: INTERVENTIONS:  - Assess bowel function  - Maintain adequate hydration with IV or PO as ordered and tolerated  - Evaluate effectiveness of GI medications  - Encourage mobilization and activity  - Obtain nutritional consult as needed  - Establish a toileting routine/schedule  - Consider collaborating with pharmacy to review patient's medication profile  Outcome: Progressing  Goal: Maintains adequate nutritional intake (undernourished)  Description: INTERVENTIONS:  - Monitor percentage of each meal consumed  - Identify factors contributing to decreased intake, treat as appropriate  - Assist with meals as needed  - Monitor I&O, WT and lab values  - Obtain nutritional consult as needed  - Optimize oral hygiene and moisture  - Encourage food from home;  allow for food preferences  - Enhance eating environment  Outcome: Progressing  Goal: Achieves appropriate nutritional intake (bariatric)  Description: INTERVENTIONS:  - Monitor for over-consumption  - Identify factors contributing to increased intake, treat as appropriate  - Monitor I&O, WT and lab values  - Obtain nutritional consult as needed  - Evaluate psychosocial factors contributing to over-consumption  Outcome: Progressing     Problem: HEMATOLOGIC - ADULT  Goal: Maintains hematologic stability  Description: INTERVENTIONS  - Assess for signs and symptoms of bleeding or hemorrhage  - Monitor labs and vital signs for trends  - Administer supportive blood products/factors, fluids and medications as ordered and appropriate  - Administer supportive blood products/factors as ordered and appropriate  Outcome: Progressing  Goal: Free from bleeding injury  Description: (Example usage: patient with low platelets)  INTERVENTIONS:  - Avoid intramuscular injections, enemas and rectal medication administration  - Ensure safe mobilization of patient  - Hold pressure on venipuncture sites to achieve adequate hemostasis  - Assess for signs and symptoms of internal bleeding  - Monitor lab trends  - Patient is to report abnormal signs of bleeding to staff  - Avoid use of toothpicks and dental floss  - Use electric shaver for shaving  - Use soft bristle tooth brush  - Limit straining and forceful nose blowing  Outcome: Progressing     Problem: METABOLIC/FLUID AND ELECTROLYTES - ADULT  Goal: Electrolytes maintained within normal limits  Description: INTERVENTIONS:  - Monitor labs and rhythm and assess patient for signs and symptoms of electrolyte imbalances  - Administer electrolyte replacement as ordered  - Monitor response to electrolyte replacements, including rhythm and repeat lab results as appropriate  - Fluid restriction as ordered  - Instruct patient on fluid and nutrition restrictions as appropriate  Outcome:  Progressing  Goal: Hemodynamic stability and optimal renal function maintained  Description: INTERVENTIONS:  - Monitor labs and assess for signs and symptoms of volume excess or deficit  - Monitor intake, output and patient weight  - Monitor urine specific gravity, serum osmolarity and serum sodium as indicated or ordered  - Monitor response to interventions for patient's volume status, including labs, urine output, blood pressure (other measures as available)  - Encourage oral intake as appropriate  - Instruct patient on fluid and nutrition restrictions as appropriate  Outcome: Progressing     New c/o dizziness this morning, had an assisted fall to the floor with pct this morning-see notes. CT brain neg for infarct/hemorrhage. BP low prior to working with pt/ot. Orders given for 250cc bolus and 1U prbc. Tolerating low fiber soft diet, no bm. Using rolling chair to assist in bathroom transfers. Plans for eventual discharge back to [facility]. Call light within reach, frequent rounding.

## 2024-11-28 NOTE — PHYSICAL THERAPY NOTE
PHYSICAL THERAPY TREATMENT NOTE - INPATIENT     Room Number: 441/441-A       Presenting Problem: GI hemorrhage       Problem List  Principal Problem:    Gastrointestinal hemorrhage, unspecified gastrointestinal hemorrhage type  Active Problems:    GI bleed      PHYSICAL THERAPY ASSESSMENT   Patient demonstrates limited progress this session, goals  remain in progress.      Patient is requiring minimal assist and moderate assist as a result of the following impairments: decreased functional strength, decreased endurance/aerobic capacity, pain, and decreased muscular endurance.     Patient continues to function below baseline with bed mobility, transfers, gait, and standing prolonged periods.  Next session anticipate patient to progress bed mobility, transfers, gait, and standing prolonged periods.  Physical Therapy will continue to follow patient for duration of hospitalization.    Patient continues to benefit from continued skilled PT services: to promote return to prior level of function and safety with continuous assistance and gradual rehabilitative therapy .    PLAN DURING HOSPITALIZATION  Nursing Mobility Recommendation : 1 Assist     PT Treatment Plan: Bed mobility;Body mechanics;Coordination;Endurance;Energy conservation;Patient education;Gait training;Strengthening;Transfer training;Balance training  Frequency (Obs): 3-5x/week     SUBJECTIVE  Pt was agreeable to therapy session.           OBJECTIVE  Precautions: Bed/chair alarm    WEIGHT BEARING RESTRICTION       PAIN ASSESSMENT   Ratin  Location: denies  Management Techniques: Activity promotion;Body mechanics;Relaxation;Repositioning    BALANCE  Static Sitting: Fair +  Dynamic Sitting: Fair  Static Standing: Poor +  Dynamic Standing: Poor +    ACTIVITY TOLERANCE                          O2 WALK       AM-PAC '6-Clicks' INPATIENT SHORT FORM - BASIC MOBILITY  How much difficulty does the patient currently have...  Patient Difficulty: Turning over in bed  (including adjusting bedclothes, sheets and blankets)?: A Little   Patient Difficulty: Sitting down on and standing up from a chair with arms (e.g., wheelchair, bedside commode, etc.): A Lot   Patient Difficulty: Moving from lying on back to sitting on the side of the bed?: A Little   How much help from another person does the patient currently need...   Help from Another: Moving to and from a bed to a chair (including a wheelchair)?: A Little   Help from Another: Need to walk in hospital room?: A Little   Help from Another: Climbing 3-5 steps with a railing?: A Lot     AM-PAC Score:  Raw Score: 16   Approx Degree of Impairment: 54.16%   Standardized Score (AM-PAC Scale): 40.78   CMS Modifier (G-Code): CK    FUNCTIONAL ABILITY STATUS  Functional Mobility/Gait Assessment  Gait Assistance: Minimum assistance  Distance (ft): 40'  Assistive Device: Rolling walker  Pattern: Shuffle  Rolling:  NT  Supine to Sit:  NT  Sit to Supine:  NT  Sit to Stand: moderate assist    Skilled Therapy Provided: Pt is received in the chair and was cleared for therapy session. Pt was a little nervous with standing and AMB.  Pt is mod A  with sit<>stand transfers with the RW. Pt was able to AMB about 40'' with the RW min A  for balance and safety. Pt with chair follow for safety.  Pt with decreased bonnie and step length with narrow BERNA but with very good balance and safety awareness.  Returned pt back to the room and back to sitting in the chair with all needs within reach. Pt is on track to dc to home once medically cleared. Reported to the RN on the status of the pt.      The patient's Approx Degree of Impairment: 54.16% has been calculated based on documentation in the [facility] '6 clicks' Inpatient Daily Activity Short Form.  Research supports that patients with this level of impairment may benefit from Rehab.  Final disposition will be made by interdisciplinary medical team.      Patient End of Session: Up in chair;Needs met;Call light  within reach;RN aware of session/findings;All patient questions and concerns addressed;Hospital anti-slip socks    CURRENT GOALS   Goals to be met by: 12/12  Patient Goal Patient's self-stated goal is: unstated    Goal #1 Patient is able to demonstrate supine - sit EOB @ level: supervision     Goal #1   Current Status NT received in the chair    Goal #2 Patient is able to demonstrate transfers Sit to/from Stand at assistance level: supervision with walker - rolling     Goal #2  Current Status Mod A with the RW    Goal #3 Patient is able to ambulate 50 feet with assist device: walker - rolling at assistance level: supervision   Goal #3   Current Status 40' with the RW min A and chair follow for safety.    Goal #4 Patient will negotiate 2 stairs/one curb w/ assistive device and supervision   Goal #4   Current Status NT   Goal #5 Patient to demonstrate independence with home activity/exercise instructions provided to patient in preparation for discharge.   Goal #5   Current Status IN PROGRESS   Goal #6    Goal #6  Current Status        Therapeutic Activity: 23 minutes

## 2024-11-28 NOTE — PROGRESS NOTES
Left message with my contact information for patient to return my call [facility]  Gastroenterology Progress Note    Hoda Busby Patient Status:  Inpatient    1940 MRN [id number]   Location [facility] 4W/SW/SE Attending Malathi Stuart MD   Hosp Day # 2 PCP Malathi Stuart MD     Subjective:  Hoda Busby is a(n) 84 year old female.    Current complaints:   Doing well.  No abdominal pain.  No melena or bright blood per rectum.  Blood pressures better this morning however there is still a little low yesterday evening and overnight.    Objective:  Blood pressure 122/72, pulse 64, temperature 98.2 °F (36.8 °C), temperature source Oral, resp. rate 16, height 5' 8\" (1.727 m), weight 170 lb (77.1 kg), SpO2 97%.  Respiratory: no labored breathing  CV: RRR  Abdomen: nondistended, soft, nontender  Extremities: no calf tenderness  Neurologic: Ox3    Labs:   Recent Labs   Lab 24  1450 24  2102 24  0558 24  1555 24  0542 24  1513 24  0528   WBC 15.6*  --  9.4  --  10.3  --   --  11.7*   HGB 7.0*   < > 6.5*   < > 8.0* 9.1* 9.7* 8.3*   HCT 22.3*   < > 20.8*   < > 25.5* 27.8*  --  26.1*   .0*  --  417.0  --  436.0  --   --  351.0   CREATSERUM 1.35*  --  1.08*  --  1.07*  --   --  1.07*   BUN 29*  --  25*  --  24*  --   --  23     --  143  --  142  --   --  143   K 4.3  --  4.0  --  4.0  --   --  3.7     --  111  --  109  --   --  112   CO2 24.0  --  27.0  --  27.0  --   --  26.0   *  --  83  --  84  --   --  94   CA 9.3  --  8.3*  --  8.4*  --   --  8.3*   ALB 3.7  --   --   --   --   --   --   --    ALKPHO 51*  --   --   --   --   --   --   --    BILT 0.6  --   --   --   --   --   --   --    TP 5.7  --   --   --   --   --   --   --    AST 24  --   --   --   --   --   --   --    ALT 32  --   --   --   --   --   --   --     < > = values in this interval not displayed.       No results for input(s): \"JENNIFER\", \"LIP\", \"GGT\", \"PSA\", \"DDIMER\", \"ESRML\", \"ESRPF\", \"CRP\", \"BNP\", \"TROP\", \"CK\",  \"CKMB\", \"URIEL\", \"RPR\", \"B12\", \"ETOH\", \"POCGLU\" in the last 168 hours.    Invalid input(s): \"RF\"     Imaging:  CT BRAIN OR HEAD (CPT=70450)    Result Date: 11/27/2024  CONCLUSION:   1. No acute intracranial hemorrhage, midline shift, mass effect, or hydrocephalus. 2. No transcortical area of hypoattenuation to suggest an acute infarct.  If there is clinical concern for an acute infarct, recommend further evaluation with an MRI of the brain. 3. Scattered foci and patchy areas of hypoattenuation involving the periventricular subcortical white matter, which are age indeterminate but favored to reflect mild-to-moderate chronic microvascular ischemic changes. 4. Moderate temporal lobe predominant parenchymal volume loss, which can be seen in neurodegenerative diseases. 5. Lesser incidental findings described above.    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    Dictated by (CST): Viktor Jennings MD on 11/26/2024 at 7:00 AM     Finalized by (CST): Viktor Jennings MD on 11/26/2024 at 7:01 AM            Problem list:  Patient Active Problem List   Diagnosis    Osteopenia of multiple sites    Vitamin D deficiency    White coat syndrome with diagnosis of hypertension    Hypercholesterolemia    Mitral valve insufficiency    Nonrheumatic aortic valve stenosis    Macular degeneration    Primary hypertension    Age-related osteoporosis without current pathological fracture    Acute pulmonary embolism (HCC)    Pneumonia    Acute respiratory failure with hypoxia (HCC)    GI bleed    Gastrointestinal hemorrhage, unspecified gastrointestinal hemorrhage type       Assessment/Plan:  Acute on chronic anemia/acute blood loss anemia  Dark  stools/possible melena  Acute GI bleed  -Patient has known anemia that was felt to be related to chronic disease in the past.  -EGD Tuesday with gastric antral clean-based ulcer 15 mm.  Biopsies taken.  No malignancy and no H. pylori.  Also with a single gastric AVM status post APC  -Last colonoscopy 2021.  Small polyps.  No other bleeding source no evidence of further bleeding.  Hemoglobin has been stable.  -Hemoglobin fluctuating, however no signs of overt bleeding.  Likely a component of dilution       3.   History of pulmonary embolism -normally on Xarelto  4.   Interstitial lung disease-stable.  Chest x-ray stable.  Not on oxygen.  Not short of breath.  5.   Aortic stenosis           Recommendations:  Monitor H&H and transfuse as necessary  Continue PPI twice daily for 8 weeks  Continue soft diet  Repeat hemoglobin this afternoon  Possible discharge tomorrow hemoglobin remained stable with no signs of bleeding      [place] Nuno MD

## 2024-11-28 NOTE — PROGRESS NOTES
[facility]  part of [facility]     Progress Note    Hoda Busby Patient Status:  Inpatient    1940 MRN [id number]   Location [facility] ENDOSCOPY LAB SUITES Attending Malathi Stuart MD   Hosp Day # 2 PCP Malathi Stuart MD       Subjective:   Patient seen and examined.  Resting in bed.  Denies significant dyspnea or cough.  Appears comfortable.    Objective:   Blood pressure 122/72, pulse 64, temperature 98.2 °F (36.8 °C), temperature source Oral, resp. rate 16, height 5' 8\" (1.727 m), weight 170 lb (77.1 kg), SpO2 97%.  Intake/Output:   Last 3 shifts: I/O last 3 completed shifts:  In: 1527.1 [P.O.:600; I.V.:495.8; Blood:431.3]  Out: 0    This shift: No intake/output data recorded.     Vent Settings:      Hemodynamic parameters (last 24 hours):      Scheduled Meds:   Current Facility-Administered Medications   Medication Dose Route Frequency    sodium chloride 0.9% infusion   Intravenous Continuous    sodium chloride 0.9% infusion   Intravenous Once    pantoprazole (Protonix) DR tab 40 mg  40 mg Oral BID AC    sodium chloride 0.9% infusion   Intravenous Once    predniSONE (Deltasone) tab 40 mg  40 mg Oral Daily with breakfast    amLODIPine (Norvasc) tab 5 mg  5 mg Oral Daily    atorvastatin (Lipitor) tab 20 mg  20 mg Oral Nightly    calcium carbonate-vitamin D (Oyster Shell-D) 250-3.125 MG-MCG per tab 2 tablet  2 tablet Oral Daily    cholecalciferol (Vitamin D3) tab 4,000 Units  4,000 Units Oral Daily    furosemide (Lasix) tab 20 mg  20 mg Oral Daily    multivitamin (Tab-A-Gaurav/Beta Carotene) tab 1 tablet  1 tablet Oral Daily    polyethylene glycol (PEG 3350) (Miralax) 17 g oral packet 17 g  17 g Oral Daily    sulfamethoxazole-trimethoprim DS (Bactrim DS) 800-160 MG per tab 1 tablet  1 tablet Oral Once per day on     cyanocobalamin (Vitamin B12) tab 500 mcg  500 mcg Oral Daily    zolpidem (Ambien) tab 5 mg  5 mg Oral Nightly PRN       Continuous  Infusions:    sodium chloride 50 mL/hr at 11/27/24 1133       Physical Exam  Constitutional: no acute distress  Eyes: PERRL  ENT: nares pateint  Neck: supple, no JVD  Cardio: RRR, S1 S2  Respiratory: clear to auscultation bilaterally, no wheezing, rales, rhonchi, crackles  GI: abdomen soft, non tender, active bowel sounds, no organomegaly  Extremities: no clubbing, cyanosis, edema  Neurologic: no gross motor deficits  Skin: warm, dry      Results:     Lab Results   Component Value Date    WBC 11.7 11/28/2024    HGB 8.3 11/28/2024    HCT 26.1 11/28/2024    .0 11/28/2024    CREATSERUM 1.07 11/28/2024    BUN 23 11/28/2024     11/28/2024    K 3.7 11/28/2024     11/28/2024    CO2 26.0 11/28/2024    GLU 94 11/28/2024    CA 8.3 11/28/2024       CT BRAIN OR HEAD (CPT=70450)    Result Date: 11/27/2024  CONCLUSION:   1. No acute intracranial hemorrhage, midline shift, mass effect, or hydrocephalus. 2. No transcortical area of hypoattenuation to suggest an acute infarct.  If there is clinical concern for an acute infarct, recommend further evaluation with an MRI of the brain. 3. Scattered foci and patchy areas of hypoattenuation involving the periventricular subcortical white matter, which are age indeterminate but favored to reflect mild-to-moderate chronic microvascular ischemic changes. 4. Moderate temporal lobe predominant parenchymal volume loss, which can be seen in neurodegenerative diseases. 5. Lesser incidental findings described above.    Dictated by (CST): Basim West MD on 11/27/2024 at 12:48 PM     Finalized by (CST): Basim West MD on 11/27/2024 at 12:54 PM                 Assessment   1.  Acute blood loss anemia  2.  ILD  3.  Recent pulmonary embolism  4.  Prior nicotine dependence  5.  Hypertension  6.  Severe aortic stenosis     Plan   -Patient presents with evidence of worsening anemia GI evaluated patient with endoscopic evaluation revealing evidence of antral ulcer with no active  bleeding seen.  -Appears to be improved on steroid therapy.  Had been on 40 mg prednisone since discharge from recent hospitalization on 11/1/2024.  Plan to repeat CT high-resolution chest to further evaluate.  -CT chest on 10/25/2024 with evidence of segmental pulmonary embolism in right upper lobe and left upper lobe.  Multifocal groundglass opacities seen bilaterally.  Differential includes ILD including organizing pneumonia, hypersensitivity pneumonitis, NSIP.  -Continue prednisone 40 mg at this time.  Will gradually taper.  -Recommend outpatient pulmonary follow-up and pulmonary function testing  -Okay for discharge from pulmonary perspective.  Follow-up in pulmonary clinic in 2 weeks time.  Recommend repeat CT high-resolution chest at 6-week interval from most recent CT chest.  Continue 40 mg prednisone upon discharge.      Cody Holloway, DO  Pulmonary Critical Care Medicine  [facility]

## 2024-11-28 NOTE — PLAN OF CARE
Patient alert x4. Denies pain or nausea. Tolerating soft diet. Voiding freely. No BM over night. IVF continued. Tele box in place. Safety precautions in place. Call light within reach.    Problem: Patient Centered Care  Goal: Patient preferences are identified and integrated in the patient's plan of care  Description: Interventions:  - What would you like us to know as we care for you?   - Provide timely, complete, and accurate information to patient/family  - Incorporate patient and family knowledge, values, beliefs, and cultural backgrounds into the planning and delivery of care  - Encourage patient/family to participate in care and decision-making at the level they choose  - Honor patient and family perspectives and choices  Outcome: Progressing          Problem: SAFETY ADULT - FALL  Goal: Free from fall injury  Description: INTERVENTIONS:  - Assess pt frequently for physical needs  - Identify cognitive and physical deficits and behaviors that affect risk of falls.  - [place] fall precautions as indicated by assessment.  - Educate pt/family on patient safety including physical limitations  - Instruct pt to call for assistance with activity based on assessment  - Modify environment to reduce risk of injury  - Provide assistive devices as appropriate  - Consider OT/PT consult to assist with strengthening/mobility  - Encourage toileting schedule  Outcome: Progressing     Problem: GASTROINTESTINAL - ADULT  Goal: Minimal or absence of nausea and vomiting  Description: INTERVENTIONS:  - Maintain adequate hydration with IV or PO as ordered and tolerated  - Nasogastric tube to low intermittent suction as ordered  - Evaluate effectiveness of ordered antiemetic medications  - Provide nonpharmacologic comfort measures as appropriate  - Advance diet as tolerated, if ordered  - Obtain nutritional consult as needed  - Evaluate fluid balance  Outcome: Progressing  Goal: Maintains or returns to baseline bowel  function  Description: INTERVENTIONS:  - Assess bowel function  - Maintain adequate hydration with IV or PO as ordered and tolerated  - Evaluate effectiveness of GI medications  - Encourage mobilization and activity  - Obtain nutritional consult as needed  - Establish a toileting routine/schedule  - Consider collaborating with pharmacy to review patient's medication profile  Outcome: Progressing  Goal: Maintains adequate nutritional intake (undernourished)  Description: INTERVENTIONS:  - Monitor percentage of each meal consumed  - Identify factors contributing to decreased intake, treat as appropriate  - Assist with meals as needed  - Monitor I&O, WT and lab values  - Obtain nutritional consult as needed  - Optimize oral hygiene and moisture  - Encourage food from home; allow for food preferences  - Enhance eating environment  Outcome: Progressing  Goal: Achieves appropriate nutritional intake (bariatric)  Description: INTERVENTIONS:  - Monitor for over-consumption  - Identify factors contributing to increased intake, treat as appropriate  - Monitor I&O, WT and lab values  - Obtain nutritional consult as needed  - Evaluate psychosocial factors contributing to over-consumption  Outcome: Progressing     Problem: METABOLIC/FLUID AND ELECTROLYTES - ADULT  Goal: Electrolytes maintained within normal limits  Description: INTERVENTIONS:  - Monitor labs and rhythm and assess patient for signs and symptoms of electrolyte imbalances  - Administer electrolyte replacement as ordered  - Monitor response to electrolyte replacements, including rhythm and repeat lab results as appropriate  - Fluid restriction as ordered  - Instruct patient on fluid and nutrition restrictions as appropriate  Outcome: Progressing  Goal: Hemodynamic stability and optimal renal function maintained  Description: INTERVENTIONS:  - Monitor labs and assess for signs and symptoms of volume excess or deficit  - Monitor intake, output and patient weight  -  Monitor urine specific gravity, serum osmolarity and serum sodium as indicated or ordered  - Monitor response to interventions for patient's volume status, including labs, urine output, blood pressure (other measures as available)  - Encourage oral intake as appropriate  - Instruct patient on fluid and nutrition restrictions as appropriate  Outcome: Progressing     Problem: HEMATOLOGIC - ADULT  Goal: Maintains hematologic stability  Description: INTERVENTIONS  - Assess for signs and symptoms of bleeding or hemorrhage  - Monitor labs and vital signs for trends  - Administer supportive blood products/factors, fluids and medications as ordered and appropriate  - Administer supportive blood products/factors as ordered and appropriate  Outcome: Progressing  Goal: Free from bleeding injury  Description: (Example usage: patient with low platelets)  INTERVENTIONS:  - Avoid intramuscular injections, enemas and rectal medication administration  - Ensure safe mobilization of patient  - Hold pressure on venipuncture sites to achieve adequate hemostasis  - Assess for signs and symptoms of internal bleeding  - Monitor lab trends  - Patient is to report abnormal signs of bleeding to staff  - Avoid use of toothpicks and dental floss  - Use electric shaver for shaving  - Use soft bristle tooth brush  - Limit straining and forceful nose blowing  Outcome: Progressing

## 2024-11-28 NOTE — PLAN OF CARE
Pt is alert and oriented x4. On room air. Vital signs stable. Ambulating independently. Pt walked in the halls with the patient. Repeat hgb stable at 9.3. Pt denies pain and nausea. IV infiltrated and new IV placed. Pt received IV iron. Tolerated well. On L fiber soft diet. Tolerating well. Safety measures in place. Will continue to monitor. Plan to discharge to rehab tomorrow.     Problem: Patient Centered Care  Goal: Patient preferences are identified and integrated in the patient's plan of care  Description: Interventions:  - Provide timely, complete, and accurate information to patient/family  - Incorporate patient and family knowledge, values, beliefs, and cultural backgrounds into the planning and delivery of care  - Encourage patient/family to participate in care and decision-making at the level they choose  - Honor patient and family perspectives and choices  Outcome: Progressing     Problem: SAFETY ADULT - FALL  Goal: Free from fall injury  Description: INTERVENTIONS:  - Assess pt frequently for physical needs  - Identify cognitive and physical deficits and behaviors that affect risk of falls.  - [place] fall precautions as indicated by assessment.  - Educate pt/family on patient safety including physical limitations  - Instruct pt to call for assistance with activity based on assessment  - Modify environment to reduce risk of injury  - Provide assistive devices as appropriate  - Consider OT/PT consult to assist with strengthening/mobility  - Encourage toileting schedule  Outcome: Progressing     Problem: GASTROINTESTINAL - ADULT  Goal: Minimal or absence of nausea and vomiting  Description: INTERVENTIONS:  - Maintain adequate hydration with IV or PO as ordered and tolerated  - Nasogastric tube to low intermittent suction as ordered  - Evaluate effectiveness of ordered antiemetic medications  - Provide nonpharmacologic comfort measures as appropriate  - Advance diet as tolerated, if ordered  - Obtain  nutritional consult as needed  - Evaluate fluid balance  Outcome: Progressing  Goal: Maintains or returns to baseline bowel function  Description: INTERVENTIONS:  - Assess bowel function  - Maintain adequate hydration with IV or PO as ordered and tolerated  - Evaluate effectiveness of GI medications  - Encourage mobilization and activity  - Obtain nutritional consult as needed  - Establish a toileting routine/schedule  - Consider collaborating with pharmacy to review patient's medication profile  Outcome: Progressing  Goal: Maintains adequate nutritional intake (undernourished)  Description: INTERVENTIONS:  - Monitor percentage of each meal consumed  - Identify factors contributing to decreased intake, treat as appropriate  - Assist with meals as needed  - Monitor I&O, WT and lab values  - Obtain nutritional consult as needed  - Optimize oral hygiene and moisture  - Encourage food from home; allow for food preferences  - Enhance eating environment  Outcome: Progressing  Goal: Achieves appropriate nutritional intake (bariatric)  Description: INTERVENTIONS:  - Monitor for over-consumption  - Identify factors contributing to increased intake, treat as appropriate  - Monitor I&O, WT and lab values  - Obtain nutritional consult as needed  - Evaluate psychosocial factors contributing to over-consumption  Outcome: Progressing     Problem: METABOLIC/FLUID AND ELECTROLYTES - ADULT  Goal: Electrolytes maintained within normal limits  Description: INTERVENTIONS:  - Monitor labs and rhythm and assess patient for signs and symptoms of electrolyte imbalances  - Administer electrolyte replacement as ordered  - Monitor response to electrolyte replacements, including rhythm and repeat lab results as appropriate  - Fluid restriction as ordered  - Instruct patient on fluid and nutrition restrictions as appropriate  Outcome: Progressing  Goal: Hemodynamic stability and optimal renal function maintained  Description: INTERVENTIONS:  -  Monitor labs and assess for signs and symptoms of volume excess or deficit  - Monitor intake, output and patient weight  - Monitor urine specific gravity, serum osmolarity and serum sodium as indicated or ordered  - Monitor response to interventions for patient's volume status, including labs, urine output, blood pressure (other measures as available)  - Encourage oral intake as appropriate  - Instruct patient on fluid and nutrition restrictions as appropriate  Outcome: Progressing     Problem: HEMATOLOGIC - ADULT  Goal: Maintains hematologic stability  Description: INTERVENTIONS  - Assess for signs and symptoms of bleeding or hemorrhage  - Monitor labs and vital signs for trends  - Administer supportive blood products/factors, fluids and medications as ordered and appropriate  - Administer supportive blood products/factors as ordered and appropriate  Outcome: Progressing  Goal: Free from bleeding injury  Description: (Example usage: patient with low platelets)  INTERVENTIONS:  - Avoid intramuscular injections, enemas and rectal medication administration  - Ensure safe mobilization of patient  - Hold pressure on venipuncture sites to achieve adequate hemostasis  - Assess for signs and symptoms of internal bleeding  - Monitor lab trends  - Patient is to report abnormal signs of bleeding to staff  - Avoid use of toothpicks and dental floss  - Use electric shaver for shaving  - Use soft bristle tooth brush  - Limit straining and forceful nose blowing  Outcome: Progressing

## 2024-11-29 ENCOUNTER — APPOINTMENT (OUTPATIENT)
Dept: CT IMAGING | Facility: HOSPITAL | Age: 84
DRG: 378 | End: 2024-11-29
Attending: Other
Payer: MEDICARE

## 2024-11-29 ENCOUNTER — APPOINTMENT (OUTPATIENT)
Dept: MRI IMAGING | Facility: HOSPITAL | Age: 84
DRG: 378 | End: 2024-11-29
Attending: Other
Payer: MEDICARE

## 2024-11-29 ENCOUNTER — APPOINTMENT (OUTPATIENT)
Dept: MRI IMAGING | Facility: HOSPITAL | Age: 84
DRG: 378 | End: 2024-11-29
Attending: INTERNAL MEDICINE
Payer: MEDICARE

## 2024-11-29 PROBLEM — J84.89 INTERSTITIAL PNEUMONITIS (HCC): Status: ACTIVE | Noted: 2024-11-29

## 2024-11-29 PROBLEM — D62 ACUTE BLOOD LOSS ANEMIA: Status: ACTIVE | Noted: 2024-11-29

## 2024-11-29 PROBLEM — G57.32 PERONEAL NEUROPATHY, LEFT: Status: ACTIVE | Noted: 2024-11-29

## 2024-11-29 PROBLEM — G57.32 LEFT PERONEAL NERVE PALSY: Status: ACTIVE | Noted: 2024-11-29

## 2024-11-29 PROBLEM — I26.99 OTHER PULMONARY EMBOLISM WITHOUT ACUTE COR PULMONALE (HCC): Status: ACTIVE | Noted: 2024-11-29

## 2024-11-29 LAB
ANION GAP SERPL CALC-SCNC: 6 MMOL/L (ref 0–18)
BASOPHILS # BLD AUTO: 0.01 X10(3) UL (ref 0–0.2)
BASOPHILS NFR BLD AUTO: 0.1 %
BLOOD TYPE BARCODE: 6200
BUN BLD-MCNC: 20 MG/DL (ref 9–23)
BUN/CREAT SERPL: 20 (ref 10–20)
CALCIUM BLD-MCNC: 8.2 MG/DL (ref 8.7–10.4)
CHLORIDE SERPL-SCNC: 112 MMOL/L (ref 98–112)
CO2 SERPL-SCNC: 25 MMOL/L (ref 21–32)
CREAT BLD-MCNC: 1 MG/DL
DEPRECATED RDW RBC AUTO: 55.7 FL (ref 35.1–46.3)
EGFRCR SERPLBLD CKD-EPI 2021: 56 ML/MIN/1.73M2 (ref 60–?)
EOSINOPHIL # BLD AUTO: 0.12 X10(3) UL (ref 0–0.7)
EOSINOPHIL NFR BLD AUTO: 1.2 %
ERYTHROCYTE [DISTWIDTH] IN BLOOD BY AUTOMATED COUNT: 17.3 % (ref 11–15)
GLUCOSE BLD-MCNC: 79 MG/DL (ref 70–99)
HCT VFR BLD AUTO: 26 %
HGB BLD-MCNC: 8.4 G/DL
IMM GRANULOCYTES # BLD AUTO: 0.11 X10(3) UL (ref 0–1)
IMM GRANULOCYTES NFR BLD: 1.1 %
LYMPHOCYTES # BLD AUTO: 1.71 X10(3) UL (ref 1–4)
LYMPHOCYTES NFR BLD AUTO: 16.5 %
MCH RBC QN AUTO: 28.3 PG (ref 26–34)
MCHC RBC AUTO-ENTMCNC: 32.3 G/DL (ref 31–37)
MCV RBC AUTO: 87.5 FL
MONOCYTES # BLD AUTO: 0.79 X10(3) UL (ref 0.1–1)
MONOCYTES NFR BLD AUTO: 7.6 %
NEUTROPHILS # BLD AUTO: 7.64 X10 (3) UL (ref 1.5–7.7)
NEUTROPHILS # BLD AUTO: 7.64 X10(3) UL (ref 1.5–7.7)
NEUTROPHILS NFR BLD AUTO: 73.5 %
OSMOLALITY SERPL CALC.SUM OF ELEC: 298 MOSM/KG (ref 275–295)
PLATELET # BLD AUTO: 332 10(3)UL (ref 150–450)
POTASSIUM SERPL-SCNC: 4 MMOL/L (ref 3.5–5.1)
RBC # BLD AUTO: 2.97 X10(6)UL
SODIUM SERPL-SCNC: 143 MMOL/L (ref 136–145)
UNIT VOLUME: 350 ML
WBC # BLD AUTO: 10.4 X10(3) UL (ref 4–11)

## 2024-11-29 PROCEDURE — 99222 1ST HOSP IP/OBS MODERATE 55: CPT | Performed by: OTHER

## 2024-11-29 PROCEDURE — 72148 MRI LUMBAR SPINE W/O DYE: CPT | Performed by: INTERNAL MEDICINE

## 2024-11-29 PROCEDURE — 70551 MRI BRAIN STEM W/O DYE: CPT | Performed by: OTHER

## 2024-11-29 PROCEDURE — 99232 SBSQ HOSP IP/OBS MODERATE 35: CPT | Performed by: INTERNAL MEDICINE

## 2024-11-29 PROCEDURE — 70450 CT HEAD/BRAIN W/O DYE: CPT | Performed by: OTHER

## 2024-11-29 NOTE — CONSULTS
[facility]  [address] [place] [address], [address]  [facility] [address]  [phone]          INPATIENT NEUROLOGY   INITIAL CONSULT NOTE       [facility]  part of [facility]    Report of Consultation    Hoda Busby Patient Status:  Inpatient     1940 MRN [id number]    Location [facility] 4W/SW/SE Attending Malathi Stuart MD    Hosp Day # 3 PCP Malathi Stuart MD      Date of Admission:  2024  Date of Consult:  2024    HPI:     Hoda Busby is a 84 year old woman with past medical history of PE, hypertension, right hip osteoarthritis who presented with GI bleeding complicated by anemia.  She had been recently started on anticoagulation for pulmonary emboli.    Neurology consulted because the patient developed an acute left foot drop with anterior foot numbness last night that has persisted until today.  No back pain.  No radicular pain.      She does sit with her legs crossed, with pressure over her left fibular bone.     CURRENT MEDICATIONS  No current outpatient medications on file.       OUTPATIENT MEDICATIONS  Medications Ordered Prior to Encounter[1]     MEDICAL HISTORY  Past Medical History:    Acute pulmonary embolism (HCC)    High blood pressure    Osteopenia    Primary osteoarthritis of right hip    Visual impairment    Readers    White coat hypertension       ?SURGICAL HISTORY  Past Surgical History:   Procedure Laterality Date    Cataract  3/2&3/16 2017    Colonoscopy & polypectomy  2021         Hip replacement surgery Left Around     Other surgical history  [place] 2017    Bilateral cataract surgery       SOCIAL HISTORY  Social History     Socioeconomic History    Marital status:    Tobacco Use    Smoking status: Former     Current packs/day: 0.00     Types: Cigarettes     Start date: 1990     Quit date: 1990     Years since quittin.9     Passive exposure: Past    Smokeless tobacco: Former   Vaping Use     Vaping status: Never Used   Substance and Sexual Activity    Alcohol use: Yes     Comment: wine, occasionally    Drug use: Never   Other Topics Concern    Caffeine Concern Yes     Comment: coffee/soda - 2cups/day       FAMILY HISTORY  Family History   Problem Relation Age of Onset    Cancer Father         bone (cause of death)    Stroke Mother         CVA (cause of death)    Diabetes Mother     Hypertension Mother     Dementia Brother         Alzheimer's    Heart Disease Brother         CAD - x2 brothers    Heart Disease Brother        ALLERGIES  Allergies[2]    ?REVIEW OF SYSTEMS:   13-point review of systems was done and is negative unless otherwise stated in HPI.     ?PHYSICAL EXAM:     /60 (BP Location: Right arm)   Pulse 86   Temp 98.9 °F (37.2 °C) (Oral)   Resp 18   Ht 68\"   Wt 170 lb (77.1 kg)   SpO2 95%   BMI 25.85 kg/m²   General appearance: Well appearing, and in no acute distress  Skin: skin color, texture normal.  No rashes or lesions.    Head: Normocephalic, atraumatic.    Neurological exam:  Mental Status: Alert, oriented to person, place and time, Follows commands, and Speech fluent and appropriate.  Cranial Nerves: visual fields intact to confrontation, extraocular movements intact, facial sensation intact, face symmetric, no facial droop or ptosis, normal bedside auditory acuity bilaterally, no dysarthria  Motor:      Deltoid  C5 Biceps  C5-6  Triceps  C7   R 5 5 5   L 5 5 5        Hip flexion (Iliopsoas) L2-4  Hip extension   S1 Knee flexion  (Hamstrings)  L5-S1   Knee Extension (Quadriceps)  L3, L4  Ankle plantarflexion  S1 Ankle Dorsiflexion  Tib Anterior   L5   R 5 5 5 5 5 5   L 5 5 5 5 5 0     Ankle inversion and eversion 5/5     Reflexes: UE and LE reflexes are equal and hypoactive throughout   Sensation: intact to light touch, vibration except vibration lost in left patella   Coordination: Finger-to- nose-finger intact bilaterally   Gait: not assessed     LABS/DATA:      Lab  Results   Component Value Date    WBC 10.4 11/29/2024    HGB 8.4 11/29/2024    HCT 26.0 11/29/2024    .0 11/29/2024    CREATSERUM 1.00 11/29/2024    BUN 20 11/29/2024     11/29/2024    K 4.0 11/29/2024     11/29/2024    CO2 25.0 11/29/2024    GLU 79 11/29/2024    CA 8.2 11/29/2024       HGBA1C:  No results found for: \"A1C\", \"EAG\"             IMAGING:  CT BRAIN OR HEAD (CPT=70450)    Result Date: 11/27/2024  CONCLUSION:   1. No acute intracranial hemorrhage, midline shift, mass effect, or hydrocephalus. 2. No transcortical area of hypoattenuation to suggest an acute infarct.  If there is clinical concern for an acute infarct, recommend further evaluation with an MRI of the brain. 3. Scattered foci and patchy areas of hypoattenuation involving the periventricular subcortical white matter, which are age indeterminate but favored to reflect mild-to-moderate chronic microvascular ischemic changes. 4. Moderate temporal lobe predominant parenchymal volume loss, which can be seen in neurodegenerative diseases. 5. Lesser incidental findings described above.    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    Dictated by (CST): Viktor Jennings MD on 11/26/2024 at 7:00 AM     Finalized by (CST): Viktor Jennings MD on 11/26/2024 at 7:01 AM          I PERSONALLY REVIEWED THESE IMAGES.     ASSESSMENT:  The patient is a 84 year old  woman with past medical history of PE, hypertension, right hip osteoarthritis who presented with GI bleeding complicated by anemia.  She had been recently started on anticoagulation for pulmonary emboli.    Allergies consulted because the patient developed an acute left peroneal palsy  within the last 24 hours.  Arrived as numbness to anterior lateral left foot and weakness with dorsiflexion.      Her neurological examination is significant for left foot drop and patella numbness to vibration.      Acute left peroneal neuropathy suspect related to leg positioning (sitting with legs crossed) - needs evaluation into frontal lobe stroke or lumbar radiculopathy   -Agree with MRI lumbar spine  - Can add MRI brain as it is possible she had a frontal lobe stroke leading to her symptoms  -Check stat CT brain  -PT, OT and AFO boot     This note was prepared using Dragon Medical voice recognition dictation software and as a result, errors may occur. When identified, these errors have been corrected. While every attempt is made to correct errors during dictation, discrepancies may still exist    MARGE Richardson DO   Staff Neurologist   11/29/2024  12:08 PM                     [1]   No current facility-administered medications on file prior to encounter.     Current Outpatient Medications on File Prior to Encounter   Medication Sig Dispense Refill    Multiple Vitamins-Minerals (MULTI-VITAMIN/MINERALS) Oral Tab Take 1 tablet by mouth daily.      ferrous sulfate 325 (65 FE) MG Oral Tab EC Take 1 tablet (325 mg total) by mouth daily.      atorvastatin 20 MG Oral Tab Take 1 tablet (20 mg total) by mouth nightly. 90 tablet 0    furosemide 20 MG Oral Tab Take 1 tablet (20 mg total) by mouth daily. 90 tablet 0    polyethylene glycol, PEG 3350, 17 g Oral Powd Pack Take 17 g by mouth daily. 30 each 0    predniSONE 20 MG Oral Tab Take 2 tablets (40 mg total) by mouth daily with breakfast. 60 tablet 0    alendronate 70 MG Oral Tab Take 1 tablet (70 mg total) by mouth every 7 days. 13 tablet 3    amLODIPine 5 MG Oral Tab Take 1 tablet (5 mg total) by mouth daily. 90 tablet 3    Cholecalciferol (VITAMIN D) 50 MCG (2000 UT) Oral Tab Take 4,000 Units by mouth daily. 1 tablet 0    Vitamin B-12 500 MCG Oral Tab Take 1 tablet (500  mcg total) by mouth daily.      Calcium Carb-Cholecalciferol (CALCIUM + D3) 600-200 MG-UNIT Oral Tab Take 1 tablet by mouth daily.     [2] No Known Allergies

## 2024-11-29 NOTE — CONGREGATE LIVING REVIEW
Formerly [facility] Living Authorization    The [facility] Review Committee has reviewed this case and the patient IS APPROVED for discharge to a facility for Short Term Skilled once the following procedure is followed:     - The physician discharge instructions (contained within the RAÚL note for SNF) must inlcude the below appropriate and approved COVID instructions to the facility    For questions regarding CLRC approval process, please contact the CM assigned to the case.  For questions regarding RN discharge workflow, please contact the unit Clinical Leader.

## 2024-11-29 NOTE — PROGRESS NOTES
[facility]  part of [facility]     Progress Note    Hoda Busby Patient Status:  Inpatient    1940 MRN [id number]   Location [facility] ENDOSCOPY LAB SUITES Attending Malathi Stuart MD   Hosp Day # 3 PCP Malathi Stuart MD       Subjective:   Patient seen and examined.  Resting in bed.  Denies significant dyspnea or cough.  Tolerated ambulation in hallways yesterday.    Objective:   Blood pressure 137/83, pulse 86, temperature 98.3 °F (36.8 °C), temperature source Oral, resp. rate 18, height 5' 8\" (1.727 m), weight 170 lb (77.1 kg), SpO2 97%.  Intake/Output:   Last 3 shifts: I/O last 3 completed shifts:  In: 1407.1 [P.O.:480; I.V.:495.8; Blood:431.3]  Out: -    This shift: I/O this shift:  In: 480 [P.O.:480]  Out: -      Vent Settings:      Hemodynamic parameters (last 24 hours):      Scheduled Meds:   Current Facility-Administered Medications   Medication Dose Route Frequency    rivaroxaban (Xarelto) tab 20 mg  20 mg Oral Daily with food    ferrous sulfate DR tab 325 mg  325 mg Oral Daily with breakfast    sodium chloride 0.9% infusion   Intravenous Continuous    sodium chloride 0.9% infusion   Intravenous Once    pantoprazole (Protonix) DR tab 40 mg  40 mg Oral BID AC    sodium chloride 0.9% infusion   Intravenous Once    predniSONE (Deltasone) tab 40 mg  40 mg Oral Daily with breakfast    [Held by provider] amLODIPine (Norvasc) tab 5 mg  5 mg Oral Daily    atorvastatin (Lipitor) tab 20 mg  20 mg Oral Nightly    calcium carbonate-vitamin D (Oyster Shell-D) 250-3.125 MG-MCG per tab 2 tablet  2 tablet Oral Daily    cholecalciferol (Vitamin D3) tab 4,000 Units  4,000 Units Oral Daily    [Held by provider] furosemide (Lasix) tab 20 mg  20 mg Oral Daily    multivitamin (Tab-A-Gaurav/Beta Carotene) tab 1 tablet  1 tablet Oral Daily    polyethylene glycol (PEG 3350) (Miralax) 17 g oral packet 17 g  17 g Oral Daily    sulfamethoxazole-trimethoprim DS (Bactrim DS) 800-160 MG per  tab 1 tablet  1 tablet Oral Once per day on Monday Wednesday Friday    cyanocobalamin (Vitamin B12) tab 500 mcg  500 mcg Oral Daily    zolpidem (Ambien) tab 5 mg  5 mg Oral Nightly PRN       Continuous Infusions:    sodium chloride 50 mL/hr at 11/27/24 1133       Physical Exam  Constitutional: no acute distress  Eyes: PERRL  ENT: nares pateint  Neck: supple, no JVD  Cardio: RRR, S1 S2  Respiratory: clear to auscultation bilaterally, no wheezing, rales, rhonchi, crackles  GI: abdomen soft, non tender, active bowel sounds, no organomegaly  Extremities: no clubbing, cyanosis, edema  Neurologic: no gross motor deficits  Skin: warm, dry      Results:     Lab Results   Component Value Date    WBC 10.4 11/29/2024    HGB 8.4 11/29/2024    HCT 26.0 11/29/2024    .0 11/29/2024    CREATSERUM 1.00 11/29/2024    BUN 20 11/29/2024     11/29/2024    K 4.0 11/29/2024     11/29/2024    CO2 25.0 11/29/2024    GLU 79 11/29/2024    CA 8.2 11/29/2024       CT BRAIN OR HEAD (CPT=70450)    Result Date: 11/27/2024  CONCLUSION:   1. No acute intracranial hemorrhage, midline shift, mass effect, or hydrocephalus. 2. No transcortical area of hypoattenuation to suggest an acute infarct.  If there is clinical concern for an acute infarct, recommend further evaluation with an MRI of the brain. 3. Scattered foci and patchy areas of hypoattenuation involving the periventricular subcortical white matter, which are age indeterminate but favored to reflect mild-to-moderate chronic microvascular ischemic changes. 4. Moderate temporal lobe predominant parenchymal volume loss, which can be seen in neurodegenerative diseases. 5. Lesser incidental findings described above.    Dictated by (CST): aBsim West MD on 11/27/2024 at 12:48 PM     Finalized by (CST): Basim West MD on 11/27/2024 at 12:54 PM                 Assessment   1.  Acute blood loss anemia  2.  ILD  3.  Recent pulmonary embolism  4.  Prior nicotine dependence  5.   Hypertension  6.  Severe aortic stenosis     Plan   -Patient presents with evidence of worsening anemia GI evaluated patient with endoscopic evaluation revealing evidence of antral ulcer with no active bleeding seen.  -Appears to be improved on steroid therapy.  Had been on 40 mg prednisone since discharge from recent hospitalization on 11/1/2024.  Plan to repeat CT high-resolution chest to further evaluate.  -CT chest on 10/25/2024 with evidence of segmental pulmonary embolism in right upper lobe and left upper lobe.  Multifocal groundglass opacities seen bilaterally.  Differential includes ILD including organizing pneumonia, hypersensitivity pneumonitis, NSIP.  -Continue prednisone 40 mg at this time.  Will gradually taper as outpatient  -Recommend outpatient pulmonary follow-up and pulmonary function testing  -Okay for discharge from pulmonary perspective.  Follow-up in pulmonary clinic in 2 weeks time.  Recommend repeat CT high-resolution chest at 6-week interval from most recent CT chest.  Continue 40 mg prednisone upon discharge.      Cody Holloway, DO  Pulmonary Critical Care Medicine  [facility]

## 2024-11-29 NOTE — PROGRESS NOTES
[facility]  Gastroenterology Progress Note    Hoda Busby Patient Status:  Inpatient    1940 MRN [id number]   Location [facility] 4W/SW/SE Attending Malathi Stuart MD   Hosp Day # 3 PCP Malathi Stuart MD     Subjective:  Hoda Busby is a(n) 84 year old female.    Current complaints:   No complaints.    No abdominal pain.  No melena or brbpr    Objective:  Blood pressure 137/83, pulse 86, temperature 98.3 °F (36.8 °C), temperature source Oral, resp. rate 18, height 5' 8\" (1.727 m), weight 170 lb (77.1 kg), SpO2 97%.  Respiratory: no labored breathing  CV: RRR  Abdomen: nondistended, soft, nontender  Extremities: no calf tenderness  Neurologic: Ox3    Labs:   Recent Labs   Lab 24  1450 24  2102 24  0542 24  1513 24  0528 24  1602 24  0539   WBC 15.6*   < > 10.3  --  11.7*  --  10.4   HGB 7.0*   < > 8.0*   < > 8.3* 9.4* 8.4*   HCT 22.3*   < > 25.5*   < > 26.1* 30.0* 26.0*   .0*   < > 436.0  --  351.0  --  332.0   CREATSERUM 1.35*   < > 1.07*  --  1.07*  --  1.00   BUN 29*   < > 24*  --  23  --  20      < > 142  --  143  --  143   K 4.3   < > 4.0  --  3.7  --  4.0      < > 109  --  112  --  112   CO2 24.0   < > 27.0  --  26.0  --  25.0   *   < > 84  --  94  --  79   CA 9.3   < > 8.4*  --  8.3*  --  8.2*   ALB 3.7  --   --   --   --   --   --    ALKPHO 51*  --   --   --   --   --   --    BILT 0.6  --   --   --   --   --   --    TP 5.7  --   --   --   --   --   --    AST 24  --   --   --   --   --   --    ALT 32  --   --   --   --   --   --     < > = values in this interval not displayed.       No results for input(s): \"JENNIFER\", \"LIP\", \"GGT\", \"PSA\", \"DDIMER\", \"ESRML\", \"ESRPF\", \"CRP\", \"BNP\", \"TROP\", \"CK\", \"CKMB\", \"URIEL\", \"RPR\", \"B12\", \"ETOH\", \"POCGLU\" in the last 168 hours.    Invalid input(s): \"RF\"     Imaging:  CT BRAIN OR HEAD (CPT=70450)    Result Date: 2024  CONCLUSION:   1. No acute intracranial hemorrhage,  midline shift, mass effect, or hydrocephalus. 2. No transcortical area of hypoattenuation to suggest an acute infarct.  If there is clinical concern for an acute infarct, recommend further evaluation with an MRI of the brain. 3. Scattered foci and patchy areas of hypoattenuation involving the periventricular subcortical white matter, which are age indeterminate but favored to reflect mild-to-moderate chronic microvascular ischemic changes. 4. Moderate temporal lobe predominant parenchymal volume loss, which can be seen in neurodegenerative diseases. 5. Lesser incidental findings described above.    Dictated by (CST): Basim West MD on 11/27/2024 at 12:48 PM     Finalized by (CST): Basim West MD on 11/27/2024 at 12:54 PM            Problem list:  Patient Active Problem List   Diagnosis    Osteopenia of multiple sites    Vitamin D deficiency    White coat syndrome with diagnosis of hypertension    Hypercholesterolemia    Mitral valve insufficiency    Nonrheumatic aortic valve stenosis    Macular degeneration    Primary hypertension    Age-related osteoporosis without current pathological fracture    Acute pulmonary embolism (HCC)    Pneumonia    Acute respiratory failure with hypoxia (HCC)    GI bleed    Gastrointestinal hemorrhage, unspecified gastrointestinal hemorrhage type       Assessment/Plan:  Acute on chronic anemia/acute blood loss anemia  Dark stools/possible melena  Acute GI bleed  PUD - Gastric ulcer, clean based  -Patient has known anemia that was felt to be related to chronic disease in the past.  -EGD Tuesday with gastric antral clean-based ulcer 15 mm.  Biopsies taken.  No malignancy and no H. pylori.  Also with a single gastric AVM status post APC  -Last colonoscopy 2021.  Small polyps.  No other bleeding source no evidence of further bleeding.  Hemoglobin has been stable.  -Hemoglobin still fluctuating, however still stable and no signs of overt bleeding.  Likely a component of dilution        3.   History of pulmonary embolism -normally on Xarelto  4.   Interstitial lung disease-stable.  Chest x-ray stable.  Not on oxygen.  Not short of breath.  5.   Aortic stenosis  6.   Left foot numbness/weakness  -etiology unclear.  May need further workup.             Recommendations:  Monitor H&H and transfuse as necessary  Continue PPI twice daily for 8 weeks  Continue soft diet  Ok to consider discharge from GI perspective.    Repeat EGD in 2 months recommended  Follow up with primary hospital physician with regards to her foot issues.        Boston Nuno MD

## 2024-11-29 NOTE — PLAN OF CARE
Pt is alert and oriented x4. On room air. Vital signs stable. Ambulating independently. Pt denies pain and nausea. IV infiltrated and new IV placed. Pt received IV iron. Tolerated well. On L fiber soft diet. Tolerating well. Walks to the bathroom using a walker. Had a large bowel movement. Dark black stool. Pt received iron this morning. Pt reports a numb sensation in the left foot. MD notified. Neurology placed on a consult. Head CT done. Negative results. MRI of a head and spine scheduled. Checklist done. Safety measures in place. Will continue to monitor. Plan to discharge to rehab tomorrow.     Problem: Patient Centered Care  Goal: Patient preferences are identified and integrated in the patient's plan of care  Description: Interventions:  - Provide timely, complete, and accurate information to patient/family  - Incorporate patient and family knowledge, values, beliefs, and cultural backgrounds into the planning and delivery of care  - Encourage patient/family to participate in care and decision-making at the level they choose  - Honor patient and family perspectives and choices  Outcome: Progressing     Problem: SAFETY ADULT - FALL  Goal: Free from fall injury  Description: INTERVENTIONS:  - Assess pt frequently for physical needs  - Identify cognitive and physical deficits and behaviors that affect risk of falls.  - Lawrence fall precautions as indicated by assessment.  - Educate pt/family on patient safety including physical limitations  - Instruct pt to call for assistance with activity based on assessment  - Modify environment to reduce risk of injury  - Provide assistive devices as appropriate  - Consider OT/PT consult to assist with strengthening/mobility  - Encourage toileting schedule  Outcome: Progressing     Problem: GASTROINTESTINAL - ADULT  Goal: Minimal or absence of nausea and vomiting  Description: INTERVENTIONS:  - Maintain adequate hydration with IV or PO as ordered and tolerated  -  Nasogastric tube to low intermittent suction as ordered  - Evaluate effectiveness of ordered antiemetic medications  - Provide nonpharmacologic comfort measures as appropriate  - Advance diet as tolerated, if ordered  - Obtain nutritional consult as needed  - Evaluate fluid balance  Outcome: Progressing  Goal: Maintains or returns to baseline bowel function  Description: INTERVENTIONS:  - Assess bowel function  - Maintain adequate hydration with IV or PO as ordered and tolerated  - Evaluate effectiveness of GI medications  - Encourage mobilization and activity  - Obtain nutritional consult as needed  - Establish a toileting routine/schedule  - Consider collaborating with pharmacy to review patient's medication profile  Outcome: Progressing  Goal: Maintains adequate nutritional intake (undernourished)  Description: INTERVENTIONS:  - Monitor percentage of each meal consumed  - Identify factors contributing to decreased intake, treat as appropriate  - Assist with meals as needed  - Monitor I&O, WT and lab values  - Obtain nutritional consult as needed  - Optimize oral hygiene and moisture  - Encourage food from home; allow for food preferences  - Enhance eating environment  Outcome: Progressing  Goal: Achieves appropriate nutritional intake (bariatric)  Description: INTERVENTIONS:  - Monitor for over-consumption  - Identify factors contributing to increased intake, treat as appropriate  - Monitor I&O, WT and lab values  - Obtain nutritional consult as needed  - Evaluate psychosocial factors contributing to over-consumption  Outcome: Progressing     Problem: METABOLIC/FLUID AND ELECTROLYTES - ADULT  Goal: Electrolytes maintained within normal limits  Description: INTERVENTIONS:  - Monitor labs and rhythm and assess patient for signs and symptoms of electrolyte imbalances  - Administer electrolyte replacement as ordered  - Monitor response to electrolyte replacements, including rhythm and repeat lab results as  appropriate  - Fluid restriction as ordered  - Instruct patient on fluid and nutrition restrictions as appropriate  Outcome: Progressing  Goal: Hemodynamic stability and optimal renal function maintained  Description: INTERVENTIONS:  - Monitor labs and assess for signs and symptoms of volume excess or deficit  - Monitor intake, output and patient weight  - Monitor urine specific gravity, serum osmolarity and serum sodium as indicated or ordered  - Monitor response to interventions for patient's volume status, including labs, urine output, blood pressure (other measures as available)  - Encourage oral intake as appropriate  - Instruct patient on fluid and nutrition restrictions as appropriate  Outcome: Progressing     Problem: HEMATOLOGIC - ADULT  Goal: Maintains hematologic stability  Description: INTERVENTIONS  - Assess for signs and symptoms of bleeding or hemorrhage  - Monitor labs and vital signs for trends  - Administer supportive blood products/factors, fluids and medications as ordered and appropriate  - Administer supportive blood products/factors as ordered and appropriate  Outcome: Progressing  Goal: Free from bleeding injury  Description: (Example usage: patient with low platelets)  INTERVENTIONS:  - Avoid intramuscular injections, enemas and rectal medication administration  - Ensure safe mobilization of patient  - Hold pressure on venipuncture sites to achieve adequate hemostasis  - Assess for signs and symptoms of internal bleeding  - Monitor lab trends  - Patient is to report abnormal signs of bleeding to staff  - Avoid use of toothpicks and dental floss  - Use electric shaver for shaving  - Use soft bristle tooth brush  - Limit straining and forceful nose blowing  Outcome: Progressing

## 2024-11-29 NOTE — PROGRESS NOTES
[facility]  part of [facility]    Progress Note    Hoda Busby Patient Status:  Inpatient    1940 MRN [id number]   Location [facility] 4W/SW/SE Attending Malathi Stuart MD   Hosp Day # 3 PCP Malathi Stuart MD         Assessment and Plan:     Acute left peroneal palsy  Sxs x 24 hours; has numbness to anterolateral left foot and ankle as well as inability to dorsiflex left foot; etiology unclear  -MRI Lumbar spine  -will ask neurologist Dr Richardson for consultation    Gastrointestinal hemorrhage, unspecified gastrointestinal hemorrhage type   -Hgb was 10's-11's last month in hospital when admitted wtih  -Hgb 7.0 in ED 24  -last colonoscopy 2021 revealed adenomatous polyp but poor prep; pt declined repeat colonoscopy  -EGD 24 (Dr. Nuno) -  15mm clean-based, nonbleeding gastric antral ulcer (bx done); 3mm gastric antral AVM s/p APC.  -on protonix 40mg BID - to continue x 8 weeks (EOT 25)  -per Dr. Nuno, will need repeat EGD in 2 months (2025)     Acute blood loss anemia  -Hgb 7.0 in ED - received 1U PRBCs -- repeat Hgb 7.8, but then down to 6.5 on  so received another 1U PRBC's   -Hgb 8.0 yesterday () but given hypotension, near syncope, and hx of severe A.S., she received another 1U PRC (#3)  -Hgb increased to 9.1>9.7>8.3 this am.  Pt actually looks much better clinically.  Had a BM yesterday (no melena per RN)  -will give one dose of IV Ferrlecit this am. Repeat another H/H this afternoon  -now on FeSO4 325mg daily  -Hgb 8.3> tx 1 U PRBC > 9.4> 8.4     Hypotension  -resolved with blood tx  -/72 this am; will continue to hold amlodipine and Lasix     Near syncope  Assisted fall  -pt felt lightheaded in bathroom on   -started to fall forward but was caught by staff; lightly bumped her head; no visible injury; no HA  -developed HA later in the day -- stat CT brain neg for bleed.  Feels fine today      Interstitial  pneumonitis  -dx'ed 10/2024 - presented with cough and severe hypoxia  -unresponsive to abx (amox/doxy, then IV zosyn/zithromax)  -CXR 10/24/24 extensive bilateral perihilar and bilateral upper and lower lobe   -S.pneumo, legionella Ag , mycoplasma IgM/G, Fungitell-beta-D glucan negative  -ANCA and URIEL/connective tissue disease panels negative  -anti-histone and anti-[place] Ab's negative  -Echo 10/24/24 - normal EF (55-60%), no obvious vegetatons  -CT with bilateral ground glass opacities, small consolidations of BLL  -per pulm, findings suspicious for NSIP (vs cryptogenic organizing pneumonia vs hypersensitivity pneumonitis); NOT thought to be c/w UIP pattern  -on empiric steroids (solumedrol then prednisone) since 10/25/24 - on prednisone 40mg/day     Bilateral pulmonary emboli  -dx'ed at time of interstitial pneumonitis  -CT chest 10/25/24 -- acute distal segmental/subsegmental pulmonary emboli in [place] and subsegmental PE in [place]  -unclear etiology; no recent hx of long travel; no family/personal hx of blood clots  -BLE venous dopplers negative  -unclear etiology of PE; will do hypercoag w/u as outpt.   Consider malignancy w/u if no other cause found (colonoscopy 1/2021 revealed adenomatous polyp but poor prep; pt declined repeat colonoscopy; Medicare would not cover Cologuard); UTD on mammo  -started on xarelto (held 2/2 GIB) - restarted xarelto 20 mg po every day on 11/28     Severe aortic stenosis  -sees cardiology Dr. Bird  -moderate A.S on echo in 10/2022 (prev mild A.S in 2020)  -abnormal pre-op EKG 9/2023 --Nuc GXT done 9/20/23 -- EKG portion revealed 1-1.5mm ST seg depression in inferolateral leads with prolonged recovery; nuclear portion with normal perfusion   -Echo 9/29/23 --progression of A.S. from mild to moderate  -recent echo at Dr. Bird's office 10/18/24 -- LVEF 65%, grade 2 diastolic dysfunction, severe aortic stenosis (mean aortic gradient signif worse since 9/2023 -- 31>49 mmHg),  -seen by  cardiology during last admission in 10/2024; pt will need TAVR evaluation as outpt  -on lasix 20mg po daily as of 10/2024--> held as above     Hx recent Anemia 10/2024  -baseline Hgb 12's; Hgb decreased to 10's-11's during hosp for pneumonitis in 10/2024 but was stable at that time and was attributed to acute illness.  -normal iron studies  (c/w anemia of chronic disease; low TIBC) on 10/27/24  -now with acute blood loss anemia as above.     Hypertension, primary  -(dyazide stopped due to NANCY)  -on amlodipine 5mg/day (on hold for now due to hypotension -- can restart if BP starts creeping up again)     Hx of hip OA  -s/p left THR (Dr. Lo) many years ago  -s/p elective R BEATRIZ on 10/5/23 by Dr. Diaz     Hyperlipidemia   Pt with strong family hx of high cholesterol  ; normal TG and HDL  No indication for statin given age     Osteopenia   On Fosamax from 2011 to 4/2016.     DEXA stable 5/10/17.    DEXA in 9/2019 showed slightly more bone loss in spine, but stable in hip.    DEXA 11/2021 -- some improvement in femur.  Hold off on fosamax for now.   DEXA 8/2024 -- osteoporosis of left forearm  -restarted Fosamax 8/2024     Vitamin D deficiency  On vitamin D 4000u/day     BLE edema  Advised compression     VTE proph            -SCDs; to restart xarelto as above     D/w pt, tres Ferrari, and RN        SUBJECTIVE:    Has acute left foot drop with +numbness to left anterolateral foot and LLE        OBJECTIVE:  PHYSICAL EXAM:     Vital signs in last 24 hours:  /83 (BP Location: Right arm)   Pulse 86   Temp 98.3 °F (36.8 °C) (Oral)   Resp 18   Ht 5' 8\" (1.727 m)   Wt 170 lb (77.1 kg)   SpO2 97%   BMI 25.85 kg/m²     Intake/Output:    Intake/Output Summary (Last 24 hours) at 11/29/2024 1120  Last data filed at 11/29/2024 0842  Gross per 24 hour   Intake 960 ml   Output --   Net 960 ml       Wt Readings from Last 3 Encounters:   11/25/24 170 lb (77.1 kg)   11/01/24 174 lb 14.4 oz (79.3 kg)   10/24/24 178  lb (80.7 kg)         Constitutional: alert and oriented x3 in no acute distress  HEENT- EOMI, PERRL  Nose/Mouth/Throat: pharynx without erythema  Neck/Thyroid: neck supple; no thyromegaly  Cardiovascular: RRR, S1, S2, no S3 or murmur  Respiratory: lungs without crackles or wheezes  Abdomen: normoactive bowel sounds, soft, non-tender and non-distended  Extremities: no clubbing, cyanosis or edema  Neuro: +hypesthesia to left anterolateral left foot and ankle area; left foot dorsiflexion 0/5        Meds:   Current Facility-Administered Medications   Medication Dose Route Frequency    rivaroxaban (Xarelto) tab 20 mg  20 mg Oral Daily with food    ferrous sulfate DR tab 325 mg  325 mg Oral Daily with breakfast    sodium chloride 0.9% infusion   Intravenous Continuous    sodium chloride 0.9% infusion   Intravenous Once    pantoprazole (Protonix) DR tab 40 mg  40 mg Oral BID AC    sodium chloride 0.9% infusion   Intravenous Once    predniSONE (Deltasone) tab 40 mg  40 mg Oral Daily with breakfast    [Held by provider] amLODIPine (Norvasc) tab 5 mg  5 mg Oral Daily    atorvastatin (Lipitor) tab 20 mg  20 mg Oral Nightly    calcium carbonate-vitamin D (Oyster Shell-D) 250-3.125 MG-MCG per tab 2 tablet  2 tablet Oral Daily    cholecalciferol (Vitamin D3) tab 4,000 Units  4,000 Units Oral Daily    [Held by provider] furosemide (Lasix) tab 20 mg  20 mg Oral Daily    multivitamin (Tab-A-Gaurav/Beta Carotene) tab 1 tablet  1 tablet Oral Daily    polyethylene glycol (PEG 3350) (Miralax) 17 g oral packet 17 g  17 g Oral Daily    sulfamethoxazole-trimethoprim DS (Bactrim DS) 800-160 MG per tab 1 tablet  1 tablet Oral Once per day on Monday Wednesday Friday    cyanocobalamin (Vitamin B12) tab 500 mcg  500 mcg Oral Daily    zolpidem (Ambien) tab 5 mg  5 mg Oral Nightly PRN            Data Review:     Labs:   Lab Results   Component Value Date    GLU 79 11/29/2024     11/29/2024    K 4.0 11/29/2024     11/29/2024    CO2 25.0  11/29/2024    BUN 20 11/29/2024    CREATSERUM 1.00 11/29/2024    CA 8.2 11/29/2024       Recent Labs   Lab 11/29/24  0539   WBC 10.4   HGB 8.4*   HCT 26.0*   MCV 87.5   MCH 28.3   MCHC 32.3   RDW 17.3*   NEPRELIM 7.64   .0       No results for input(s): \"COLORUR\", \"CLARITY\", \"SPECGRAVITY\", \"GLUUR\", \"BILUR\", \"KETUR\", \"BLOODURINE\", \"PHURINE\", \"PROUR\", \"UROBILINOGEN\", \"NITRITE\", \"LEUUR\", \"WBCUR\", \"RBCUR\", \"BACUR\", \"EPIUR\" in the last 168 hours.    No results for input(s): \"URINE\", \"CULTI\", \"BLDSMR\" in the last 168 hours.      Imaging:  CT BRAIN OR HEAD (CPT=70450)    Result Date: 11/27/2024  CONCLUSION:   1. No acute intracranial hemorrhage, midline shift, mass effect, or hydrocephalus. 2. No transcortical area of hypoattenuation to suggest an acute infarct.  If there is clinical concern for an acute infarct, recommend further evaluation with an MRI of the brain. 3. Scattered foci and patchy areas of hypoattenuation involving the periventricular subcortical white matter, which are age indeterminate but favored to reflect mild-to-moderate chronic microvascular ischemic changes. 4. Moderate temporal lobe predominant parenchymal volume loss, which can be seen in neurodegenerative diseases. 5. Lesser incidental findings described above.    Dictated by (CST): Basim West MD on 11/27/2024 at 12:48 PM     Finalized by (CST): Basim West MD on 11/27/2024 at 12:54 PM                            Jigar Apple MD

## 2024-11-29 NOTE — PLAN OF CARE
Patient is alert and oriented x4. Tolerating diet. Denies any pain or nausea. Up with a walker. Call light within reach. Fall precautions maintained.    Problem: Patient Centered Care  Goal: Patient preferences are identified and integrated in the patient's plan of care  Description: Interventions:  - What would you like us to know as we care for you?   - Provide timely, complete, and accurate information to patient/family  - Incorporate patient and family knowledge, values, beliefs, and cultural backgrounds into the planning and delivery of care  - Encourage patient/family to participate in care and decision-making at the level they choose  - Honor patient and family perspectives and choices  Outcome: Progressing     Problem: Patient/Family Goals  Goal: Patient/Family Long Term Goal  Description: Patient's Long Term Goal:     Interventions:  -   - See additional Care Plan goals for specific interventions  Outcome: Progressing  Goal: Patient/Family Short Term Goal  Description: Patient's Short Term Goal:     Interventions:   -   - See additional Care Plan goals for specific interventions  Outcome: Progressing     Problem: SAFETY ADULT - FALL  Goal: Free from fall injury  Description: INTERVENTIONS:  - Assess pt frequently for physical needs  - Identify cognitive and physical deficits and behaviors that affect risk of falls.  - [place] fall precautions as indicated by assessment.  - Educate pt/family on patient safety including physical limitations  - Instruct pt to call for assistance with activity based on assessment  - Modify environment to reduce risk of injury  - Provide assistive devices as appropriate  - Consider OT/PT consult to assist with strengthening/mobility  - Encourage toileting schedule  Outcome: Progressing     Problem: GASTROINTESTINAL - ADULT  Goal: Minimal or absence of nausea and vomiting  Description: INTERVENTIONS:  - Maintain adequate hydration with IV or PO as ordered and tolerated  -  Nasogastric tube to low intermittent suction as ordered  - Evaluate effectiveness of ordered antiemetic medications  - Provide nonpharmacologic comfort measures as appropriate  - Advance diet as tolerated, if ordered  - Obtain nutritional consult as needed  - Evaluate fluid balance  Outcome: Progressing  Goal: Maintains or returns to baseline bowel function  Description: INTERVENTIONS:  - Assess bowel function  - Maintain adequate hydration with IV or PO as ordered and tolerated  - Evaluate effectiveness of GI medications  - Encourage mobilization and activity  - Obtain nutritional consult as needed  - Establish a toileting routine/schedule  - Consider collaborating with pharmacy to review patient's medication profile  Outcome: Progressing  Goal: Maintains adequate nutritional intake (undernourished)  Description: INTERVENTIONS:  - Monitor percentage of each meal consumed  - Identify factors contributing to decreased intake, treat as appropriate  - Assist with meals as needed  - Monitor I&O, WT and lab values  - Obtain nutritional consult as needed  - Optimize oral hygiene and moisture  - Encourage food from home; allow for food preferences  - Enhance eating environment  Outcome: Progressing  Goal: Achieves appropriate nutritional intake (bariatric)  Description: INTERVENTIONS:  - Monitor for over-consumption  - Identify factors contributing to increased intake, treat as appropriate  - Monitor I&O, WT and lab values  - Obtain nutritional consult as needed  - Evaluate psychosocial factors contributing to over-consumption  Outcome: Progressing     Problem: METABOLIC/FLUID AND ELECTROLYTES - ADULT  Goal: Electrolytes maintained within normal limits  Description: INTERVENTIONS:  - Monitor labs and rhythm and assess patient for signs and symptoms of electrolyte imbalances  - Administer electrolyte replacement as ordered  - Monitor response to electrolyte replacements, including rhythm and repeat lab results as  appropriate  - Fluid restriction as ordered  - Instruct patient on fluid and nutrition restrictions as appropriate  Outcome: Progressing  Goal: Hemodynamic stability and optimal renal function maintained  Description: INTERVENTIONS:  - Monitor labs and assess for signs and symptoms of volume excess or deficit  - Monitor intake, output and patient weight  - Monitor urine specific gravity, serum osmolarity and serum sodium as indicated or ordered  - Monitor response to interventions for patient's volume status, including labs, urine output, blood pressure (other measures as available)  - Encourage oral intake as appropriate  - Instruct patient on fluid and nutrition restrictions as appropriate  Outcome: Progressing     Problem: HEMATOLOGIC - ADULT  Goal: Maintains hematologic stability  Description: INTERVENTIONS  - Assess for signs and symptoms of bleeding or hemorrhage  - Monitor labs and vital signs for trends  - Administer supportive blood products/factors, fluids and medications as ordered and appropriate  - Administer supportive blood products/factors as ordered and appropriate  Outcome: Progressing  Goal: Free from bleeding injury  Description: (Example usage: patient with low platelets)  INTERVENTIONS:  - Avoid intramuscular injections, enemas and rectal medication administration  - Ensure safe mobilization of patient  - Hold pressure on venipuncture sites to achieve adequate hemostasis  - Assess for signs and symptoms of internal bleeding  - Monitor lab trends  - Patient is to report abnormal signs of bleeding to staff  - Avoid use of toothpicks and dental floss  - Use electric shaver for shaving  - Use soft bristle tooth brush  - Limit straining and forceful nose blowing  Outcome: Progressing

## 2024-11-30 VITALS
RESPIRATION RATE: 18 BRPM | BODY MASS INDEX: 25.76 KG/M2 | SYSTOLIC BLOOD PRESSURE: 112 MMHG | WEIGHT: 170 LBS | DIASTOLIC BLOOD PRESSURE: 60 MMHG | TEMPERATURE: 98 F | OXYGEN SATURATION: 94 % | HEIGHT: 68 IN | HEART RATE: 94 BPM

## 2024-11-30 LAB
BASOPHILS # BLD AUTO: 0.02 X10(3) UL (ref 0–0.2)
BASOPHILS NFR BLD AUTO: 0.2 %
DEPRECATED RDW RBC AUTO: 55.8 FL (ref 35.1–46.3)
EOSINOPHIL # BLD AUTO: 0.1 X10(3) UL (ref 0–0.7)
EOSINOPHIL NFR BLD AUTO: 1 %
ERYTHROCYTE [DISTWIDTH] IN BLOOD BY AUTOMATED COUNT: 17.5 % (ref 11–15)
HCT VFR BLD AUTO: 27.5 %
HGB BLD-MCNC: 8.3 G/DL
IMM GRANULOCYTES # BLD AUTO: 0.11 X10(3) UL (ref 0–1)
IMM GRANULOCYTES NFR BLD: 1.1 %
LYMPHOCYTES # BLD AUTO: 1.49 X10(3) UL (ref 1–4)
LYMPHOCYTES NFR BLD AUTO: 14.5 %
MCH RBC QN AUTO: 26.9 PG (ref 26–34)
MCHC RBC AUTO-ENTMCNC: 30.2 G/DL (ref 31–37)
MCV RBC AUTO: 89 FL
MONOCYTES # BLD AUTO: 0.63 X10(3) UL (ref 0.1–1)
MONOCYTES NFR BLD AUTO: 6.1 %
NEUTROPHILS # BLD AUTO: 7.91 X10 (3) UL (ref 1.5–7.7)
NEUTROPHILS # BLD AUTO: 7.91 X10(3) UL (ref 1.5–7.7)
NEUTROPHILS NFR BLD AUTO: 77.1 %
PLATELET # BLD AUTO: 344 10(3)UL (ref 150–450)
RBC # BLD AUTO: 3.09 X10(6)UL
WBC # BLD AUTO: 10.3 X10(3) UL (ref 4–11)

## 2024-11-30 PROCEDURE — 99232 SBSQ HOSP IP/OBS MODERATE 35: CPT | Performed by: INTERNAL MEDICINE

## 2024-11-30 PROCEDURE — 99239 HOSP IP/OBS DSCHRG MGMT >30: CPT | Performed by: INTERNAL MEDICINE

## 2024-11-30 RX ORDER — PANTOPRAZOLE SODIUM 40 MG/1
40 TABLET, DELAYED RELEASE ORAL
Qty: 60 TABLET | Refills: 1 | Status: SHIPPED | OUTPATIENT
Start: 2024-11-30

## 2024-11-30 RX ORDER — ZOLPIDEM TARTRATE 5 MG/1
5 TABLET ORAL NIGHTLY PRN
Qty: 30 TABLET | Refills: 5 | Status: SHIPPED | OUTPATIENT
Start: 2024-11-30

## 2024-11-30 NOTE — PROGRESS NOTES
[facility]  part of [facility]    Progress Note    Hoda Busby Patient Status:  Inpatient    1940 MRN [id number]   Location Health system 4W/SW/SE Attending Malathi Stuart MD   Hosp Day # 4 PCP Malathi Stuart MD         Assessment and Plan:     Acute left peroneal palsy  Sxs x 48 hours; has numbness to anterolateral left foot and ankle as well as inability to dorsiflex left foot; etiology unclear  -neurologist Dr Richardson consult  noted and appreciated  -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   -MRI brain shows no acute intracranial process  -will plan transfer to [facility], possibly today, if stable from neurologic standpoint      Gastrointestinal hemorrhage, unspecified gastrointestinal hemorrhage type   -Hgb was 10's-11's last month in hospital when admitted wtih  -Hgb 7.0 in ED 24  -last colonoscopy 2021 revealed adenomatous polyp but poor prep; pt declined repeat colonoscopy  -EGD 24 (Dr. Nuno) -  15mm clean-based, nonbleeding gastric antral ulcer (bx done); 3mm gastric antral AVM s/p APC.  -on protonix 40mg BID - to continue x 8 weeks (EOT 25)  -per Dr. Nuno, will need repeat EGD in 2 months (2025)     Acute blood loss anemia  -Hgb 7.0 in ED - received 1U PRBCs -- repeat Hgb 7.8, but then down to 6.5 on  so received another 1U PRBC's   -Hgb 8.0 yesterday () but given hypotension, near syncope, and hx of severe A.S., she received another 1U PRC (#3)  -Hgb increased to 9.1>9.7>8.3 this am.  Pt actually looks much better clinically.  Had a BM yesterday (no melena per RN)  -will give one dose of IV Ferrlecit this am. Repeat another H/H this afternoon  -now on FeSO4 325mg daily  -Hgb 8.3> tx 1 U PRBC > 9.4> 8.4> 8.3     Hypotension  -resolved with blood tx  - this am; will continue to hold amlodipine and Lasix     Near  syncope  Assisted fall  -pt felt lightheaded in bathroom on 11/27  -started to fall forward but was caught by staff; lightly bumped her head; no visible injury; no HA  -developed HA later in the day -- stat CT brain neg for bleed.  Feels fine today 11/28     Interstitial pneumonitis  -dx'ed 10/2024 - presented with cough and severe hypoxia  -unresponsive to abx (amox/doxy, then IV zosyn/zithromax)  -CXR 10/24/24 extensive bilateral perihilar and bilateral upper and lower lobe   -S.pneumo, legionella Ag , mycoplasma IgM/G, Fungitell-beta-D glucan negative  -ANCA and URIEL/connective tissue disease panels negative  -anti-histone and anti-[place] Ab's negative  -Echo 10/24/24 - normal EF (55-60%), no obvious vegetatons  -CT with bilateral ground glass opacities, small consolidations of BLL  -per pulm, findings suspicious for NSIP (vs cryptogenic organizing pneumonia vs hypersensitivity pneumonitis); NOT thought to be c/w UIP pattern  -on empiric steroids (solumedrol then prednisone) since 10/25/24 - on prednisone 40mg/day     Bilateral pulmonary emboli  -dx'ed at time of interstitial pneumonitis  -CT chest 10/25/24 -- acute distal segmental/subsegmental pulmonary emboli in [place] and subsegmental PE in [place]  -unclear etiology; no recent hx of long travel; no family/personal hx of blood clots  -BLE venous dopplers negative  -unclear etiology of PE; will do hypercoag w/u as outpt.   Consider malignancy w/u if no other cause found (colonoscopy 1/2021 revealed adenomatous polyp but poor prep; pt declined repeat colonoscopy; Medicare would not cover Cologuard); UTD on mammo  -started on xarelto (held 2/2 GIB) - restarted xarelto 20 mg po every day on 11/28     Severe aortic stenosis  -sees cardiology Dr. Bird  -moderate A.S on echo in 10/2022 (prev mild A.S in 2020)  -abnormal pre-op EKG 9/2023 --Nuc GXT done 9/20/23 -- EKG portion revealed 1-1.5mm ST seg depression in inferolateral leads with prolonged recovery; nuclear portion  with normal perfusion   -Echo 9/29/23 --progression of A.S. from mild to moderate  -recent echo at Dr. Bird's office 10/18/24 -- LVEF 65%, grade 2 diastolic dysfunction, severe aortic stenosis (mean aortic gradient signif worse since 9/2023 -- 31>49 mmHg),  -seen by cardiology during last admission in 10/2024; pt will need TAVR evaluation as outpt  -on lasix 20mg po daily as of 10/2024--> held as above     Hx recent Anemia 10/2024  -baseline Hgb 12's; Hgb decreased to 10's-11's during hosp for pneumonitis in 10/2024 but was stable at that time and was attributed to acute illness.  -normal iron studies  (c/w anemia of chronic disease; low TIBC) on 10/27/24  -now with acute blood loss anemia as above.     Hypertension, primary  -(dyazide stopped due to NANCY)  -on amlodipine 5mg/day (on hold for now due to hypotension -- can restart if BP starts creeping up again)     Hx of hip OA  -s/p left THR (Dr. Lo) many years ago  -s/p elective R BEATRIZ on 10/5/23 by Dr. Diaz     Hyperlipidemia   Pt with strong family hx of high cholesterol  ; normal TG and HDL  No indication for statin given age     Osteopenia   On Fosamax from 2011 to 4/2016.     DEXA stable 5/10/17.    DEXA in 9/2019 showed slightly more bone loss in spine, but stable in hip.    DEXA 11/2021 -- some improvement in femur.  Hold off on fosamax for now.   DEXA 8/2024 -- osteoporosis of left forearm  -restarted Fosamax 8/2024     Vitamin D deficiency  On vitamin D 4000u/day     BLE edema  Advised compression     VTE proph            -SCDs; to restart xarelto as above              SUBJECTIVE:     Has acute left foot drop with +numbness to left anterolateral foot and LLE      OBJECTIVE:  PHYSICAL EXAM:     Vital signs in last 24 hours:  /61 (BP Location: Right arm)   Pulse 69   Temp 98.1 °F (36.7 °C) (Oral)   Resp 18   Ht 5' 8\" (1.727 m)   Wt 170 lb (77.1 kg)   SpO2 95%   BMI 25.85 kg/m²     Intake/Output:  No intake or output data in the  24 hours ending 11/30/24 0856    Wt Readings from Last 3 Encounters:   11/29/24 170 lb (77.1 kg)   11/01/24 174 lb 14.4 oz (79.3 kg)   10/24/24 178 lb (80.7 kg)         Constitutional: alert and oriented x3 in no acute distress  HEENT- EOMI, PERRL  Nose/Mouth/Throat: pharynx without erythema  Neck/Thyroid: neck supple; no thyromegaly  Cardiovascular: RRR, S1, S2, no S3 or murmur  Respiratory: lungs without crackles or wheezes  Abdomen: normoactive bowel sounds, soft, non-tender and non-distended  Extremities: no clubbing, cyanosis or edema  Neuro: +Left foot drop        Meds:   Current Facility-Administered Medications   Medication Dose Route Frequency    rivaroxaban (Xarelto) tab 20 mg  20 mg Oral Daily with food    ferrous sulfate DR tab 325 mg  325 mg Oral Daily with breakfast    sodium chloride 0.9% infusion   Intravenous Continuous    sodium chloride 0.9% infusion   Intravenous Once    pantoprazole (Protonix) DR tab 40 mg  40 mg Oral BID AC    sodium chloride 0.9% infusion   Intravenous Once    predniSONE (Deltasone) tab 40 mg  40 mg Oral Daily with breakfast    [Held by provider] amLODIPine (Norvasc) tab 5 mg  5 mg Oral Daily    atorvastatin (Lipitor) tab 20 mg  20 mg Oral Nightly    calcium carbonate-vitamin D (Oyster Shell-D) 250-3.125 MG-MCG per tab 2 tablet  2 tablet Oral Daily    cholecalciferol (Vitamin D3) tab 4,000 Units  4,000 Units Oral Daily    [Held by provider] furosemide (Lasix) tab 20 mg  20 mg Oral Daily    multivitamin (Tab-A-Guarav/Beta Carotene) tab 1 tablet  1 tablet Oral Daily    polyethylene glycol (PEG 3350) (Miralax) 17 g oral packet 17 g  17 g Oral Daily    sulfamethoxazole-trimethoprim DS (Bactrim DS) 800-160 MG per tab 1 tablet  1 tablet Oral Once per day on Monday Wednesday Friday    cyanocobalamin (Vitamin B12) tab 500 mcg  500 mcg Oral Daily    zolpidem (Ambien) tab 5 mg  5 mg Oral Nightly PRN            Data Review:     Labs:        Recent Labs   Lab 11/30/24  0714   WBC 10.3   HGB  8.3*   HCT 27.5*   MCV 89.0   MCH 26.9   MCHC 30.2*   RDW 17.5*   NEPRELIM 7.91*   .0       No results for input(s): \"COLORUR\", \"CLARITY\", \"SPECGRAVITY\", \"GLUUR\", \"BILUR\", \"KETUR\", \"BLOODURINE\", \"PHURINE\", \"PROUR\", \"UROBILINOGEN\", \"NITRITE\", \"LEUUR\", \"WBCUR\", \"RBCUR\", \"BACUR\", \"EPIUR\" in the last 168 hours.    No results for input(s): \"URINE\", \"CULTI\", \"BLDSMR\" in the last 168 hours.      Imaging:  MRI BRAIN (CPT=70551)    Result Date: 11/29/2024  CONCLUSION:  1. No acute intracranial process. 2. Stable moderate generalized atrophy and mild chronic microangiopathic ischemic changes.   Elm-remote   Dictated by (CST): Steven Browne MD on 11/29/2024 at 10:06 PM     Finalized by (CST): Steven Browne MD on 11/29/2024 at 10:08 PM          CT BRAIN OR HEAD (CPT=70450)    Result Date: 11/29/2024  CONCLUSION:   1. No transcortical area of hypoattenuation to suggest an acute infarct.  If there is clinical concern for an acute infarct recommend further evaluation with an MRI brain. 2. No acute intracranial hemorrhage, midline shift, mass effect, or hydrocephalus. 3. No significant change in the scattered foci and patchy areas of hypoattenuation involving the periventricular and subcortical white matter. 4. Lesser incidental findings described above.    Dictated by (CST): Basim West MD on 11/29/2024 at 2:07 PM     Finalized by (CST): Basim West MD on 11/29/2024 at 2:17 PM          CT BRAIN OR HEAD (CPT=70450)    Result Date: 11/27/2024  CONCLUSION:   1. No acute intracranial hemorrhage, midline shift, mass effect, or hydrocephalus. 2. No transcortical area of hypoattenuation to suggest an acute infarct.  If there is clinical concern for an acute infarct, recommend further evaluation with an MRI of the brain. 3. Scattered foci and patchy areas of hypoattenuation involving the periventricular subcortical white matter, which are age indeterminate but favored to reflect mild-to-moderate chronic  microvascular ischemic changes. 4. Moderate temporal lobe predominant parenchymal volume loss, which can be seen in neurodegenerative diseases. 5. Lesser incidental findings described above.    Dictated by (CST): Basim West MD on 11/27/2024 at 12:48 PM     Finalized by (CST): Basim West MD on 11/27/2024 at 12:54 PM                            Jigar Apple MD

## 2024-11-30 NOTE — OCCUPATIONAL THERAPY NOTE
OCCUPATIONAL THERAPY RE-EVALUATION - INPATIENT     Room Number: 441/441-A  Evaluation Date: 11/30/2024  Type of Evaluation: Re-evaluation  Presenting Problem: GI bleeding complicated by anemia, recently started on anticoagulation for pulmonary emboli. PMHx includes but is not limited to PE, hypertension, right hip osteoarthritis. OT Re-evaluation facilitated as patient devleoped developed an acute left foot drop with anterior foot numbness noted x 48 hr. MRI and CT (-) per chart review.    Physician Order: IP Consult to Occupational Therapy  Reason for Therapy: ADL/IADL Dysfunction and Discharge Planning    OCCUPATIONAL THERAPY ASSESSMENT   Patient is a 84 year old female admitted 11/25/2024.     Orders received. Chart review complete. RN approved patient participation. Prior to admission, patient's baseline is independent, however, patient recently admitted from [facility]. Patient is currently functioning below baseline with  all BADLs and functional mobility . Patient is requiring  max A for donning / doffing socks and min A for functional mobility with use of RW  as a result of the following impairments: decreased functional strength, decreased functional reach, decreased endurance, impaired static and dynamic sitting and standing balance, impaired coordination, impaired motor planning, decreased muscular endurance, and limited L LE ROM. Occupational Therapy will continue to follow for duration of hospitalization.    Patient will benefit from continued skilled OT Services to promote return to prior level of function and safety with continuous assistance and gradual rehabilitative therapy.    PLAN DURING HOSPITALIZATION  OT Device Recommendations: TBD  OT Treatment Plan: Balance activities;Energy conservation/work simplification techniques;ADL training;IADL training;Functional transfer training;UE strengthening/ROM;Endurance training;Patient/Family education;Patient/Family training;Equipment eval/education;Neuromuscluar  reeducation;Compensatory technique education     OCCUPATIONAL THERAPY MEDICAL/SOCIAL HISTORY   Problem List  Principal Problem:    Gastrointestinal hemorrhage, unspecified gastrointestinal hemorrhage type  Active Problems:    GI bleed    Acute blood loss anemia    Peroneal neuropathy, left    Other pulmonary embolism without acute cor pulmonale (HCC)    Interstitial pneumonitis (HCC)    HOME SITUATION  Type of Home: House (Patient admitted from [facility])  Home Layout: One level  Lives With: Alone    SUBJECTIVE  Patient pleasant and agreeable to participate in skilled OT.     OCCUPATIONAL THERAPY EXAMINATION      OBJECTIVE  Precautions: Bed/chair alarm (New onset of left foot drop.)  Fall Risk: High fall risk    PAIN ASSESSMENT  Ratin    COGNITION  Overall Cognitive Status:  WFL - within functional limits    RANGE OF MOTION   Upper extremity ROM is within functional limits     STRENGTH ASSESSMENT  Upper extremity strength is within functional limits     COORDINATION  Gross Motor: WFL   Fine Motor: WFL     ACTIVITIES OF DAILY LIVING ASSESSMENT  AM-PAC ‘6-Clicks’ Inpatient Daily Activity Short Form  How much help from another person does the patient currently need…  -   Putting on and taking off regular lower body clothing?: A Lot  -   Bathing (including washing, rinsing, drying)?: A Little  -   Toileting, which includes using toilet, bedpan or urinal? : A Little  -   Putting on and taking off regular upper body clothing?: A Little  -   Taking care of personal grooming such as brushing teeth?: A Little  -   Eating meals?: None    AM-PAC Score:  Score: 18  Approx Degree of Impairment: 46.65%  Standardized Score (AM-PAC Scale): 38.66  CMS Modifier (G-Code): CK    FUNCTIONAL TRANSFER ASSESSMENT  Sit to Stand: Chair  Chair: Minimal Assist (+ RW)  Toilet Transfer: Minimal Assist (+ RW and use of bilateral grab bars)    BALANCE ASSESSMENT  Static Sitting: Stand-by Assist    FUNCTIONAL ADL ASSESSMENT  LB Dressing Seated:  Maximum Assist (Donning / [place] socks)  Toileting Seated: Contact Guard Assist    EDUCATION PROVIDED  Patient Education : Role of Occupational Therapy; Discharge Recommendations; Plan of Care; DME Recommendations; Functional Transfer Techniques; Fall Prevention; Posture/Positioning; Energy Conservation; Proper Body Mechanics  Patient's Response to Education: Verbalized Understanding; Requires Further Education    The patient's Approx Degree of Impairment: 46.65% has been calculated based on documentation in the [facility] '6 clicks' Inpatient Daily Activity Short Form. Research supports that patients with this level of impairment may benefit from HHOT, however, due to patients new / recent onset of L LE foot drop, and balance deficits, recommending return to gradual rehab and 24 hr staff supervision and assist, to maximize patient safety and functional outcomes with all BADL/IADL tasks and functional mobility.     Final disposition will be made by interdisciplinary medical team.     Patient End of Session: Needs met;Call light within reach;RN aware of session/findings;All patient questions and concerns addressed;Alarm set;Hospital anti-slip socks;In bathroom - nursing staff aware    OT Goals  Patients self stated goal is: To improve function and return home.      Patient will complete functional transfer with SUP  Comment:     Patient will complete toileting with SUP  Comment:     Patient will tolerate standing for 5 minutes in prep for adls with SUP   Comment:    Patient will complete item retrieval with SUP  Comment:     Goals  on: 24  Frequency: 5 x wk     Patient Evaluation Complexity Level: RE-EVALUATION     Self-Care Home Management: 8 minutes    TONNY Umana/L  [facility]  PRN - Staff

## 2024-11-30 NOTE — CM/SW NOTE
11/30/24 1215   Discharge disposition   Expected discharge disposition subacute   Post Acute Care Provider [facility] SNF   Discharge transportation [facility]     RN informed MARCIA pt cleared to DC today. MARCIA arranged [facility] transport for 2pm  to [facility]. PCS form completed.    MARCIA spoke w/ pt regarding DC time and [facility] costs. MARCIA updated Emili from [facility], of pt's return at 2pm.     RN to call report to [facility] at [phone]    NATALIA Bowden - [phone]

## 2024-11-30 NOTE — PLAN OF CARE
Pt is alert and oriented x4. On room air. Vital signs stable. Ambulating stand by assist and walker. On L fiber soft diet. Tolerating well. Denies pain and nausea. Continent of bowel and bladder. Cleared for discharge. IV removed with no signs and symptoms of an infection. AVS explained, pt states understanding. Report given to the receiving nurse.     Problem: Patient Centered Care  Goal: Patient preferences are identified and integrated in the patient's plan of care  Description: Interventions:  - Provide timely, complete, and accurate information to patient/family  - Incorporate patient and family knowledge, values, beliefs, and cultural backgrounds into the planning and delivery of care  - Encourage patient/family to participate in care and decision-making at the level they choose  - Honor patient and family perspectives and choices  Outcome: Progressing     Problem: SAFETY ADULT - FALL  Goal: Free from fall injury  Description: INTERVENTIONS:  - Assess pt frequently for physical needs  - Identify cognitive and physical deficits and behaviors that affect risk of falls.  - [place] fall precautions as indicated by assessment.  - Educate pt/family on patient safety including physical limitations  - Instruct pt to call for assistance with activity based on assessment  - Modify environment to reduce risk of injury  - Provide assistive devices as appropriate  - Consider OT/PT consult to assist with strengthening/mobility  - Encourage toileting schedule  Outcome: Progressing     Problem: GASTROINTESTINAL - ADULT  Goal: Minimal or absence of nausea and vomiting  Description: INTERVENTIONS:  - Maintain adequate hydration with IV or PO as ordered and tolerated  - Nasogastric tube to low intermittent suction as ordered  - Evaluate effectiveness of ordered antiemetic medications  - Provide nonpharmacologic comfort measures as appropriate  - Advance diet as tolerated, if ordered  - Obtain nutritional consult as needed  -  Evaluate fluid balance  Outcome: Progressing  Goal: Maintains or returns to baseline bowel function  Description: INTERVENTIONS:  - Assess bowel function  - Maintain adequate hydration with IV or PO as ordered and tolerated  - Evaluate effectiveness of GI medications  - Encourage mobilization and activity  - Obtain nutritional consult as needed  - Establish a toileting routine/schedule  - Consider collaborating with pharmacy to review patient's medication profile  Outcome: Progressing  Goal: Maintains adequate nutritional intake (undernourished)  Description: INTERVENTIONS:  - Monitor percentage of each meal consumed  - Identify factors contributing to decreased intake, treat as appropriate  - Assist with meals as needed  - Monitor I&O, WT and lab values  - Obtain nutritional consult as needed  - Optimize oral hygiene and moisture  - Encourage food from home; allow for food preferences  - Enhance eating environment  Outcome: Progressing  Goal: Achieves appropriate nutritional intake (bariatric)  Description: INTERVENTIONS:  - Monitor for over-consumption  - Identify factors contributing to increased intake, treat as appropriate  - Monitor I&O, WT and lab values  - Obtain nutritional consult as needed  - Evaluate psychosocial factors contributing to over-consumption  Outcome: Progressing     Problem: METABOLIC/FLUID AND ELECTROLYTES - ADULT  Goal: Electrolytes maintained within normal limits  Description: INTERVENTIONS:  - Monitor labs and rhythm and assess patient for signs and symptoms of electrolyte imbalances  - Administer electrolyte replacement as ordered  - Monitor response to electrolyte replacements, including rhythm and repeat lab results as appropriate  - Fluid restriction as ordered  - Instruct patient on fluid and nutrition restrictions as appropriate  Outcome: Progressing  Goal: Hemodynamic stability and optimal renal function maintained  Description: INTERVENTIONS:  - Monitor labs and assess for signs  and symptoms of volume excess or deficit  - Monitor intake, output and patient weight  - Monitor urine specific gravity, serum osmolarity and serum sodium as indicated or ordered  - Monitor response to interventions for patient's volume status, including labs, urine output, blood pressure (other measures as available)  - Encourage oral intake as appropriate  - Instruct patient on fluid and nutrition restrictions as appropriate  Outcome: Progressing     Problem: HEMATOLOGIC - ADULT  Goal: Maintains hematologic stability  Description: INTERVENTIONS  - Assess for signs and symptoms of bleeding or hemorrhage  - Monitor labs and vital signs for trends  - Administer supportive blood products/factors, fluids and medications as ordered and appropriate  - Administer supportive blood products/factors as ordered and appropriate  Outcome: Progressing  Goal: Free from bleeding injury  Description: (Example usage: patient with low platelets)  INTERVENTIONS:  - Avoid intramuscular injections, enemas and rectal medication administration  - Ensure safe mobilization of patient  - Hold pressure on venipuncture sites to achieve adequate hemostasis  - Assess for signs and symptoms of internal bleeding  - Monitor lab trends  - Patient is to report abnormal signs of bleeding to staff  - Avoid use of toothpicks and dental floss  - Use electric shaver for shaving  - Use soft bristle tooth brush  - Limit straining and forceful nose blowing  Outcome: Progressing

## 2024-11-30 NOTE — PROGRESS NOTES
[facility]  Gastroenterology Progress Note    Hoda Busby Patient Status:  Inpatient    1940 MRN [id number]   Location [facility] 4W/SW/SE Attending Malathi Stuart MD   Hosp Day # 4 PCP Malathi Stuart MD     Subjective:  Hoda Busby is a(n) 84 year old female.    Current complaints:   Still doing well.  No melena.    Able to walk around today.      Objective:  Blood pressure 112/60, pulse 69, temperature 98 °F (36.7 °C), temperature source Oral, resp. rate 18, height 5' 8\" (1.727 m), weight 170 lb (77.1 kg), SpO2 94%.  Respiratory: no labored breathing  CV: RRR  Abdomen: nondistended, soft, nontender  Extremities: no calf tenderness  Neurologic: Ox3    Labs:   Recent Labs   Lab 24  1450 24  2102 24  0542 24  1513 24  0528 24  1602 24  0539 24  0714   WBC 15.6*   < > 10.3  --  11.7*  --  10.4 10.3   HGB 7.0*   < > 8.0*   < > 8.3* 9.4* 8.4* 8.3*   HCT 22.3*   < > 25.5*   < > 26.1* 30.0* 26.0* 27.5*   .0*   < > 436.0  --  351.0  --  332.0 344.0   CREATSERUM 1.35*   < > 1.07*  --  1.07*  --  1.00  --    BUN 29*   < > 24*  --  23  --  20  --       < > 142  --  143  --  143  --    K 4.3   < > 4.0  --  3.7  --  4.0  --       < > 109  --  112  --  112  --    CO2 24.0   < > 27.0  --  26.0  --  25.0  --    *   < > 84  --  94  --  79  --    CA 9.3   < > 8.4*  --  8.3*  --  8.2*  --    ALB 3.7  --   --   --   --   --   --   --    ALKPHO 51*  --   --   --   --   --   --   --    BILT 0.6  --   --   --   --   --   --   --    TP 5.7  --   --   --   --   --   --   --    AST 24  --   --   --   --   --   --   --    ALT 32  --   --   --   --   --   --   --     < > = values in this interval not displayed.       No results for input(s): \"JENNIFER\", \"LIP\", \"GGT\", \"PSA\", \"DDIMER\", \"ESRML\", \"ESRPF\", \"CRP\", \"BNP\", \"TROP\", \"CK\", \"CKMB\", \"URIEL\", \"RPR\", \"B12\", \"ETOH\", \"POCGLU\" in the last 168 hours.    Invalid input(s): \"RF\"      Imaging:  MRI BRAIN (CPT=70551)    Result Date: 11/29/2024  CONCLUSION:  1. No acute intracranial process. 2. Stable moderate generalized atrophy and mild chronic microangiopathic ischemic changes.   Elm-remote   Dictated by (CST): Steven Browne MD on 11/29/2024 at 10:06 PM     Finalized by (CST): Steven Browne MD on 11/29/2024 at 10:08 PM          CT BRAIN OR HEAD (CPT=70450)    Result Date: 11/29/2024  CONCLUSION:   1. No transcortical area of hypoattenuation to suggest an acute infarct.  If there is clinical concern for an acute infarct recommend further evaluation with an MRI brain. 2. No acute intracranial hemorrhage, midline shift, mass effect, or hydrocephalus. 3. No significant change in the scattered foci and patchy areas of hypoattenuation involving the periventricular and subcortical white matter. 4. Lesser incidental findings described above.    Dictated by (CST): Basim West MD on 11/29/2024 at 2:07 PM     Finalized by (CST): Basim West MD on 11/29/2024 at 2:17 PM          CT BRAIN OR HEAD (CPT=70450)    Result Date: 11/27/2024  CONCLUSION:   1. No acute intracranial hemorrhage, midline shift, mass effect, or hydrocephalus. 2. No transcortical area of hypoattenuation to suggest an acute infarct.  If there is clinical concern for an acute infarct, recommend further evaluation with an MRI of the brain. 3. Scattered foci and patchy areas of hypoattenuation involving the periventricular subcortical white matter, which are age indeterminate but favored to reflect mild-to-moderate chronic microvascular ischemic changes. 4. Moderate temporal lobe predominant parenchymal volume loss, which can be seen in neurodegenerative diseases. 5. Lesser incidental findings described above.    Dictated by (CST): Basim West MD on 11/27/2024 at 12:48 PM     Finalized by (CST): Basim West MD on 11/27/2024 at 12:54 PM            Problem list:  Patient Active Problem List   Diagnosis     Osteopenia of multiple sites    Vitamin D deficiency    White coat syndrome with diagnosis of hypertension    Hypercholesterolemia    Mitral valve insufficiency    Nonrheumatic aortic valve stenosis    Macular degeneration    Primary hypertension    Age-related osteoporosis without current pathological fracture    Acute pulmonary embolism (HCC)    Pneumonia    Acute respiratory failure with hypoxia (HCC)    GI bleed    Gastrointestinal hemorrhage, unspecified gastrointestinal hemorrhage type    Acute blood loss anemia    Peroneal neuropathy, left    Other pulmonary embolism without acute cor pulmonale (HCC)    Interstitial pneumonitis (HCC)       Assessment/Plan:  Acute on chronic anemia/acute blood loss anemia  Dark stools/possible melena  Acute GI bleed  PUD - Gastric ulcer, clean based  -Patient has known anemia that was felt to be related to chronic disease in the past.  -EGD Tuesday with gastric antral clean-based ulcer 15 mm.  Biopsies taken.  No malignancy and no H. pylori.  Also with a single gastric AVM status post APC  -Last colonoscopy 2021.  Small polyps.  No other bleeding source no evidence of further bleeding.  Hemoglobin has been stable.  -Hemoglobin stable X 48 hrs       3.   History of pulmonary embolism -normally on Xarelto  4.   Interstitial lung disease-stable.  Chest x-ray stable.  Not on oxygen.  Not short of breath.  5.   Aortic stenosis  6.   Left foot numbness/weakness  -etiology unclear.  Neuro consulted.             Recommendations:  Monitor H&H and transfuse as necessary  Continue PPI twice daily for 8 weeks  Continue soft diet  Ok to discharge from GI perspective.    Repeat EGD in 2 months recommended  Follow up with primary hospital physician/Neurology      Boston Nuno MD

## 2024-11-30 NOTE — PROGRESS NOTES
[facility]  part of [facility]    Progress Note      Assessment and Plan:   1.  GI bleed-resolved.    2.  Chronic respiratory impairment-NSIP versus hypersensitivity pneumonitis.    Recommendations: Prednisone 20 mg and follow-up pulmonary clinic with PFTs and will need serial CT imaging.    3.  History of venous thromboembolism      Subjective:   Hoda Busby is a(n) 84 year old female who is breathing well and anxious to go home    Objective:   Blood pressure 112/60, pulse 94, temperature 98 °F (36.7 °C), temperature source Oral, resp. rate 18, height 5' 8\" (1.727 m), weight 170 lb (77.1 kg), SpO2 94%.    Physical Exam alert white female  HEENT examination is unremarkable with pupils equal round and reactive to light and accommodation.   Neck without adenopathy, thyromegaly, JVD nor bruit.   Lungs few scattered crackles to auscultation and percussion.  Cardiac regular rate and rhythm no murmur.   Abdomen nontender, without hepatosplenomegaly and no mass appreciable.   Extremities without clubbing cyanosis nor edema.   Neurologic grossly intact with symmetric tone and strength and reflex.  Skin without gross abnormality     Results:     Lab Results   Component Value Date    WBC 10.3 11/30/2024    HGB 8.3 11/30/2024    HCT 27.5 11/30/2024    .0 11/30/2024       Stanton Sanchez MD  Medical Director, Critical Care, [facility]  Medical Director, [facility]  Pager: [phone]

## 2024-11-30 NOTE — PLAN OF CARE
Patient AOX4. Patient on RA, remote tele, standby with walker to restroom voiding freely, tolerating low fiber soft diet, MRI completed this evening, patient has personal belongings and call light within reach, safety measures in place. Patient awaiting discharge to rehab later today.  Problem: Patient Centered Care  Goal: Patient preferences are identified and integrated in the patient's plan of care  Description: Interventions:  - What would you like us to know as we care for you? I recently was at [facility] for rehab.   - Provide timely, complete, and accurate information to patient/family  - Incorporate patient and family knowledge, values, beliefs, and cultural backgrounds into the planning and delivery of care  - Encourage patient/family to participate in care and decision-making at the level they choose  - Honor patient and family perspectives and choices  Outcome: Progressing     Problem: Patient/Family Goals  Goal: Patient/Family Long Term Goal  Description: Patient's Long Term Goal: To complete my rehab.    Interventions:  - monitor VS  -Monitor lab values  -monitor pain  -remote tele in place    - See additional Care Plan goals for specific interventions  Outcome: Progressing  Goal: Patient/Family Short Term Goal  Description: Patient's Short Term Goal: To discharge back to rehab tomorrow    Interventions:   - continue to follow plan of care.  - See additional Care Plan goals for specific interventions  Outcome: Progressing     Problem: SAFETY ADULT - FALL  Goal: Free from fall injury  Description: INTERVENTIONS:  - Assess pt frequently for physical needs  - Identify cognitive and physical deficits and behaviors that affect risk of falls.  - [facility] fall precautions as indicated by assessment.  - Educate pt/family on patient safety including physical limitations  - Instruct pt to call for assistance with activity based on assessment  - Modify environment to reduce risk of injury  - Provide assistive  devices as appropriate  - Consider OT/PT consult to assist with strengthening/mobility  - Encourage toileting schedule  Outcome: Progressing     Problem: GASTROINTESTINAL - ADULT  Goal: Minimal or absence of nausea and vomiting  Description: INTERVENTIONS:  - Maintain adequate hydration with IV or PO as ordered and tolerated  - Nasogastric tube to low intermittent suction as ordered  - Evaluate effectiveness of ordered antiemetic medications  - Provide nonpharmacologic comfort measures as appropriate  - Advance diet as tolerated, if ordered  - Obtain nutritional consult as needed  - Evaluate fluid balance  Outcome: Progressing  Goal: Maintains or returns to baseline bowel function  Description: INTERVENTIONS:  - Assess bowel function  - Maintain adequate hydration with IV or PO as ordered and tolerated  - Evaluate effectiveness of GI medications  - Encourage mobilization and activity  - Obtain nutritional consult as needed  - Establish a toileting routine/schedule  - Consider collaborating with pharmacy to review patient's medication profile  Outcome: Progressing  Goal: Maintains adequate nutritional intake (undernourished)  Description: INTERVENTIONS:  - Monitor percentage of each meal consumed  - Identify factors contributing to decreased intake, treat as appropriate  - Assist with meals as needed  - Monitor I&O, WT and lab values  - Obtain nutritional consult as needed  - Optimize oral hygiene and moisture  - Encourage food from home; allow for food preferences  - Enhance eating environment  Outcome: Progressing  Goal: Achieves appropriate nutritional intake (bariatric)  Description: INTERVENTIONS:  - Monitor for over-consumption  - Identify factors contributing to increased intake, treat as appropriate  - Monitor I&O, WT and lab values  - Obtain nutritional consult as needed  - Evaluate psychosocial factors contributing to over-consumption  Outcome: Progressing     Problem: METABOLIC/FLUID AND ELECTROLYTES -  ADULT  Goal: Electrolytes maintained within normal limits  Description: INTERVENTIONS:  - Monitor labs and rhythm and assess patient for signs and symptoms of electrolyte imbalances  - Administer electrolyte replacement as ordered  - Monitor response to electrolyte replacements, including rhythm and repeat lab results as appropriate  - Fluid restriction as ordered  - Instruct patient on fluid and nutrition restrictions as appropriate  Outcome: Progressing  Goal: Hemodynamic stability and optimal renal function maintained  Description: INTERVENTIONS:  - Monitor labs and assess for signs and symptoms of volume excess or deficit  - Monitor intake, output and patient weight  - Monitor urine specific gravity, serum osmolarity and serum sodium as indicated or ordered  - Monitor response to interventions for patient's volume status, including labs, urine output, blood pressure (other measures as available)  - Encourage oral intake as appropriate  - Instruct patient on fluid and nutrition restrictions as appropriate  Outcome: Progressing     Problem: HEMATOLOGIC - ADULT  Goal: Maintains hematologic stability  Description: INTERVENTIONS  - Assess for signs and symptoms of bleeding or hemorrhage  - Monitor labs and vital signs for trends  - Administer supportive blood products/factors, fluids and medications as ordered and appropriate  - Administer supportive blood products/factors as ordered and appropriate  Outcome: Progressing  Goal: Free from bleeding injury  Description: (Example usage: patient with low platelets)  INTERVENTIONS:  - Avoid intramuscular injections, enemas and rectal medication administration  - Ensure safe mobilization of patient  - Hold pressure on venipuncture sites to achieve adequate hemostasis  - Assess for signs and symptoms of internal bleeding  - Monitor lab trends  - Patient is to report abnormal signs of bleeding to staff  - Avoid use of toothpicks and dental floss  - Use electric shaver for  shaving  - Use soft bristle tooth brush  - Limit straining and forceful nose blowing  Outcome: Progressing

## 2024-12-02 ENCOUNTER — EXTERNAL FACILITY (OUTPATIENT)
Dept: PULMONOLOGY | Facility: CLINIC | Age: 84
End: 2024-12-02

## 2024-12-02 ENCOUNTER — INITIAL APN SNF VISIT (OUTPATIENT)
Dept: INTERNAL MEDICINE CLINIC | Facility: SKILLED NURSING FACILITY | Age: 84
End: 2024-12-02

## 2024-12-02 VITALS
RESPIRATION RATE: 18 BRPM | DIASTOLIC BLOOD PRESSURE: 60 MMHG | HEART RATE: 90 BPM | SYSTOLIC BLOOD PRESSURE: 111 MMHG | TEMPERATURE: 98 F | OXYGEN SATURATION: 97 %

## 2024-12-02 DIAGNOSIS — D64.9 ANEMIA, UNSPECIFIED TYPE: ICD-10-CM

## 2024-12-02 DIAGNOSIS — G57.32 PERONEAL NEUROPATHY, LEFT: ICD-10-CM

## 2024-12-02 DIAGNOSIS — E55.9 VITAMIN D DEFICIENCY: ICD-10-CM

## 2024-12-02 DIAGNOSIS — J96.01 ACUTE RESPIRATORY FAILURE WITH HYPOXIA (HCC): ICD-10-CM

## 2024-12-02 DIAGNOSIS — I10 PRIMARY HYPERTENSION: ICD-10-CM

## 2024-12-02 DIAGNOSIS — I26.99 ACUTE PULMONARY EMBOLISM WITHOUT ACUTE COR PULMONALE, UNSPECIFIED PULMONARY EMBOLISM TYPE (HCC): ICD-10-CM

## 2024-12-02 DIAGNOSIS — K92.2 GASTROINTESTINAL HEMORRHAGE, UNSPECIFIED GASTROINTESTINAL HEMORRHAGE TYPE: ICD-10-CM

## 2024-12-02 DIAGNOSIS — I26.94 MULTIPLE SUBSEGMENTAL PULMONARY EMBOLI WITHOUT ACUTE COR PULMONALE (HCC): ICD-10-CM

## 2024-12-02 DIAGNOSIS — H35.30 MACULAR DEGENERATION, UNSPECIFIED LATERALITY, UNSPECIFIED TYPE: ICD-10-CM

## 2024-12-02 DIAGNOSIS — J84.89 INTERSTITIAL PNEUMONITIS (HCC): ICD-10-CM

## 2024-12-02 DIAGNOSIS — E78.00 HYPERCHOLESTEROLEMIA: ICD-10-CM

## 2024-12-02 DIAGNOSIS — D62 ACUTE BLOOD LOSS ANEMIA: Primary | ICD-10-CM

## 2024-12-02 DIAGNOSIS — I26.99 OTHER PULMONARY EMBOLISM WITHOUT ACUTE COR PULMONALE, UNSPECIFIED CHRONICITY (HCC): ICD-10-CM

## 2024-12-02 DIAGNOSIS — J84.9 INTERSTITIAL LUNG DISEASE (HCC): Primary | ICD-10-CM

## 2024-12-02 DIAGNOSIS — M85.89 OSTEOPENIA OF MULTIPLE SITES: ICD-10-CM

## 2024-12-02 NOTE — PROGRESS NOTES
Hoda Busby  : 1940  Age 84 year old  female patient is admitted to [facility] for rehabilitation and strengthening.       EM Admission 10/24/24 - 24  [facility] 24 - 24  EM ReAdmission 24 - 24     Reason for visit: F/u on PE, hypoxia, pneumonia, COPD, generalized weakness      HPI     Pt is an 83 y/o F with HTN, aortic stenosis admitted with acute respiratory failure due to pneumonia and PE. The patent was initially seen in the ED on 10/17/24 with a one week hx of cough and dyspnea. CXR revealed bilateral airspace opacities c/w pneumonia. Covid negative. WBC 10.3, left shift. O2 sats were 93-94% on RA   Pt was sent home on amoxicillin 1 g TID and doxycycline 100 mg BID x 7 days.   Since then, she has continued to cough and has become more short of breath.  She could not walk even 10 feet without becoming short of breath.   Her daughter brought her here in a wheelchair. Cough is productive/rattling, though she is not able to bring up the sputum.  She notes feeling chilled at night, though no actual rigors. She was seen in the office that day and O2 sats 85% on RA -- up to 90% on 1.5L O2 NC.  Pt appeared visibly dyspneic. She was sent to [facility] for further eval and treatment. CT chest in hospital revealed Acute distal segmental/subsegmental pulmonary embolism involving the right upper lobe as well as a subsegmental pulmonary embolism in the left upper lobe. Pt was started on anticoagulation therapy, was on 10L of O2 with gradual wean, also started on steroid therapy. Pt was treated, stabilized and discharged to [facility] on 3L of O2 for rehab and strengthening. While at the facility weekly labs noted for steady drops in hemoglobin and sustaining in 7s. On 24 hgb at PP came back at 6.2 and pt was transferred to [facility] ED for further eval. Pt was admitted to the hospital with GI consult. Underwent endoscopic evaluation revealing evidence of  antral ulcer with no active bleeding seen at the time and was deemed to have improved with steroid therapy. EGD 24 (Dr. Nuno) -  15mm clean-based, nonbleeding gastric antral ulcer (bx done); 3mm gastric antral AVM s/p APC. Was on IV protonix. GI Dr. Nuno cleared pt to restart xarelto. After stabilization and treatment, pt was discharged back to [facility] for rehab and strengthening.     Pt seen and examined in follow up. Seen in PT, reports feeling great and denies sob, even with activity like she did prior to her most recent hospitalization. Pt reports participating and progressing in PT daily. Denies chest pain. Denies appetite change, n/v or abdominal pain. Denies constipation or diarreha. She has no issues/concerns. Vss     Past Medical History:    Acute pulmonary embolism (HCC)    High blood pressure    Osteopenia    Primary osteoarthritis of right hip    Visual impairment    Readers    White coat hypertension     Past Surgical History:   Procedure Laterality Date    Cataract  3/2&3/16 2017    Colonoscopy & polypectomy  2021         Hip replacement surgery Left Around     Other surgical history  [place] 2017    Bilateral cataract surgery     Family History   Problem Relation Age of Onset    Cancer Father         bone (cause of death)    Stroke Mother         CVA (cause of death)    Diabetes Mother     Hypertension Mother     Dementia Brother         Alzheimer's    Heart Disease Brother         CAD - x2 brothers    Heart Disease Brother      Social History     Socioeconomic History    Marital status:    Tobacco Use    Smoking status: Former     Current packs/day: 0.00     Types: Cigarettes     Start date: 1990     Quit date: 1990     Years since quittin.9     Passive exposure: Past    Smokeless tobacco: Former   Vaping Use    Vaping status: Never Used   Substance and Sexual Activity    Alcohol use: Yes     Comment: wine, occasionally    Drug use: Never   Other Topics  Concern    Caffeine Concern Yes     Comment: coffee/soda - 2cups/day     Social Drivers of Health     Food Insecurity: No Food Insecurity (11/25/2024)    Food Insecurity     Food Insecurity: Never true   Transportation Needs: No Transportation Needs (11/25/2024)    Transportation Needs     Lack of Transportation: No   Housing Stability: Low Risk  (11/25/2024)    Housing Stability     Housing Instability: No       IMMUNIZATIONS  Immunization History   Administered Date(s) Administered    Covid-19 Vaccine Pfizer 30 mcg/0.3 ml 02/17/2021, 03/10/2021, 10/04/2021    Covid-19 Vaccine Pfizer Bivalent 30mcg/0.3mL 12/06/2022    Covid-19 Vaccine Pfizer Wilber-Sucrose 30 mcg/0.3 ml 04/18/2022    FLU VAC High Dose 65 YRS & Older PRSV Free (78554) 10/04/2017, 10/14/2019, 10/05/2022, 11/06/2023    FLUZONE 6 months and older PFS 0.5 ml (09747) 10/07/2015, 09/29/2020    Fluvirin, 3 Years & >, Im 10/22/2012, 10/02/2014    Fluzone Vaccine Medicare () 10/01/2017, 10/14/2018    High Dose Fluzone Influenza Vaccine, 65yr+ PF 0.5mL (69624) 10/19/2016, 10/02/2017, 10/15/2018, 10/14/2019, 10/05/2022    Influenza 10/15/2016, 10/19/2021    Influenza Virus Vaccine, Split 04/18/2022    Pfizer Covid-19 Vaccine 30mcg/0.3ml 12yrs+ 12/08/2023    Pneumococcal (Prevnar 13) 03/11/2015    Pneumococcal Vaccine, Conjugate 01/01/2001    RSV, recombinant, RSVpreF, adjuvanted (Arexvy) 02/13/2024    TDAP 03/22/2017    Zoster Vaccine Recombinant Adjuvanted (Shingrix) 07/22/2020, 09/29/2020   Deferred Date(s) Deferred    High Dose Fluzone Influenza Vaccine, 65yr+ PF 0.5mL (31923) 11/01/2024        ALLERGIES:  Allergies[1]    CODE STATUS:  Full Code    ADVANCED CARE PLANNING TEAM: Will need family care plan     CURRENT MEDICATIONS - reviewed and updated on SNF EMAR     SUBJECTIVE    Pt seen and examined in follow up. Seen in PT, reports feeling great and denies sob, even with activity like she did prior to her most recent hospitalization. Pt reports  participating and progressing in PT daily. Denies chest pain. Denies appetite change, n/v or abdominal pain. Denies constipation or diarreha. She has no issues/concerns. Vss     PHYSICAL EXAM:  Vitals:    12/02/24 1304   BP: 111/60   Pulse: 90   Resp: 18   Temp: 98.1 °F (36.7 °C)   SpO2: 97%      GENERAL HEALTH:well developed, well nourished, in no apparent distress   LINES, TUBES, DRAINS:  O2 per NC in place   SKIN: no rashes, no suspicious lesions  WOUND: see wound assessment,   EYES: PERRLA, EOMI, sclera anicteric, conjunctiva normal; there is no nystagmus, no drainage from eyes  HENT: normocephalic; normal nose, no nasal drainage, mucous membranes pink, moist, pharynx no exudate, no visible cerumen.   NECK:supple, FROM; no JVD, no TMG, no carotid bruits   BREAST: deferred exam   RESPIRATORY:diminished at the bases   CARDIOVASCULAR: S1, S2 normal, RRR; no S3, no S4 and positive for murmur   ABDOMEN:  normal active BS+, soft, nondistended; no organomegaly, no masses; no bruits; nontender, no guarding, no rebound tenderness.  :no suprapubic distension  LYMPHATIC:no lymphedema  MUSCULOSKELETAL: no acute synovitis upper or lower extremity   EXTREMITIES/VASCULAR:edema right LE and edema left LE  NEUROLOGIC:intact; no sensorimotor deficit  PSYCHIATRIC: alert and oriented x 3; affect appropriate     MEDICAL DECISION MAKING  Capable     DIAGNOSTICS REVIEWED AT THIS VISIT:  [facility] medical records    SEE PLAN BELOW    Worsening anemia   - baseline hgb at 11-12  - has been steadily dropping to 8.7. now 7.3  - check OB  - start ferrous sulfate 325 mg daily   - repeat CBC on 11/13/24 hgb 7.3 and has not much improved since as of 11/21/24 with labs as above   - Admitted to [facility] for acute blood loss anemia after hgb came back at 6.2 on 11/25/24  - s/p endoscopic evaluation revealing evidence of antral ulcer with no active bleeding seen at the time and was deemed to have improved with steroid therapy. EGD 11/26/24  (Dr. Nuno) -  15mm clean-based, nonbleeding gastric antral ulcer (bx done); 3mm gastric antral AVM s/p APC.  - s/p IV protonix in hospital   - s/p PRBC transfusion   - continue omeprazole 20 mg bid, continue ppi bid x 8 weeks per GI   - soft diet   - GI cleared to resume xarelto for recent PE    - weekly labs in rehab   - monitor      Constipation   - miralax daily and prn      Acute hypoxic respiratory failure  Bilateral pneumonia/pneumonitis  COPD   Bilateral pulm emboli   - failed outpt treatment with amox and doxycycline x 7d  - repeat CXR 10/24 revealed extensive bilateral perihilar and bilateral upper and lower lobe airspace opacities, progressed since 10/17.    - Covid negative 10/17 and 10/24/24  - S.pneumo, legionella negative  - Mycoplasma IgM/IgG negative  - ANCA panel negative  - URIEL/connective tissue disease panel negative  - Fungitell-beta-D glucan negative  - Echo 10/24/24 - normal EF (55-60%), no obvious vegetatons  - T with bilateral ground glass opacities, small consolidations of BLL  - ID followed in hospital s/p zithromax x 6 days; Completed Zosyn IV  - per pulm, findings suspicious for NSIP (vs cryptogenic organizing pneumonia vs hypersensitivity pneumonitis); NOT thought to be c/w UIP pattern  - on empiric IV solumedrol since 10/25/24- on 40mg IV q6hrs > q8hrs >q12 hrs  - continue prednisone 40 mg daily   - pt denied recent travel, exposure to birds/pets, arthralgias (other than her mild hand OA), fevers  - wean O2 as tolerated   - repeat CXR 10/29 improved from 10/24  - pt was started on hep gtt, then transitioned to Xarelto   - continue xarelto  - unclear etiology of PE; will do hypercoag w/u as outpt.   Consider malignancy w/u if no other cause found (colonoscopy 1/2021 revealed adenomatous polyp but poor prep; pt declined repeat colonoscopy; Medicare would not cover Cologuard); UTD on mammo  - weekly labs in rehab   - On 1L of O2 at rest, 2-3L of O2 with ambulation. Weaning slowly  11/07/24  - On RA at rest as of 11/11/24. On supplemental O2 at night and with ambulation   - Respiratory status has much improved since her most recent hospitalization for acute blood loss anemia after receiving blood transfusions   - currently on RA   - started on bactrim prophylaxis while on prednisone, bactrim 800-160 1 tab every mon, wedn and frid  - monitor      Severe aortic stenosis  HLD  - seeing cardiology Dr. Bird  - moderate A.S on echo in 10/2022 (prev mild A.S in 2020)  -abnormal pre-op EKG 9/2023 --Nuc GXT done 9/20/23 -- EKG portion revealed 1-1.5mm ST seg depression in inferolateral leads with prolonged recovery; nuclear portion with normal perfusion   - most recent echo at Dr. Bird's office 10/18/24 -- LVEF 65%, grade 2 diastolic dysfunction, severe aortic stenosis (mean aortic gradient signif worse since 9/2023 -- 31>49 mmHg)  - pt will need TAVR evaluation as outpt  - cardiology consult in rehab   - asa 81 mg bid stopped in hospital likely d/t gi bleed   - continue xarelto for pe as above   - continue atorvastatin 20 mg hs   - monitor      Anemia  - Hgb stable in the 10's-11's  - likely 2/2 acute illness per hospital records   - normal iron studies (c/w anemia of chronic disease; low TIBC)     Hypertension  - dyazide stopped due to NANCY   - amlodipine 5 mg daily stopped in hospital   - bp well controlled off meds      Hx of hip OA  - s/p left THR (Dr. Lo) many years ago  - s/p elective R BEATRIZ on 10/5/23 by Dr. Diaz     Osteopenia   - continue Fosamax 70 mg weekly     Insomnia  - continue ambien 5 mg hs prn      Physical Deconditioning/Impaired mobility and ADLs/At risk for falling  - Fall Precautions  - PT/OT/ST to evaluate and treat  - [facility] team to establish care plan meeting with patient and POA/family as appropriate  - Anticipate DC on or before TBD; SW to assist patient/family w/ DC planning  - DC Plan:  TBD     Pain Management  - Offer to pre-medicate 30-60 min prior to therapy  -  Physiatry evaluation with management appreciated  - Acetaminophen 500 mg every 6 hrs prn      Vitamins/supplements as r/t deficiencies  - [facility] RD to follow while in rehab; supplementation/diet as per [facility] RD  - May continue home supplements  - continue Cyanocobalamin Oral Tablet 500 MCG daily   - continue Vitamin D3 Tablet 50 MCG (2000 UT) daily   - continue Calcium 600+D3 Oral Tablet 600-5 MG-MCG daily   - continue mvt daily      LABS  - CBC/CMP weekly while in [facility]    FOLLOW UP APPOINTMENTS  No future appointments.     60 minutes spent w/ patient and staff, including but not limited to reviewing present status, needs, abilities with disciplines, reviewing medical records, vital signs, labs, completing med reconciliation and entering orders for continued care in [facility]      Note to patient: The 21st Century Cures Act makes medical notes like these available to patients in the interest of transparency. However, this is a medical document intended as peer to peer communication. It is written in medical language and may contain abbreviations or verbiage that are unfamiliar. It may appear blunt or direct. Medical documents are intended to carry relevant information, facts as evident, and the clinical opinion of the practitioner who signs the document.     Jose F Meehan, APRN  12/02/24         [1] No Known Allergies

## 2024-12-02 NOTE — PROGRESS NOTES
Pulmonary Progress Note  SNF External Facility - [facility]     History of Present Illness:   Hoda Busby is a(n) 84 year old female who I am seeing for follow up at [facility]. The patient was hospitalized initially with respiratory failure and CT scan of the chest demonstrated multifocal ground-glass opacities involving R>L lung as well as acute pulmonary emboli. CT findings concerning for NSIP or HSP. Connective tissue disease labs, ANCA, fungal antibodies, Strep and Legionella antigens were negative. She improved with trial of steroids and was discharged on prednisone 40 mg daily. This was her first VTE event and she is on Xarelto. She was unfortunately re-hospitalized last week with GI bleed and severe anemia requiring blood transfusions. EGD revealed gastric ulcer and gastric AVM s/p APC. Hemoglobin is improved to 9.6 today. She is feeling much better. No blood in stool or melena. No dizziness. No shortness of breath or cough. She is not requiring supplemental oxygen.     Review of Systems: Vision notable for visual impariment. Ears nose and throat normal. Bowel normal. Bladder function normal. No thyroid disease. No depression. No rash. Muscles and joints unremarkable. No weight loss or weight gain.      Physical Exam:  /60, HR 90, RR 18, T 98.1, sat 97% on room air   Constitutional: Awake, alert, NAD  HEENT: Head NC/AT, PEERL, MMM  Cardio: RRR, S1S2, +murmur  Respiratory: Thorax symmetrical with no labored breathing. Normal effort. Right basilar crackles. Left lung clear.  Extremities: No clubbing or cyanosis. No LE edema. No calf tenderness.  Neurologic: A&Ox3. No gross motor deficits.  Skin: Warm, dry.   Psych: Calm, cooperative. Pleasant affect.     Results:  Labs:  -Labs 12/2/24: wbc 9.99, hgb 9.6, plt 410, cr 1.02, bun 25, na 142, k 4.3, co2 27     Imaging:  -CTA chest 10/25/24:  1. Acute distal segmental/subsegmental pulmonary embolism involving the right upper lobe as well as a  subsegmental pulmonary embolism in the left upper lobe.   2. Multifocal ground-glass opacities involving the right greater than left lungs with some areas of perilobular sparing as well as small consolidations involving the bilateral lower lobes.  Differential diagnosis includes multifocal pneumonia, organizing    pneumonia, or less likely other etiologies.   3. Small right and trace left pleural effusions.   4. Mild centrilobular emphysema.   5. Mild mediastinal and right hilar lymphadenopathy, which is favored to be reactive.   6. Biatrial predominant cardiomegaly with triple-vessel coronary artery calcifications, mitral annular calcifications, and aortic valve leaflet calcifications.   7. Lesser incidental findings described above.     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      Assessment and Plan:  1. Diffuse lung injury - etiology is uncertain. Pattern on imaging more c/w NSIP or HSP. Connective tissue disease labs, ANCA, fungal antibodies all negative. Clinically improving with empiric steroids. Weaned off oxygen. CXR 11/26/24 with extensive basilar fibrosis.     Plan:  -Prednisone 40 mg daily for now; will discuss tapering with Dr. Holloway  -[facility] for PCP prophylaxis while on prednisone  -Follow up HRCT chest - ordered    2. Pulmonary emboli - 1st event. Unprovoked. She is on Xarelto. Hemoglobin now stabilized.     Plan:  -Xarelto     3. Anemia due to GIB with gastric ulcer and gastric AVM s/p APC - hemoglobin improving. No further signs of bleeding.     Plan:  -Xarelto  -Follow up GI for follow up EGD    4. Severe aortic stenosis - follows with cardiology. Plans for outpatient TAVR eval.     Plan:  -Follow up cardiology for eventual TAVR eval     Dilip Diehl PA-C  Pulmonary Medicine

## 2024-12-03 ENCOUNTER — TELEPHONE (OUTPATIENT)
Dept: PULMONOLOGY | Facility: CLINIC | Age: 84
End: 2024-12-03

## 2024-12-03 NOTE — TELEPHONE ENCOUNTER
Patient currently at [facility].    HRCT chest ordered and to be scheduled in next 1-2 weeks. This was d/w [facility] staff to assist.     RN: Patient needs follow up with Dr. Holloway after HRCT chest completed. Could you please assist with scheduling in 2 weeks? Thank you.

## 2024-12-04 NOTE — DISCHARGE SUMMARY
[facility]  Discharge Summary    Hoda Busby Patient Status:  Inpatient    1940 MRN [id number]   Location [facility] 4W/SW/SE Attending No att. providers found   Hosp Day # 4 PCP Malathi Stuart MD     Date of Admission: 2024    Date of Discharge: 2024  3:04 PM    Admitting Diagnosis: Gastrointestinal hemorrhage, unspecified gastrointestinal hemorrhage type [K92.2]    Discharge Diagnosis:   Patient Active Problem List   Diagnosis    Osteopenia of multiple sites    Vitamin D deficiency    White coat syndrome with diagnosis of hypertension    Hypercholesterolemia    Mitral valve insufficiency    Nonrheumatic aortic valve stenosis    Macular degeneration    Primary hypertension    Age-related osteoporosis without current pathological fracture    Acute pulmonary embolism (HCC)    Pneumonia    Acute respiratory failure with hypoxia (HCC)    GI bleed    Gastrointestinal hemorrhage, unspecified gastrointestinal hemorrhage type    Acute blood loss anemia    Peroneal neuropathy, left    Other pulmonary embolism without acute cor pulmonale (HCC)    Interstitial pneumonitis (HCC)       Reason for Admission: anemia    Physical Exam:   PHYSICAL EXAM:   Blood pressure 112/60, pulse 94, temperature 98 °F (36.7 °C), temperature source Oral, resp. rate 18, height 5' 8\" (1.727 m), weight 170 lb (77.1 kg), SpO2 94%.  Constitutional: alert and oriented x3 in no acute distress  HEENT- EOMI, PERRL  Nose/Mouth/Throat: pharynx without erythema  Neck/Thyroid: neck supple; no thyromegaly  Lymphatics: no lymphadenopathy of neck or groin  Cardiovascular: RRR, S1, S2, no S3 or murmur  Respiratory: lungs without crackles or wheezes  Abdomen: normoactive bowel sounds, soft, non-tender and non-distended  Extremities: no clubbing, cyanosis or edema  Vascular: no carotid bruits; DP/PT 2/2  Musculoskeletal: Motor 5/5 upper and lower extremities  Neurological: cranial nerves II-XII intact; light touch and  proprioception intact; +left foot drop  Skin: no rash or ulcerations          History of Present Illness:     84-year-old female with a past medical history of severe aortic stenosis, recently diagnosed interstitial pneumonitis on prednisone, recently diagnosed small right pulmonary emboli on Xarelto, hypertension admitted with severe symptomatic anemia and GI bleed.     Patient was admitted to the hospital last month with bilateral pneumonia unresponsive to outpatient antibiotics.  At the time of admission, she underwent CT chest which showed significant bilateral interstitial infiltrates along with small subsegmental right-sided pulmonary emboli.  Patient was started on anticoagulation for the pulmonary emboli.  She was seen by pulmonary and started empirically on IV antibiotics.  However, after extensive workup, it was determined that she most likely had a noninfectious interstitial pneumonitis and was started on IV Solu-Medrol which was then transitioned to prednisone (currently 40mg/d).  She was also seen by cardiology as she had some mild CHF related to her aortic stenosis.  She is currently following with cardiology and will need eventual TAVR.  During that admission, she had some mild anemia with a hemoglobin in the 10's-11's but this was stable.  Her baseline hemoglobin was in the 12's.  Iron studies were consistent with anemia of chronic disease.  The mild anemia was attributed to her acute illness.  Hemoglobin on the day of discharge was 10.3  Following that hospital stay, she was transferred to [facility].  She initially did well with PT but in the past 1 to 2 weeks, she seems to have plateaued and was unable to make further progress due to severe fatigue.  She was weaned off of her oxygen about a week ago though was still using it at night.     The patient's hemoglobin had decreased to 8.7 on 11/4 then trended down to 7.1 x 11/11.  Repeat hemoglobin on 11/13 was 7.6 and then down to 6.3 on 11/18  though a repeat later that day was 7.0.  She continued to remain low in the 7's.  She was seen by the family medicine APN on 11/25 and was started on FeSO4 and stool OB was ordered.  She was then seen by the pulmonary PA Dilip Diehl who sent the pt to the ED for evaluation due to concern for GI bleeding.     In the ED, Hgb was 7.0.  Rectal exam revealed black stool that was heme positive.  Was given 1 unit of PRBCs.  Xarelto was held.  The patient was started on IV Protonix.  GI was consulted and the patient was admitted for further evaluation and treatment.     The patient notes mild dyspnea on exertion.  She denies any chest pain or pressure.  She denies abdominal pain or nausea.  She denies diarrhea.  She had a couple of episodes of lightheadedness during her physical therapy sessions but none in the past week.    Hospital Course:     Acute left peroneal palsy  Sxs x 48 hours; has numbness to anterolateral left foot and ankle as well as inability to dorsiflex left foot; etiology unclear  -neurologist Dr Richardson consult  noted and appreciated  -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   -MRI brain shows no acute intracranial process  -will plan transfer to [facility], possibly today, if stable from neurologic standpoint      Gastrointestinal hemorrhage, unspecified gastrointestinal hemorrhage type   -Hgb was 10's-11's last month in hospital when admitted [facility]  -Hgb 7.0 in ED 11/25/24  -last colonoscopy 1/2021 revealed adenomatous polyp but poor prep; pt declined repeat colonoscopy  -EGD 11/26/24 (Dr. Nuno) -  15mm clean-based, nonbleeding gastric antral ulcer (bx done); 3mm gastric antral AVM s/p APC.  -on protonix 40mg BID - to continue x 8 weeks (EOT 1/22/25)  -per Dr. Nuno, will need repeat EGD in 2 months (1/2025)     Acute blood loss anemia  -Hgb 7.0 in ED - received 1U PRBCs -- repeat Hgb 7.8, but then  down to 6.5 on 11/26 so received another 1U PRBC's   -Hgb 8.0 yesterday (11/27) but given hypotension, near syncope, and hx of severe A.S., she received another 1U PRC (#3)  -Hgb increased to 9.1>9.7>8.3 this am.  Pt actually looks much better clinically.  Had a BM yesterday (no melena per RN)  -will give one dose of IV Ferrlecit this am. Repeat another H/H this afternoon  -now on FeSO4 325mg daily  -Hgb 8.3> tx 1 U PRBC 11/28> 9.4> 8.4> 8.3     Hypotension  -resolved with blood tx  - this am; will continue to hold amlodipine and Lasix     Near syncope  Assisted fall  -pt felt lightheaded in bathroom on 11/27  -started to fall forward but was caught by staff; lightly bumped her head; no visible injury; no HA  -developed HA later in the day -- stat CT brain neg for bleed.  Feels fine today 11/28     Interstitial pneumonitis  -dx'ed 10/2024 - presented with cough and severe hypoxia  -unresponsive to abx (amox/doxy, then IV zosyn/zithromax)  -CXR 10/24/24 extensive bilateral perihilar and bilateral upper and lower lobe   -S.pneumo, legionella Ag , mycoplasma IgM/G, Fungitell-beta-D glucan negative  -ANCA and URIEL/connective tissue disease panels negative  -anti-histone and anti-K[place]a Ab's negative  -Echo 10/24/24 - normal EF (55-60%), no obvious vegetatons  -CT with bilateral ground glass opacities, small consolidations of BLL  -per pulm, findings suspicious for NSIP (vs cryptogenic organizing pneumonia vs hypersensitivity pneumonitis); NOT thought to be c/w UIP pattern  -on empiric steroids (solumedrol then prednisone) since 10/25/24 - on prednisone 40mg/day     Bilateral pulmonary emboli  -dx'ed at time of interstitial pneumonitis  -CT chest 10/25/24 -- acute distal segmental/subsegmental pulmonary emboli in [place] and subsegmental PE in [place]  -unclear etiology; no recent hx of long travel; no family/personal hx of blood clots  -BLE venous dopplers negative  -unclear etiology of PE; will do hypercoag w/u as outpt.    Consider malignancy w/u if no other cause found (colonoscopy 1/2021 revealed adenomatous polyp but poor prep; pt declined repeat colonoscopy; Medicare would not cover Cologuard); UTD on mammo  -started on xarelto (held 2/2 GIB) - restarted xarelto 20 mg po every day on 11/28     Severe aortic stenosis  -sees cardiology Dr. Bird  -moderate A.S on echo in 10/2022 (prev mild A.S in 2020)  -abnormal pre-op EKG 9/2023 --Nuc GXT done 9/20/23 -- EKG portion revealed 1-1.5mm ST seg depression in inferolateral leads with prolonged recovery; nuclear portion with normal perfusion   -Echo 9/29/23 --progression of A.S. from mild to moderate  -recent echo at Dr. Bird's office 10/18/24 -- LVEF 65%, grade 2 diastolic dysfunction, severe aortic stenosis (mean aortic gradient signif worse since 9/2023 -- 31>49 mmHg),  -seen by cardiology during last admission in 10/2024; pt will need TAVR evaluation as outpt  -on lasix 20mg po daily as of 10/2024--> held as above     Hx recent Anemia 10/2024  -baseline Hgb 12's; Hgb decreased to 10's-11's during hosp for pneumonitis in 10/2024 but was stable at that time and was attributed to acute illness.  -normal iron studies  (c/w anemia of chronic disease; low TIBC) on 10/27/24  -now with acute blood loss anemia as above.     Hypertension, primary  -(dyazide stopped due to NANCY)  -on amlodipine 5mg/day (on hold for now due to hypotension -- can restart if BP starts creeping up again)     Hx of hip OA  -s/p left THR (Dr. Lo) many years ago  -s/p elective R BEATRIZ on 10/5/23 by Dr. Diaz     Hyperlipidemia   Pt with strong family hx of high cholesterol  ; normal TG and HDL  No indication for statin given age     Osteopenia   On Fosamax from 2011 to 4/2016.     DEXA stable 5/10/17.    DEXA in 9/2019 showed slightly more bone loss in spine, but stable in hip.    DEXA 11/2021 -- some improvement in femur.  Hold off on fosamax for now.   DEXA 8/2024 -- osteoporosis of left  forearm  -restarted Fosamax 8/2024     Vitamin D deficiency  On vitamin D 4000u/day     BLE edema  Advised compression     VTE proph            -SCDs; to restart xarelto as above    Consultations: GI Megan, Neuro Dr Richardson    Disposition: [facility]    Discharge Condition: Stable    Discharge Medications:   Discharge Medication List as of 11/30/2024 12:50 PM        START taking these medications    Details   pantoprazole 40 MG Oral Tab EC Take 1 tablet (40 mg total) by mouth 2 (two) times daily before meals., Print Script, Disp-60 tablet, R-1      zolpidem 5 MG Oral Tab Take 1 tablet (5 mg total) by mouth nightly as needed., Print Script, Disp-30 tablet, R-5           CONTINUE these medications which have NOT CHANGED    Details   rivaroxaban (XARELTO) 20 MG Oral Tab Take 1 tablet (20 mg total) by mouth daily with food., Historical      sulfamethoxazole-trimethoprim -160 MG Oral Tab per tablet Take 1 tablet by mouth 3 (three) times a week. Monday, Wednesday, Friday, Historical      Multiple Vitamins-Minerals (MULTI-VITAMIN/MINERALS) Oral Tab Take 1 tablet by mouth daily., Historical      ferrous sulfate 325 (65 FE) MG Oral Tab EC Take 1 tablet (325 mg total) by mouth daily., Historical      atorvastatin 20 MG Oral Tab Take 1 tablet (20 mg total) by mouth nightly., Print Script, Disp-90 tablet, R-0      polyethylene glycol, PEG 3350, 17 g Oral Powd Pack Take 17 g by mouth daily., Print Script, Disp-30 each, R-0      predniSONE 20 MG Oral Tab Take 2 tablets (40 mg total) by mouth daily with breakfast., Print Script, Disp-60 tablet, R-0      alendronate 70 MG Oral Tab Take 1 tablet (70 mg total) by mouth every 7 days., Normal, Disp-13 tablet, R-3      Cholecalciferol (VITAMIN D) 50 MCG (2000 UT) Oral Tab Take 4,000 Units by mouth daily., OTC, Disp-1 tablet, R-0      Vitamin B-12 500 MCG Oral Tab Take 1 tablet (500 mcg total) by mouth daily., Historical      Calcium Carb-Cholecalciferol (CALCIUM + D3)  600-200 MG-UNIT Oral Tab Take 1 tablet by mouth daily., Historical           STOP taking these medications       furosemide 20 MG Oral Tab        amLODIPine 5 MG Oral Tab        omeprazole 20 MG Oral Capsule Delayed Release              Follow up Visits: Follow-up with Dr Stuart in 2 weeks    This is a > 30 minute visit and greater than 50% of the time was spent counseling the patient and/or coordinating care.      Jigar Apple MD  12/4/2024  5:33 PM

## 2024-12-06 NOTE — TELEPHONE ENCOUNTER
Patient's relative calling regards scheduling the follow up, nothing open until 1/31/25. Please call.

## 2024-12-09 ENCOUNTER — EXTERNAL FACILITY (OUTPATIENT)
Dept: PULMONOLOGY | Facility: CLINIC | Age: 84
End: 2024-12-09

## 2024-12-09 ENCOUNTER — SNF DISCHARGE (OUTPATIENT)
Dept: INTERNAL MEDICINE CLINIC | Facility: SKILLED NURSING FACILITY | Age: 84
End: 2024-12-09

## 2024-12-09 ENCOUNTER — HOSPITAL ENCOUNTER (OUTPATIENT)
Dept: CT IMAGING | Facility: HOSPITAL | Age: 84
Discharge: HOME | End: 2024-12-09
Attending: PHYSICIAN ASSISTANT
Payer: MEDICARE

## 2024-12-09 DIAGNOSIS — I26.99 ACUTE PULMONARY EMBOLISM WITHOUT ACUTE COR PULMONALE, UNSPECIFIED PULMONARY EMBOLISM TYPE (HCC): ICD-10-CM

## 2024-12-09 DIAGNOSIS — J96.01 ACUTE RESPIRATORY FAILURE WITH HYPOXIA (HCC): ICD-10-CM

## 2024-12-09 DIAGNOSIS — D62 ACUTE BLOOD LOSS ANEMIA: Primary | ICD-10-CM

## 2024-12-09 DIAGNOSIS — K92.2 GASTROINTESTINAL HEMORRHAGE, UNSPECIFIED GASTROINTESTINAL HEMORRHAGE TYPE: ICD-10-CM

## 2024-12-09 DIAGNOSIS — D64.9 ANEMIA, UNSPECIFIED TYPE: ICD-10-CM

## 2024-12-09 DIAGNOSIS — J84.9 INTERSTITIAL LUNG DISEASE (HCC): Primary | ICD-10-CM

## 2024-12-09 DIAGNOSIS — J84.9 INTERSTITIAL LUNG DISEASE (HCC): ICD-10-CM

## 2024-12-09 DIAGNOSIS — E78.00 HYPERCHOLESTEROLEMIA: ICD-10-CM

## 2024-12-09 DIAGNOSIS — J18.9 PNEUMONIA DUE TO INFECTIOUS ORGANISM, UNSPECIFIED LATERALITY, UNSPECIFIED PART OF LUNG: ICD-10-CM

## 2024-12-09 DIAGNOSIS — J84.89 INTERSTITIAL PNEUMONITIS (HCC): ICD-10-CM

## 2024-12-09 DIAGNOSIS — I10 PRIMARY HYPERTENSION: ICD-10-CM

## 2024-12-09 DIAGNOSIS — I26.94 MULTIPLE SUBSEGMENTAL PULMONARY EMBOLI WITHOUT ACUTE COR PULMONALE (HCC): ICD-10-CM

## 2024-12-09 PROCEDURE — 71250 CT THORAX DX C-: CPT | Performed by: PHYSICIAN ASSISTANT

## 2024-12-09 PROCEDURE — 99316 NF DSCHRG MGMT 30 MIN+: CPT

## 2024-12-09 NOTE — PROGRESS NOTES
Pulmonary Progress Note  SNF External Facility - [facility]     History of Present Illness:   Hoda Busby is a(n) 84 year old female who I am seeing for follow up at [facility]. The patient was hospitalized initially with respiratory failure and CT scan of the chest demonstrated multifocal ground-glass opacities involving R>L lung as well as acute pulmonary emboli. CT findings concerning for NSIP or HSP. Connective tissue disease labs, ANCA, fungal antibodies, Strep and Legionella antigens were negative. She improved with trial of steroids and was discharged on prednisone 40 mg daily. This was her first VTE event and she is on Xarelto. She was unfortunately re-hospitalized 2 weeks ago with GI bleed and severe anemia requiring blood transfusions. EGD revealed gastric ulcer and gastric AVM s/p APC. Hemoglobin had improved to 9.6 on 12/2 but unfortunately is back down to 7.6 today. She denies blood in the stool and melena. She is going for HRCT chest today. She feels much better and has no complaints. No shortness of breath, cough, or wheezing. She is not requiring supplemental oxygen.     Review of Systems: Vision notable for visual impariment. Ears nose and throat normal. Bowel normal. Bladder function normal. No thyroid disease. No depression. No rash. Muscles and joints notable for decreased range of motion of left foot. No weight loss or weight gain.      Physical Exam:  /67, HR 98, RR 18, T 97.4, sat 96% on room air   Constitutional: Awake, alert, NAD  HEENT: Head NC/AT, PEERL, MMM  Cardio: RRR, S1S2, +murmur  Respiratory: Thorax symmetrical with no labored breathing. Normal effort. Bibasilar crackles R>L.  Extremities: No clubbing or cyanosis. No RLE edema. 1+ pitting LE edema. No calf tenderness.  Neurologic: A&Ox3. No gross motor deficits.  Skin: Warm, dry.   Psych: Calm, cooperative. Pleasant affect.     Results:  Labs:  -Labs 12/2/24: wbc 9.99, hgb 9.6, plt 410, cr 1.02, bun 25, na 142, k  4.3, co2 27  -Labs 12/9/24: wbc 8.66, hgb 7.6, plt 396, cr 1.03, bun 23, na 144, k 4.2, co2 22     Imaging:  -CTA chest 10/25/24:  1. Acute distal segmental/subsegmental pulmonary embolism involving the right upper lobe as well as a subsegmental pulmonary embolism in the left upper lobe.   2. Multifocal ground-glass opacities involving the right greater than left lungs with some areas of perilobular sparing as well as small consolidations involving the bilateral lower lobes.  Differential diagnosis includes multifocal pneumonia, organizing    pneumonia, or less likely other etiologies.   3. Small right and trace left pleural effusions.   4. Mild centrilobular emphysema.   5. Mild mediastinal and right hilar lymphadenopathy, which is favored to be reactive.   6. Biatrial predominant cardiomegaly with triple-vessel coronary artery calcifications, mitral annular calcifications, and aortic valve leaflet calcifications.   7. Lesser incidental findings described above.      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      Assessment and Plan:  1. Diffuse lung injury - etiology is uncertain. Pattern on imaging more c/w NSIP or HSP. Connective tissue disease labs, ANCA, fungal antibodies all negative. Clinically improving with empiric steroids. Weaned off oxygen. CXR 11/26/24 still with extensive basilar fibrosis. Will await HRCT chest which she is completing today.     Plan:  -Prednisone 40 mg daily; taper as outpatient as per Dr. Holloway  -Bactrim [facility] for PCP prophylaxis while on prednisone  -Follow up HRCT chest today  -Follow up with Dr. Holloway in clinic on 12/19/24     2. Pulmonary emboli - 1st event. Unprovoked. She is on Xarelto. Hemoglobin had stabilized and now has dropped again.      Plan:  -Xarelto  -Follow up CBC tomorrow AM     3. Anemia due to GIB with gastric ulcer and gastric AVM s/p APC - hemoglobin  improved to 9.6 and now dropped 2 g in 1 week. Hemoglobin 7.6 today. No reported melena or blood in stool.     Plan:  -Follow up CBC tomorrow AM  -PPI BID  -Follow up GI for follow up EGD     4. Severe aortic stenosis - follows with cardiology. Plans for outpatient TAVR eval.     Plan:  -Follow up cardiology for eventual TAVR eval    D/w primary [facility].     Dilip Diehl PA-C  Pulmonary Medicine

## 2024-12-10 VITALS
RESPIRATION RATE: 18 BRPM | SYSTOLIC BLOOD PRESSURE: 123 MMHG | HEART RATE: 93 BPM | OXYGEN SATURATION: 95 % | DIASTOLIC BLOOD PRESSURE: 66 MMHG | TEMPERATURE: 97 F

## 2024-12-10 NOTE — PROGRESS NOTES
Hoda DAIGLE Kehinde, 6/24/1940, 84 year old, female is being discharged from [facility] to [place]       DISCHARGE SUMMARY    [facility] Admission 10/24/24 - 11/01/24  [facility] 11/01/24 - 11/25/24  [facility] ReAdmission 11/25/24 - 11/30/24    Date of ReAdmission to [facility]: 11/30/24    Date of Discharge from [facility]: 12/10/24                                 Admitting Diagnoses: F/u on PE, hypoxia, pneumonia, COPD, generalized weakness, GI bleed, anemia     Reason for Admission to [facility]: Rehabilitation and strengthening      PHYSICAL EXAM:  Vitals:    12/09/24 1401   BP: 123/66   Pulse: 93   Resp: 18   Temp: 97.4 °F (36.3 °C)   SpO2: 95%      GENERAL HEALTH:well developed, well nourished, in no apparent distress   LINES, TUBES, DRAINS:  O2 per NC in place   SKIN: no rashes, no suspicious lesions  WOUND: see wound assessment,   EYES: PERRLA, EOMI, sclera anicteric, conjunctiva normal; there is no nystagmus, no drainage from eyes  HENT: normocephalic; normal nose, no nasal drainage, mucous membranes pink, moist, pharynx no exudate, no visible cerumen.   NECK:supple, FROM; no JVD, no TMG, no carotid bruits   BREAST: deferred exam   RESPIRATORY:diminished at the bases   CARDIOVASCULAR: S1, S2 normal, RRR; no S3, no S4 and positive for murmur   ABDOMEN:  normal active BS+, soft, nondistended; no organomegaly, no masses; no bruits; nontender, no guarding, no rebound tenderness.  :no suprapubic distension  LYMPHATIC:no lymphedema  MUSCULOSKELETAL: no acute synovitis upper or lower extremity   EXTREMITIES/VASCULAR:edema right LE and edema left LE  NEUROLOGIC:intact; no sensorimotor deficit  PSYCHIATRIC: alert and oriented x 3; affect appropriate     Diagnostics reviewed:   Labs 12/2/24: wbc 9.99, hgb 9.6, plt 410, cr 1.02, bun 25, na 142, k 4.3, co2 27  Labs 12/9/24: wbc 8.66, hgb 7.6, plt 396, cr 1.03, bun 23, na 144, k 4.2, co2 22  Labs 12/10/24: wbc 9.30, hgb 7.6, plt 425     CURRENT MANAGEMENT AT [facility]      Worsening anemia   - baseline hgb at 11-12  - has been steadily dropping to 8.7. now 7.3  - check OB  - start ferrous sulfate 325 mg daily   - repeat CBC on 11/13/24 hgb 7.3 and has not much improved since as of 11/21/24 with labs as above   - Admitted to [facility] for acute blood loss anemia after hgb came back at 6.2 on 11/25/24  - s/p endoscopic evaluation revealing evidence of antral ulcer with no active bleeding seen at the time and was deemed to have improved with steroid therapy. EGD 11/26/24 (Dr. Nuno) -  15mm clean-based, nonbleeding gastric antral ulcer (bx done); 3mm gastric antral AVM s/p APC.  - s/p IV protonix in hospital   - s/p PRBC transfusion   - continue omeprazole 20 mg bid, continue ppi bid x 8 weeks per GI   - soft diet   - GI cleared to resume xarelto for recent PE    - Hgb trending down again, slowly down to 7.6 from 9.6 in week  - will hold off on DC from facility pending repeat CBC results tomorrow 12/10/24     Constipation   - miralax daily and prn      Acute hypoxic respiratory failure  Bilateral pneumonia/pneumonitis  COPD   Bilateral pulm emboli   - failed outpt treatment with amox and doxycycline x 7d  - repeat CXR 10/24 revealed extensive bilateral perihilar and bilateral upper and lower lobe airspace opacities, progressed since 10/17.    - Covid negative 10/17 and 10/24/24  - S.pneumo, legionella negative  - Mycoplasma IgM/IgG negative  - ANCA panel negative  - URIEL/connective tissue disease panel negative  - Fungitell-beta-D glucan negative  - Echo 10/24/24 - normal EF (55-60%), no obvious vegetatons  - T with bilateral ground glass opacities, small consolidations of BLL  - ID followed in hospital s/p zithromax x 6 days; Completed Zosyn IV  - per pulm, findings suspicious for NSIP (vs cryptogenic organizing pneumonia vs hypersensitivity pneumonitis); NOT thought to be c/w UIP pattern  - on empiric IV solumedrol since 10/25/24- on 40mg IV q6hrs > q8hrs >q12 hrs  -  continue prednisone 40 mg daily   - pt denied recent travel, exposure to birds/pets, arthralgias (other than her mild hand OA), fevers  - wean O2 as tolerated   - repeat CXR 10/29 improved from 10/24  - pt was started on hep gtt, then transitioned to Xarelto   - continue xarelto  - unclear etiology of PE; will do hypercoag w/u as outpt.   Consider malignancy w/u if no other cause found (colonoscopy 1/2021 revealed adenomatous polyp but poor prep; pt declined repeat colonoscopy; Medicare would not cover Cologuard); UTD on mammo  - weekly labs in rehab   - On 1L of O2 at rest, 2-3L of O2 with ambulation. Weaning slowly 11/07/24  - On RA at rest as of 11/11/24. On supplemental O2 at night and with ambulation   - Respiratory status has much improved since her most recent hospitalization for acute blood loss anemia after receiving blood transfusions   - currently on RA   - started on bactrim prophylaxis while on prednisone, bactrim 800-160 1 tab every mon, wedn and frid  - monitor      Severe aortic stenosis  HLD  - seeing cardiology Dr. Bird  - moderate A.S on echo in 10/2022 (prev mild A.S in 2020)  -abnormal pre-op EKG 9/2023 --Nuc GXT done 9/20/23 -- EKG portion revealed 1-1.5mm ST seg depression in inferolateral leads with prolonged recovery; nuclear portion with normal perfusion   - most recent echo at Dr. Bird's office 10/18/24 -- LVEF 65%, grade 2 diastolic dysfunction, severe aortic stenosis (mean aortic gradient signif worse since 9/2023 -- 31>49 mmHg)  - pt will need TAVR evaluation as outpt  - cardiology consult in rehab   - asa 81 mg bid stopped in hospital likely d/t gi bleed   - continue xarelto for pe as above   - continue atorvastatin 20 mg hs   - monitor      Anemia  - Hgb stable in the 10's-11's  - likely 2/2 acute illness per hospital records   - normal iron studies (c/w anemia of chronic disease; low TIBC)     Hypertension  - dyazide stopped due to NANCY   - amlodipine 5 mg daily stopped in  hospital   - bp well controlled off meds      Hx of hip OA  - s/p left THR (Dr. Lo) many years ago  - s/p elective R BEATRIZ on 10/5/23 by Dr. Diaz     Osteopenia   - continue Fosamax 70 mg weekly      Insomnia  - continue ambien 5 mg hs prn      Physical Deconditioning/Impaired mobility and ADLs/At risk for falling  - Fall Precautions  - PT/OT/ST to evaluate and treat  - [facility] team to establish care plan meeting with patient and POA/family as appropriate  - Anticipate DC on or before 12/10/24; SW to assist patient/family w/ DC planning  - DC Plan:  12/10/24 pending repeat CBC results tomorrow 12/10/24     Pain Management  - Offer to pre-medicate 30-60 min prior to therapy  - Physiatry evaluation with management appreciated  - Acetaminophen 500 mg every 6 hrs prn      Vitamins/supplements as r/t deficiencies  - [facility] RD to follow while in rehab; supplementation/diet as per [facility] RD  - May continue home supplements  - continue Cyanocobalamin Oral Tablet 500 MCG daily   - continue Vitamin D3 Tablet 50 MCG (2000 UT) daily   - continue Calcium 600+D3 Oral Tablet 600-5 MG-MCG daily   - continue mvt daily      LABS  - CBC/CMP weekly while in [facility]    Medication Reconciliation Completed:  Yes - All medications and side effects reviewed with patient    FOLLOW UP APPOINTMENTS  Future Appointments   Date Time Provider Department Center   12/19/2024  2:15 PM Cody Holloway DO [facility]      60 minutes spent w/ patient and staff, including but not limited to reviewing present status, needs, abilities with disciplines, reviewing medical records, vital signs, labs, completing med reconciliation and entering orders for continued care in [facility]  and at discharge     Note to patient: The 21st Century Cures Act makes medical notes like these available to patients in the interest of transparency. However, this is a medical document intended as peer to peer communication. It is written in medical language and may contain  abbreviations or verbiage that are unfamiliar. It may appear blunt or direct. Medical documents are intended to carry relevant information, facts as evident, and the clinical opinion of the practitioner who signs the document.     Jose F Meehan, APRN   12/09/2024

## 2024-12-13 ENCOUNTER — HOSPITAL ENCOUNTER (INPATIENT)
Facility: HOSPITAL | Age: 84
LOS: 6 days | Discharge: NURSING, REHAB OR LONG-TERM CARE | End: 2024-12-19
Attending: STUDENT IN AN ORGANIZED HEALTH CARE EDUCATION/TRAINING PROGRAM | Admitting: INTERNAL MEDICINE
Payer: MEDICARE

## 2024-12-13 ENCOUNTER — HOSPITAL ENCOUNTER (INPATIENT)
Facility: HOSPITAL | Age: 84
LOS: 6 days | Discharge: HOME HEALTH | End: 2024-12-19
Attending: STUDENT IN AN ORGANIZED HEALTH CARE EDUCATION/TRAINING PROGRAM | Admitting: INTERNAL MEDICINE
Payer: MEDICARE

## 2024-12-13 DIAGNOSIS — N17.9 AKI (ACUTE KIDNEY INJURY) (HCC): ICD-10-CM

## 2024-12-13 DIAGNOSIS — K92.2 GASTROINTESTINAL HEMORRHAGE, UNSPECIFIED GASTROINTESTINAL HEMORRHAGE TYPE: Primary | ICD-10-CM

## 2024-12-13 DIAGNOSIS — K31.819 GASTRIC AVM: ICD-10-CM

## 2024-12-13 LAB
ALBUMIN SERPL-MCNC: 3.7 G/DL (ref 3.2–4.8)
ALBUMIN/GLOB SERPL: 1.9 {RATIO} (ref 1–2)
ALP LIVER SERPL-CCNC: 48 U/L
ALT SERPL-CCNC: 31 U/L
ANION GAP SERPL CALC-SCNC: 8 MMOL/L (ref 0–18)
ANTIBODY SCREEN: NEGATIVE
AST SERPL-CCNC: 29 U/L (ref ?–34)
BASOPHILS # BLD AUTO: 0.01 X10(3) UL (ref 0–0.2)
BASOPHILS NFR BLD AUTO: 0.1 %
BILIRUB SERPL-MCNC: 0.4 MG/DL (ref 0.2–1.1)
BUN BLD-MCNC: 28 MG/DL (ref 9–23)
BUN/CREAT SERPL: 21.5 (ref 10–20)
CALCIUM BLD-MCNC: 9 MG/DL (ref 8.7–10.4)
CHLORIDE SERPL-SCNC: 113 MMOL/L (ref 98–112)
CO2 SERPL-SCNC: 24 MMOL/L (ref 21–32)
CREAT BLD-MCNC: 1.3 MG/DL
DEPRECATED RDW RBC AUTO: 55.3 FL (ref 35.1–46.3)
EGFRCR SERPLBLD CKD-EPI 2021: 41 ML/MIN/1.73M2 (ref 60–?)
EOSINOPHIL # BLD AUTO: 0 X10(3) UL (ref 0–0.7)
EOSINOPHIL NFR BLD AUTO: 0 %
ERYTHROCYTE [DISTWIDTH] IN BLOOD BY AUTOMATED COUNT: 17.2 % (ref 11–15)
GLOBULIN PLAS-MCNC: 2 G/DL (ref 2–3.5)
GLUCOSE BLD-MCNC: 133 MG/DL (ref 70–99)
HCT VFR BLD AUTO: 26.2 %
HGB BLD-MCNC: 7.6 G/DL
IMM GRANULOCYTES # BLD AUTO: 0.06 X10(3) UL (ref 0–1)
IMM GRANULOCYTES NFR BLD: 0.5 %
LYMPHOCYTES # BLD AUTO: 0.58 X10(3) UL (ref 1–4)
LYMPHOCYTES NFR BLD AUTO: 5.2 %
MCH RBC QN AUTO: 25.8 PG (ref 26–34)
MCHC RBC AUTO-ENTMCNC: 29 G/DL (ref 31–37)
MCV RBC AUTO: 88.8 FL
MONOCYTES # BLD AUTO: 0.11 X10(3) UL (ref 0.1–1)
MONOCYTES NFR BLD AUTO: 1 %
NEUTROPHILS # BLD AUTO: 10.44 X10 (3) UL (ref 1.5–7.7)
NEUTROPHILS # BLD AUTO: 10.44 X10(3) UL (ref 1.5–7.7)
NEUTROPHILS NFR BLD AUTO: 93.2 %
OSMOLALITY SERPL CALC.SUM OF ELEC: 307 MOSM/KG (ref 275–295)
PLATELET # BLD AUTO: 469 10(3)UL (ref 150–450)
POTASSIUM SERPL-SCNC: 4.8 MMOL/L (ref 3.5–5.1)
PROT SERPL-MCNC: 5.7 G/DL (ref 5.7–8.2)
RBC # BLD AUTO: 2.95 X10(6)UL
RH BLOOD TYPE: POSITIVE
SARS-COV-2 RNA RESP QL NAA+PROBE: NOT DETECTED
SODIUM SERPL-SCNC: 145 MMOL/L (ref 136–145)
WBC # BLD AUTO: 11.2 X10(3) UL (ref 4–11)

## 2024-12-13 RX ORDER — CHOLECALCIFEROL (VITAMIN D3) 25 MCG
4000 TABLET ORAL DAILY
Status: DISCONTINUED | OUTPATIENT
Start: 2024-12-14 | End: 2024-12-19

## 2024-12-13 RX ORDER — ALENDRONATE SODIUM 70 MG/1
70 TABLET ORAL
Status: DISCONTINUED | OUTPATIENT
Start: 2024-12-17 | End: 2024-12-19

## 2024-12-13 RX ORDER — MULTIVIT-MIN/IRON FUM/FOLIC AC 7.5 MG-4
1 TABLET ORAL DAILY
Status: DISCONTINUED | OUTPATIENT
Start: 2024-12-14 | End: 2024-12-13

## 2024-12-13 RX ORDER — FERROUS SULFATE 325(65) MG
325 TABLET, DELAYED RELEASE (ENTERIC COATED) ORAL DAILY
Status: DISCONTINUED | OUTPATIENT
Start: 2024-12-14 | End: 2024-12-19

## 2024-12-13 RX ORDER — POLYETHYLENE GLYCOL 3350 17 G/17G
17 POWDER, FOR SOLUTION ORAL DAILY
Status: DISCONTINUED | OUTPATIENT
Start: 2024-12-14 | End: 2024-12-19

## 2024-12-13 RX ORDER — SODIUM CHLORIDE 9 MG/ML
INJECTION, SOLUTION INTRAVENOUS CONTINUOUS
Status: DISCONTINUED | OUTPATIENT
Start: 2024-12-13 | End: 2024-12-14

## 2024-12-13 RX ORDER — PREDNISONE 20 MG/1
40 TABLET ORAL
Status: DISCONTINUED | OUTPATIENT
Start: 2024-12-14 | End: 2024-12-17

## 2024-12-13 RX ORDER — ATORVASTATIN CALCIUM 20 MG/1
20 TABLET, FILM COATED ORAL NIGHTLY
Status: DISCONTINUED | OUTPATIENT
Start: 2024-12-13 | End: 2024-12-19

## 2024-12-13 RX ORDER — ZOLPIDEM TARTRATE 5 MG/1
5 TABLET ORAL NIGHTLY PRN
Status: DISCONTINUED | OUTPATIENT
Start: 2024-12-13 | End: 2024-12-19

## 2024-12-13 RX ORDER — ATORVASTATIN CALCIUM 20 MG/1
20 TABLET, FILM COATED ORAL NIGHTLY
COMMUNITY

## 2024-12-13 RX ORDER — PANTOPRAZOLE SODIUM 40 MG/1
40 TABLET, DELAYED RELEASE ORAL
Status: DISCONTINUED | OUTPATIENT
Start: 2024-12-14 | End: 2024-12-14

## 2024-12-13 RX ORDER — SULFAMETHOXAZOLE AND TRIMETHOPRIM 800; 160 MG/1; MG/1
1 TABLET ORAL
Status: DISCONTINUED | OUTPATIENT
Start: 2024-12-16 | End: 2024-12-19

## 2024-12-13 RX ORDER — MULTIVITAMIN WITH IRON
500 TABLET ORAL DAILY
Status: DISCONTINUED | OUTPATIENT
Start: 2024-12-14 | End: 2024-12-19

## 2024-12-13 NOTE — ED PROVIDER NOTES
History     Chief Complaint   Patient presents with    Abnormal Labs       HPI    84 year old female with hx severe aortic stenosis, small right pulmonary emboli on Xarelto, hypertension recent admission with severe symptomatic anemia and GI bleed status post EGD and numerous transfusions presents with low hemoglobin.  At her facility patient had hemoglobin checked today and noted it was sixes.  Patient had EGD , status post APC, Xarelto he was started .  She is on PPI and steroids at this time.  She reports she feels well, has no pain, has dark stools but is on iron.  Denies any dyspnea, lightheadedness or chest pain.          Past Medical History:    Acute pulmonary embolism (HCC)    High blood pressure    Osteopenia    Primary osteoarthritis of right hip    Visual impairment    Readers    White coat hypertension       Past Surgical History:   Procedure Laterality Date    Cataract  3/2&3/16 2017    Colonoscopy & polypectomy  2021         Hip replacement surgery Left Around     Other surgical history  [place]     Bilateral cataract surgery       Social History     Socioeconomic History    Marital status:    Tobacco Use    Smoking status: Former     Current packs/day: 0.00     Types: Cigarettes     Start date: 1990     Quit date: 1990     Years since quittin.9     Passive exposure: Past    Smokeless tobacco: Former   Vaping Use    Vaping status: Never Used   Substance and Sexual Activity    Alcohol use: Yes     Comment: wine, occasionally    Drug use: Never   Other Topics Concern    Caffeine Concern Yes     Comment: coffee/soda - 2cups/day     Social Drivers of Health     Food Insecurity: No Food Insecurity (2024)    Food Insecurity     Food Insecurity: Never true   Transportation Needs: No Transportation Needs (2024)    Transportation Needs     Lack of Transportation: No   Housing Stability: Low Risk  (2024)    Housing Stability     Housing Instability:  No                   Physical Exam     ED Triage Vitals [12/13/24 1545]   /66   Pulse 97   Resp 20   Temp 97.9 °F (36.6 °C)   Temp src Oral   SpO2 99 %   O2 Device None (Room air)       Physical Exam  Constitutional:       General: She is not in acute distress.  Eyes:      Extraocular Movements: Extraocular movements intact.   Cardiovascular:      Rate and Rhythm: Normal rate.      Pulses: Normal pulses.   Pulmonary:      Effort: Pulmonary effort is normal. No respiratory distress.   Abdominal:      General: Abdomen is flat. There is no distension.      Tenderness: There is no abdominal tenderness. There is no guarding.   Musculoskeletal:      Cervical back: Normal range of motion.   Neurological:      General: No focal deficit present.      Mental Status: She is alert.              ED Course     Labs Reviewed   CBC WITH DIFFERENTIAL WITH PLATELET - Abnormal; Notable for the following components:       Result Value    WBC 11.2 (*)     RBC 2.95 (*)     HGB 7.6 (*)     HCT 26.2 (*)     MCH 25.8 (*)     MCHC 29.0 (*)     RDW-SD 55.3 (*)     RDW 17.2 (*)     .0 (*)     Neutrophil Absolute Prelim 10.44 (*)     Neutrophil Absolute 10.44 (*)     Lymphocyte Absolute 0.58 (*)     All other components within normal limits   COMP METABOLIC PANEL (14) - Abnormal; Notable for the following components:    Glucose 133 (*)     Chloride 113 (*)     BUN 28 (*)     Creatinine 1.30 (*)     BUN/CREA Ratio 21.5 (*)     Calculated Osmolality 307 (*)     eGFR-Cr 41 (*)     Alkaline Phosphatase 48 (*)     All other components within normal limits   RAPID SARS-COV-2 BY PCR - Normal   CBC WITH DIFFERENTIAL WITH PLATELET   COMP METABOLIC PANEL (14)   TYPE AND SCREEN    Narrative:     The following orders were created for panel order Type and screen.  Procedure                               Abnormality         Status                     ---------                               -----------         ------                     ABORH  (Blood Type)[[id number]]                               Final result               Antibody Screen[[id number]]                                  Final result                 Please view results for these tests on the individual orders.   ABORH (BLOOD TYPE)   ANTIBODY SCREEN   RAINBOW DRAW LAVENDER   RAINBOW DRAW LIGHT GREEN   RAINBOW DRAW BLUE   RAINBOW DRAW GOLD     No results found.        MDM     Vitals:    12/13/24 2000 12/13/24 2030 12/13/24 2059 12/13/24 2118   BP: 140/69 132/73 145/63    BP Location:   Right arm    Pulse: 79 87 82    Resp: 16 16 18    Temp:   98.2 °F (36.8 °C)    TempSrc:   Oral    SpO2: 97% 97% 97%    Weight:    79.2 kg   Height:    172.7 cm (5' 8\")       Recent GI bleed with drop in hemoglobin on anticoagulation and prednisone.  High risk patient.  Abdominal exam is benign.  Blood pressure is reassuring.  Mentating perfusing appropriately.    ED Course as of 12/14/24 0006  ------------------------------------------------------------  Time: 12/13 1656  Comment: Per my chart review patient had EGD here 11/26 showing 15 mm clean-based nonbleeding gastric ulcers and gastric AVM status post APC  ------------------------------------------------------------  Time: 12/13 1659  Comment: Notable for mild NANCY and a drop in hemoglobin by oh point compared to last  ------------------------------------------------------------  Time: 12/13 1804  Comment: Rectal exam with +melena and +FOBT     I consulted with GI, recommends clear liquid diet, discussed with primary care group, will admit for further observation and care    Disposition and Plan     Clinical Impression:  1. Gastrointestinal hemorrhage, unspecified gastrointestinal hemorrhage type    2. NANCY (acute kidney injury) (HCC)    3. Gastric AVM        Disposition:  Admit    Follow-up:  No follow-up provider specified.    Medications Prescribed:  Current Discharge Medication List          Hospital Problems       Present on Admission  Date Reviewed:  10/24/2024            ICD-10-CM Noted POA    * (Principal) Gastrointestinal hemorrhage, unspecified gastrointestinal hemorrhage type K92.2 11/25/2024 Unknown    NANCY (acute kidney injury) (HCC) N17.9 12/13/2024 Unknown    Gastric AVM K31.819 12/13/2024 Unknown

## 2024-12-13 NOTE — ED INITIAL ASSESSMENT (HPI)
Patient comes is from [facility]. Patient recently had GI bleed & had 3 transfusion. Patient had blood drawn this AM which showed her Hmg of 6.3. Patient left [facility] on Tuesday & her Hmg 7.6.  Patient also had recent pneumonia & 2 PEs. Patient is on Xeralto. Patient had dark stools, but she is also on iron.  Patient denies dizziness or chest pain.

## 2024-12-14 LAB
ALBUMIN SERPL-MCNC: 2.9 G/DL (ref 3.2–4.8)
ALBUMIN/GLOB SERPL: 1.9 {RATIO} (ref 1–2)
ALP LIVER SERPL-CCNC: 36 U/L
ALT SERPL-CCNC: 19 U/L
ANION GAP SERPL CALC-SCNC: 3 MMOL/L (ref 0–18)
AST SERPL-CCNC: 16 U/L (ref ?–34)
BASOPHILS # BLD AUTO: 0.01 X10(3) UL (ref 0–0.2)
BASOPHILS NFR BLD AUTO: 0.1 %
BILIRUB SERPL-MCNC: 0.6 MG/DL (ref 0.2–1.1)
BUN BLD-MCNC: 23 MG/DL (ref 9–23)
BUN/CREAT SERPL: 22.3 (ref 10–20)
CALCIUM BLD-MCNC: 8.4 MG/DL (ref 8.7–10.4)
CHLORIDE SERPL-SCNC: 115 MMOL/L (ref 98–112)
CO2 SERPL-SCNC: 26 MMOL/L (ref 21–32)
CREAT BLD-MCNC: 1.03 MG/DL
DEPRECATED RDW RBC AUTO: 55.6 FL (ref 35.1–46.3)
EGFRCR SERPLBLD CKD-EPI 2021: 54 ML/MIN/1.73M2 (ref 60–?)
EOSINOPHIL # BLD AUTO: 0.11 X10(3) UL (ref 0–0.7)
EOSINOPHIL NFR BLD AUTO: 1.4 %
ERYTHROCYTE [DISTWIDTH] IN BLOOD BY AUTOMATED COUNT: 17.2 % (ref 11–15)
GLOBULIN PLAS-MCNC: 1.5 G/DL (ref 2–3.5)
GLUCOSE BLD-MCNC: 80 MG/DL (ref 70–99)
HCT VFR BLD AUTO: 20.3 %
HCT VFR BLD AUTO: 32.2 %
HGB BLD-MCNC: 10.3 G/DL
HGB BLD-MCNC: 6.1 G/DL
IMM GRANULOCYTES # BLD AUTO: 0.04 X10(3) UL (ref 0–1)
IMM GRANULOCYTES NFR BLD: 0.5 %
LYMPHOCYTES # BLD AUTO: 1.64 X10(3) UL (ref 1–4)
LYMPHOCYTES NFR BLD AUTO: 20.5 %
MCH RBC QN AUTO: 26.5 PG (ref 26–34)
MCHC RBC AUTO-ENTMCNC: 30 G/DL (ref 31–37)
MCV RBC AUTO: 88.3 FL
MONOCYTES # BLD AUTO: 0.54 X10(3) UL (ref 0.1–1)
MONOCYTES NFR BLD AUTO: 6.8 %
NEUTROPHILS # BLD AUTO: 5.65 X10 (3) UL (ref 1.5–7.7)
NEUTROPHILS # BLD AUTO: 5.65 X10(3) UL (ref 1.5–7.7)
NEUTROPHILS NFR BLD AUTO: 70.7 %
OSMOLALITY SERPL CALC.SUM OF ELEC: 301 MOSM/KG (ref 275–295)
PLATELET # BLD AUTO: 346 10(3)UL (ref 150–450)
POTASSIUM SERPL-SCNC: 4.3 MMOL/L (ref 3.5–5.1)
PROT SERPL-MCNC: 4.4 G/DL (ref 5.7–8.2)
RBC # BLD AUTO: 2.3 X10(6)UL
SODIUM SERPL-SCNC: 144 MMOL/L (ref 136–145)
WBC # BLD AUTO: 8 X10(3) UL (ref 4–11)

## 2024-12-14 PROCEDURE — 30233N1 TRANSFUSION OF NONAUTOLOGOUS RED BLOOD CELLS INTO PERIPHERAL VEIN, PERCUTANEOUS APPROACH: ICD-10-PCS | Performed by: INTERNAL MEDICINE

## 2024-12-14 PROCEDURE — 99223 1ST HOSP IP/OBS HIGH 75: CPT | Performed by: INTERNAL MEDICINE

## 2024-12-14 RX ORDER — SODIUM CHLORIDE 9 MG/ML
INJECTION, SOLUTION INTRAVENOUS ONCE
Status: COMPLETED | OUTPATIENT
Start: 2024-12-14 | End: 2024-12-14

## 2024-12-14 NOTE — PROGRESS NOTES
12/14/24 1137   VISIT TYPE   OT Inpatient Visit Type (Documentation Required) Attempted Evaluation     Orders received and chart reviewed.  Pt with HGB of 6.1.  Will cont to follow s/p transfusion and elevated HGB is drawn

## 2024-12-14 NOTE — ED QUICK NOTES
Orders for admission, patient is aware of plan and ready to go upstairs.     LOC: Aox3, ambulatory with assistance. Recently at a SNF, did not use a cane or walker while there. Had a fall in hospital two weeks ago - high fall risk.       ACCESS:20g PIV    Rapid COVID is pending     Any questions, please call ED CHIARA Saavedra  at extension [phone].

## 2024-12-14 NOTE — H&P
[facility]  part of [facility]    History & Physical    Hoda Busby Patient Status:  Inpatient    1940 MRN [id number]   Location [facility] 5SW/SE Attending Jigar Apple MD   Hosp Day # 1 PCP Malathi Stuart MD     Date:  2024  Date of Admission:  2024    History of Present Illness:  Hoda Busby is a(n) 84 year old female with a past medical history of severe aortic stenosis, recently diagnosed interstitial pneumonitis on prednisone, recently diagnosed small right pulmonary emboli on Xarelto, hypertension who was hospitalized -24  with severe symptomatic anemia and GI bleed.     Patient was admitted to the hospital in Oct 2024 with bilateral pneumonia unresponsive to outpatient antibiotics.  At the time of admission, she underwent CT chest which showed significant bilateral interstitial infiltrates along with small subsegmental right-sided pulmonary emboli.  Patient was started on anticoagulation for the pulmonary emboli.  She was seen by pulmonary and started empirically on IV antibiotics.  However, after extensive workup, it was determined that she most likely had a noninfectious interstitial pneumonitis and was started on IV Solu-Medrol which was then transitioned to prednisone (currently 40mg/d).  She was also seen by cardiology as she had some mild CHF related to her aortic stenosis.  She is currently following with cardiology and will need eventual TAVR.  During that admission, she had some mild anemia with a hemoglobin in the 10's-11's but this was stable.  Her baseline hemoglobin was in the 12's.  Iron studies were consistent with anemia of chronic disease.  The mild anemia was attributed to her acute illness.  Hemoglobin on the day of discharge was 10.3  Following that hospital stay, she was transferred to [facility].     The patient's hemoglobin had decreased to 8.7 on 24 then trended down to 7.1 x 11/11/24.  Repeat  hemoglobin on 11/13/24 was 7.6 and then down to 6.3 on 11/18/24.  She was sent to the ED for evaluation due to concern for GI bleeding.  Rectal exam revealed black stool that was heme positive.  Was given 1 unit of PRBCs.  Xarelto was held.  The patient was started on IV Protonix.  GI was consulted and the patient was admitted for further evaluation and treatment.  Her last colonoscopy in 1/2021 revealed adenomatous polyp but poor prep; pt declined repeat colonoscopy.  -EGD 11/26/24 (Dr. Nuno) -  15mm clean-based, nonbleeding gastric antral ulcer (bx done); 3mm gastric antral AVM s/p APC.  -on protonix 40mg BID - to continue x 8 weeks (EOT 1/22/25)  She received a total of 3 units PRBCs and also IV Ferrrlecit.  She was resumed on Xarelto 20 mg po daily, and oral prednisone.  She returned to [facility] 11/30/24 for rehab.    She was noted to have Hgb 6.5 on 12/13/24 at [facility].  She was referred to ED.  Repeat HGb was 7.6, though stool was heme positive.  Hgb decreased to 6.1 overnight, and she received transfusion 2 units PRBCs today.  Xarelto has been stopped.  She denies abdominal pain, fever, or hematochezia.       History:  Past Medical History:    Acute pulmonary embolism (HCC)    High blood pressure    History of blood transfusion    Macular degeneration    Osteopenia    Primary osteoarthritis of right hip    Pulmonary embolism (HCC)    Visual impairment    Readers    White coat hypertension     Past Surgical History:   Procedure Laterality Date    Cataract  3/2&3/16 2017    Colonoscopy & polypectomy  1/4/2021         Hip replacement surgery Left Around 2006    Other surgical history  Cydney 2017    Bilateral cataract surgery       Allergies:  Allergies[1]    Home Medications:  Current Medications as of December 14, 2024  Generic Name Brand Name Strength Route/ Site Freq.   Alendronate Sodium (Tab) Fosamax 70 MG Oral Take 1 tablet (70 mg total) by mouth every 7 days.      Atorvastatin Calcium (Tab) Lipitor  20 MG Oral Take 1 tablet (20 mg total) by mouth at bedtime.      Calcium Carb-Cholecalciferol (Tab) Calcium + D3 600-200 MG-UNIT Oral Take 1 tablet by mouth daily.      Cholecalciferol (Tab) Vitamin D 50 MCG (2000 UT) Oral Take 4,000 Units by mouth daily.      Cyanocobalamin (Tab) Vitamin B12 500 MCG Oral Take 1 tablet (500 mcg total) by mouth daily.      Ferrous Sulfate (Tab EC) ferrous sulfate 325 (65 FE) MG Oral Take 1 tablet (325 mg total) by mouth daily.      Multiple Vitamins-Minerals (Tab) Multi-Vitamin/Minerals  Oral Take 1 tablet by mouth daily.      Omeprazole (Capsule Delayed Release) PriLOSEC 20 MG Oral Take 1 capsule (20 mg total) by mouth 2 (two) times daily.      Pantoprazole Sodium (Tab EC) Protonix 40 MG Oral Take 1 tablet (40 mg total) by mouth 2 (two) times daily before meals.      Polyethylene Glycol 3350 (Powd Pack) MIRALAX 17 g Oral Take 17 g by mouth daily.      predniSONE (Tab) Deltasone 20 MG Oral Take 2 tablets (40 mg total) by mouth daily with breakfast.      Rivaroxaban (Tab) Xarelto 20 MG Oral Take 1 tablet (20 mg total) by mouth daily with food.      Sulfamethoxazole-Trimethoprim (Tab) Bactrim -160 MG Oral Take 1 tablet by mouth 3 (three) times a week. Monday, Wednesday, Friday      Zolpidem Tartrate (Tab) Ambien 5 MG Oral Take 1 tablet (5 mg total) by mouth nightly as needed.             SOCIAL HISTORY   reports that she quit smoking about 34 years ago. Her smoking use included cigarettes. She started smoking about 34 years ago. She has been exposed to tobacco smoke. She has quit using smokeless tobacco. She reports current alcohol use. She reports that she does not use drugs.    Family History   Problem Relation Age of Onset    Cancer Father         bone (cause of death)    Stroke Mother         CVA (cause of death)    Diabetes Mother     Hypertension Mother     Dementia Brother         Alzheimer's    Heart Disease Brother         CAD - x2 brothers    Heart Disease Brother               Review of Systems:    ROS:   Constitutional: no weight loss; no fatigue  ENMT:  Negative for ear drainage, hearing loss and nasal drainage  Eyes:  Negative for eye discharge and vision loss  Cardiovascular:  Negative for chest pain; negative palpitations  Respiratory:  Negative for cough, dyspnea and wheezing  Endocrine:  Negative for abnormal sleep patterns, increased activity, polydipsia and polyphagia  Gastrointestinal:  Negative for abdominal pain, constipation, decreased appetite, diarrhea and vomiting; no melena or hematochezia  Musculoskeletal:  Negative for arthralgias or myalgias  Genitourinary:  Negative for dysuria or polyuria  Hema/Lymph:  Negative for easy bleeding and easy bruising  Integumentary:  Negative for pruritus and rash  Neurological:  Negative for gait disturbance; negative for paresthesias   All other review of systems are negative.          Physical Exam:  PHYSICAL EXAM:   Blood pressure 100/54, pulse 82, temperature 98.3 °F (36.8 °C), temperature source Oral, resp. rate 18, height 5' 8\" (1.727 m), weight 174 lb 9.6 oz (79.2 kg), SpO2 98%.  Constitutional: alert and oriented x3 in no acute distress  HEENT- EOMI, PERRL  Nose/Mouth/Throat: pharynx without erythema  Neck/Thyroid: neck supple; no thyromegaly  Lymphatics: no lymphadenopathy of neck or groin  Cardiovascular: RRR, S1, S2, no S3 or murmur  Respiratory: lungs without crackles or wheezes  Abdomen: normoactive bowel sounds, soft, non-tender and non-distended  Extremities: no clubbing, cyanosis or edema  Vascular: no carotid bruits; DP/PT 2/2  Musculoskeletal: Motor 5/5 upper and lower extremities  Neurological: cranial nerves II-XII intact; light touch and proprioception intact  Skin: no rash or ulcerations            Laboratory Data:     Lab Results   Component Value Date    GLU 80 12/14/2024     12/14/2024    K 4.3 12/14/2024     12/14/2024    CO2 26.0 12/14/2024    BUN 23 12/14/2024    CREATSERUM 1.03  12/14/2024    CA 8.4 12/14/2024    ALB 2.9 12/14/2024    ALKPHO 36 12/14/2024    BILT 0.6 12/14/2024    TP 4.4 12/14/2024    AST 16 12/14/2024    ALT 19 12/14/2024       Recent Labs   Lab 12/14/24  0445   WBC 8.0   HGB 6.1*   HCT 20.3*   MCV 88.3   MCH 26.5   MCHC 30.0*   RDW 17.2*   NEPRELIM 5.65   .0         Imaging:  No results found.          Assessment and Plan:    Acute GI bleed  Recently hospitalized 11/25-11/30/24 for GI bleed, now with recurrence; had been taking Xarelto 20 mg daily for PE  -her last colonoscopy in 1/2021 revealed adenomatous polyp but poor prep; pt declined repeat colonoscopy.  -EGD 11/26/24 (Dr. Nuno) -  15mm clean-based, nonbleeding gastric antral ulcer (bx done); 3mm gastric antral AVM s/p APC.  -on protonix 40mg BID - was to continue x 8 weeks (EOT 1/22/25)  She received a total of 3 units PRBCs and also IV Ferrrlecit.  On 11/30/24, she was resumed on Xarelto 20 mg po daily, and oral prednisone.  She returned to [facility] 11/30/24 for rehab.    -now Hgb 6.5 on 12/13/24 at [facility].  She was referred to ED.  Repeat Hgb was 7.6, though stool was heme positive.  Hgb decreased to 6.1 overnight, and she received transfusion 2 units PRBCs today.   - Xarelto has been stopped.    -seen for GI consult by Dr Rosario; plan enteroscopy and colonoscopy on 12/16/24 with MAC anesthesia  -continue pantoprazole 40 mg IV [id number]  -serial H/H; keep Hgb >7    Anemia due to acute blood loss  -Hgb 6.1 today; s/p 2 units PRBCs today; check post-tx H/H    Acute left peroneal palsy  In Nov 2024; has numbness to anterolateral left foot and ankle as well as inability to dorsiflex left foot; etiology unclear  -neurologist Dr Richardson consult  noted and appreciated  -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   -MRI brain shows no acute intracranial process    Hypotension  -resolved with blood tx  -BP 100s this  am; will continue to hold amlodipine and Lasix; creatinine 1.30> 1.03 with IVF hydration; will stop IVF     Near syncope  Assisted fall  -pt felt lightheaded in bathroom on 11/27  -started to fall forward but was caught by staff; lightly bumped her head; no visible injury; no HA  -developed HA later in the day -- stat CT brain neg for bleed.      Interstitial pneumonitis  -dx'ed 10/2024 - presented with cough and severe hypoxia  -unresponsive to abx (amox/doxy, then IV zosyn/zithromax)  -CXR 10/24/24 extensive bilateral perihilar and bilateral upper and lower lobe   -S.pneumo, legionella Ag , mycoplasma IgM/G, Fungitell-beta-D glucan negative  -ANCA and URIEL/connective tissue disease panels negative  -anti-histone and anti-K[place]a Ab's negative  -Echo 10/24/24 - normal EF (55-60%), no obvious vegetatons  -CT with bilateral ground glass opacities, small consolidations of BLL  -per pulm, findings suspicious for NSIP (vs cryptogenic organizing pneumonia vs hypersensitivity pneumonitis); NOT thought to be c/w UIP pattern  -on empiric steroids (solumedrol then prednisone) since 10/25/24 - on prednisone 40mg/day  -F/U Pulm Dr Holloway 12/19/24     Bilateral pulmonary emboli  -dx'ed at time of interstitial pneumonitis  -CT chest 10/25/24 -- acute distal segmental/subsegmental pulmonary emboli in [place] and subsegmental PE in [place]  -unclear etiology; no recent hx of long travel; no family/personal hx of blood clots  -BLE venous dopplers negative 10/26/24  -unclear etiology of PE; will do hypercoag w/u as outpt.   Consider malignancy w/u if no other cause found (colonoscopy 1/2021 revealed adenomatous polyp but poor prep; pt declined repeat colonoscopy; Medicare would not cover Cologuard); UTD on mammo  -started on xarelto (held 2/2 GIB) - restarted xarelto 20 mg po every day on 11/28/24  -repeat CT chest 12/9/24 neg PE  -stopped Xarelto 12/14/24 due to recurrent GI bleed     Severe aortic stenosis  -sees cardiology   Pelon  -moderate A.S on echo in 10/2022 (prev mild A.S in 2020)  -abnormal pre-op EKG 9/2023 --Nuc GXT done 9/20/23 -- EKG portion revealed 1-1.5mm ST seg depression in inferolateral leads with prolonged recovery; nuclear portion with normal perfusion   -Echo 9/29/23 --progression of A.S. from mild to moderate  -recent echo at Dr. Bird's office 10/18/24 -- LVEF 65%, grade 2 diastolic dysfunction, severe aortic stenosis (mean aortic gradient signif worse since 9/2023 -- 31>49 mmHg),  -seen by cardiology during last admission in 10/2024; pt will need TAVR evaluation as outpt  -on lasix 20mg po daily as of 10/2024--> held as above     Hypertension, primary  -(dyazide stopped due to NANCY)  -on amlodipine 5mg/day (on hold for now due to hypotension -- can restart if BP starts creeping up again)     Hx of hip OA  -s/p left THR (Dr. Lo) many years ago  -s/p elective R BEATRIZ on 10/5/23 by Dr. Diaz     Hyperlipidemia   Pt with strong family hx of high cholesterol  ; normal TG and HDL  No indication for statin given age     Osteopenia   On Fosamax from 2011 to 4/2016.     DEXA stable 5/10/17.    DEXA in 9/2019 showed slightly more bone loss in spine, but stable in hip.    DEXA 11/2021 -- some improvement in femur.  Hold off on fosamax for now.   DEXA 8/2024 -- osteoporosis of left forearm  -restarted Fosamax 8/2024     Vitamin D deficiency  On vitamin D 4000u/day     BLE edema  Advised compression     VTE proph            -SCDs    D/w tres Apple MD  12/14/2024  12:08 PM         [1] No Known Allergies

## 2024-12-14 NOTE — CONSULTS
REFERRING PHYSICIAN: Dr. Burt ref. provider found    HPI:         Thank you very much for requesting me to see the patient.    As you know, Hoda Busby is a 84 year old female with history of aortic stenosis, recent pneumonia / interstitial lung disease / PE (on Xarelto, 10/25/2024) and admit 11/25/2024 - 11/30/2024 for worsening anemia vs baseline / dark stools  who presents today and 11/26/2024 EGD revealing clean based gastric ulcer and gastric AVM. She presented to ER yesterday due to Hb < 7 noted on out-pt testing. Pt denies abdominal pain; no cp; no sob; no f/c;           PMH: severe aortic stenosis;  HTN; macular degeneration; hip OA, L THR, R BEATRIZ;  white coat HTN; vit d def; dyslipidemia; mitral valve insufficiency; osteoporosis with h/o pathologic fracture; L peronea neuropathy; B pneumonia / inerstitial pneumonitis (treated with prednisone), PE 11/2024 (Xarelto).     PMH-GI:      1/4/2021 -- colonoscopy -- \"revealed adenomatous polyp but poor prep; pt declined repeat colonoscopy.\" -- Dr. Seth Hendricks -- \"Pre-Operative Diagnosis: Positive Cologuard. Post-Operative Diagnosis: Colon polyp x 3. Internal hemorrhoids - -- Quality of Preparation: Inadequate  - - - 1 pedunculated and polypoid appearing polyp 12-15 mm was found in the ascending colon s/p removal  - - - - - 1 sessile polyp measuring 2 mm was found in the ascending colon s/p removal with cold forceps.   - 1 flat polyp measuring 10 mm was found on the ileocecal valve.  Saline was used to lift however unable to get adequate lift.  Unable to obtain any portion of this flat polyp with Lariat or Exacto snares.  Unable to resect any of this polyp.  Biopsies were taken.  If adenoma vs SSP will need Orise for resection.\"      11/26/2024 -- consult Dr. Nuno -- \"Patient is a 84 year old female with past medical history significant for hypertension, osteoarthritis, recently diagnosed small pulmonary embolism with possible interstitial lung disease who was  admitted to [facility] secondary to declining hemoglobin levels.  Patient was recently admitted in October November 2020 with pneumonia/interstitial lung disease.  It was during that time she was noted to have pulmonary emboli that were small.  She was put on anticoagulation with Xarelto.  Upon discharge her hemoglobin was 10.3.  While outpatient, since discharge, she has had some progressive fatigue.  Here hemoglobin was noted to be dropping with hemoglobin on the 16th of 7.0.  There has been some suggestion that she has been having some dark stools concerning for melena.  She denies any abdominal pain.  No nausea or vomiting.  No diarrhea or constipation.  Her weight has been stable.  Her last colonoscopy was in 2021 with a few small polyps removed.  She does not recall of any recent upper endoscopy.  She denies any significant NSAID use.  Since this admission patient has received 2 units of packed red blood cells given drop in hemoglobin to 6.5. - - -IMPRESSION / PLAN: 1. Acute on chronic anemia 2. Dark stools/possible melena  -Patient has known anemia that was felt to be related to chronic disease in the past. -She has had an acute drop and with dark stools raising concerns for GI bleeding.  Upper GI bleed being the most likely given the black stools and history.  -No previous EGD. -Last colonoscopy 2021.  Small polyps.  No other bleeding source.  3.   History of pulmonary embolism -normally on Xarelto 4.   Interstitial lung disease-stable.  Chest x-ray stable.  Not on oxygen.  Not short of breath. 5.   Aortic stenosis       Recommendations: 1. N.p.o. 2. Plan for EGD.  If clearance given from a cardiopulmonary perspective 3. Hold Xarelto 4. Monitor H&H and transfuse as necessary. 5. PPI twice daily for now 6. Avoid NSAIDs -- \"      11/26/2024 -- EGD with control of bleeding / bx -- Dr. Nuno -- \"Pre-op Diagnosis: gi bleed/anemia/melena.   Post-Op Diagnosis:  1. Normal esophagus and Z-line 2. Mild  gastritis 3. Gastric antral ulcer 15 mm clean-based.  Nonbleeding.  Biopsies taken from ulcer edge. 4. Gastric antral AVM, 3mm status post APC 5. Normal duodenal bulb and post bulbar duodenum.\"     SOC: no tob (remote); no etoh; no children;     Fhx: no colon ca; no IBD; no celiac sprue.         Past Surgical History:   Procedure Laterality Date    Cataract  3/2&3/16 2017    Colonoscopy & polypectomy  2021         Hip replacement surgery Left Around     Other surgical history  [place]     Bilateral cataract surgery     Social History     Socioeconomic History    Marital status:    Tobacco Use    Smoking status: Former     Current packs/day: 0.00     Types: Cigarettes     Start date: 1990     Quit date: 1990     Years since quittin.9     Passive exposure: Past    Smokeless tobacco: Former   Vaping Use    Vaping status: Never Used   Substance and Sexual Activity    Alcohol use: Yes     Comment: wine, occasionally    Drug use: Never   Other Topics Concern    Caffeine Concern Yes     Comment: coffee/soda - 2cups/day     Social Drivers of Health     Food Insecurity: No Food Insecurity (2024)    Food Insecurity     Food Insecurity: Never true   Transportation Needs: No Transportation Needs (2024)    Transportation Needs     Lack of Transportation: No   Housing Stability: Low Risk  (2024)    Housing Stability     Housing Instability: No         Current Facility-Administered Medications   Medication Dose Route Frequency    pantoprazole (Protonix) 40 mg in sodium chloride 0.9% PF 10 mL IV push  40 mg Intravenous Daily    [START ON 2024] alendronate (Fosamax) tab 70 mg  70 mg Oral Q7 Days    atorvastatin (Lipitor) tab 20 mg  20 mg Oral Nightly    calcium carbonate-vitamin D (Oyster Shell-D) 250-3.125 MG-MCG per tab 1 tablet  1 tablet Oral Daily    cholecalciferol (Vitamin D3) tab 4,000 Units  4,000 Units Oral Daily    ferrous sulfate DR tab 325 mg  325 mg Oral Daily     polyethylene glycol (PEG 3350) (Miralax) 17 g oral packet 17 g  17 g Oral Daily    predniSONE (Deltasone) tab 40 mg  40 mg Oral Daily with breakfast    [START ON 12/16/2024] sulfamethoxazole-trimethoprim DS (Bactrim DS) 800-160 MG per tab 1 tablet  1 tablet Oral Once per day on Monday Wednesday Friday    cyanocobalamin (Vitamin B12) tab 500 mcg  500 mcg Oral Daily    zolpidem (Ambien) tab 5 mg  5 mg Oral Nightly PRN    sodium chloride 0.9% infusion   Intravenous Continuous       Allergies[1]    A comprehensive 10 point review of systems was completed.  Pertinent positives and negatives noted in the the HPI.    EXAM:  /63 (BP Location: Left arm)   Pulse 82   Temp 98 °F (36.7 °C) (Oral)   Resp 18   Ht 5' 8\" (1.727 m)   Wt 174 lb 9.6 oz (79.2 kg)   SpO2 98%   BMI 26.55 kg/m²  -Body mass index is 26.55 kg/m².  GENERAL: well developed, well nourished, in no apparent distress  SKIN: no rashes, no suspicious lesions  HEENT: anicteric; no JVD.  NECK: supple, no adenopathy, no bruits  LUNGS: clear to auscultation  CARDIO: RRR, nl s1 and s2. No murmers appreciated.  GI: Soft, NT, ND, normal BS. NO HSM, masses, hernias or bruits.  EXTREMITIES: no cyanosis, clubbing or edema    ___________________________________________________________    ASSESSMENT: In summary this is a 84 year old female with history of aortic stenosis, recent pneumonia / interstitial lung disease / PE (on Xarelto, 10/25/2024) and admit 11/25/2024 - 11/30/2024 for worsening anemia vs baseline / dark stools  who presents today and 11/26/2024 EGD revealing clean based gastric ulcer and gastric AVM. She presented to ER yesterday due to Hb < 7 noted on out-pt testing.     ACTIVE ISSUES INCLUDE:     Recurrent severe anemia on Xarelto -- acute blood loss anemia -- recent 11/26/2024 EGD revealing gastric ulcer and gastric AVM (cauterized). She is also on steroids for ILD. Dark stools (also on PO Fe). Last colonoscopy was in 1/2021 with inadequate prep  (and a polyp which could not be removed; pt declined repeat colonoscopy previously). Options d/w pt:     -- \"I want to do everything I can do get better\" and is agreeable to with suggested Enteroscopy / colonoscopy with MAC anesthesia suggested on Monday after slow two day prep.   --  last Xarelto was 12/13/2024. Held at admit.   -- soft diet today.   -- empiric PPI.    -- transfuse to Hb > 7     2. PE / ILD     3.  Severe aortic stenosis;  HTN;    4. hip OA, L THR, R BEATRIZ;      5. osteoporosis with h/o pathologic fracture.           The patient indicates understanding of these issues and agrees to the plan. Thank you!       Damon Rosario M.D.       [facility]  ___________________________________________________________              [1] No Known Allergies

## 2024-12-14 NOTE — PLAN OF CARE
Call light within reach. Patient care needs addressed.     Problem: Patient Centered Care  Goal: Patient preferences are identified and integrated in the patient's plan of care  Description: Interventions:  - Provide timely, complete, and accurate information to patient/family  - Incorporate patient and family knowledge, values, beliefs, and cultural backgrounds into the planning and delivery of care  - Encourage patient/family to participate in care and decision-making at the level they choose  - Honor patient and family perspectives and choices  Outcome: Progressing      Problem: SAFETY ADULT - FALL  Goal: Free from fall injury  Description: INTERVENTIONS:  - Assess pt frequently for physical needs  - Identify cognitive and physical deficits and behaviors that affect risk of falls.  - [place] fall precautions as indicated by assessment.  - Educate pt/family on patient safety including physical limitations  - Instruct pt to call for assistance with activity based on assessment  - Modify environment to reduce risk of injury  - Provide assistive devices as appropriate  - Consider OT/PT consult to assist with strengthening/mobility  - Encourage toileting schedule  Outcome: Progressing     Problem: HEMATOLOGIC - ADULT  Goal: Maintains hematologic stability  Description: INTERVENTIONS  - Assess for signs and symptoms of bleeding or hemorrhage  - Monitor labs and vital signs for trends  - Administer supportive blood products/factors, fluids and medications as ordered and appropriate  - Administer supportive blood products/factors as ordered and appropriate  Outcome: Progressing  Goal: Free from bleeding injury  Description: (Example usage: patient with low platelets)  INTERVENTIONS:  - Avoid intramuscular injections, enemas and rectal medication administration  - Ensure safe mobilization of patient  - Hold pressure on venipuncture sites to achieve adequate hemostasis  - Assess for signs and symptoms of internal  bleeding  - Monitor lab trends  - Patient is to report abnormal signs of bleeding to staff  - Avoid use of toothpicks and dental floss  - Use electric shaver for shaving  - Use soft bristle tooth brush  - Limit straining and forceful nose blowing  Outcome: Progressing     Problem: MUSCULOSKELETAL - ADULT  Goal: Return mobility to safest level of function  Description: INTERVENTIONS:  - Assess patient stability and activity tolerance for standing, transferring and ambulating w/ or w/o assistive devices  - Assist with transfers and ambulation using safe patient handling equipment as needed  - Ensure adequate protection for wounds/incisions during mobilization  - Obtain PT/OT consults as needed  - Advance activity as appropriate  - Communicate ordered activity level and limitations with patient/family  Outcome: Progressing     Problem: Impaired Functional Mobility  Goal: Achieve highest/safest level of mobility/gait  Description: Interventions:  - Assess patient's functional ability and stability  - Promote increasing activity/tolerance for mobility and gait  - Educate and engage patient/family in tolerated activity level and precautions  - Recommend use of  RW for transfers and ambulation  Outcome: Progressing     Problem: Impaired Activities of Daily Living  Goal: Achieve highest/safest level of independence in self care  Description: Interventions:  - Assess ability and encourage patient to participate in ADLs to maximize function  - Promote sitting position while performing ADLs such as feeding, grooming, and bathing  - Educate and encourage patient/family in tolerated functional activity level and precautions during self-care  - Encourage patient to incorporate impaired side during daily activities to promote function  Outcome: Progressing

## 2024-12-14 NOTE — PLAN OF CARE
Problem: Patient Centered Care  Goal: Patient preferences are identified and integrated in the patient's plan of care  Description: Interventions:  - What would you like us to know as we care for you? From [facility]   - Provide timely, complete, and accurate information to patient/family  - Incorporate patient and family knowledge, values, beliefs, and cultural backgrounds into the planning and delivery of care  - Encourage patient/family to participate in care and decision-making at the level they choose  - Honor patient and family perspectives and choices  Outcome: Progressing     Problem: Patient/Family Goals  Goal: Patient/Family Long Term Goal  Description: Patient's Long Term Goal: Discharge from the hospital    Interventions:  - Monitor vital signs  - Monitor appropriate labs  - Pain management  - Administer medications per order  - Follow MD orders  - Diagnostics per order  - Update / inform patient and family on plan of care  - Discharge planning  - See additional Care Plan goals for specific interventions  Outcome: Progressing  Goal: Patient/Family Short Term Goal  Description: Patient's Short Term Goal: Improve dark stools    Interventions:   - Monitor vital signs  - Monitor appropriate labs  - Pain management  - Administer medications per order  - Follow MD orders  - Diagnostics per order  - Update / inform patient and family on plan of care  - See additional Care Plan goals for specific interventions  Outcome: Progressing     Problem: SAFETY ADULT - FALL  Goal: Free from fall injury  Description: INTERVENTIONS:  - Assess pt frequently for physical needs  - Identify cognitive and physical deficits and behaviors that affect risk of falls.  - [place] fall precautions as indicated by assessment.  - Educate pt/family on patient safety including physical limitations  - Instruct pt to call for assistance with activity based on assessment  - Modify environment to reduce risk of  injury  - Provide assistive devices as appropriate  - Consider OT/PT consult to assist with strengthening/mobility  - Encourage toileting schedule  Outcome: Progressing     Problem: HEMATOLOGIC - ADULT  Goal: Maintains hematologic stability  Description: INTERVENTIONS  - Assess for signs and symptoms of bleeding or hemorrhage  - Monitor labs and vital signs for trends  - Administer supportive blood products/factors, fluids and medications as ordered and appropriate  - Administer supportive blood products/factors as ordered and appropriate  Outcome: Progressing  Goal: Free from bleeding injury  Description: (Example usage: patient with low platelets)  INTERVENTIONS:  - Avoid intramuscular injections, enemas and rectal medication administration  - Ensure safe mobilization of patient  - Hold pressure on venipuncture sites to achieve adequate hemostasis  - Assess for signs and symptoms of internal bleeding  - Monitor lab trends  - Patient is to report abnormal signs of bleeding to staff  - Avoid use of toothpicks and dental floss  - Use electric shaver for shaving  - Use soft bristle tooth brush  - Limit straining and forceful nose blowing  Outcome: Progressing     Problem: MUSCULOSKELETAL - ADULT  Goal: Return mobility to safest level of function  Description: INTERVENTIONS:  - Assess patient stability and activity tolerance for standing, transferring and ambulating w/ or w/o assistive devices  - Assist with transfers and ambulation using safe patient handling equipment as needed  - Ensure adequate protection for wounds/incisions during mobilization  - Obtain PT/OT consults as needed  - Advance activity as appropriate  - Communicate ordered activity level and limitations with patient/family  Outcome: Progressing     Problem: Impaired Functional Mobility  Goal: Achieve highest/safest level of mobility/gait  Description: Interventions:  - Assess patient's functional ability and stability  - Promote increasing  activity/tolerance for mobility and gait  - Educate and engage patient/family in tolerated activity level and precautions  - Recommend use of  RW for transfers and ambulation  Outcome: Progressing     Problem: Impaired Activities of Daily Living  Goal: Achieve highest/safest level of independence in self care  Description: Interventions:  - Assess ability and encourage patient to participate in ADLs to maximize function  - Promote sitting position while performing ADLs such as feeding, grooming, and bathing  - Educate and encourage patient/family in tolerated functional activity level and precautions during self-care  - Encourage patient to incorporate impaired side during daily activities to promote function  Outcome: Progressing

## 2024-12-14 NOTE — H&P (VIEW-ONLY)
REFERRING PHYSICIAN: Dr. Burt ref. provider found    HPI:         Thank you very much for requesting me to see the patient.    As you know, Hoda Busyb is a 84 year old female with history of aortic stenosis, recent pneumonia / interstitial lung disease / PE (on Xarelto, 10/25/2024) and admit 11/25/2024 - 11/30/2024 for worsening anemia vs baseline / dark stools  who presents today and 11/26/2024 EGD revealing clean based gastric ulcer and gastric AVM. She presented to ER yesterday due to Hb < 7 noted on out-pt testing. Pt denies abdominal pain; no cp; no sob; no f/c;           PMH: severe aortic stenosis;  HTN; macular degeneration; hip OA, L THR, R BEATRIZ;  white coat HTN; vit d def; dyslipidemia; mitral valve insufficiency; osteoporosis with h/o pathologic fracture; L peronea neuropathy; B pneumonia / inerstitial pneumonitis (treated with prednisone), PE 11/2024 (Xarelto).     PMH-GI:      1/4/2021 -- colonoscopy -- \"revealed adenomatous polyp but poor prep; pt declined repeat colonoscopy.\" -- Dr. Seth Hendricks -- \"Pre-Operative Diagnosis: Positive Cologuard. Post-Operative Diagnosis: Colon polyp x 3. Internal hemorrhoids - -- Quality of Preparation: Inadequate  - - - 1 pedunculated and polypoid appearing polyp 12-15 mm was found in the ascending colon s/p removal  - - - - - 1 sessile polyp measuring 2 mm was found in the ascending colon s/p removal with cold forceps.   - 1 flat polyp measuring 10 mm was found on the ileocecal valve.  Saline was used to lift however unable to get adequate lift.  Unable to obtain any portion of this flat polyp with Lariat or Exacto snares.  Unable to resect any of this polyp.  Biopsies were taken.  If adenoma vs SSP will need Orise for resection.\"      11/26/2024 -- consult Dr. Nuno -- \"Patient is a 84 year old female with past medical history significant for hypertension, osteoarthritis, recently diagnosed small pulmonary embolism with possible interstitial lung disease who was  admitted to [facility] secondary to declining hemoglobin levels.  Patient was recently admitted in October November 2020 with pneumonia/interstitial lung disease.  It was during that time she was noted to have pulmonary emboli that were small.  She was put on anticoagulation with Xarelto.  Upon discharge her hemoglobin was 10.3.  While outpatient, since discharge, she has had some progressive fatigue.  Here hemoglobin was noted to be dropping with hemoglobin on the 16th of 7.0.  There has been some suggestion that she has been having some dark stools concerning for melena.  She denies any abdominal pain.  No nausea or vomiting.  No diarrhea or constipation.  Her weight has been stable.  Her last colonoscopy was in 2021 with a few small polyps removed.  She does not recall of any recent upper endoscopy.  She denies any significant NSAID use.  Since this admission patient has received 2 units of packed red blood cells given drop in hemoglobin to 6.5. - - -IMPRESSION / PLAN: 1. Acute on chronic anemia 2. Dark stools/possible melena  -Patient has known anemia that was felt to be related to chronic disease in the past. -She has had an acute drop and with dark stools raising concerns for GI bleeding.  Upper GI bleed being the most likely given the black stools and history.  -No previous EGD. -Last colonoscopy 2021.  Small polyps.  No other bleeding source.  3.   History of pulmonary embolism -normally on Xarelto 4.   Interstitial lung disease-stable.  Chest x-ray stable.  Not on oxygen.  Not short of breath. 5.   Aortic stenosis       Recommendations: 1. N.p.o. 2. Plan for EGD.  If clearance given from a cardiopulmonary perspective 3. Hold Xarelto 4. Monitor H&H and transfuse as necessary. 5. PPI twice daily for now 6. Avoid NSAIDs -- \"      11/26/2024 -- EGD with control of bleeding / bx -- Dr. Nuno -- \"Pre-op Diagnosis: gi bleed/anemia/melena.   Post-Op Diagnosis:  1. Normal esophagus and Z-line 2. Mild  gastritis 3. Gastric antral ulcer 15 mm clean-based.  Nonbleeding.  Biopsies taken from ulcer edge. 4. Gastric antral AVM, 3mm status post APC 5. Normal duodenal bulb and post bulbar duodenum.\"     SOC: no tob (remote); no etoh; no children;     Fhx: no colon ca; no IBD; no celiac sprue.         Past Surgical History:   Procedure Laterality Date    Cataract  3/2&3/16 2017    Colonoscopy & polypectomy  2021         Hip replacement surgery Left Around     Other surgical history  [place]     Bilateral cataract surgery     Social History     Socioeconomic History    Marital status:    Tobacco Use    Smoking status: Former     Current packs/day: 0.00     Types: Cigarettes     Start date: 1990     Quit date: 1990     Years since quittin.9     Passive exposure: Past    Smokeless tobacco: Former   Vaping Use    Vaping status: Never Used   Substance and Sexual Activity    Alcohol use: Yes     Comment: wine, occasionally    Drug use: Never   Other Topics Concern    Caffeine Concern Yes     Comment: coffee/soda - 2cups/day     Social Drivers of Health     Food Insecurity: No Food Insecurity (2024)    Food Insecurity     Food Insecurity: Never true   Transportation Needs: No Transportation Needs (2024)    Transportation Needs     Lack of Transportation: No   Housing Stability: Low Risk  (2024)    Housing Stability     Housing Instability: No         Current Facility-Administered Medications   Medication Dose Route Frequency    pantoprazole (Protonix) 40 mg in sodium chloride 0.9% PF 10 mL IV push  40 mg Intravenous Daily    [START ON 2024] alendronate (Fosamax) tab 70 mg  70 mg Oral Q7 Days    atorvastatin (Lipitor) tab 20 mg  20 mg Oral Nightly    calcium carbonate-vitamin D (Oyster Shell-D) 250-3.125 MG-MCG per tab 1 tablet  1 tablet Oral Daily    cholecalciferol (Vitamin D3) tab 4,000 Units  4,000 Units Oral Daily    ferrous sulfate DR tab 325 mg  325 mg Oral Daily     polyethylene glycol (PEG 3350) (Miralax) 17 g oral packet 17 g  17 g Oral Daily    predniSONE (Deltasone) tab 40 mg  40 mg Oral Daily with breakfast    [START ON 12/16/2024] sulfamethoxazole-trimethoprim DS (Bactrim DS) 800-160 MG per tab 1 tablet  1 tablet Oral Once per day on Monday Wednesday Friday    cyanocobalamin (Vitamin B12) tab 500 mcg  500 mcg Oral Daily    zolpidem (Ambien) tab 5 mg  5 mg Oral Nightly PRN    sodium chloride 0.9% infusion   Intravenous Continuous       Allergies[1]    A comprehensive 10 point review of systems was completed.  Pertinent positives and negatives noted in the the HPI.    EXAM:  /63 (BP Location: Left arm)   Pulse 82   Temp 98 °F (36.7 °C) (Oral)   Resp 18   Ht 5' 8\" (1.727 m)   Wt 174 lb 9.6 oz (79.2 kg)   SpO2 98%   BMI 26.55 kg/m²  -Body mass index is 26.55 kg/m².  GENERAL: well developed, well nourished, in no apparent distress  SKIN: no rashes, no suspicious lesions  HEENT: anicteric; no JVD.  NECK: supple, no adenopathy, no bruits  LUNGS: clear to auscultation  CARDIO: RRR, nl s1 and s2. No murmers appreciated.  GI: Soft, NT, ND, normal BS. NO HSM, masses, hernias or bruits.  EXTREMITIES: no cyanosis, clubbing or edema    ___________________________________________________________    ASSESSMENT: In summary this is a 84 year old female with history of aortic stenosis, recent pneumonia / interstitial lung disease / PE (on Xarelto, 10/25/2024) and admit 11/25/2024 - 11/30/2024 for worsening anemia vs baseline / dark stools  who presents today and 11/26/2024 EGD revealing clean based gastric ulcer and gastric AVM. She presented to ER yesterday due to Hb < 7 noted on out-pt testing.     ACTIVE ISSUES INCLUDE:     Recurrent severe anemia on Xarelto -- acute blood loss anemia -- recent 11/26/2024 EGD revealing gastric ulcer and gastric AVM (cauterized). She is also on steroids for ILD. Dark stools (also on PO Fe). Last colonoscopy was in 1/2021 with inadequate prep  (and a polyp which could not be removed; pt declined repeat colonoscopy previously). Options d/w pt:     -- \"I want to do everything I can do get better\" and is agreeable to with suggested Enteroscopy / colonoscopy with MAC anesthesia suggested on Monday after slow two day prep.   --  last Xarelto was 12/13/2024. Held at admit.   -- soft diet today.   -- empiric PPI.    -- transfuse to Hb > 7     2. PE / ILD     3.  Severe aortic stenosis;  HTN;    4. hip OA, L THR, R BEATRIZ;      5. osteoporosis with h/o pathologic fracture.           The patient indicates understanding of these issues and agrees to the plan. Thank you!       Damon Rosario M.D.       [facility]  ___________________________________________________________              [1] No Known Allergies

## 2024-12-15 LAB
ANION GAP SERPL CALC-SCNC: 5 MMOL/L (ref 0–18)
BASOPHILS # BLD AUTO: 0.02 X10(3) UL (ref 0–0.2)
BASOPHILS NFR BLD AUTO: 0.2 %
BLOOD TYPE BARCODE: 6200
BLOOD TYPE BARCODE: 6200
BUN BLD-MCNC: 20 MG/DL (ref 9–23)
BUN/CREAT SERPL: 24.1 (ref 10–20)
CALCIUM BLD-MCNC: 8.5 MG/DL (ref 8.7–10.4)
CHLORIDE SERPL-SCNC: 113 MMOL/L (ref 98–112)
CO2 SERPL-SCNC: 28 MMOL/L (ref 21–32)
CREAT BLD-MCNC: 0.83 MG/DL
DEPRECATED RDW RBC AUTO: 52.5 FL (ref 35.1–46.3)
EGFRCR SERPLBLD CKD-EPI 2021: 69 ML/MIN/1.73M2 (ref 60–?)
EOSINOPHIL # BLD AUTO: 0.04 X10(3) UL (ref 0–0.7)
EOSINOPHIL NFR BLD AUTO: 0.5 %
ERYTHROCYTE [DISTWIDTH] IN BLOOD BY AUTOMATED COUNT: 16.2 % (ref 11–15)
GLUCOSE BLD-MCNC: 81 MG/DL (ref 70–99)
HCT VFR BLD AUTO: 29 %
HGB BLD-MCNC: 9 G/DL
IMM GRANULOCYTES # BLD AUTO: 0.06 X10(3) UL (ref 0–1)
IMM GRANULOCYTES NFR BLD: 0.7 %
LYMPHOCYTES # BLD AUTO: 1.52 X10(3) UL (ref 1–4)
LYMPHOCYTES NFR BLD AUTO: 17.7 %
MCH RBC QN AUTO: 27.3 PG (ref 26–34)
MCHC RBC AUTO-ENTMCNC: 31 G/DL (ref 31–37)
MCV RBC AUTO: 87.9 FL
MONOCYTES # BLD AUTO: 0.68 X10(3) UL (ref 0.1–1)
MONOCYTES NFR BLD AUTO: 7.9 %
NEUTROPHILS # BLD AUTO: 6.29 X10 (3) UL (ref 1.5–7.7)
NEUTROPHILS # BLD AUTO: 6.29 X10(3) UL (ref 1.5–7.7)
NEUTROPHILS NFR BLD AUTO: 73 %
OSMOLALITY SERPL CALC.SUM OF ELEC: 304 MOSM/KG (ref 275–295)
PLATELET # BLD AUTO: 387 10(3)UL (ref 150–450)
POTASSIUM SERPL-SCNC: 3.8 MMOL/L (ref 3.5–5.1)
RBC # BLD AUTO: 3.3 X10(6)UL
SODIUM SERPL-SCNC: 146 MMOL/L (ref 136–145)
UNIT VOLUME: 350 ML
UNIT VOLUME: 350 ML
WBC # BLD AUTO: 8.6 X10(3) UL (ref 4–11)

## 2024-12-15 PROCEDURE — 99232 SBSQ HOSP IP/OBS MODERATE 35: CPT | Performed by: INTERNAL MEDICINE

## 2024-12-15 RX ORDER — POTASSIUM CHLORIDE 1500 MG/1
40 TABLET, EXTENDED RELEASE ORAL ONCE
Status: COMPLETED | OUTPATIENT
Start: 2024-12-15 | End: 2024-12-15

## 2024-12-15 NOTE — PROGRESS NOTES
[facility]  part of [facility]    Progress Note    Hoda Busby Patient Status:  Inpatient    1940 MRN [id number]   Location [facility] 5SW/SE Attending Jigar Apple MD   Hosp Day # 2 PCP Malathi Stuart MD         Assessment and Plan:     Acute GI bleed  Recently hospitalized -24 for GI bleed, now with recurrence; had been taking Xarelto 20 mg daily for PE  -her last colonoscopy in 2021 revealed adenomatous polyp but poor prep; pt declined repeat colonoscopy.  -EGD 24 (Dr. Nuno) -  15mm clean-based, nonbleeding gastric antral ulcer (bx done); 3mm gastric antral AVM s/p APC.  -on protonix 40mg BID - was to continue x 8 weeks (EOT 25)  She received a total of 3 units PRBCs and also IV Ferrrlecit.  On 24, she was resumed on Xarelto 20 mg po daily, and oral prednisone.  She returned to [facility] 24 for rehab.    -now Hgb 6.5 on 24 at [facility].  She was referred to ED.  Repeat Hgb was 7.6, though stool was heme positive.  Hgb decreased to 6.1 overnight, and she received transfusion 2 units PRBCs today.   - Xarelto has been stopped.    -seen for GI consult by Dr Rosario; plan enteroscopy and colonoscopy on 24 with MAC anesthesia; bowel prep ordered by Dr Rosario  -continue pantoprazole 40 mg IV [id number]  -serial H/H; keep Hgb >7     Anemia due to acute blood loss  -Hgb 6.1 today; s/p 2 units PRBCs today; post-tx H/H 10.1> 9.0     Acute left peroneal palsy  In 2024; has numbness to anterolateral left foot and ankle as well as inability to dorsiflex left foot; etiology unclear  -neurologist Dr Richardson consult  noted and appreciated  -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   -MRI brain shows no acute intracranial process  -left foot drop persists     Hypotension  -resolved with blood tx  -BP 100s this am; will continue to hold  amlodipine and Lasix; creatinine 1.30> 1.03 with IVF hydration; will stop IVF     Near syncope  Assisted fall  -pt felt lightheaded in bathroom on 11/27  -started to fall forward but was caught by staff; lightly bumped her head; no visible injury; no HA  -developed HA later in the day -- stat CT brain neg for bleed.      Interstitial pneumonitis  -dx'ed 10/2024 - presented with cough and severe hypoxia  -unresponsive to abx (amox/doxy, then IV zosyn/zithromax)  -CXR 10/24/24 extensive bilateral perihilar and bilateral upper and lower lobe   -S.pneumo, legionella Ag , mycoplasma IgM/G, Fungitell-beta-D glucan negative  -ANCA and URIEL/connective tissue disease panels negative  -anti-histone and anti-[place] Ab's negative  -Echo 10/24/24 - normal EF (55-60%), no obvious vegetatons  -CT with bilateral ground glass opacities, small consolidations of BLL  -per pulm, findings suspicious for NSIP (vs cryptogenic organizing pneumonia vs hypersensitivity pneumonitis); NOT thought to be c/w UIP pattern  -on empiric steroids (solumedrol then prednisone) since 10/25/24 - on prednisone 40mg/day  -F/U Pulm Dr Holloway 12/19/24     Bilateral pulmonary emboli  -dx'ed at time of interstitial pneumonitis  -CT chest 10/25/24 -- acute distal segmental/subsegmental pulmonary emboli in [place] and subsegmental PE in [place]  -unclear etiology; no recent hx of long travel; no family/personal hx of blood clots  -BLE venous dopplers negative 10/26/24  -unclear etiology of PE; will do hypercoag w/u as outpt.   Consider malignancy w/u if no other cause found (colonoscopy 1/2021 revealed adenomatous polyp but poor prep; pt declined repeat colonoscopy; Medicare would not cover Cologuard); UTD on mammo  -started on xarelto (held 2/2 GIB) - restarted xarelto 20 mg po every day on 11/28/24  -repeat CT chest 12/9/24 neg PE  -stopped Xarelto 12/14/24 due to recurrent GI bleed     Severe aortic stenosis  -sees cardiology Dr. Bird  -moderate A.S on echo in 10/2022  (prev mild A.S in 2020)  -abnormal pre-op EKG 9/2023 --Nuc GXT done 9/20/23 -- EKG portion revealed 1-1.5mm ST seg depression in inferolateral leads with prolonged recovery; nuclear portion with normal perfusion   -Echo 9/29/23 --progression of A.S. from mild to moderate  -recent echo at Dr. Bird's office 10/18/24 -- LVEF 65%, grade 2 diastolic dysfunction, severe aortic stenosis (mean aortic gradient signif worse since 9/2023 -- 31>49 mmHg),  -seen by cardiology during last admission in 10/2024; pt will need TAVR evaluation as outpt  -on lasix 20mg po daily as of 10/2024--> held as above     Hypertension, primary  -(dyazide stopped due to NANCY)  -on amlodipine 5mg/day (on hold for now due to hypotension -- can restart if BP starts creeping up again)     Hx of hip OA  -s/p left THR (Dr. Lo) many years ago  -s/p elective R BEATRIZ on 10/5/23 by Dr. Diaz     Hyperlipidemia   Pt with strong family hx of high cholesterol  ; normal TG and HDL  No indication for statin given age     Osteopenia   On Fosamax from 2011 to 4/2016.     DEXA stable 5/10/17.    DEXA in 9/2019 showed slightly more bone loss in spine, but stable in hip.    DEXA 11/2021 -- some improvement in femur.  Hold off on fosamax for now.   DEXA 8/2024 -- osteoporosis of left forearm  -restarted Fosamax 8/2024     Vitamin D deficiency  On vitamin D 4000u/day     BLE edema  Advised compression     VTE proph            -SCDs     D/w tres Ferrari yesterday        SUBJECTIVE:    No melena; no abdominal pain; no F/C        OBJECTIVE:  PHYSICAL EXAM:     Vital signs in last 24 hours:  /80 (BP Location: Left arm)   Pulse 81   Temp 97.6 °F (36.4 °C) (Oral)   Resp 18   Ht 5' 8\" (1.727 m)   Wt 174 lb 9.6 oz (79.2 kg)   SpO2 95%   BMI 26.55 kg/m²     Intake/Output:    Intake/Output Summary (Last 24 hours) at 12/15/2024 1104  Last data filed at 12/15/2024 0754  Gross per 24 hour   Intake 2560 ml   Output 1450 ml   Net 1110 ml       Wt Readings  from Last 3 Encounters:   12/13/24 174 lb 9.6 oz (79.2 kg)   11/29/24 170 lb (77.1 kg)   11/01/24 174 lb 14.4 oz (79.3 kg)         Constitutional: alert and oriented x3 in no acute distress  HEENT- EOMI, PERRL  Nose/Mouth/Throat: pharynx without erythema  Neck/Thyroid: neck supple; no thyromegaly  Cardiovascular: RRR, S1, S2, no S3 or murmur  Respiratory: lungs without crackles or wheezes  Abdomen: normoactive bowel sounds, soft, non-tender and non-distended  Extremities: no clubbing, cyanosis or edema          Meds:   Current Facility-Administered Medications   Medication Dose Route Frequency    polyethylene glycol-electrolyte (Golytely) 236 g oral solution 2,000 mL  2,000 mL Oral Once    pantoprazole (Protonix) 40 mg in sodium chloride 0.9% PF 10 mL IV push  40 mg Intravenous Q12H    [START ON 12/17/2024] alendronate (Fosamax) tab 70 mg  70 mg Oral Q7 Days    atorvastatin (Lipitor) tab 20 mg  20 mg Oral Nightly    calcium carbonate-vitamin D (Oyster Shell-D) 250-3.125 MG-MCG per tab 1 tablet  1 tablet Oral Daily    cholecalciferol (Vitamin D3) tab 4,000 Units  4,000 Units Oral Daily    ferrous sulfate DR tab 325 mg  325 mg Oral Daily    polyethylene glycol (PEG 3350) (Miralax) 17 g oral packet 17 g  17 g Oral Daily    predniSONE (Deltasone) tab 40 mg  40 mg Oral Daily with breakfast    [START ON 12/16/2024] sulfamethoxazole-trimethoprim DS (Bactrim DS) 800-160 MG per tab 1 tablet  1 tablet Oral Once per day on Monday Wednesday Friday    cyanocobalamin (Vitamin B12) tab 500 mcg  500 mcg Oral Daily    zolpidem (Ambien) tab 5 mg  5 mg Oral Nightly PRN            Data Review:     Labs:   Lab Results   Component Value Date    GLU 81 12/15/2024     12/15/2024    K 3.8 12/15/2024     12/15/2024    CO2 28.0 12/15/2024    BUN 20 12/15/2024    CREATSERUM 0.83 12/15/2024    CA 8.5 12/15/2024       Recent Labs   Lab 12/15/24  0535   WBC 8.6   HGB 9.0*   HCT 29.0*   MCV 87.9   MCH 27.3   MCHC 31.0   RDW 16.2*    NEPRELIM 6.29   .0       No results for input(s): \"COLORUR\", \"CLARITY\", \"SPECGRAVITY\", \"GLUUR\", \"BILUR\", \"KETUR\", \"BLOODURINE\", \"PHURINE\", \"PROUR\", \"UROBILINOGEN\", \"NITRITE\", \"LEUUR\", \"WBCUR\", \"RBCUR\", \"BACUR\", \"EPIUR\" in the last 168 hours.    No results for input(s): \"URINE\", \"CULTI\", \"BLDSMR\" in the last 168 hours.      Imaging:  No results found.                     Jigar Apple MD

## 2024-12-15 NOTE — PLAN OF CARE
Problem: Patient Centered Care  Goal: Patient preferences are identified and integrated in the patient's plan of care  Description: Interventions:  - What would you like us to know as we care for you? From [facility]   - Provide timely, complete, and accurate information to patient/family  - Incorporate patient and family knowledge, values, beliefs, and cultural backgrounds into the planning and delivery of care  - Encourage patient/family to participate in care and decision-making at the level they choose  - Honor patient and family perspectives and choices  Outcome: Progressing     Problem: Patient/Family Goals  Goal: Patient/Family Long Term Goal  Description: Patient's Long Term Goal: Discharge from the hospital    Interventions:  - Monitor vital signs  - Monitor appropriate labs  - Pain management  - Administer medications per order  - Follow MD orders  - Diagnostics per order  - Update / inform patient and family on plan of care  - Discharge planning  - See additional Care Plan goals for specific interventions  Outcome: Progressing  Goal: Patient/Family Short Term Goal  Description: Patient's Short Term Goal: Improve dark stools    Interventions:   - Monitor vital signs  - Monitor appropriate labs  - Pain management  - Administer medications per order  - Follow MD orders  - Diagnostics per order  - Update / inform patient and family on plan of care  - See additional Care Plan goals for specific interventions  Outcome: Progressing     Problem: SAFETY ADULT - FALL  Goal: Free from fall injury  Description: INTERVENTIONS:  - Assess pt frequently for physical needs  - Identify cognitive and physical deficits and behaviors that affect risk of falls.  - [place] fall precautions as indicated by assessment.  - Educate pt/family on patient safety including physical limitations  - Instruct pt to call for assistance with activity based on assessment  - Modify environment to reduce risk of  Addended by: Mona Min on: 7/23/2019 04:14 PM     Modules accepted: Orders injury  - Provide assistive devices as appropriate  - Consider OT/PT consult to assist with strengthening/mobility  - Encourage toileting schedule  Outcome: Progressing     Problem: HEMATOLOGIC - ADULT  Goal: Maintains hematologic stability  Description: INTERVENTIONS  - Assess for signs and symptoms of bleeding or hemorrhage  - Monitor labs and vital signs for trends  - Administer supportive blood products/factors, fluids and medications as ordered and appropriate  - Administer supportive blood products/factors as ordered and appropriate  Outcome: Progressing  Goal: Free from bleeding injury  Description: (Example usage: patient with low platelets)  INTERVENTIONS:  - Avoid intramuscular injections, enemas and rectal medication administration  - Ensure safe mobilization of patient  - Hold pressure on venipuncture sites to achieve adequate hemostasis  - Assess for signs and symptoms of internal bleeding  - Monitor lab trends  - Patient is to report abnormal signs of bleeding to staff  - Avoid use of toothpicks and dental floss  - Use electric shaver for shaving  - Use soft bristle tooth brush  - Limit straining and forceful nose blowing  Outcome: Progressing     Problem: MUSCULOSKELETAL - ADULT  Goal: Return mobility to safest level of function  Description: INTERVENTIONS:  - Assess patient stability and activity tolerance for standing, transferring and ambulating w/ or w/o assistive devices  - Assist with transfers and ambulation using safe patient handling equipment as needed  - Ensure adequate protection for wounds/incisions during mobilization  - Obtain PT/OT consults as needed  - Advance activity as appropriate  - Communicate ordered activity level and limitations with patient/family  Outcome: Progressing     Problem: Impaired Functional Mobility  Goal: Achieve highest/safest level of mobility/gait  Description: Interventions:  - Assess patient's functional ability and stability  - Promote increasing  activity/tolerance for mobility and gait  - Educate and engage patient/family in tolerated activity level and precautions  - Recommend use of  RW for transfers and ambulation  Outcome: Progressing     Problem: Impaired Activities of Daily Living  Goal: Achieve highest/safest level of independence in self care  Description: Interventions:  - Assess ability and encourage patient to participate in ADLs to maximize function  - Promote sitting position while performing ADLs such as feeding, grooming, and bathing  - Educate and encourage patient/family in tolerated functional activity level and precautions during self-care  - Encourage patient to incorporate impaired side during daily activities to promote function  Outcome: Progressing      Monitoring vital signs- see flowsheets. No acute changes noted at this moment. Safety and fall precautions maintained- bed alarm on, bed locked in lowest position, call light within reach. Frequent rounding by nursing staff.

## 2024-12-15 NOTE — PROGRESS NOTES
GI  PROGRESS NOTE    SUBJECTIVE: in chair; no complaints; completed first half of prep yesterday.       OBJECTIVE:  Temp:  [97.5 °F (36.4 °C)-98.5 °F (36.9 °C)] 97.6 °F (36.4 °C)  Pulse:  [75-83] 81  Resp:  [18] 18  BP: (100-145)/(54-80) 130/80  SpO2:  [93 %-99 %] 95 %  Exam  Gen: No acute distress, alert and oriented x3  Pulm: Lungs clear, normal respiratory effort  CV: Heart with regular rate and rhythm, no peripheral edema  Abd: Abdomen soft, NT; ND; (+) BS; no organomegaly, bowel sounds present    Labs:     Recent Labs   Lab 12/13/24  1556 12/14/24  0445 12/14/24  1549 12/15/24  0535   WBC 11.2* 8.0  --  8.6   HGB 7.6* 6.1* 10.3* 9.0*   MCV 88.8 88.3  --  87.9   .0* 346.0  --  387.0       Recent Labs   Lab 12/13/24  1556 12/14/24  0445 12/15/24  0535    144 146*   K 4.8 4.3 3.8   * 115* 113*   CO2 24.0 26.0 28.0   BUN 28* 23 20   CREATSERUM 1.30* 1.03* 0.83   CA 9.0 8.4* 8.5*   * 80 81       Recent Labs   Lab 12/13/24  1556 12/14/24  0445   ALT 31 19   AST 29 16   ALB 3.7 2.9*         Meds:      pantoprazole  40 mg Intravenous Q12H    [START ON 12/17/2024] alendronate  70 mg Oral Q7 Days    atorvastatin  20 mg Oral Nightly    calcium carbonate-vitamin D  1 tablet Oral Daily    cholecalciferol  4,000 Units Oral Daily    ferrous sulfate  325 mg Oral Daily    polyethylene glycol (PEG 3350)  17 g Oral Daily    predniSONE  40 mg Oral Daily with breakfast    [START ON 12/16/2024] sulfamethoxazole-trimethoprim DS  1 tablet Oral Once per day on Monday Wednesday Friday    cyanocobalamin  500 mcg Oral Daily         zolpidem    _______________________________________________________________    IMPRESSION: Pt is a 84 F with history of aortic stenosis, recent pneumonia / interstitial lung disease / PE (on Xarelto, 10/25/2024) and admit 11/25/2024 - 11/30/2024 for worsening anemia vs baseline / dark stools  who presents today and 11/26/2024 EGD revealing clean based gastric ulcer and gastric AVM.  She presented to ER yesterday due to Hb < 7 noted on out-pt testing.      ACTIVE ISSUES INCLUDE:      Recurrent severe anemia on Xarelto -- acute blood loss anemia -- recent 11/26/2024 EGD revealing gastric ulcer and gastric AVM (cauterized). She is also on steroids for ILD. Dark stools (also on PO Fe). Last colonoscopy was in 1/2021 with inadequate prep (and a polyp which could not be removed; pt declined repeat colonoscopy previously). Options d/w pt:      -- \"I want to do everything I can do get better\" and is agreeable to with suggested Enteroscopy / colonoscopy with MAC anesthesia suggested on Monday after slow two day prep.   --  last Xarelto was 12/13/2024. Held at admit.   -- clear liquid diet; NPO after 5 am tomorrow.   -- empiric PPI.    -- scheduled for Enteroscopy / colonoscopy with MAC anesthesia tomorrow with Dr. Staton     2. PE / ILD      3.  Severe aortic stenosis;  HTN;     4. hip OA, L THR, R BEATRIZ;       5. osteoporosis with h/o pathologic fracture.       Damon Rosario M.D.  [facility]   _______________________________________________________________

## 2024-12-15 NOTE — OCCUPATIONAL THERAPY NOTE
OCCUPATIONAL THERAPY EVALUATION - INPATIENT     Room Number: 531/531-A  Evaluation Date: 12/15/2024  Type of Evaluation: Initial  Presenting Problem: GI bleed, anemia    Physician Order: IP Consult to Occupational Therapy  Reason for Therapy: ADL/IADL Dysfunction and Discharge Planning    OCCUPATIONAL THERAPY ASSESSMENT   Patient is a 84 year old female admitted 12/13/2024 for GI bleed, anemia, gastric AVM.  Prior to admission, patient's baseline is I with all ADLs and IADLS including driving. Pt lives in a house alone and has a WIS. Patient is currently functioning near baseline with bathing, lower body dressing, and transfers.  Patient is requiring contact guard assist as a result of the following impairments: decreased functional strength, decreased endurance, and impaired   balance. Occupational Therapy will continue to follow for duration of hospitalization.    Patient will benefit from continued skilled OT Services at discharge to promote prior level of function.  Anticipate patient will return home with home health OT.    PLAN DURING HOSPITALIZATION     OT Treatment Plan: Balance activities;Energy conservation/work simplification techniques;ADL training;Endurance training;Patient/Family education     OCCUPATIONAL THERAPY MEDICAL/SOCIAL HISTORY   Problem List  Principal Problem:    Gastrointestinal hemorrhage, unspecified gastrointestinal hemorrhage type  Active Problems:    Nonrheumatic aortic valve stenosis    Acute blood loss anemia    Interstitial pneumonitis (HCC)    NANCY (acute kidney injury) (HCC)    Gastric AVM    HOME SITUATION  Type of Home: House  Lives With: Alone  Shower/Tub and Equipment: Walk-in shower  Drives: Yes    Stairs in Home: 3 steps  Use of Assistive Device(s): none    Prior Level of [place]: I with all ADLS and IADLS    SUBJECTIVE  I have some fear (of ambulating without the RW).     OCCUPATIONAL THERAPY EXAMINATION      OBJECTIVE  Fall Risk: High fall risk      PAIN  ASSESSMENT  Ratin        RANGE OF MOTION   Upper extremity ROM is within functional limits     STRENGTH ASSESSMENT  BUEs: 4+/5      ACTIVITIES OF DAILY LIVING ASSESSMENT  AM-PAC ‘6-Clicks’ Inpatient Daily Activity Short Form  How much help from another person does the patient currently need…  -   Putting on and taking off regular lower body clothing?: A Little  -   Bathing (including washing, rinsing, drying)?: A Little  -   Toileting, which includes using toilet, bedpan or urinal? : None  -   Putting on and taking off regular upper body clothing?: A Little  -   Taking care of personal grooming such as brushing teeth?: None  -   Eating meals?: None    AM-PAC Score:  Score: 21  Approx Degree of Impairment: 32.79%  Standardized Score (AM-PAC Scale): 44.27  CMS Modifier (G-Code): CJ    FUNCTIONAL TRANSFER ASSESSMENT  Sit to Stand: Chair  Chair: Contact Guard Assist (with RW)  Toilet Transfer: Contact Guard Assist    BED MOBILITY     BALANCE ASSESSMENT  Static Sitting: Independent (Good static balance seated at edge of bedside chair)    FUNCTIONAL ADL ASSESSMENT  Grooming Seated: Independent (simulated)  LB Dressing Seated: Supervision (donning/doffing socks)    THERAPEUTIC EXERCISE     Skilled Therapy Provided: RN okayed to work with pt this date. Pt received up in bedside chair and was agreeable to participate in skilled OT session. Pt vision and sensation appears grossly intact. A & O x 3 (person, place, month and year). OT assessed pt safety and performance of ADL tasks. Pt demo'd Good static balance seated at edge of bedside chair. Pt able to perform t/fs with CGA and use of RW. However, pt unable to ambulate without use of assistive device due to unsteadiness, which is below her baseline. Pt demoing unsteady gait and reaching for handholds with attempt to take x 2 steps without use of assistive device. OT educated pt on RW and shower safety including use of shower stool with pt verbalizing/demoing  understanding.     EDUCATION PROVIDED     The patient's Approx Degree of Impairment: 32.79% has been calculated based on documentation in the [facility] '6 clicks' Inpatient Daily Activity Short Form.  Research supports that patients with this level of impairment may benefit from discharge home.   Final disposition will be made by interdisciplinary medical team.     Patient End of Session: Up in chair;Needs met;Call light within reach;Hospital anti-slip socks;All patient questions and concerns addressed;RN aware of session/findings    OT Goals       Patient will complete functional transfer with I  Comment:     Patient will complete toileting with I  Comment:     Patient will tolerate standing for 10 minutes in prep for adls with I   Comment:    Patient will complete item retrieval with I  Comment:          Goals  on: 24  Frequency: 5x per week    Patient Evaluation Complexity Level:   Occupational Profile/Medical History LOW - Brief history including review of medical or therapy records    Specific performance deficits impacting engagement in ADL/IADL LOW  1 - 3 performance deficits    Client Assessment/Performance Deficits LOW - No comorbidities nor modifications of tasks    Clinical Decision Making LOW - Analysis of occupational profile, problem-focused assessments, limited treatment options    Overall Complexity LOW     OT Session Time: 10 minutes  Therapeutic Activity: 8 minutes

## 2024-12-15 NOTE — CM/SW NOTE
Per chart, OT note available and Anticipated therapy need: Home with Home Healthcare      HH referrals sent in Aidin. F2F entered/sent.    Monday SW/CM will need to f/up w/ pt for further assessment and DC planning discussion.    PLAN: Home w/ poss HH - pending list/choice & med clear      SW/CM to remain available for support and/or discharge planning.       MS DoriW, LSW [id number]

## 2024-12-16 ENCOUNTER — ANESTHESIA EVENT (OUTPATIENT)
Dept: ENDOSCOPY | Facility: HOSPITAL | Age: 84
End: 2024-12-16
Payer: MEDICARE

## 2024-12-16 ENCOUNTER — ANESTHESIA (OUTPATIENT)
Dept: ENDOSCOPY | Facility: HOSPITAL | Age: 84
End: 2024-12-16
Payer: MEDICARE

## 2024-12-16 LAB
ANION GAP SERPL CALC-SCNC: 8 MMOL/L (ref 0–18)
ATRIAL RATE: 73 BPM
BASOPHILS # BLD AUTO: 0.02 X10(3) UL (ref 0–0.2)
BASOPHILS NFR BLD AUTO: 0.3 %
BUN BLD-MCNC: 14 MG/DL (ref 9–23)
BUN/CREAT SERPL: 17.7 (ref 10–20)
CALCIUM BLD-MCNC: 8.5 MG/DL (ref 8.7–10.4)
CHLORIDE SERPL-SCNC: 110 MMOL/L (ref 98–112)
CO2 SERPL-SCNC: 27 MMOL/L (ref 21–32)
CREAT BLD-MCNC: 0.79 MG/DL
DEPRECATED RDW RBC AUTO: 55.1 FL (ref 35.1–46.3)
EGFRCR SERPLBLD CKD-EPI 2021: 74 ML/MIN/1.73M2 (ref 60–?)
EOSINOPHIL # BLD AUTO: 0.08 X10(3) UL (ref 0–0.7)
EOSINOPHIL NFR BLD AUTO: 1 %
ERYTHROCYTE [DISTWIDTH] IN BLOOD BY AUTOMATED COUNT: 16.3 % (ref 11–15)
GLUCOSE BLD-MCNC: 79 MG/DL (ref 70–99)
HCT VFR BLD AUTO: 30.6 %
HGB BLD-MCNC: 9 G/DL
IMM GRANULOCYTES # BLD AUTO: 0.05 X10(3) UL (ref 0–1)
IMM GRANULOCYTES NFR BLD: 0.6 %
LYMPHOCYTES # BLD AUTO: 1.83 X10(3) UL (ref 1–4)
LYMPHOCYTES NFR BLD AUTO: 23.6 %
MCH RBC QN AUTO: 27 PG (ref 26–34)
MCHC RBC AUTO-ENTMCNC: 29.4 G/DL (ref 31–37)
MCV RBC AUTO: 91.9 FL
MONOCYTES # BLD AUTO: 0.71 X10(3) UL (ref 0.1–1)
MONOCYTES NFR BLD AUTO: 9.2 %
NEUTROPHILS # BLD AUTO: 5.05 X10 (3) UL (ref 1.5–7.7)
NEUTROPHILS # BLD AUTO: 5.05 X10(3) UL (ref 1.5–7.7)
NEUTROPHILS NFR BLD AUTO: 65.3 %
OSMOLALITY SERPL CALC.SUM OF ELEC: 299 MOSM/KG (ref 275–295)
P AXIS: 49 DEGREES
P-R INTERVAL: 162 MS
PLATELET # BLD AUTO: 345 10(3)UL (ref 150–450)
POTASSIUM SERPL-SCNC: 3.5 MMOL/L (ref 3.5–5.1)
POTASSIUM SERPL-SCNC: 3.5 MMOL/L (ref 3.5–5.1)
POTASSIUM SERPL-SCNC: 3.8 MMOL/L (ref 3.5–5.1)
Q-T INTERVAL: 362 MS
QRS DURATION: 74 MS
QTC CALCULATION (BEZET): 398 MS
R AXIS: 2 DEGREES
RBC # BLD AUTO: 3.33 X10(6)UL
SODIUM SERPL-SCNC: 145 MMOL/L (ref 136–145)
T AXIS: 12 DEGREES
VENTRICULAR RATE: 73 BPM
WBC # BLD AUTO: 7.7 X10(3) UL (ref 4–11)

## 2024-12-16 PROCEDURE — 99232 SBSQ HOSP IP/OBS MODERATE 35: CPT | Performed by: INTERNAL MEDICINE

## 2024-12-16 RX ORDER — POTASSIUM CHLORIDE 1500 MG/1
40 TABLET, EXTENDED RELEASE ORAL EVERY 4 HOURS
Status: DISPENSED | OUTPATIENT
Start: 2024-12-16 | End: 2024-12-16

## 2024-12-16 RX ORDER — SODIUM PHOSPHATE, DIBASIC AND SODIUM PHOSPHATE, MONOBASIC 7; 19 G/230ML; G/230ML
1 ENEMA RECTAL ONCE AS NEEDED
Status: ACTIVE | OUTPATIENT
Start: 2024-12-16 | End: 2024-12-16

## 2024-12-16 RX ORDER — POLYETHYLENE GLYCOL 3350 17 G/17G
169 POWDER, FOR SOLUTION ORAL ONCE
Status: COMPLETED | OUTPATIENT
Start: 2024-12-16 | End: 2024-12-16

## 2024-12-16 NOTE — PLAN OF CARE
Monitoring vital signs, stable at this time. No acute changes at this moment. Fall precautions in place-- bed alarm on, bed locked in lowest position, call light within reach. Frequent rounding by nursing staff.      Problem: Patient Centered Care  Goal: Patient preferences are identified and integrated in the patient's plan of care  Description: Interventions:  - What would you like us to know as we care for you? From [facility]   - Provide timely, complete, and accurate information to patient/family  - Incorporate patient and family knowledge, values, beliefs, and cultural backgrounds into the planning and delivery of care  - Encourage patient/family to participate in care and decision-making at the level they choose  - Honor patient and family perspectives and choices  Outcome: Progressing     Problem: Patient/Family Goals  Goal: Patient/Family Long Term Goal  Description: Patient's Long Term Goal: Discharge from the hospital    Interventions:  - Monitor vital signs  - Monitor appropriate labs  - Pain management  - Administer medications per order  - Follow MD orders  - Diagnostics per order  - Update / inform patient and family on plan of care  - Discharge planning  - See additional Care Plan goals for specific interventions  Outcome: Progressing  Goal: Patient/Family Short Term Goal  Description: Patient's Short Term Goal: Improve dark stools    Interventions:   - Monitor vital signs  - Monitor appropriate labs  - Pain management  - Administer medications per order  - Follow MD orders  - Diagnostics per order  - Update / inform patient and family on plan of care  - See additional Care Plan goals for specific interventions  Outcome: Progressing     Problem: SAFETY ADULT - FALL  Goal: Free from fall injury  Description: INTERVENTIONS:  - Assess pt frequently for physical needs  - Identify cognitive and physical deficits and behaviors that affect risk of falls.  - [place] fall precautions  as indicated by assessment.  - Educate pt/family on patient safety including physical limitations  - Instruct pt to call for assistance with activity based on assessment  - Modify environment to reduce risk of injury  - Provide assistive devices as appropriate  - Consider OT/PT consult to assist with strengthening/mobility  - Encourage toileting schedule  Outcome: Progressing     Problem: HEMATOLOGIC - ADULT  Goal: Maintains hematologic stability  Description: INTERVENTIONS  - Assess for signs and symptoms of bleeding or hemorrhage  - Monitor labs and vital signs for trends  - Administer supportive blood products/factors, fluids and medications as ordered and appropriate  - Administer supportive blood products/factors as ordered and appropriate  Outcome: Progressing  Goal: Free from bleeding injury  Description: (Example usage: patient with low platelets)  INTERVENTIONS:  - Avoid intramuscular injections, enemas and rectal medication administration  - Ensure safe mobilization of patient  - Hold pressure on venipuncture sites to achieve adequate hemostasis  - Assess for signs and symptoms of internal bleeding  - Monitor lab trends  - Patient is to report abnormal signs of bleeding to staff  - Avoid use of toothpicks and dental floss  - Use electric shaver for shaving  - Use soft bristle tooth brush  - Limit straining and forceful nose blowing  Outcome: Progressing     Problem: MUSCULOSKELETAL - ADULT  Goal: Return mobility to safest level of function  Description: INTERVENTIONS:  - Assess patient stability and activity tolerance for standing, transferring and ambulating w/ or w/o assistive devices  - Assist with transfers and ambulation using safe patient handling equipment as needed  - Ensure adequate protection for wounds/incisions during mobilization  - Obtain PT/OT consults as needed  - Advance activity as appropriate  - Communicate ordered activity level and limitations with patient/family  Outcome:  Progressing     Problem: Impaired Functional Mobility  Goal: Achieve highest/safest level of mobility/gait  Description: Interventions:  - Assess patient's functional ability and stability  - Promote increasing activity/tolerance for mobility and gait  - Educate and engage patient/family in tolerated activity level and precautions  - Recommend use of  RW for transfers and ambulation  Outcome: Progressing     Problem: Impaired Activities of Daily Living  Goal: Achieve highest/safest level of independence in self care  Description: Interventions:  - Assess ability and encourage patient to participate in ADLs to maximize function  - Promote sitting position while performing ADLs such as feeding, grooming, and bathing  - Educate and encourage patient/family in tolerated functional activity level and precautions during self-care  - Encourage patient to incorporate impaired side during daily activities to promote function  Outcome: Progressing

## 2024-12-16 NOTE — HISTORICAL OFFICE NOTE
[facility]  [address], [address] [place] [address]  [phone]      Hoda Busby  Progress Note  Demographics:  Name: Hoda Busby YOB: 1940  Age: 84, Female Medical Record No: [id number]  Visited Date/Time: 2024 10:20 AM    Chief Complaints  f/u  History of Present Illness  Patient is an 83-year-old female with a history of HTN and aortic stenosis who presents for preoperative clearance for planned right total hip arthroplasty on 10/5/2023.  In general she reports feeling well, her primary issue is hip pain when she ambulates and is able to walk about a block before having to stop.  She denies any chest pain, shortness of breath, palpitations, edema, dizziness, or syncope.  Her last echocardiogram was on 10/2022 which noted moderate aortic stenosis with mean gradient of 30 mmHg.  She then had a nuclear stress test a week ago that came back unremarkable.    2024  Overall feels well, no cardiac symptoms.  Mobility much improved since hip arthroplasty.  No chest pain or shortness of breath.    Cardiac history:  Moderate aortic stenosis  HTN  Cardiac risk factors Former smoker  Past Medical History  1.History of mitral valve insufficiency  2.Hypercholesterolemia, familial  3.Hypertension (HTN), primary  Family History  1. Mother - Stroke; Hypertension  2. Brother - Heart problem; HHD (hypertensive heart disease)  She had 2 brothers had bypass surgery in the 50s.  Mother  of congestive heart failure in her 50s.  Social History  Smoking status Former smoker  Tobacco usage - No (Non-smoker for personal reasons (finding))  She lives at home alone.  She quit smoking 30 years ago.  She has a few glasses of wine throughout the week.  Review of systems  Cardiovascular No history of Chest pain, Palpitations and Edema  Respiratory No history of SOB  Neurological No history of Numbness and Limb weakness  Physical Examination  Constitutional 99%o2  Vitals Left  Arm Sitting  / 80 mmHg, Pulse rate 85 bpm, Height in 5' 9\", BMI: 27, Weight in 183 lbs (or) 83 kgs and BSA : 2.03 cc/m²  General Appearance No Acute Distress and Normal Body habitus  Cardiovascular   EKG/Other abnormalities  GENERAL: in no apparent distress  HEENT: Normal  NECK: no JVD, normal carotid pulses without bruits. No lymphadenopathy  LUNGS: normal excursion, clear to auscultation bilaterally  HEART: RRR, nl S1/S2, no gallop, 3/6 systolic ejection murmur  ABD: soft, nontender, nondistended, normal bowel sounds  EXTREMITIES: no cyanosis, clubbing or edema  SKIN: warm, dry, normal turgor  NEURO: alert, oriented x3, symmetrical face, no dysarthria, no focal deficits  PSYCH: cooperative, calm    ECG 9/25/2023 sinus rhythm, LVH with ST-T wave abnormality, septal infarct, 80 bpm  Allergies  No medication allergies noted.  Medications (Info obtained by: Verbal)  1.amLODIPine 5 mg tablet, Take 1 tablet orally once a day.  2.aspirin 81 mg tablet,delayed release, Take 1 tablet orally once a day.  3.Calcium 600 + D(3) 600 mg-10 mcg (400 unit) tablet, Take 1 tablet orally once a day.  4.Mendronate Sodium , take 1 tablet once a week  5.PreserVision AREDS-2 250 mg-90 mg-40 mg-1 mg capsule, Take 1 capsule orally once a day.  6.Vitamin B-12 500 mcg tablet, Take 1 tablet orally once a day.  7.Vitamin D3 50 mcg (2,000 unit) tablet, Take 1 tablet orally once a day.  Impression  1.Aortic stenosis, moderate  2.Hypertension (HTN), primary  Assessment & Plan  Moderate aortic stenosis  - Denies any chest pain or shortness of breath  - Repeat transthoracic echocardiogram for annual surveillance  - If remains in moderate range will need repeat echo in 1 year prior to follow-up    HTN  - Normotensive today  - Continue amlodipine 5 mg daily    Plan  Transthoracic echocardiogram to reassess aortic stenosis, if remains in moderate range then we will need to schedule repeat echo prior to 1 year follow-up  Follow-up with me in 1  year or sooner  Labs and Diagnostics ordered  1.Echocardiography - Complete (1 Year)  Future appointments  1.Referral Visit - Malathi Stuart ([email or web address]) : (Today)  Miscellaneous  1.Reviewed trans thoracic echocardiogram with the patient.  2.Weight monitoring (regime/therapy)  Nurses documentation  refills: None   Assistive devices: none  Upcoming sx or procedures: none   Patient instructions  Plan  Transthoracic echocardiogram to reassess aortic stenosis, if remains in moderate range then we will need to schedule repeat echo prior to 1 year follow-up  Follow-up with me in 1 year or sooner  Diagnostics Details  Trans Thoracic Echocardiogram 09/26/2023  1.The left ventricle is normal in size. Left ventricular wall thickness is mildly increased. Global left ventricular systolic function is normal. The left ventricular ejection fraction is 65%. No regional wall motion abnormalties. The left ventricle diastolic function is impaired (Grade II) with an elevated left atrial pressure.    2.Moderately calcified trileaflet aortic valve. Moderate aortic stenosis. Peak velocity 3.69 m/s. Mean gradient 31 mm Hg. KIKE (VTI) 0.96 cm². DI 0.3.    3.Mild mitral annular calcification. Mild mitral regurgitation.    4.The left atrium is mildly dilated.    5.Mild pulmonary hypertension with an estimated pulmonary artery systolic pressure of 44 mm Hg.    CPOE Orders carried out by: Dotty CASTILLO  Care Providers: Vkitor Bird MD, Apryl Wilkins SAV and Dotty CASTILLO  Electronically Authenticated by  Viktor Bird MD  09/23/2024 10:43:35 AM  Disclaimer: Components of this note were documented using voice recognition system and are subject to errors not corrected at proofreading. Contact the author of this note for any clarifications.

## 2024-12-16 NOTE — PLAN OF CARE
Problem: Patient Centered Care  Goal: Patient preferences are identified and integrated in the patient's plan of care  Description: Interventions:  - What would you like us to know as we care for you? From [facility]   - Provide timely, complete, and accurate information to patient/family  - Incorporate patient and family knowledge, values, beliefs, and cultural backgrounds into the planning and delivery of care  - Encourage patient/family to participate in care and decision-making at the level they choose  - Honor patient and family perspectives and choices  Outcome: Progressing     Problem: Patient/Family Goals  Goal: Patient/Family Long Term Goal  Description: Patient's Long Term Goal: Discharge from the hospital    Interventions:  - Monitor vital signs  - Monitor appropriate labs  - Pain management  - Administer medications per order  - Follow MD orders  - Diagnostics per order  - Update / inform patient and family on plan of care  - Discharge planning  - See additional Care Plan goals for specific interventions  Outcome: Progressing     Problem: SAFETY ADULT - FALL  Goal: Free from fall injury  Description: INTERVENTIONS:  - Assess pt frequently for physical needs  - Identify cognitive and physical deficits and behaviors that affect risk of falls.  - [place] fall precautions as indicated by assessment.  - Educate pt/family on patient safety including physical limitations  - Instruct pt to call for assistance with activity based on assessment  - Modify environment to reduce risk of injury  - Provide assistive devices as appropriate  - Consider OT/PT consult to assist with strengthening/mobility  - Encourage toileting schedule  Outcome: Progressing     Problem: HEMATOLOGIC - ADULT  Goal: Maintains hematologic stability  Description: INTERVENTIONS  - Assess for signs and symptoms of bleeding or hemorrhage  - Monitor labs and vital signs for trends  - Administer supportive blood  products/factors, fluids and medications as ordered and appropriate  - Administer supportive blood products/factors as ordered and appropriate  Outcome: Progressing  Goal: Free from bleeding injury  Description: (Example usage: patient with low platelets)  INTERVENTIONS:  - Avoid intramuscular injections, enemas and rectal medication administration  - Ensure safe mobilization of patient  - Hold pressure on venipuncture sites to achieve adequate hemostasis  - Assess for signs and symptoms of internal bleeding  - Monitor lab trends  - Patient is to report abnormal signs of bleeding to staff  - Avoid use of toothpicks and dental floss  - Use electric shaver for shaving  - Use soft bristle tooth brush  - Limit straining and forceful nose blowing  Outcome: Progressing     Problem: MUSCULOSKELETAL - ADULT  Goal: Return mobility to safest level of function  Description: INTERVENTIONS:  - Assess patient stability and activity tolerance for standing, transferring and ambulating w/ or w/o assistive devices  - Assist with transfers and ambulation using safe patient handling equipment as needed  - Ensure adequate protection for wounds/incisions during mobilization  - Obtain PT/OT consults as needed  - Advance activity as appropriate  - Communicate ordered activity level and limitations with patient/family  Outcome: Progressing     Problem: Impaired Functional Mobility  Goal: Achieve highest/safest level of mobility/gait  Description: Interventions:  - Assess patient's functional ability and stability  - Promote increasing activity/tolerance for mobility and gait  - Educate and engage patient/family in tolerated activity level and precautions  - Recommend use of  RW for transfers and ambulation  Outcome: Progressing     Problem: Impaired Activities of Daily Living  Goal: Achieve highest/safest level of independence in self care  Description: Interventions:  - Assess ability and encourage patient to participate in ADLs to  maximize function  - Promote sitting position while performing ADLs such as feeding, grooming, and bathing  - Educate and encourage patient/family in tolerated functional activity level and precautions during self-care  - Encourage patient to incorporate impaired side during daily activities to promote function  Outcome: Progressing     Problem: Patient/Family Goals  Goal: Patient/Family Short Term Goal  Description: Patient's Short Term Goal: Improve dark stools    Interventions:   - Monitor vital signs  - Monitor appropriate labs  - Pain management  - Administer medications per order  - Follow MD orders  - Diagnostics per order  - Update / inform patient and family on plan of care  - See additional Care Plan goals for specific interventions  Outcome: Not Progressing

## 2024-12-16 NOTE — PROGRESS NOTES
GASTROENTEROLOGY PROGRESS NOTE      Subjective: Pt sitting up in bed, stools still not clear, no blood.  No acute events overnight.      PE:  Vitals:   BP Readings from Last 3 Encounters:   12/16/24 127/82   12/09/24 123/66   12/02/24 111/60      Wt Readings from Last 3 Encounters:   12/13/24 174 lb 9.6 oz (79.2 kg)   11/29/24 170 lb (77.1 kg)   11/01/24 174 lb 14.4 oz (79.3 kg)      General: AAOx3 in NAD  HEENT: No scleral icterus, no LAD  Abdomen: normal active bowel sounds, soft, nontender, nondistended    Labs: Reviewed in chart   Latest Reference Range & Units 12/13/24 15:56 12/14/24 04:45 12/14/24 15:49 12/15/24 05:35 12/16/24 04:56   Hemoglobin 12.0 - 16.0 g/dL 7.6 (L) 6.1 (LL) 10.3 (L) 9.0 (L) 9.0 (L)   Hematocrit 35.0 - 48.0 % 26.2 (L) 20.3 (L) 32.2 (L) 29.0 (L) 30.6 (L)     Endoscopy:  1/4/24  Colonoscopy with a pednctulated and polypoid polyp 12-15mm in the ascending colon (tubular adenoma) that was removed and a flat polyp 10mm on the IC valve that could not be removed (sessile serrated polyp); poor prep,   11/26/24  EGD with gastric antral ulcer, 15mm cleanbased, and antral AVM 3mm s/p APC      Assessment and Plan: Patient is an 85 yo female with history of aortic stenosis, ILD with PE on Xarelto 10/25/24 and recent admission 11/25/24 or anemia and melena found to have a gastric ulcer and AVM on EGD, admitted on 12/13/24 with a Hgb of 6.1 noted on outpt testing.  Xarelto held since 12/13/24; prep not clear and awaiting cardiology clearance per anesthesia request prior to colonoscopy/EGD.  Discussed with pt and her niece at bedside; pt not sure she can tolerate more prep, will give Miralax/Gatorade prep and see if can achieve better clearance.

## 2024-12-16 NOTE — CONSULTS
[facility]  part of [facility]    Cardiology Consultation    Hoda Busby Patient Status:  Inpatient    1940 MRN [id number]   Location [facility] 5SW/SE Attending Jigar Apple MD   Hosp Day # 3 PCP Malathi Stuart MD     Date of Admission:  2024  Date of Consult:  2024  Reason for Consultation:     History of Present Illness:   Patient is a 84 year old female with significant past medical history of severe aortic stenosis, PE/DVT on rivaroxaban, interstitial lung disease, osteoarthritis, recurrent GIB and anemia who has been hospitalized for acute on chronic anemia and melanotic stools.  Cardiology consulted for perioperative cardiac risk stratification for EGD.  Of note, patient had follow-up surveillance echocardiogram for aortic stenosis, recent echo from 10/18/2024 with worsening aortic valve gradient consistent with severe aortic stenosis (Vmax 4.4m/s, MG 49 mmHg).  Patient reports improved functional capacity after significant deconditioning due to prior hospitalization, able to ambulate with rolling walker without significant limiting symptoms.  Denies any chest pain or shortness of breath.  No palpitations.    Assessment and Plan:   GIB, acute on chronic anemia  PE/DVT - holding rivaroxaban due to GIB/anemia  ILD  OA    Perioperative cardiac risk stratification  -plan for EGD for GIB evaluation  -in regards to the asymptomatic severe aortic stenosis and the low procedural specific risk of EGD, can proceed with perioperative hemodynamic/tele monitoring with avoidance of rapid changes in volume status    Severe aortic stenosis  -recent TTE on 10/18/24 with transaortic hemodynamics c/w severe aortic stenosis (mean gradient of 49mmHg, Vmax of 4.4m/s); LVEF 65%  -currently BP stable, no clinical s/sx of decompensation  -given severe AoS, pt likely preload dependent and has a fixed cardiac output; thus close monitoring of her hemodynamics to maintain  euvolemia and avoid excessive afterload reduction  -plan for TAVR evaluation once stable from GIB/anemia standpoint    Past Medical History  Past Medical History:    Acute pulmonary embolism (HCC)    High blood pressure    History of blood transfusion    Macular degeneration    Osteopenia    Primary osteoarthritis of right hip    Pulmonary embolism (HCC)    Visual impairment    Readers    White coat hypertension       Past Surgical History  Past Surgical History:   Procedure Laterality Date    Cataract  3/2&3/16 2017    Colonoscopy & polypectomy  1/4/2021         Hip replacement surgery Left Around 2006    Other surgical history  [place] 2017    Bilateral cataract surgery       Family History  Family History   Problem Relation Age of Onset    Cancer Father         bone (cause of death)    Stroke Mother         CVA (cause of death)    Diabetes Mother     Hypertension Mother     Dementia Brother         Alzheimer's    Heart Disease Brother         CAD - x2 brothers    Heart Disease Brother        Social History  Pediatric History   Patient Parents    Not on file     Other Topics Concern     Service Not Asked    Blood Transfusions Not Asked    Caffeine Concern Yes     Comment: coffee/soda - 2cups/day    Occupational Exposure Not Asked    Hobby Hazards Not Asked    Sleep Concern Not Asked    Stress Concern Not Asked    Weight Concern Not Asked    Special Diet Not Asked    Back Care Not Asked    Exercise Not Asked    Bike Helmet Not Asked    Seat Belt Not Asked    Self-Exams Not Asked   Social History Narrative    Not on file           Current Medications:  Current Facility-Administered Medications   Medication Dose Route Frequency    fleet enema (Fleet) rectal enema 133 mL  1 enema Rectal Once PRN    pantoprazole (Protonix) 40 mg in sodium chloride 0.9% PF 10 mL IV push  40 mg Intravenous Q12H    [START ON 12/17/2024] alendronate (Fosamax) tab 70 mg  70 mg Oral Q7 Days    atorvastatin (Lipitor) tab 20 mg  20 mg  Oral Nightly    calcium carbonate-vitamin D (Oyster Shell-D) 250-3.125 MG-MCG per tab 1 tablet  1 tablet Oral Daily    cholecalciferol (Vitamin D3) tab 4,000 Units  4,000 Units Oral Daily    ferrous sulfate DR tab 325 mg  325 mg Oral Daily    polyethylene glycol (PEG 3350) (Miralax) 17 g oral packet 17 g  17 g Oral Daily    predniSONE (Deltasone) tab 40 mg  40 mg Oral Daily with breakfast    sulfamethoxazole-trimethoprim DS (Bactrim DS) 800-160 MG per tab 1 tablet  1 tablet Oral Once per day on Monday Wednesday Friday    cyanocobalamin (Vitamin B12) tab 500 mcg  500 mcg Oral Daily    zolpidem (Ambien) tab 5 mg  5 mg Oral Nightly PRN     Medications Prior to Admission   Medication Sig    omeprazole 20 MG Oral Capsule Delayed Release Take 1 capsule (20 mg total) by mouth 2 (two) times daily.    atorvastatin 20 MG Oral Tab Take 1 tablet (20 mg total) by mouth at bedtime.    zolpidem 5 MG Oral Tab Take 1 tablet (5 mg total) by mouth nightly as needed.    rivaroxaban (XARELTO) 20 MG Oral Tab Take 1 tablet (20 mg total) by mouth daily with food.    sulfamethoxazole-trimethoprim -160 MG Oral Tab per tablet Take 1 tablet by mouth 3 (three) times a week. Monday, Wednesday, Friday    Multiple Vitamins-Minerals (MULTI-VITAMIN/MINERALS) Oral Tab Take 1 tablet by mouth daily.    ferrous sulfate 325 (65 FE) MG Oral Tab EC Take 1 tablet (325 mg total) by mouth daily.    polyethylene glycol, PEG 3350, 17 g Oral Powd Pack Take 17 g by mouth daily.    predniSONE 20 MG Oral Tab Take 2 tablets (40 mg total) by mouth daily with breakfast.    alendronate 70 MG Oral Tab Take 1 tablet (70 mg total) by mouth every 7 days.    Cholecalciferol (VITAMIN D) 50 MCG (2000 UT) Oral Tab Take 4,000 Units by mouth daily.    Vitamin B-12 500 MCG Oral Tab Take 1 tablet (500 mcg total) by mouth daily.    Calcium Carb-Cholecalciferol (CALCIUM + D3) 600-200 MG-UNIT Oral Tab Take 1 tablet by mouth daily.    pantoprazole 40 MG Oral Tab EC Take 1 tablet  (40 mg total) by mouth 2 (two) times daily before meals. (Patient not taking: Reported on 12/13/2024)       Allergies  Allergies[1]    Review of Systems:   ROS  10 point review of systems completed and negative except as noted.    Physical Exam:   Patient Vitals for the past 24 hrs:   BP Temp Temp src Pulse Resp SpO2   12/16/24 0858 127/82 -- -- 88 -- --   12/16/24 0821 141/68 97.5 °F (36.4 °C) Oral 74 16 96 %   12/16/24 0546 135/78 98.3 °F (36.8 °C) Oral 61 18 98 %   12/15/24 2250 137/74 97.9 °F (36.6 °C) Oral 82 18 96 %   12/15/24 1621 138/89 98.1 °F (36.7 °C) Oral 83 18 97 %       Intake/Output:   Last 3 shifts:   Intake/Output                   12/14/24 0700 - 12/15/24 0659 12/15/24 0700 - 12/16/24 0659 12/16/24 0700 - 12/17/24 0659       Intake    P.O.  2440  2270  --    P.O. 440 270 --    Oral prep ingested 2000 2000 --    I.V.  20  30  --    I.V. 20 30 --    Blood  659.8  --  --    Volume (Transfuse RBC) 319.8 -- --    Volume (Transfuse RBC) 340 -- --    Total Intake 3119.8 2300 --       Output    Urine  1750  200  --    Urine 1750 200 --    Urine Occurrence 3 x 6 x --    Stool  --  --  --    Stool Count Calculated for I/O 4 x 9 x 3 x    Total Output 1750 200 --       Net I/O     1369.8 2100 --          Vent Settings:    Hemodynamic parameters (last 24 hours):    Scheduled Meds:    pantoprazole  40 mg Intravenous Q12H    [START ON 12/17/2024] alendronate  70 mg Oral Q7 Days    atorvastatin  20 mg Oral Nightly    calcium carbonate-vitamin D  1 tablet Oral Daily    cholecalciferol  4,000 Units Oral Daily    ferrous sulfate  325 mg Oral Daily    polyethylene glycol (PEG 3350)  17 g Oral Daily    predniSONE  40 mg Oral Daily with breakfast    sulfamethoxazole-trimethoprim DS  1 tablet Oral Once per day on Monday Wednesday Friday    cyanocobalamin  500 mcg Oral Daily     Continuous Infusions:     Physical Exam:  General: Alert and oriented x 3. No apparent distress.   HEENT: Normocephalic, anicteric sclera, neck  supple, no thyromegaly or adenopathy.  Neck: No JVD, carotids 2+, no bruits.  Cardiac: Regular rate and rhythm. Grade IV WADE  Lungs: Clear without wheezes, rales, rhonchi or dullness.  Normal excursions and effort.  Abdomen: Soft, non-tender. No organosplenomegally, mass or rebound, BS-present.  Extremities: Without clubbing or cyanosis. Trace edema.    Neurologic: Alert and oriented, normal affect. No focal defects  Skin: Warm and dry.     Results:   Laboratory Data:  Lab Results   Component Value Date    WBC 7.7 12/16/2024    HGB 9.0 (L) 12/16/2024    HCT 30.6 (L) 12/16/2024    .0 12/16/2024    CREATSERUM 0.79 12/16/2024    BUN 14 12/16/2024     12/16/2024    K 3.5 12/16/2024    K 3.5 12/16/2024     12/16/2024    CO2 27.0 12/16/2024    GLU 79 12/16/2024    CA 8.5 (L) 12/16/2024    ALB 2.9 (L) 12/14/2024    ALKPHO 36 (L) 12/14/2024    TP 4.4 (L) 12/14/2024    AST 16 12/14/2024    ALT 19 12/14/2024    PTT 30.6 11/01/2024    INR 1.06 09/08/2023    PTP 13.7 09/08/2023    TSH 1.526 07/16/2024    ESRML 49 (H) 10/26/2024    PHOS 3.6 10/28/2024    B12 349 03/20/2017         Recent Labs   Lab 12/14/24  0445 12/15/24  0535 12/16/24  0456   GLU 80 81 79   BUN 23 20 14   CREATSERUM 1.03* 0.83 0.79   CA 8.4* 8.5* 8.5*    146* 145   K 4.3 3.8 3.5  3.5   * 113* 110   CO2 26.0 28.0 27.0     Recent Labs   Lab 12/14/24  0445 12/14/24  1549 12/15/24  0535 12/16/24  0456   RBC 2.30*  --  3.30* 3.33*   HGB 6.1* 10.3* 9.0* 9.0*   HCT 20.3* 32.2* 29.0* 30.6*   MCV 88.3  --  87.9 91.9   MCH 26.5  --  27.3 27.0   MCHC 30.0*  --  31.0 29.4*   RDW 17.2*  --  16.2* 16.3*   NEPRELIM 5.65  --  6.29 5.05   WBC 8.0  --  8.6 7.7   .0  --  387.0 345.0       No results for input(s): \"BNPML\" in the last 168 hours.    No results for input(s): \"TROP\", \"CK\" in the last 168 hours.    Imaging:  No results found.    Thank you for allowing me to participate in the care of your patient.    Colin Wood,   [facility]         [1] No Known Allergies

## 2024-12-16 NOTE — PLAN OF CARE
Fall precautions in place. Call light within reach. Patient care needs addressed.     Problem: Patient Centered Care  Goal: Patient preferences are identified and integrated in the patient's plan of care  Description: Interventions:  - Provide timely, complete, and accurate information to patient/family  - Incorporate patient and family knowledge, values, beliefs, and cultural backgrounds into the planning and delivery of care  - Encourage patient/family to participate in care and decision-making at the level they choose  - Honor patient and family perspectives and choices  Outcome: Progressing     Problem: SAFETY ADULT - FALL  Goal: Free from fall injury  Description: INTERVENTIONS:  - Assess pt frequently for physical needs  - Identify cognitive and physical deficits and behaviors that affect risk of falls.  - [place] fall precautions as indicated by assessment.  - Educate pt/family on patient safety including physical limitations  - Instruct pt to call for assistance with activity based on assessment  - Modify environment to reduce risk of injury  - Provide assistive devices as appropriate  - Consider OT/PT consult to assist with strengthening/mobility  - Encourage toileting schedule  Outcome: Progressing     Problem: HEMATOLOGIC - ADULT  Goal: Maintains hematologic stability  Description: INTERVENTIONS  - Assess for signs and symptoms of bleeding or hemorrhage  - Monitor labs and vital signs for trends  - Administer supportive blood products/factors, fluids and medications as ordered and appropriate  - Administer supportive blood products/factors as ordered and appropriate  Outcome: Progressing  Goal: Free from bleeding injury  Description: (Example usage: patient with low platelets)  INTERVENTIONS:  - Avoid intramuscular injections, enemas and rectal medication administration  - Ensure safe mobilization of patient  - Hold pressure on venipuncture sites to achieve adequate hemostasis  - Assess for signs and  symptoms of internal bleeding  - Monitor lab trends  - Patient is to report abnormal signs of bleeding to staff  - Avoid use of toothpicks and dental floss  - Use electric shaver for shaving  - Use soft bristle tooth brush  - Limit straining and forceful nose blowing  Outcome: Progressing     Problem: MUSCULOSKELETAL - ADULT  Goal: Return mobility to safest level of function  Description: INTERVENTIONS:  - Assess patient stability and activity tolerance for standing, transferring and ambulating w/ or w/o assistive devices  - Assist with transfers and ambulation using safe patient handling equipment as needed  - Ensure adequate protection for wounds/incisions during mobilization  - Obtain PT/OT consults as needed  - Advance activity as appropriate  - Communicate ordered activity level and limitations with patient/family  Outcome: Progressing     Problem: Impaired Functional Mobility  Goal: Achieve highest/safest level of mobility/gait  Description: Interventions:  - Assess patient's functional ability and stability  - Promote increasing activity/tolerance for mobility and gait  - Educate and engage patient/family in tolerated activity level and precautions  - Recommend use of  RW for transfers and ambulation  Outcome: Progressing     Problem: Impaired Activities of Daily Living  Goal: Achieve highest/safest level of independence in self care  Description: Interventions:  - Assess ability and encourage patient to participate in ADLs to maximize function  - Promote sitting position while performing ADLs such as feeding, grooming, and bathing  - Educate and encourage patient/family in tolerated functional activity level and precautions during self-care  - Encourage patient to incorporate impaired side during daily activities to promote function  Outcome: Progressing

## 2024-12-16 NOTE — PHYSICAL THERAPY NOTE
PHYSICAL THERAPY EVALUATION - INPATIENT     Room Number: 531/531-A  Evaluation Date: 12/16/2024  Type of Evaluation: Initial   Physician Order: PT Eval and Treat    Presenting Problem: gastrointestinal hemorrhage, low hemoglobin  Co-Morbidities : severe aortic stenosis, small right pulmonary emboli on Xarelto, hypertension recent admission with severe symptomatic anemia and GI bleed status post EGD and numerous transfusions  Reason for Therapy: Mobility Dysfunction and Discharge Planning    PHYSICAL THERAPY ASSESSMENT   Patient is a 84 year old female admitted 12/13/2024 for gastrointestinal hemorrhage, low hemoglobin. Prior to admission, patient's baseline is Independent with ADLs/iADLs/functional mobility. Patient recently discharged to [facility] from [facility] - has not been utilizing an assistive device for ambulation. Patient is currently functioning below baseline with bed mobility, transfers, gait, standing prolonged periods, and performing household tasks.  Patient is requiring stand-by assist and contact guard assist as a result of the following impairments: decreased functional strength, decreased endurance/aerobic capacity, impaired standing balance, decreased muscular endurance, and medical status.  Physical Therapy will continue to follow for duration of hospitalization.    Patient will benefit from continued skilled PT Services at discharge to promote prior level of function and safety with additional support and return home with home health PT.    PLAN DURING HOSPITALIZATION  Nursing Mobility Recommendation : 1 Assist  PT Device Recommendation: Rolling walker  PT Treatment Plan: Bed mobility;Body mechanics;Coordination;Endurance;Energy conservation;Patient education;Gait training;Strengthening;Transfer training;Balance training  Rehab Potential : Good  Frequency (Obs): 5x/week     PHYSICAL THERAPY MEDICAL/SOCIAL HISTORY     Problem List  Principal Problem:    Gastrointestinal hemorrhage, unspecified  gastrointestinal hemorrhage type  Active Problems:    Nonrheumatic aortic valve stenosis    Acute blood loss anemia    Interstitial pneumonitis (HCC)    NANCY (acute kidney injury) (HCC)    Gastric AVM    HOME SITUATION  Type of Home: Assisted living facility (currently staying at the [facility]. Per patient, plans to move back home in approximately 1 month to her 1 level home with 3 STEPHANIE)  Home Layout: One level  Lives With: Alone    Drives: Yes (not recently)   Patient Regularly Uses: None (owns cane and rolling walker)     SUBJECTIVE  Agreeable     PHYSICAL THERAPY EXAMINATION   OBJECTIVE  Precautions: Bed/chair alarm (left foot drop)  Fall Risk:  (Moderate fall risk)    PAIN ASSESSMENT  Ratin    COGNITION  Overall Cognitive Status:  WFL - within functional limits    RANGE OF MOTION AND STRENGTH ASSESSMENT  Upper extremity ROM and strength are within functional limits   Lower extremity ROM is within functional limits   Lower extremity strength is within functional limits; L foot DF limited     BALANCE  Static Sitting: Good  Dynamic Sitting: Fair +  Static Standing: Fair  Dynamic Standing: Fair -    ACTIVITY TOLERANCE  Pulse: 88  Heart Rate Source: Monitor     BP: 127/82  BP Location: Right arm  BP Method: Automatic  Patient Position: Sitting    O2 WALK  Oxygen Therapy  SPO2% on Room Air at Rest: 95    AM-PAC '6-Clicks' INPATIENT SHORT FORM - BASIC MOBILITY  How much difficulty does the patient currently have...  Patient Difficulty: Turning over in bed (including adjusting bedclothes, sheets and blankets)?: A Little   Patient Difficulty: Sitting down on and standing up from a chair with arms (e.g., wheelchair, bedside commode, etc.): A Little   Patient Difficulty: Moving from lying on back to sitting on the side of the bed?: A Little   How much help from another person does the patient currently need...   Help from Another: Moving to and from a bed to a chair (including a wheelchair)?: A Little    Help from Another: Need to walk in hospital room?: A Little   Help from Another: Climbing 3-5 steps with a railing?: A Little     AM-PAC Score:  Raw Score: 18   Approx Degree of Impairment: 46.58%   Standardized Score (AM-PAC Scale): 43.63   CMS Modifier (G-Code): CK    FUNCTIONAL ABILITY STATUS  Functional Mobility/Gait Assessment  Gait Assistance: Contact guard assist  Distance (ft): 150 ft  Assistive Device: Rolling walker  Pattern: L Foot drop;Shuffle;L Hip hike (decreased bonnie speed, decreased step length, narrow BERNA with intermittent scissoring noted)  Supine to Sit: stand-by assist  Sit to Supine: stand-by assist  Sit to Stand: contact guard assist    Exercise/Education Provided:  Bed mobility  Body mechanics  Energy conservation  Functional activity tolerated  Gait training  Posture  Transfer training    Skilled Therapy Provided: Patient in bed upon arrival. RN approved activity. Educated patient on POC and benefits of mobilization. Agreeable to participate. Patient reporting no pain. Patient benefits from increased time and cues in order to complete functional mobility tasks. Patient with new L foot drop - developed during last admission. Patient reports that she declined the need for an AFO. Encouraged patient to continue ambulating with nursing staff as tolerated and sitting in bedside chair.     The patient's Approx Degree of Impairment: 46.58% has been calculated based on documentation in the [facility] '6 clicks' Inpatient Basic Mobility Short Form.  Research supports that patients with this level of impairment may benefit from HHPT.  Final disposition will be made by interdisciplinary medical team.    Patient End of Session: In bed;Needs met;Call light within reach;RN aware of session/findings;All patient questions and concerns addressed;Hospital anti-slip socks;Alarm set    CURRENT GOALS  Goals to be met by: 12/23/24  Patient Goal Patient's self-stated goal is: none stated   Goal #1 Patient is able  to demonstrate supine - sit EOB @ level: modified independent     Goal #1   Current Status    Goal #2 Patient is able to demonstrate transfers Sit to/from Stand at assistance level: modified independent with walker - rolling     Goal #2  Current Status    Goal #3 Patient is able to ambulate 250 feet with assist device: walker - rolling at assistance level: modified independent   Goal #3   Current Status    Goal #4 Patient to demonstrate independence with home activity/exercise instructions provided to patient in preparation for discharge.   Goal #4   Current Status      Patient Evaluation Complexity Level:  History Moderate - 1 or 2 personal factors and/or co-morbidities   Examination of body systems Moderate - addressing a total of 3 or more elements   Clinical Presentation  Moderate - Evolving   Clinical Decision Making  Moderate Complexity     Gait Training: 10 minutes

## 2024-12-16 NOTE — PROGRESS NOTES
[facility]  part of [facility]    Progress Note    Hoda Busby Patient Status:  Inpatient    1940 MRN [id number]   Location [facility] 5SW/SE Attending Jigar Apple MD   Hosp Day # 3 PCP Malathi Stuart MD         Assessment and Plan:     Acute GI bleed  Recently hospitalized -24 for GI bleed, now with recurrence; had been taking Xarelto 20 mg daily for PE  -her last colonoscopy in 2021 revealed adenomatous polyp but poor prep; pt declined repeat colonoscopy.  -EGD 24 (Dr. Nuno) -  15mm clean-based, nonbleeding gastric antral ulcer (bx done); 3mm gastric antral AVM s/p APC.  -on protonix 40mg BID - was to continue x 8 weeks (EOT 25)  She received a total of 3 units PRBCs and also IV Ferrrlecit.  On 24, she was resumed on Xarelto 20 mg po daily, and oral prednisone.  She returned to [facility] 24 for rehab.    -now Hgb 6.5 on 24 at [facility].  She was referred to ED.  Repeat Hgb was 7.6, though stool was heme positive.  Hgb decreased to 6.1 overnight, and she received transfusion 2 units PRBCs today.   - Xarelto has been stopped.    -seen for GI consult by Dr Rosario; plan enteroscopy and colonoscopy on 24 with MAC anesthesia; bowel prep ordered by Dr Rosario  -continue pantoprazole 40 mg IV [id number]  -serial H/H; keep Hgb >7     Anemia due to acute blood loss  -Hgb 6.1 today; s/p 2 units PRBCs today; post-tx H/H 10.1> 9.0     Acute left peroneal palsy  In 2024; has numbness to anterolateral left foot and ankle as well as inability to dorsiflex left foot; etiology unclear  -neurologist Dr Richardson consult  noted and appreciated  -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   -MRI brain shows no acute intracranial process  -left foot drop persists     Hypotension  -resolved with blood tx  -BP 100s this am; will continue to hold  amlodipine and Lasix; creatinine 1.30> 1.03 with IVF hydration; will stop IVF     Near syncope  Assisted fall  -pt felt lightheaded in bathroom on 11/27  -started to fall forward but was caught by staff; lightly bumped her head; no visible injury; no HA  -developed HA later in the day -- stat CT brain neg for bleed.      Interstitial pneumonitis  -dx'ed 10/2024 - presented with cough and severe hypoxia  -unresponsive to abx (amox/doxy, then IV zosyn/zithromax)  -CXR 10/24/24 extensive bilateral perihilar and bilateral upper and lower lobe   -S.pneumo, legionella Ag , mycoplasma IgM/G, Fungitell-beta-D glucan negative  -ANCA and URIEL/connective tissue disease panels negative  -anti-histone and anti-[place] Ab's negative  -Echo 10/24/24 - normal EF (55-60%), no obvious vegetatons  -CT with bilateral ground glass opacities, small consolidations of BLL  -per pulm, findings suspicious for NSIP (vs cryptogenic organizing pneumonia vs hypersensitivity pneumonitis); NOT thought to be c/w UIP pattern  -on empiric steroids (solumedrol then prednisone) since 10/25/24 - on prednisone 40mg/day  -F/U Pulm Dr Holloawy 12/19/24     Bilateral pulmonary emboli  -dx'ed at time of interstitial pneumonitis  -CT chest 10/25/24 -- acute distal segmental/subsegmental pulmonary emboli in [place] and subsegmental PE in [place]  -unclear etiology; no recent hx of long travel; no family/personal hx of blood clots  -BLE venous dopplers negative 10/26/24  -unclear etiology of PE; will do hypercoag w/u as outpt.   Consider malignancy w/u if no other cause found (colonoscopy 1/2021 revealed adenomatous polyp but poor prep; pt declined repeat colonoscopy; Medicare would not cover Cologuard); UTD on mammo  -started on xarelto (held 2/2 GIB) - restarted xarelto 20 mg po every day on 11/28/24  -repeat CT chest 12/9/24 neg PE  -stopped Xarelto 12/14/24 due to recurrent GI bleed     Severe aortic stenosis  -sees cardiology Dr. Bird  -moderate A.S on echo in 10/2022  (prev mild A.S in 2020)  -abnormal pre-op EKG 9/2023 --Nuc GXT done 9/20/23 -- EKG portion revealed 1-1.5mm ST seg depression in inferolateral leads with prolonged recovery; nuclear portion with normal perfusion   -Echo 9/29/23 --progression of A.S. from mild to moderate  -recent echo at Dr. Bird's office 10/18/24 -- LVEF 65%, grade 2 diastolic dysfunction, severe aortic stenosis (mean aortic gradient signif worse since 9/2023 -- 31>49 mmHg),  -seen by cardiology during last admission in 10/2024; pt will need TAVR evaluation as outpt  -on lasix 20mg po daily as of 10/2024--> held as above     Hypertension, primary  -(dyazide stopped due to NANCY)  -on amlodipine 5mg/day (on hold for now due to hypotension -- can restart if BP starts creeping up again)     Hx of hip OA  -s/p left THR (Dr. Lo) many years ago  -s/p elective R BEATRIZ on 10/5/23 by Dr. Diaz     Hyperlipidemia   Pt with strong family hx of high cholesterol  ; normal TG and HDL  No indication for statin given age     Osteopenia   On Fosamax from 2011 to 4/2016.     DEXA stable 5/10/17.    DEXA in 9/2019 showed slightly more bone loss in spine, but stable in hip.    DEXA 11/2021 -- some improvement in femur.  Hold off on fosamax for now.   DEXA 8/2024 -- osteoporosis of left forearm  -restarted Fosamax 8/2024     Vitamin D deficiency  On vitamin D 4000u/day     BLE edema  Advised compression     VTE proph            -SCDs     tres Ferrari is NOK           SUBJECTIVE:     No melena; no abdominal pain; no F/C       OBJECTIVE:  PHYSICAL EXAM:     Vital signs in last 24 hours:  /68 (BP Location: Right arm)   Pulse 74   Temp 97.5 °F (36.4 °C) (Oral)   Resp 16   Ht 5' 8\" (1.727 m)   Wt 174 lb 9.6 oz (79.2 kg)   SpO2 96%   BMI 26.55 kg/m²     Intake/Output:    Intake/Output Summary (Last 24 hours) at 12/16/2024 0952  Last data filed at 12/16/2024 0547  Gross per 24 hour   Intake 2050 ml   Output --   Net 2050 ml       Wt Readings from Last  3 Encounters:   12/13/24 174 lb 9.6 oz (79.2 kg)   11/29/24 170 lb (77.1 kg)   11/01/24 174 lb 14.4 oz (79.3 kg)         Constitutional: alert and oriented x3 in no acute distress  HEENT- EOMI, PERRL  Nose/Mouth/Throat: pharynx without erythema  Neck/Thyroid: neck supple; no thyromegaly  Cardiovascular: RRR, S1, S2, no S3 or murmur  Respiratory: lungs without crackles or wheezes  Abdomen: normoactive bowel sounds, soft, non-tender and non-distended  Extremities: no clubbing, cyanosis or edema          Meds:   Current Facility-Administered Medications   Medication Dose Route Frequency    fleet enema (Fleet) rectal enema 133 mL  1 enema Rectal Once PRN    pantoprazole (Protonix) 40 mg in sodium chloride 0.9% PF 10 mL IV push  40 mg Intravenous Q12H    [START ON 12/17/2024] alendronate (Fosamax) tab 70 mg  70 mg Oral Q7 Days    atorvastatin (Lipitor) tab 20 mg  20 mg Oral Nightly    calcium carbonate-vitamin D (Oyster Shell-D) 250-3.125 MG-MCG per tab 1 tablet  1 tablet Oral Daily    cholecalciferol (Vitamin D3) tab 4,000 Units  4,000 Units Oral Daily    ferrous sulfate DR tab 325 mg  325 mg Oral Daily    polyethylene glycol (PEG 3350) (Miralax) 17 g oral packet 17 g  17 g Oral Daily    predniSONE (Deltasone) tab 40 mg  40 mg Oral Daily with breakfast    sulfamethoxazole-trimethoprim DS (Bactrim DS) 800-160 MG per tab 1 tablet  1 tablet Oral Once per day on Monday Wednesday Friday    cyanocobalamin (Vitamin B12) tab 500 mcg  500 mcg Oral Daily    zolpidem (Ambien) tab 5 mg  5 mg Oral Nightly PRN            Data Review:     Labs:   Lab Results   Component Value Date    GLU 79 12/16/2024     12/16/2024    K 3.5 12/16/2024    K 3.5 12/16/2024     12/16/2024    CO2 27.0 12/16/2024    BUN 14 12/16/2024    CREATSERUM 0.79 12/16/2024    CA 8.5 12/16/2024       Recent Labs   Lab 12/16/24  0456   WBC 7.7   HGB 9.0*   HCT 30.6*   MCV 91.9   MCH 27.0   MCHC 29.4*   RDW 16.3*   NEPRELIM 5.05   .0       No  results for input(s): \"COLORUR\", \"CLARITY\", \"SPECGRAVITY\", \"GLUUR\", \"BILUR\", \"KETUR\", \"BLOODURINE\", \"PHURINE\", \"PROUR\", \"UROBILINOGEN\", \"NITRITE\", \"LEUUR\", \"WBCUR\", \"RBCUR\", \"BACUR\", \"EPIUR\" in the last 168 hours.    No results for input(s): \"URINE\", \"CULTI\", \"BLDSMR\" in the last 168 hours.      Imaging:  No results found.                     Jigar Apple MD

## 2024-12-17 LAB
ANION GAP SERPL CALC-SCNC: 4 MMOL/L (ref 0–18)
BASOPHILS # BLD AUTO: 0.01 X10(3) UL (ref 0–0.2)
BASOPHILS NFR BLD AUTO: 0.1 %
BUN BLD-MCNC: 11 MG/DL (ref 9–23)
BUN/CREAT SERPL: 11.5 (ref 10–20)
CALCIUM BLD-MCNC: 8.7 MG/DL (ref 8.7–10.4)
CHLORIDE SERPL-SCNC: 113 MMOL/L (ref 98–112)
CO2 SERPL-SCNC: 27 MMOL/L (ref 21–32)
CREAT BLD-MCNC: 0.96 MG/DL
DEPRECATED RDW RBC AUTO: 52.4 FL (ref 35.1–46.3)
EGFRCR SERPLBLD CKD-EPI 2021: 58 ML/MIN/1.73M2 (ref 60–?)
EOSINOPHIL # BLD AUTO: 0.04 X10(3) UL (ref 0–0.7)
EOSINOPHIL NFR BLD AUTO: 0.4 %
ERYTHROCYTE [DISTWIDTH] IN BLOOD BY AUTOMATED COUNT: 16.4 % (ref 11–15)
GLUCOSE BLD-MCNC: 89 MG/DL (ref 70–99)
HCT VFR BLD AUTO: 32.8 %
HGB BLD-MCNC: 10.3 G/DL
IMM GRANULOCYTES # BLD AUTO: 0.08 X10(3) UL (ref 0–1)
IMM GRANULOCYTES NFR BLD: 0.9 %
LYMPHOCYTES # BLD AUTO: 1.51 X10(3) UL (ref 1–4)
LYMPHOCYTES NFR BLD AUTO: 16.9 %
MCH RBC QN AUTO: 27.5 PG (ref 26–34)
MCHC RBC AUTO-ENTMCNC: 31.4 G/DL (ref 31–37)
MCV RBC AUTO: 87.5 FL
MONOCYTES # BLD AUTO: 0.68 X10(3) UL (ref 0.1–1)
MONOCYTES NFR BLD AUTO: 7.6 %
NEUTROPHILS # BLD AUTO: 6.6 X10 (3) UL (ref 1.5–7.7)
NEUTROPHILS # BLD AUTO: 6.6 X10(3) UL (ref 1.5–7.7)
NEUTROPHILS NFR BLD AUTO: 74.1 %
OSMOLALITY SERPL CALC.SUM OF ELEC: 297 MOSM/KG (ref 275–295)
PLATELET # BLD AUTO: 350 10(3)UL (ref 150–450)
POTASSIUM SERPL-SCNC: 4.4 MMOL/L (ref 3.5–5.1)
RBC # BLD AUTO: 3.75 X10(6)UL
SODIUM SERPL-SCNC: 144 MMOL/L (ref 136–145)
WBC # BLD AUTO: 8.9 X10(3) UL (ref 4–11)

## 2024-12-17 PROCEDURE — 99232 SBSQ HOSP IP/OBS MODERATE 35: CPT | Performed by: INTERNAL MEDICINE

## 2024-12-17 PROCEDURE — 99223 1ST HOSP IP/OBS HIGH 75: CPT | Performed by: INTERNAL MEDICINE

## 2024-12-17 PROCEDURE — 0DBL8ZX EXCISION OF TRANSVERSE COLON, VIA NATURAL OR ARTIFICIAL OPENING ENDOSCOPIC, DIAGNOSTIC: ICD-10-PCS | Performed by: INTERNAL MEDICINE

## 2024-12-17 PROCEDURE — 0DJ08ZZ INSPECTION OF UPPER INTESTINAL TRACT, VIA NATURAL OR ARTIFICIAL OPENING ENDOSCOPIC: ICD-10-PCS | Performed by: INTERNAL MEDICINE

## 2024-12-17 RX ORDER — LIDOCAINE HYDROCHLORIDE 10 MG/ML
INJECTION, SOLUTION EPIDURAL; INFILTRATION; INTRACAUDAL; PERINEURAL AS NEEDED
Status: DISCONTINUED | OUTPATIENT
Start: 2024-12-17 | End: 2024-12-17 | Stop reason: SURG

## 2024-12-17 RX ORDER — FUROSEMIDE 20 MG/1
20 TABLET ORAL DAILY
Status: DISCONTINUED | OUTPATIENT
Start: 2024-12-17 | End: 2024-12-18

## 2024-12-17 RX ORDER — SODIUM CHLORIDE, SODIUM LACTATE, POTASSIUM CHLORIDE, CALCIUM CHLORIDE 600; 310; 30; 20 MG/100ML; MG/100ML; MG/100ML; MG/100ML
INJECTION, SOLUTION INTRAVENOUS CONTINUOUS PRN
Status: DISCONTINUED | OUTPATIENT
Start: 2024-12-17 | End: 2024-12-17 | Stop reason: SURG

## 2024-12-17 RX ORDER — ESMOLOL HYDROCHLORIDE 10 MG/ML
INJECTION INTRAVENOUS AS NEEDED
Status: DISCONTINUED | OUTPATIENT
Start: 2024-12-17 | End: 2024-12-17 | Stop reason: SURG

## 2024-12-17 RX ORDER — POTASSIUM CHLORIDE 1500 MG/1
40 TABLET, EXTENDED RELEASE ORAL ONCE
Status: COMPLETED | OUTPATIENT
Start: 2024-12-17 | End: 2024-12-17

## 2024-12-17 RX ORDER — AMLODIPINE BESYLATE 2.5 MG/1
2.5 TABLET ORAL DAILY
Status: DISCONTINUED | OUTPATIENT
Start: 2024-12-17 | End: 2024-12-19

## 2024-12-17 RX ORDER — PHENYLEPHRINE HCL 10 MG/ML
VIAL (ML) INJECTION AS NEEDED
Status: DISCONTINUED | OUTPATIENT
Start: 2024-12-17 | End: 2024-12-17 | Stop reason: SURG

## 2024-12-17 RX ADMIN — LIDOCAINE HYDROCHLORIDE 50 MG: 10 INJECTION, SOLUTION EPIDURAL; INFILTRATION; INTRACAUDAL; PERINEURAL at 12:05:00

## 2024-12-17 RX ADMIN — PHENYLEPHRINE HCL 300 MCG: 10 MG/ML VIAL (ML) INJECTION at 12:29:00

## 2024-12-17 RX ADMIN — PHENYLEPHRINE HCL 100 MCG: 10 MG/ML VIAL (ML) INJECTION at 12:38:00

## 2024-12-17 RX ADMIN — SODIUM CHLORIDE, SODIUM LACTATE, POTASSIUM CHLORIDE, CALCIUM CHLORIDE: 600; 310; 30; 20 INJECTION, SOLUTION INTRAVENOUS at 12:38:00

## 2024-12-17 RX ADMIN — PHENYLEPHRINE HCL 200 MCG: 10 MG/ML VIAL (ML) INJECTION at 12:31:00

## 2024-12-17 RX ADMIN — ESMOLOL HYDROCHLORIDE 40 MG: 10 INJECTION INTRAVENOUS at 12:17:00

## 2024-12-17 RX ADMIN — PHENYLEPHRINE HCL 200 MCG: 10 MG/ML VIAL (ML) INJECTION at 12:20:00

## 2024-12-17 RX ADMIN — SODIUM CHLORIDE, SODIUM LACTATE, POTASSIUM CHLORIDE, CALCIUM CHLORIDE: 600; 310; 30; 20 INJECTION, SOLUTION INTRAVENOUS at 12:02:00

## 2024-12-17 NOTE — DISCHARGE INSTRUCTIONS
Resume care with tomoguides     [address] [place] [address], [address]  [place] [address]  Phone: [phone]  Fax: [phone]

## 2024-12-17 NOTE — PROGRESS NOTES
[facility]  Cardiology Progress Note    Hoda Busby Patient Status:  Inpatient    1940 MRN [id number]   Location [facility] 5SW/SE Attending Jigar Apple MD   Hosp Day # 4 PCP Malathi Stuart MD     Subjective     No issues overnight.  No chest pain or shortness of breath.  Plan for colonoscopy/EGD today.    Objective:   Patient Vitals for the past 24 hrs:   BP Temp Temp src Pulse Resp SpO2   24 0737 158/83 98.3 °F (36.8 °C) Oral 88 18 96 %   24 0437 141/83 98.3 °F (36.8 °C) Oral 85 18 95 %   24 2152 134/74 97.8 °F (36.6 °C) Oral 73 18 95 %   24 1530 118/71 98 °F (36.7 °C) Oral 80 20 97 %       Intake/Output:   Last 3 shifts:   Intake/Output                   12/15/24 0700 - 24 0659 24 0700 - 24 0659 24 0700 - 24 0659       Intake    P.O.  2270    --    P.O. 270  --    Oral prep ingested  -- --    I.V.  30  20  --    I.V. 30 20 --    Total Intake 0  --       Output    Urine  200  --  --    Urine 200 -- --    Urine Occurrence 6 x 3 x 1 x    Incontinent Urine Occurrence -- 0 x --    Stool  --  --  --    Stool Count Calculated for I/O 9 x 13 x --    Total Output 200 -- --       Net I/O     2100  --             Scheduled Meds:    amLODIPine  2.5 mg Oral Daily    pantoprazole  40 mg Intravenous Q12H    alendronate  70 mg Oral Q7 Days    atorvastatin  20 mg Oral Nightly    calcium carbonate-vitamin D  1 tablet Oral Daily    cholecalciferol  4,000 Units Oral Daily    ferrous sulfate  325 mg Oral Daily    polyethylene glycol (PEG 3350)  17 g Oral Daily    predniSONE  40 mg Oral Daily with breakfast    sulfamethoxazole-trimethoprim DS  1 tablet Oral Once per day on     cyanocobalamin  500 mcg Oral Daily         Results:     Lab Results   Component Value Date    WBC 7.7 2024    HGB 9.0 (L) 2024    HCT 30.6 (L) 2024    .0 2024    CREATSERUM 0.79  12/16/2024    BUN 14 12/16/2024     12/16/2024    K 3.8 12/16/2024     12/16/2024    CO2 27.0 12/16/2024    GLU 79 12/16/2024    CA 8.5 (L) 12/16/2024    ALB 2.9 (L) 12/14/2024    ALKPHO 36 (L) 12/14/2024    BILT 0.6 12/14/2024    TP 4.4 (L) 12/14/2024    AST 16 12/14/2024    ALT 19 12/14/2024    PTT 30.6 11/01/2024    INR 1.06 09/08/2023    TSH 1.526 07/16/2024    ESRML 49 (H) 10/26/2024    PHOS 3.6 10/28/2024    B12 349 03/20/2017       Recent Labs   Lab 12/14/24  0445 12/15/24  0535 12/16/24  0456 12/16/24 2010   GLU 80 81 79  --    BUN 23 20 14  --    CREATSERUM 1.03* 0.83 0.79  --    CA 8.4* 8.5* 8.5*  --     146* 145  --    K 4.3 3.8 3.5  3.5 3.8   * 113* 110  --    CO2 26.0 28.0 27.0  --      Recent Labs   Lab 12/14/24  0445 12/14/24  1549 12/15/24  0535 12/16/24  0456   RBC 2.30*  --  3.30* 3.33*   HGB 6.1* 10.3* 9.0* 9.0*   HCT 20.3* 32.2* 29.0* 30.6*   MCV 88.3  --  87.9 91.9   MCH 26.5  --  27.3 27.0   MCHC 30.0*  --  31.0 29.4*   RDW 17.2*  --  16.2* 16.3*   NEPRELIM 5.65  --  6.29 5.05   WBC 8.0  --  8.6 7.7   .0  --  387.0 345.0       No results for input(s): \"BNPML\" in the last 168 hours.    No results for input(s): \"TROP\", \"CK\" in the last 168 hours.    No results found.  EKG 12 Lead    Result Date: 12/16/2024  Normal sinus rhythm Possible Left atrial enlargement Anterior infarct , age undetermined Abnormal ECG When compared with ECG of 17-OCT-2024 09:10, Anterior infarct is now Present ST no longer depressed in Lateral leads Nonspecific T wave abnormality now evident in Inferior leads Nonspecific T wave abnormality, worse in Anterior leads Nonspecific T wave abnormality no longer evident in Lateral leads Confirmed by PABLO MELLO ([id number]) on 12/16/2024 3:59:19 PM          Exam:     General: Alert and oriented x 3. No apparent distress.   HEENT: Normocephalic, anicteric sclera, neck supple, no thyromegaly or adenopathy.  Neck: No JVD, carotids 2+, no bruits.  Cardiac:  Regular rate and rhythm. Grade IV WADE  Lungs: Clear without wheezes, rales, rhonchi or dullness.  Normal excursions and effort.  Abdomen: Soft, non-tender. No organosplenomegally, mass or rebound, BS-present.  Extremities: Without clubbing or cyanosis. Trace edema.    Neurologic: Alert and oriented, normal affect. No focal defects  Skin: Warm and dry.     Assessment and Plan:   GIB, acute on chronic anemia  PE/DVT - holding rivaroxaban due to GIB/anemia  ILD  OA     Perioperative cardiac risk stratification  -plan for EGD for GIB evaluation  -in regards to the asymptomatic severe aortic stenosis and the low procedural specific risk of EGD, can proceed with perioperative hemodynamic/tele monitoring with avoidance of rapid changes in volume status     Severe aortic stenosis  -recent TTE on 10/18/24 with transaortic hemodynamics c/w severe aortic stenosis (mean gradient of 49mmHg, Vmax of 4.4m/s); LVEF 65%  -currently BP stable, no clinical s/sx of decompensation  -given severe AoS, pt likely preload dependent and has a fixed cardiac output; thus close monitoring of her hemodynamics to maintain euvolemia and avoid excessive afterload reduction  -plan for TAVR evaluation once stable from GIB/anemia standpoint    After discharge will need follow-up with me in the office to discuss next steps regarding severe arctic stenosis.    Viktor Bird MD  [facility]  12/17/2024

## 2024-12-17 NOTE — INTERVAL H&P NOTE
Pre-op Diagnosis: anemia    The above referenced H&P was reviewed by Karen Staton MD on 12/17/2024, the patient was examined and no significant changes have occurred in the patient's condition since the H&P was performed.  I discussed with the patient and/or legal representative the potential benefits, risks and side effects of this procedure; the likelihood of the patient achieving goals; and potential problems that might occur during recuperation.  I discussed reasonable alternatives to the procedure, including risks, benefits and side effects related to the alternatives and risks related to not receiving this procedure.  We will proceed with procedure as planned.

## 2024-12-17 NOTE — PLAN OF CARE
Problem: Patient Centered Care  Goal: Patient preferences are identified and integrated in the patient's plan of care  Description: Interventions:  - What would you like us to know as we care for you? From [facility]   - Provide timely, complete, and accurate information to patient/family  - Incorporate patient and family knowledge, values, beliefs, and cultural backgrounds into the planning and delivery of care  - Encourage patient/family to participate in care and decision-making at the level they choose  - Honor patient and family perspectives and choices  Outcome: Progressing     Problem: Patient/Family Goals  Goal: Patient/Family Long Term Goal  Description: Patient's Long Term Goal: Discharge from the hospital    Interventions:  - Monitor vital signs  - Monitor appropriate labs  - Pain management  - Administer medications per order  - Follow MD orders  - Diagnostics per order  - Update / inform patient and family on plan of care  - Discharge planning  - See additional Care Plan goals for specific interventions  Outcome: Progressing  Goal: Patient/Family Short Term Goal  Description: Patient's Short Term Goal: Improve dark stools    Interventions:   - Monitor vital signs  - Monitor appropriate labs  - Pain management  - Administer medications per order  - Follow MD orders  - Diagnostics per order  - Update / inform patient and family on plan of care  - See additional Care Plan goals for specific interventions  Outcome: Progressing     Problem: SAFETY ADULT - FALL  Goal: Free from fall injury  Description: INTERVENTIONS:  - Assess pt frequently for physical needs  - Identify cognitive and physical deficits and behaviors that affect risk of falls.  - [place] fall precautions as indicated by assessment.  - Educate pt/family on patient safety including physical limitations  - Instruct pt to call for assistance with activity based on assessment  - Modify environment to reduce risk of  injury  - Provide assistive devices as appropriate  - Consider OT/PT consult to assist with strengthening/mobility  - Encourage toileting schedule  Outcome: Progressing     Problem: HEMATOLOGIC - ADULT  Goal: Maintains hematologic stability  Description: INTERVENTIONS  - Assess for signs and symptoms of bleeding or hemorrhage  - Monitor labs and vital signs for trends  - Administer supportive blood products/factors, fluids and medications as ordered and appropriate  - Administer supportive blood products/factors as ordered and appropriate  Outcome: Progressing  Goal: Free from bleeding injury  Description: (Example usage: patient with low platelets)  INTERVENTIONS:  - Avoid intramuscular injections, enemas and rectal medication administration  - Ensure safe mobilization of patient  - Hold pressure on venipuncture sites to achieve adequate hemostasis  - Assess for signs and symptoms of internal bleeding  - Monitor lab trends  - Patient is to report abnormal signs of bleeding to staff  - Avoid use of toothpicks and dental floss  - Use electric shaver for shaving  - Use soft bristle tooth brush  - Limit straining and forceful nose blowing  Outcome: Progressing     Problem: MUSCULOSKELETAL - ADULT  Goal: Return mobility to safest level of function  Description: INTERVENTIONS:  - Assess patient stability and activity tolerance for standing, transferring and ambulating w/ or w/o assistive devices  - Assist with transfers and ambulation using safe patient handling equipment as needed  - Ensure adequate protection for wounds/incisions during mobilization  - Obtain PT/OT consults as needed  - Advance activity as appropriate  - Communicate ordered activity level and limitations with patient/family  Outcome: Not Progressing     Problem: Impaired Functional Mobility  Goal: Achieve highest/safest level of mobility/gait  Description: Interventions:  - Assess patient's functional ability and stability  - Promote increasing  activity/tolerance for mobility and gait  - Educate and engage patient/family in tolerated activity level and precautions  - Recommend use of  RW for transfers and ambulation  Outcome: Not Progressing     Problem: Impaired Activities of Daily Living  Goal: Achieve highest/safest level of independence in self care  Description: Interventions:  - Assess ability and encourage patient to participate in ADLs to maximize function  - Promote sitting position while performing ADLs such as feeding, grooming, and bathing  - Educate and encourage patient/family in tolerated functional activity level and precautions during self-care  - Encourage patient to incorporate impaired side during daily activities to promote function  Outcome: Not Progressing

## 2024-12-17 NOTE — PHYSICAL THERAPY NOTE
Physical Therapy Contact Note    Orders received, chart reviewed. Attempted to see patient for Physical Therapy services, patient declining participation in PT at this time, patient requesting to defer therapy until after EGD scheduled for later this morning. Will re-schedule visit.    Shira Wylie, PT, DPT

## 2024-12-17 NOTE — PLAN OF CARE
Reviewed with attending, low cardiac risk for EGD/CLN.     Cindy Ramírez, MSN, FNP-BC, [facility]  12/17/24   10:25 AM  [phone] [place]  [phone] [place]

## 2024-12-17 NOTE — ANESTHESIA PREPROCEDURE EVALUATION
Anesthesia PreOp Note    HPI:     Hoda Busby is a 84 year old female who presents for preoperative consultation requested by: Karen Staton MD    Date of Surgery: 12/13/2024 - 12/17/2024    Procedure(s):  PUSH ENEROSCOPY WITH ESOPHAGOGASTRODUODENOSCOPY (EGD)  COLONOSCOPY  Indication: anemia    Relevant Problems   No relevant active problems       NPO:  Last Liquid Consumption Date: 12/16/24  Last Liquid Consumption Time: 2300  Last Solid Consumption Date: 12/15/24  Last Solid Consumption Time: 1400  Last Liquid Consumption Date: 12/16/24          History Review:  Patient Active Problem List    Diagnosis Date Noted    NANCY (acute kidney injury) (HCC) 12/13/2024    Gastric AVM 12/13/2024    Acute blood loss anemia 11/29/2024    Peroneal neuropathy, left 11/29/2024    Other pulmonary embolism without acute cor pulmonale (HCC) 11/29/2024    Interstitial pneumonitis (HCC) 11/29/2024    GI bleed 11/25/2024    Gastrointestinal hemorrhage, unspecified gastrointestinal hemorrhage type 11/25/2024    Acute respiratory failure with hypoxia (HCC) 11/01/2024    Pneumonia 10/28/2024    Acute pulmonary embolism (HCC) 10/25/2024    Age-related osteoporosis without current pathological fracture 08/22/2024    Primary hypertension 09/20/2023    Macular degeneration 09/14/2023    Nonrheumatic aortic valve stenosis 10/22/2020    Mitral valve insufficiency 03/22/2017    Hypercholesterolemia 03/15/2017    White coat syndrome with diagnosis of hypertension 03/04/2015    Vitamin D deficiency 02/27/2014    Osteopenia of multiple sites 02/14/2013       Past Medical History:    Acute pulmonary embolism (HCC)    High blood pressure    History of blood transfusion    Macular degeneration    Osteopenia    Primary osteoarthritis of right hip    Pulmonary embolism (HCC)    Visual impairment    Readers    White coat hypertension       Past Surgical History:   Procedure Laterality Date    Cataract  3/2&3/16 2017    Colonoscopy & polypectomy   2021         Hip replacement surgery Left Around 2006    Other surgical history  [place] 2017    Bilateral cataract surgery       Prescriptions Prior to Admission[1]  Current Medications and Prescriptions Ordered in Epic[2]    Allergies[3]    Family History   Problem Relation Age of Onset    Cancer Father         bone (cause of death)    Stroke Mother         CVA (cause of death)    Diabetes Mother     Hypertension Mother     Dementia Brother         Alzheimer's    Heart Disease Brother         CAD - x2 brothers    Heart Disease Brother      Social History     Socioeconomic History    Marital status:    Tobacco Use    Smoking status: Former     Current packs/day: 0.00     Types: Cigarettes     Start date: 1990     Quit date: 1990     Years since quittin.9     Passive exposure: Past    Smokeless tobacco: Former   Vaping Use    Vaping status: Never Used   Substance and Sexual Activity    Alcohol use: Yes     Comment: wine, occasionally    Drug use: Never   Other Topics Concern    Caffeine Concern Yes     Comment: coffee/soda - 2cups/day       Available pre-op labs reviewed.  Lab Results   Component Value Date    WBC 8.9 2024    RBC 3.75 (L) 2024    HGB 10.3 (L) 2024    HCT 32.8 (L) 2024    MCV 87.5 2024    MCH 27.5 2024    MCHC 31.4 2024    RDW 16.4 (H) 2024    .0 2024     Lab Results   Component Value Date     2024    K 4.4 2024     (H) 2024    CO2 27.0 2024    BUN 11 2024    CREATSERUM 0.96 2024    GLU 89 2024    PGLU 113 (H) 2024    CA 8.7 2024          Vital Signs:  Body mass index is 26.55 kg/m².   height is 1.727 m (5' 8\") and weight is 79.2 kg (174 lb 9.6 oz). Her oral temperature is 98.3 °F (36.8 °C). Her blood pressure is 158/83 and her pulse is 88. Her respiration is 18 and oxygen saturation is 96%.   Vitals:    24 1530 24 2152 24 0437 24  0737   BP: 118/71 134/74 141/83 158/83   Pulse: 80 73 85 88   Resp: 20 18 18 18   Temp: 98 °F (36.7 °C) 97.8 °F (36.6 °C) 98.3 °F (36.8 °C) 98.3 °F (36.8 °C)   TempSrc: Oral Oral Oral Oral   SpO2: 97% 95% 95% 96%   Weight:       Height:            Anesthesia Evaluation     Patient summary reviewed and Nursing notes reviewed    No history of anesthetic complications   Airway   Mallampati: II  TM distance: >3 FB  Neck ROM: full  Dental          Pulmonary     breath sounds clear to auscultation    ROS comment: Hx PE ED admit 10/2024 w/ hypoxic respiratory failure  Cardiovascular   Exercise tolerance: poor  (+) hypertension, valvular problems/murmurs AS, murmur    Rhythm: regular    Neuro/Psych    (+)  neuromuscular disease,        Comments: Left peroneal neuropathy    GI/Hepatic/Renal      Comments: 11/24/2024 ED for acute GI bleed    Endo/Other - negative ROS   Abdominal  - normal exam    Abdomen: soft.                 Anesthesia Plan:   ASA:  3  Plan:   MAC  Informed Consent Plan and Risks Discussed With:  Patient      I have informed Hoda Busby and/or legal guardian or family member of the nature of the anesthetic plan, benefits, risks including possible dental damage if relevant, major complications, and any alternative forms of anesthetic management.   All of the patient's questions were answered to the best of my ability. The patient desires the anesthetic management as planned.  Viktor Haq CRNA  12/17/2024 11:25 AM  Present on Admission:   Nonrheumatic aortic valve stenosis   Interstitial pneumonitis (HCC)   Acute blood loss anemia           [1]   Medications Prior to Admission   Medication Sig Dispense Refill Last Dose/Taking    omeprazole 20 MG Oral Capsule Delayed Release Take 1 capsule (20 mg total) by mouth 2 (two) times daily.   12/13/2024 at  8:00 AM    atorvastatin 20 MG Oral Tab Take 1 tablet (20 mg total) by mouth at bedtime.   12/12/2024 at  6:00 PM    zolpidem 5 MG Oral Tab Take 1 tablet  (5 mg total) by mouth nightly as needed. 30 tablet 5 Past Week    rivaroxaban (XARELTO) 20 MG Oral Tab Take 1 tablet (20 mg total) by mouth daily with food.   12/13/2024 at  8:00 AM    sulfamethoxazole-trimethoprim -160 MG Oral Tab per tablet Take 1 tablet by mouth 3 (three) times a week. Monday, Wednesday, Friday 12/13/2024 at  8:00 AM    Multiple Vitamins-Minerals (MULTI-VITAMIN/MINERALS) Oral Tab Take 1 tablet by mouth daily.   12/13/2024 at  8:00 AM    ferrous sulfate 325 (65 FE) MG Oral Tab EC Take 1 tablet (325 mg total) by mouth daily.   12/13/2024 at  8:00 AM    polyethylene glycol, PEG 3350, 17 g Oral Powd Pack Take 17 g by mouth daily. 30 each 0 12/13/2024 at  8:00 AM    predniSONE 20 MG Oral Tab Take 2 tablets (40 mg total) by mouth daily with breakfast. 60 tablet 0 12/13/2024 at  8:00 AM    alendronate 70 MG Oral Tab Take 1 tablet (70 mg total) by mouth every 7 days. 13 tablet 3 12/10/2024    Cholecalciferol (VITAMIN D) 50 MCG (2000 UT) Oral Tab Take 4,000 Units by mouth daily. 1 tablet 0 12/13/2024 at  8:00 AM    Vitamin B-12 500 MCG Oral Tab Take 1 tablet (500 mcg total) by mouth daily.   12/13/2024 at  8:00 AM    Calcium Carb-Cholecalciferol (CALCIUM + D3) 600-200 MG-UNIT Oral Tab Take 1 tablet by mouth daily.   12/13/2024 at  8:00 AM    pantoprazole 40 MG Oral Tab EC Take 1 tablet (40 mg total) by mouth 2 (two) times daily before meals. (Patient not taking: Reported on 12/13/2024) 60 tablet 1 Not Taking   [2]   Current Facility-Administered Medications Ordered in Epic   Medication Dose Route Frequency Provider Last Rate Last Admin    amLODIPine (Norvasc) tab 2.5 mg  2.5 mg Oral Daily Malathi Stuart MD        furosemide (Lasix) tab 20 mg  20 mg Oral Daily Malathi Stuart MD        [START ON 12/18/2024] predniSONE (Deltasone) tab 30 mg  30 mg Oral Daily with breakfast Cody Holloway DO        pantoprazole (Protonix) 40 mg in sodium chloride 0.9% PF 10 mL IV push  40 mg Intravenous  Q12H Jigar Apple MD   40 mg at 12/17/24 0441    alendronate (Fosamax) tab 70 mg  70 mg Oral Q7 Days Jigar Apple MD        atorvastatin (Lipitor) tab 20 mg  20 mg Oral Nightly Jigar Apple MD   20 mg at 12/16/24 2158    calcium carbonate-vitamin D (Oyster Shell-D) 250-3.125 MG-MCG per tab 1 tablet  1 tablet Oral Daily Jigar Apple MD   1 tablet at 12/15/24 0801    cholecalciferol (Vitamin D3) tab 4,000 Units  4,000 Units Oral Daily Jigar Apple MD   4,000 Units at 12/15/24 0801    ferrous sulfate DR tab 325 mg  325 mg Oral Daily Jigar Apple MD   325 mg at 12/16/24 1533    polyethylene glycol (PEG 3350) (Miralax) 17 g oral packet 17 g  17 g Oral Daily Jigar Apple MD   17 g at 12/16/24 0902    sulfamethoxazole-trimethoprim DS (Bactrim DS) 800-160 MG per tab 1 tablet  1 tablet Oral Once per day on Monday Wednesday Friday Jigar Apple MD   1 tablet at 12/16/24 1531    cyanocobalamin (Vitamin B12) tab 500 mcg  500 mcg Oral Daily Jigar Apple MD   500 mcg at 12/16/24 1531    zolpidem (Ambien) tab 5 mg  5 mg Oral Nightly PRN Jigar Apple MD         No current [facility]-ordered outpatient medications on file.   [3] No Known Allergies

## 2024-12-17 NOTE — PROGRESS NOTES
[facility]  part of [facility]    Progress Note    Hoda Busby Patient Status:  Inpatient    1940 MRN [id number]   Location [facility] 5SW/SE Attending Jigar Apple MD   Hosp Day # 4 PCP Malathi Stuart MD       SUBJECTIVE:  Feels fine.  No SOB    OBJECTIVE:  Vital signs in last 24 hours:  /83 (BP Location: Right arm)   Pulse 88   Temp 98.3 °F (36.8 °C) (Oral)   Resp 18   Ht 5' 8\" (1.727 m)   Wt 174 lb 9.6 oz (79.2 kg)   SpO2 96%   BMI 26.55 kg/m²     Intake/Output:    Intake/Output Summary (Last 24 hours) at 2024 0916  Last data filed at 2024 0441  Gross per 24 hour   Intake 1980 ml   Output --   Net 1980 ml       Wt Readings from Last 3 Encounters:   24 174 lb 9.6 oz (79.2 kg)   24 170 lb (77.1 kg)   24 174 lb 14.4 oz (79.3 kg)       EXAM:  GENERAL: well developed, well nourished, in no apparent distress  LUNGS: clear to auscultation  CARDIO: RRR, normal S1S2, 2/6 loud WADE RUSB  GI: soft, NT, ND, NABS  EXTREMITIES: tr BLE edema    Data Review:     Labs:   Lab Results   Component Value Date    K 3.8 2024         Imaging:  No results found.            Meds:   Current Facility-Administered Medications   Medication Dose Route Frequency    pantoprazole (Protonix) 40 mg in sodium chloride 0.9% PF 10 mL IV push  40 mg Intravenous Q12H    alendronate (Fosamax) tab 70 mg  70 mg Oral Q7 Days    atorvastatin (Lipitor) tab 20 mg  20 mg Oral Nightly    calcium carbonate-vitamin D (Oyster Shell-D) 250-3.125 MG-MCG per tab 1 tablet  1 tablet Oral Daily    cholecalciferol (Vitamin D3) tab 4,000 Units  4,000 Units Oral Daily    ferrous sulfate DR tab 325 mg  325 mg Oral Daily    polyethylene glycol (PEG 3350) (Miralax) 17 g oral packet 17 g  17 g Oral Daily    predniSONE (Deltasone) tab 40 mg  40 mg Oral Daily with breakfast    sulfamethoxazole-trimethoprim DS (Bactrim DS) 800-160 MG per tab 1 tablet  1 tablet Oral Once per day on  Monday Wednesday Friday    cyanocobalamin (Vitamin B12) tab 500 mcg  500 mcg Oral Daily    zolpidem (Ambien) tab 5 mg  5 mg Oral Nightly PRN       Assessment & Plan    Acute GI bleed  Recently hospitalized 11/25-11/30/24 for GI bleed, now with recurrence; had been taking Xarelto 20 mg daily for PE  -her last colonoscopy in 1/2021 revealed adenomatous polyp but poor prep; pt declined repeat colonoscopy.  -EGD 11/26/24 (Dr. Nuno) -  15mm clean-based, nonbleeding gastric antral ulcer (bx done); 3mm gastric antral AVM s/p APC.  -on protonix 40mg BID - was to continue x 8 weeks (EOT 1/22/25)  She received a total of 3 units PRBCs and also IV Ferrrlecit.  On 11/30/24, she was resumed on Xarelto 20 mg po daily, and oral prednisone.  She returned to [facility] 11/30/24 for rehab.    -now Hgb 6.5 on 12/13/24 at [facility].  She was referred to ED.  Repeat Hgb was 7.6, though stool was heme positive.  Hgb decreased to 6.1 overnight, and she received transfusion 2 units PRBCs on 12/16/24  - Xarelto has been stopped  -seen for GI consult by Dr Rosario;   -plan enteroscopy and colonoscopy with MAC anesthesia  -pt seen by cards for pre-procedure eval; okay to proceed with close monitoring during procedure  -continue pantoprazole 40 mg IV [id number]  -serial H/H; keep Hgb >7  -CBC pending today     Anemia due to acute blood loss  -Hgb 6.1 on 12/14/24; s/p 2 units PRBCs; post-tx H/H 10.1> 9.0>9.0     Acute left peroneal palsy  In Nov 2024; has numbness to anterolateral left foot and ankle as well as inability to dorsiflex left foot; etiology unclear  -neurologist Dr Richardson consult  noted and appreciated  -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   -MRI brain shows no acute intracranial process  -left foot drop persists     Hypotension  -resolved with blood tx     Interstitial pneumonitis  -dx'ed 10/2024 - presented with cough and severe  hypoxia  -unresponsive to abx (amox/doxy, then IV zosyn/zithromax)  -CXR 10/24/24 extensive bilateral perihilar and bilateral upper and lower lobe   -S.pneumo, legionella Ag , mycoplasma IgM/G, Fungitell-beta-D glucan negative  -ANCA and URIEL/connective tissue disease panels negative  -anti-histone and anti-[place] Ab's negative  -Echo 10/24/24 - normal EF (55-60%), no obvious vegetatons  -CT with bilateral ground glass opacities, small consolidations of BLL  -per pulm, findings suspicious for NSIP (vs cryptogenic organizing pneumonia vs hypersensitivity pneumonitis); NOT thought to be c/w UIP pattern  -on empiric steroids (solumedrol then prednisone) since 10/25/24 - on prednisone 40mg/day  -F/U Pulm Dr Holloway 12/19/24 -- unclear if pt will be discharged before then; will c/w Dr. Borrego     Bilateral pulmonary emboli  -dx'ed at time of interstitial pneumonitis  -CT chest 10/25/24 -- acute distal segmental/subsegmental pulmonary emboli in [place] and subsegmental PE in [place]  -unclear etiology; no recent hx of long travel; no family/personal hx of blood clots  -BLE venous dopplers negative 10/26/24  -unclear etiology of PE; will do hypercoag w/u as outpt.   Consider malignancy w/u if no other cause found (colonoscopy 1/2021 revealed adenomatous polyp but poor prep; pt declined repeat colonoscopy; Medicare would not cover Cologuard); UTD on mammo  -started on xarelto (held 2/2 GIB) - restarted xarelto 20 mg po every day on 11/28/24  -repeat CT chest 12/9/24 neg PE  -stopped Xarelto 12/14/24 due to recurrent GI bleed     Severe aortic stenosis  -sees cardiology Dr. Bird  -moderate A.S on echo in 10/2022 (prev mild A.S in 2020)  -abnormal pre-op EKG 9/2023 --Nuc GXT done 9/20/23 -- EKG portion revealed 1-1.5mm ST seg depression in inferolateral leads with prolonged recovery; nuclear portion with normal perfusion   -Echo 9/29/23 --progression of A.S. from mild to moderate  -recent echo at Dr. Bird's office 10/18/24 -- LVEF  65%, grade 2 diastolic dysfunction, severe aortic stenosis (mean aortic gradient signif worse since 9/2023 -- 31>49 mmHg),  -seen by cardiology during last admission in 10/2024; pt will need TAVR evaluation as outpt  -on lasix 20mg po daily as of 10/2024--> held as above for hypotension; will restart lasix as starting to develop some LE edema     Hypertension, primary  -(dyazide stopped due to NANCY)  -on amlodipine 5mg/day   -BP creeping up (158/83); restart amlodipine at 2.5mg/day     Hx of hip OA  -s/p left THR (Dr. Lo) many years ago  -s/p elective R BEATRIZ on 10/5/23 by Dr. Diaz     Hyperlipidemia   Pt with strong family hx of high cholesterol  ; normal TG and HDL  No indication for statin given age     Osteopenia   On Fosamax from 2011 to 4/2016.     DEXA stable 5/10/17.    DEXA in 9/2019 showed slightly more bone loss in spine, but stable in hip.    DEXA 11/2021 -- some improvement in femur.  Hold off on fosamax for now.   DEXA 8/2024 -- osteoporosis of left forearm  -restarted Fosamax 8/2024     Vitamin D deficiency  On vitamin D 4000u/day     BLE edema  Advised compression     VTE proph            -SCDs             Malathi Stuart MD  12/17/2024  9:16 AM

## 2024-12-17 NOTE — ANESTHESIA POSTPROCEDURE EVALUATION
Patient: Hoda Busby    Procedure Summary       Date: 12/17/24 Room / Location: [facility] ENDOSCOPY 05 / [facility] ENDOSCOPY    Anesthesia Start: 1202 Anesthesia Stop: 1247    Procedures:       PUSH ENEROSCOPY WITH ESOPHAGOGASTRODUODENOSCOPY (EGD)      COLONOSCOPY Diagnosis: (Gastritis, gastric ulcers, esophagitis, colon polyp, internal hemorrhoids)    Surgeons: Karen Staton MD Anesthesiologist: Viktor Haq CRNA    Anesthesia Type: MAC ASA Status: 3            Anesthesia Type: MAC    Vitals Value Taken Time   /62 12/17/24 1245   Temp 36.4 12/17/24 1249   Pulse 80 12/17/24 1249   Resp 17 12/17/24 1249   SpO2 98 % 12/17/24 1249   Vitals shown include unfiled device data.    [facility] AN Post Evaluation:   Patient Evaluated in PACU  Patient Participation: complete - patient participated  Level of Consciousness: sleepy but conscious  Pain Score: 0  Pain Management: adequate  Airway Patency:patent  Dental exam unchanged from preop  Yes    Nausea/Vomiting: none  Cardiovascular Status: acceptable and blood pressure returned to baseline  Respiratory Status: acceptable and room air  Postoperative Hydration acceptable      Viktor Haq CRNA  12/17/2024 12:49 PM

## 2024-12-17 NOTE — PLAN OF CARE
Monitoring vital signs, stable at this time. No acute changes at this moment. Fall precautions in place-- bed alarm on, bed locked in lowest position, call light within reach. Frequent rounding by nursing staff.      Problem: Patient Centered Care  Goal: Patient preferences are identified and integrated in the patient's plan of care  Description: Interventions:  - What would you like us to know as we care for you? From [facility]   - Provide timely, complete, and accurate information to patient/family  - Incorporate patient and family knowledge, values, beliefs, and cultural backgrounds into the planning and delivery of care  - Encourage patient/family to participate in care and decision-making at the level they choose  - Honor patient and family perspectives and choices  Outcome: Progressing     Problem: Patient/Family Goals  Goal: Patient/Family Long Term Goal  Description: Patient's Long Term Goal: Discharge from the hospital    Interventions:  - Monitor vital signs  - Monitor appropriate labs  - Pain management  - Administer medications per order  - Follow MD orders  - Diagnostics per order  - Update / inform patient and family on plan of care  - Discharge planning  - See additional Care Plan goals for specific interventions  Outcome: Progressing  Goal: Patient/Family Short Term Goal  Description: Patient's Short Term Goal: Improve dark stools    Interventions:   - Monitor vital signs  - Monitor appropriate labs  - Pain management  - Administer medications per order  - Follow MD orders  - Diagnostics per order  - Update / inform patient and family on plan of care  - See additional Care Plan goals for specific interventions  Outcome: Progressing     Problem: SAFETY ADULT - FALL  Goal: Free from fall injury  Description: INTERVENTIONS:  - Assess pt frequently for physical needs  - Identify cognitive and physical deficits and behaviors that affect risk of falls.  - [place] fall precautions  as indicated by assessment.  - Educate pt/family on patient safety including physical limitations  - Instruct pt to call for assistance with activity based on assessment  - Modify environment to reduce risk of injury  - Provide assistive devices as appropriate  - Consider OT/PT consult to assist with strengthening/mobility  - Encourage toileting schedule  Outcome: Progressing     Problem: HEMATOLOGIC - ADULT  Goal: Maintains hematologic stability  Description: INTERVENTIONS  - Assess for signs and symptoms of bleeding or hemorrhage  - Monitor labs and vital signs for trends  - Administer supportive blood products/factors, fluids and medications as ordered and appropriate  - Administer supportive blood products/factors as ordered and appropriate  Outcome: Progressing  Goal: Free from bleeding injury  Description: (Example usage: patient with low platelets)  INTERVENTIONS:  - Avoid intramuscular injections, enemas and rectal medication administration  - Ensure safe mobilization of patient  - Hold pressure on venipuncture sites to achieve adequate hemostasis  - Assess for signs and symptoms of internal bleeding  - Monitor lab trends  - Patient is to report abnormal signs of bleeding to staff  - Avoid use of toothpicks and dental floss  - Use electric shaver for shaving  - Use soft bristle tooth brush  - Limit straining and forceful nose blowing  Outcome: Progressing     Problem: MUSCULOSKELETAL - ADULT  Goal: Return mobility to safest level of function  Description: INTERVENTIONS:  - Assess patient stability and activity tolerance for standing, transferring and ambulating w/ or w/o assistive devices  - Assist with transfers and ambulation using safe patient handling equipment as needed  - Ensure adequate protection for wounds/incisions during mobilization  - Obtain PT/OT consults as needed  - Advance activity as appropriate  - Communicate ordered activity level and limitations with patient/family  Outcome:  Progressing     Problem: Impaired Functional Mobility  Goal: Achieve highest/safest level of mobility/gait  Description: Interventions:  - Assess patient's functional ability and stability  - Promote increasing activity/tolerance for mobility and gait  - Educate and engage patient/family in tolerated activity level and precautions  - Recommend use of  RW for transfers and ambulation  Outcome: Progressing     Problem: Impaired Activities of Daily Living  Goal: Achieve highest/safest level of independence in self care  Description: Interventions:  - Assess ability and encourage patient to participate in ADLs to maximize function  - Promote sitting position while performing ADLs such as feeding, grooming, and bathing  - Educate and encourage patient/family in tolerated functional activity level and precautions during self-care  - Encourage patient to incorporate impaired side during daily activities to promote function  Outcome: Progressing

## 2024-12-17 NOTE — CONSULTS
[facility]  part of [facility]    Report of Consultation    Hoda Busby Patient Status:  Inpatient    1940 MRN [id number]   Location [facility] 5SW/SE Attending Jigar Apple MD   Hosp Day # 4 PCP Malathi Stuart MD     Date of Admission:  2024    Reason for Consultation:   Abnormal CT chest    History of Present Illness:   Patient is a 84-year-old female with past medical history significant for recent hospitalization 2024 with evidence of hypoxemic respiratory failure.  CT chest performed at that time with evidence of fibrotic changes discharged on its own 40 mg at bedtime.  Had been treated earlier with course of antibiotic therapy with no significant clinical radiographic improvement.  Returns now with concern for GI bleed/anemia.  Denies significant dyspnea symptoms at this time.  Denies cough wheezing or fevers.  Recent CT chest performed on 2024 with improvement in groundglass opacities and consolidation bilaterally.    Past Medical History  Past Medical History:    Acute pulmonary embolism (HCC)    High blood pressure    History of blood transfusion    Macular degeneration    Osteopenia    Primary osteoarthritis of right hip    Pulmonary embolism (HCC)    Visual impairment    Readers    White coat hypertension       Past Surgical History  Past Surgical History:   Procedure Laterality Date    Cataract  3/2&3/16 2017    Colonoscopy & polypectomy  2021         Hip replacement surgery Left Around     Other surgical history  [place] 2017    Bilateral cataract surgery       Family History  Family History   Problem Relation Age of Onset    Cancer Father         bone (cause of death)    Stroke Mother         CVA (cause of death)    Diabetes Mother     Hypertension Mother     Dementia Brother         Alzheimer's    Heart Disease Brother         CAD - x2 brothers    Heart Disease Brother        Social History  Social History     Socioeconomic  History    Marital status:    Tobacco Use    Smoking status: Former     Current packs/day: 0.00     Types: Cigarettes     Start date: 1990     Quit date: 1990     Years since quittin.9     Passive exposure: Past    Smokeless tobacco: Former   Vaping Use    Vaping status: Never Used   Substance and Sexual Activity    Alcohol use: Yes     Comment: wine, occasionally    Drug use: Never   Other Topics Concern    Caffeine Concern Yes     Comment: coffee/soda - 2cups/day           Current Medications:  Current Facility-Administered Medications   Medication Dose Route Frequency    amLODIPine (Norvasc) tab 2.5 mg  2.5 mg Oral Daily    furosemide (Lasix) tab 20 mg  20 mg Oral Daily    pantoprazole (Protonix) 40 mg in sodium chloride 0.9% PF 10 mL IV push  40 mg Intravenous Q12H    alendronate (Fosamax) tab 70 mg  70 mg Oral Q7 Days    atorvastatin (Lipitor) tab 20 mg  20 mg Oral Nightly    calcium carbonate-vitamin D (Oyster Shell-D) 250-3.125 MG-MCG per tab 1 tablet  1 tablet Oral Daily    cholecalciferol (Vitamin D3) tab 4,000 Units  4,000 Units Oral Daily    ferrous sulfate DR tab 325 mg  325 mg Oral Daily    polyethylene glycol (PEG 3350) (Miralax) 17 g oral packet 17 g  17 g Oral Daily    predniSONE (Deltasone) tab 40 mg  40 mg Oral Daily with breakfast    sulfamethoxazole-trimethoprim DS (Bactrim DS) 800-160 MG per tab 1 tablet  1 tablet Oral Once per day on     cyanocobalamin (Vitamin B12) tab 500 mcg  500 mcg Oral Daily    zolpidem (Ambien) tab 5 mg  5 mg Oral Nightly PRN     Medications Prior to Admission   Medication Sig    omeprazole 20 MG Oral Capsule Delayed Release Take 1 capsule (20 mg total) by mouth 2 (two) times daily.    atorvastatin 20 MG Oral Tab Take 1 tablet (20 mg total) by mouth at bedtime.    zolpidem 5 MG Oral Tab Take 1 tablet (5 mg total) by mouth nightly as needed.    rivaroxaban (XARELTO) 20 MG Oral Tab Take 1 tablet (20 mg total) by mouth daily with  food.    sulfamethoxazole-trimethoprim -160 MG Oral Tab per tablet Take 1 tablet by mouth 3 (three) times a week. Monday, Wednesday, Friday    Multiple Vitamins-Minerals (MULTI-VITAMIN/MINERALS) Oral Tab Take 1 tablet by mouth daily.    ferrous sulfate 325 (65 FE) MG Oral Tab EC Take 1 tablet (325 mg total) by mouth daily.    polyethylene glycol, PEG 3350, 17 g Oral Powd Pack Take 17 g by mouth daily.    predniSONE 20 MG Oral Tab Take 2 tablets (40 mg total) by mouth daily with breakfast.    alendronate 70 MG Oral Tab Take 1 tablet (70 mg total) by mouth every 7 days.    Cholecalciferol (VITAMIN D) 50 MCG (2000 UT) Oral Tab Take 4,000 Units by mouth daily.    Vitamin B-12 500 MCG Oral Tab Take 1 tablet (500 mcg total) by mouth daily.    Calcium Carb-Cholecalciferol (CALCIUM + D3) 600-200 MG-UNIT Oral Tab Take 1 tablet by mouth daily.    pantoprazole 40 MG Oral Tab EC Take 1 tablet (40 mg total) by mouth 2 (two) times daily before meals. (Patient not taking: Reported on 12/13/2024)       Allergies  Allergies[1]    Review of Systems:   Constitutional: denies fevers, chills, weakness, fatigue, recent illness  HEENT: denies headache, sore throat, vision loss  Cardio: denies chest pain, chest pressure, palpitations  Respiratory: denies dyspnea, cough, wheezing, hemoptysis   GI: denies nausea, vomiting, abdominal pain  : denies dysuria, hematuria  Musculoskeletal: denies arthralgia, myalgia  Integumentary: denies rash, itching  Neurological: denies syncope, weakness, dizziness,   Psychiatric: denies depression, anxiety  Hematologic: denies bruising        Physical Exam:   Blood pressure 158/83, pulse 88, temperature 98.3 °F (36.8 °C), temperature source Oral, resp. rate 18, height 5' 8\" (1.727 m), weight 174 lb 9.6 oz (79.2 kg), SpO2 96%.    Constitutional: no acute distress  Eyes: PERRL  ENT: nares patent  Neck: neck supple, no JVD  Cardio: RRR, S1 S2  Respiratory: clear to auscultation bilaterally, no wheezing,  rales, rhonchi, crackles  GI: abdomen soft, non tender, active bowel souds, no organomegaly  Extremities: no clubbing, cyanosis, edema  Neurologic: no gross motor deficits  Skin: warm, dry    Results:   Laboratory Data  Lab Results   Component Value Date    WBC 8.9 12/17/2024    HGB 10.3 (L) 12/17/2024    HCT 32.8 (L) 12/17/2024    .0 12/17/2024    CREATSERUM 0.96 12/17/2024    BUN 11 12/17/2024     12/17/2024    K 4.4 12/17/2024     (H) 12/17/2024    CO2 27.0 12/17/2024    GLU 89 12/17/2024    CA 8.7 12/17/2024    ALB 2.9 (L) 12/14/2024    ALKPHO 36 (L) 12/14/2024    TP 4.4 (L) 12/14/2024    AST 16 12/14/2024    ALT 19 12/14/2024    PTT 30.6 11/01/2024    INR 1.06 09/08/2023    PTP 13.7 09/08/2023    TSH 1.526 07/16/2024    ESRML 49 (H) 10/26/2024    PHOS 3.6 10/28/2024    B12 349 03/20/2017         Imaging  No results found.    Assessment   1.  Acute blood loss anemia/acute GI bleed  2.  ILD  3.  Recent pulmonary embolism  4.  Prior nicotine dependence  5.  Hypertension  6.  Severe aortic stenosis    Plan   -Patient presents with evidence of worsening anemia GI evaluated patient with endoscopic evaluation revealing evidence of antral ulcer with no active bleeding seen during previous hospitalization.  Plan for endoscopy later today  -Appears to be improved on steroid therapy.  Had been on 40 mg prednisone since discharge from recent hospitalization on 11/1/2024.  Plan to repeat CT high-resolution chest to further evaluate.  -CT chest on 10/25/2024 with evidence of segmental pulmonary embolism in right upper lobe and left upper lobe.  Multifocal groundglass opacities seen bilaterally.  Differential includes ILD including organizing pneumonia, hypersensitivity pneumonitis, NSIP.  -Repeat CT chest high-resolution on 12/9/2024 with significant improvement in groundglass and consolidation seen bilaterally.  Mosaic attenuation seen bilaterally with some subpleural reticulation.  -Patient has been on  prednisone 40 mg daily and Bactrim double strength 3 times weekly.  Will transition to prednisone 30 mg daily for 10 days followed by 20 mg daily for 10 days and 10 mg daily for 10 days before discontinuing.  Continue Bactrim double strength 3 times weekly in the meantime  -Recommend outpatient pulmonary follow-up and pulmonary function testing  -Okay for discharge from pulmonary perspective.  Follow-up in pulmonary clinic in 1 month.    -Reviewed vitals, labs and imaging    Cody Holloway DO  Pulmonary Critical Care Medicine  [facility]  12/17/2024  11:00 AM        [1] No Known Allergies

## 2024-12-17 NOTE — OPERATIVE REPORT
EGD/Colonoscopy Operative Report    Hoda Busby Patient Status:  Inpatient    1940 MRN [id number]   Location [facility] ENDOSCOPY LAB SUITES Attending Jigar Apple MD    Day #   4 PCP Malathi Stuart MD     Pre-Operative Diagnosis: anemia    Date of previous colonoscopy: 24    Post-Operative Diagnosis:   Esophagitis, Gastritis, and Gastric ulcer  Colon polyps and Internal Hemorrhoids     Procedure Performed:   EGD with biopsies  Colonoscopy with cold forceps biopsy      Pre-procedure: The patient was assessed in the procedure room immediately before induction of sedation which included, at a minimum, vital signs, NPO status, and airway assessment.  The patient was deemed and appropriate candidate for procedural sedation.    Informed Consent: Informed consent for both procedures and sedation were obtained from the patient.  The risks, benefits and alternatives to the procedures including potentially life-threatening complications of sedation, bleeding, perforation, missed lesions or need for repeat endoscopy were reviewed along with the possible need for hospitalization, surgical management, transfusion of blood products or repeat endoscopy should one of these complications arise.  The patient and/or POA voiced their understanding and was agreeable to proceed.     Sedation Type: MAC-Patient received sedation with monitored anesthesia provided by an anesthesiologist    EGD Procedure Description: The patient was placed in the left lateral decubitus position.  A bite block was placed in the patient's mouth and the endoscope was passed through the mouth under direct vision and advanced to the second portion of the duodenum.  The endoscope was then withdrawn to examine the duodenal bulb, pylorus and gastric antrum, body, incisura and fundus and then retroflexed to  to examine the GE junction and cardia. The upper endoscopy was performed  without difficulty, the patient tolerated the procedure well with no immediate complications.     Colonoscopy Procedure Description: The patient was placed in the left lateral decubitus position.  After careful digital rectal examination, the Pediatric colonoscope was inserted into the rectum under direct vision and advanced to the level of the terminal ileum under direct visualization. The cecum was identified by landmarks, including the appendiceal orifice and ileoceccal valve. Careful examination of the entire colon was performed during withdrawal of the endoscope. The scope was withdrawn to the rectum and retroflexion was performed.  The colonoscopy was performed without difficulty and the patient tolerated the procedure well with no immediate complications. The patient was transferred to the recovery area in stable condition.     Quality of Preparation/Aronchick Bowel Prep Scale: 2: Good (Clear liquid covering 5%-25% of mucosa, but >90% of mucosa seen) and 3: Fair (Semisolid stool could not be suctioned or washed away, but >90% of mucosa seen)  Cecum Withdrawal Time: see nursing notes    Findings: Upper endoscopy revealed grade 1 esophagitis at the GE junction, 2 healing superficial ulcers in the gastric antrum and body and otherwise normal duodenum and jejunum.  Push enteroscopy was performed and no further AVMs were noted in the third portion of the duodenum or proximal jejunum.  Biopsies were not taken given recent negative biopsies on prior exam and healing appearance of the ulcers.    Colonoscopy revealed a large flat polyp in the distal transverse colon that was biopsied in a piecemeal fashion and area tattooed for future reference.  Prep was fair in the right colon and cecum and lavage did not reveal any gross lesions on IC valve or cecum.  Few diverticuli were noted in the left colon and small internal hemorrhoids found on retroflexion.    Impression:   Esophagitis (inflamation of the esophagus lining),  Gastritis (inflammation of the stomach lining), and Ulcer in the stomach/duodenum (small bowel)  Colon polyp(s), Diverticulosis, and Internal hemorrhoids    Recommendations: Discharge patient when discharge criteria are met., Await pathology results, Check MyChart (if enrolled) for pathology results or await call from office. If you do not receive your results within a week, please call the office., and Depending on pathology results, surveillance interval for next colonoscopy will be determined.    Discharge:  The patient was given an after visit summary detailing the procedure, findings, recommendations and follow up plans.     Karen Staton MD  12/17/2024  12:50 PM

## 2024-12-18 LAB
ANION GAP SERPL CALC-SCNC: 4 MMOL/L (ref 0–18)
BASOPHILS # BLD AUTO: 0.02 X10(3) UL (ref 0–0.2)
BASOPHILS NFR BLD AUTO: 0.2 %
BUN BLD-MCNC: 12 MG/DL (ref 9–23)
BUN/CREAT SERPL: 10.3 (ref 10–20)
CALCIUM BLD-MCNC: 8.4 MG/DL (ref 8.7–10.4)
CHLORIDE SERPL-SCNC: 112 MMOL/L (ref 98–112)
CO2 SERPL-SCNC: 27 MMOL/L (ref 21–32)
CREAT BLD-MCNC: 1.16 MG/DL
DEPRECATED RDW RBC AUTO: 55.2 FL (ref 35.1–46.3)
EGFRCR SERPLBLD CKD-EPI 2021: 46 ML/MIN/1.73M2 (ref 60–?)
EOSINOPHIL # BLD AUTO: 0.17 X10(3) UL (ref 0–0.7)
EOSINOPHIL NFR BLD AUTO: 1.9 %
ERYTHROCYTE [DISTWIDTH] IN BLOOD BY AUTOMATED COUNT: 16.8 % (ref 11–15)
GLUCOSE BLD-MCNC: 90 MG/DL (ref 70–99)
HCT VFR BLD AUTO: 28.8 %
HCT VFR BLD AUTO: 32.3 %
HGB BLD-MCNC: 10.3 G/DL
HGB BLD-MCNC: 8.7 G/DL
IMM GRANULOCYTES # BLD AUTO: 0.06 X10(3) UL (ref 0–1)
IMM GRANULOCYTES NFR BLD: 0.7 %
LYMPHOCYTES # BLD AUTO: 1.35 X10(3) UL (ref 1–4)
LYMPHOCYTES NFR BLD AUTO: 15.1 %
MCH RBC QN AUTO: 26.9 PG (ref 26–34)
MCHC RBC AUTO-ENTMCNC: 30.2 G/DL (ref 31–37)
MCV RBC AUTO: 88.9 FL
MONOCYTES # BLD AUTO: 0.58 X10(3) UL (ref 0.1–1)
MONOCYTES NFR BLD AUTO: 6.5 %
NEUTROPHILS # BLD AUTO: 6.74 X10 (3) UL (ref 1.5–7.7)
NEUTROPHILS # BLD AUTO: 6.74 X10(3) UL (ref 1.5–7.7)
NEUTROPHILS NFR BLD AUTO: 75.6 %
OSMOLALITY SERPL CALC.SUM OF ELEC: 295 MOSM/KG (ref 275–295)
PLATELET # BLD AUTO: 299 10(3)UL (ref 150–450)
POTASSIUM SERPL-SCNC: 4.3 MMOL/L (ref 3.5–5.1)
POTASSIUM SERPL-SCNC: 4.3 MMOL/L (ref 3.5–5.1)
RBC # BLD AUTO: 3.24 X10(6)UL
SODIUM SERPL-SCNC: 143 MMOL/L (ref 136–145)
WBC # BLD AUTO: 8.9 X10(3) UL (ref 4–11)

## 2024-12-18 PROCEDURE — 99239 HOSP IP/OBS DSCHRG MGMT >30: CPT | Performed by: INTERNAL MEDICINE

## 2024-12-18 PROCEDURE — 99232 SBSQ HOSP IP/OBS MODERATE 35: CPT | Performed by: INTERNAL MEDICINE

## 2024-12-18 RX ORDER — FUROSEMIDE 20 MG/1
20 TABLET ORAL DAILY
Qty: 90 TABLET | Refills: 3 | Status: SHIPPED | OUTPATIENT
Start: 2024-12-18

## 2024-12-18 RX ORDER — FUROSEMIDE 10 MG/ML
20 INJECTION INTRAMUSCULAR; INTRAVENOUS ONCE
Status: COMPLETED | OUTPATIENT
Start: 2024-12-18 | End: 2024-12-18

## 2024-12-18 RX ORDER — PREDNISONE 10 MG/1
TABLET ORAL
Qty: 57 TABLET | Refills: 0 | Status: SHIPPED | OUTPATIENT
Start: 2024-12-19 | End: 2025-01-17

## 2024-12-18 NOTE — CM/SW NOTE
12/18/24 0900   Discharge disposition   Expected discharge disposition [facility]   Post Acute Care Provider   ([facility])   Discharge transportation Private car     MARCIA confirmed with RN Kari who stated pt is medically ready for discharge today.  DC order placed.    HH updates attached via [facility] to [facility].  Fabi from [facility]. made aware of discharge through [facility] Messages.    AMRCIA confirmed that [facility] contact information added to AVS.    MARCIA spoke to CHIARA lenz/Tina gerber who is aware pt will return today.    PLAN: DC to [facility]    RN # to report: [phone] ask for RN on duty for patient     Tiny Vasquezjhoana MSW, LSW [id number]

## 2024-12-18 NOTE — PLAN OF CARE
Monitoring vital signs, stable at this time. No acute changes at this moment. Fall precautions in place-- bed alarm on, bed locked in lowest position, call light within reach. Frequent rounding by nursing staff.      Problem: Patient Centered Care  Goal: Patient preferences are identified and integrated in the patient's plan of care  Description: Interventions:  - What would you like us to know as we care for you? From [facility]   - Provide timely, complete, and accurate information to patient/family  - Incorporate patient and family knowledge, values, beliefs, and cultural backgrounds into the planning and delivery of care  - Encourage patient/family to participate in care and decision-making at the level they choose  - Honor patient and family perspectives and choices  Outcome: Progressing     Problem: Patient/Family Goals  Goal: Patient/Family Long Term Goal  Description: Patient's Long Term Goal: Discharge from the hospital    Interventions:  - Monitor vital signs  - Monitor appropriate labs  - Pain management  - Administer medications per order  - Follow MD orders  - Diagnostics per order  - Update / inform patient and family on plan of care  - Discharge planning  - See additional Care Plan goals for specific interventions  Outcome: Progressing  Goal: Patient/Family Short Term Goal  Description: Patient's Short Term Goal: Improve dark stools    Interventions:   - Monitor vital signs  - Monitor appropriate labs  - Pain management  - Administer medications per order  - Follow MD orders  - Diagnostics per order  - Update / inform patient and family on plan of care  - See additional Care Plan goals for specific interventions  Outcome: Progressing     Problem: SAFETY ADULT - FALL  Goal: Free from fall injury  Description: INTERVENTIONS:  - Assess pt frequently for physical needs  - Identify cognitive and physical deficits and behaviors that affect risk of falls.  - [place] fall precautions  as indicated by assessment.  - Educate pt/family on patient safety including physical limitations  - Instruct pt to call for assistance with activity based on assessment  - Modify environment to reduce risk of injury  - Provide assistive devices as appropriate  - Consider OT/PT consult to assist with strengthening/mobility  - Encourage toileting schedule  Outcome: Progressing     Problem: HEMATOLOGIC - ADULT  Goal: Maintains hematologic stability  Description: INTERVENTIONS  - Assess for signs and symptoms of bleeding or hemorrhage  - Monitor labs and vital signs for trends  - Administer supportive blood products/factors, fluids and medications as ordered and appropriate  - Administer supportive blood products/factors as ordered and appropriate  Outcome: Progressing  Goal: Free from bleeding injury  Description: (Example usage: patient with low platelets)  INTERVENTIONS:  - Avoid intramuscular injections, enemas and rectal medication administration  - Ensure safe mobilization of patient  - Hold pressure on venipuncture sites to achieve adequate hemostasis  - Assess for signs and symptoms of internal bleeding  - Monitor lab trends  - Patient is to report abnormal signs of bleeding to staff  - Avoid use of toothpicks and dental floss  - Use electric shaver for shaving  - Use soft bristle tooth brush  - Limit straining and forceful nose blowing  Outcome: Progressing     Problem: MUSCULOSKELETAL - ADULT  Goal: Return mobility to safest level of function  Description: INTERVENTIONS:  - Assess patient stability and activity tolerance for standing, transferring and ambulating w/ or w/o assistive devices  - Assist with transfers and ambulation using safe patient handling equipment as needed  - Ensure adequate protection for wounds/incisions during mobilization  - Obtain PT/OT consults as needed  - Advance activity as appropriate  - Communicate ordered activity level and limitations with patient/family  Outcome:  Progressing     Problem: Impaired Functional Mobility  Goal: Achieve highest/safest level of mobility/gait  Description: Interventions:  - Assess patient's functional ability and stability  - Promote increasing activity/tolerance for mobility and gait  - Educate and engage patient/family in tolerated activity level and precautions  - Recommend use of  RW for transfers and ambulation  Outcome: Progressing     Problem: Impaired Activities of Daily Living  Goal: Achieve highest/safest level of independence in self care  Description: Interventions:  - Assess ability and encourage patient to participate in ADLs to maximize function  - Promote sitting position while performing ADLs such as feeding, grooming, and bathing  - Educate and encourage patient/family in tolerated functional activity level and precautions during self-care  - Encourage patient to incorporate impaired side during daily activities to promote function  Outcome: Progressing

## 2024-12-18 NOTE — DISCHARGE SUMMARY
[facility]  part of [facility]    Discharge Summary    Hoda Busby Patient Status:  Inpatient    1940 MRN [id number]   Location [facility] 5SW/SE Attending Jigar Apple MD   Hosp Day # 5 PCP Malathi Stuart MD     Date of Admission: 2024   Date of Discharge:  2024    Admitting Diagnosis: Gastric AVM [K31.819]  NANCY (acute kidney injury) (HCC) [N17.9]  Gastrointestinal hemorrhage, unspecified gastrointestinal hemorrhage type [K92.2]    Disposition: Home    Discharge Diagnosis: .Principal Problem:    Gastrointestinal hemorrhage, unspecified gastrointestinal hemorrhage type  Active Problems:    Nonrheumatic aortic valve stenosis    Acute blood loss anemia    Interstitial pneumonitis (HCC)    NANCY (acute kidney injury) (HCC)    Gastric AVM      Hospital Course:   Reason for Admission:   GI bleed    Discharge Physical Exam:  Vital Signs:  Blood pressure 101/45, pulse 94, temperature 98.2 °F (36.8 °C), temperature source Oral, resp. rate 18, height 5' 8\" (1.727 m), weight 174 lb (78.9 kg), SpO2 95%.     General: Pt awake, alert, no acute distress.   Respiratory: Clear to auscultation bilaterally.  No wheezes. No rhonchi.  Cardiovascular: S1, S2.  Regular rate and rhythm.  No murmurs.   Abdomen: Soft, nontender, nondistended, NABS  Extremities: 1-2+ BLE pitting edema, no erythema or tenderness    Hospital Course:     Acute GI bleed  Recently hospitalized -24 for GI bleed, now with recurrence; had been taking Xarelto 20 mg daily for PE  -her last colonoscopy in 2021 revealed adenomatous polyp but poor prep; pt declined repeat colonoscopy.  -EGD 24 (Dr. Nuno) -  15mm clean-based, nonbleeding gastric antral ulcer (bx done); 3mm gastric antral AVM s/p APC.  -on protonix 40mg BID - was to continue x 8 weeks (EOT 25)  She received a total of 3 units PRBCs and also IV Ferrrlecit.  On 24, she was resumed on Xarelto 20 mg po daily, and oral  prednisone.  She returned to [facility] 11/30/24 for rehab.    -now Hgb 6.5 on 12/13/24 at [facility].  She was referred to ED.  Repeat Hgb was 7.6, though stool was heme positive.  Hgb decreased to 6.1 overnight, and she received transfusion 2 units PRBCs on 12/16/24  - Xarelto stopped  -colonoscopy and repeat EGD 12/17/24 by Dr. Staton -- grade 1 esophagitis; duod ulcers healing; large flat polyp in transverse colon (biopsied).  Path pending -- pending results, may need repeat colonoscopy -- to follow up results with Dr. Staton      Anemia due to acute blood loss  -Hgb 6.1 on 12/14/24; s/p 2 units PRBCs; post-tx H/H 9.0>9.0>10.3>8.7  -doubt the 10.3 is accurate (pt prob hemoconcentrated from doing bowel prep)  -check repeat h/h at noon today; home if stable     BLE edema  -IV lasix 20mg x 1 now  -then lasix 40mg po every day x 3d, then 20mg/day    Mild NANCY  -likely due to mild dehydration from bowel prep  -repeat BMP next week     Acute left peroneal palsy  In Nov 2024; has numbness to anterolateral left foot and ankle as well as inability to dorsiflex left foot; etiology unclear  -neurologist Dr Richardson consult  noted and appreciated  -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   -MRI brain shows no acute intracranial process  -left foot drop persists     Interstitial pneumonitis  -dx'ed 10/2024 - presented with cough and severe hypoxia  -unresponsive to abx (amox/doxy, then IV zosyn/zithromax)  -CXR 10/24/24 extensive bilateral perihilar and bilateral upper and lower lobe   -S.pneumo, legionella Ag , mycoplasma IgM/G, Fungitell-beta-D glucan negative  -ANCA and URIEL/connective tissue disease panels negative  -anti-histone and anti-K[place]a Ab's negative  -Echo 10/24/24 - normal EF (55-60%), no obvious vegetatons  -CT with bilateral ground glass opacities, small consolidations of BLL  -per pulm, findings suspicious for NSIP (vs  cryptogenic organizing pneumonia vs hypersensitivity pneumonitis); NOT thought to be c/w UIP pattern  -on empiric steroids (solumedrol then prednisone) since 10/25/24 - prednisone 40mg/day decreased to 30mg/day on 12/17/24 by pulm Dr. Holloway -- d/w Dr. Holloway -- 30mg/day x 10d, then 20mg/day x 10d, then 10mg/day x 10d  -pt to f/u with Dr. Holloway in 3-4 weeks       Bilateral pulmonary emboli  -dx'ed at time of interstitial pneumonitis  -CT chest 10/25/24 -- acute distal segmental/subsegmental pulmonary emboli in [place] and subsegmental PE in [place]  -unclear etiology; no recent hx of long travel; no family/personal hx of blood clots  -BLE venous dopplers negative 10/26/24  -unclear etiology of PE; will do hypercoag w/u as outpt.   Consider malignancy w/u if no other cause found (colonoscopy 1/2021 revealed adenomatous polyp but poor prep; pt declined repeat colonoscopy; Medicare would not cover Cologuard); UTD on mammo  -started on xarelto (held 2/2 GIB) - restarted xarelto 20 mg po every day on 11/28/24  -repeat CT chest 12/9/24 neg PE  -stopped Xarelto 12/14/24 due to recurrent GI bleed     Severe aortic stenosis  -sees cardiology Dr. Bird  -moderate A.S on echo in 10/2022 (prev mild A.S in 2020)  -abnormal pre-op EKG 9/2023 --Nuc GXT done 9/20/23 -- EKG portion revealed 1-1.5mm ST seg depression in inferolateral leads with prolonged recovery; nuclear portion with normal perfusion   -Echo 9/29/23 --progression of A.S. from mild to moderate  -recent echo at Dr. Bird's office 10/18/24 -- LVEF 65%, grade 2 diastolic dysfunction, severe aortic stenosis (mean aortic gradient signif worse since 9/2023 -- 31>49 mmHg),  -seen by cardiology during last admission in 10/2024; pt will need TAVR evaluation as outpt  -restarted lasix as starting to develop some LE edema as above     Hypertension, primary  -(dyazide stopped due to NANCY)  -on amlodipine 5mg/day at home  -BP's soft here; hold amlodipine; monitor as outpt     Hx  of hip OA  -s/p left THR (Dr. Lo) many years ago  -s/p elective R BEATRIZ on 10/5/23 by Dr. Diaz     Hyperlipidemia   Pt with strong family hx of high cholesterol  ; normal TG and HDL  No indication for statin given age     Osteopenia   On Fosamax from 2011 to 4/2016.     DEXA stable 5/10/17 and 2019 and 2021  DEXA 8/2024 -- osteoporosis of left forearm  -restarted Fosamax 8/2024 -- given esophagitis 12/2024, will hold fosamax for now     Vitamin D deficiency  On vitamin D 4000u/day     Discharge to [place] where pt lives in independent living.  She has been ambulating here w/walker w/o difficulty or SOB  D/w pt and tres Ferrari          Consultants         Provider   Role Specialty     Cody Holloway DO      Consulting Physician PULMONARY DISEASES     Viktor Bird MD      Consulting Physician Cardiovascular Diseases          Surgical Procedures       Case IDs Date Procedure Surgeon Location Status    [id number] 12/17/24 PUSH ENEROSCOPY WITH ESOPHAGOGASTRODUODENOSCOPY (EGD) Karen Staton MD [facility] ENDOSCOPY Comp              Discharge Plan:   Discharge Condition: Stable    Current Discharge Medication List        New Orders    Details   furosemide (LASIX) 20 MG Oral Tab Take 1 tablet (20 mg total) by mouth daily. Take 40mg daily x 3 days (12/19-12/21/24), then 20mg daily thereafter           Home Meds - Modified    Details   predniSONE 10 MG Oral Tab Take 3 tablets (30 mg total) by mouth daily with breakfast for 9 days, THEN 2 tablets (20 mg total) daily with breakfast for 10 days, THEN 1 tablet (10 mg total) daily with breakfast for 10 days.           Home Meds - Unchanged    Details   omeprazole 20 MG Oral Capsule Delayed Release Take 1 capsule (20 mg total) by mouth 2 (two) times daily.      atorvastatin 20 MG Oral Tab Take 1 tablet (20 mg total) by mouth at bedtime.      zolpidem 5 MG Oral Tab Take 1 tablet (5 mg total) by mouth nightly as needed.      sulfamethoxazole-trimethoprim -160 MG  Oral Tab per tablet Take 1 tablet by mouth 3 (three) times a week. Monday, Wednesday, Friday      Multiple Vitamins-Minerals (MULTI-VITAMIN/MINERALS) Oral Tab Take 1 tablet by mouth daily.      ferrous sulfate 325 (65 FE) MG Oral Tab EC Take 1 tablet (325 mg total) by mouth daily.      polyethylene glycol, PEG 3350, 17 g Oral Powd Pack Take 17 g by mouth daily.             Cholecalciferol (VITAMIN D) 50 MCG (2000 UT) Oral Tab Take 4,000 Units by mouth daily.      Vitamin B-12 500 MCG Oral Tab Take 1 tablet (500 mcg total) by mouth daily.      Calcium Carb-Cholecalciferol (CALCIUM + D3) 600-200 MG-UNIT Oral Tab Take 1 tablet by mouth daily.                 Discharge Diet: As tolerated    Discharge Activity: As tolerated    Follow up:       Follow up Labs: bmp and cbc next week         Other Discharge Instructions:         POST-EGD/COLONOSCOPY DISCHARGE INSTRUCTIONS    PROCEDURES PERFORMED   EGD with biopsies  Colonoscopy with cold forceps biopsy    ENDOSCOPIST: Karen Staton MD    FINDINGS:   Esophagitis (inflamation of the esophagus lining), Gastritis (inflammation of the stomach lining), and Ulcer in the stomach/duodenum (small bowel)  Colon polyp(s), Diverticulosis, and Internal hemorrhoids    MEDICATIONS: You may resume all other medications today    DIET: You may resume your regular diet.    BIOPSIES: Biopsies were taken.  They will be sent to pathology and results will be available in 7-10 days.    X-RAYS: No X-rays/Labs were ordered today        Activity for remainder of today:  REST TODAY  DO NOT drive or operate heavy machinery  DO NOT drink any alcoholic beverages  DO NOT sign any legal documents or make any important decisions    After your procedure(s):  It is not unusual to feel bloated or gassy .  Passing gas and belching is encouraged. Lying on your left side with your knees flexed may relieve the discomfort. A hot pack to the abdomen may also help. After your upper endoscopy, you may experience a slight  sore throat which will subside. Throat lozenges or salt water gargle can be used.    FOLLOW-UP:  Contact the office at [phone] if a follow-up appointment is needed or if you develop any of the following:    Severe abdominal pain/discomfort   Excessive bleeding  Black tarry stool  Difficulty breathing/swallowing  Persistent nausea/vomiting    Fever above 100 degrees or chills              >30 min were spent counseling patient and coordinating care    Malathi Stuart MD  12/18/2024

## 2024-12-19 ENCOUNTER — APPOINTMENT (OUTPATIENT)
Dept: CT IMAGING | Facility: HOSPITAL | Age: 84
End: 2024-12-19
Attending: SURGERY
Payer: MEDICARE

## 2024-12-19 VITALS
HEART RATE: 97 BPM | DIASTOLIC BLOOD PRESSURE: 56 MMHG | SYSTOLIC BLOOD PRESSURE: 118 MMHG | RESPIRATION RATE: 18 BRPM | WEIGHT: 174 LBS | BODY MASS INDEX: 26.37 KG/M2 | OXYGEN SATURATION: 98 % | TEMPERATURE: 98 F | HEIGHT: 68 IN

## 2024-12-19 LAB
ALBUMIN SERPL-MCNC: 3.2 G/DL (ref 3.2–4.8)
ALBUMIN/GLOB SERPL: 2 {RATIO} (ref 1–2)
ALP LIVER SERPL-CCNC: 46 U/L
ALT SERPL-CCNC: 25 U/L
ANION GAP SERPL CALC-SCNC: 6 MMOL/L (ref 0–18)
AST SERPL-CCNC: 19 U/L (ref ?–34)
BASOPHILS # BLD AUTO: 0.01 X10(3) UL (ref 0–0.2)
BASOPHILS NFR BLD AUTO: 0.1 %
BILIRUB SERPL-MCNC: 0.7 MG/DL (ref 0.2–1.1)
BUN BLD-MCNC: 13 MG/DL (ref 9–23)
BUN/CREAT SERPL: 12.3 (ref 10–20)
CALCIUM BLD-MCNC: 9.1 MG/DL (ref 8.7–10.4)
CEA SERPL-MCNC: 1.6 NG/ML (ref ?–5)
CHLORIDE SERPL-SCNC: 108 MMOL/L (ref 98–112)
CO2 SERPL-SCNC: 28 MMOL/L (ref 21–32)
CREAT BLD-MCNC: 1.06 MG/DL
DEPRECATED RDW RBC AUTO: 53.3 FL (ref 35.1–46.3)
EGFRCR SERPLBLD CKD-EPI 2021: 52 ML/MIN/1.73M2 (ref 60–?)
EOSINOPHIL # BLD AUTO: 0.05 X10(3) UL (ref 0–0.7)
EOSINOPHIL NFR BLD AUTO: 0.6 %
ERYTHROCYTE [DISTWIDTH] IN BLOOD BY AUTOMATED COUNT: 16.3 % (ref 11–15)
GLOBULIN PLAS-MCNC: 1.6 G/DL (ref 2–3.5)
GLUCOSE BLD-MCNC: 107 MG/DL (ref 70–99)
HCT VFR BLD AUTO: 29.1 %
HGB BLD-MCNC: 9.2 G/DL
IMM GRANULOCYTES # BLD AUTO: 0.04 X10(3) UL (ref 0–1)
IMM GRANULOCYTES NFR BLD: 0.5 %
LYMPHOCYTES # BLD AUTO: 1.16 X10(3) UL (ref 1–4)
LYMPHOCYTES NFR BLD AUTO: 14.8 %
MCH RBC QN AUTO: 28 PG (ref 26–34)
MCHC RBC AUTO-ENTMCNC: 31.6 G/DL (ref 31–37)
MCV RBC AUTO: 88.7 FL
MONOCYTES # BLD AUTO: 0.56 X10(3) UL (ref 0.1–1)
MONOCYTES NFR BLD AUTO: 7.1 %
NEUTROPHILS # BLD AUTO: 6.03 X10 (3) UL (ref 1.5–7.7)
NEUTROPHILS # BLD AUTO: 6.03 X10(3) UL (ref 1.5–7.7)
NEUTROPHILS NFR BLD AUTO: 76.9 %
OSMOLALITY SERPL CALC.SUM OF ELEC: 295 MOSM/KG (ref 275–295)
PLATELET # BLD AUTO: 230 10(3)UL (ref 150–450)
POTASSIUM SERPL-SCNC: 4 MMOL/L (ref 3.5–5.1)
PROT SERPL-MCNC: 4.8 G/DL (ref 5.7–8.2)
RBC # BLD AUTO: 3.28 X10(6)UL
SODIUM SERPL-SCNC: 142 MMOL/L (ref 136–145)
WBC # BLD AUTO: 7.9 X10(3) UL (ref 4–11)

## 2024-12-19 PROCEDURE — 74177 CT ABD & PELVIS W/CONTRAST: CPT | Performed by: SURGERY

## 2024-12-19 RX ORDER — FUROSEMIDE 10 MG/ML
20 INJECTION INTRAMUSCULAR; INTRAVENOUS ONCE
Status: COMPLETED | OUTPATIENT
Start: 2024-12-19 | End: 2024-12-19

## 2024-12-19 NOTE — CONSULTS
[facility]  part of [facility]    Report of Consultation    Hoda Busby Patient Status:  Inpatient    1940 MRN [id number]   Location [facility] 5SW/SE Attending Jigar Apple MD   Hosp Day # 5 PCP Malathi Stuart MD     Date of Admission:  2024  Date of Consult:  2024    Reason for Consultation:  Splenic flexure colon cancer       History of Present Illness:  Hoda Busby   is a 84 year old    female who presents multiple medical comorbidities.  Patient recently was on Xarelto for pulmonary embolus.  Patient been off Xarelto due to recent GI bleed.  Patient also was found to have pneumonitis and has been on high-dose steroids followed by pulmonology Dr. Cody Holloway DO  Patient also with severe aortic stenosis most likely will require TAVR followed by Dr. Viktor Bird  The patient underwent EGD and colonoscopy by Dr. Staton was found to have a tumor at the splenic flexure.  This was tattooed.  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  I am called by the patient's niece who is a nurse in the hospital that is retired for further evaluation.      The patient denies any abdominal surgery.        History:  Past Medical History:    Acute pulmonary embolism (HCC)    High blood pressure    History of blood transfusion    Macular degeneration    Osteopenia    Primary osteoarthritis of right hip    Pulmonary embolism (HCC)    Visual impairment    Readers    White coat hypertension     Past Surgical History:   Procedure Laterality Date    Cataract  3/2&3/16 2017    Colonoscopy N/A 2024    Procedure: COLONOSCOPY;  Surgeon: Karen Staton MD;  Location: [facility] ENDOSCOPY    Colonoscopy & polypectomy  2021         Hip replacement surgery Left Around     Other surgical history  [place]     Bilateral cataract surgery     Family History   Problem Relation Age of Onset    Cancer Father         bone (cause of death)     Stroke Mother         CVA (cause of death)    Diabetes Mother     Hypertension Mother     Dementia Brother         Alzheimer's    Heart Disease Brother         CAD - x2 brothers    Heart Disease Brother       reports that she quit smoking about 34 years ago. Her smoking use included cigarettes. She started smoking about 34 years ago. She has been exposed to tobacco smoke. She has quit using smokeless tobacco. She reports current alcohol use. She reports that she does not use drugs.    Allergies:  Allergies[1]    Medications:    Current Facility-Administered Medications:     [Held by provider] amLODIPine (Norvasc) tab 2.5 mg, 2.5 mg, Oral, Daily    predniSONE (Deltasone) tab 30 mg, 30 mg, Oral, Daily with breakfast    pantoprazole (Protonix) 40 mg in sodium chloride 0.9% PF 10 mL IV push, 40 mg, Intravenous, Q12H    alendronate (Fosamax) tab 70 mg, 70 mg, Oral, Q7 Days    atorvastatin (Lipitor) tab 20 mg, 20 mg, Oral, Nightly    calcium carbonate-vitamin D (Oyster Shell-D) 250-3.125 MG-MCG per tab 1 tablet, 1 tablet, Oral, Daily    cholecalciferol (Vitamin D3) tab 4,000 Units, 4,000 Units, Oral, Daily    ferrous sulfate DR tab 325 mg, 325 mg, Oral, Daily    polyethylene glycol (PEG 3350) (Miralax) 17 g oral packet 17 g, 17 g, Oral, Daily    sulfamethoxazole-trimethoprim DS (Bactrim DS) 800-160 MG per tab 1 tablet, 1 tablet, Oral, Once per day on Monday Wednesday Friday    cyanocobalamin (Vitamin B12) tab 500 mcg, 500 mcg, Oral, Daily    zolpidem (Ambien) tab 5 mg, 5 mg, Oral, Nightly PRN    Review of Systems:  Pertinent items are noted in HPI.    Physical Exam:  Blood pressure 139/74, pulse 85, temperature 98.7 °F (37.1 °C), temperature source Oral, resp. rate 18, height 5' 8\" (1.727 m), weight 174 lb (78.9 kg), SpO2 94%.    General appearance:  alert, appears stated age, cooperative, and no distress  Eyes: Sclera anicteric  Pulmonary: Equal respiratory excursion, no labored breathing  Cardiovascular: regular rate  and rhythm  Abdominal: soft, non-tender; bowel sounds normal; no masses,  no organomegaly and no palpable discrete mass present no ventral hernia present  Extremities: extremities normal, atraumatic, no cyanosis or edema  Skin: Skin color, texture, turgor normal. No rashes or lesions  Psychiatric: calm  concrete thoughts   memory intact    Results:  Lab Results   Component Value Date    WBC 8.9 12/18/2024    HGB 10.3 (L) 12/18/2024    HCT 32.3 (L) 12/18/2024    .0 12/18/2024    CREATSERUM 1.16 (H) 12/18/2024    BUN 12 12/18/2024     12/18/2024    K 4.3 12/18/2024    K 4.3 12/18/2024     12/18/2024    CO2 27.0 12/18/2024    GLU 90 12/18/2024    CA 8.4 (L) 12/18/2024    ALB 2.9 (L) 12/14/2024    ALKPHO 36 (L) 12/14/2024    BILT 0.6 12/14/2024    TP 4.4 (L) 12/14/2024    AST 16 12/14/2024    ALT 19 12/14/2024    PTT 30.6 11/01/2024    INR 1.06 09/08/2023    TSH 1.526 07/16/2024    ESRML 49 (H) 10/26/2024    PHOS 3.6 10/28/2024    B12 349 03/20/2017       No results found.          Impression and Plan:  Patient Active Problem List   Diagnosis    Osteopenia of multiple sites    Vitamin D deficiency    White coat syndrome with diagnosis of hypertension    Hypercholesterolemia    Mitral valve insufficiency    Nonrheumatic aortic valve stenosis    Macular degeneration    Primary hypertension    Age-related osteoporosis without current pathological fracture    Acute pulmonary embolism (HCC)    Pneumonia    Acute respiratory failure with hypoxia (HCC)    GI bleed    Gastrointestinal hemorrhage, unspecified gastrointestinal hemorrhage type    Acute blood loss anemia    Peroneal neuropathy, left    Other pulmonary embolism without acute cor pulmonale (HCC)    Interstitial pneumonitis (HCC)    NANCY (acute kidney injury) (HCC)    Gastric AVM       This is an 84-year-old white female with multiple medical comorbidities.  Patient with pneumonitis on high-dose steroid taper followed by pulmonology Dr. Ross  Jewel MARTINEZ  Patient also with a history of pulmonary embolus.  Patient is off Xarelto at this time.  Patient most likely require IVC filter prior to definitive surgical resection.  Patient with severe aortic stenosis.  Patient does require TAVR.  Discussed in detail with cardiology Dr. Bird will coordinate care at [facility].  Patient require catheterization most likely require TAVR completed prior to surgery.  Tumor is not bleeding.  Tumor is not obstructing.  Patient is extremely high risk for major morbidity mortality including severe complications and death due to severe aortic stenosis and pneumonitis.  I discussed in detail with the patient as well as her niece Lisbeth by phone and etiology of the above.  Discussed the need for definitive surgical resection after patient been optimized.  Patient will be weaning steroids over next 3 to 4 weeks.  Cardiology will coordinate care with angiography and possible TAVR repair over the same period of time.  Patient will follow-up with me in my office in 3 weeks for preoperative discussion.  I discussed surgical approach including open versus laparoscopic versus robotic approach.  The decision about the approach will be made pending repair of the TAVR as well as pneumonitis.  Patient may not be able to tolerate pneumoperitoneum given the pneumonitis and increased cardiac return therefore may require open surgery however if TAVR is successful patient may benefit from minimally invasive approach.  I would recommend CT scan of the abdomen pelvis with oral and IV contrast tomorrow to further evaluate for metastatic disease.  Patient did recently have CT of the chest which revealed no active metastatic disease.  Patient is scheduled to be transferred back to rehab in assisted living tomorrow.  Patient is cleared from my point of view.  I will see patient in the office in approximate 3 weeks.  Discussed and coordinated care with all of the above specialist including   Sade.         Time spent in direct patient contact and decision making as well as counseling/coordination of care:  10745- 80 min    JENNA CONNOLLY JR., MD. [facility]    12/18/2024  6:41 PM             [1] No Known Allergies

## 2024-12-19 NOTE — PLAN OF CARE
Spoke with Lucinda from the [facility]. She will begin the process of having patient evaluated for TAVR. She will call her to schedule an expedited visit with the valve clinic.       ADDENDUM 1604: Valve clinic appointment is scheduled for 1/2/2024.     Cindy Ramírez MSN, FNP-BC, [facility]  12/19/24   1:50 PM  [phone] [place]  [phone] [place]

## 2024-12-19 NOTE — PROGRESS NOTES
Patient PIV removed. [place] at bedside and provided discharge paperwork. Reviewed and assisted with care needs. En route to home via private vehicle.

## 2024-12-19 NOTE — PLAN OF CARE
Patient alert and oriented on room air with no complaints of pain. Call light in reach and no needs noted at this time. Plan for CT abd/pel today  Problem: Patient Centered Care  Goal: Patient preferences are identified and integrated in the patient's plan of care  Description: Interventions:  - What would you like us to know as we care for you? From [facility]   - Provide timely, complete, and accurate information to patient/family  - Incorporate patient and family knowledge, values, beliefs, and cultural backgrounds into the planning and delivery of care  - Encourage patient/family to participate in care and decision-making at the level they choose  - Honor patient and family perspectives and choices  Outcome: Progressing     Problem: Patient/Family Goals  Goal: Patient/Family Long Term Goal  Description: Patient's Long Term Goal: Discharge from the hospital    Interventions:  - Monitor vital signs  - Monitor appropriate labs  - Pain management  - Administer medications per order  - Follow MD orders  - Diagnostics per order  - Update / inform patient and family on plan of care  - Discharge planning  - See additional Care Plan goals for specific interventions  Outcome: Progressing  Goal: Patient/Family Short Term Goal  Description: Patient's Short Term Goal: Improve dark stools    Interventions:   - Monitor vital signs  - Monitor appropriate labs  - Pain management  - Administer medications per order  - Follow MD orders  - Diagnostics per order  - Update / inform patient and family on plan of care  - See additional Care Plan goals for specific interventions  Outcome: Progressing     Problem: SAFETY ADULT - FALL  Goal: Free from fall injury  Description: INTERVENTIONS:  - Assess pt frequently for physical needs  - Identify cognitive and physical deficits and behaviors that affect risk of falls.  - [place] fall precautions as indicated by assessment.  - Educate pt/family on patient safety  including physical limitations  - Instruct pt to call for assistance with activity based on assessment  - Modify environment to reduce risk of injury  - Provide assistive devices as appropriate  - Consider OT/PT consult to assist with strengthening/mobility  - Encourage toileting schedule  Outcome: Progressing     Problem: HEMATOLOGIC - ADULT  Goal: Maintains hematologic stability  Description: INTERVENTIONS  - Assess for signs and symptoms of bleeding or hemorrhage  - Monitor labs and vital signs for trends  - Administer supportive blood products/factors, fluids and medications as ordered and appropriate  - Administer supportive blood products/factors as ordered and appropriate  Outcome: Progressing  Goal: Free from bleeding injury  Description: (Example usage: patient with low platelets)  INTERVENTIONS:  - Avoid intramuscular injections, enemas and rectal medication administration  - Ensure safe mobilization of patient  - Hold pressure on venipuncture sites to achieve adequate hemostasis  - Assess for signs and symptoms of internal bleeding  - Monitor lab trends  - Patient is to report abnormal signs of bleeding to staff  - Avoid use of toothpicks and dental floss  - Use electric shaver for shaving  - Use soft bristle tooth brush  - Limit straining and forceful nose blowing  Outcome: Progressing     Problem: MUSCULOSKELETAL - ADULT  Goal: Return mobility to safest level of function  Description: INTERVENTIONS:  - Assess patient stability and activity tolerance for standing, transferring and ambulating w/ or w/o assistive devices  - Assist with transfers and ambulation using safe patient handling equipment as needed  - Ensure adequate protection for wounds/incisions during mobilization  - Obtain PT/OT consults as needed  - Advance activity as appropriate  - Communicate ordered activity level and limitations with patient/family  Outcome: Progressing     Problem: Impaired Functional Mobility  Goal: Achieve  highest/safest level of mobility/gait  Description: Interventions:  - Assess patient's functional ability and stability  - Promote increasing activity/tolerance for mobility and gait  - Educate and engage patient/family in tolerated activity level and precautions  - Recommend use of  RW for transfers and ambulation  Outcome: Progressing     Problem: Impaired Activities of Daily Living  Goal: Achieve highest/safest level of independence in self care  Description: Interventions:  - Assess ability and encourage patient to participate in ADLs to maximize function  - Promote sitting position while performing ADLs such as feeding, grooming, and bathing  - Educate and encourage patient/family in tolerated functional activity level and precautions during self-care  - Encourage patient to incorporate impaired side during daily activities to promote function  Outcome: Progressing

## 2024-12-19 NOTE — PROGRESS NOTES
GASTROENTEROLOGY PROGRESS NOTE      Subjective: Patient sitting up in chair, no complaints.     PE:  Vitals:   BP Readings from Last 3 Encounters:   24 118/56   24 123/66   24 111/60      Wt Readings from Last 3 Encounters:   24 174 lb (78.9 kg)   24 170 lb (77.1 kg)   24 174 lb 14.4 oz (79.3 kg)      General: AAOx3 in NAD  HEENT: No scleral icterus, no LAD  Lungs: CTA bilaterally, no wheezing or crackles  CV: RRR, S1S2,, no murmurs or rubs  Abdomen: normal active bowel sounds, soft, nontender, nondistended, no stigmata of liver disease  Extermities: No edema, no discoloration      Labs: Reviewed in chart    Imagin24  CT A/P:  Mild wall thickening of the distal transverse colon. Patient's known colonic malignancy is not well seen on CT.No metastatic disease within the abdomen or pelvis. Interstitial lung disease is partially seen and grossly unchanged. Multiple other incidental findings as described in the body of the report.     Endoscopy:  24  Colonoscopy with a pednctulated and polypoid polyp 12-15mm in the ascending colon (tubular adenoma) that was removed and a flat polyp 10mm on the IC valve that could not be removed (sessile serrated polyp); poor prep,   24  EGD with gastric antral ulcer, 15mm cleanbased, and antral AVM 3mm s/p APC    Assessment and Plan: Patient is an 84-year-old female with past medical history of aortic stenosis, interstitial lung disease with PE on Xarelto admitted with history of a gastric ulcer and a BM and anemia.  Colonoscopy and upper endoscopy done at submission showed of greatest concern a finding of a large flat polyp in the distal transverse colon with biopsies positive for adenocarcinoma.  Imaging study showed no evidence of metastases; patient will follow-up with surgery and oncology as an outpatient.  Results were discussed with patient who voiced understanding.

## 2024-12-19 NOTE — PLAN OF CARE
Problem: Patient Centered Care  Goal: Patient preferences are identified and integrated in the patient's plan of care  Description: Interventions:  - What would you like us to know as we care for you? From [facility]   - Provide timely, complete, and accurate information to patient/family  - Incorporate patient and family knowledge, values, beliefs, and cultural backgrounds into the planning and delivery of care  - Encourage patient/family to participate in care and decision-making at the level they choose  - Honor patient and family perspectives and choices  Outcome: Progressing     Problem: Patient/Family Goals  Goal: Patient/Family Long Term Goal  Description: Patient's Long Term Goal: Discharge from the hospital    Interventions:  - Monitor vital signs  - Monitor appropriate labs  - Pain management  - Administer medications per order  - Follow MD orders  - Diagnostics per order  - Update / inform patient and family on plan of care  - Discharge planning  - See additional Care Plan goals for specific interventions  Outcome: Progressing  Goal: Patient/Family Short Term Goal  Description: Patient's Short Term Goal: Improve dark stools    Interventions:   - Monitor vital signs  - Monitor appropriate labs  - Pain management  - Administer medications per order  - Follow MD orders  - Diagnostics per order  - Update / inform patient and family on plan of care  - See additional Care Plan goals for specific interventions  Outcome: Progressing     Problem: SAFETY ADULT - FALL  Goal: Free from fall injury  Description: INTERVENTIONS:  - Assess pt frequently for physical needs  - Identify cognitive and physical deficits and behaviors that affect risk of falls.  - Smyer fall precautions as indicated by assessment.  - Educate pt/family on patient safety including physical limitations  - Instruct pt to call for assistance with activity based on assessment  - Modify environment to reduce risk of  injury  - Provide assistive devices as appropriate  - Consider OT/PT consult to assist with strengthening/mobility  - Encourage toileting schedule  Outcome: Progressing     Problem: HEMATOLOGIC - ADULT  Goal: Maintains hematologic stability  Description: INTERVENTIONS  - Assess for signs and symptoms of bleeding or hemorrhage  - Monitor labs and vital signs for trends  - Administer supportive blood products/factors, fluids and medications as ordered and appropriate  - Administer supportive blood products/factors as ordered and appropriate  Outcome: Progressing  Goal: Free from bleeding injury  Description: (Example usage: patient with low platelets)  INTERVENTIONS:  - Avoid intramuscular injections, enemas and rectal medication administration  - Ensure safe mobilization of patient  - Hold pressure on venipuncture sites to achieve adequate hemostasis  - Assess for signs and symptoms of internal bleeding  - Monitor lab trends  - Patient is to report abnormal signs of bleeding to staff  - Avoid use of toothpicks and dental floss  - Use electric shaver for shaving  - Use soft bristle tooth brush  - Limit straining and forceful nose blowing  Outcome: Progressing     Problem: MUSCULOSKELETAL - ADULT  Goal: Return mobility to safest level of function  Description: INTERVENTIONS:  - Assess patient stability and activity tolerance for standing, transferring and ambulating w/ or w/o assistive devices  - Assist with transfers and ambulation using safe patient handling equipment as needed  - Ensure adequate protection for wounds/incisions during mobilization  - Obtain PT/OT consults as needed  - Advance activity as appropriate  - Communicate ordered activity level and limitations with patient/family  Outcome: Progressing     Problem: Impaired Functional Mobility  Goal: Achieve highest/safest level of mobility/gait  Description: Interventions:  - Assess patient's functional ability and stability  - Promote increasing  activity/tolerance for mobility and gait  - Educate and engage patient/family in tolerated activity level and precautions  - Recommend use of  RW for transfers and ambulation  Outcome: Progressing     Problem: Impaired Activities of Daily Living  Goal: Achieve highest/safest level of independence in self care  Description: Interventions:  - Assess ability and encourage patient to participate in ADLs to maximize function  - Promote sitting position while performing ADLs such as feeding, grooming, and bathing  - Educate and encourage patient/family in tolerated functional activity level and precautions during self-care  - Encourage patient to incorporate impaired side during daily activities to promote function  Outcome: Progressing

## 2024-12-19 NOTE — PROGRESS NOTES
[facility]  part of [facility]    Progress Note  Late entry  Hoda Busby Patient Status:  Inpatient    1940 MRN [id number]   Location [facility] 5SW/SE Attending No att. providers found   Hosp Day # 6 PCP Malathi Stuart MD       Subjective:   Hoda Busby is a(n) 84 year old   female without complaints    Assessment and Plan:   Hoda Busby is a(n) 84 year old female    Patient with colon cancer most likely at the splenic flexure.  Multiple comorbidities including pneumonitis on high-dose steroids as well as aortic stenosis.  Patient to have CT scan of the abdomen pelvis today.  Will evaluate for metastatic disease and to assess if there is colon cancer can be visualized.  Patient will follow-up with pulmonology in several weeks hopefully will wean steroids as soon as possible  Discussed with cardiology who will have patient evaluated at TAVR clinic for possible TAVR for surgical resection of the colon.  Patient will require IVC filter as an outpatient prior to surgery.  Discussed with GI who feels colon cancer is in the distal transverse colon.  Hopefully will be able to visualize on CT scan.  Patient will follow-up with me in approxi-3 weeks as an outpatient.  Hopefully at that time after the holidays patient will have evaluation at the TAVR clinic.  Also will have evaluation with pulmonology for weaning of steroids.  I discussed all the above in detail with the patient who understands and wished proceed with the above plan.    Objective:   Blood pressure 118/56, pulse 97, temperature 98 °F (36.7 °C), temperature source Oral, resp. rate 18, height 5' 8\" (1.727 m), weight 174 lb (78.9 kg), SpO2 98%. I/O last 3 completed shifts:  In: 340 [P.O.:310; I.V.:30]  Out: -      General appearance: alert, appears stated age, and cooperative  Neck: no JVD and supple, symmetrical, trachea midline  Pulmonary: No labored breathing, equal respiratory excursion  Cardiovascular:  regular rate and rhythm  Abdominal: soft, non-tender; bowel sounds normal; no masses,  no organomegaly and nontender.  Extremities: extremities normal, atraumatic, no cyanosis or edema          Results:       Recent Labs   Lab 12/17/24  0940 12/18/24  0513 12/18/24  1243 12/19/24  0528   RBC 3.75* 3.24*  --  3.28*   HGB 10.3* 8.7* 10.3* 9.2*   HCT 32.8* 28.8* 32.3* 29.1*   MCV 87.5 88.9  --  88.7   MCH 27.5 26.9  --  28.0   MCHC 31.4 30.2*  --  31.6   RDW 16.4* 16.8*  --  16.3*   NEPRELIM 6.60 6.74  --  6.03   WBC 8.9 8.9  --  7.9   .0 299.0  --  230.0     Lab Results   Component Value Date    INR 1.06 09/08/2023       Recent Labs   Lab 12/13/24  1556 12/14/24  0445 12/15/24  0535 12/17/24  0940 12/18/24  0513 12/19/24  0528   * 80   < > 89 90 107*   BUN 28* 23   < > 11 12 13   CREATSERUM 1.30* 1.03*   < > 0.96 1.16* 1.06*   CA 9.0 8.4*   < > 8.7 8.4* 9.1   ALB 3.7 2.9*  --   --   --  3.2    144   < > 144 143 142   K 4.8 4.3   < > 4.4 4.3  4.3 4.0   * 115*   < > 113* 112 108   CO2 24.0 26.0   < > 27.0 27.0 28.0   ALKPHO 48* 36*  --   --   --  46*   AST 29 16  --   --   --  19   ALT 31 19  --   --   --  25   BILT 0.4 0.6  --   --   --  0.7   TP 5.7 4.4*  --   --   --  4.8*    < > = values in this interval not displayed.             CT ABDOMEN+PELVIS(CONTRAST ONLY)(CPT=74177)    Result Date: 12/19/2024  CONCLUSION:   Mild wall thickening of the distal transverse colon.  Patient's known colonic malignancy is not well seen on CT.  No metastatic disease within the abdomen or pelvis.  Interstitial lung disease is partially seen and grossly unchanged.  Multiple other incidental findings as described in the body of the report.  Dictated by (CST): Heri Lebron MD on 12/19/2024 at 12:10 PM     Finalized by (CST): Heri Lebron MD on 12/19/2024 at 12:12 PM                 Time spent in direct patient contact and decision making as well as counseling/coordination of care:    78520- 35 min      JENNA  CATHLEEN PEACE [facility]  GENERAL SURGERY  [facility]  12/19/2024  8:00 AM  Late entry

## 2024-12-19 NOTE — PROGRESS NOTES
GASTROENTEROLOGY PROGRESS NOTE      Subjective: Patient sitting in chair with family at bedside.  Overall feeling well after scopes.    PE:  Vitals:   BP Readings from Last 3 Encounters:   12/18/24 139/74   12/09/24 123/66   12/02/24 111/60      Wt Readings from Last 3 Encounters:   12/17/24 174 lb (78.9 kg)   11/29/24 170 lb (77.1 kg)   11/01/24 174 lb 14.4 oz (79.3 kg)      General: AAOx3 in NAD  HEENT: No scleral icterus, no LAD    Labs: Reviewed in chart    Endoscopy:  1/4/24  Colonoscopy with a pednctulated and polypoid polyp 12-15mm in the ascending colon (tubular adenoma) that was removed and a flat polyp 10mm on the IC valve that could not be removed (sessile serrated polyp); poor prep,   11/26/24  EGD with gastric antral ulcer, 15mm cleanbased, and antral AVM 3mm s/p APC    Assessment and Plan: Patient is an 84-year-old female with history of aortic stenosis, interstitial lung disease with PE on Xarelto admitted with history of recent gastric ulcer and AVM and anemia.  Colonoscopy yesterday showed evidence of a large flat polyp in the distal transverse colon with biopsies positive for adenocarcinoma.  Discussed results with patient and her family at bedside, will await surgery oncology evaluation and further workup.

## 2024-12-19 NOTE — PROGRESS NOTES
[facility]  part of [facility]    Progress Note    Hoda Busby Patient Status:  Inpatient    1940 MRN [id number]   Location [facility] 5SW/SE Attending Jigar Apple MD   Hosp Day # 6 PCP Malathi Stuart MD       SUBJECTIVE:  Feels fine. No SOB    OBJECTIVE:  Vital signs in last 24 hours:  /73 (BP Location: Right arm)   Pulse 80   Temp 98.2 °F (36.8 °C) (Oral)   Resp 18   Ht 5' 8\" (1.727 m)   Wt 174 lb (78.9 kg)   SpO2 95%   BMI 26.46 kg/m²     Intake/Output:    Intake/Output Summary (Last 24 hours) at 2024 1005  Last data filed at 2024 0358  Gross per 24 hour   Intake 110 ml   Output --   Net 110 ml       Wt Readings from Last 3 Encounters:   24 174 lb (78.9 kg)   24 170 lb (77.1 kg)   24 174 lb 14.4 oz (79.3 kg)       EXAM:  GENERAL: well developed, well nourished, in no apparent distress  LUNGS: clear to auscultation  CARDIO: RRR, normal S1S2, 2/6 WADE RUSB  GI: soft, NT, ND, NABS  EXTREMITIES: 1+ BLE edema (improved)    Data Review:     Labs:   Lab Results   Component Value Date    WBC 7.9 2024    HGB 9.2 2024    HCT 29.1 2024    .0 2024    CREATSERUM 1.06 2024    BUN 13 2024     2024    K 4.0 2024     2024    CO2 28.0 2024     2024    CA 9.1 2024    ALB 3.2 2024    ALKPHO 46 2024    BILT 0.7 2024    TP 4.8 2024    AST 19 2024    ALT 25 2024         Imaging:  No results found.            Meds:   Current Facility-Administered Medications   Medication Dose Route Frequency    furosemide (Lasix) 10 mg/mL injection 20 mg  20 mg Intravenous Once    [Held by provider] amLODIPine (Norvasc) tab 2.5 mg  2.5 mg Oral Daily    predniSONE (Deltasone) tab 30 mg  30 mg Oral Daily with breakfast    pantoprazole (Protonix) 40 mg in sodium chloride 0.9% PF 10 mL IV push  40 mg Intravenous Q12H    alendronate  (Fosamax) tab 70 mg  70 mg Oral Q7 Days    atorvastatin (Lipitor) tab 20 mg  20 mg Oral Nightly    calcium carbonate-vitamin D (Oyster Shell-D) 250-3.125 MG-MCG per tab 1 tablet  1 tablet Oral Daily    cholecalciferol (Vitamin D3) tab 4,000 Units  4,000 Units Oral Daily    ferrous sulfate DR tab 325 mg  325 mg Oral Daily    polyethylene glycol (PEG 3350) (Miralax) 17 g oral packet 17 g  17 g Oral Daily    sulfamethoxazole-trimethoprim DS (Bactrim DS) 800-160 MG per tab 1 tablet  1 tablet Oral Once per day on Monday Wednesday Friday    cyanocobalamin (Vitamin B12) tab 500 mcg  500 mcg Oral Daily    zolpidem (Ambien) tab 5 mg  5 mg Oral Nightly PRN       Assessment & Plan       Acute GI bleed  Recently hospitalized 11/25-11/30/24 for GI bleed, now with recurrence; had been taking Xarelto 20 mg daily for PE  -her last colonoscopy in 1/2021 revealed adenomatous polyp but poor prep; pt declined repeat colonoscopy.  -EGD 11/26/24 (Dr. Nuno) -  15mm clean-based, nonbleeding gastric antral ulcer (bx done); 3mm gastric antral AVM s/p APC.  -on protonix 40mg BID - to continue x 8 weeks (EOT 1/22/25)  She received a total of 3 units PRBCs and also IV Ferrrlecit.  On 11/30/24, she was resumed on Xarelto 20 mg po daily, and oral prednisone.  She returned to [facility] 11/30/24 for rehab.    -Hgb 6.5 12/13/24 at the [facility]. Sent to ED.  Repeat Hgb in ED was 7.6, though stool was heme positive.  Hgb decreased to 6.1 overnight, and she received transfusion 2 units PRBCs on 12/16/24  -Xarelto stopped  -colonoscopy and repeat EGD 12/17/24 by Dr. Staton -- grade 1 esophagitis; duod ulcers healing; large flat polyp in transverse colon      Anemia due to acute blood loss  -Hgb 6.1 on 12/14/24; s/p 2 units PRBCs  -Hgb 9.2 - stable; no sx of active bleeding    Adenocarcinoma of colon  -Bx of transverse colon polyp 12/17/24 -- invasive, moderately differentiated adenocarcinoma  -Dr. Cassidy's surgical consult much appreciated  -CEA normal  (1.9)  -CT a/p to be done today for staging  -will need TAVR for severe A.S. prior to colon resection, as pt is very high risk for susie-op complcations related to the A.S. She is also high risk for bowel leak while on high dose prednisone.  Plan to delay surgery and f/u with cards Dr. Bird to discuss TAVR and will Dr. Holloway for prednisone taper - Dr. Cassidy discussed with Ronen Bird and Jewel.  The colon cancer is not the source of GI bleeding so not urgent to remove.  Pt may also benefit from pre-operative IVC filter placement given recent PE     BLE edema  -IV lasix 20mg x 1 yesterday 12/18 w/good diuresis; decreased edema though still present  -will give another dose of lasix 20mg IV x 1 today  -then lasix 40mg po every day x 3d, then 20mg/day     Mild NANCY  -likely due to mild dehydration from bowel prep  -creat improved today 1.16>1.06  -repeat early next week     Acute left peroneal palsy  In Nov 2024; has numbness to anterolateral left foot and ankle as well as inability to dorsiflex left foot; etiology unclear  -neurologist Dr Richardson consult  noted and appreciated  -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   -MRI brain shows no acute intracranial process  -left foot drop persists     Interstitial pneumonitis  -dx'ed 10/2024 - presented with cough and severe hypoxia  -unresponsive to abx (amox/doxy, then IV zosyn/zithromax)  -CXR 10/24/24 extensive bilateral perihilar and bilateral upper and lower lobe   -S.pneumo, legionella Ag , mycoplasma IgM/G, Fungitell-beta-D glucan negative  -ANCA and URIEL/connective tissue disease panels negative  -anti-histone and anti-Keara Ab's negative  -Echo 10/24/24 - normal EF (55-60%), no obvious vegetatons  -CT with bilateral ground glass opacities, small consolidations of BLL  -per pulm, findings suspicious for NSIP (vs cryptogenic organizing pneumonia vs  hypersensitivity pneumonitis); NOT thought to be c/w UIP pattern  -on empiric steroids (solumedrol then prednisone) since 10/25/24 - prednisone 40mg/day decreased to 30mg/day on 12/17/24 by pulm Dr. Holloway -- d/w Dr. Holloway -- 30mg/day x 10d, then 20mg/day x 10d, then 10mg/day x 10d  -pt to f/u with Dr. Holloway in 3-4 weeks     Bilateral pulmonary emboli  -dx'ed at time of interstitial pneumonitis  -CT chest 10/25/24 -- acute distal segmental/subsegmental pulmonary emboli in [place] and subsegmental PE in [place]  -unclear etiology; no recent hx of long travel; no family/personal hx of blood clots  -BLE venous dopplers negative 10/26/24  -unclear etiology of PE; will do hypercoag w/u as outpt.   Consider malignancy w/u if no other cause found (colonoscopy 1/2021 revealed adenomatous polyp but poor prep; pt declined repeat colonoscopy; Medicare would not cover Cologuard); UTD on mammo  -started on xarelto (held 2/2 GIB) - restarted xarelto 20 mg po every day on 11/28/24  -repeat CT chest 12/9/24 neg PE  -stopped Xarelto 12/14/24 due to recurrent GI bleed     Severe aortic stenosis  -sees cardiology Dr. Bird  -moderate A.S on echo in 10/2022 (prev mild A.S in 2020)  -abnormal pre-op EKG 9/2023 --Nuc GXT done 9/20/23 -- EKG portion revealed 1-1.5mm ST seg depression in inferolateral leads with prolonged recovery; nuclear portion with normal perfusion   -Echo 9/29/23 --progression of A.S. from mild to moderate  -recent echo at Dr. Bird's office 10/18/24 -- LVEF 65%, grade 2 diastolic dysfunction, severe aortic stenosis (mean aortic gradient signif worse since 9/2023 -- 31>49 mmHg),  -seen by cardiology during last admission in 10/2024; pt will need TAVR evaluation as outpt prior to colon resection as above  -restarted lasix as starting to develop some LE edema as above  -pt to f/u with Dr. Bird     Hypertension, primary  -(dyazide stopped due to NANCY)  -on amlodipine 5mg/day at home  -BP's intermittently soft here;  holding amlodipine     Hx of hip OA  -s/p left THR (Dr. Lo) many years ago  -s/p elective R BEATRIZ on 10/5/23 by Dr. Diaz     Hyperlipidemia   Pt with strong family hx of high cholesterol  ; normal TG and HDL  No indication for statin given age     Osteopenia   On Fosamax from 2011 to 4/2016.     DEXA stable 5/10/17 and 2019 and 2021  DEXA 8/2024 -- osteoporosis of left forearm  -restarted Fosamax 8/2024 -- given esophagitis 12/2024, will hold fosamax for now     Vitamin D deficiency  On vitamin D 4000u/day       D/w RN Kari and with nieceVonda    Plan for pt to discharge to the [facility] assisted living facility for respite care x 1 month (will be able to get PT/OT while there), prior to returning home (had been at [facility] after last hospital admission, then transferred to the [facility] for respite care (then readmitted 2 days later)         Malathi Stuart MD  12/19/2024  10:05 AM

## 2024-12-19 NOTE — PLAN OF CARE
Problem: Patient Centered Care  Goal: Patient preferences are identified and integrated in the patient's plan of care  Description: Interventions:  - What would you like us to know as we care for you? From [facility]   - Provide timely, complete, and accurate information to patient/family  - Incorporate patient and family knowledge, values, beliefs, and cultural backgrounds into the planning and delivery of care  - Encourage patient/family to participate in care and decision-making at the level they choose  - Honor patient and family perspectives and choices  Outcome: Completed     Problem: Patient/Family Goals  Goal: Patient/Family Long Term Goal  Description: Patient's Long Term Goal: Discharge from the hospital    Interventions:  - Monitor vital signs  - Monitor appropriate labs  - Pain management  - Administer medications per order  - Follow MD orders  - Diagnostics per order  - Update / inform patient and family on plan of care  - Discharge planning  - See additional Care Plan goals for specific interventions  Outcome: Completed  Goal: Patient/Family Short Term Goal  Description: Patient's Short Term Goal: Improve dark stools    Interventions:   - Monitor vital signs  - Monitor appropriate labs  - Pain management  - Administer medications per order  - Follow MD orders  - Diagnostics per order  - Update / inform patient and family on plan of care  - See additional Care Plan goals for specific interventions  Outcome: Completed     Problem: SAFETY ADULT - FALL  Goal: Free from fall injury  Description: INTERVENTIONS:  - Assess pt frequently for physical needs  - Identify cognitive and physical deficits and behaviors that affect risk of falls.  - [place] fall precautions as indicated by assessment.  - Educate pt/family on patient safety including physical limitations  - Instruct pt to call for assistance with activity based on assessment  - Modify environment to reduce risk of injury  -  Provide assistive devices as appropriate  - Consider OT/PT consult to assist with strengthening/mobility  - Encourage toileting schedule  Outcome: Completed     Problem: HEMATOLOGIC - ADULT  Goal: Maintains hematologic stability  Description: INTERVENTIONS  - Assess for signs and symptoms of bleeding or hemorrhage  - Monitor labs and vital signs for trends  - Administer supportive blood products/factors, fluids and medications as ordered and appropriate  - Administer supportive blood products/factors as ordered and appropriate  Outcome: Completed  Goal: Free from bleeding injury  Description: (Example usage: patient with low platelets)  INTERVENTIONS:  - Avoid intramuscular injections, enemas and rectal medication administration  - Ensure safe mobilization of patient  - Hold pressure on venipuncture sites to achieve adequate hemostasis  - Assess for signs and symptoms of internal bleeding  - Monitor lab trends  - Patient is to report abnormal signs of bleeding to staff  - Avoid use of toothpicks and dental floss  - Use electric shaver for shaving  - Use soft bristle tooth brush  - Limit straining and forceful nose blowing  Outcome: Completed     Problem: MUSCULOSKELETAL - ADULT  Goal: Return mobility to safest level of function  Description: INTERVENTIONS:  - Assess patient stability and activity tolerance for standing, transferring and ambulating w/ or w/o assistive devices  - Assist with transfers and ambulation using safe patient handling equipment as needed  - Ensure adequate protection for wounds/incisions during mobilization  - Obtain PT/OT consults as needed  - Advance activity as appropriate  - Communicate ordered activity level and limitations with patient/family  Outcome: Completed     Problem: Impaired Functional Mobility  Goal: Achieve highest/safest level of mobility/gait  Description: Interventions:  - Assess patient's functional ability and stability  - Promote increasing activity/tolerance for  mobility and gait  - Educate and engage patient/family in tolerated activity level and precautions  - Recommend use of  RW for transfers and ambulation  Outcome: Completed     Problem: Impaired Activities of Daily Living  Goal: Achieve highest/safest level of independence in self care  Description: Interventions:  - Assess ability and encourage patient to participate in ADLs to maximize function  - Promote sitting position while performing ADLs such as feeding, grooming, and bathing  - Educate and encourage patient/family in tolerated functional activity level and precautions during self-care  - Encourage patient to incorporate impaired side during daily activities to promote function  Outcome: Completed

## 2024-12-20 ENCOUNTER — PATIENT OUTREACH (OUTPATIENT)
Dept: CASE MANAGEMENT | Age: 84
End: 2024-12-20

## 2024-12-20 ENCOUNTER — TELEPHONE (OUTPATIENT)
Dept: PULMONOLOGY | Facility: CLINIC | Age: 84
End: 2024-12-20

## 2024-12-20 NOTE — TELEPHONE ENCOUNTER
Patient relative Vonda is calling in regards to a hospital follow up appointment.   She scheduled for 1/17/25 but patient will not be available due to another appointment.  She is requesting to speak with an RN.  Please call

## 2024-12-20 NOTE — PROGRESS NOTES
Attempted to reach the patient to complete a Transitional Care Management-Hospital follow up call. Left a message for the patient to call the nurse care manager back at, [phone].

## 2024-12-20 NOTE — TELEPHONE ENCOUNTER
The patient is scheduled for a Transitional Care [facility] follow up appointment with Dr. Sturat on 12/31/2024.    Per guidelines patient should  be seen within seven days by 12/26/2024.     Otherwise, last date for TCM: 01/02/2024     Clinical staff:  Please follow-up with patient and try to get them to schedule as patient would greatly benefit from a sooner appointment for TCM if able to. Thank you!      Future Appointments   Date Time Provider Department Center   12/31/2024 11:30 AM Malathi Stuart MD [facility]   1/2/2025  9:15 AM Vasu, Structural Heart [facility]   1/17/2025 11:45 AM Hussain, Wajahat M, DO [facility]

## 2024-12-20 NOTE — PROGRESS NOTES
[facility] attempted to reach the patient to complete a [facility] follow up call. Left message to call back. [facility] provided direct contact info at [phone].     The patient is scheduled for a [facility] follow up appointment with Dr. Stuart on 12/31/2024. Per guidelines patient to be seen within seven days of discharge. A telephone encounter has been sent to the PCP office to assist with scheduling a sooner appointment.       Future Appointments   Date Time Provider Department Center   12/31/2024 11:30 AM [facility]   1/2/2025  9:15 AM Vasu, Structural Heart [facility]   1/17/2025 11:45 AM Hussain, Wajahat M, DO [facility] [place]

## 2024-12-23 NOTE — TELEPHONE ENCOUNTER
Spoke to patient's niece Vonda (JEFF on file) and scheduled patient for 1/14/25 at 9:45am. Reviewed time, date, place and where to park. Vonda verbalized understanding

## 2024-12-24 ENCOUNTER — HOSPITAL ENCOUNTER (EMERGENCY)
Facility: HOSPITAL | Age: 84
Discharge: HOME | End: 2024-12-25
Attending: EMERGENCY MEDICINE
Payer: MEDICARE

## 2024-12-24 DIAGNOSIS — Z13.9 ENCOUNTER FOR MEDICAL SCREENING EXAMINATION: Primary | ICD-10-CM

## 2024-12-24 PROCEDURE — 99284 EMERGENCY DEPT VISIT MOD MDM: CPT

## 2024-12-24 PROCEDURE — 93010 ELECTROCARDIOGRAM REPORT: CPT

## 2024-12-24 PROCEDURE — 93005 ELECTROCARDIOGRAM TRACING: CPT

## 2024-12-24 PROCEDURE — 36415 COLL VENOUS BLD VENIPUNCTURE: CPT

## 2024-12-24 PROCEDURE — 80048 BASIC METABOLIC PNL TOTAL CA: CPT | Performed by: EMERGENCY MEDICINE

## 2024-12-24 PROCEDURE — 85025 COMPLETE CBC W/AUTO DIFF WBC: CPT | Performed by: EMERGENCY MEDICINE

## 2024-12-25 VITALS
SYSTOLIC BLOOD PRESSURE: 136 MMHG | RESPIRATION RATE: 21 BRPM | BODY MASS INDEX: 25.18 KG/M2 | HEART RATE: 79 BPM | TEMPERATURE: 98 F | WEIGHT: 170 LBS | OXYGEN SATURATION: 95 % | HEIGHT: 69 IN | DIASTOLIC BLOOD PRESSURE: 72 MMHG

## 2024-12-25 LAB
ANION GAP SERPL CALC-SCNC: 8 MMOL/L (ref 0–18)
ANTIBODY SCREEN: NEGATIVE
BASOPHILS # BLD AUTO: 0.02 X10(3) UL (ref 0–0.2)
BASOPHILS NFR BLD AUTO: 0.2 %
BUN BLD-MCNC: 21 MG/DL (ref 9–23)
BUN/CREAT SERPL: 19.4 (ref 10–20)
CALCIUM BLD-MCNC: 8.8 MG/DL (ref 8.7–10.4)
CHLORIDE SERPL-SCNC: 108 MMOL/L (ref 98–112)
CO2 SERPL-SCNC: 25 MMOL/L (ref 21–32)
CREAT BLD-MCNC: 1.08 MG/DL
DEPRECATED RDW RBC AUTO: 52.6 FL (ref 35.1–46.3)
EGFRCR SERPLBLD CKD-EPI 2021: 51 ML/MIN/1.73M2 (ref 60–?)
EOSINOPHIL # BLD AUTO: 0.04 X10(3) UL (ref 0–0.7)
EOSINOPHIL NFR BLD AUTO: 0.4 %
ERYTHROCYTE [DISTWIDTH] IN BLOOD BY AUTOMATED COUNT: 16.5 % (ref 11–15)
GLUCOSE BLD-MCNC: 95 MG/DL (ref 70–99)
HCT VFR BLD AUTO: 33.4 %
HGB BLD-MCNC: 10.1 G/DL
IMM GRANULOCYTES # BLD AUTO: 0.09 X10(3) UL (ref 0–1)
IMM GRANULOCYTES NFR BLD: 0.8 %
LYMPHOCYTES # BLD AUTO: 1.32 X10(3) UL (ref 1–4)
LYMPHOCYTES NFR BLD AUTO: 12.4 %
MCH RBC QN AUTO: 26.4 PG (ref 26–34)
MCHC RBC AUTO-ENTMCNC: 30.2 G/DL (ref 31–37)
MCV RBC AUTO: 87.2 FL
MONOCYTES # BLD AUTO: 0.68 X10(3) UL (ref 0.1–1)
MONOCYTES NFR BLD AUTO: 6.4 %
NEUTROPHILS # BLD AUTO: 8.47 X10 (3) UL (ref 1.5–7.7)
NEUTROPHILS # BLD AUTO: 8.47 X10(3) UL (ref 1.5–7.7)
NEUTROPHILS NFR BLD AUTO: 79.8 %
OSMOLALITY SERPL CALC.SUM OF ELEC: 295 MOSM/KG (ref 275–295)
PLATELET # BLD AUTO: 316 10(3)UL (ref 150–450)
POTASSIUM SERPL-SCNC: 4.1 MMOL/L (ref 3.5–5.1)
RBC # BLD AUTO: 3.83 X10(6)UL
RH BLOOD TYPE: POSITIVE
SODIUM SERPL-SCNC: 141 MMOL/L (ref 136–145)
WBC # BLD AUTO: 10.6 X10(3) UL (ref 4–11)

## 2024-12-25 PROCEDURE — 86901 BLOOD TYPING SEROLOGIC RH(D): CPT | Performed by: EMERGENCY MEDICINE

## 2024-12-25 PROCEDURE — 86900 BLOOD TYPING SEROLOGIC ABO: CPT | Performed by: EMERGENCY MEDICINE

## 2024-12-25 PROCEDURE — 86850 RBC ANTIBODY SCREEN: CPT | Performed by: EMERGENCY MEDICINE

## 2024-12-25 NOTE — ED INITIAL ASSESSMENT (HPI)
Patient arrives via EMS for abnormal labs, hbg 6.5. Patient admitted about a week ago for the same reason. Denies any symptoms. Per pt, suppose to have a heart valve replacement.

## 2024-12-25 NOTE — ED QUICK NOTES
Patient discharged back to SNF in no acute distress. A&Ox4, skin p/w/d, denies cp/sob. Ambulating with steady gait and verbalized understanding of d/c instructions. SNF made aware, pt returning via [facility].

## 2024-12-26 LAB
ATRIAL RATE: 84 BPM
P AXIS: 49 DEGREES
P-R INTERVAL: 164 MS
Q-T INTERVAL: 352 MS
QRS DURATION: 82 MS
QTC CALCULATION (BEZET): 415 MS
R AXIS: 3 DEGREES
T AXIS: 38 DEGREES
VENTRICULAR RATE: 84 BPM

## 2024-12-26 NOTE — ED PROVIDER NOTES
Patient Seen in: [facility] Emergency Department    History     Chief Complaint   Patient presents with    Abnormal Labs       HPI    The patient presents to the ED after outpatient laboratory testing showed that her hemoglobin was 6.5.  She states that she has had low hemoglobin levels recently.  Denies a source of bleeding or anticoagulant use.    History reviewed.   Past Medical History:    Acute pulmonary embolism (HCC)    High blood pressure    History of blood transfusion    Macular degeneration    Osteopenia    Primary osteoarthritis of right hip    Pulmonary embolism (HCC)    Visual impairment    Readers    White coat hypertension       History reviewed.   Past Surgical History:   Procedure Laterality Date    Cataract  3/2&3/16 2017    Colonoscopy N/A 2024    Procedure: COLONOSCOPY;  Surgeon: Karen Staton MD;  Location: [facility] ENDOSCOPY    Colonoscopy & polypectomy  2021         Hip replacement surgery Left Around     Other surgical history  [place]     Bilateral cataract surgery         Medications :  Prescriptions Prior to Admission[1]     Family History   Problem Relation Age of Onset    Cancer Father         bone (cause of death)    Stroke Mother         CVA (cause of death)    Diabetes Mother     Hypertension Mother     Dementia Brother         Alzheimer's    Heart Disease Brother         CAD - x2 brothers    Heart Disease Brother        Smoking Status:   Social History     Socioeconomic History    Marital status:    Tobacco Use    Smoking status: Former     Current packs/day: 0.00     Types: Cigarettes     Start date: 1990     Quit date: 1990     Years since quittin.0     Passive exposure: Past    Smokeless tobacco: Former   Vaping Use    Vaping status: Never Used   Substance and Sexual Activity    Alcohol use: Yes     Comment: wine, occasionally    Drug use: Never   Other Topics Concern    Caffeine Concern Yes     Comment: coffee/soda - 2cups/day        Constitutional and vital signs reviewed.      Social History and Family History elements reviewed from today, pertinent positives to the presenting problem noted.    Physical Exam     ED Triage Vitals   BP 12/24/24 2348 117/63   Pulse 12/24/24 2348 86   Resp 12/24/24 2348 18   Temp 12/24/24 2342 97.9 °F (36.6 °C)   Temp src 12/24/24 2342 Temporal   SpO2 12/24/24 2348 96 %   O2 Device 12/24/24 2348 None (Room air)       All measures to prevent infection transmission during my interaction with the patient were taken. Handwashing was performed prior to and after the exam.  Stethoscope and any equipment used during my examination was cleaned with super sani-cloth germicidal wipes following the exam.     Physical Exam  Vitals and nursing note reviewed.   Constitutional:       General: She is not in acute distress.     Appearance: She is well-developed. She is not ill-appearing.   HENT:      Head: Normocephalic and atraumatic.   Eyes:      General:         Right eye: No discharge.         Left eye: No discharge.      Conjunctiva/sclera: Conjunctivae normal.   Neck:      Trachea: No tracheal deviation.   Cardiovascular:      Rate and Rhythm: Normal rate.   Pulmonary:      Effort: Pulmonary effort is normal. No respiratory distress.      Breath sounds: Normal breath sounds. No stridor.   Abdominal:      General: There is no distension.      Palpations: Abdomen is soft.   Musculoskeletal:         General: No deformity.   Skin:     General: Skin is warm and dry.   Neurological:      Mental Status: She is alert and oriented to person, place, and time.   Psychiatric:         Mood and Affect: Mood normal.         Behavior: Behavior normal.         ED Course        Labs Reviewed   BASIC METABOLIC PANEL (8) - Abnormal; Notable for the following components:       Result Value    Creatinine 1.08 (*)     eGFR-Cr 51 (*)     All other components within normal limits   CBC WITH DIFFERENTIAL WITH PLATELET - Abnormal; Notable for  the following components:    HGB 10.1 (*)     HCT 33.4 (*)     MCHC 30.2 (*)     RDW-SD 52.6 (*)     RDW 16.5 (*)     Neutrophil Absolute Prelim 8.47 (*)     Neutrophil Absolute 8.47 (*)     All other components within normal limits   TYPE AND SCREEN    Narrative:     The following orders were created for panel order Type and screen.                  Procedure                               Abnormality         Status                                     ---------                               -----------         ------                                     ABORH (Blood Type)[[id number]]                               Final result                               Antibody Screen[[id number]]                                  Final result                                                 Please view results for these tests on the individual orders.   ABORH (BLOOD TYPE)   ANTIBODY SCREEN   RAINBOW DRAW LAVENDER   RAINBOW DRAW LIGHT GREEN   RAINBOW DRAW BLUE     EKG    Rate, intervals and axes as noted on EKG Report.  Rate: Normal, 84 bpm  Rhythm: Sinus Rhythm  Reading: Normal intervals, septal Q waves, abnormal EKG           As Interpreted by me    Imaging Results Available and Reviewed while in ED: No results found.  ED Medications Administered: Medications - No data to display      MDM     Vitals:    12/24/24 2348 12/25/24 0030 12/25/24 0100 12/25/24 0115   BP: 117/63 118/64 127/64 136/72   Pulse: 86 75 77 79   Resp: 18 15 16 21   Temp:       TempSrc:       SpO2: 96% 96% 96% 95%   Weight:       Height:         *I personally reviewed and interpreted all ED vitals.    Pulse Ox: 95%, Room air, Normal     Monitor Interpretation:   normal sinus rhythm as interpreted by me.  The cardiac monitor was ordered to monitor heart rate.    Differential Diagnosis/ Diagnostic Considerations: Anemia, GI blood loss, other    Complicating Factors: The patient already has does not have any pertinent problems on file. to contribute to the complexity  of this ED evaluation.    Medical Decision Making  Patient presents after outpatient laboratory testing showed severe anemia.  Patient nondistressed on exam in the ED and hemoglobin 10.1 on evaluation in the ED.  No evidence for blood loss.  Hemoglobin increased today from baseline.  Stable for discharge back to her care facility.  He is    Problems Addressed:  Encounter for medical screening examination: acute illness or injury    Amount and/or Complexity of Data Reviewed  Labs: ordered. Decision-making details documented in ED Course.        Condition upon leaving the department: Stable    Disposition and Plan     Clinical Impression:  1. Encounter for medical screening examination        Disposition:  Discharge    Follow-up:  Malathi Stuart MD  [address] [place] [id number]  [phone]    Schedule an appointment as soon as possible for a visit  As needed      Medications Prescribed:  Discharge Medication List as of 12/25/2024  1:24 AM                           [1] (Not in a hospital admission)

## 2024-12-27 NOTE — DISCHARGE SUMMARY
[facility]  part of [facility]    Discharge Summary    Hoda Busby Patient Status:  Inpatient    1940 MRN [id number]   Location [facility] 5SW/SE Attending No att. providers found   Hosp Day # 6 PCP Malathi Stuart MD     Date of Admission: 2024   Date of Discharge: 2024     Admitting Diagnosis: Gastric AVM [K31.819]  NANCY (acute kidney injury) (HCC) [N17.9]  Gastrointestinal hemorrhage, unspecified gastrointestinal hemorrhage type [K92.2]    Disposition: Home    Discharge Diagnosis: .Principal Problem:    Gastrointestinal hemorrhage, unspecified gastrointestinal hemorrhage type  Active Problems:    Nonrheumatic aortic valve stenosis    Acute blood loss anemia    Interstitial pneumonitis (HCC)    NANCY (acute kidney injury) (HCC)    Gastric AVM      Hospital Course:   Reason for Admission:   Acute GI bleed  Acute blood loss anemia    Discharge Physical Exam:  Vital Signs:  Blood pressure 118/56, pulse 97, temperature 98 °F (36.7 °C), temperature source Oral, resp. rate 18, height 5' 8\" (1.727 m), weight 174 lb (78.9 kg), SpO2 98%.     See prog note    Hospital Course:     Acute GI bleed  Recently hospitalized -24 for GI bleed, now with recurrence; had been taking Xarelto 20 mg daily for PE  -her last colonoscopy in 2021 revealed adenomatous polyp but poor prep; pt declined repeat colonoscopy.  -EGD 24 (Dr. Nuno) -  15mm clean-based, nonbleeding gastric antral ulcer (bx done); 3mm gastric antral AVM s/p APC.  -on protonix 40mg BID - to continue x 8 weeks (EOT 25)  She received a total of 3 units PRBCs and also IV Ferrrlecit.  On 24, she was resumed on Xarelto 20 mg po daily, and oral prednisone.  She returned to [facility] 24 for rehab.    -Hgb 6.5 24 at the [facility]. Sent to ED.  Repeat Hgb in ED was 7.6, though stool was heme positive.  Hgb decreased to 6.1 overnight, and she received transfusion 2 units PRBCs on  12/16/24  -Xarelto stopped  -colonoscopy and repeat EGD 12/17/24 by Dr. Staton -- grade 1 esophagitis; duod ulcers healing; large flat polyp in transverse colon      Anemia due to acute blood loss  -Hgb 6.1 on 12/14/24; s/p 2 units PRBCs  -Hgb 9.2 - stable; no sx of active bleeding     Adenocarcinoma of colon  -Bx of transverse colon polyp 12/17/24 -- invasive, moderately differentiated adenocarcinoma  -Dr. Cassidy's surgical consult much appreciated  -CEA normal (1.9)  -CT a/p to be done today for staging  -will need TAVR for severe A.S. prior to colon resection, as pt is very high risk for susie-op complcations related to the A.S. She is also high risk for bowel leak while on high dose prednisone.  Plan to delay surgery and f/u with cards Dr. Bird to discuss TAVR and will Dr. Holloway for prednisone taper - Dr. Cassidy discussed with Ronen Bird and Jewel.  The colon cancer is not the source of GI bleeding so not urgent to remove.  Pt may also benefit from pre-operative IVC filter placement given recent PE     BLE edema  -IV lasix 20mg x 1 yesterday 12/18 w/good diuresis; decreased edema though still present  -will give another dose of lasix 20mg IV x 1 today  -then lasix 40mg po every day x 3d, then 20mg/day     Mild NANCY  -likely due to mild dehydration from bowel prep  -creat improved today 1.16>1.06  -repeat early next week     Acute left peroneal palsy  In Nov 2024; has numbness to anterolateral left foot and ankle as well as inability to dorsiflex left foot; etiology unclear  -neurologist Dr Richardson consult  noted and appreciated  -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   -MRI brain shows no acute intracranial process  -left foot drop persists     Interstitial pneumonitis  -dx'ed 10/2024 - presented with cough and severe hypoxia  -unresponsive to abx (amox/doxy, then IV zosyn/zithromax)  -CXR 10/24/24  extensive bilateral perihilar and bilateral upper and lower lobe   -S.pneumo, legionella Ag , mycoplasma IgM/G, Fungitell-beta-D glucan negative  -ANCA and URIEL/connective tissue disease panels negative  -anti-histone and anti-Keara Ab's negative  -Echo 10/24/24 - normal EF (55-60%), no obvious vegetatons  -CT with bilateral ground glass opacities, small consolidations of BLL  -per pulm, findings suspicious for NSIP (vs cryptogenic organizing pneumonia vs hypersensitivity pneumonitis); NOT thought to be c/w UIP pattern  -on empiric steroids (solumedrol then prednisone) since 10/25/24 - prednisone 40mg/day decreased to 30mg/day on 12/17/24 by pulm Dr. Holloway -- d/w Dr. Holloway -- 30mg/day x 10d, then 20mg/day x 10d, then 10mg/day x 10d  -pt to f/u with Dr. Holloway in 3-4 weeks     Bilateral pulmonary emboli  -dx'ed at time of interstitial pneumonitis  -CT chest 10/25/24 -- acute distal segmental/subsegmental pulmonary emboli in [place] and subsegmental PE in [place]  -unclear etiology; no recent hx of long travel; no family/personal hx of blood clots  -BLE venous dopplers negative 10/26/24  -unclear etiology of PE; will do hypercoag w/u as outpt.   Consider malignancy w/u if no other cause found (colonoscopy 1/2021 revealed adenomatous polyp but poor prep; pt declined repeat colonoscopy; Medicare would not cover Cologuard); UTD on mammo  -started on xarelto (held 2/2 GIB) - restarted xarelto 20 mg po every day on 11/28/24  -repeat CT chest 12/9/24 neg PE  -stopped Xarelto 12/14/24 due to recurrent GI bleed     Severe aortic stenosis  -sees cardiology Dr. Bird  -moderate A.S on echo in 10/2022 (prev mild A.S in 2020)  -abnormal pre-op EKG 9/2023 --Nuc GXT done 9/20/23 -- EKG portion revealed 1-1.5mm ST seg depression in inferolateral leads with prolonged recovery; nuclear portion with normal perfusion   -Echo 9/29/23 --progression of A.S. from mild to moderate  -recent echo at Dr. Bird's office 10/18/24 -- LVEF 65%, grade  2 diastolic dysfunction, severe aortic stenosis (mean aortic gradient signif worse since 9/2023 -- 31>49 mmHg),  -seen by cardiology during last admission in 10/2024; pt will need TAVR evaluation as outpt prior to colon resection as above  -restarted lasix as starting to develop some LE edema as above  -pt to f/u with Dr. Bird     Hypertension, primary  -(dyazide stopped due to NANCY)  -on amlodipine 5mg/day at home  -BP's intermittently soft here; holding amlodipine     Hx of hip OA  -s/p left THR (Dr. Lo) many years ago  -s/p elective R BEATRIZ on 10/5/23 by Dr. Diaz     Hyperlipidemia   Pt with strong family hx of high cholesterol  ; normal TG and HDL  No indication for statin given age     Osteopenia   On Fosamax from 2011 to 4/2016.     DEXA stable 5/10/17 and 2019 and 2021  DEXA 8/2024 -- osteoporosis of left forearm  -restarted Fosamax 8/2024 -- given esophagitis 12/2024, will hold fosamax for now     Vitamin D deficiency  On vitamin D 4000u/day         D/w RN Kari and with Vonda joshua     Plan for pt to discharge to the [facility] assisted [facility] for respite care x 1 month (will be able to get PT/OT while there), prior to returning home (had been at [facility] after last hospital admission, then transferred to the [place] for respite care (then readmitted 2 days later)        Consultants         Provider   Role Specialty     Viktor Bird MD      Consulting Physician Cardiovascular Diseases     Viktor Cassidy MD      Consulting Physician SURGERY, GENERAL     Cody Holloway DO      Consulting Physician PULMONARY DISEASES          Surgical Procedures       Case IDs Date Procedure Surgeon Location Status    [id number] 12/17/24 PUSH ENEROSCOPY WITH ESOPHAGOGASTRODUODENOSCOPY (EGD) Karen Staton MD [facility] ENDOSCOPY Comp              Discharge Plan:   Discharge Condition: Stable    Discharge Medication List as of 12/19/2024  2:31 PM        New Orders    Details   furosemide  (LASIX) 20 MG Oral Tab Take 1 tablet (20 mg total) by mouth daily. Take 40mg daily x 3 days (12/19-12/21/24), then 20mg daily thereafter, Print Script, Disp-90 tablet, R-3           Home Meds - Modified    Details   predniSONE 10 MG Oral Tab Take 3 tablets (30 mg total) by mouth daily with breakfast for 9 days, THEN 2 tablets (20 mg total) daily with breakfast for 10 days, THEN 1 tablet (10 mg total) daily with breakfast for 10 days., Print Script, Disp-57 tablet, R-0           Home Meds - Unchanged    Details   omeprazole 20 MG Oral Capsule Delayed Release Take 1 capsule (20 mg total) by mouth 2 (two) times daily., Historical      atorvastatin 20 MG Oral Tab Take 1 tablet (20 mg total) by mouth at bedtime., Historical      zolpidem 5 MG Oral Tab Take 1 tablet (5 mg total) by mouth nightly as needed., Print Script, Disp-30 tablet, R-5      sulfamethoxazole-trimethoprim -160 MG Oral Tab per tablet Take 1 tablet by mouth 3 (three) times a week. Monday, Wednesday, Friday, Historical      Multiple Vitamins-Minerals (MULTI-VITAMIN/MINERALS) Oral Tab Take 1 tablet by mouth daily., Historical      ferrous sulfate 325 (65 FE) MG Oral Tab EC Take 1 tablet (325 mg total) by mouth daily., Historical      polyethylene glycol, PEG 3350, 17 g Oral Powd Pack Take 17 g by mouth daily., Print Script, Disp-30 each, R-0      Cholecalciferol (VITAMIN D) 50 MCG (2000 UT) Oral Tab Take 4,000 Units by mouth daily., OTC, Disp-1 tablet, R-0      Vitamin B-12 500 MCG Oral Tab Take 1 tablet (500 mcg total) by mouth daily., Historical      Calcium Carb-Cholecalciferol (CALCIUM + D3) 600-200 MG-UNIT Oral Tab Take 1 tablet by mouth daily., Historical                 Discharge Diet: As tolerated    Discharge Activity: As tolerated    Follow up:      Follow-up Information       Malathi Stuart MD Follow up in 1 week(s).    Specialty: Internal Medicine  Contact information:  [address] [place] [address] [phone]                Cody Holloway, DO Follow up.    Specialty: PULMONARY DISEASES  Why: 3-4 weeks  Contact information:  [address]  [address]  [facility] [address]  [phone]               Viktor Bird MD Follow up in 2 week(s).    Specialties: Cardiovascular Diseases, CARDIOLOGY  Why: Office will call to schedule your follow up visit  Contact information:  [address]  [address]  [place] [address]  [phone]               Viktor Cassidy MD. Call on 1/15/2025.    Specialty: SURGERY, GENERAL  Why: Call for Follow-up after hospitalization  Contact information:  [address] [facility]  [address]  [place] [address]  [phone]               Apn, Structural Heart. Go on 1/2/2025.    Why: 9:15 TAVR consult.  Expect 4-6 hrs for consults, education and testing. You may eat, drink, take AM meds.  No caffiene.  Park in [place] parking garage and check in at [facility] desk.  Brink ins cards and a list of your current meds. Pls call [phone] with any questions.  Contact information:  [address] [place] [id number]                                     Other Discharge Instructions:           Resume care with [facility]     [address] [place] [address], [address]  [place] [address]  Phone: [phone]  Fax: [phone]        >30 min were spent counseling patient and coordinating care    Malathi Stuart MD  12/26/2024

## 2025-01-02 ENCOUNTER — HOSPITAL ENCOUNTER (OUTPATIENT)
Dept: ULTRASOUND IMAGING | Facility: HOSPITAL | Age: 85
Discharge: HOME | End: 2025-01-02
Attending: INTERNAL MEDICINE
Payer: MEDICARE

## 2025-01-02 ENCOUNTER — HOSPITAL ENCOUNTER (OUTPATIENT)
Dept: CT IMAGING | Facility: HOSPITAL | Age: 85
Discharge: HOME | End: 2025-01-02
Attending: INTERNAL MEDICINE
Payer: MEDICARE

## 2025-01-02 ENCOUNTER — HOSPITAL ENCOUNTER (OUTPATIENT)
Dept: LAB | Facility: HOSPITAL | Age: 85
Discharge: HOME | End: 2025-01-02
Attending: INTERNAL MEDICINE
Payer: MEDICARE

## 2025-01-02 ENCOUNTER — HOSPITAL ENCOUNTER (OUTPATIENT)
Dept: CV DIAGNOSTICS | Facility: HOSPITAL | Age: 85
Discharge: HOME | End: 2025-01-02
Attending: INTERNAL MEDICINE
Payer: MEDICARE

## 2025-01-02 ENCOUNTER — HOSPITAL ENCOUNTER (OUTPATIENT)
Dept: CARDIOLOGY CLINIC | Facility: HOSPITAL | Age: 85
Discharge: HOME | End: 2025-01-02
Attending: INTERNAL MEDICINE
Payer: MEDICARE

## 2025-01-02 VITALS — DIASTOLIC BLOOD PRESSURE: 46 MMHG | HEART RATE: 74 BPM | SYSTOLIC BLOOD PRESSURE: 103 MMHG

## 2025-01-02 DIAGNOSIS — J98.4 PNEUMONITIS: ICD-10-CM

## 2025-01-02 DIAGNOSIS — I10 HTN (HYPERTENSION): ICD-10-CM

## 2025-01-02 DIAGNOSIS — C18.9 COLON CANCER (HCC): ICD-10-CM

## 2025-01-02 DIAGNOSIS — D64.9 ANEMIA: ICD-10-CM

## 2025-01-02 DIAGNOSIS — I26.99 PULMONARY EMBOLISM (HCC): ICD-10-CM

## 2025-01-02 DIAGNOSIS — I35.0 AORTIC STENOSIS: ICD-10-CM

## 2025-01-02 DIAGNOSIS — R42 DIZZINESS AND GIDDINESS: ICD-10-CM

## 2025-01-02 LAB
ANION GAP SERPL CALC-SCNC: 5 MMOL/L (ref 0–18)
ATRIAL RATE: 85 BPM
BUN BLD-MCNC: 23 MG/DL (ref 9–23)
CALCIUM BLD-MCNC: 9.1 MG/DL (ref 8.7–10.4)
CHLORIDE SERPL-SCNC: 107 MMOL/L (ref 98–112)
CO2 SERPL-SCNC: 27 MMOL/L (ref 21–32)
CREAT BLD-MCNC: 0.98 MG/DL
EGFRCR SERPLBLD CKD-EPI 2021: 57 ML/MIN/1.73M2 (ref 60–?)
ERYTHROCYTE [DISTWIDTH] IN BLOOD BY AUTOMATED COUNT: 17.7 %
FASTING STATUS PATIENT QL REPORTED: NO
GLUCOSE BLD-MCNC: 92 MG/DL (ref 70–99)
HCT VFR BLD AUTO: 34.8 %
HGB BLD-MCNC: 10.6 G/DL
MCH RBC QN AUTO: 27 PG (ref 26–34)
MCHC RBC AUTO-ENTMCNC: 30.5 G/DL (ref 31–37)
MCV RBC AUTO: 88.8 FL
OSMOLALITY SERPL CALC.SUM OF ELEC: 291 MOSM/KG (ref 275–295)
P AXIS: 31 DEGREES
P-R INTERVAL: 148 MS
PLATELET # BLD AUTO: 490 10(3)UL (ref 150–450)
POTASSIUM SERPL-SCNC: 4.2 MMOL/L (ref 3.5–5.1)
Q-T INTERVAL: 344 MS
QRS DURATION: 86 MS
QTC CALCULATION (BEZET): 409 MS
R AXIS: -8 DEGREES
RBC # BLD AUTO: 3.92 X10(6)UL
SODIUM SERPL-SCNC: 139 MMOL/L (ref 136–145)
T AXIS: 21 DEGREES
VENTRICULAR RATE: 85 BPM
WBC # BLD AUTO: 11.4 X10(3) UL (ref 4–11)

## 2025-01-02 PROCEDURE — 93005 ELECTROCARDIOGRAM TRACING: CPT

## 2025-01-02 PROCEDURE — 74174 CTA ABD&PLVS W/CONTRAST: CPT | Performed by: INTERNAL MEDICINE

## 2025-01-02 PROCEDURE — 93010 ELECTROCARDIOGRAM REPORT: CPT | Performed by: INTERNAL MEDICINE

## 2025-01-02 PROCEDURE — 99212 OFFICE O/P EST SF 10 MIN: CPT

## 2025-01-02 PROCEDURE — 80048 BASIC METABOLIC PNL TOTAL CA: CPT | Performed by: INTERNAL MEDICINE

## 2025-01-02 PROCEDURE — 93880 EXTRACRANIAL BILAT STUDY: CPT | Performed by: INTERNAL MEDICINE

## 2025-01-02 PROCEDURE — 85027 COMPLETE CBC AUTOMATED: CPT | Performed by: INTERNAL MEDICINE

## 2025-01-02 PROCEDURE — 71275 CT ANGIOGRAPHY CHEST: CPT | Performed by: INTERNAL MEDICINE

## 2025-01-02 PROCEDURE — 36415 COLL VENOUS BLD VENIPUNCTURE: CPT | Performed by: INTERNAL MEDICINE

## 2025-01-02 NOTE — IMAGING NOTE
Patient to radiology holding area. IV placed by CHIARA Matthews. Reviewed patient's name,  and allergies. Procedure explained and questions answered. GFR = 57    Patient to CT Rm 4, GE. 2L O2 applied via nasal cannula.    Contrast injected followed by saline flush at 1115.  Contrast injection = 95ml  0.9 NS flush = 100ml  Average HR = 74    Patient tolerated the procedure without complication. Denies any contrast reaction. Discontinued IV saline lock. Escorted pt back to radiology holding.

## 2025-01-02 NOTE — PROGRESS NOTES
TAVR procedure and process explained to pt and tres.  KCCQ and 5M walk performed. Pt will do PATs at [facility]. Educational folder provided.

## 2025-01-02 NOTE — CONSULTS
[facility]    PATIENT'S NAME: ANUPAMA CARNEY   ATTENDING PHYSICIAN: Jigar Shah M.D.   CONSULTING PHYSICIAN: Jigar Shah M.D.   PATIENT ACCOUNT#:   [id number]    LOCATION:  [facility]  MEDICAL RECORD #:   [id number]       YOB: 1940  ADMISSION DATE:       01/02/2025      CONSULT DATE:  01/02/2025    REPORT OF CONSULTATION    HISTORY OF PRESENT ILLNESS:  This is the first office visit for the patient with me.  She is referred by Dr. Bird for consideration of percutaneous transcatheter aortic valve replacement.    The patient has been followed by Dr. Bird for known aortic stenosis.  In 2023 it was moderate, and when she was seen this fall a followup echocardiogram revealed progression to severe.  LV function was preserved.  Peak velocity nearly 4.5 m/second.  Mean gradient 47 mmHg.    She was scheduled to meet again with Dr. Bird to discuss various options, but she again was feeling poorly and was diagnosed with inflammatory pneumonitis.  During this evaluation, small subsegmental pulmonary emboli were identified.  Venous duplex of the lower extremities was unremarkable.  She was initially treated with intravenous steroids in the hospital and then converted to oral and placed on Xarelto.    Soon thereafter, she became increasingly short of breath and was admitted to the hospital with profound anemia.  Hemoglobin 6.1.    Subsequent evaluation revealed a nonbleeding gastric ulcer and a gastric antral AVM.  She also had a colonic polyp, the biopsy of which revealed an invasive moderately differentiated adenocarcinoma.  She was transfused and seen by Surgery.  It has been recommended that she have surgical resection of her adenocarcinoma, yet she was felt to be a too high-risk candidate until her valve was addressed.    She is feeling a bit better now that her blood count is up, but she does have some exertional dyspnea.  The stenosis is clearly severe.    PHYSICAL EXAMINATION:   Lungs are clear.  She is in sinus rhythm with an obvious aortic outflow murmur.  No significant aortic insufficiency.  The abdomen is soft.  She has mild lower extremity edema.    Her electrocardiogram reveals sinus rhythm with no acute ischemic changes.    She has a family history of coronary disease.  Her brothers have had symptomatic coronary disease.  She had a normal nuclear study in September 2023.    ASSESSMENT AND PLAN:  I had a nice discussion with the patient and one of her relatives today regarding the procedure.  We talked about the risks, benefits, and alternatives.  Her STS score has not yet been calculated, but clearly at age 84 with her above-stated comorbidities, a percutaneous approach to her valve would be preferable.    She will move forward with her testing today.  If we can see her coronaries, we might be able to avoid angiography, but a cardiac catheterization would be required if this is not feasible.    I will present her to the structural heart team at large at our next meeting.  We will obviously move forward expeditiously with our evaluation given her need for surgical resection of her adenocarcinoma.    I think she and her relative have a good understanding and seem comfortable with the above approach.    Dictated By Jigar Shah M.D.  d: 01/02/2025 10:04:37  t: 01/02/2025 10:42:36  [facility] [id number]  WS/

## 2025-01-08 ENCOUNTER — OFFICE VISIT (OUTPATIENT)
Dept: INTERNAL MEDICINE CLINIC | Facility: CLINIC | Age: 85
End: 2025-01-08

## 2025-01-08 VITALS
DIASTOLIC BLOOD PRESSURE: 60 MMHG | HEART RATE: 80 BPM | HEIGHT: 69 IN | OXYGEN SATURATION: 98 % | BODY MASS INDEX: 26.96 KG/M2 | SYSTOLIC BLOOD PRESSURE: 124 MMHG | TEMPERATURE: 98 F | WEIGHT: 182 LBS

## 2025-01-08 DIAGNOSIS — G57.32 PERONEAL NEUROPATHY, LEFT: ICD-10-CM

## 2025-01-08 DIAGNOSIS — I35.0 NONRHEUMATIC AORTIC VALVE STENOSIS: Primary | ICD-10-CM

## 2025-01-08 DIAGNOSIS — J84.89 INTERSTITIAL PNEUMONITIS (HCC): ICD-10-CM

## 2025-01-08 DIAGNOSIS — I10 PRIMARY HYPERTENSION: ICD-10-CM

## 2025-01-08 DIAGNOSIS — K92.2 GASTROINTESTINAL HEMORRHAGE, UNSPECIFIED GASTROINTESTINAL HEMORRHAGE TYPE: ICD-10-CM

## 2025-01-08 DIAGNOSIS — D62 ACUTE BLOOD LOSS ANEMIA: ICD-10-CM

## 2025-01-08 PROBLEM — J18.9 PNEUMONIA: Status: RESOLVED | Noted: 2024-10-28 | Resolved: 2025-01-08

## 2025-01-08 PROBLEM — N17.9 AKI (ACUTE KIDNEY INJURY): Status: RESOLVED | Noted: 2024-12-13 | Resolved: 2025-01-08

## 2025-01-08 PROBLEM — I26.99 ACUTE PULMONARY EMBOLISM (HCC): Status: RESOLVED | Noted: 2024-10-25 | Resolved: 2025-01-08

## 2025-01-08 PROBLEM — I26.99 OTHER PULMONARY EMBOLISM WITHOUT ACUTE COR PULMONALE (HCC): Status: RESOLVED | Noted: 2024-11-29 | Resolved: 2025-01-08

## 2025-01-08 PROBLEM — J96.01 ACUTE RESPIRATORY FAILURE WITH HYPOXIA (HCC): Status: RESOLVED | Noted: 2024-11-01 | Resolved: 2025-01-08

## 2025-01-08 PROBLEM — N17.9 AKI (ACUTE KIDNEY INJURY) (HCC): Status: RESOLVED | Noted: 2024-12-13 | Resolved: 2025-01-08

## 2025-01-08 PROCEDURE — 99214 OFFICE O/P EST MOD 30 MIN: CPT | Performed by: INTERNAL MEDICINE

## 2025-01-08 NOTE — PROGRESS NOTES
Subjective:   Hoda Busby is a 84 year old female who presents for hospital follow up.   She was discharged from [facility] to Home or Self Care  Admission Date: 12/24/24   Discharge Date: 12/25/24  Hospital Discharge Diagnosis: GI bleed    Interactive contact within 2 business days post discharge first initiated on Date: 12/20/2024    I accessed [facility] and/or Care Everywhere and personally reviewed the following for the recent hospitalization: provider notes, consults, summaries, labs and other test results and the pertinent findings are documented below.     HPI: readmitted with GI bleed 2/2 duod ulcer, esophagitis  Had colonoscopy and repeat EGD.  Colonoscopy showed new transverse colon mass - +carcinoma    History/Other:   Current Medications:  Medication Reconciliation:  I am aware of an inpatient discharge within the last 30 days.  The discharge medication list has been reconciled with the patient's current medication list and reviewed by me. See medication list for additions of new medication, and changes to current doses of medications and discontinued medications.  Outpatient Medications Marked as Taking for the 1/8/25 encounter (Office Visit) with Malathi Stuart MD   Medication Sig    predniSONE 10 MG Oral Tab Take 3 tablets (30 mg total) by mouth daily with breakfast for 9 days, THEN 2 tablets (20 mg total) daily with breakfast for 10 days, THEN 1 tablet (10 mg total) daily with breakfast for 10 days.    furosemide (LASIX) 20 MG Oral Tab Take 1 tablet (20 mg total) by mouth daily. Take 40mg daily x 3 days (12/19-12/21/24), then 20mg daily thereafter    omeprazole 20 MG Oral Capsule Delayed Release Take 1 capsule (20 mg total) by mouth 2 (two) times daily.    atorvastatin 20 MG Oral Tab Take 1 tablet (20 mg total) by mouth at bedtime.    zolpidem 5 MG Oral Tab Take 1 tablet (5 mg total) by mouth nightly as needed.    sulfamethoxazole-trimethoprim -160 MG Oral Tab per tablet Take 1 tablet by  mouth 3 (three) times a week. Monday, Wednesday, Friday    Multiple Vitamins-Minerals (MULTI-VITAMIN/MINERALS) Oral Tab Take 1 tablet by mouth daily.    ferrous sulfate 325 (65 FE) MG Oral Tab EC Take 1 tablet (325 mg total) by mouth daily.    polyethylene glycol, PEG 3350, 17 g Oral Powd Pack Take 17 g by mouth daily.    Cholecalciferol (VITAMIN D) 50 MCG (2000 UT) Oral Tab Take 4,000 Units by mouth daily.    Vitamin B-12 500 MCG Oral Tab Take 1 tablet (500 mcg total) by mouth daily.    Calcium Carb-Cholecalciferol (CALCIUM + D3) 600-200 MG-UNIT Oral Tab Take 1 tablet by mouth daily.       Review of Systems:  GENERAL: weight stable, energy stable, no sweating  SKIN: denies any unusual skin lesions  EYES: denies blurred vision or double vision  HEENT: denies nasal congestion, sinus pain or ST  LUNGS: denies shortness of breath with exertion  CARDIOVASCULAR: denies chest pain on exertion or palpitations  GI: denies abdominal pain, denies heartburn, denies diarrhea  MUSCULOSKELETAL: denies pain, normal range of motion of extremities  NEURO: denies headaches, denies dizziness, denies weakness  PSYCHE: denies depression or anxiety  HEMATOLOGIC: denies hx of anemia, or bruising, denies bleeding  ENDOCRINE: denies thyroid history  ALL/ASTHMA: denies hx of allergy or asthma    Objective:   No LMP recorded. Patient is postmenopausal.  Estimated body mass index is 26.88 kg/m² as calculated from the following:    Height as of this encounter: 5' 9\" (1.753 m).    Weight as of this encounter: 182 lb (82.6 kg).   /60 (BP Location: Right arm, Patient Position: Sitting, Cuff Size: adult)   Pulse 80   Temp 97.6 °F (36.4 °C) (Oral)   Ht 5' 9\" (1.753 m)   Wt 182 lb (82.6 kg)   SpO2 98%   BMI 26.88 kg/m²    GENERAL: well developed, well nourished, in no apparent distress  EYES: conjunctiva are clear  LUNGS: clear to auscultation except minimal dry fine crackles at bases  CARDIO: RRR S1S2, 2/6 WADE RUSB  GI: good BS's, no  masses, HSM or tenderness  EXTREMITIES: trace BLE edema  NEURO: Oriented times three    Assessment & Plan:     Acute GI bleed  Recently hospitalized 11/25-11/30/24 for GI bleed, then with recurrence 12/2024; had been taking Xarelto 20 mg daily for PE  -EGD 11/26/24 (Dr. Nuno) -  15mm clean-based, nonbleeding gastric antral ulcer (bx done); 3mm gastric antral AVM s/p APC.  -received total of 3 units PRBCs and also IV Ferrrlecit.  On 11/30/24, she was resumed on Xarelto 20 mg po daily, and oral prednisone.  She returned to [facility] 11/30/24 for rehab.    -Hgb 6.5 12/13/24 - back to ED  -Xarelto stopped  -colonoscopy and repeat EGD 12/17/24 by Dr. Staton -- grade 1 esophagitis; duod ulcers healing; large flat polyp in transverse colon   -on omeprazole 20mg BID (formulary change at the [place]) - to continue x 8 weeks (EOT 1/22/25) - recommend then decreasing to 20mg/day until she gets thru her upcoming procedures     Anemia due to acute blood loss  -Hgb 6.1 on 12/14/24; s/p 2 units PRBCs  -Hgb now up 9.2>10.6  -on FeSO4 325mg/day  -repeat CBC on 1/14/24     Adenocarcinoma of colon  -Bx of transverse colon polyp 12/17/24 -- invasive, moderately differentiated adenocarcinoma  -seen in hosp by Dr. Cassidy from gen surg  -CEA normal (1.9)  -CT a/p neg for mets  -will need TAVR for severe A.S. prior to colon resection, as pt is very high risk for susie-op complcations related to the A.S. She is also high risk for bowel leak while on high dose prednisone.  Plan to delay surgery and f/u with cards Dr. Bird to discuss TAVR and will Dr. Holloway for prednisone taper - Dr. Cassidy discussed with Ronen Bird and Jewel.  The colon cancer is not the source of GI bleeding so not urgent to remove.  Pt may also benefit from pre-operative IVC filter placement given recent PE     BLE edema  -Lasix 20mg/day  -still with trace edema  -gets better with leg elevation     Acute left peroneal palsy  In Nov 2024; has numbness to  anterolateral left foot and ankle as well as inability to dorsiflex left foot; etiology unclear  -neurologist Dr Richardson consulted in hosp  -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   -MRI brain shows no acute intracranial process  -left foot drop persists but improving; able to dorsiflex somewhat; getting PT/OT for it     Interstitial pneumonitis  -dx'ed 10/2024 - presented with cough and severe hypoxia  -unresponsive to abx (amox/doxy, then IV zosyn/zithromax)  -CXR 10/24/24 extensive bilateral perihilar and bilateral upper and lower lobe   -S.pneumo, legionella Ag , mycoplasma IgM/G, Fungitell-beta-D glucan negative  -ANCA and URIEL/connective tissue disease panels negative  -anti-histone and anti-K[place]a Ab's negative  -Echo 10/24/24 - normal EF (55-60%), no obvious vegetatons  -CT with bilateral ground glass opacities, small consolidations of BLL  -per pulm, findings suspicious for NSIP (vs cryptogenic organizing pneumonia vs hypersensitivity pneumonitis); NOT thought to be c/w UIP pattern  -on empiric steroids (solumedrol then prednisone) since 10/25/24 - prednisone 40mg/day decreased to 30mg/day on 12/17/24 by pulm Dr. Holloway , then on 12/19/24 decreased to 30mg/day x 10d, then 20mg/day x 10d, then 10mg/day x 10d.  Currently on 10mg/day.  -pt to f/u with Dr. Holloway on 1/14/25     Bilateral pulmonary emboli  -dx'ed at time of interstitial pneumonitis  -CT chest 10/25/24 -- acute distal segmental/subsegmental pulmonary emboli in [place] and subsegmental PE in [place]  -unclear etiology; no recent hx of long travel; no family/personal hx of blood clots  -BLE venous dopplers negative 10/26/24  -started xarelto 20 mg po every day on 11/28/24  -repeat CT chest 12/9/24 neg PE  -stopped Xarelto 12/14/24 due to recurrent GI bleed     Severe aortic stenosis  -sees cardiology Dr. Bird  -moderate A.S on echo in 10/2022 (prev mild A.S  in 2020)  -abnormal pre-op EKG 9/2023 --Nuc GXT done 9/20/23 -- EKG portion revealed 1-1.5mm ST seg depression in inferolateral leads with prolonged recovery; nuclear portion with normal perfusion   -Echo 9/29/23 --progression of A.S. from mild to moderate  -echo at Dr. Bird's office 10/18/24 -- LVEF 65%, grade 2 diastolic dysfunction, severe aortic stenosis (mean aortic gradient signif worse since 9/2023 -- 31>49 mmHg),  -seeing cards for TAVR evaluation - had extensive pre-op eval last week (including CTA and carotid ultrasound); awaiting next steps (poss cath vs straight to TAVR)     Hypertension, primary  -(dyazide stopped due to NANCY)  -amlodipine held due to low BP in hosp  -BP actually good off meds (other than lasix)     Hx of hip OA  -s/p left THR (Dr. Lo) many years ago  -s/p elective R BEATRIZ on 10/5/23 by Dr. Diaz     Hyperlipidemia   Pt with strong family hx of high cholesterol  ; normal TG and HDL  No indication for statin given age     Osteopenia   On Fosamax from 2011 to 4/2016.     DEXA stable 5/10/17 and 2019 and 2021  DEXA 8/2024 -- osteoporosis of left forearm  -restarted Fosamax 8/2024 -- given esophagitis 12/2024, fosamax on hold     Vitamin D deficiency  On vitamin D 4000u/day        Currently at [facility] for respite care (getting PT/OT while there), prior to returning home

## 2025-01-09 ENCOUNTER — TELEPHONE (OUTPATIENT)
Dept: CARDIOLOGY CLINIC | Facility: HOSPITAL | Age: 85
End: 2025-01-09

## 2025-01-14 ENCOUNTER — HOSPITAL ENCOUNTER (OUTPATIENT)
Dept: INTERVENTIONAL RADIOLOGY/VASCULAR | Facility: HOSPITAL | Age: 85
Discharge: HOME | End: 2025-01-14
Attending: STUDENT IN AN ORGANIZED HEALTH CARE EDUCATION/TRAINING PROGRAM | Admitting: INTERNAL MEDICINE
Payer: MEDICARE

## 2025-01-14 VITALS
DIASTOLIC BLOOD PRESSURE: 53 MMHG | OXYGEN SATURATION: 98 % | SYSTOLIC BLOOD PRESSURE: 103 MMHG | HEART RATE: 73 BPM | TEMPERATURE: 98 F | WEIGHT: 174 LBS | RESPIRATION RATE: 16 BRPM | BODY MASS INDEX: 26.37 KG/M2 | HEIGHT: 68 IN

## 2025-01-14 DIAGNOSIS — I35.0 AORTIC STENOSIS: ICD-10-CM

## 2025-01-14 PROCEDURE — 99152 MOD SED SAME PHYS/QHP 5/>YRS: CPT | Performed by: INTERNAL MEDICINE

## 2025-01-14 PROCEDURE — 92979 ENDOLUMINL IVUS OCT C EA: CPT | Performed by: INTERNAL MEDICINE

## 2025-01-14 PROCEDURE — 4A023N7 MEASUREMENT OF CARDIAC SAMPLING AND PRESSURE, LEFT HEART, PERCUTANEOUS APPROACH: ICD-10-PCS | Performed by: INTERNAL MEDICINE

## 2025-01-14 PROCEDURE — 93458 L HRT ARTERY/VENTRICLE ANGIO: CPT | Performed by: INTERNAL MEDICINE

## 2025-01-14 PROCEDURE — 92978 ENDOLUMINL IVUS OCT C 1ST: CPT | Performed by: INTERNAL MEDICINE

## 2025-01-14 PROCEDURE — B2111ZZ FLUOROSCOPY OF MULTIPLE CORONARY ARTERIES USING LOW OSMOLAR CONTRAST: ICD-10-PCS | Performed by: INTERNAL MEDICINE

## 2025-01-14 PROCEDURE — 36415 COLL VENOUS BLD VENIPUNCTURE: CPT

## 2025-01-14 PROCEDURE — B241ZZ3 ULTRASONOGRAPHY OF MULTIPLE CORONARY ARTERIES, INTRAVASCULAR: ICD-10-PCS | Performed by: INTERNAL MEDICINE

## 2025-01-14 RX ORDER — NITROGLYCERIN 20 MG/100ML
INJECTION INTRAVENOUS
Status: COMPLETED
Start: 2025-01-14 | End: 2025-01-14

## 2025-01-14 RX ORDER — VERAPAMIL HYDROCHLORIDE 2.5 MG/ML
INJECTION, SOLUTION INTRAVENOUS
Status: COMPLETED
Start: 2025-01-14 | End: 2025-01-14

## 2025-01-14 RX ORDER — ASPIRIN 81 MG/1
324 TABLET, CHEWABLE ORAL ONCE
Status: COMPLETED | OUTPATIENT
Start: 2025-01-14 | End: 2025-01-14

## 2025-01-14 RX ORDER — LIDOCAINE HYDROCHLORIDE 20 MG/ML
INJECTION, SOLUTION EPIDURAL; INFILTRATION; INTRACAUDAL; PERINEURAL
Status: COMPLETED
Start: 2025-01-14 | End: 2025-01-14

## 2025-01-14 RX ORDER — ASPIRIN 81 MG/1
TABLET, CHEWABLE ORAL
Status: COMPLETED
Start: 2025-01-14 | End: 2025-01-14

## 2025-01-14 RX ORDER — HEPARIN SODIUM 1000 [USP'U]/ML
INJECTION, SOLUTION INTRAVENOUS; SUBCUTANEOUS
Status: COMPLETED
Start: 2025-01-14 | End: 2025-01-14

## 2025-01-14 RX ORDER — IOPAMIDOL 612 MG/ML
40 INJECTION, SOLUTION INTRAVASCULAR
Status: COMPLETED | OUTPATIENT
Start: 2025-01-14 | End: 2025-01-14

## 2025-01-14 RX ORDER — MIDAZOLAM HYDROCHLORIDE 1 MG/ML
INJECTION INTRAMUSCULAR; INTRAVENOUS
Status: COMPLETED
Start: 2025-01-14 | End: 2025-01-14

## 2025-01-14 RX ORDER — SODIUM CHLORIDE 9 MG/ML
3 INJECTION, SOLUTION INTRAVENOUS
Status: COMPLETED | OUTPATIENT
Start: 2025-01-14 | End: 2025-01-14

## 2025-01-14 RX ADMIN — IOPAMIDOL 40 ML: 612 INJECTION, SOLUTION INTRAVASCULAR at 07:39:00

## 2025-01-14 RX ADMIN — ASPIRIN 324 MG: 81 TABLET, CHEWABLE ORAL at 06:15:00

## 2025-01-14 RX ADMIN — SODIUM CHLORIDE 3 ML/KG/HR: 9 INJECTION, SOLUTION INTRAVENOUS at 06:15:00

## 2025-01-14 NOTE — PROGRESS NOTES
DISCHARGE NOTE      Pt is able to sit up and ambulate without difficulty.   Pt voided and tolerated fluids and food.   Procedural site remains dry and intact with good circulation, motion, and sensation.   No signs and symptoms of bleeding/hematoma noted.   Instruction provided, patient/family verbalizes understanding.   Dr. Liz spoke with patient/family post procedure.      Follow up Appointment: 1/15 at 1150a, with Dr. Bird

## 2025-01-14 NOTE — H&P
[facility]  Cardiology Cath Lab H&P    Hoda Busby Patient Status:  Outpatient    1940 MRN [id number]   Location [facility] INTERVENTIONAL SUITES Attending Viktor Bird MD   Hosp Day # 0 PCP Malathi Stuart MD     Patient presents for outpatient angiogram prior to TAVR.    84-year-old female with progressive aortic stenosis, chronic anemia due to AVMs, adenocarcinoma of the colon.  Here for coronary angiogram prior to TAVR which will then allow further treatment for adenocarcinoma.    Assessment  Severe aortic stenosis: TAVR plan, TAVR CT with no peripheral artery disease  Adenocarcinoma of the colon  Chronic anemia, AVMs    Recommendations  Proceed with angiogram      Bernardo Liz MD  Interventional Cardiology  [facility]    --------------------------------------------------------------------------------------------------------------------------------  ROS 10 systems reviewed, pertinent findings above.  ROS    History:  Past Medical History:    Acute pulmonary embolism (HCC)    High blood pressure    History of blood transfusion    Macular degeneration    Osteopenia    Primary osteoarthritis of right hip    Pulmonary embolism (HCC)    Visual impairment    Readers    White coat hypertension     Past Surgical History:   Procedure Laterality Date    Cataract  3/2&3/16 2017    Colonoscopy N/A 2024    Procedure: COLONOSCOPY;  Surgeon: Karen Staton MD;  Location: [facility] ENDOSCOPY    Colonoscopy & polypectomy  2021         Hip replacement surgery Left Around     Other surgical history  [place] 2017    Bilateral cataract surgery     Family History   Problem Relation Age of Onset    Cancer Father         bone (cause of death)    Stroke Mother         CVA (cause of death)    Diabetes Mother     Hypertension Mother     Dementia Brother         Alzheimer's    Heart Disease Brother         CAD - x2 brothers    Heart Disease Brother     Dementia Brother        reports that she quit smoking about 35 years ago. Her smoking use included cigarettes. She started smoking about 35 years ago. She has been exposed to tobacco smoke. She has quit using smokeless tobacco. She reports current alcohol use of about 5.0 standard drinks of alcohol per week. She reports that she does not use drugs.    Objective:   Temp: 97.6 °F (36.4 °C)  Pulse: 73  Resp: 17  BP: 139/71    Intake/Output:   No intake or output data in the 24 hours ending 01/14/25 0732    Physical Exam:     General: Alert and oriented x 3. No apparent distress. No respiratory or constitutional distress.  HEENT: Normocephalic, anicteric sclera, neck supple.  Neck: No JVD, carotids 2+, no bruits.  Cardiac: Regular rate and rhythm. S1, S2 normal. No murmur, pericardial rub, S3.  Lungs: Clear without wheezes, rales, rhonchi or dullness.  Normal excursions and effort.  Abdomen: Soft, non-tender. BS-present.  Extremities: Without clubbing, cyanosis or edema.  Peripheral pulses are 2+.  Neurologic: Alert and oriented, normal affect.  Skin: Warm and dry.       Bernardo Liz MD  1/14/2025  7:32 AM

## 2025-01-14 NOTE — DISCHARGE INSTRUCTIONS
HOME CARE INSTRUCTIONS FOLLOWING CORONARY ANGIOGRAPHY, ANGIOPLASTY (PTCA/PTA) OR INSERTION OF STENT IN THE CORONARY      Activity  DO NOT drive after the procedure.  You may resume driving late the following day according to the nurse or physician's instructions  Plan on resting and relaxing tonight and tomorrow  Resume your normal activity after 48 hours, or as instructed by your physician  Avoid drinking alcohol for the next 24 hours  Do not lift anything over 5 pounds for 1 week  Avoid wrist flexion, extension, pulling and pushing for at least 48 hours    What is Normal?  A small lump at the procedure site associated with mild tenderness when touched  The procedure site may be bruised or discolored  There may be a small amount of drainage on the bandage    Special Instructions  Drink plenty of fluids during the next 24 hours to \"flush\" the contrast from your system  Do NOT take meformin/glucophage containing medications for 48 hours  The bandage is to remain in place for 24 hours  Keep the bandage clean and dry  Do NOT shower for 24 hours  After 24 hours, you must remove the bandage  You should shower after removing the bandage, and wash the procedure site gently with soap and water  DO NOT submerge the procedure site for 1 week (no bath tubs or pools)    When you should NOTIFY YOUR PHYSICIAN  Bleeding can occur at the procedure site - both on the outside of the skin and/or beneath the surface of the skin  Swelling or a large lump at the procedure site can occur, which may be accompanied by moderate to severe pain    If either of the above occurs, lie down flat.  Have someone apply pressure to the procedure site with both hands, as instructed by the nurse.  Hold pressure for 20 minutes and the bleeding should stop.  Notify your physician of the occurrence  If the bleeding does not stop, call 911 and continue to apply pressure    If you experience signs of a fever, temperature > 101°, chills, infection (redness,  swelling, thick yellow drainage, or a foul odor from the procedure site)  If you notice any numbness, tingling, or loss of feeling to your leg or foot or groin access  If you notice any numbness, tingling, or loss of feeling to your fingers or hand, if wrist access was utilized    May call on call PCI RN at [phone] for any problems or concerns    Other  You may resume your present diet, unless otherwise specified by your physician.  You may resume all of your medications as prescribed, unless otherwise directed by your physician.  A list of your medications was provided to you at discharge.  Continue the walking program initiated in the hospital and progress your walking as directed.  Or, gradually resume your previous aerobic exercise schedule as tolerated.  Please call your physician’s office for a follow-up appointment.  You should be seen in 7-10 days.

## 2025-01-14 NOTE — PROCEDURES
[facility]  part of [facility]    Cardiac Cath Procedure Note  Hoda Busby Patient Status:  Outpatient    1940 MRN [id number]   Location [facility] INTERVENTIONAL SUITES Attending Viktor Bird MD   Hosp Day # 0 PCP Malathi Stuart MD       Cardiologist: Bernardo Liz MD  Primary Proceduralist: Bernardo Liz MD  Procedure Performed: LHC, COR, LV, and intravascular ultrasound left main, LAD, circumflex  Date of Procedure: 2025   Indication: Pre-TAVR    Summary of procedure:    Large RCA system moderate disease but nonobstructive  Distal left main calcified plaque into LAD and circumflex, by intravascular ultrasound only the ostium of the circumflex significant with MLA 3.4 mm².  Mid LAD-diagonal focal bifurcation stenosis, moderately calcified, 80 to 90%.    Plan:  Proceed with TAVR, medical management of circumflex versus complete trial enrollment.  Would opt for medical management given her anemia, pending GI surgery, would avoid left main stent to the circumflex necessitating DAPT.      Left Ventriculography and hemodynamics:   LV EF not done  LV EDP 12 mmHg  Mean gradient across the valve 34 mmHg on pullback        Coronary Angiography  RCA: Large artery, dominant and free of obstructive disease, supplies PDA and PL    Left main:  Free of obstructive disease.  Distal calcified tapering of the left main 20 to 30% angiographically.    Left anterior descending: Left main plaque extends into the ostium of the LAD, difficult to visualize, angiographically 30 to 40%.  Mid LAD focal 80 to 90% mid LAD stenosis, heavily calcified at the takeoff of moderate size diagonal to the anterolateral wall.    Circumflex: Ostium of the circumflex appears hazy and calcified, angiographically 60 to 70% stenosis.  Remainder of the circumflex is free of obstructive disease, supplies multiple OM branches which are patent      Circumflex intravascular ultrasound: EBU 3.75, Huma rae  initially tried [place] intravascular ultrasound catheter however at the bend damage the rotating ultrasound with 2 different catheters so unable to visualize the ostium of the circumflex.  Ly catheter would jump through the lesion however believe that there is a focal calcified and fibrotic tight stenosis at the ostium with minimal luminal area 3.4 mm².      LAD intravascular ultrasound: EBU 3.75, Huma black, Ly intravascular ultrasound catheter, minimal luminal area 6.8 mm² therefore not significant.    Left main intravascular ultrasound: EBU 3.75, Huma black, Ly intravascular ultrasound catheter, nonobstructive plaque      Summary of Case: After written informed consent was obtained from the patient, patient was brought to the cardiac catheterization laboratory.  Patient was prepped and draped in the usual sterile fashion. Lidocaine 1% was used to infiltrate the right radial artery for local anesthesia and a 6 [place]ian introducer sheath was inserted into the right radial artery.      Selective coronary angiography performed with JR4 catheter for RCA and JL3.5 catheter for LCA.  Angiography performed in standard projections.      6 Telugu JR4 catheter placed in LV for hemodynamics.    Specimen sent to: No specimen collected  Estimated blood loss: 10 cc  Closure:  TR band      IV was maintained by RN and moderate conscious sedation of versed and fentanyl was given.  Patient was assessed and monitoring of oxygen, heart rate and blood pressure by nurse and myself during the exam 35 minutes.      Bernardo Liz MD  01/14/25

## 2025-01-16 ENCOUNTER — TELEPHONE (OUTPATIENT)
Dept: CARDIOLOGY CLINIC | Facility: HOSPITAL | Age: 85
End: 2025-01-16

## 2025-01-20 ENCOUNTER — LAB ENCOUNTER (OUTPATIENT)
Dept: LAB | Facility: HOSPITAL | Age: 85
End: 2025-01-20
Attending: INTERNAL MEDICINE
Payer: MEDICARE

## 2025-01-20 DIAGNOSIS — J98.4 PNEUMONITIS: ICD-10-CM

## 2025-01-20 DIAGNOSIS — K92.2 GIB (GASTROINTESTINAL BLEEDING): ICD-10-CM

## 2025-01-20 DIAGNOSIS — D62 ACUTE BLOOD LOSS ANEMIA: ICD-10-CM

## 2025-01-20 DIAGNOSIS — I50.9 CHF (CONGESTIVE HEART FAILURE) (HCC): ICD-10-CM

## 2025-01-20 DIAGNOSIS — I10 HTN (HYPERTENSION): ICD-10-CM

## 2025-01-20 DIAGNOSIS — I10 PRIMARY HYPERTENSION: ICD-10-CM

## 2025-01-20 DIAGNOSIS — I35.0 AORTIC STENOSIS: ICD-10-CM

## 2025-01-20 DIAGNOSIS — C18.9 COLON CANCER (HCC): ICD-10-CM

## 2025-01-20 LAB
ALBUMIN SERPL-MCNC: 3.9 G/DL (ref 3.2–4.8)
ALBUMIN/GLOB SERPL: 1.9 {RATIO} (ref 1–2)
ALP LIVER SERPL-CCNC: 70 U/L
ALT SERPL-CCNC: 19 U/L
ANION GAP SERPL CALC-SCNC: 7 MMOL/L (ref 0–18)
ANTIBODY SCREEN: NEGATIVE
AST SERPL-CCNC: 23 U/L (ref ?–34)
BASOPHILS # BLD AUTO: 0.1 X10(3) UL (ref 0–0.2)
BASOPHILS NFR BLD AUTO: 1 %
BILIRUB SERPL-MCNC: 0.5 MG/DL (ref 0.2–1.1)
BUN BLD-MCNC: 21 MG/DL (ref 9–23)
BUN/CREAT SERPL: 20.2 (ref 10–20)
CALCIUM BLD-MCNC: 9.3 MG/DL (ref 8.7–10.4)
CHLORIDE SERPL-SCNC: 109 MMOL/L (ref 98–112)
CO2 SERPL-SCNC: 25 MMOL/L (ref 21–32)
CREAT BLD-MCNC: 1.04 MG/DL
DEPRECATED RDW RBC AUTO: 57.7 FL (ref 35.1–46.3)
EGFRCR SERPLBLD CKD-EPI 2021: 53 ML/MIN/1.73M2 (ref 60–?)
EOSINOPHIL # BLD AUTO: 0.25 X10(3) UL (ref 0–0.7)
EOSINOPHIL NFR BLD AUTO: 2.5 %
ERYTHROCYTE [DISTWIDTH] IN BLOOD BY AUTOMATED COUNT: 17.7 % (ref 11–15)
FASTING STATUS PATIENT QL REPORTED: NO
GLOBULIN PLAS-MCNC: 2.1 G/DL (ref 2–3.5)
GLUCOSE BLD-MCNC: 121 MG/DL (ref 70–99)
HCT VFR BLD AUTO: 34.1 %
HGB BLD-MCNC: 10.7 G/DL
IMM GRANULOCYTES # BLD AUTO: 0.05 X10(3) UL (ref 0–1)
IMM GRANULOCYTES NFR BLD: 0.5 %
LYMPHOCYTES # BLD AUTO: 1.1 X10(3) UL (ref 1–4)
LYMPHOCYTES NFR BLD AUTO: 11.1 %
MCH RBC QN AUTO: 27.8 PG (ref 26–34)
MCHC RBC AUTO-ENTMCNC: 31.4 G/DL (ref 31–37)
MCV RBC AUTO: 88.6 FL
MONOCYTES # BLD AUTO: 0.61 X10(3) UL (ref 0.1–1)
MONOCYTES NFR BLD AUTO: 6.2 %
NEUTROPHILS # BLD AUTO: 7.79 X10 (3) UL (ref 1.5–7.7)
NEUTROPHILS # BLD AUTO: 7.79 X10(3) UL (ref 1.5–7.7)
NEUTROPHILS NFR BLD AUTO: 78.7 %
NT-PROBNP SERPL-MCNC: 755 PG/ML (ref ?–450)
OSMOLALITY SERPL CALC.SUM OF ELEC: 296 MOSM/KG (ref 275–295)
PLATELET # BLD AUTO: 394 10(3)UL (ref 150–450)
POTASSIUM SERPL-SCNC: 3.7 MMOL/L (ref 3.5–5.1)
PROT SERPL-MCNC: 6 G/DL (ref 5.7–8.2)
RBC # BLD AUTO: 3.85 X10(6)UL
RH BLOOD TYPE: POSITIVE
SODIUM SERPL-SCNC: 141 MMOL/L (ref 136–145)
WBC # BLD AUTO: 9.9 X10(3) UL (ref 4–11)

## 2025-01-20 PROCEDURE — 86900 BLOOD TYPING SEROLOGIC ABO: CPT

## 2025-01-20 PROCEDURE — 86920 COMPATIBILITY TEST SPIN: CPT

## 2025-01-20 PROCEDURE — 86850 RBC ANTIBODY SCREEN: CPT

## 2025-01-20 PROCEDURE — 80053 COMPREHEN METABOLIC PANEL: CPT

## 2025-01-20 PROCEDURE — 36415 COLL VENOUS BLD VENIPUNCTURE: CPT

## 2025-01-20 PROCEDURE — 86901 BLOOD TYPING SEROLOGIC RH(D): CPT

## 2025-01-20 PROCEDURE — 83880 ASSAY OF NATRIURETIC PEPTIDE: CPT

## 2025-01-20 PROCEDURE — 85025 COMPLETE CBC W/AUTO DIFF WBC: CPT

## 2025-01-21 ENCOUNTER — TELEPHONE (OUTPATIENT)
Dept: PULMONOLOGY | Facility: CLINIC | Age: 85
End: 2025-01-21

## 2025-01-21 NOTE — TELEPHONE ENCOUNTER
Patient's niece calling to schedule clearance, please call declined to schedule with anyone else and nothing open soon. Please call.

## 2025-01-22 NOTE — DISCHARGE INSTRUCTIONS
Instructions after Transcatheter Aortic Valve Replacement (TAVR):    ACTIVITY:   Do not drive for 3 days after procedure, or as directed by your physician.  Do not lift anything heavier than 5-10bs for 1 week, or as directed by your physician.  Walk at least 3 times per day for 5-10 minutes at a time. Increase your activity gradually.  Keep legs elevated while sitting.   No tight-fitting underwear.     HYGIENE:  Wash incisions with mild soap and water daily. Showering is allowed. No tub baths or swimming pools until totally healed.  Do not apply lotions, ointments or creams to the incisions.   Avoid constipation or straining to have a bowel movement. If necessary, use Milk of Magnesia, Metamucil or other supplements to relieve straining.     NOTIFY YOUR PHYSICIAN IF:  Temperature is greater than 101 degrees, have chills, sweating or fever for more than one day.  Drainage, tenderness, redness, swelling, persistent pain from the incisions, or new numbness in the legs or feet.  Persistent chest discomfort or pain (angina) that radiates to the neck, jaw, or arm.   Nausea or profuse sweating.  Increasing shortness of breath with activity or at rest.  If your pain medication is not relieving your pain.    WOUND HEALING:  If you notice minor bleeding from the puncture wound, please do the following things. If it does not stop with these measures, please notify your doctor immediately:  Immediately lie flat.   Apply firm pressure just above the puncture site and hold firm pressure for 15 minutes. You may use a clean cloth to apply pressure. If possible, have another person apply the pressure.   After 15 minutes remove pressure. The wound should be dry and flat, without bleeding. You should continue to lie flat for about 1 hour before getting up and walking.   Cover the wound with a Band-Aid.     FOLLOW-UP CARE:  You will need a follow-up visit to check your incisions approximately 7 to 10 days after discharge, and will be  seen in your cardiologist office by the APN.  You will be seen in the valve clinic 1 month after discharge, if not given an appointment please call [phone] for [facility] patient's or [phone] for duly patients to make an appointment    IMPORTANT:  Always inform other doctors about your heart valve replacement before any medical or dental procedure.  Your dentist or physician should prescribe antibiotic prophylaxis prior to any invasive dental procedure or surgery.  Before undergoing MRI (magnetic resonance imaging), always notify the doctor (or medical technician) that you have an implanted heart valve.   If there are any changes in your condition, or you are hospitalized elsewhere, please notify the valve clinic.   Resume HOME [employer] @ discharge  Phone  [phone]  Fax  [phone]

## 2025-01-22 NOTE — TELEPHONE ENCOUNTER
Spoke with patient's niece Vonda. Appointment scheduled for 2/11/25 at 9:45AM. Confirmed date, time, place. Niece verbalized understanding.

## 2025-01-23 ENCOUNTER — APPOINTMENT (OUTPATIENT)
Dept: GENERAL RADIOLOGY | Facility: HOSPITAL | Age: 85
End: 2025-01-23
Attending: INTERNAL MEDICINE
Payer: MEDICARE

## 2025-01-23 ENCOUNTER — ANESTHESIA EVENT (OUTPATIENT)
Dept: CARDIAC SURGERY | Facility: HOSPITAL | Age: 85
End: 2025-01-23
Payer: MEDICARE

## 2025-01-23 ENCOUNTER — HOSPITAL ENCOUNTER (OUTPATIENT)
Dept: CV DIAGNOSTICS | Facility: HOSPITAL | Age: 85
Discharge: HOME | End: 2025-01-23
Attending: INTERNAL MEDICINE
Payer: MEDICARE

## 2025-01-23 ENCOUNTER — HOSPITAL ENCOUNTER (INPATIENT)
Facility: HOSPITAL | Age: 85
LOS: 1 days | Discharge: NURSING, REHAB OR LONG-TERM CARE | End: 2025-01-24
Attending: INTERNAL MEDICINE | Admitting: INTERNAL MEDICINE
Payer: MEDICARE

## 2025-01-23 ENCOUNTER — ANESTHESIA (OUTPATIENT)
Dept: CARDIAC SURGERY | Facility: HOSPITAL | Age: 85
End: 2025-01-23
Payer: MEDICARE

## 2025-01-23 LAB
ALBUMIN SERPL-MCNC: 3.6 G/DL (ref 3.2–4.8)
ALBUMIN/GLOB SERPL: 1.8 {RATIO} (ref 1–2)
ALP LIVER SERPL-CCNC: 75 U/L
ALT SERPL-CCNC: 16 U/L
ANION GAP SERPL CALC-SCNC: 7 MMOL/L (ref 0–18)
APTT PPP: >240 SECONDS (ref 23–36)
AST SERPL-CCNC: 25 U/L (ref ?–34)
BASE EXCESS BLD CALC-SCNC: -4 MMOL/L
BILIRUB SERPL-MCNC: 0.5 MG/DL (ref 0.2–1.1)
BUN BLD-MCNC: 16 MG/DL (ref 9–23)
CA-I BLD-SCNC: 1.19 MMOL/L (ref 1.12–1.32)
CALCIUM BLD-MCNC: 8.8 MG/DL (ref 8.7–10.6)
CHLORIDE SERPL-SCNC: 110 MMOL/L (ref 98–112)
CO2 BLD-SCNC: 22 MMOL/L (ref 22–32)
CO2 SERPL-SCNC: 24 MMOL/L (ref 21–32)
CREAT BLD-MCNC: 0.88 MG/DL
EGFRCR SERPLBLD CKD-EPI 2021: 65 ML/MIN/1.73M2 (ref 60–?)
ERYTHROCYTE [DISTWIDTH] IN BLOOD BY AUTOMATED COUNT: 17.6 %
GLOBULIN PLAS-MCNC: 2 G/DL (ref 2–3.5)
GLUCOSE BLD-MCNC: 101 MG/DL (ref 70–99)
GLUCOSE BLD-MCNC: 92 MG/DL (ref 70–99)
GLUCOSE BLD-MCNC: 94 MG/DL (ref 70–99)
HCO3 BLD-SCNC: 21.3 MEQ/L
HCT VFR BLD AUTO: 30.7 %
HCT VFR BLD CALC: 28 %
HGB BLD-MCNC: 9.5 G/DL
INR BLD: 1.33 (ref 0.8–1.2)
ISTAT ACTIVATED CLOTTING TIME: 141 SECONDS (ref 74–137)
MAGNESIUM SERPL-MCNC: 1.8 MG/DL (ref 1.6–2.6)
MCH RBC QN AUTO: 26.7 PG (ref 26–34)
MCHC RBC AUTO-ENTMCNC: 30.9 G/DL (ref 31–37)
MCV RBC AUTO: 86.2 FL
MRSA DNA SPEC QL NAA+PROBE: NEGATIVE
OSMOLALITY SERPL CALC.SUM OF ELEC: 293 MOSM/KG (ref 275–295)
PCO2 BLD: 37.2 MMHG
PH BLD: 7.37 [PH]
PLATELET # BLD AUTO: 346 10(3)UL (ref 150–450)
PO2 BLD: 449 MMHG
POTASSIUM BLD-SCNC: 3.7 MMOL/L (ref 3.6–5.1)
POTASSIUM SERPL-SCNC: 4.2 MMOL/L (ref 3.5–5.1)
PROT SERPL-MCNC: 5.6 G/DL (ref 5.7–8.2)
PROTHROMBIN TIME: 16.6 SECONDS (ref 11.6–14.8)
RBC # BLD AUTO: 3.56 X10(6)UL
SAO2 % BLD: 100 %
SODIUM BLD-SCNC: 139 MMOL/L (ref 136–145)
SODIUM SERPL-SCNC: 141 MMOL/L (ref 136–145)
WBC # BLD AUTO: 9 X10(3) UL (ref 4–11)

## 2025-01-23 PROCEDURE — 84132 ASSAY OF SERUM POTASSIUM: CPT

## 2025-01-23 PROCEDURE — 93312 ECHO TRANSESOPHAGEAL: CPT | Performed by: STUDENT IN AN ORGANIZED HEALTH CARE EDUCATION/TRAINING PROGRAM

## 2025-01-23 PROCEDURE — 02RF38Z REPLACEMENT OF AORTIC VALVE WITH ZOOPLASTIC TISSUE, PERCUTANEOUS APPROACH: ICD-10-PCS | Performed by: INTERNAL MEDICINE

## 2025-01-23 PROCEDURE — 93005 ELECTROCARDIOGRAM TRACING: CPT

## 2025-01-23 PROCEDURE — 84295 ASSAY OF SERUM SODIUM: CPT

## 2025-01-23 PROCEDURE — 71045 X-RAY EXAM CHEST 1 VIEW: CPT | Performed by: INTERNAL MEDICINE

## 2025-01-23 PROCEDURE — 82962 GLUCOSE BLOOD TEST: CPT

## 2025-01-23 PROCEDURE — 85027 COMPLETE CBC AUTOMATED: CPT | Performed by: INTERNAL MEDICINE

## 2025-01-23 PROCEDURE — 85610 PROTHROMBIN TIME: CPT | Performed by: INTERNAL MEDICINE

## 2025-01-23 PROCEDURE — 82330 ASSAY OF CALCIUM: CPT

## 2025-01-23 PROCEDURE — 85730 THROMBOPLASTIN TIME PARTIAL: CPT | Performed by: INTERNAL MEDICINE

## 2025-01-23 PROCEDURE — 85014 HEMATOCRIT: CPT

## 2025-01-23 PROCEDURE — 83735 ASSAY OF MAGNESIUM: CPT | Performed by: INTERNAL MEDICINE

## 2025-01-23 PROCEDURE — 85347 COAGULATION TIME ACTIVATED: CPT

## 2025-01-23 PROCEDURE — B24BZZ4 ULTRASONOGRAPHY OF HEART WITH AORTA, TRANSESOPHAGEAL: ICD-10-PCS | Performed by: INTERNAL MEDICINE

## 2025-01-23 PROCEDURE — 80053 COMPREHEN METABOLIC PANEL: CPT | Performed by: INTERNAL MEDICINE

## 2025-01-23 PROCEDURE — 93010 ELECTROCARDIOGRAM REPORT: CPT | Performed by: INTERNAL MEDICINE

## 2025-01-23 PROCEDURE — 82803 BLOOD GASES ANY COMBINATION: CPT

## 2025-01-23 PROCEDURE — 93355 ECHO TRANSESOPHAGEAL (TEE): CPT | Performed by: INTERNAL MEDICINE

## 2025-01-23 PROCEDURE — 87641 MR-STAPH DNA AMP PROBE: CPT | Performed by: INTERNAL MEDICINE

## 2025-01-23 DEVICE — SAPIEN 3 ULTRA RESILIA TRANSCATHETER HEART VALVE, 23MM
Type: IMPLANTABLE DEVICE | Status: FUNCTIONAL
Brand: SAPIEN 3 ULTRA RESILIA

## 2025-01-23 RX ORDER — SODIUM CHLORIDE 9 MG/ML
INJECTION, SOLUTION INTRAVENOUS CONTINUOUS PRN
Status: DISCONTINUED | OUTPATIENT
Start: 2025-01-23 | End: 2025-01-23 | Stop reason: SURG

## 2025-01-23 RX ORDER — BISACODYL 10 MG
10 SUPPOSITORY, RECTAL RECTAL
Status: DISCONTINUED | OUTPATIENT
Start: 2025-01-23 | End: 2025-01-24

## 2025-01-23 RX ORDER — HEPARIN SODIUM 1000 [USP'U]/ML
INJECTION, SOLUTION INTRAVENOUS; SUBCUTANEOUS AS NEEDED
Status: DISCONTINUED | OUTPATIENT
Start: 2025-01-23 | End: 2025-01-23 | Stop reason: SURG

## 2025-01-23 RX ORDER — KETOROLAC TROMETHAMINE 30 MG/ML
INJECTION, SOLUTION INTRAMUSCULAR; INTRAVENOUS AS NEEDED
Status: DISCONTINUED | OUTPATIENT
Start: 2025-01-23 | End: 2025-01-23 | Stop reason: SURG

## 2025-01-23 RX ORDER — PANTOPRAZOLE SODIUM 20 MG/1
20 TABLET, DELAYED RELEASE ORAL
Status: DISCONTINUED | OUTPATIENT
Start: 2025-01-23 | End: 2025-01-24

## 2025-01-23 RX ORDER — HYDROCODONE BITARTRATE AND ACETAMINOPHEN 5; 325 MG/1; MG/1
2 TABLET ORAL EVERY 6 HOURS PRN
Status: DISCONTINUED | OUTPATIENT
Start: 2025-01-23 | End: 2025-01-24

## 2025-01-23 RX ORDER — ASPIRIN 81 MG/1
81 TABLET ORAL DAILY
Status: DISCONTINUED | OUTPATIENT
Start: 2025-01-24 | End: 2025-01-24

## 2025-01-23 RX ORDER — SODIUM PHOSPHATE, DIBASIC AND SODIUM PHOSPHATE, MONOBASIC 7; 19 G/230ML; G/230ML
1 ENEMA RECTAL ONCE AS NEEDED
Status: DISCONTINUED | OUTPATIENT
Start: 2025-01-23 | End: 2025-01-24

## 2025-01-23 RX ORDER — ROCURONIUM BROMIDE 10 MG/ML
INJECTION, SOLUTION INTRAVENOUS AS NEEDED
Status: DISCONTINUED | OUTPATIENT
Start: 2025-01-23 | End: 2025-01-23 | Stop reason: SURG

## 2025-01-23 RX ORDER — HYDROCODONE BITARTRATE AND ACETAMINOPHEN 5; 325 MG/1; MG/1
2 TABLET ORAL ONCE AS NEEDED
Status: ACTIVE | OUTPATIENT
Start: 2025-01-23 | End: 2025-01-23

## 2025-01-23 RX ORDER — PROTAMINE SULFATE 10 MG/ML
INJECTION, SOLUTION INTRAVENOUS AS NEEDED
Status: DISCONTINUED | OUTPATIENT
Start: 2025-01-23 | End: 2025-01-23 | Stop reason: SURG

## 2025-01-23 RX ORDER — ACETAMINOPHEN 325 MG/1
650 TABLET ORAL EVERY 6 HOURS PRN
Status: DISCONTINUED | OUTPATIENT
Start: 2025-01-23 | End: 2025-01-24

## 2025-01-23 RX ORDER — NITROGLYCERIN 20 MG/100ML
INJECTION INTRAVENOUS CONTINUOUS PRN
Status: DISCONTINUED | OUTPATIENT
Start: 2025-01-23 | End: 2025-01-24

## 2025-01-23 RX ORDER — MAGNESIUM OXIDE 400 MG/1
400 TABLET ORAL ONCE
Status: COMPLETED | OUTPATIENT
Start: 2025-01-23 | End: 2025-01-23

## 2025-01-23 RX ORDER — SODIUM CHLORIDE, SODIUM LACTATE, POTASSIUM CHLORIDE, CALCIUM CHLORIDE 600; 310; 30; 20 MG/100ML; MG/100ML; MG/100ML; MG/100ML
INJECTION, SOLUTION INTRAVENOUS CONTINUOUS
Status: DISCONTINUED | OUTPATIENT
Start: 2025-01-23 | End: 2025-01-24

## 2025-01-23 RX ORDER — HYDRALAZINE HYDROCHLORIDE 20 MG/ML
10 INJECTION INTRAMUSCULAR; INTRAVENOUS EVERY 2 HOUR PRN
Status: DISCONTINUED | OUTPATIENT
Start: 2025-01-23 | End: 2025-01-24

## 2025-01-23 RX ORDER — LIDOCAINE HYDROCHLORIDE 10 MG/ML
INJECTION, SOLUTION EPIDURAL; INFILTRATION; INTRACAUDAL; PERINEURAL AS NEEDED
Status: DISCONTINUED | OUTPATIENT
Start: 2025-01-23 | End: 2025-01-23 | Stop reason: SURG

## 2025-01-23 RX ORDER — HYDROMORPHONE HYDROCHLORIDE 1 MG/ML
0.2 INJECTION, SOLUTION INTRAMUSCULAR; INTRAVENOUS; SUBCUTANEOUS EVERY 5 MIN PRN
Status: ACTIVE | OUTPATIENT
Start: 2025-01-23 | End: 2025-01-23

## 2025-01-23 RX ORDER — IODIXANOL 320 MG/ML
100 INJECTION, SOLUTION INTRAVASCULAR
Status: COMPLETED | OUTPATIENT
Start: 2025-01-23 | End: 2025-01-23

## 2025-01-23 RX ORDER — METOCLOPRAMIDE HYDROCHLORIDE 5 MG/ML
5 INJECTION INTRAMUSCULAR; INTRAVENOUS EVERY 8 HOURS PRN
Status: DISCONTINUED | OUTPATIENT
Start: 2025-01-23 | End: 2025-01-23

## 2025-01-23 RX ORDER — ACETAMINOPHEN 500 MG
1000 TABLET ORAL ONCE AS NEEDED
Status: ACTIVE | OUTPATIENT
Start: 2025-01-23 | End: 2025-01-23

## 2025-01-23 RX ORDER — POLYETHYLENE GLYCOL 3350 17 G/17G
17 POWDER, FOR SOLUTION ORAL DAILY PRN
Status: DISCONTINUED | OUTPATIENT
Start: 2025-01-23 | End: 2025-01-24

## 2025-01-23 RX ORDER — METOCLOPRAMIDE HYDROCHLORIDE 5 MG/ML
5 INJECTION INTRAMUSCULAR; INTRAVENOUS EVERY 8 HOURS PRN
Status: DISCONTINUED | OUTPATIENT
Start: 2025-01-23 | End: 2025-01-24

## 2025-01-23 RX ORDER — MEPERIDINE HYDROCHLORIDE 25 MG/ML
12.5 INJECTION INTRAMUSCULAR; INTRAVENOUS; SUBCUTANEOUS AS NEEDED
Status: DISCONTINUED | OUTPATIENT
Start: 2025-01-23 | End: 2025-01-24

## 2025-01-23 RX ORDER — ATORVASTATIN CALCIUM 20 MG/1
20 TABLET, FILM COATED ORAL NIGHTLY
Status: DISCONTINUED | OUTPATIENT
Start: 2025-01-23 | End: 2025-01-24

## 2025-01-23 RX ORDER — HYDROMORPHONE HYDROCHLORIDE 1 MG/ML
0.6 INJECTION, SOLUTION INTRAMUSCULAR; INTRAVENOUS; SUBCUTANEOUS EVERY 5 MIN PRN
Status: ACTIVE | OUTPATIENT
Start: 2025-01-23 | End: 2025-01-23

## 2025-01-23 RX ORDER — NALOXONE HYDROCHLORIDE 0.4 MG/ML
0.08 INJECTION, SOLUTION INTRAMUSCULAR; INTRAVENOUS; SUBCUTANEOUS AS NEEDED
Status: ACTIVE | OUTPATIENT
Start: 2025-01-23 | End: 2025-01-23

## 2025-01-23 RX ORDER — ONDANSETRON 2 MG/ML
4 INJECTION INTRAMUSCULAR; INTRAVENOUS EVERY 6 HOURS PRN
Status: DISCONTINUED | OUTPATIENT
Start: 2025-01-23 | End: 2025-01-24

## 2025-01-23 RX ORDER — ONDANSETRON 2 MG/ML
INJECTION INTRAMUSCULAR; INTRAVENOUS AS NEEDED
Status: DISCONTINUED | OUTPATIENT
Start: 2025-01-23 | End: 2025-01-23 | Stop reason: SURG

## 2025-01-23 RX ORDER — SODIUM CHLORIDE 9 MG/ML
INJECTION, SOLUTION INTRAVENOUS CONTINUOUS
Status: DISCONTINUED | OUTPATIENT
Start: 2025-01-23 | End: 2025-01-24

## 2025-01-23 RX ORDER — FUROSEMIDE 20 MG/1
20 TABLET ORAL DAILY
Status: DISCONTINUED | OUTPATIENT
Start: 2025-01-24 | End: 2025-01-24

## 2025-01-23 RX ORDER — HYDROCODONE BITARTRATE AND ACETAMINOPHEN 5; 325 MG/1; MG/1
1 TABLET ORAL ONCE AS NEEDED
Status: ACTIVE | OUTPATIENT
Start: 2025-01-23 | End: 2025-01-23

## 2025-01-23 RX ORDER — DIPHENHYDRAMINE HYDROCHLORIDE 50 MG/ML
INJECTION INTRAMUSCULAR; INTRAVENOUS AS NEEDED
Status: DISCONTINUED | OUTPATIENT
Start: 2025-01-23 | End: 2025-01-23 | Stop reason: SURG

## 2025-01-23 RX ORDER — HYDROCODONE BITARTRATE AND ACETAMINOPHEN 5; 325 MG/1; MG/1
1 TABLET ORAL EVERY 6 HOURS PRN
Status: DISCONTINUED | OUTPATIENT
Start: 2025-01-23 | End: 2025-01-24

## 2025-01-23 RX ORDER — HYDROMORPHONE HYDROCHLORIDE 1 MG/ML
0.4 INJECTION, SOLUTION INTRAMUSCULAR; INTRAVENOUS; SUBCUTANEOUS EVERY 5 MIN PRN
Status: ACTIVE | OUTPATIENT
Start: 2025-01-23 | End: 2025-01-23

## 2025-01-23 RX ORDER — INSULIN ASPART 100 [IU]/ML
INJECTION, SOLUTION INTRAVENOUS; SUBCUTANEOUS ONCE
Status: DISCONTINUED | OUTPATIENT
Start: 2025-01-23 | End: 2025-01-24

## 2025-01-23 RX ORDER — SENNOSIDES 8.6 MG
17.2 TABLET ORAL NIGHTLY PRN
Status: DISCONTINUED | OUTPATIENT
Start: 2025-01-23 | End: 2025-01-24

## 2025-01-23 RX ORDER — LABETALOL HYDROCHLORIDE 5 MG/ML
5 INJECTION, SOLUTION INTRAVENOUS EVERY 5 MIN PRN
Status: ACTIVE | OUTPATIENT
Start: 2025-01-23 | End: 2025-01-23

## 2025-01-23 RX ORDER — DEXAMETHASONE SODIUM PHOSPHATE 4 MG/ML
VIAL (ML) INJECTION AS NEEDED
Status: DISCONTINUED | OUTPATIENT
Start: 2025-01-23 | End: 2025-01-23 | Stop reason: SURG

## 2025-01-23 RX ORDER — ALBUMIN HUMAN 50 G/1000ML
12.5 SOLUTION INTRAVENOUS ONCE AS NEEDED
Status: ACTIVE | OUTPATIENT
Start: 2025-01-23 | End: 2025-01-23

## 2025-01-23 RX ADMIN — HEPARIN SODIUM 2000 UNITS: 1000 INJECTION, SOLUTION INTRAVENOUS; SUBCUTANEOUS at 12:40:00

## 2025-01-23 RX ADMIN — PROTAMINE SULFATE 50 MG: 10 INJECTION, SOLUTION INTRAVENOUS at 12:48:00

## 2025-01-23 RX ADMIN — SODIUM CHLORIDE: 9 INJECTION, SOLUTION INTRAVENOUS at 11:34:00

## 2025-01-23 RX ADMIN — HEPARIN SODIUM 17000 UNITS: 1000 INJECTION, SOLUTION INTRAVENOUS; SUBCUTANEOUS at 12:27:00

## 2025-01-23 RX ADMIN — SODIUM CHLORIDE: 9 INJECTION, SOLUTION INTRAVENOUS at 13:07:00

## 2025-01-23 RX ADMIN — DIPHENHYDRAMINE HYDROCHLORIDE 12.5 MG: 50 INJECTION INTRAMUSCULAR; INTRAVENOUS at 11:57:00

## 2025-01-23 RX ADMIN — KETOROLAC TROMETHAMINE 30 MG: 30 INJECTION, SOLUTION INTRAMUSCULAR; INTRAVENOUS at 12:52:00

## 2025-01-23 RX ADMIN — LIDOCAINE HYDROCHLORIDE 25 MG: 10 INJECTION, SOLUTION EPIDURAL; INFILTRATION; INTRACAUDAL; PERINEURAL at 11:41:00

## 2025-01-23 RX ADMIN — ONDANSETRON 4 MG: 2 INJECTION INTRAMUSCULAR; INTRAVENOUS at 12:52:00

## 2025-01-23 RX ADMIN — ROCURONIUM BROMIDE 50 MG: 10 INJECTION, SOLUTION INTRAVENOUS at 11:41:00

## 2025-01-23 RX ADMIN — DEXAMETHASONE SODIUM PHOSPHATE 4 MG: 4 MG/ML VIAL (ML) INJECTION at 11:57:00

## 2025-01-23 RX ADMIN — SODIUM CHLORIDE: 9 INJECTION, SOLUTION INTRAVENOUS at 11:57:00

## 2025-01-23 NOTE — ANESTHESIA POSTPROCEDURE EVALUATION
[facility]    Hoda Busby Patient Status:  Inpatient   Age/Gender 84 year old female MRN [id number]   Location [facility] 6NE-A Attending Jigar Shah MD   Hosp Day # 0 PCP Malathi Stuart MD       Anesthesia Post-op Note    TRANSCATHETER AORTIC VALVE REPLACEMENT FEMORAL APPROACH. 23MM PUNEET; SEE CATH LAB CHARTING FOR ADDITIONAL DETAILS    Procedure Summary       Date: 01/23/25 Room / Location:  [facility] 03 HYBRID /  [facility]    Anesthesia Start: 1134 Anesthesia Stop: 1312    Procedure: TRANSCATHETER AORTIC VALVE REPLACEMENT FEMORAL APPROACH. 23MM PUNEET; SEE CATH LAB CHARTING FOR ADDITIONAL DETAILS Diagnosis: (AORTIC STENOSIS)    Surgeons: Jigar Shah MD Anesthesiologist: Colin Hernandez MD    Anesthesia Type: general ASA Status: 4            Anesthesia Type: general    Vitals Value Taken Time   /67 01/23/25 1313   Temp 97.4 °F (36.3 °C) 01/23/25 1313   Pulse 74 01/23/25 1314   Resp 18 01/23/25 1314   SpO2 100 % 01/23/25 1314   Vitals shown include unfiled device data.        Patient Location: ICU    Anesthesia Type: general    Airway Patency: patent    Postop Pain Control: adequate    Mental Status: preanesthetic baseline    Nausea/Vomiting: none    Cardiopulmonary/Hydration status: stable euvolemic    Complications: no apparent anesthesia related complications    Postop vital signs: stable    Dental Exam: Unchanged from Preop    Patient to be discharged from PACU when criteria met.

## 2025-01-23 NOTE — ANESTHESIA PREPROCEDURE EVALUATION
PRE-OP EVALUATION    Patient Name: Hoda Busby    Admit Diagnosis: AORTIC STENOSIS    Pre-op Diagnosis: AORTIC STENOSIS    TRANSCATHETER AORTIC VALVE REPLACEMENT FEMORAL APPROACH. 23MM PUNEET    Anesthesia Procedure: TRANSCATHETER AORTIC VALVE REPLACEMENT FEMORAL APPROACH. 23MM PUNEET    Surgeons and Role:     * Jigar Shah MD - Primary     * Travon Jackman MD     * Tor Catalan MD     * Bernardo Liz MD    Pre-op vitals reviewed.  Temp: 98.1 °F (36.7 °C)  Pulse: 72  Resp: 16  BP: 145/69  SpO2: 95 %  There is no height or weight on file to calculate BMI.    Current medications reviewed.  Hospital Medications:   sodium chloride 0.9% infusion   Intravenous Continuous       Outpatient Medications:   Prescriptions Prior to Admission[1]    Allergies: Patient has no known allergies.      Anesthesia Evaluation  Patient Active Problem List   Diagnosis    Osteopenia of multiple sites    Vitamin D deficiency    White coat syndrome with diagnosis of hypertension    Hypercholesterolemia    Mitral valve insufficiency    Nonrheumatic aortic valve stenosis    Macular degeneration    Primary hypertension    Age-related osteoporosis without current pathological fracture    GI bleed    Gastrointestinal hemorrhage, unspecified gastrointestinal hemorrhage type    Acute blood loss anemia    Peroneal neuropathy, left    Interstitial pneumonitis (HCC)    Gastric AVM        Past Medical History:    Acute pulmonary embolism (HCC)    High blood pressure    History of blood transfusion    Macular degeneration    Osteopenia    Primary osteoarthritis of right hip    Pulmonary embolism (HCC)    Visual impairment    Readers    White coat hypertension          Past Surgical History:   Procedure Laterality Date    Cataract  3/2&3/16 2017    Colonoscopy N/A 12/17/2024    Procedure: COLONOSCOPY;  Surgeon: Karen Staton MD;  Location: [facility] ENDOSCOPY    Colonoscopy & polypectomy  1/4/2021         Hip replacement surgery Left Around 2006     Other surgical history  [place] 2017    Bilateral cataract surgery     Social History     Socioeconomic History    Marital status:    Tobacco Use    Smoking status: Former     Current packs/day: 0.00     Types: Cigarettes     Start date: 1990     Quit date: 1990     Years since quittin.0     Passive exposure: Past    Smokeless tobacco: Former   Vaping Use    Vaping status: Never Used   Substance and Sexual Activity    Alcohol use: Yes     Alcohol/week: 5.0 standard drinks of alcohol     Types: 5 Glasses of wine per week     Comment: wine, occasionally    Drug use: No   Other Topics Concern    Caffeine Concern Yes     Comment: coffee/soda - 2cups/day     History   Drug Use No     Available pre-op labs reviewed.  Lab Results   Component Value Date    WBC 9.9 2025    RBC 3.85 2025    HGB 10.7 (L) 2025    HCT 34.1 (L) 2025    MCV 88.6 2025    MCH 27.8 2025    MCHC 31.4 2025    RDW 17.7 (H) 2025    .0 2025     Lab Results   Component Value Date     2025    K 3.7 2025     2025    CO2 25.0 2025    BUN 21 2025    CREATSERUM 1.04 (H) 2025     (H) 2025    CA 9.3 2025            Airway      Mallampati: II  Mouth opening: >3 FB  TM distance: 4 - 6 cm  Neck ROM: full Cardiovascular      Rhythm: regular  Rate: normal     Dental      Dental appliance(s): partials       Pulmonary    Pulmonary exam normal.  Breath sounds clear to auscultation bilaterally.               Other findings              ASA: 4   Plan: general  NPO status verified and     Post-procedure pain management plan discussed with surgeon and patient.    Comment:  I explained intrinsic risks of general anesthesia, including nausea, dental damage, sore throat, mouth injury,and hoarseness from airway management.  All questions were answered and understanding was demonstrated of risks.  Informed permission was obtained to  proceed as documented in the signed consent form.      Plan/risks discussed with: patient  Use of blood product(s) discussed with: patient    Consented to blood products.          Present on Admission:  **None**             [1]   Medications Prior to Admission   Medication Sig Dispense Refill Last Dose/Taking    furosemide (LASIX) 20 MG Oral Tab Take 1 tablet (20 mg total) by mouth daily. Take 40mg daily x 3 days (12/19-12/21/24), then 20mg daily thereafter 90 tablet 3 1/22/2025    omeprazole 20 MG Oral Capsule Delayed Release Take 1 capsule (20 mg total) by mouth 2 (two) times daily.   1/22/2025    atorvastatin 20 MG Oral Tab Take 1 tablet (20 mg total) by mouth at bedtime.   1/22/2025    sulfamethoxazole-trimethoprim -160 MG Oral Tab per tablet Take 1 tablet by mouth 3 (three) times a week. Monday, Wednesday, Friday 1/22/2025    Multiple Vitamins-Minerals (MULTI-VITAMIN/MINERALS) Oral Tab Take 1 tablet by mouth daily.   1/22/2025    ferrous sulfate 325 (65 FE) MG Oral Tab EC Take 1 tablet (325 mg total) by mouth daily.   1/22/2025    Cholecalciferol (VITAMIN D) 50 MCG (2000 UT) Oral Tab Take 4,000 Units by mouth daily. 1 tablet 0 1/22/2025    Vitamin B-12 500 MCG Oral Tab Take 1 tablet (500 mcg total) by mouth daily.   1/22/2025    Calcium Carb-Cholecalciferol (CALCIUM + D3) 600-200 MG-UNIT Oral Tab Take 1 tablet by mouth daily.   1/22/2025

## 2025-01-23 NOTE — H&P
History & Physical Examination    Patient Name: Hoda Busby  MRN: [id number]  CSN: [id number]  YOB: 1940    Diagnosis: Severe, symptomatic aortic stenosis    Present Illness: Patient presents for elective admission for TAVR today. He/She has previously been evaluate by the Structural Heart Team and Cardiovascular surgery and deemed a TAVR candidate. Please see full structural heart consult performed by Dr Shah on 1/2/2025. PAT was performed on 1/2/25. No changes since that visit.     Prescriptions Prior to Admission[1]  Current Facility-Administered Medications   Medication Dose Route Frequency    sodium chloride 0.9% infusion   Intravenous Continuous       Current Outpatient Medications   Medication Instructions    atorvastatin (LIPITOR) 20 mg, Nightly    Calcium Carb-Cholecalciferol (CALCIUM + D3) 600-200 MG-UNIT Oral Tab 1 tablet, Daily    cyanocobalamin (VITAMIN B12) 500 mcg, Daily    ferrous sulfate 325 (65 FE) MG Oral Tab EC 1 tablet, Daily    furosemide (LASIX) 20 mg, Oral, Daily, Take 40mg daily x 3 days (12/19-12/21/24), then 20mg daily thereafter    Multiple Vitamins-Minerals (MULTI-VITAMIN/MINERALS) Oral Tab 1 tablet, Daily    omeprazole (PRILOSEC) 20 mg, 2 times daily    sulfamethoxazole-trimethoprim -160 MG Oral Tab per tablet 1 tablet, Three times weekly    Vitamin D 4,000 Units, Oral, Daily        Allergies: Allergies[2]    Past Medical History:    Acute pulmonary embolism (HCC)    High blood pressure    History of blood transfusion    Macular degeneration    Osteopenia    Primary osteoarthritis of right hip    Pulmonary embolism (HCC)    Visual impairment    Readers    White coat hypertension     Past Surgical History:   Procedure Laterality Date    Cataract  3/2&3/16 2017    Colonoscopy N/A 12/17/2024    Procedure: COLONOSCOPY;  Surgeon: Karen Staton MD;  Location: [facility] ENDOSCOPY    Colonoscopy & polypectomy  1/4/2021         Hip replacement surgery Left Around 2006     Other surgical history  [place] 2017    Bilateral cataract surgery     Family History   Problem Relation Age of Onset    Cancer Father         bone (cause of death)    Stroke Mother         CVA (cause of death)    Diabetes Mother     Hypertension Mother     Dementia Brother         Alzheimer's    Heart Disease Brother         CAD - x2 brothers    Heart Disease Brother     Dementia Brother      Social History     Tobacco Use    Smoking status: Former     Current packs/day: 0.00     Types: Cigarettes     Start date: 1990     Quit date: 1990     Years since quittin.0     Passive exposure: Past    Smokeless tobacco: Former   Substance Use Topics    Alcohol use: Yes     Alcohol/week: 5.0 standard drinks of alcohol     Types: 5 Glasses of wine per week     Comment: wine, occasionally       SYSTEM Check if Review is Normal Check if Physical Exam is Normal If not normal, please explain:   HEENT [ x] [ x]    NECK & BACK [ x] [ x]    HEART [ x] [ ] 3/ primitivo   LUNGS [ x] [ x]    ABDOMEN [x ] [ x]    UROGENITAL [ ] [ ] deferred   EXTREMITIES [ x] [ x]    OTHER        [ x ] I have discussed the risks and benefits and alternatives with the patient/family.  They understand and agree to proceed with plan of care.  [ x ] I have reviewed the History and Physical done within the last 30 days.  Any changes noted above.    Veronica Rojas, KALYAN  2025  10:56 AM     Addendum    Reviewed again this am with entire structural heart team    Will proceed with an attempt at TF TAVR as planned    ECG sinus    .0   pro-bnp 755    Hgb 10.7             [1]   Medications Prior to Admission   Medication Sig Dispense Refill Last Dose/Taking    furosemide (LASIX) 20 MG Oral Tab Take 1 tablet (20 mg total) by mouth daily. Take 40mg daily x 3 days (-24), then 20mg daily thereafter 90 tablet 3 2025    omeprazole 20 MG Oral Capsule Delayed Release Take 1 capsule (20 mg total) by mouth 2 (two) times daily.   2025     atorvastatin 20 MG Oral Tab Take 1 tablet (20 mg total) by mouth at bedtime.   1/22/2025    sulfamethoxazole-trimethoprim -160 MG Oral Tab per tablet Take 1 tablet by mouth 3 (three) times a week. Monday, Wednesday, Friday 1/22/2025    Multiple Vitamins-Minerals (MULTI-VITAMIN/MINERALS) Oral Tab Take 1 tablet by mouth daily.   1/22/2025    ferrous sulfate 325 (65 FE) MG Oral Tab EC Take 1 tablet (325 mg total) by mouth daily.   1/22/2025    Cholecalciferol (VITAMIN D) 50 MCG (2000 UT) Oral Tab Take 4,000 Units by mouth daily. 1 tablet 0 1/22/2025    Vitamin B-12 500 MCG Oral Tab Take 1 tablet (500 mcg total) by mouth daily.   1/22/2025    Calcium Carb-Cholecalciferol (CALCIUM + D3) 600-200 MG-UNIT Oral Tab Take 1 tablet by mouth daily.   1/22/2025   [2] No Known Allergies

## 2025-01-23 NOTE — ANESTHESIA PROCEDURE NOTES
Arterial Line    Performed by: Colin Hernandez MD  Authorized by: Colin Hernandez MD    General Information and Staff    Procedure Start:   Procedure End:  1/23/2025 11:48 AM  Anesthesiologist:  Colin Hernandez MD  Performed By:  Anesthesiologist  Patient Location:  OR  Indication: continuous blood pressure monitoring    Site Identification: real time ultrasound guided and image stored and retrievable    Preanesthetic Checklist: 2 patient identifiers, IV checked, risks and benefits discussed, monitors and equipment checked, pre-op evaluation, timeout performed, anesthesia consent and sterile technique used    Procedure Details    Catheter Size:  20 G  Catheter Length:  1 and 3/4 inch  Catheter Type:  Angiocath  Seldinger Technique?: No    Laterality:  Left  Site:  Radial artery  Site Prep: alcohol swabs    Line Secured:  Tape and Tegaderm    Assessment    Events: patient tolerated procedure well with no complications      Medications      Additional Comments

## 2025-01-23 NOTE — PROGRESS NOTES
Prelim      #23 Martha 3 Resilia aortic valve via right CFA - nice result    Perclose femoral vessels        Agusto/Marija/Pablo/Tha

## 2025-01-23 NOTE — OPERATIVE REPORT
OPERATIVE REPORT    PATIENT'S NAME: Hoda Busby  DATE: 2025  : 1940  CSN: [id number]     PREOPERATIVE DIAGNOSIS:    Severe symptomatic aortic stenosis.  ***   POSTOPERATIVE DIAGNOSIS:  Same  PROCEDURE PERFORMED:  Transcatheter aortic valve replacement with {TAVR Valve Size:52979} mm {TAVR Valve Type:22154::\"Mcnamara Martha 3 Ultra Resilia\"} valve     OPERATORS:  {TAVR Operators:82152}  SURGEON: Miguel Reyes MD    INDICATION: Hoda Busby is a 84 year old female with symptomatic severe aortic stenosis who has been reviewed by our multidisciplinary TAVR committee and deemed suitable for placement of a transcatheter aortic valve replacement.      DESCRIPTION OF PROCEDURE:  After informed consent was obtained, the patient was prepped and draped in the usual sterile fashion.  The patient underwent general anesthesia.  Bilateral common femoral arteries were accessed using a micropuncture needle and sheath. A 14-Czech sheath was placed on the {RIGHT OR LEFT:1744} side.  Heparin was given.  A pigtail catheter was advanced to the aortic root to obtain images of the annulus. A {TAVR Valve Size:79466} mm {TAVR Valve Type:63546::\"Mcnamara Martha 3 Ultra Resilia\"} valve was chosen and prepped accordingly with {TAVR Contrast Amount:21115::\"nominal\"} contrast.  The aortic valve was crossed. The valve was brought into the abdominal aorta and paired with its balloon.  It was brought across the aortic annulus and deployed with rapid pacing.  Followup DARYL interrogation revealed {TAVR ECHO:16338} {TAVR Regur::\"aortic\"} insufficiency.  {TAVR Post Dilation (Optional):48150}The delivery device was removed.  The {RIGHT OR LEFT:1744} side was closed using {TAVR Closure:42545} devices and a {TAVR Closure:38101} device was used on the {RIGHT OR LEFT:1744} side to achieve hemostasis.    Patient tolerate the procedure without difficulty.       Miguel Reyes MD  [facility]

## 2025-01-23 NOTE — PROCEDURES
Cardiology Transesophageal Echo Note     PRE and POST PROCEDURE DIAGNOSIS:   1. Aortic stenosis, severe and symptomatic.  2. S/pTAVR (*** mm S3 Martha)     PROCEDURE: Transesophageal Echocardiogram DARYL - intra-operative during TAVR.    The patient was already intubated and sedated by anesthesiologist. DARYL probe placed by anesthesia.  DARYL probe was advanced to mid-esophageal level and transesophageal views were obtained and reviewed. Gastric views were obtained. The patient was stable hemodynamically and tolerated procedure very well. A trivial pericardial effusion was noted from the getgo.  This was circumferential.     DARYL guidance to cross aortic valve, with wire well positioned in the LV apex.  Mitral apparatus was intact.     Ventilation held and external pacing used for TAVR deployment under DARYL guidance.  Excellent results obtained.  Valve position verified.  ***Trivial perivalvular AI noted, requiring balloon dilatation with *** cc x 1.  This was uneventful.       Patient was monitored post-procedure with no echo evidence of aortic root disruption or hematoma formation. Pericardial effusion ***.    Findings:  LVSF *** without wall motion abnormalities.   RV size was at the upper limit of normal with preserved systolic function.   No mitral stenosis with trivial mitral regurgitatoin; normal tricuspid valve.  Pulmonic valve was not well visualized.      There was no evidence for intracardiac mass or thrombus  Aortic valve - heavily calcified, with severe reduction in cusp separation.  Calcified aortic root.    Diameters:    - LVOT = *** mm   Pre-TAVR  - mean gradient = *** mmHg  - peak velocity = *** m/sec  - trivial AI  - dimensionless index (VTI) of ***, which suggests critical AS    Post-TAVR  - mean gradient = *** mmHg   - peak velocity = *** m/sec  - trivial perivalvular AI at the aorto-mitral curtain, post dilatation with *** cc     Reji Green MD

## 2025-01-23 NOTE — ANESTHESIA PROCEDURE NOTES
Peripheral IV  Date/Time: 1/23/2025 11:49 AM  Inserted by: Colin Hernandez MD    Placement  Needle size: 20 G  Laterality: right  Location: forearm  Site prep: alcohol  Technique: anatomical landmarks  Attempts: 1

## 2025-01-23 NOTE — HISTORICAL OFFICE NOTE
[facility]  [address], [address] [place] [address]  [phone]      Hoda Busby  Progress Note  Demographics:  Name: Hoda Busby YOB: 1940  Age: 84, Female Medical Record No: [id number]  Visited Date/Time: 01/15/2025 11:50 AM    Chief Complaints  f/u  History of Present Illness  Patient is an 83-year-old female with a history of HTN and aortic stenosis who presents for preoperative clearance for planned right total hip arthroplasty on 10/5/2023.  In general she reports feeling well, her primary issue is hip pain when she ambulates and is able to walk about a block before having to stop.  She denies any chest pain, shortness of breath, palpitations, edema, dizziness, or syncope.  Her last echocardiogram was on 10/2022 which noted moderate aortic stenosis with mean gradient of 30 mmHg.  She then had a nuclear stress test a week ago that came back unremarkable.    9/23/2024  Overall feels well, no cardiac symptoms.  Mobility much improved since hip arthroplasty.  No chest pain or shortness of breath.    1/15/2025  Patient was hospitalized end of October with pneumonitis, also found to have small pulmonary emboli; discharged on steroids and Xarelto.  Came back to the hospital end of November with anemia hemoglobin 6.1, underwent GI workup which revealed nonbleeding gastric ulcer and gastric antral AVM.  She had a colonic polyp with a biopsy which came back as adenocarcinoma.  Recommendation for surgical resection however felt she was high risk given evidence of severe aortic stenosis.  Was then expedited workup for TAVR and had a coronary angiogram yesterday which noted severe ostial LCx and LAD-D disease.  Overall doing well, breathing has improved and denies any chest pain.  Minimal lower EXTR edema.  Continues with physical therapy.    Cardiac history:  Severe aortic stenosis  CAD, severe ostial LCx and LAD-D on 1/14/2025 [facility]  Bilateral PE  HTN  HLD  Cardiac  risk factors Former smoker  Past Medical History  1.History of mitral valve insufficiency  2.Hypercholesterolemia, familial  3.Hypertension (HTN), primary  Family History  1. Mother - Stroke; Hypertension  2. Brother - Heart problem; HHD (hypertensive heart disease)  She had 2 brothers had bypass surgery in the 50s.  Mother  of congestive heart failure in her 50s.  Social History  Smoking status Former smoker  Tobacco usage - No (Non-smoker for personal reasons (finding))  She lives at home alone.  She quit smoking 30 years ago.  She has a few glasses of wine throughout the week.  Review of systems  Cardiovascular No history of Chest pain, Palpitations and Edema  Respiratory No history of SOB  Neurological No history of Numbness and Limb weakness  Physical Examination  Constitutional 98%o2sat  Vitals Left Arm Sitting  / 66 mmHg, Pulse rate 78 bpm, Height in 5' 9\", BMI: 26.4, Weight in 179 lbs (or) 81 kgs and BSA : 2 cc/m²  General Appearance No Acute Distress and Normal Body habitus  Cardiovascular   EKG/Other abnormalities  GENERAL: in no apparent distress  HEENT: Normal  NECK: no JVD, normal carotid pulses without bruits. No lymphadenopathy  LUNGS: normal excursion, clear to auscultation bilaterally  HEART: RRR, nl S1/S2, no gallop, 3/6 systolic ejection murmur  ABD: soft, nontender, nondistended, normal bowel sounds  EXTREMITIES: no cyanosis, clubbing or edema  SKIN: warm, dry, normal turgor  NEURO: alert, oriented x3, symmetrical face, no dysarthria, no focal deficits  PSYCH: cooperative, calm    ECG 2023 sinus rhythm, LVH with ST-T wave abnormality, septal infarct, 80 bpm  Allergies  No medication allergies noted.  Medications (Info obtained by: Verbal)  1.alendronate 70 MG Oral Tab, Take 1 tablet (70 mg total) by mouth every 7 days.  2.atorvastatin 20 MG Oral Tab, Take 1 tablet (20 mg total) by mouth at bedtime.  3.Calcium 600 + D(3) 600 mg-10 mcg (400 unit) tablet, Take 1 tablet orally once a  day.  4.ferrous sulfate 325 (65 FE) MG Oral Tab EC, Take 1 tablet (325 mg total) by mouth daily.  5.furosemide (LASIX) 20 MG Oral Tab, Take 1 tablet (20 mg total) by mouth daily. Take 40mg daily x 3 days (12/19-12/21/24), then 20mg daily thereafter  6.Multiple Vitamins-Minerals (MULTI-VITAMIN/MINERALS) Oral Tab, Take 1 tablet by mouth daily.  7.omeprazole 20 MG Oral Capsule Delayed Release, Take 1 capsule (20 mg total) by mouth 2 (two) times daily.  8.polyethylene glycol, PEG 3350, 17 g Oral Powd Pack, Take 17 g by mouth daily.  9.predniSONE 10 MG Oral Tab, Take 3 tablets (30 mg total) by mouth daily with breakfast for 9 days, THEN 2 tablets (20 mg total) daily with breakfast for 10 days, THEN 1 tablet (10 mg total) daily with breakfast for 10 days.  10.PreserVision AREDS-2 250 mg-90 mg-40 mg-1 mg capsule, Take 1 capsule orally once a day.  11.sulfamethoxazole-trimethoprim -160 MG Oral Tab per tablet, Take 1 tablet by mouth 3 (three) times a week. Monday, Wednesday, Friday  12.Vitamin B-12 500 mcg tablet, Take 1 tablet orally once a day.  13.Vitamin D3 50 mcg (2,000 unit) tablet, Take 1 tablet orally once a day.  Impression  1.Aortic stenosis, moderate  2.Hypertension (HTN), primary  Assessment & Plan  Severe aortic stenosis  - Currently having expedited workup given need for colonic resection for recently diagnosed adenocarcinoma end of November  - Patient is already established in the valve clinic, tentative scheduled for TAVR early February    CAD, severe ostial LCx and LAD-D on 1/14/2025   -No chest pain, shortness of breath improving  - Given need for colon resection defer intervention to avoid DAPT, can schedule for PCI after surgery  - Continue atorvastatin 40 mg daily    Bilateral PE   - Noted small subsegmental bilateral pulmonary emboli on chest CT 10/2025  - No longer on anticoagulation given GI bleed secondary to colon adenocarcinoma    HLD  - , triglycerides 78 on 7/16/2024  - Continue  atorvastatin 40 mg daily  - Will need repeat lipid panel in future now that she is taking atorvastatin    Plan  Follow-up with me in 2 months or sooner as needed  Future appointments  1.Referral Visit - Malathi Stuart ([email or web address]) : (Today)  Miscellaneous  1.Reviewed glucose, sodium, potassium, chloride, co2, anion gap, bun, creatinine, calcium, osmolality calculated, e gfr cr and fasting patient bmp answer with the patient.  2.Reviewed trans thoracic echocardiogram with the patient.  3.Weight monitoring (regime/therapy)  Nurses documentation  refills: None   Assistive devices: none  Upcoming sx or procedures: none   Patient instructions  Plan  Follow-up with me in 2 months or sooner as needed    Please bring in you medication bottles or updated medicine list to your next appointment.   Call [facility] if you have any problems or concerns at [phone]   Lab Details  BASIC METABOLIC PANEL (8)  01/02/2025 10:04:14 AM  GLUCOSE 92 70-99 mg/dL  F  SODIUM 139 136-145 mmol/L  F  POTASSIUM 4.2 3.5-5.1 mmol/L  F  CHLORIDE 107  mmol/L  F  CO2 27.0 21.0-32.0 mmol/L  F  ANION GAP 5 0-18 mmol/L  F  BUN 23 9-23 mg/dL  F  CREATININE 0.98 0.55-1.02 mg/dL  F  CALCIUM 9.1 8.7-10.4 mg/dL  F  OSMOLALITY CALCULATED 291 275-295 mOsm/kg  F  E GFR CR 57 >=60 mL/min/1.73m2 L F  FASTING PATIENT BMP ANSWER No   F  CBC, PLATELET; NO DIFFERENTIAL  01/02/2025 09:54:15 AM  WBC 11.4 4.0-11.0 x10(3) uL H F  RED BLOOD COUNT 3.92 3.80-5.30 x10(6)uL  F  HGB 10.6 12.0-16.0 g/dL L F  HCT 34.8 35.0-48.0 % L F  PLATELETS 490.0 150.0-450.0 10(3)uL H F  MEAN CELL VOLUME 88.8 80.0-100.0 fL  F  MEAN CORPUSCULAR HEMOGLOBIN 27.0 26.0-34.0 pg  F  MEAN CORPUSCULAR HGB CONC 30.5 31.0-37.0 g/dL L F  RED CELL DISTRIBUTION WIDTH CV 17.7 %  F  Diagnostics Details  Trans Thoracic Echocardiogram 10/18/2024  1.The left ventricle is normal in size. Left ventricular wall thickness is mildly increased. Global left ventricular systolic function is  normal. The left ventricular ejection fraction is 65%. No regional wall motion abnormalities. The left ventricle diastolic function is impaired (Grade II) with an elevated left atrial pressure.    2.Moderately to severely calcified and restricted trileaflet aortic valve. Severe aortic stenosis. Peak velocity 4.42 m/s. Mean gradient 47 mm Hg. KIKE (VTI) 0.91 cm². DI 0.29. Trivial aortic regurgitation.    3.Moderate mitral annular calcification. Mild to moderate mitral regurgitation.    4.The left atrium is moderately to severely dilated.    5.Mild to moderate tricuspid regurgitation.    6.The pulmonary artery systolic pressure is mildly increased, estimated at 45 mm Hg.    Care Providers: Viktor Bird MD, Apryl SCOTT and Amena Alas  Electronically Authenticated by  Viktor Bird MD  01/15/2025 03:28:59 PM  Disclaimer: Components of this note were documented using voice recognition system and are subject to errors not corrected at proofreading. Contact the author of this note for any clarifications.

## 2025-01-23 NOTE — ANESTHESIA PROCEDURE NOTES
Procedure Performed: DARYL       Start Time:        End Time:      Preanesthesia Checklist:  Patient identified, IV assessed, risks and benefits discussed, monitors and equipment assessed, procedure being performed at surgeon's request and anesthesia consent obtained.    General Procedure Information  Diagnostic Indications for Echo:  assessment of surgical repair  Physician Requesting Echo: Jigar Shah MD  Location performed:  Cath lab  Intubated  Bite block placed  Heart visualized  Probe Insertion:  Easy        Anesthesia Information  Performed Personally  Anesthesiologist:  Colin Hernandez MD      Echocardiogram Comments:         DARYL probe successfully inserted by anesthesiologist; atraumatic insertion with dentition unchanged after esophageal intubation. See separate cardiologist report for findings.    Post  Post Intervention Follow-up Study:See addtional report

## 2025-01-23 NOTE — OPERATIVE REPORT
[facility]    PATIENT'S NAME: ANUPAMA CARNEY   ATTENDING PHYSICIAN: Jigar Shah M.D.   OPERATING PHYSICIAN: Jigar Shah M.D.   PATIENT ACCOUNT#:   [id number]    LOCATION:  [address] [place] [address]  MEDICAL RECORD #:   [id number]       YOB: 1940  ADMISSION DATE:       01/23/2025      OPERATION DATE:  01/23/2025    OPERATIVE REPORT    PREOPERATIVE DIAGNOSIS:    POSTOPERATIVE DIAGNOSIS:    PROCEDURE PERFORMED:  Percutaneous transcatheter aortic valve replacement.    HISTORY:  The patient is an 84-year-old woman referred for an attempt at percutaneous transcatheter aortic valve replacement due to the presence of severe symptomatic calcific aortic stenosis.    PROCEDURE:  After obtaining informed consent, the patient was brought to the hybrid operating room and placed under general anesthesia by Dr. Hernandez.    Using ultrasound guidance and a micropuncture technique, a 6-Chadian sheath was placed in the right common femoral artery and a 6-Chadian sheath in the left common femoral artery.  The left iliofemoral system was quite tortuous and thus a 6-Chadian long sheath was placed to help with catheter manipulation.    The right-sided sheath was upsized to the 14-Chadian delivery sheath.  The SavvyWire was used for pacing and valve deployment.  A pigtail catheter was placed in the ascending aorta.  Heparin was administered for systemic anticoagulation.    We placed a #23 Martha 3 Resilia aortic valve at nominal pressure.  The valve sat beautifully.  There was no evidence of aortic disruption or significant aortic insufficiency.  No significant gradient was present across the valve at the end of the procedure (approximately 4 mmHg by the SavvyWire).    The patient tolerated the procedure well and was without apparent acute complications.  At the end of the procedure, hemostasis was achieved by placing Perclose devices in the femoral vessels.  The patient was taken in good condition by Dr. Hernandez  to the coronary care unit for further management.  The patient's family was updated on her condition by Dr. Liz.    Dr. Bernardo Liz, Dr. Jose G Dutton, and Dr. Tor Almazan were co-operators in this case.    Dictated By Jigar Shah M.D.  d: 01/23/2025 12:54:27  t: 01/23/2025 13:39:20  Job [id number]  WS/

## 2025-01-23 NOTE — ANESTHESIA PROCEDURE NOTES
Airway  Date/Time: 1/23/2025 11:45 AM  Urgency: elective      General Information and Staff    Patient location during procedure: OR  Anesthesiologist: Colin Hernandez MD  Performed: anesthesiologist   Performed by: Colin Hernandez MD  Authorized by: Colin Hernandez MD      Indications and Patient Condition  Indications for airway management: anesthesia  Spontaneous Ventilation: absent  Sedation level: deep  Preoxygenated: yes  Patient position: sniffing  Mask difficulty assessment: 1 - vent by mask    Final Airway Details  Final airway type: endotracheal airway      Successful airway: ETT  Cuffed: yes   Successful intubation technique: direct laryngoscopy  Facilitating devices/methods: intubating stylet  Endotracheal tube insertion site: oral  Blade: Vi  Blade size: #3  ETT size (mm): 7.0    Cormack-Lehane Classification: grade IIA - partial view of glottis  Placement verified by: capnometry   Measured from: teeth  ETT to teeth (cm): 22  Number of attempts at approach: 1    Additional Comments  Dentition unchanged after DL and intubation, atraumatic procedure.

## 2025-01-24 ENCOUNTER — APPOINTMENT (OUTPATIENT)
Dept: GENERAL RADIOLOGY | Facility: HOSPITAL | Age: 85
End: 2025-01-24
Attending: INTERNAL MEDICINE
Payer: MEDICARE

## 2025-01-24 ENCOUNTER — APPOINTMENT (OUTPATIENT)
Dept: CV DIAGNOSTICS | Facility: HOSPITAL | Age: 85
End: 2025-01-24
Attending: INTERNAL MEDICINE
Payer: MEDICARE

## 2025-01-24 VITALS
DIASTOLIC BLOOD PRESSURE: 58 MMHG | HEART RATE: 75 BPM | SYSTOLIC BLOOD PRESSURE: 111 MMHG | RESPIRATION RATE: 22 BRPM | WEIGHT: 183.44 LBS | TEMPERATURE: 98 F | OXYGEN SATURATION: 95 % | BODY MASS INDEX: 28 KG/M2

## 2025-01-24 LAB
ANION GAP SERPL CALC-SCNC: 10 MMOL/L (ref 0–18)
ATRIAL RATE: 71 BPM
ATRIAL RATE: 73 BPM
BLOOD TYPE BARCODE: 6200
BUN BLD-MCNC: 22 MG/DL (ref 9–23)
CALCIUM BLD-MCNC: 8.8 MG/DL (ref 8.7–10.6)
CHLORIDE SERPL-SCNC: 108 MMOL/L (ref 98–112)
CO2 SERPL-SCNC: 23 MMOL/L (ref 21–32)
CREAT BLD-MCNC: 0.91 MG/DL
EGFRCR SERPLBLD CKD-EPI 2021: 62 ML/MIN/1.73M2 (ref 60–?)
ERYTHROCYTE [DISTWIDTH] IN BLOOD BY AUTOMATED COUNT: 17.5 %
GLUCOSE BLD-MCNC: 93 MG/DL (ref 70–99)
HCT VFR BLD AUTO: 30.1 %
HGB BLD-MCNC: 9.7 G/DL
INR BLD: 1.18 (ref 0.8–1.2)
ISTAT ACTIVATED CLOTTING TIME: 262 SECONDS (ref 74–137)
MAGNESIUM SERPL-MCNC: 2.1 MG/DL (ref 1.6–2.6)
MCH RBC QN AUTO: 27.4 PG (ref 26–34)
MCHC RBC AUTO-ENTMCNC: 32.2 G/DL (ref 31–37)
MCV RBC AUTO: 85 FL
OSMOLALITY SERPL CALC.SUM OF ELEC: 295 MOSM/KG (ref 275–295)
P AXIS: 62 DEGREES
P AXIS: 77 DEGREES
P-R INTERVAL: 170 MS
P-R INTERVAL: 180 MS
PLATELET # BLD AUTO: 314 10(3)UL (ref 150–450)
POTASSIUM SERPL-SCNC: 4.2 MMOL/L (ref 3.5–5.1)
PROTHROMBIN TIME: 15.1 SECONDS (ref 11.6–14.8)
Q-T INTERVAL: 378 MS
Q-T INTERVAL: 406 MS
QRS DURATION: 86 MS
QRS DURATION: 90 MS
QTC CALCULATION (BEZET): 410 MS
QTC CALCULATION (BEZET): 447 MS
R AXIS: -21 DEGREES
R AXIS: 0 DEGREES
RBC # BLD AUTO: 3.54 X10(6)UL
SODIUM SERPL-SCNC: 141 MMOL/L (ref 136–145)
T AXIS: 33 DEGREES
T AXIS: 57 DEGREES
UNIT VOLUME: 350 ML
VENTRICULAR RATE: 71 BPM
VENTRICULAR RATE: 73 BPM
WBC # BLD AUTO: 10.1 X10(3) UL (ref 4–11)

## 2025-01-24 PROCEDURE — 80048 BASIC METABOLIC PNL TOTAL CA: CPT | Performed by: INTERNAL MEDICINE

## 2025-01-24 PROCEDURE — 93005 ELECTROCARDIOGRAM TRACING: CPT

## 2025-01-24 PROCEDURE — 71045 X-RAY EXAM CHEST 1 VIEW: CPT | Performed by: INTERNAL MEDICINE

## 2025-01-24 PROCEDURE — 93321 DOPPLER ECHO F-UP/LMTD STD: CPT | Performed by: INTERNAL MEDICINE

## 2025-01-24 PROCEDURE — 97116 GAIT TRAINING THERAPY: CPT

## 2025-01-24 PROCEDURE — 99211 OFF/OP EST MAY X REQ PHY/QHP: CPT

## 2025-01-24 PROCEDURE — 93308 TTE F-UP OR LMTD: CPT | Performed by: INTERNAL MEDICINE

## 2025-01-24 PROCEDURE — 93010 ELECTROCARDIOGRAM REPORT: CPT | Performed by: INTERNAL MEDICINE

## 2025-01-24 PROCEDURE — 97161 PT EVAL LOW COMPLEX 20 MIN: CPT

## 2025-01-24 PROCEDURE — 97165 OT EVAL LOW COMPLEX 30 MIN: CPT

## 2025-01-24 PROCEDURE — 94760 N-INVAS EAR/PLS OXIMETRY 1: CPT

## 2025-01-24 PROCEDURE — 83735 ASSAY OF MAGNESIUM: CPT | Performed by: INTERNAL MEDICINE

## 2025-01-24 PROCEDURE — 85610 PROTHROMBIN TIME: CPT | Performed by: INTERNAL MEDICINE

## 2025-01-24 PROCEDURE — 93325 DOPPLER ECHO COLOR FLOW MAPG: CPT | Performed by: INTERNAL MEDICINE

## 2025-01-24 PROCEDURE — 85027 COMPLETE CBC AUTOMATED: CPT | Performed by: INTERNAL MEDICINE

## 2025-01-24 PROCEDURE — 97535 SELF CARE MNGMENT TRAINING: CPT

## 2025-01-24 RX ORDER — ASPIRIN 81 MG/1
81 TABLET ORAL DAILY
Qty: 30 TABLET | Refills: 11 | Status: SHIPPED | OUTPATIENT
Start: 2025-01-25

## 2025-01-24 RX ORDER — FUROSEMIDE 10 MG/ML
20 INJECTION INTRAMUSCULAR; INTRAVENOUS ONCE
Status: COMPLETED | OUTPATIENT
Start: 2025-01-24 | End: 2025-01-24

## 2025-01-24 RX ORDER — ASPIRIN 81 MG/1
81 TABLET ORAL DAILY
Qty: 30 TABLET | Refills: 11 | Status: SHIPPED | OUTPATIENT
Start: 2025-01-25 | End: 2025-01-24

## 2025-01-24 NOTE — PLAN OF CARE
Pt from [facility] this afternoon around 1330. S/p TAVR. Pt awake and alert, denying pain. Pt recovered per ccu protocol and orders. Labs sent, EKG and chest xray done. Vitals per protocol. Bilateral groin sites with perclose soft, no bleeding or hematoma. Pt flat for over 2 hours then HOB elevated slowly monitoring groin sites. Starting to take clear liquids, advancing as tolerated. Family at bedside. Plan for echo in morning.     Problem: CARDIOVASCULAR - ADULT  Goal: Maintains optimal cardiac output and hemodynamic stability  Description: INTERVENTIONS:  - Monitor vital signs, rhythm, and trends  - Monitor for bleeding, hypotension and signs of decreased cardiac output  - Evaluate effectiveness of vasoactive medications to optimize hemodynamic stability  - Monitor arterial and/or venous puncture sites for bleeding and/or hematoma  - Assess quality of pulses, skin color and temperature  - Assess for signs of decreased coronary artery perfusion - ex. Angina  - Evaluate fluid balance, assess for edema, trend weights  Outcome: Progressing  Goal: Absence of cardiac arrhythmias or at baseline  Description: INTERVENTIONS:  - Continuous cardiac monitoring, monitor vital signs, obtain 12 lead EKG if indicated  - Evaluate effectiveness of antiarrhythmic and heart rate control medications as ordered  - Initiate emergency measures for life threatening arrhythmias  - Monitor electrolytes and administer replacement therapy as ordered  Outcome: Progressing     Problem: SKIN/TISSUE INTEGRITY - ADULT  Goal: Incision(s), wounds(s) or drain site(s) healing without S/S of infection  Description: INTERVENTIONS:  - Assess and document risk factors for pressure ulcer development  - Assess and document skin integrity  - Assess and document dressing/incision, wound bed, drain sites and surrounding tissue  - Implement wound care per orders  - Initiate isolation precautions as appropriate  - Initiate Pressure Ulcer prevention bundle as  indicated  Outcome: Progressing

## 2025-01-24 NOTE — PLAN OF CARE
Assumed care of pt at approximately 1930. NSR on tele. Ambulates to bathroom with steady gait and walker. Switching between bed and chair overnight for sleep. Groin sites are C/D/I. No significant events overnight. See flowsheets for full assessments.

## 2025-01-24 NOTE — PHYSICAL THERAPY NOTE
PHYSICAL THERAPY EVALUATION - INPATIENT     Room Number: 6613/6613-A  Evaluation Date: 1/24/2025  Type of Evaluation: Initial  Physician Order: PT Eval and Treat    Presenting Problem: s/p TAVR 1/23  Co-Morbidities : gastric AVM, GI bleed  Reason for Therapy: Mobility Dysfunction and Discharge Planning    PHYSICAL THERAPY ASSESSMENT   Patient is a 84 year old female admitted 1/23/2025 for planned procedure.  Prior to admission, patient's baseline is indep, int mod I .  Patient is currently functioning near baseline with bed mobility, transfers, gait, and stair negotiation.  Patient is requiring supervision and contact guard assist as a result of the following impairments: decreased endurance/aerobic capacity, decreased muscular endurance, and medical status.      Patient will benefit from continued skilled PT Services at discharge to promote prior level of function.  Anticipate patient will return home with home health PT.    Patient has met all skilled IPPT goals at this time. Patient will be discharged from Physical Therapy services.  Please re-order if a new functional limitation presents during this admission.      PLAN DURING HOSPITALIZATION  Nursing Mobility Recommendation : 1 Assist  PT Device Recommendation: Rolling walker                PHYSICAL THERAPY MEDICAL/SOCIAL HISTORY  History related to current admission: Patient is a 84 year old female admitted on 1/23/2025 from [facility] for planned procedure.     12/13-12/19/24: GIB (had been dc'd from [facility] from previous admit to [facility] with plans to transition back to home after ~ 1 month) > [facility]  11/25-11/30/24: GIB > [facility]    HOME SITUATION  Type of Home: Assisted living facility ([facility])  Home Layout: One level  Stairs to Enter : 3                  Lives With: Alone;Staff 24 hours    Drives: Yes   Patient Regularly Uses: Reading glasses;Rolling walker      Prior Level of [place]: Planning to stay at [facility] short term until all her surgeries are completed  (plans to have colon surgery next). Reports syncopal episode in the bathroom ~ 1 month ago w/ staff. Uses the w/c to get <> the dining perez. Is working with [facility] on walking w/o the RW but had limited endurnace pre op    SUBJECTIVE  \" I do plan to go back home just not until after I finish with all these surgeries\"     OBJECTIVE  Precautions: None       WEIGHT BEARING RESTRICTION     PAIN ASSESSMENT  Ratin          COGNITION  Overall Cognitive Status:  WFL - within functional limits    RANGE OF MOTION AND STRENGTH ASSESSMENT  Upper extremity ROM and strength are within functional limits     Lower extremity ROM is within functional limits     Lower extremity strength is within functional limits     BALANCE  Static Sitting: Good  Dynamic Sitting: Good  Static Standing: Fair -  Dynamic Standing: Fair -    ADDITIONAL TESTS                                    ACTIVITY TOLERANCE  Pulse:  (upper 80s low 90s)  Heart Rate Source: Monitor     BP: 99/51  BP Location: Left arm  BP Method: Automatic  Patient Position: Standing    O2 WALK       NEUROLOGICAL FINDINGS                        AM-PAC '6-Clicks' INPATIENT SHORT FORM - BASIC MOBILITY  How much difficulty does the patient currently have...  Patient Difficulty: Turning over in bed (including adjusting bedclothes, sheets and blankets)?: None   Patient Difficulty: Sitting down on and standing up from a chair with arms (e.g., wheelchair, bedside commode, etc.): A Little   Patient Difficulty: Moving from lying on back to sitting on the side of the bed?: None   How much help from another person does the patient currently need...   Help from Another: Moving to and from a bed to a chair (including a wheelchair)?: A Little   Help from Another: Need to walk in hospital room?: A Little   Help from Another: Climbing 3-5 steps with a railing?: A Little     AM-PAC Score:  Raw Score: 20   Approx Degree of Impairment: 35.83%   Standardized Score (AM-PAC Scale): 47.67   CMS Modifier  (G-Code): CJ    FUNCTIONAL ABILITY STATUS  Gait Assessment   Functional Mobility/Gait Assessment  Gait Assistance: Contact guard assist  Distance (ft): 200  Assistive Device: Rolling walker  Pattern: Within Functional Limits    Skilled Therapy Provided     Bed Mobility:  Rolling: NT  Supine to sit: NT   Sit to supine: NT     Transfer Mobility:  Sit to stand: SBA   Stand to sit: SBA  Gait = CGA    Therapist's Comments: Discussed case with RN prior to session initiation. Pt agreeable to participation in therapy. Gait belt donned for out of bed mobility. Educated on EC and pacing strategies. Discussed post op precautions. Pt plans to continue with home health at [facility].       Exercise/Education Provided:  Body mechanics  Energy conservation  Functional activity tolerated  Gait training    Patient End of Session: Up in chair;Needs met;Call light within reach;RN aware of session/findings;All patient questions and concerns addressed;[facility] anti-slip socks      Patient Evaluation Complexity Level:  History Low - no personal factors and/or co-morbidities   Examination of body systems Low -  addressing 1-2 elements   Clinical Presentation Low- Stable   Clinical Decision Making Low Complexity       PT Session Time: 23 minutes  Gait Training: 10 minutes  Therapeutic Activity:  minutes  Neuromuscular Re-education:  minutes  Therapeutic Exercise:  minutes

## 2025-01-24 NOTE — PLAN OF CARE
Assumed care this morning. Pt alert and oriented sitting up in chair waiting on breakfast. NSR on monitor, BP initially low this morning, which per pt is her \"normal\" then an hour later SBP in 120s. Echo and EKG done. Bilateral groin sites stable, no bleeding or hematoma. Ambulating in room and halls with PT. Discharge orders received.     Written and oral discharge instructions given to pt and nieces. Questions encouraged and answered. Report given to nurse at assisted living. [facility] here to take pt to [facility]. Pt discharged at 1530.     Problem: CARDIOVASCULAR - ADULT  Goal: Maintains optimal cardiac output and hemodynamic stability  Description: INTERVENTIONS:  - Monitor vital signs, rhythm, and trends  - Monitor for bleeding, hypotension and signs of decreased cardiac output  - Evaluate effectiveness of vasoactive medications to optimize hemodynamic stability  - Monitor arterial and/or venous puncture sites for bleeding and/or hematoma  - Assess quality of pulses, skin color and temperature  - Assess for signs of decreased coronary artery perfusion - ex. Angina  - Evaluate fluid balance, assess for edema, trend weights  Outcome: Adequate for Discharge  Goal: Absence of cardiac arrhythmias or at baseline  Description: INTERVENTIONS:  - Continuous cardiac monitoring, monitor vital signs, obtain 12 lead EKG if indicated  - Evaluate effectiveness of antiarrhythmic and heart rate control medications as ordered  - Initiate emergency measures for life threatening arrhythmias  - Monitor electrolytes and administer replacement therapy as ordered  Outcome: Adequate for Discharge     Problem: SKIN/TISSUE INTEGRITY - ADULT  Goal: Incision(s), wounds(s) or drain site(s) healing without S/S of infection  Description: INTERVENTIONS:  - Assess and document risk factors for pressure ulcer development  - Assess and document skin integrity  - Assess and document dressing/incision, wound bed, drain sites and surrounding  tissue  - Implement wound care per orders  - Initiate isolation precautions as appropriate  - Initiate Pressure Ulcer prevention bundle as indicated  Outcome: Adequate for Discharge

## 2025-01-24 NOTE — OCCUPATIONAL THERAPY NOTE
OCCUPATIONAL THERAPY EVALUATION - INPATIENT    Room Number: 6613/6613-A  Evaluation Date: 1/24/2025     Type of Evaluation: Initial  Presenting Problem: TAVR 1/23    Physician Order: IP Consult to Occupational Therapy  Reason for Therapy:  ADL/IADL Dysfunction and Discharge Planning    OCCUPATIONAL THERAPY ASSESSMENT   Patient is a 84 year old female admitted on 1/23/2025 with Presenting Problem: TAVR 1/23. Co-Morbidities : gastric AVM, GI bleed  Patient is currently functioning at baseline with  all ADL .  Prior to admission, patient's baseline is independent.Uses RW.  Patient met all OT goals at baseline level.  Patient reports no further questions/concerns at this time.     Recent admissions:  12/13 to 12/19/24 at [facility] for gastric AVM-> back to assisted living with in-house therapy  11/25-11/30 at [facility] for GI bleed-> GRACIE    EVALUATION SESSION:  Patient at start of session: sitting    FUNCTIONAL TRANSFER ASSESSMENT  Sit to Stand: Chair  Chair: Modified Independent    BED MOBILITY  Supine to Sit : Not tested  Sit to Supine (OT): Not Tested    COGNITION  Overall Cognitive Status:  WFL - within functional limits    Upper Extremity:   ROM: within functional limits   Strength: is within functional limits     EDUCATION PROVIDED  Patient Education : Role of Occupational Therapy; Plan of Care; Energy Conservation; Proper Body Mechanics  Patient's Response to Education: Verbalized Understanding    Equipment used: rw  Demonstrates functional use    Therapist comments: pleasant, motivated, Educated the pt about post-TAVR activity guidelines that relate to ADL tasks. Used MD instruction sheet.  Verbalized understanding. Cueing provided for pacing. Completed pt education about energy conservation. Verbalized understanding.       Patient End of Session: Up in chair;Needs met;Call light within reach;RN aware of session/findings;All patient questions and concerns addressed    OCCUPATIONAL PROFILE    HOME SITUATION  Type  of Home: Assisted living facility ([facility])  Home Layout: One level  Lives With: Alone;Staff 24 hours    Toilet and Equipment: Comfort height toilet  Shower/Tub and Equipment: Walk-in shower             Drives: Yes  Patient Regularly Uses: Reading glasses;Rolling walker    Prior Level of Function: pt has been living at assisted living facility since last month. Per patient, she is planning to stay at this assisted living facility until she has colon surgery?        PAIN ASSESSMENT  Ratin          OBJECTIVE  Precautions: None       WEIGHT BEARING RESTRICTION       AM-PAC ‘6-Clicks’ Inpatient Daily Activity Short Form  -   Putting on and taking off regular lower body clothing?: None  -   Bathing (including washing, rinsing, drying)?: A Little  -   Toileting, which includes using toilet, bedpan or urinal? : None  -   Putting on and taking off regular upper body clothing?: None  -   Taking care of personal grooming such as brushing teeth?: None  -   Eating meals?: None    AM-PAC Score:  Score: 23  Approx Degree of Impairment: 15.86%  Standardized Score (AM-PAC Scale): 51.12    ADDITIONAL TESTS     NEUROLOGICAL FINDINGS      PLAN   Patient has been evaluated and presents with no skilled Occupational Therapy needs at this time.  Patient discharged from Occupational Therapy services.  Please re-order if a new functional limitation presents during this admission.         Patient Evaluation Complexity Level:   Occupational Profile/Medical History LOW - Brief history including review of medical or therapy records    Specific performance deficits impacting engagement in ADL/IADL LOW  1 - 3 performance deficits    Client Assessment/Performance Deficits LOW - No comorbidities nor modifications of tasks    Clinical Decision Making LOW - Analysis of occupational profile, problem-focused assessments, limited treatment options    Overall Complexity LOW     OT Session Time: 20 minutes  Self-Care Home Management: 8  minutes

## 2025-01-24 NOTE — CM/SW NOTE
Confirmed patient resides @ [facility] ([phone]) patient has been residing at this assisted living facility since 12-.  Per Bibi her nurse, patient is rather independent with dressing, toileting, bathing, eating, etc--does have nurse supervision with medication administration.  Updated nurse of anticipated return back to facility--nurse to call Bibi direct with report to     Confirmed patient is current with [facility]  Phone  [phone]  Fax  [phone]    Order to resume HHC (RN, PT and OT) along with clinicals faxed () as well as AVS

## 2025-01-24 NOTE — PROGRESS NOTES
Progress Note  Hoda Busby Patient Status:  Inpatient    1940 MRN [id number]   Location [facility] 6NE-A Attending Jigar Shah MD   Hosp Day # 1 PCP Malathi Stuart MD     Subjective:  Sitting up in the chair. Took a longer walk than normal this am and didn't have to stop for SOB    Objective:  /60 (BP Location: Right arm)   Pulse 76   Temp 98.2 °F (36.8 °C) (Temporal)   Resp 15   Wt 183 lb 6.8 oz (83.2 kg)   SpO2 96%   BMI 27.89 kg/m²     Telemetry: SR      Intake/Output:    Intake/Output Summary (Last 24 hours) at 2025 1127  Last data filed at 2025 0900  Gross per 24 hour   Intake 1600 ml   Output --   Net 1600 ml       Last 3 Weights   25 1515 183 lb 6.8 oz (83.2 kg)   25 0943 174 lb (78.9 kg)   25 1412 182 lb (82.6 kg)       Labs:  Recent Labs   Lab 25  1055 25  1328 25  0430   * 94 93   BUN 21 16 22   CREATSERUM 1.04* 0.88 0.91   EGFRCR 53* 65 62   CA 9.3 8.8 8.8   ALB 3.9 3.6  --     141 141   K 3.7 4.2 4.2    110 108   CO2 25.0 24.0 23.0   ALKPHO 70 75  --    AST 23 25  --    ALT 19 16  --    BILT 0.5 0.5  --    TP 6.0 5.6*  --      Recent Labs   Lab 25  1055 25  1328 25  0430   RBC 3.85 3.56* 3.54*   HGB 10.7* 9.5* 9.7*   HCT 34.1* 30.7* 30.1*   MCV 88.6 86.2 85.0   MCH 27.8 26.7 27.4   MCHC 31.4 30.9* 32.2   RDW 17.7* 17.6 17.5   NEPRELIM 7.79*  --   --    WBC 9.9 9.0 10.1   .0 346.0 314.0         No results for input(s): \"TROP\", \"TROPHS\", \"CK\" in the last 168 hours.  Lab Results   Component Value Date    INR 1.18 2025    INR 1.33 (H) 2025    INR 1.06 2023       Diagnostics:     Review of Systems   Constitutional: Negative.   Cardiovascular: Negative.    Respiratory: Negative.         Physical Exam:    Gen: Alert, oriented x 3, in no apparent distress  Heent: Pupils equal, reactive. Mucous membranes moist.   Cardiac: Regular rate and rhythm, normal S1,S2  Lungs:  Clear to auscultation  Abd: Soft, non tender, non distended  Ext: No edema  Skin: Warm, dry  Neuro: No focal deficits  B groin sites soft, no hematoma, dressing removed      Medications:     insulin aspart  1-5 Units Subcutaneous Once    atorvastatin  20 mg Oral Nightly    pantoprazole  20 mg Oral BID AC    aspirin  81 mg Oral Daily      nitroGLYCERIN in dextrose 5%      norepinephrine             Assessment:  POD # 1 TAVR #23 Martha 3 Resilia aortic valve via right CFA  On ASA 81 mg po daily  HTN - 120/60  CAD  LHC 1/14/25 with plans for medical management of circumflex  On ASA, statin  Hypercholesterolemia - statin    Adenocarcinoma of the colon   Chronic anemia, AVM's - hgb 9.7    Plan:  Continue ASA, statin   Await results of echo.   Hopeful discharge planning later this afternoon  Follow up Dr. Bird  Plan of care discussed with patient, RN.    WINSTON Lowery  1/24/2025  11:27 AM      I have personally seen and examined patient.   I have independently formulated assessment and plan  I agree with the AP note    Doing well. No complaints  Feels great    A/P:  TAVR post op Day 1  CAD  Hyperlipidemia    Cont with ASA and statin  Follow up on echo  Eventual plan for discharge later today    Thank you for the opportunity to participate in the care of your patient.     Bran Salcedo MD  Interventional Cardiologist  [facility]

## 2025-01-28 PROBLEM — K92.2 GASTROINTESTINAL HEMORRHAGE, UNSPECIFIED GASTROINTESTINAL HEMORRHAGE TYPE: Status: RESOLVED | Noted: 2024-11-25 | Resolved: 2025-01-28

## 2025-01-28 PROBLEM — Z95.2 S/P TAVR (TRANSCATHETER AORTIC VALVE REPLACEMENT): Status: ACTIVE | Noted: 2025-01-28

## 2025-01-28 PROBLEM — K92.2 GI BLEED: Status: RESOLVED | Noted: 2024-11-25 | Resolved: 2025-01-28

## 2025-01-28 PROBLEM — D62 ACUTE BLOOD LOSS ANEMIA: Status: RESOLVED | Noted: 2024-11-29 | Resolved: 2025-01-28

## 2025-01-29 ENCOUNTER — PATIENT OUTREACH (OUTPATIENT)
Dept: CASE MANAGEMENT | Age: 85
End: 2025-01-29

## 2025-01-29 NOTE — PROGRESS NOTES
Per nurse message :    \"Please contact patient for assistance with scheduling a follow-up appointment for  transitional care clinic, if declines then PCP .  Thank you!\"    (Discharged 1/24 [facility])      PCP Request :    Dr. Malathi Stuart  [address] [place] [id number]  [phone]        Attempt #1:  Left message on voicemail for patient to call transitions specialist back to schedule follow up appointments. Provided Transitions specialist scheduling phone number [phone].

## 2025-01-29 NOTE — PROGRESS NOTES
Please contact patient for assistance with scheduling a follow-up appointment for  transitional care clinic, if declines then PCP .  Thank you!

## 2025-01-30 NOTE — PROGRESS NOTES
Per nurse message :     \"Please contact patient for assistance with scheduling a follow-up appointment for  transitional care clinic, if declines then PCP .  Thank you!\"     (Discharged 1/24 [facility])          PCP Request :     Dr. Malathi Stuart  [address] [place] [id number]  [phone]          Attempt #2:  Left message on voicemail for patient to call transitions specialist back to schedule follow up appointments. Provided Transitions specialist scheduling phone number [phone].

## 2025-01-31 NOTE — PROGRESS NOTES
Per nurse message :     \"Please contact patient for assistance with scheduling a follow-up appointment for  transitional care clinic, if declines then PCP .  Thank you!\"     (Discharged 1/24 [facility])           PCP Request :     Dr. Malathi Stuart  [address] [place] [id number]  [phone]  Unable to reach patient after 3rd attempt        Attempt #3:  Left message on voicemail for patient to call transitions specialist back to schedule follow up appointments. Provided Transitions specialist scheduling phone number [phone]. Closing encounter. Will re-open if patient returns call.

## 2025-02-11 ENCOUNTER — OFFICE VISIT (OUTPATIENT)
Dept: PULMONOLOGY | Facility: CLINIC | Age: 85
End: 2025-02-11

## 2025-02-11 VITALS
BODY MASS INDEX: 27.43 KG/M2 | HEART RATE: 76 BPM | HEIGHT: 68 IN | OXYGEN SATURATION: 97 % | WEIGHT: 181 LBS | SYSTOLIC BLOOD PRESSURE: 113 MMHG | DIASTOLIC BLOOD PRESSURE: 62 MMHG | RESPIRATION RATE: 16 BRPM

## 2025-02-11 DIAGNOSIS — J84.9 ILD (INTERSTITIAL LUNG DISEASE) (HCC): ICD-10-CM

## 2025-02-11 DIAGNOSIS — R06.00 DYSPNEA, UNSPECIFIED TYPE: Primary | ICD-10-CM

## 2025-02-11 PROCEDURE — 99213 OFFICE O/P EST LOW 20 MIN: CPT | Performed by: INTERNAL MEDICINE

## 2025-02-11 NOTE — PROGRESS NOTES
Referring Physician  Malathi Stuart MD    History of Present Illness  Patient is a 84-year-old female with underlying history of ILD who presents today for preoperative pulmonary clearance for upcoming colon resection surgery February 27.  States dyspnea symptoms significantly improved over the last several months.  Completed slowly taper of prednisone with no worsening dyspnea symptoms.  Denies significant cough.  Tolerating some ambulation.    Medications  Medications Ordered Prior to Encounter[1]    Allergies  Allergies[2]    Physical Exam  Constitutional: no acute distress  HEENT: PERRL  Neck: supple, no JVD  Cardio: RRR, S1 S2  Respiratory: Faint basilar crackles  GI: abdomen soft, non tender, active bowel sounds, no organomegaly  Extremities: no clubbing, cyanosis, edema  Neurologic: no gross motor deficits  Skin: warm, dry  Lymphatic: no supraclavicular lymphadenopathy     Assessment  1.  ILD  2.  Prior history of pulmonary embolism  3.  Colon adenocarcinoma  4.  Severe aortic stenosis    Plan  -Patient presents today for preoperative clearance for upcoming colon resection surgery February 27.  History of underlying ILD.  Stable from pulmonary perspective at this time.  -Reviewed most recent chest imaging including CT cardiac overread from 1/2/2025 with evidence of subpleural reticulation seen and some groundglass opacities.  Chest x-ray appeared to be stable from 1/24/2025.  -I have ordered baseline pulmonary function testing.  Recommend repeat CT high-resolution chest in 6 months duration.  -Prior history of pulmonary embolism.  Completed course of anticoagulation therapy.  -Okay to proceed with surgery from pulmonary perspective.  Patient well optimized from ILD perspective.    Cody Holloway,   Pulmonary Critical Care Medicine  Located within [facility]  2/11/2025  1:03 PM        [1]   Current Outpatient Medications on File Prior to Visit   Medication Sig Dispense Refill    aspirin 81 MG Oral Tab EC Take 1  tablet (81 mg total) by mouth daily. 30 tablet 11    furosemide (LASIX) 20 MG Oral Tab Take 1 tablet (20 mg total) by mouth daily. Take 40mg daily x 3 days (12/19-12/21/24), then 20mg daily thereafter 90 tablet 3    omeprazole 20 MG Oral Capsule Delayed Release Take 1 capsule (20 mg total) by mouth 2 (two) times daily.      atorvastatin 20 MG Oral Tab Take 1 tablet (20 mg total) by mouth at bedtime.      sulfamethoxazole-trimethoprim -160 MG Oral Tab per tablet Take 1 tablet by mouth 3 (three) times a week. Monday, Wednesday, Friday      Multiple Vitamins-Minerals (MULTI-VITAMIN/MINERALS) Oral Tab Take 1 tablet by mouth daily.      ferrous sulfate 325 (65 FE) MG Oral Tab EC Take 1 tablet (325 mg total) by mouth daily.      Cholecalciferol (VITAMIN D) 50 MCG (2000 UT) Oral Tab Take 4,000 Units by mouth daily. 1 tablet 0    Vitamin B-12 500 MCG Oral Tab Take 1 tablet (500 mcg total) by mouth daily.      Calcium Carb-Cholecalciferol (CALCIUM + D3) 600-200 MG-UNIT Oral Tab Take 1 tablet by mouth daily.       No current facility-administered medications on file prior to visit.   [2] Not on File

## 2025-02-14 ENCOUNTER — TELEPHONE (OUTPATIENT)
Dept: PULMONOLOGY | Facility: CLINIC | Age: 85
End: 2025-02-14

## 2025-02-14 NOTE — TELEPHONE ENCOUNTER
Received progress notes from Henrique Cassidy 2/14/2025, placed in Dr. Holloway folder to review.

## 2025-02-20 ENCOUNTER — LAB ENCOUNTER (OUTPATIENT)
Dept: LAB | Age: 85
End: 2025-02-20
Attending: INTERNAL MEDICINE

## 2025-02-20 ENCOUNTER — OFFICE VISIT (OUTPATIENT)
Dept: INTERNAL MEDICINE CLINIC | Facility: CLINIC | Age: 85
End: 2025-02-20
Payer: MEDICARE

## 2025-02-20 ENCOUNTER — HOSPITAL ENCOUNTER (OUTPATIENT)
Dept: RESPIRATORY THERAPY | Facility: HOSPITAL | Age: 85
Discharge: HOME | End: 2025-02-20
Attending: INTERNAL MEDICINE
Payer: MEDICARE

## 2025-02-20 VITALS
WEIGHT: 173 LBS | HEART RATE: 76 BPM | SYSTOLIC BLOOD PRESSURE: 128 MMHG | RESPIRATION RATE: 16 BRPM | TEMPERATURE: 98 F | BODY MASS INDEX: 25.62 KG/M2 | HEIGHT: 69 IN | OXYGEN SATURATION: 97 % | DIASTOLIC BLOOD PRESSURE: 68 MMHG

## 2025-02-20 DIAGNOSIS — Z01.818 PREOP EXAMINATION: Primary | ICD-10-CM

## 2025-02-20 DIAGNOSIS — C18.9 ADENOCARCINOMA OF COLON (HCC): ICD-10-CM

## 2025-02-20 DIAGNOSIS — R06.00 DYSPNEA, UNSPECIFIED TYPE: ICD-10-CM

## 2025-02-20 PROCEDURE — 94060 EVALUATION OF WHEEZING: CPT | Performed by: INTERNAL MEDICINE

## 2025-02-20 PROCEDURE — 94726 PLETHYSMOGRAPHY LUNG VOLUMES: CPT | Performed by: INTERNAL MEDICINE

## 2025-02-20 PROCEDURE — 99214 OFFICE O/P EST MOD 30 MIN: CPT | Performed by: INTERNAL MEDICINE

## 2025-02-20 PROCEDURE — 94729 DIFFUSING CAPACITY: CPT | Performed by: INTERNAL MEDICINE

## 2025-02-20 RX ORDER — ERYTHROMYCIN 500 MG/1
2 TABLET, COATED ORAL AS DIRECTED
COMMUNITY
Start: 2025-02-14

## 2025-02-20 RX ORDER — ALENDRONATE SODIUM 70 MG/1
70 TABLET ORAL WEEKLY
COMMUNITY
Start: 2025-01-15

## 2025-02-20 RX ORDER — METOCLOPRAMIDE 10 MG/1
1 TABLET ORAL AS DIRECTED
COMMUNITY
Start: 2025-02-14

## 2025-02-20 RX ORDER — POLYETHYLENE GLYCOL 3350, SODIUM CHLORIDE, SODIUM BICARBONATE, POTASSIUM CHLORIDE 420; 11.2; 5.72; 1.48 G/4L; G/4L; G/4L; G/4L
1 POWDER, FOR SOLUTION ORAL AS DIRECTED
COMMUNITY
Start: 2025-02-14

## 2025-02-20 RX ORDER — NEOMYCIN SULFATE 500 MG/1
2 TABLET ORAL SEE ADMIN INSTRUCTIONS
COMMUNITY
Start: 2025-02-14

## 2025-02-20 RX ORDER — PREDNISONE 10 MG/1
10 TABLET ORAL DAILY
COMMUNITY
Start: 2025-01-15

## 2025-02-20 RX ORDER — FUROSEMIDE 40 MG/1
TABLET ORAL
COMMUNITY
Start: 2024-12-19 | End: 2025-02-21 | Stop reason: DRUGHIGH

## 2025-02-20 NOTE — PROGRESS NOTES
Hoda Busby is a 84 year old female.  Chief Complaint   Patient presents with    Pre-Op Exam     2/27 [facility] Colon Resection, Dr De Leon     HPI:     Pre-op eval for robotic resection of splenic flexure of colon by Dr. Cassidy on 2/27/25 for newly dx'ed colon cancer.    Had TAVR on 1/23/25.  Notes that her breathing is much improved.  No longer winded when walking.   Cardiac cath 1/14/25 -- LAD 80-90% and 30-40% stenosis, Cx 60-70% stenosis.  IVUS of LAD not significant.  Pt will need eventual PCI/stenting but Dr. Liz opted not to do at the time of cath as pt would require DAPT x 6 mos, which would preclude the upcoming colon resection.  To see Dr. Bird again in March to discuss stents.    Saw pulfatmata Holloway and radha Bird for pre-op eval -- okay'ed by both    To have repeat CT chest in 5/2025    Doing PT for her foot drop and deconditioning.  Doing much better  Able to climb 2 flights of stairs.        Current Outpatient Medications   Medication Sig Dispense Refill    alendronate 70 MG Oral Tab Take 1 tablet (70 mg total) by mouth once a week.      erythromycin base 500 MG Oral Tab Take 2 tablets (1,000 mg total) by mouth As Directed.      metoclopramide 10 MG Oral Tab Take 1 tablet (10 mg total) by mouth As Directed.      neomycin 500 MG Oral Tab Take 2 tablets (1,000 mg total) by mouth See Admin Instructions.      PEG 3350-KCl-Na Bicarb-NaCl 420 g Oral Recon Soln Take 4,000 mL by mouth As Directed.      predniSONE 10 MG Oral Tab Take 1 tablet (10 mg total) by mouth daily.      furosemide 40 MG Oral Tab       aspirin 81 MG Oral Tab EC Take 1 tablet (81 mg total) by mouth daily. 30 tablet 11    furosemide (LASIX) 20 MG Oral Tab Take 1 tablet (20 mg total) by mouth daily. Take 40mg daily x 3 days (12/19-12/21/24), then 20mg daily thereafter 90 tablet 3    omeprazole 20 MG Oral Capsule Delayed Release Take 1 capsule (20 mg total) by mouth 2 (two) times daily.      atorvastatin 20 MG Oral Tab  Take 1 tablet (20 mg total) by mouth at bedtime.      sulfamethoxazole-trimethoprim -160 MG Oral Tab per tablet Take 1 tablet by mouth 3 (three) times a week. Monday, Wednesday, Friday      Multiple Vitamins-Minerals (MULTI-VITAMIN/MINERALS) Oral Tab Take 1 tablet by mouth daily.      ferrous sulfate 325 (65 FE) MG Oral Tab EC Take 1 tablet (325 mg total) by mouth daily.      Cholecalciferol (VITAMIN D) 50 MCG (2000 UT) Oral Tab Take 4,000 Units by mouth daily. 1 tablet 0    Vitamin B-12 500 MCG Oral Tab Take 1 tablet (500 mcg total) by mouth daily.      Calcium Carb-Cholecalciferol (CALCIUM + D3) 600-200 MG-UNIT Oral Tab Take 1 tablet by mouth daily.        Past Medical History:    Acute pulmonary embolism (HCC)    Cancer (HCC)    Cataract    Colon cancer (HCC)    Heart valve disease    High blood pressure    High cholesterol    History of blood transfusion    History of stomach ulcers    History of transcatheter aortic valve replacement (TAVR)    Macular degeneration    Osteoarthritis    Osteopenia    Primary osteoarthritis of right hip    Pulmonary embolism (HCC)    Visual impairment    Readers    White coat hypertension      Social History:  Social History     Socioeconomic History    Marital status:    Tobacco Use    Smoking status: Former     Current packs/day: 0.00     Types: Cigarettes     Start date: 1990     Quit date: 1990     Years since quittin.1     Passive exposure: Past    Smokeless tobacco: Former   Vaping Use    Vaping status: Never Used   Substance and Sexual Activity    Alcohol use: Yes     Alcohol/week: 5.0 standard drinks of alcohol     Types: 5 Glasses of wine per week     Comment: wine, occasionally    Drug use: No   Other Topics Concern    Caffeine Concern Yes     Comment: coffee/soda - 2cups/day     Social Drivers of Health     Food Insecurity: No Food Insecurity (2025)    Food Insecurity     Food Insecurity: Never true   Transportation Needs: No  Transportation Needs (1/23/2025)    Transportation Needs     Lack of Transportation: No   Housing Stability: Low Risk  (1/23/2025)    Housing Stability     Housing Instability: No        REVIEW OF SYSTEMS:   GENERAL HEALTH: feels well otherwise  RESPIRATORY: no SOB  CARDIOVASCULAR: no chest pain/pressure  GI: no nausea, vomiting, diarrhea    Wt Readings from Last 5 Encounters:   02/11/25 181 lb (82.1 kg)   01/23/25 183 lb 6.8 oz (83.2 kg)   01/13/25 174 lb (78.9 kg)   01/08/25 182 lb (82.6 kg)   12/24/24 170 lb (77.1 kg)     Body mass index is 26.73 kg/m².      EXAM:   /68   Pulse 76   Temp 97.8 °F (36.6 °C) (Oral)   Resp 16   Ht 5' 9\" (1.753 m)   SpO2 97%   BMI 26.73 kg/m²   GENERAL: well developed, well nourished, in no apparent distress    NECK: supple, no adenopathy, no bruits  LUNGS: fine crackles at R>L base  CARDIO: RRR, normal S1S2, 1/6 WADE RUSB  GI: soft, NT, ND, NABS  EXTREMITIES: no LE edema    ASSESSMENT AND PLAN:       Pre-op evaluation  Adenocarcinoma of colon  -Bx of transverse colon polyp 12/17/24 -- invasive, moderately differentiated adenocarcinoma  -seen in hosp by Dr. Cassidy from gen surg  -CEA normal (1.9)  -CT a/p neg for mets  -surgery delayed in anticipation of TAVR (done 1/23/25)  -pt has seen pulm (Dr. Baez) and cards (Dr. Bird)  -EKG okay; has known CAD but deemed stable and okay to delay stenting until after colon surgery  -pt is able to achieve 4 METs of activity w/o CP or SOB  -pt is deemed acceptable risk for colon resection from my standpoint  -discussed with pt that I do not think a pre-op IVC filter is necessary, as her previous PE's were small and LE venous dopplers were negative for DVT.  Will give SQH post-operatively, along with SCD's and early ambulation    _______________________________________________    Coronary artery disease  -Cath done 1/14/25 by Dr. Bernardo Liz (as w/u for TAVR) -- RCA non-obstructive; LM free of obst disease; LM plaque extending into  ostium of LAD (20-30%); mid LAD (80-90%), cx ostium (60-70%)  -intervention to be delayed until after colon resection due to need for DAPT x 6 mos after stenting   -pt has f/u with Dr. Bird in 3/2025  -started on Atorvastatin 20mg/day, ASA 81mg/day    Hx  GI bleed  -EGD 11/26/24 (Dr. Nuno) -15mm nonbleeding gastric antral ulcer; 3mm gastric antral AVM s/p APC  -recurrence in 12/2024  -colonoscopy and repeat EGD 12/17/24 by Dr. Staton -- grade 1 esophagitis; duod ulcers healing; large flat polyp in transverse colon (carcinoma)   -received total of 3 units PRBCs   -Xarelto stopped in 12/2024  -s/p omeprazole 20mg BID x 8 weeks (EOT 1/22/25), now on omeprazole 20mg daily -- would continue until she completes her upcoming procedures (colon resection, coronary stenting)     Anemia due to acute blood loss  -GI bleed as above  -Hgb 9.7 on 1/24/25  -check repeat CBC today  -still taking daily FeSO4 -- to stop on 2/23/25, prior to upcoming surgery     BLE edema  -Lasix 20mg/day  -improved     Acute left peroneal palsy  In Nov 2024; had numbness to anterolateral left foot and ankle as well as inability to dorsiflex left foot; etiology unclear  -neurologist Dr Richardson consulted in hosp  -MRI Lumbar spine showed severe multilevel DJD   -head CT shows no acute intracranial hemorrhage, midline shift, mass effect, or hydrocephalus.   -MRI brain shows no acute intracranial process  -left foot drop persists but continues to improve; still doing PT     Interstitial pneumonitis  -dx'ed 10/2024 - presented with cough and severe hypoxia  -unresponsive to abx   -CXR 10/24/24 extensive bilateral perihilar and bilateral upper and lower lobe   -S.pneumo, legionella Ag , mycoplasma IgM/G, Fungitell-beta-D glucan negative  -ANCA and URIEL/connective tissue disease panels negative  -anti-histone and anti-Keara Ab's negative  -CT with bilateral ground glass opacities, small consolidations of BLL  -per pulm, findings suspicious for NSIP (vs cryptogenic  organizing pneumonia vs hypersensitivity pneumonitis); NOT thought to be c/w UIP pattern  -s/p empiric steroids (solumedrol then prednisone taper) 10/25/24 - (EOT 1/18/25)  -clinically much improved; breathing w/o difficulty  -to have repeat CT chest in 5/2025     Bilateral pulmonary emboli  -dx'ed at time of interstitial pneumonitis  -CT chest 10/25/24 -- acute distal segmental/subsegmental pulmonary emboli in RUL and subsegmental PE in [place]  -unclear etiology; no recent hx of long travel; no family/personal hx of blood clots  -BLE venous dopplers negative 10/26/24  -started xarelto 20 mg po every day on 11/28/24  -repeat CT chest 12/9/24 neg PE  -stopped Xarelto 12/14/24 due to recurrent GI bleed     Severe aortic stenosis  -progression on serial echocardiograms  -s/p TAVR 1/23/25 (Dr. Reji Green)  -symptomatically much improved -- POPE resolved  -followed by Dr. Bird     Hypertension, primary  -(dyazide stopped due to NANCY)  -(amlodipine held due to low BP in 11/2024)  -BP remains normal off meds     Hx of hip OA  -s/p left THR (Dr. Lo) many years ago  -s/p elective R BEATRIZ on 10/5/23 by Dr. Diaz     Hyperlipidemia   Pt with strong family hx of high cholesterol  Started on Atorvastatin 20mg/day in 1/2025 (after noted to have CAD on cath)     Osteopenia   On Fosamax from 2011 to 4/2016.     DEXA stable 5/10/17 and 2019 and 2021  DEXA 8/2024 -- osteoporosis of left forearm  -restarted Fosamax 8/2024 (held briefly in 12/2024; now restarted)     Vitamin D deficiency  On vitamin D 4000u/day     The patient indicates understanding of these issues and agrees to the plan.    Malathi Stuart MD, 02/20/25, 3:16 PM

## 2025-02-27 NOTE — ANESTHESIA PROCEDURE NOTES
Arterial Line    Date/Time: 2/27/2025 10:18 AM    Performed by: Castro Black MD  Authorized by: Castro Black MD    General Information and Staff    Procedure Start:  2/27/2025 10:18 AM  Procedure End:  2/27/2025 10:20 AM  Anesthesiologist:  Rhys Mccormick MD  Performed By:  Anesthesiologist  Patient Location:  OR  Indication: continuous blood pressure monitoring and blood sampling needed    Site Identification: real time ultrasound guided and surface landmarks    Preanesthetic Checklist: 2 patient identifiers, IV checked, risks and benefits discussed, monitors and equipment checked, pre-op evaluation, timeout performed, anesthesia consent and sterile technique used    Procedure Details    Catheter Size:  20 G  Catheter Length:  1 and 3/4 inch  Catheter Type:  Arrow  Seldinger Technique?: Yes    Laterality:  Left  Site:  Radial artery  Site Prep: chlorhexidine    Line Secured:  Tape and Tegaderm    Assessment    Events: patient tolerated procedure well with no complications      Medications  2/27/2025 10:18 AM      Additional Comments

## 2025-02-27 NOTE — HISTORICAL OFFICE NOTE
CHIEF COMPLAINT    CHIEF COMPLAINT  Reason for Visit/Chief Complaint   hospital f/u  [facility] requesting CRA for robotic resection of splenic flexure colon cancer 2/27   Ms. Busby presents for hospital follow up. She follows with Dr. Bird has a history of aortic stenosis (status post TAVR 1/23/2025), CAD (multivessel disease noted on recent cardiac catheterization from 1/14/2025) hypertension, hyperlipidemia.  She underwent TAVR on 1/23/2025 with a PUNEET 3 Resilia aortic valve. She is scheduled for colon resection due to colon cancer at the end of the month which will need to be completed prior to PCI intervention. She presents today with her nieces and is doing well overall. Denies chest pain, shortness of breath, palpitations, dizziness. She has noted an improvement in her shortness of breath since prior to her valve replacement. Incision site is healed well without drainage, she denies fevers or chills. EKG today with sinus rhythm, 78 bpm     PROBLEMS  Reconcile with Patient's ChartPROBLEMS  Problem Effective Dates Date resolved Problem Status   Pre-operative clearance, [SNOMED-CT: 89876064] 2/4/2025 - Active   History of mitral valve insufficiency, [SNOMED-CT: 380434780] 9/22/2023 - Active   Hypercholesterolemia, familial , [SNOMED-CT: 594231174] 9/22/2023 - Active   Hypertension (HTN), primary, [SNOMED-CT: 01932215] 9/22/2023 - Active     ENCOUNTER    ENCOUNTER  Problem Effective Dates Date resolved Problem Status   Aortic stenosis, moderate, [SNOMED-CT: 829699220] 9/25/2023 - Active   Hypertension (HTN), primary, [SNOMED-CT: 55044757] 9/22/2023 - Active     VITAL SIGNS    VITAL SIGNS  Date / Time: 2/4/2025   BP Systolic 130 mmHg   BP Diastolic 72 mmHg   Height 69 inches   Weight 179 lbs   Pulse Rate 84 bpm   BSA (Body Surface Area) 2 cc/m2   BMI (Body Mass Index) 26.4 cc/m2   Blood Pressure 130 / 72 mmHg     PHYSICAL EXAMINATION    PHYSICAL EXAMINATION  Header Details   Constitutional 95%o2   Vitals Left Arm  Sitting  / 72 mmHg, Pulse rate 84 bpm, Regular, Height in 5' 9\", BMI: 26.4, Weight in 178.57 lbs (or) 81 kgs, BSA : 2 cc/m²   General Appearance No Acute Distress   Respiratory Lungs clear with normal breath sounds   Cardiovascular Intact distal pulses, Regular rhythm. Normal rate present.   Lower Extremities No edema   Neurologic / Psychiatric Alert and Oriented     ALLERGIES, ADVERSE REACTIONS, ALERTS    No data available    MEDICATIONS ADMINISTERED DURING VISIT    No data available    MEDICATIONS    MEDICATIONS  Medication Start Date Route/Frequency Status   alendronate 70 MG Oral Tab, [RxNorm: 247172] 1/15/2025 Take 1 tablet (70 mg total) by mouth every 7 days. Active   atorvastatin 20 MG Oral Tab, [RxNorm: 064681] 1/15/2025 Take 1 tablet (20 mg total) by mouth at bedtime. Active   Calcium 600 + D(3) 600 mg-10 mcg (400 unit) tablet, [RxNorm: 343601] 9/25/2023 Take 1 tablet orally once a day. Active   ferrous sulfate 325 (65 FE) MG Oral Tab EC, [RxNorm: 551047] 1/15/2025 Take 1 tablet (325 mg total) by mouth daily. Active   furosemide (LASIX) 20 MG Oral Tab, [RxNorm: 058887] 1/15/2025 Take 1 tablet (20 mg total) by mouth daily. Take 40mg daily x 3 days (12/19-12/21/24), then 20mg daily thereafter Active   Multiple Vitamins-Minerals (MULTI-VITAMIN/MINERALS) Oral Tab, [RxNorm: 0] 1/15/2025 Take 1 tablet by mouth daily. Active   omeprazole 20 MG Oral Capsule Delayed Release, [RxNorm: 615522] 1/15/2025 Take 1 capsule (20 mg total) by mouth 2 (two) times daily. Active   polyethylene glycol, PEG 3350, 17 g Oral Powd Pack, [RxNorm: 583628] 1/15/2025 Take 17 g by mouth daily. Active   predniSONE 10 MG Oral Tab, [RxNorm: 099264] 1/15/2025 Take 3 tablets (30 mg total) by mouth daily with breakfast for 9 days, THEN 2 tablets (20 mg total) daily with breakfast for 10 days, THEN 1 tablet (10 mg total) daily with breakfast for 10 days. Active   PreserVision AREDS-2 250 mg-90 mg-40 mg-1 mg capsule, [RxNorm: 0] 9/25/2023  Take 1 capsule orally once a day. Active   sulfamethoxazole-trimethoprim -160 MG Oral Tab per tablet, [RxNorm: 280583] 1/15/2025 Take 1 tablet by mouth 3 (three) times a week. Monday, Wednesday, Friday Active   Vitamin B-12 500 mcg tablet, [RxNorm: 032223] 9/22/2023 Take 1 tablet orally once a day. Active   Vitamin D3 50 mcg (2,000 unit) tablet, [RxNorm: 409597] 9/25/2023 Take 1 tablet orally once a day. Active     ASSESSMENT    Aortic stenosis, s/p TAVR 1/23/25. Doing well from a cardiac standpoint. Continue aspirin. CAD, multivessel disease with plans for PCI after colon resection. Denies anginal symptoms. Will reassess timing of procedure at next office visit Preoperative clearance, per Dr. Bird, patient requires echocardiogram prior to clearance and will ideally continue aspirin perioperatively. Plan:  -Echocardiogram prior to preoperative clearance. If stable then patient would be at acceptable however elevated risk for colon resection. Advised to continue cardiac medications perioperatively  -Follow up with Dr. Bird as scheduled to discuss timing of PCI     FAMILY HISTORY    FAMILY HISTORY  Relationship Age Diagnosis   Mother 0 Stroke; Hypertension   Brother 0 Heart problem; HHD (hypertensive heart disease)     GENERAL STATUS    No data available    PAST MEDICAL HISTORY    PAST MEDICAL HISTORY  Problem Date diagonsed Date resolved Status   Pre-operative clearance, [SNOMED-CT: 47785306] 2/4/2025 - Active   History of mitral valve insufficiency, [SNOMED-CT: 844009165] 9/22/2023 - Active   Hypercholesterolemia, familial , [SNOMED-CT: 690064606] 9/22/2023 - Active   Hypertension (HTN), primary, [SNOMED-CT: 72526759] 9/22/2023 - Active     HISTORY OF PRESENT ILLNESS    Ms. Busby presents for hospital follow up. She follows with Dr. Bird has a history of aortic stenosis (status post TAVR 1/23/2025), CAD (multivessel disease noted on recent cardiac catheterization from 1/14/2025) hypertension,  hyperlipidemia.  She underwent TAVR on 1/23/2025 with a PUNEET 3 Resilia aortic valve. She is scheduled for colon resection due to colon cancer at the end of the month which will need to be completed prior to PCI intervention. She presents today with her nieces and is doing well overall. Denies chest pain, shortness of breath, palpitations, dizziness. She has noted an improvement in her shortness of breath since prior to her valve replacement. Incision site is healed well without drainage, she denies fevers or chills. EKG today with sinus rhythm, 78 bpm     IMMUNIZATIONS    No data available    PLAN OF CARE    PLAN OF CARE  Planned Care Date   EKG (electrocardiogram) 1/1/1900   Echocardiography - Complete ([facility]) 1/1/1900   Referral Visit - Malathi Stuart [email or web address]) : 1/1/1900     PROCEDURES    No data available    RESULTS    RESULTS  Name Result Date Location - Ordered By   ABO BLOOD TYPE [LOINC: 44209823] A 01/20/2025 10:55:00 AM [facility] BLOOD BANK LAB ([facility])  Address: [address] [place] [address]  tel:   RH BLOOD TYPE [LOINC: 92708632] Positive 01/20/2025 10:55:00 AM [facility] BLOOD BANK LAB ([facility])  Address: [address] [place] [address]  tel:   ANTIBODY SCREEN [LOINC: 4110334] Negative 01/20/2025 10:55:00 AM [facility] BLOOD BANK LAB ([facility])  Address: [address] [place] [address]  tel:   GLUCOSE [LOINC: 2339-0] 121 mg/dL [High] 01/20/2025 10:55:00 AM [facility] LAB ([facility])  Address: [address] [facility]  [address]  tel:   SODIUM [LOINC: 2951-2] 141 mmol/L 01/20/2025 10:55:00 AM [facility] LAB ([facility])  Address: [address]  [facility]  [address]  tel:   POTASSIUM [LOINC: 2823-3] 3.7 mmol/L 01/20/2025 10:55:00 AM [facility] LAB ([facility])  Address: [address]  [facility]  [address]  tel:   CHLORIDE [LOINC: 2075-0] 109 mmol/L 01/20/2025 10:55:00 AM [facility] LAB ([facility])  Address: [address]  [facility]  [address]  tel:   CO2 [LOINC: 2028-9] 25.0 mmol/L 01/20/2025 10:55:00 AM [facility] LAB ([facility])  Address: [address]  [facility]  [address]  tel:   ANION GAP [LOINC: 33037-3] 7 mmol/L 01/20/2025 10:55:00 AM [facility] LAB ([facility])  Address: [address]  [facility]  [address]  tel:   BUN [LOINC: 6299-2] 21 mg/dL 01/20/2025 10:55:00 AM [facility] LAB ([facility])  Address: [address]  [facility]  [address]  tel:   CREATININE [LOINC: 68986-5] 1.04 mg/dL [High] 01/20/2025 10:55:00 AM [facility] LAB ([facility])  Address: [address]  [facility]  [address]  tel:   BUN/ CREAT RATIO [LOINC: 3097-3] 20.2 [High] 01/20/2025 10:55:00 AM [facility] LAB ([facility])  Address: [address]  [facility]  [address]  tel:   CALCIUM [LOINC: 97267-0] 9.3 mg/dL 01/20/2025 10:55:00 AM [facility] LAB ([facility])  Address: [address]  [facility]  [address]  tel:   OSMOLALITY CALCULATED [LOINC: 73748-7] 296 mOsm/kg [High] 01/20/2025 10:55:00 AM [facility] LAB ([facility])  Address: [address]  [facility]  [address]  tel:   E GFR CR [LOINC: 67542-5] 53 mL/min/1.73m2 [Low] 01/20/2025 10:55:00 AM [facility] LAB ([facility])  Address: [address]  [facility]  [address]  tel:   ALT [LOINC: 1742-6] 19 U/L 01/20/2025 10:55:00 AM [facility] LAB ([facility])  Address: [address]  [facility]  [address]  tel:   AST [LOINC: 1920-8] 23 U/L 01/20/2025 10:55:00 AM [facility] LAB ([facility])  Address: [address]  [facility]  [address]  tel:   ALKALINE PHOSPHATASE [LOINC: 1779-8] 70 U/L 01/20/2025 10:55:00 AM  [facility] LAB ([facility])  Address: [address]  [facility]  [address]  tel:   BILIRUBIN, TOTAL [LOINC: 1975-2] 0.5 mg/dL 01/20/2025 10:55:00 AM [facility] LAB ([facility])  Address: [address]  [facility]  [address]  tel:   TOTAL PROTEIN [LOINC: 2885-2] 6.0 g/dL 01/20/2025 10:55:00 AM [facility] LAB ([facility])  Address: [address]  [facility]  [address]  tel:   ALBUMIN [LOINC: 1751-7] 3.9 g/dL 01/20/2025 10:55:00 AM [facility] LAB ([facility])  Address: [address]  [facility]  [address]  tel:   GLOBULIN [LOINC: 27305-9] 2.1 g/dL 01/20/2025 10:55:00 AM [facility] ([facility])  Address: [address]  [facility]  [address]  tel:   A/G RATIO [LOINC: 1759-0] 1.9 01/20/2025 10:55:00 AM [facility] LAB ([facility])  Address: [address]  [facility]  [address]  tel:   FASTING PATIENT CMP ANSWER [LOINC: 57916-1] No 01/20/2025 10:55:00 AM [facility] LAB ([facility])  Address: [address]  [facility]  [address]  tel:   PRO-BETA NATRIURETIC PEPTIDE [LOINC: 65440-4] 755 pg/mL [High] 01/20/2025 10:55:00 AM [facility] LAB ([facility])  Address: [address]  [facility]  [address]  tel:   GLUCOSE [LOINC: 2339-0] 92 mg/dL 01/02/2025 09:31:00 AM [facility] LAB ([facility])  Address: [address] [place] [address]  [address]  tel:   SODIUM [LOINC: 2951-2] 139 mmol/L 01/02/2025 09:31:00 AM [facility] LAB ([facility])  Address: [address] [place] [address]  [address]  tel:   POTASSIUM [LOINC: 2823-3] 4.2 mmol/L 01/02/2025 09:31:00 AM [facility] LAB ([facility])  Address: [address] [place] [address]  [address]  tel:   CHLORIDE [LOINC: 2075-0] 107 mmol/L 01/02/2025 09:31:00 AM [facility] LAB ([facility])  Address: [address] [place] [address]  [place] [address]  tel:   CO2 [LOINC: 2028-9] 27.0 mmol/L 01/02/2025 09:31:00 AM [facility] LAB ([facility])  Address: [address] [place] [address]  tel:   ANION GAP [LOINC: 33037-3] 5 mmol/L 01/02/2025 09:31:00 AM [facility] LAB ([facility])  Address: [address] [place] [address]  tel:   BUN [LOINC: 6299-2] 23 mg/dL 01/02/2025 09:31:00 AM [facility] LAB ([facility])  Address: [address] [place] [address]  tel:   CREATININE [LOINC: 53016-2] 0.98 mg/dL 01/02/2025 09:31:00 AM [facility] ([facility])  Address: [address] [place] [address]  tel:   CALCIUM [LOINC: 86952-5] 9.1 mg/dL 01/02/2025 09:31:00 AM [facility] LAB ([facility])  Address: [address] [place] [address]  tel:   OSMOLALITY CALCULATED [LOINC: 31363-7] 291 mOsm/kg 01/02/2025 09:31:00 AM [facility] ([facility])  Address: [address] [place] [address]  tel:   E GFR CR [LOINC: 61511-2] 57 mL/min/1.73m2 [Low] 01/02/2025 09:31:00 AM [facility] ([facility])  Address: [address] [place] [address]  tel:   FASTING PATIENT BMP ANSWER [LOINC: 77147-7] No 01/02/2025 09:31:00 AM [facility] ([facility])  Address: [address] [place] [address]  tel:   WBC [LOINC: 6690-2] 11.4 x10(3) uL [High] 01/02/2025 09:31:00 AM [facility] ([facility])  Address: [address] [place] [address]  [address]  tel:   RED BLOOD COUNT [LOINC: 789-8] 3.92 x10(6)uL 01/02/2025 09:31:00 AM [facility] ([facility])  Address: [address] [place] [address]  [address]  tel:   HGB [LOINC: 718-7] 10.6 g/dL [Low] 01/02/2025 09:31:00 AM [facility] LAB ([facility])  Address: [address] [place] [address]  [address]  tel:   HCT [LOINC: 4544-3] 34.8 % [Low]  01/02/2025 09:31:00 AM [facility] LAB ([facility])  Address: [address] [place] [address]  [address]  tel:   PLATELETS [LOINC: 777-3] 490.0 10(3)uL [High] 01/02/2025 09:31:00 AM [facility] ([facility])  Address: [address] [place] [address]  [address]  tel:   MEAN CELL VOLUME [LOINC: 787-2] 88.8 fL 01/02/2025 09:31:00 AM [facility] LAB ([facility])  Address: [address] [place] [address]  [address]  tel:   MEAN CORPUSCULAR HEMOGLOBIN [LOINC: 785-6] 27.0 pg 01/02/2025 09:31:00 AM [facility] ([facility])  Address: [address] [place] [address]  tel:   MEAN CORPUSCULAR HGB CONC [LOINC: 786-4] 30.5 g/dL [Low] 01/02/2025 09:31:00 AM [facility] ([facility])  Address: [address] [place] [address]  tel:   RED CELL DISTRIBUTION WIDTH CV [LOINC: 788-0] 17.7 % 01/02/2025 09:31:00 AM [facility] ([facility])  Address: [address] [place] [address]  tel:   Trans Thoracic Echocardiogram 1.The left ventricle is normal in size. Left ventricular wall thickness is mildly increased. Global left ventricular systolic function is normal. The left ventricular ejection fraction is 65%. No regional wall motion abnormalities. The left ventricle diastolic function is impaired (Grade II) with an elevated left atrial pressure.2.Moderately to severely calcified and restricted trileaflet aortic valve. Severe aortic stenosis. Peak velocity 4.42 m/s. Mean gradient 47 mm Hg. KIKE (VTI) 0.91 cm². DI 0.29. Trivial aortic regurgitation.3.Moderate mitral annular calcification. Mild to moderate mitral regurgitation.4.The left atrium is moderately to severely dilated.5.Mild to moderate tricuspid regurgitation.6.The pulmonary artery systolic pressure is mildly increased, estimated at 45 mm Hg. 10/18/2024 12:30:00 PM Viktor Bird MD     REVIEW OF SYSTEMS    REVIEW OF  SYSTEMS  Header Details   Cardiovascular No history of Chest pain, POPE, Palpitations, Syncope, PND, Orthopnea, Edema, Claudication   Respiratory No history of SOB, Wheezing, Sputum   Hem/Lymphatic No history of Easy bruising, Blood clots, Hx of blood transfusion, Anemia, Bleeding problems     SOCIAL HISTORY    SOCIAL HISTORY  Social History Element Description Effective Dates   Smoking status Former smoker -     FUNCTIONAL STATUS    No data available    MEDICAL EQUIPMENT    No data available    Goals Sections    No data available    REASON FOR REFERRAL         Health Concerns Section    No data available    COGNITIVE/MENTAL STATUS    No data available    Patient Demographics    Patient Demographics  Patient Address Patient Name Communication   [address] [place] [address] Hoda Busby [phone] (Mobile)     Patient Demographics  Language Race / Ethnicity Marital Status   English - Spoken (Preferred) White / Not  or       Document Information    Primary Care Provider Other Service Providers Document Coverage Dates   Yessi Krishnamurthy  NPI: [id number]  [phone] (Work)  [address], [address]  [place] [address]  [place] [address]  Interpreting Physicians  [facility]  [phone] (Work)  [address] [place] [address] Debi Enriquez  NPI: [id number]  [phone] (Work)  [address], [address]  [place] [address]  [place] [address]  Nurses Feb. 04, 2025February 04, 2025      Organization   [facility]  [phone] (Work)  [address], [address]  [place] [address]  [place] [address]     Encounter Providers Encounter Date    Feb. 04, 2025February 04, 2025     Legal Authenticator    Yessi Krishnamurthy  NPI: [id number]  [phone] (Work)  [address], [address]  [place] [address]  [place] [address]

## 2025-02-27 NOTE — ANESTHESIA PROCEDURE NOTES
Airway  Date/Time: 2/27/2025 10:20 AM  Urgency: Elective      General Information and Staff    Patient location during procedure: OR  Resident/CRNA: Zoe Allen CRNA  Performed: CRNA   Performed by: Zoe Allen CRNA  Authorized by: Castro Black MD      Indications and Patient Condition  Indications for airway management: anesthesia  Sedation level: deep  Preoxygenated: yes  Patient position: sniffing  Mask difficulty assessment: 1 - vent by mask    Final Airway Details  Final airway type: endotracheal airway      Successful airway: ETT  Cuffed: yes   Successful intubation technique: direct laryngoscopy  Endotracheal tube insertion site: oral  Blade: Vi  Blade size: #3  ETT size (mm): 7.0    Cormack-Lehane Classification: grade I - full view of glottis  Placement verified by: capnometry   Measured from: teeth  ETT to teeth (cm): 21  Number of attempts at approach: 1  Ventilation between attempts: BVM  Number of other approaches attempted: 1

## 2025-02-27 NOTE — ANESTHESIA PROCEDURE NOTES
Peripheral IV  Date/Time: 2/27/2025 10:22 AM  Inserted by: Castro Black MD    Placement  Needle size: 18 G  Laterality: right  Location: hand  Site prep: alcohol  Technique: anatomical landmarks  Attempts: 1

## 2025-02-27 NOTE — ANESTHESIA POSTPROCEDURE EVALUATION
Patient: Hoda Busby    Procedure Summary       Date: 02/27/25 Room / Location: [facility] MAIN OR 06 / [facility] MAIN OR    Anesthesia Start: 1009 Anesthesia Stop: 1439    Procedure: Robotic left hemicolectomy, mobilization of splenic flexure, omental flap closure (Abdomen) Diagnosis: (Splenic flexure colon cancer)    Surgeons: Viktor Cassidy MD Anesthesiologist: Castro Black MD    Anesthesia Type: general ASA Status: 3            Anesthesia Type: general    Vitals Value Taken Time   /72 02/27/25 1435   Temp 97.0 02/27/25 1440   Pulse 72 02/27/25 1439   Resp 15 02/27/25 1439   SpO2 96 % 02/27/25 1439   Vitals shown include unfiled device data.    [facility] AN Post Evaluation:   Patient Evaluated in PACU  Patient Participation: complete - patient participated  Level of Consciousness: awake  Pain Score: 0  Pain Management: adequate  Airway Patency:patent  Yes    Nausea/Vomiting: none  Cardiovascular Status: acceptable  Respiratory Status: acceptable  Postoperative Hydration acceptable  Comments: PATIENT TOLERATED PROCEDURE WELL.  VSS.  AWAKE, GOOD SPONT VENTILATION.  TO PAR, O2 FACEMASK.      Zoe Allen CRNA  2/27/2025 2:40 PM

## 2025-02-27 NOTE — DISCHARGE INSTRUCTIONS
Dr. Viktor Cassidy   [phone]    DISCHARGE INSTRUCTIONS-LAPAROSCOPIC/ROBOTIC BOWEL RESECTION    Activity can be resumed as tolerated including walking, stairs, light exercise and driving short distances.  Heavy lifting (i.e. objects > 15-20 lbs.) is to be avoided for 6-8 weeks.  Normal time off work is one to two weeks.    Incisional  pains are commonly of moderate intensity in the first 24-48 hours and gradually improve over the initial post-operative week.  Please take tylenol extra strength 2   500mg tabs every 8 hours around the clock.  A prescription for Celebrex (celecoxib) will be sent to your pharmacy. Please take until the prescription is complete. Prescription pain medication (Tramadol) should be used initially according to the pain experienced and gradually weaned over the next few days.  Prescription pain medication can make you nauseated, light-headed and constipated: it should be taken with food and discontinued if side-effects develop.  A prescription for  stool softener  (Colace) is helpful to avoid constipation. Take 1 orally twice a day.   If  no bowel movement within 48 hours, MOM 30 mls may be helpful.   You should avoid advil, motrin, ibuprofen, or aleve as it may cause problems with your anastomosis with delayed healing.      Your diet should consist low residual diet for 3 weeks then transition to a high-fiber diet.     You may restart taking Aspirin tomorrow morning following discharge from the hospital and all your previous medications tonight at your regular time and dose unless instructed otherwise.        The dressing should not be removed until the first office visit.  You may shower in 24 hours, no bath or swimming for 4 weeks from your surgery date.  Apply an ice bag over your dressing for 72 hours.  No driving for 5 days or until off pain medication.   If the gauze dressing gets wet remove the Tegaderm and gauze but leave the Steri-Strips in place.      Activity after  surgery  After surgery, take it easy for the rest of the day. If you had general anesthesia, don’t use machinery or power tools, drink alcohol, or make any major decisions for at least the first 24 hours.  Don’t drive while you are still taking opioid pain medication, and don’t drive u.ntil you are able to step firmly on the brake pedal without hesitation.  Ask others to help with chores and errands while you recover.  Don’t lift anything heavier than 10 pounds until your doctor says it’s OK.  Don’t mow the lawn, shovel snow, use a vacuum , or do other strenuous activities until your doctor says it’s okay.  Walk as often as you feel able.  Continue the coughing and deep breathing exercises that you learned in the hospital.  Ask your doctor when you can expect to return to work.  Avoid constipation:  Drink 6-8 glasses of water a day, unless otherwise instructed.  Use a laxative or a mild stool softener as instructed by your doctor.  Call your doctor, or the 24-hour answering service, immediately if you have any of the following:  Yellowing of your eyes or skin (jaundice)  Chills  Fever above 101.5°F or 38.5°C    Redness, swelling, increasing pain, pus, or a foul smell at the incision site  Dark or rust-colored urine  Stool that is wilian-colored or light in color instead of brown  Increasing abdominal pain  persistent nausea or bowel problems, uncontrolled pain or poor appetite     Contact the office tomorrow to make a follow appointment for 10-14 days after hospitalization, on 3/11/25 with Holland Harris PA-C.      Viktor Cassidy MD. [facility]  GENERAL SURGERY  [facility]  OFFICE [phone]    2/27/2025  9:44 AM

## 2025-02-27 NOTE — CONSULTS
[facility]  part of [facility]    Report of Consultation    Hoda Busby Patient Status:  Inpatient    1940 MRN [id number]   Location [facility] 4W/SW/SE Attending Malathi Stuart MD   Hosp Day # 0 PCP Malathi Stuart MD     Date of Admission:  2025    Reason for Consultation:   ILD    History of Present Illness:   Patient is a 84-year-old female with past medical history significant for ILD, colon adenocarcinoma who presents for scheduled robotic right hemicolectomy and omental flap closure.  Currently resting postoperatively.  Pain well-controlled.  Denies significant dyspnea symptoms.  Saturation stable.  Hemodynamically stable.    Past Medical History  Past Medical History:    Acute pulmonary embolism (HCC)    Cancer (HCC)    Cataract    Colon cancer (HCC)    Coronary atherosclerosis    Foot drop, left foot    Heart valve disease    High blood pressure    High cholesterol    History of blood transfusion    History of stomach ulcers    History of transcatheter aortic valve replacement (TAVR)    Macular degeneration    Osteoarthritis    Osteopenia    Primary osteoarthritis of right hip    Pulmonary embolism (HCC)    Visual impairment    Readers    White coat hypertension       Past Surgical History  Past Surgical History:   Procedure Laterality Date    Cataract  3/2&3/16 2017    Cath transcatheter aortic valve replacement      Colonoscopy N/A 2024    Procedure: COLONOSCOPY;  Surgeon: Karen Staton MD;  Location: [facility] ENDOSCOPY    Colonoscopy      Colonoscopy & polypectomy  2021          tavr outpt e&m est pt level 2 w proced  2025    Hip replacement surgery Left Around     Other surgical history  [place]     Bilateral cataract surgery    Trcath replace aortic valve  2025    TAVR    Upper gi endoscopy,exam         Family History  Family History   Problem Relation Age of Onset    Cancer Father         bone (cause of death)    Stroke  Mother         CVA (cause of death)    Diabetes Mother     Hypertension Mother     Dementia Brother         Alzheimer's    Heart Disease Brother         CAD - x2 brothers    Heart Disease Brother     Dementia Brother        Social History  Social History     Socioeconomic History    Marital status:    Tobacco Use    Smoking status: Former     Current packs/day: 0.00     Types: Cigarettes     Start date: 1990     Quit date: 1990     Years since quittin.1     Passive exposure: Past    Smokeless tobacco: Never   Vaping Use    Vaping status: Never Used   Substance and Sexual Activity    Alcohol use: Not Currently     Alcohol/week: 5.0 standard drinks of alcohol     Types: 5 Glasses of wine per week     Comment: wine, occasionally    Drug use: No   Other Topics Concern    Caffeine Concern Yes     Comment: coffee/soda - 2cups/day           Current Medications:  Current Facility-Administered Medications   Medication Dose Route Frequency    lactated ringers infusion   Intravenous Continuous    bupivacaine liposome (Exparel) 1.3% injection 20 mL  20 mL Infiltration Once (Intra-Op)    lactated ringers infusion  75 mL/hr Intravenous Continuous    heparin (Porcine) 5000 UNIT/ML injection 5,000 Units  5,000 Units Subcutaneous 2 times per day    phenol (Chloraseptic) 1.4 % oral liquid spray 1 mL  1 spray Mouth/Throat PRN    acetaminophen (Tylenol Extra Strength) tab 1,000 mg  1,000 mg Oral Q8H TINO    polyethylene glycol (PEG 3350) (Miralax) 17 g oral packet 17 g  17 g Oral Daily PRN    sennosides (Senokot) tab 17.2 mg  17.2 mg Oral Nightly PRN    ondansetron (Zofran) 4 MG/2ML injection 4 mg  4 mg Intravenous Q6H PRN    metoclopramide (Reglan) 5 mg/mL injection 5 mg  5 mg Intravenous Q8H PRN    famotidine (Pepcid) tab 20 mg  20 mg Oral BID    Or    famotidine (Pepcid) 20 mg/2mL injection 20 mg  20 mg Intravenous BID    magnesium oxide (Mag-Ox) tab 400 mg  400 mg Oral Daily    [START ON 2025] alvimopan  (ENTEREG) capsule 12 mg  12 mg Oral BID    melatonin tab 3 mg  3 mg Oral Nightly PRN    acetaminophen (Tylenol Extra Strength) tab 1,000 mg  1,000 mg Oral Q8H    traMADol (Ultram) tab 50 mg  50 mg Oral Q6H PRN    morphINE PF 2 MG/ML injection 2 mg  2 mg Intravenous Q2H PRN    Or    morphINE PF 4 MG/ML injection 4 mg  4 mg Intravenous Q2H PRN     Medications Prior to Admission   Medication Sig    alendronate 70 MG Oral Tab Take 1 tablet (70 mg total) by mouth once a week.    erythromycin base 500 MG Oral Tab Take 2 tablets (1,000 mg total) by mouth As Directed.    neomycin 500 MG Oral Tab Take 2 tablets (1,000 mg total) by mouth See Admin Instructions.    aspirin 81 MG Oral Tab EC Take 1 tablet (81 mg total) by mouth daily.    furosemide (LASIX) 20 MG Oral Tab Take 1 tablet (20 mg total) by mouth daily. Take 40mg daily x 3 days (12/19-12/21/24), then 20mg daily thereafter    omeprazole 20 MG Oral Capsule Delayed Release Take 1 capsule (20 mg total) by mouth 2 (two) times daily.    atorvastatin 20 MG Oral Tab Take 1 tablet (20 mg total) by mouth at bedtime.    Multiple Vitamins-Minerals (MULTI-VITAMIN/MINERALS) Oral Tab Take 1 tablet by mouth daily.    ferrous sulfate 325 (65 FE) MG Oral Tab EC Take 1 tablet (325 mg total) by mouth daily.    Cholecalciferol (VITAMIN D) 50 MCG (2000 UT) Oral Tab Take 4,000 Units by mouth daily.    Vitamin B-12 500 MCG Oral Tab Take 1 tablet (500 mcg total) by mouth daily.    metoclopramide 10 MG Oral Tab Take 1 tablet (10 mg total) by mouth As Directed.    PEG 3350-KCl-Na Bicarb-NaCl 420 g Oral Recon Soln Take 4,000 mL by mouth As Directed.    predniSONE 10 MG Oral Tab Take 1 tablet (10 mg total) by mouth daily.    sulfamethoxazole-trimethoprim -160 MG Oral Tab per tablet Take 1 tablet by mouth 3 (three) times a week. Monday, Wednesday, Friday    Calcium Carb-Cholecalciferol (CALCIUM + D3) 600-200 MG-UNIT Oral Tab Take 1 tablet by mouth daily.        Allergies  Allergies[1]    Review of Systems:   Constitutional: denies fevers, chills, weakness, fatigue, recent illness  HEENT: denies headache, sore throat, vision loss  Cardio: denies chest pain, chest pressure, palpitations  Respiratory: denies dyspnea, cough, wheezing, hemoptysis   GI: denies nausea, vomiting, abdominal pain  : denies dysuria, hematuria  Musculoskeletal: denies arthralgia, myalgia  Integumentary: denies rash, itching  Neurological: denies syncope, weakness, dizziness,   Psychiatric: denies depression, anxiety  Hematologic: denies bruising        Physical Exam:   Blood pressure 137/77, pulse 83, temperature 97.5 °F (36.4 °C), temperature source Oral, resp. rate 18, height 5' 9\" (1.753 m), weight 176 lb (79.8 kg), SpO2 94%.    Constitutional: no acute distress  Eyes: PERRL  ENT: nares patent  Neck: neck supple, no JVD  Cardio: RRR, S1 S2  Respiratory: clear to auscultation bilaterally, no wheezing, rales, rhonchi, crackles  GI: abdomen soft, mild tenderness to palpation around surgical site  Extremities: no clubbing, cyanosis, edema  Neurologic: no gross motor deficits  Skin: warm, dry    Results:   Laboratory Data  Lab Results   Component Value Date    WBC 8.8 02/20/2025    HGB 10.3 (L) 02/20/2025    HCT 33.0 (L) 02/20/2025    .0 02/20/2025    CREATSERUM 1.01 02/20/2025    BUN 23 02/20/2025     02/20/2025    K 4.1 02/20/2025     02/20/2025    CO2 24.0 02/20/2025    GLU 94 02/20/2025    CA 9.2 02/20/2025    ALB 3.9 02/20/2025    ALKPHO 119 02/20/2025    TP 6.4 02/20/2025    AST 37 (H) 02/20/2025    ALT 22 02/20/2025    PTT >240.0 (HH) 01/23/2025    INR 1.18 01/24/2025    PTP 15.1 (H) 01/24/2025    TSH 1.526 07/16/2024    ESRML 49 (H) 10/26/2024    MG 2.1 01/24/2025    PHOS 3.6 10/28/2024    B12 349 03/20/2017         Imaging  No results found.    Assessment   1.  POD #0 status post right hemicolectomy and omental flap closure  2.  Colon adenocarcinoma  3.  ILD  4.   Severe aortic stenosis  5.  Prior history of pulmonary embolism    Plan   -Patient presents for scheduled right hemicolectomy and omental flap closure.  Recently diagnosed colon adenocarcinoma.  -Patient with underlying history of ILD.  Most recent CT imaging from 1/2/2025 with some subpleural reticulation and groundglass opacities seen.   -Patient stable from pulmonary perspective at this time  -Reviewed vitals, labs imaging    Cody Holloway DO  Pulmonary Critical Care Medicine  [facility]  2/27/2025  4:03 PM        [1] No Known Allergies

## 2025-02-27 NOTE — BRIEF OP NOTE
Pre-Operative Diagnosis: Splenic flexure colon cancer     Post-Operative Diagnosis: Splenic flexure colon cancer      Procedure Performed:   Robotic left hemicolectomy, mobilization of splenic flexure, omental flap closure    Surgeons and Role:     * Viktor Cassidy MD - Primary    Assistant(s):  PA: Holland Harris PA     Surgical Findings: Splenic flexure colon cancer     Specimen: Transverse and descending colon     Estimated Blood Loss: Blood Output: 10 mL (2/27/2025  2:09 PM)      Dictation Number:  [id number]    Viktor Cassidy MD  2/27/2025  2:31 PM

## 2025-02-27 NOTE — ANESTHESIA PREPROCEDURE EVALUATION
Anesthesia PreOp Note    HPI:     Hoda Busby is a 84 year old female who presents for preoperative consultation requested by: Viktor Cassidy MD    Date of Surgery: 2/27/2025    Procedure(s):  Robotic resection of splenic flexure colon cancer  Indication: Splenic flexure colon cancer    Relevant Problems   No relevant active problems       NPO:  Last Liquid Consumption Date: 02/27/25  Last Liquid Consumption Time: 0600 (pre surgery ensure)  Last Solid Consumption Date: 02/26/25  Last Solid Consumption Time: 1900  Last Liquid Consumption Date: 02/27/25          History Review:  Patient Active Problem List    Diagnosis Date Noted    Dyspnea 02/20/2025    S/P TAVR (transcatheter aortic valve replacement) 01/28/2025    Gastric AVM 12/13/2024    Peroneal neuropathy, left 11/29/2024    Interstitial pneumonitis (HCC) 11/29/2024    Age-related osteoporosis without current pathological fracture 08/22/2024    Primary hypertension 09/20/2023    Macular degeneration 09/14/2023    Nonrheumatic aortic valve stenosis 10/22/2020    Mitral valve insufficiency 03/22/2017    Hypercholesterolemia 03/15/2017    White coat syndrome with diagnosis of hypertension 03/04/2015    Vitamin D deficiency 02/27/2014    Osteopenia of multiple sites 02/14/2013       Past Medical History:    Acute pulmonary embolism (HCC)    Cancer (HCC)    Cataract    Colon cancer (HCC)    Coronary atherosclerosis    Foot drop, left foot    Heart valve disease    High blood pressure    High cholesterol    History of blood transfusion    History of stomach ulcers    History of transcatheter aortic valve replacement (TAVR)    Macular degeneration    Osteoarthritis    Osteopenia    Primary osteoarthritis of right hip    Pulmonary embolism (HCC)    Visual impairment    Readers    White coat hypertension       Past Surgical History:   Procedure Laterality Date    Cataract  3/2&3/16 2017    Cath transcatheter aortic valve replacement      Colonoscopy N/A  2024    Procedure: COLONOSCOPY;  Surgeon: Karen Staton MD;  Location: [facility] ENDOSCOPY    Colonoscopy      Colonoscopy & polypectomy  2021         Hc tavr outpt e&m est pt level 2 w proced  2025    Hip replacement surgery Left Around     Other surgical history  [place]     Bilateral cataract surgery    Trcath replace aortic valve  2025    TAVR    Upper gi endoscopy,exam         Prescriptions Prior to Admission[1]  Current Medications and Prescriptions Ordered in Epic[2]    Allergies[3]    Family History   Problem Relation Age of Onset    Cancer Father         bone (cause of death)    Stroke Mother         CVA (cause of death)    Diabetes Mother     Hypertension Mother     Dementia Brother         Alzheimer's    Heart Disease Brother         CAD - x2 brothers    Heart Disease Brother     Dementia Brother      Social History     Socioeconomic History    Marital status:    Tobacco Use    Smoking status: Former     Current packs/day: 0.00     Types: Cigarettes     Start date: 1990     Quit date: 1990     Years since quittin.1     Passive exposure: Past    Smokeless tobacco: Never   Vaping Use    Vaping status: Never Used   Substance and Sexual Activity    Alcohol use: Not Currently     Alcohol/week: 5.0 standard drinks of alcohol     Types: 5 Glasses of wine per week     Comment: wine, occasionally    Drug use: No   Other Topics Concern    Caffeine Concern Yes     Comment: coffee/soda - 2cups/day       Available pre-op labs reviewed.  Lab Results   Component Value Date    WBC 8.8 2025    RBC 3.83 2025    HGB 10.3 (L) 2025    HCT 33.0 (L) 2025    MCV 86.2 2025    MCH 26.9 2025    MCHC 31.2 2025    RDW 17.0 (H) 2025    .0 2025     Lab Results   Component Value Date     2025    K 4.1 2025     2025    CO2 24.0 2025    BUN 23 2025    CREATSERUM 1.01 2025    GLU 94  02/20/2025    PGLU 101 (H) 01/23/2025    CA 9.2 02/20/2025          Vital Signs:  Body mass index is 25.99 kg/m².   height is 1.753 m (5' 9\") and weight is 79.8 kg (176 lb). Her oral temperature is 98 °F (36.7 °C). Her blood pressure is 141/64 and her pulse is 88. Her respiration is 16 and oxygen saturation is 93%.   Vitals:    02/21/25 1020 02/27/25 0753   BP:  141/64   Pulse:  88   Resp:  16   Temp:  98 °F (36.7 °C)   TempSrc:  Oral   SpO2:  93%   Weight: 78.5 kg (173 lb) 79.8 kg (176 lb)   Height: 1.753 m (5' 9\") 1.753 m (5' 9\")        Anesthesia Evaluation     Patient summary reviewed and Nursing notes reviewed    No history of anesthetic complications   Airway   Mallampati: II  TM distance: >3 FB  Neck ROM: full  Dental    (+) lower dentures, upper dentures and partials    Pulmonary - normal exam     ROS comment: ILD, pulm clearance noted - optimized  Cardiovascular - normal exam  (+) hypertension, valvular problems/murmurs, CAD    ROS comment: S/p TAVR 1 month ago  Echo with normal EF, TAVR functioning appropriately, PASP mildly elevated @ 44  [facility] 1/2025:  Summary of procedure:    Large RCA system moderate disease but nonobstructive  Distal left main calcified plaque into LAD and circumflex, by intravascular ultrasound only the ostium of the circumflex significant with MLA 3.4 mm².  Mid LAD-diagonal focal bifurcation stenosis, moderately calcified, 80 to 90%.     Plan:  Proceed with TAVR, medical management of circumflex versus complete trial enrollment.  Would opt for medical management given her anemia, pending GI surgery, would avoid left main stent to the circumflex necessitating DAPT.      Neuro/Psych    (+)  neuromuscular disease,        GI/Hepatic/Renal      Comments: Colon CA    Endo/Other - negative ROS   Abdominal                  Anesthesia Plan:   ASA:  3  Plan:   General  Monitors and Lines:   Additonal IV and A-line  Airway:  ETT  Post-op Pain Management: IV analgesics  Plan Comments: Art line  for closer BP monitoring given recent TAVR and LHC with LAD/LCX disease that will be readdressed after her cancer surgery -- these factors put patient at increased risk of perioperative cardiopulmonary complications  Informed Consent Plan and Risks Discussed With:  Patient  Discussed plan with:  CRNA      I have informed Hoda Busby and/or legal guardian or family member of the nature of the anesthetic plan, benefits, risks including possible dental damage if relevant, major complications, and any alternative forms of anesthetic management.   All of the patient's questions were answered to the best of my ability. The patient desires the anesthetic management as planned.  Castro Black MD  2/27/2025 8:06 AM  Present on Admission:  **None**           [1]   Medications Prior to Admission   Medication Sig Dispense Refill Last Dose/Taking    alendronate 70 MG Oral Tab Take 1 tablet (70 mg total) by mouth once a week.   2/20/2025    aspirin 81 MG Oral Tab EC Take 1 tablet (81 mg total) by mouth daily. 30 tablet 11 2/26/2025 at  7:00 AM    furosemide (LASIX) 20 MG Oral Tab Take 1 tablet (20 mg total) by mouth daily. Take 40mg daily x 3 days (12/19-12/21/24), then 20mg daily thereafter 90 tablet 3 2/26/2025 at  7:00 AM    omeprazole 20 MG Oral Capsule Delayed Release Take 1 capsule (20 mg total) by mouth 2 (two) times daily.   2/26/2025 at  6:00 PM    atorvastatin 20 MG Oral Tab Take 1 tablet (20 mg total) by mouth at bedtime.   2/23/2025    sulfamethoxazole-trimethoprim -160 MG Oral Tab per tablet Take 1 tablet by mouth 3 (three) times a week. Monday, Wednesday, Friday       Multiple Vitamins-Minerals (MULTI-VITAMIN/MINERALS) Oral Tab Take 1 tablet by mouth daily.   2/20/2025    ferrous sulfate 325 (65 FE) MG Oral Tab EC Take 1 tablet (325 mg total) by mouth daily.   2/23/2025    Cholecalciferol (VITAMIN D) 50 MCG (2000 UT) Oral Tab Take 4,000 Units by mouth daily. 1 tablet 0 2/20/2025    Vitamin B-12 500  MCG Oral Tab Take 1 tablet (500 mcg total) by mouth daily.   2/20/2025    erythromycin base 500 MG Oral Tab Take 2 tablets (1,000 mg total) by mouth As Directed.   2/26/2025 at  9:30 PM    metoclopramide 10 MG Oral Tab Take 1 tablet (10 mg total) by mouth As Directed.       neomycin 500 MG Oral Tab Take 2 tablets (1,000 mg total) by mouth See Admin Instructions.   2/26/2025 at  9:30 PM    PEG 3350-KCl-Na Bicarb-NaCl 420 g Oral Recon Soln Take 4,000 mL by mouth As Directed.       predniSONE 10 MG Oral Tab Take 1 tablet (10 mg total) by mouth daily.       Calcium Carb-Cholecalciferol (CALCIUM + D3) 600-200 MG-UNIT Oral Tab Take 1 tablet by mouth daily.   2/20/2025   [2]   Current Facility-Administered Medications Ordered in Epic   Medication Dose Route Frequency Provider Last Rate Last Admin    lactated ringers infusion   Intravenous Continuous Viktor Cassidy MD        ertapenem (Invanz) 1 g in sodium chloride 0.9% 100 mL IVPB-MBP  1 g Intravenous Once Viktor Cassidy MD         No current [facility]-ordered outpatient medications on file.   [3] No Known Allergies

## 2025-02-27 NOTE — CONSULTS
Hoda Busby is a 84 year old female.   Patient presents with:  Follow - Up: Preoperative discussion for splenic flexure neoplasm. Surgery scheduled for . S/p TAVR .    HPI:   Hoda Busby is a 84 year old female. 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 Patient is off Xarelto.    The patient completed prednisone  Patient will have a TAVR by Dr. Bird 2025    Cardiac cath by Dr. Bernardo Liz  Procedure Notes  - documented in this encounter   Bernardo Liz MD - 2025 7:34 AM CST  Formatting of this note is different from the original.  Images from the original note were not included.  [facility]  part of [facility]    Cardiac Cath Procedure Note  Hoda Busby Patient Status: Outpatient   1940 MRN [id number]  Location [facility] INTERVENTIONAL SUITES Attending Viktor Bird MD  Hosp Day # 0 PCP Malathi Stuart MD    Cardiologist: Bernardo Liz MD  Primary Proceduralist: Bernardo Liz MD  Procedure Performed: LHC, COR, LV, and intravascular ultrasound left main, LAD, circumflex  Date of Procedure: 2025  Indication: Pre-TAVR    Summary of procedure:  Large RCA system moderate disease but nonobstructive  Distal left main calcified plaque into LAD and circumflex, by intravascular ultrasound only the ostium of the circumflex significant with MLA 3.4 mm².  Mid LAD-diagonal focal bifurcation stenosis, moderately calcified, 80 to 90%.    Plan:  Proceed with TAVR, medical management of circumflex versus complete trial enrollment. Would opt for medical management given her anemia, pending GI surgery, would avoid left main stent to the circumflex necessitating DAPT.     The patient is on Xarelto   Hoda Busby is a 84 year old female who presents multiple medical comorbidities. Patient recently was on Xarelto for pulmonary embolus. Patient been off Xarelto due to recent GI bleed.  Patient also was found to have pneumonitis and has been on high-dose steroids followed by pulmonology Dr. Cody Holloway DO  Patient also with severe aortic stenosis most likely will require TAVR followed by Dr. Viktor Bird  The patient underwent EGD and colonoscopy by Dr. Staton was found to have a tumor at the splenic flexure. This was tattooed. 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   Location of the tumor is splenic flexure.     TNM staging 2.     Ct scan of the abd and pelvis on 12/19/2025:  Impression    CONCLUSION:    Mild wall thickening of the distal transverse colon. Patient's known colonic malignancy is not well seen on CT.    No metastatic disease within the abdomen or pelvis.    Interstitial lung disease is partially seen and grossly unchanged.    Multiple other incidental findings as described in the body of the report.    PCP: Malathi Stuart MD   Gastroenterologist: Dr. Karen Staton    2/14/2025: Patient presents for follow-up evaluation and preop discussion for splenic flexure colon cancer.  IVC filter was not placed since patient stopped Xarelto   Patient is status post TAVR at [facility] on 1/23/2025  Patient is OFF Xarelto  Patient is on asa 81mg daily   The patient was cleared by cardiology for surgery    The patient did see pulmonology Dr. Cody Holloway DO and 2/11/2025 and cleared for surgery:  Plan  -Patient presents today for preoperative clearance for upcoming colon resection surgery February 27. History of underlying ILD. Stable from pulmonary perspective at this time.  -Reviewed most recent chest imaging including CT cardiac overread from 1/2/2025 with evidence of subpleural reticulation seen and some groundglass opacities. Chest x-ray appeared to be stable from 1/24/2025.  -I have ordered baseline pulmonary function testing. Recommend repeat CT high-resolution chest in 6 months duration.  -Prior history of pulmonary embolism. Completed course  of anticoagulation therapy.  -Okay to proceed with surgery from pulmonary perspective. Patient well optimized from ILD perspective.    Cody Holloway DO  Pulmonary Critical Care Medicine  [facility]  2025     OR Notes  - documented in this encounter   Operative Report - Karen Staton MD - 2024 12:49 PM CST  Formatting of this note is different from the original.  EGD/Colonoscopy Operative Report    Hoda Busby Patient Status: Inpatient   1940 MRN [id number]  Location [facility] ENDOSCOPY LAB SUITES Attending Jigar Apple MD  Hosp Day #  4 PCP Malathi Stuart MD    Pre-Operative Diagnosis: anemia    Date of previous colonoscopy: 24    Post-Operative Diagnosis:  Esophagitis, Gastritis, and Gastric ulcer  Colon polyps and Internal Hemorrhoids    Procedure Performed:  EGD with biopsies  Colonoscopy with cold forceps biopsy    Pre-procedure: The patient was assessed in the procedure room immediately before induction of sedation which included, at a minimum, vital signs, NPO status, and airway assessment. The patient was deemed and appropriate candidate for procedural sedation.    Informed Consent: Informed consent for both procedures and sedation were obtained from the patient. The risks, benefits and alternatives to the procedures including potentially life-threatening complications of sedation, bleeding, perforation, missed lesions or need for repeat endoscopy were reviewed along with the possible need for hospitalization, surgical management, transfusion of blood products or repeat endoscopy should one of these complications arise. The patient and/or POA voiced their understanding and was agreeable to proceed.    Sedation Type: MAC-Patient received sedation with monitored anesthesia provided by an anesthesiologist    EGD Procedure Description: The patient was placed in the left lateral decubitus position. A bite block was placed in the patient's mouth and the endoscope  was passed through the mouth under direct vision and advanced to the second portion of the duodenum. The endoscope was then withdrawn to examine the duodenal bulb, pylorus and gastric antrum, body, incisura and fundus and then retroflexed to to examine the GE junction and cardia. The upper endoscopy was performed without difficulty, the patient tolerated the procedure well with no immediate complications.    Colonoscopy Procedure Description: The patient was placed in the left lateral decubitus position. After careful digital rectal examination, the Pediatric colonoscope was inserted into the rectum under direct vision and advanced to the level of the terminal ileum under direct visualization. The cecum was identified by landmarks, including the appendiceal orifice and ileoceccal valve. Careful examination of the entire colon was performed during withdrawal of the endoscope. The scope was withdrawn to the rectum and retroflexion was performed. The colonoscopy was performed without difficulty and the patient tolerated the procedure well with no immediate complications. The patient was transferred to the recovery area in stable condition.    Quality of Preparation/Aronchick Bowel Prep Scale: 2: Good (Clear liquid covering 5%-25% of mucosa, but >90% of mucosa seen) and 3: Fair (Semisolid stool could not be suctioned or washed away, but >90% of mucosa seen)  Cecum Withdrawal Time: see nursing notes    Findings: Upper endoscopy revealed grade 1 esophagitis at the GE junction, 2 healing superficial ulcers in the gastric antrum and body and otherwise normal duodenum and jejunum. Push enteroscopy was performed and no further AVMs were noted in the third portion of the duodenum or proximal jejunum. Biopsies were not taken given recent negative biopsies on prior exam and healing appearance of the ulcers.    Colonoscopy revealed a large flat polyp in the distal transverse colon that was biopsied in a piecemeal fashion and area  tattooed for future reference. Prep was fair in the right colon and cecum and lavage did not reveal any gross lesions on IC valve or cecum. Few diverticuli were noted in the left colon and small internal hemorrhoids found on retroflexion.    Impression:  Esophagitis (inflamation of the esophagus lining), Gastritis (inflammation of the stomach lining), and Ulcer in the stomach/duodenum (small bowel)  Colon polyp(s), Diverticulosis, and Internal hemorrhoids    Allergies:  No Known Allergies   Current Meds:  Current Outpatient Medications   Medication Sig Dispense Refill   atorvastatin 20 MG Oral Tab Take 20 mg by mouth daily.   ferrous sulfate 325 (65 FE) MG Oral Tab EC Take 325 mg by mouth daily with breakfast.   LASIX 20 MG Oral Tab Take 20 mg by mouth daily.   omeprazole 20 MG Oral Capsule Delayed Release Take 20 mg by mouth 2 (two) times daily before meals.   Polyethylene Glycol 3350 17 g Oral Powd Pack Take 17 g by mouth daily.   zolpidem 5 MG Oral Tab Take 1 tablet by mouth nightly as needed.   Vitamin B-12 500 MCG Oral Tab Take 500 mcg by mouth daily.   aspirin (ASPIRIN ADULT LOW STRENGTH) 81 MG Oral Tab EC Take 1 tablet by mouth daily.   Calcium Carb-Cholecalciferol (CALCIUM + D3) 600-200 MG-UNIT Oral Tab Take 1 tablet by mouth daily.   Multiple Vitamins-Minerals (OCUVITE ADULT 50+) Oral Cap Take 1 capsule by mouth daily.   sulfamethoxazole-trimethoprim -160 MG Oral Tab per tablet Take 1 tablet by mouth. (Patient not taking: Reported on 2/14/2025)       HISTORY:  Past Medical History:   Diagnosis Date   Osteopenia   White coat hypertension     Past Surgical History:   Procedure Laterality Date   CATARACT 3/2&3/16 2017   COLONOSCOPY STOMA RMVL LES BY HOT BIOPSY FORCEPS 1/4/2021     HIP REPLACEMENT SURGERY Left Around 2006   OTHER SURGICAL HISTORY [place] 2017   Bilateral cataract surgery     Family History   Problem Relation Age of Onset   Cancer Father   bone (cause of death)   Stroke Mother   CVA (cause  of death)   Diabetes Mother   Hypertension Mother   Dementia Brother   Alzheimer's   Heart Disease Brother   CAD - x2 brothers   Dementia Brother     Social History  Tobacco Use  Smoking status: Former  Packs/day: 0.00  Types: Cigarettes  Start date: 1990  Quit date: 1990  Years since quittin.1  Smokeless tobacco: Former  Vaping Use  Vaping status: Never Used  Alcohol use: Yes  Comment: wine, occasionally  Drug use: No      ROS:   GENERAL HEALTH: otherwise feels well; 10 weight loss  SKIN: denies any unusual skin lesions or rashes  EYES: no visual complaints or deficits  HEENT: denies nasal congestion, sinus pain or sore throat; hearing loss negative  RESPIRATORY: denies shortness of breath, wheezing or cough   CARDIOVASCULAR: denies chest pain or POPE; no palpitations   GI: denies nausea, vomiting, constipation, diarrhea; no rectal bleeding; no heartburn  MUSCULOSKELETAL: no joint complaints upper or lower extremities, no back or rib pain  NEURO: no sensory or motor complaint  PSYCHE: no symptoms of depression or anxiety  HEMATOLOGY: denies hx anemia; denies bruising or excessive bleeding  ENDOCRINE: denies excessive thirst or urination; denies unexpected wt gain or wt loss  ALLERGY/IMM.: denies food or seasonal allergies    PHYSICAL EXAM:   GENERAL: well developed, well nourished, in no apparent distress  SKIN: no rashes, no suspicious lesions  HEENT: normocephalic; sclera anicteric, conjunctiva normal; no icterus  NECK: supple; no JVD, no TMG,   RESPIRATORY: clear to auscultation bilaterally  CARDIOVASCULAR: RRR  ABDOMEN: The abdomen is soft normal active BS+, soft, nondistended; no HSM; no mass palpable ; non tender; previous incision none; no evidence of ventral hernia  2025: Abdomen is soft nondistended nontender. No palpable masses present.    EXTREMITIES: no cyanosis, clubbing or edema, peripheral pulses intact  NEUROLOGIC: intact; no sensorimotor deficit; gait normal  PSYCHIATRIC: alert and  oriented x 3; affect appropriate    ASSESSMENT/ PLAN:     This is a 84 year old female who has a cancer of the splenic flexure.. The preoperative clinical stage is 2. Patient with severe pneumonitis recently stopped steroids. Patient will need preoperative pulmonary clearance by her pulmonologist. I asked the patient to contact the pulmonologist for the further optimization once surgical date has been decided on.  Patient is scheduled undergo TAVR on 1/23/2025 at [facility]. I asked the family to discuss with cardiology at [facility] about the timing of surgery. Will the patient need to wait 2 for 6 or 8 weeks prior to any surgery. Patient will require some epithelialization over the valve repair. The patient will have bowel surgery so may have bacteremia. They will contact me after the TAVR has been completed to know about the surgical timing.  Patient will require cardiology evaluation as well after the TAVR.  Patient does have a history of pulmonary embolus.   The patient Xarelto has been stopped. Patient is on a baby aspirin. Will continue baby aspirin perioperatively. Pulmonology did not feel the patient required IVC filter placement perioperatively.  I had a lengthy discussion with patient as to the etiology of colon cancer. I discussed non-operative management versus surgical resection in detail. I reviewed the patient's CT scan laboratory data as well as the angiography results in detail with the patient and her nieces I discussed open versus laparoscopic versus robotic assisted resection in detail. Risks of the procedure including, but not limited to, bleeding, infection, postoperative hematoma, wound infection, non healing wound, scar formation, 3-5% incidence of anastomotic leak, need for a blood transfusion and the risk of blood borne infections, need to convert to an open procedure, injury to the ureter, wound infection, hernia formation; as well as the risks with anesthesia including  myocardial infarction, respiratory failure, renal failure, pulmonary embolus, deep vein thrombosis, CVA, and even death were all discussed in detail with the patient who understands, consents, and wishes to proceed with the operation. I reviewed patient education material with the patient. I discussed with the patient the possible need for blood transfusion in detail. The patient will a type and screen available for surgery. I also discussed accelerator bowel pathways including ERAS in detail. I discussed the role that narcotics have negatively on ileus postoperatively. I discussed alternative pain management control as including TAP block, nonnarcotic analgesia, as well as the role of early ambulation in the patient's recovery. This is shown to decrease length of stay as well as improve patient outcomes including survival. I also discussed the importance of mechanical bowel prep with oral antibiotics to decrease perioperative wound infections. We also discussed the role of hCG skin decontamination preoperatively. All of the patients questions were answered. Patient was provided written information as well. Also discussed using visual aids to describe the resection. Will plan robotic assisted resection of splenic flexure colon cancer possible open in the supine position under general anesthesia at [facility] on Thursday, February 27, 2025.  Patient been cleared by pulmonology as well as cardiology for surgery.    I discussed all the above in detail with the patient as well as her niece understand and wish to proceed with the above plan.    I spent 50 minutes on this patient visit. This included: preparing to see patient, obtaining history, reviewing separately obtained history, performing physical examination, independently interpreting results and discussing with patient, patient and family counseling, referring and communicating with other healthcare professionals, and care coordination    Malathi Stuart MD,   Dr. Pelon Lawrence DO

## 2025-02-28 NOTE — PROGRESS NOTES
[facility]  part of [facility]    Progress Note    Hoda Busby Patient Status:  Inpatient    1940 MRN [id number]   Location [facility] 4W/SW/SE Attending Malathi Stuart MD   Hosp Day # 1 PCP Malathi Stuart MD       SUBJECTIVE:  Pt seen early this am  Overall feeling well-no pain  Tolerated CLD, no nausea/vomiting  No flatus yet    OBJECTIVE:  /71 (BP Location: Right arm)   Pulse 77   Temp 98.3 °F (36.8 °C) (Temporal)   Resp 16   Ht 5' 9\" (1.753 m)   Wt 180 lb (81.6 kg)   SpO2 96%   BMI 26.58 kg/m²     Intake/Output:  I/O last 3 completed shifts:  In: 2060 [P.O.:360; I.V.:1700]  Out: 755 [Urine:745; Blood:10]    Wt Readings from Last 3 Encounters:   25 180 lb (81.6 kg)   25 173 lb (78.5 kg)   25 181 lb (82.1 kg)       Exam  Gen: No acute distress, alert and oriented x3  Pulm: Lungs CTAB, no rhonchi or wheezing  CV: Heart RRR, no murmurs   Abd: Abdomen soft, mild RUQ tenderness, nondistended, no organomegaly, bowel sounds absent  MSK:no edema  Neuro: A&O x 3, 5/5 strength in all 4 extremities.     Labs:   Recent Labs   Lab 25  0553   RBC 3.56*   HGB 9.3*   HCT 29.5*   MCV 82.9   MCH 26.1   MCHC 31.5   RDW 16.6*   NEPRELIM 16.55*   WBC 18.3*   .0         Recent Labs   Lab 25  0553   *   BUN 18   CREATSERUM 0.88   EGFRCR 65   CA 8.1*      K 4.2      CO2 23.0         Imaging:  No results found.          Assessment and Plan:       Colon cancer  S/p robotic left hemicolectomy, mobilization of splenic flexure and omental flap closure with Dr. Cassidy on 2025  Leukocytosis--likely reactive; will monitor closely   Received perioperative invanz    2. H/o GI bleed  Recently hospitalized -24 for GI bleed, then with recurrence 2024; had been taking Xarelto 20 mg daily for PE  -EGD 24 (Dr. Nuno) -  15mm clean-based, nonbleeding gastric antral ulcer (bx done); 3mm gastric antral AVM s/p  APC.  -received total of 3 units PRBCs and also IV Ferrrlecit.  On 11/30/24, she was resumed on Xarelto 20 mg po daily, and oral prednisone.  She returned to [facility] 11/30/24 for rehab.    -Hgb 6.5 12/13/24 - back to ED  -Xarelto stopped  -colonoscopy and repeat EGD 12/17/24 by Dr. Staton -- grade 1 esophagitis; duod ulcers healing; large flat polyp in transverse colon   -on omeprazole 20mg BID (formulary change at the [facility]) - to continue x 8 weeks (EOT 1/22/25)   Now on omeprazole 20mg daily     Anemia  H/o GI bleed; monitor for post op blood loss  Currently hgb stable (previously was 9.5)       BLE edema  -Lasix 20mg/day  -still with trace edema  -gets better with leg elevation     left peroneal palsy  In Nov 2024; has numbness to anterolateral left foot and ankle as well as inability to dorsiflex left foot; etiology unclear  -neurologist Dr Richardson consulted in hosp  -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   -MRI brain shows no acute intracranial process  -left foot drop persists but improving; able to dorsiflex somewhat; getting PT/OT for it     Interstitial pneumonitis  -dx'ed 10/2024 - presented with cough and severe hypoxia  -unresponsive to abx (amox/doxy, then IV zosyn/zithromax)  -CXR 10/24/24 extensive bilateral perihilar and bilateral upper and lower lobe   -S.pneumo, legionella Ag , mycoplasma IgM/G, Fungitell-beta-D glucan negative  -ANCA and URIEL/connective tissue disease panels negative  -anti-histone and anti-K[place]a Ab's negative  -Echo 10/24/24 - normal EF (55-60%), no obvious vegetatons  -CT with bilateral ground glass opacities, small consolidations of BLL  -per pulm, findings suspicious for NSIP (vs cryptogenic organizing pneumonia vs hypersensitivity pneumonitis); NOT thought to be c/w UIP pattern  -on empiric steroids (solumedrol then prednisone) since 10/25/24 - prednisone 40mg/day decreased to  30mg/day on 12/17/24 by pulm Dr. Holloway , then on 12/19/24 decreased to 30mg/day x 10d, then 20mg/day x 10d, then 10mg/day x 10d.  Currently on 10mg/day.  -pulmon on consult     Bilateral pulmonary emboli  -dx'ed at time of interstitial pneumonitis  -CT chest 10/25/24 -- acute distal segmental/subsegmental pulmonary emboli in [place] and subsegmental PE in [place]  -unclear etiology; no recent hx of long travel; no family/personal hx of blood clots  -BLE venous dopplers negative 10/26/24  -started xarelto 20 mg po every day on 11/28/24  -repeat CT chest 12/9/24 neg PE  -stopped Xarelto 12/14/24 due to recurrent GI bleed     Severe aortic stenosis  S/p s/p TAVR 1/23/2025     Hypertension, primary  -(dyazide stopped due to NANCY)  -amlodipine held due to low BP in hosp  -BP actually good off meds (other than lasix)     Hx of hip OA  -s/p left THR (Dr. Lo) many years ago  -s/p elective R BEATRIZ on 10/5/23 by Dr. Diaz     Hyperlipidemia   Pt with strong family hx of high cholesterol  ; normal TG and HDL  No indication for statin given age     Osteopenia   On Fosamax from 2011 to 4/2016.     DEXA stable 5/10/17 and 2019 and 2021  DEXA 8/2024 -- osteoporosis of left forearm  -restarted Fosamax 8/2024 -- given esophagitis 12/2024, fosamax on hold     Vitamin D deficiency  On vitamin D 4000u/day             Nadia Steel DO  2/28/2025  9:09 AM

## 2025-02-28 NOTE — CONSULTS
[facility] - CARDIOLOGY CONSULT NOTE    Hoda Busby Patient Status:  Inpatient    1940 MRN [id number]   Location [facility] 4W/SW/SE Attending Malathi Stuart MD   Hosp Day # 1 PCP Malathi Stuart MD     Date of Admission:  2025  Date of Consult:  2025  I was asked by Malathi Stuart MD to provide recommendations for evaluation and management of cardiac issues.  Impression:     84-year-old male history of multivessel CAD and severe AS status post TAVR now status post robotic hemicolectomy.    Recommendations:  1.  History of CAD.  Severe ostial LAD and diagonal disease and circumflex disease.  Stable from cardiac standpoint will need PCI at a later time.  2.  History of bilateral PEs and interstitial lung disease.  Groundglass opacities pulmonary is following  3.  Hypertension  At goal  4.  Hyperlipidemia continue atorvastatin 20 mg when ready for discharge  4.  Colon adenocarcinoma status post hemicolectomy further management as per general surgery    L5 consult will sign off call if needs to be seen as needed    Reason for Consultation:   History of CAD, postop management.      History of Present Illness:   Patient is a 84 year old female who was admitted to the hospital for elective robotic resection of splenic flexure for colon cancer with hemicolectomy.  History of severe AS status post TAVR .  Also multivessel CAD which was supposed to be addressed after surgery.  Cardiology was consulted for postop blood pressure management.  Currently on clear liquid diet.      Cardiac tests:    Past Medical History  Past Medical History:    Acute pulmonary embolism (HCC)    Cancer (HCC)    Cataract    Colon cancer (HCC)    Coronary atherosclerosis    Foot drop, left foot    Heart valve disease    High blood pressure    High cholesterol    History of blood transfusion    History of stomach ulcers    History of transcatheter aortic valve replacement (TAVR)    Macular  degeneration    Osteoarthritis    Osteopenia    Primary osteoarthritis of right hip    Pulmonary embolism (HCC)    Visual impairment    Readers    White coat hypertension       Past Surgical History  Past Surgical History:   Procedure Laterality Date    Cataract  3/2&3/16 2017    Cath transcatheter aortic valve replacement      Colonoscopy N/A 12/17/2024    Procedure: COLONOSCOPY;  Surgeon: Karen Staton MD;  Location: [facility] ENDOSCOPY    Colonoscopy      Colonoscopy & polypectomy  01/04/2021         Hc tavr outpt e&m est pt level 2 w proced  01/23/2025    Hip replacement surgery Left Around 2006    Other surgical history  [place] 2017    Bilateral cataract surgery    Trcath replace aortic valve  01/23/2025    TAVR    Upper gi endoscopy,exam         Family History  Family History   Problem Relation Age of Onset    Cancer Father         bone (cause of death)    Stroke Mother         CVA (cause of death)    Diabetes Mother     Hypertension Mother     Dementia Brother         Alzheimer's    Heart Disease Brother         CAD - x2 brothers    Heart Disease Brother     Dementia Brother        Social History  Pediatric History   Patient Parents    Not on file     Other Topics Concern     Service Not Asked    Blood Transfusions Not Asked    Caffeine Concern Yes     Comment: coffee/soda - 2cups/day    Occupational Exposure Not Asked    Hobby Hazards Not Asked    Sleep Concern Not Asked    Stress Concern Not Asked    Weight Concern Not Asked    Special Diet Not Asked    Back Care Not Asked    Exercise Not Asked    Bike Helmet Not Asked    Seat Belt Not Asked    Self-Exams Not Asked   Social History Narrative    Not on file           Current Medications:  Current Facility-Administered Medications   Medication Dose Route Frequency    bupivacaine liposome (Exparel) 1.3% injection 20 mL  20 mL Infiltration Once (Intra-Op)    heparin (Porcine) 5000 UNIT/ML injection 5,000 Units  5,000 Units Subcutaneous 2 times per day     phenol (Chloraseptic) 1.4 % oral liquid spray 1 mL  1 spray Mouth/Throat PRN    acetaminophen (Tylenol Extra Strength) tab 1,000 mg  1,000 mg Oral Q8H TINO    polyethylene glycol (PEG 3350) (Miralax) 17 g oral packet 17 g  17 g Oral Daily PRN    sennosides (Senokot) tab 17.2 mg  17.2 mg Oral Nightly PRN    ondansetron (Zofran) 4 MG/2ML injection 4 mg  4 mg Intravenous Q6H PRN    metoclopramide (Reglan) 5 mg/mL injection 5 mg  5 mg Intravenous Q8H PRN    famotidine (Pepcid) tab 20 mg  20 mg Oral Daily    Or    famotidine (Pepcid) 20 mg/2mL injection 20 mg  20 mg Intravenous Daily    magnesium oxide (Mag-Ox) tab 400 mg  400 mg Oral Daily    alvimopan (ENTEREG) capsule 12 mg  12 mg Oral BID    melatonin tab 3 mg  3 mg Oral Nightly PRN    traMADol (Ultram) tab 50 mg  50 mg Oral Q6H PRN    morphINE PF 2 MG/ML injection 2 mg  2 mg Intravenous Q2H PRN    Or    morphINE PF 4 MG/ML injection 4 mg  4 mg Intravenous Q2H PRN     Medications Prior to Admission   Medication Sig    alendronate 70 MG Oral Tab Take 1 tablet (70 mg total) by mouth once a week.    erythromycin base 500 MG Oral Tab Take 2 tablets (1,000 mg total) by mouth As Directed.    neomycin 500 MG Oral Tab Take 2 tablets (1,000 mg total) by mouth See Admin Instructions.    aspirin 81 MG Oral Tab EC Take 1 tablet (81 mg total) by mouth daily.    furosemide (LASIX) 20 MG Oral Tab Take 1 tablet (20 mg total) by mouth daily. Take 40mg daily x 3 days (12/19-12/21/24), then 20mg daily thereafter    omeprazole 20 MG Oral Capsule Delayed Release Take 1 capsule (20 mg total) by mouth 2 (two) times daily.    atorvastatin 20 MG Oral Tab Take 1 tablet (20 mg total) by mouth at bedtime.    Multiple Vitamins-Minerals (MULTI-VITAMIN/MINERALS) Oral Tab Take 1 tablet by mouth daily.    ferrous sulfate 325 (65 FE) MG Oral Tab EC Take 1 tablet (325 mg total) by mouth daily.    Cholecalciferol (VITAMIN D) 50 MCG (2000 UT) Oral Tab Take 4,000 Units by mouth daily.    Vitamin B-12 500  MCG Oral Tab Take 1 tablet (500 mcg total) by mouth daily.    metoclopramide 10 MG Oral Tab Take 1 tablet (10 mg total) by mouth As Directed.    PEG 3350-KCl-Na Bicarb-NaCl 420 g Oral Recon Soln Take 4,000 mL by mouth As Directed.    predniSONE 10 MG Oral Tab Take 1 tablet (10 mg total) by mouth daily.    sulfamethoxazole-trimethoprim -160 MG Oral Tab per tablet Take 1 tablet by mouth 3 (three) times a week. Monday, Wednesday, Friday    Calcium Carb-Cholecalciferol (CALCIUM + D3) 600-200 MG-UNIT Oral Tab Take 1 tablet by mouth daily.       Allergies  Allergies[1]    Review of Systems:   10 pt ROS performed, separate from HPI  Review of Systems:  GENERAL: no fevers, chills, sweats  HEENT: no visual or hearing changes  SKIN: denies any unusual skin lesions or rashes  RESPIRATORY: denies shortness of breath with exertion  CARDIOVASCULAR: no active chest pain, no claudication  GI: denies abdominal pain and denies heartburn  : no dysuria or hematuria  NEURO: denies headaches, focal weaknesses or paresthesias  All other systems reviewed and negative.  fatigue is present  All other systems were reviewed are negative  Physical Exam:   Blood pressure 122/71, pulse 77, temperature 98.3 °F (36.8 °C), temperature source Temporal, resp. rate 16, height 5' 9\" (1.753 m), weight 180 lb (81.6 kg), SpO2 96%.    Scheduled Meds:    bupivacaine liposome  20 mL Infiltration Once (Intra-Op)    heparin  5,000 Units Subcutaneous 2 times per day    acetaminophen  1,000 mg Oral Q8H TINO    famotidine  20 mg Oral Daily    Or    famotidine  20 mg Intravenous Daily    magnesium oxide  400 mg Oral Daily    alvimopan  12 mg Oral BID         Physical Exam:    General: Alert and oriented. No apparent distress. No respiratory or constitutional distress.  HEENT: Normocephalic, anicteric sclera, neck supple  Neck: No JVD, carotids 2+, supple  Cardiac: Regular rate. No pathologic murmur.  Lungs: Clear with normal effort.  Normal excursions and  effort.  Abdomen: Soft, tender to palpation. BS-present.  Extremities: Without clubbing, cyanosis.  Peripheral pulsespresent.  Neurologic: Alert and oriented, normal affect. Motor ok.  Skin: Warm and dry.     Results:     Laboratory Data:  Lab Results   Component Value Date    WBC 18.3 (H) 02/28/2025    HGB 9.3 (L) 02/28/2025    HCT 29.5 (L) 02/28/2025    .0 02/28/2025    CREATSERUM 0.88 02/28/2025    BUN 18 02/28/2025     02/28/2025    K 4.2 02/28/2025     02/28/2025    CO2 23.0 02/28/2025     (H) 02/28/2025    CA 8.1 (L) 02/28/2025    ALB 3.9 02/20/2025    ALKPHO 119 02/20/2025    TP 6.4 02/20/2025    AST 37 (H) 02/20/2025    ALT 22 02/20/2025    PTT >240.0 (HH) 01/23/2025    INR 1.18 01/24/2025    PTP 15.1 (H) 01/24/2025    TSH 1.526 07/16/2024    ESRML 49 (H) 10/26/2024    MG 1.9 02/28/2025    PHOS 4.1 02/28/2025    B12 349 03/20/2017         Thank you for allowing me to participate in the care of your patient.    Manjinder Chavis MD  [facility]  Interventional Cardiology  2/28/2025         [1] No Known Allergies

## 2025-02-28 NOTE — PLAN OF CARE
Patient is A/Ox4 on room air. Forgetful at times. VSS. Up SBA with walker. Funk in place. No BM overnight. Saline locked. Scheduled tylenol for pain. Denies nausea; tolerating diet. Call light within reach; safety precautions in place.   Problem: Patient Centered Care  Goal: Patient preferences are identified and integrated in the patient's plan of care  Description: Interventions:  - What would you like us to know as we care for you?   - Provide timely, complete, and accurate information to patient/family  - Incorporate patient and family knowledge, values, beliefs, and cultural backgrounds into the planning and delivery of care  - Encourage patient/family to participate in care and decision-making at the level they choose  - Honor patient and family perspectives and choices  2/28/2025 0329 by Crys David, RN  Outcome: Progressing  2/28/2025 0329 by Crys David, RN  Outcome: Progressing     Problem: PAIN - ADULT  Goal: Verbalizes/displays adequate comfort level or patient's stated pain goal  Description: INTERVENTIONS:  - Encourage pt to monitor pain and request assistance  - Assess pain using appropriate pain scale  - Administer analgesics based on type and severity of pain and evaluate response  - Implement non-pharmacological measures as appropriate and evaluate response  - Consider cultural and social influences on pain and pain management  - Manage/alleviate anxiety  - Utilize distraction and/or relaxation techniques  - Monitor for opioid side effects  - Notify MD/LIP if interventions unsuccessful or patient reports new pain  - Anticipate increased pain with activity and pre-medicate as appropriate  Outcome: Progressing     Problem: RISK FOR INFECTION - ADULT  Goal: Absence of fever/infection during anticipated neutropenic period  Description: INTERVENTIONS  - Monitor WBC  - Administer growth factors as ordered  - Implement neutropenic guidelines  Outcome: Progressing     Problem: SAFETY ADULT -  FALL  Goal: Free from fall injury  Description: INTERVENTIONS:  - Assess pt frequently for physical needs  - Identify cognitive and physical deficits and behaviors that affect risk of falls.  - Exchange fall precautions as indicated by assessment.  - Educate pt/family on patient safety including physical limitations  - Instruct pt to call for assistance with activity based on assessment  - Modify environment to reduce risk of injury  - Provide assistive devices as appropriate  - Consider OT/PT consult to assist with strengthening/mobility  - Encourage toileting schedule  Outcome: Progressing     Problem: DISCHARGE PLANNING  Goal: Discharge to home or other facility with appropriate resources  Description: INTERVENTIONS:  - Identify barriers to discharge w/pt and caregiver  - Include patient/family/discharge partner in discharge planning  - Arrange for needed discharge resources and transportation as appropriate  - Identify discharge learning needs (meds, wound care, etc)  - Arrange for interpreters to assist at discharge as needed  - Consider post-discharge preferences of patient/family/discharge partner  - Complete POLST form as appropriate  - Assess patient's ability to be responsible for managing their own health  - Refer to Case Management Department for coordinating discharge planning if the patient needs post-hospital services based on physician/LIP order or complex needs related to functional status, cognitive ability or social support system  Outcome: Progressing

## 2025-02-28 NOTE — PROGRESS NOTES
[facility]  part of [facility]    Progress Note    Hoda Busby Patient Status:  Inpatient    1940 MRN [id number]   Location [facility] 4W/SW/SE Attending Malathi Stuart MD   Hosp Day # 1 PCP Malathi Stuart MD       Subjective:   Hoda Busby is a(n) 84 year old   female without complaints    Assessment and Plan:   Hoda Busby is a(n) 84 year old female    POD #1 status post robotic resection of splenic flexure colon  cancer  Patient doing well  Pulmonary toilet  Ambulate  Clear liquid diet  Will advance to full liquid diet for dinner  Elevated leukocytosis will continue to follow  VTE prophylaxis  Discussed intraoperative findings with the patient   for discharge planning with home physical therapy possibly Saturday/    Objective:   Blood pressure 122/71, pulse 77, temperature 98.3 °F (36.8 °C), temperature source Temporal, resp. rate 16, height 5' 9\" (1.753 m), weight 180 lb (81.6 kg), SpO2 96%. I/O last 3 completed shifts:  In:  [P.O.:360; I.V.:1700]  Out: 755 [Urine:745; Blood:10]     General appearance: alert, appears stated age, and cooperative  Neck: no JVD and supple, symmetrical, trachea midline  Pulmonary: No labored breathing, equal respiratory excursion  Cardiovascular: regular rate and rhythm  Abdominal: soft, non-tender; bowel sounds normal; no masses,  no organomegaly and dressing clean dry and intact  No flatus, no BM  Extremities: extremities normal, atraumatic, no cyanosis or edema          Results:       Recent Labs   Lab 25  0553   RBC 3.56*   HGB 9.3*   HCT 29.5*   MCV 82.9   MCH 26.1   MCHC 31.5   RDW 16.6*   NEPRELIM 16.55*   WBC 18.3*   .0     Lab Results   Component Value Date    INR 1.18 2025       Recent Labs   Lab 25  0553   *   BUN 18   CREATSERUM 0.88   CA 8.1*      K 4.2      CO2 23.0             No results found.          Time spent in direct patient contact and  decision making as well as counseling/coordination of care:    42770- 35 min      JENNA CONNOLLY MD [facility]  GENERAL SURGERY  [facility]  2/28/2025  8:43 AM

## 2025-02-28 NOTE — PROGRESS NOTES
[facility]  part of [facility]     Progress Note    Hoda Busby Patient Status:  Inpatient    1940 MRN [id number]   Location [facility] 4W/SW/SE Attending Malathi Stuart MD   Hosp Day # 1 PCP Malathi Stuart MD       Subjective:   Patient seen and examined.  Postoperative pain well-controlled.  Denies significant dyspnea    Objective:   Blood pressure 122/71, pulse 77, temperature 98.3 °F (36.8 °C), temperature source Temporal, resp. rate 16, height 5' 9\" (1.753 m), weight 180 lb (81.6 kg), SpO2 96%.  Intake/Output:   Last 3 shifts: I/O last 3 completed shifts:  In: 2060 [P.O.:360; I.V.:1700]  Out: 755 [Urine:745; Blood:10]   This shift: I/O this shift:  In: 480 [P.O.:480]  Out: -      Vent Settings:      Hemodynamic parameters (last 24 hours):      Scheduled Meds:   Current Facility-Administered Medications   Medication Dose Route Frequency    bupivacaine liposome (Exparel) 1.3% injection 20 mL  20 mL Infiltration Once (Intra-Op)    heparin (Porcine) 5000 UNIT/ML injection 5,000 Units  5,000 Units Subcutaneous 2 times per day    phenol (Chloraseptic) 1.4 % oral liquid spray 1 mL  1 spray Mouth/Throat PRN    acetaminophen (Tylenol Extra Strength) tab 1,000 mg  1,000 mg Oral Q8H TINO    polyethylene glycol (PEG 3350) (Miralax) 17 g oral packet 17 g  17 g Oral Daily PRN    sennosides (Senokot) tab 17.2 mg  17.2 mg Oral Nightly PRN    ondansetron (Zofran) 4 MG/2ML injection 4 mg  4 mg Intravenous Q6H PRN    metoclopramide (Reglan) 5 mg/mL injection 5 mg  5 mg Intravenous Q8H PRN    famotidine (Pepcid) tab 20 mg  20 mg Oral Daily    Or    famotidine (Pepcid) 20 mg/2mL injection 20 mg  20 mg Intravenous Daily    magnesium oxide (Mag-Ox) tab 400 mg  400 mg Oral Daily    alvimopan (ENTEREG) capsule 12 mg  12 mg Oral BID    melatonin tab 3 mg  3 mg Oral Nightly PRN    traMADol (Ultram) tab 50 mg  50 mg Oral Q6H PRN    morphINE PF 2 MG/ML injection 2 mg  2 mg Intravenous Q2H PRN     Or    morphINE PF 4 MG/ML injection 4 mg  4 mg Intravenous Q2H PRN       Continuous Infusions:     Physical Exam  Constitutional: no acute distress  Eyes: PERRL  ENT: nares pateint  Neck: supple, no JVD  Cardio: RRR, S1 S2  Respiratory: clear to auscultation bilaterally, no wheezing, rales, rhonchi, crackles  GI: abdomen soft, non tender, active bowel sounds, no organomegaly  Extremities: no clubbing, cyanosis, edema  Neurologic: no gross motor deficits  Skin: warm, dry      Results:     Lab Results   Component Value Date    WBC 18.3 02/28/2025    HGB 9.3 02/28/2025    HCT 29.5 02/28/2025    .0 02/28/2025    CREATSERUM 0.88 02/28/2025    BUN 18 02/28/2025     02/28/2025    K 4.2 02/28/2025     02/28/2025    CO2 23.0 02/28/2025     02/28/2025    CA 8.1 02/28/2025    MG 1.9 02/28/2025    PHOS 4.1 02/28/2025       No results found.          Assessment   1.  POD #1 status post right hemicolectomy and omental flap closure  2.  Colon adenocarcinoma  3.  ILD  4.  Severe aortic stenosis  5.  Prior history of pulmonary embolism     Plan   -Patient presents for scheduled right hemicolectomy and omental flap closure.  Recently diagnosed colon adenocarcinoma.  -Patient with underlying history of ILD.  Most recent CT imaging from 1/2/2025 with some subpleural reticulation and groundglass opacities seen.   -Further recommendations per general surgery  -DVT prophylaxis: Heparin  -Patient stable from pulmonary perspective at this time    Cody Holloway DO  Pulmonary Critical Care Medicine  [facility]

## 2025-02-28 NOTE — DIETARY NOTE
Brief Note    Completed low fiber diet education per MD order. Handout and contact information provided.     Angelina Giraldo-Mary  Dietetic Intern  Phone: [phone]

## 2025-02-28 NOTE — OCCUPATIONAL THERAPY NOTE
OCCUPATIONAL THERAPY EVALUATION - INPATIENT     Room Number: 471/471-A  Evaluation Date: 2/28/2025  Type of Evaluation: Initial  Presenting Problem: POD #1 status post robotic resection of splenic flexure colon  cancer    Physician Order: IP Consult to Occupational Therapy  Reason for Therapy: ADL/IADL Dysfunction and Discharge Planning    OCCUPATIONAL THERAPY ASSESSMENT   Patient is a 84 year old female admitted 2/27/2025 for  POD #1 status post robotic resection of splenic flexure colon  cancer.  Prior to admission, patient's baseline is Independent w ADLS, IADLS, TFRS, and fucntional mobility w 2ww. Pt gets A from staff for laundry, med management.  Patient is currently functioning near baseline with ADLs and functional mobility/TFRs.  Patient is requiring SBA overall as a result of the following impairments: decreased functional strength, decreased endurance, and impaired   balance. Occupational Therapy will continue to follow for duration of hospitalization.    Patient will benefit from continued skilled OT Services with no additional skilled services but increased support at home.    PLAN DURING HOSPITALIZATION  OT Device Recommendations: None  OT Treatment Plan: Balance activities;ADL training;Energy conservation/work simplification techniques;Functional transfer training;Endurance training     OCCUPATIONAL THERAPY MEDICAL/SOCIAL HISTORY   Problem List  Active Problems:    Malignant neoplasm of splenic flexure (HCC)    Adenocarcinoma of colon (HCC)    HOME SITUATION  Type of Home: Independent living facility  Lives With: Staff 24 hours  Toilet and Equipment: Comfort height toilet; Grab bar  Shower/Tub and Equipment: Walk-in shower; Shower chair; Grab bar  Other Equipment: None  Patient Regularly Uses: Rolling walker      SUBJECTIVE  \"I am hoping to advance my diet soon\"    OCCUPATIONAL THERAPY EXAMINATION      OBJECTIVE  Precautions: -- (desats with amb)  Fall Risk: Standard fall risk      PAIN  ASSESSMENT  Ratin                           O2 SATURATIONS  Oxygen Therapy  SPO2% on Room Air at Rest: 94  SPO2% Ambulation on Room Air: 88    COGNITION  Overall Cognitive Status:  WFL - within functional limits        RANGE OF MOTION   Upper extremity ROM is within functional limits     STRENGTH ASSESSMENT  Upper extremity strength is within functional limits     COORDINATION  Gross Motor: WFL   Fine Motor: WFL     ACTIVITIES OF DAILY LIVING ASSESSMENT  AM-PAC ‘6-Clicks’ Inpatient Daily Activity Short Form  How much help from another person does the patient currently need…  -   Putting on and taking off regular lower body clothing?: A Little  -   Bathing (including washing, rinsing, drying)?: A Little  -   Toileting, which includes using toilet, bedpan or urinal? : A Little  -   Putting on and taking off regular upper body clothing?: A Little  -   Taking care of personal grooming such as brushing teeth?: A Little  -   Eating meals?: None    AM-PAC Score:  Score: 19  Approx Degree of Impairment: 42.8%  Standardized Score (AM-PAC Scale): 40.22  CMS Modifier (G-Code): CK      FUNCTIONAL TRANSFER ASSESSMENT  Sit to Stand from Chair: stand-by assist  Toilet Transfer: stand-by assist    2ww use    FUNCTIONAL MOBILITY  stand-by assist for in-room fx mobility using RW  Comments:     20 ft x2    ACTIVITIES OF DAILY LIVING  Eating: independent  Grooming: stand-by assist-stands at sink for ADL task approx 5 mins  UB Dressing: stand-by assist  LB Dressing: min assist-incidental A for threading clothing on RLE  Toileting: stand-by assist  Comments:     Graded based on abilities during session and clinical judgement. Pt demos abilities with:  Toileting, LB dressing, UB bathing and grooming standing at sink. Pt is intermittently SOB, O2 ranges from 88-90 on RA. Cues for breathing technique, pt aware.        Skilled Therapy Provided: Pt seen for OT session, RN approved. See ADL and mobility grids, DC recs for details. At the  end, Pt positioned upright in chair w call light and personal items in reach, RN Aware.       EDUCATION PROVIDED  Patient Education : Role of Occupational Therapy; Plan of Care; Discharge Recommendations; DME Recommendations; Functional Transfer Techniques; Fall Prevention; Posture/Positioning; Energy Conservation  Patient's Response to Education: Verbalized Understanding; Returned Demonstration    The patient's Approx Degree of Impairment: 42.8% has been calculated based on documentation in the [facility] '6 clicks' Inpatient Daily Activity Short Form.  Research supports that patients with this level of impairment may benefit from [facility].  Final disposition will be made by interdisciplinary medical team.     Patient End of Session: Up in chair;Needs met;Call light within reach;RN aware of session/findings;All patient questions and concerns addressed;Hospital anti-slip socks    OT Goals  Patients self stated goal is: return home     Patient will complete functional transfer with mod I  Comment:     Patient will complete toileting with mod I  Comment:     Patient will tolerate standing at sink for length of ADL at mod I level   Comment:   Pt will increase LB dressing mod I           Goals  on: 3/20/25  Frequency: 3-5xs/week    Patient Evaluation Complexity Level:   Occupational Profile/Medical History LOW - Brief history including review of medical or therapy records    Specific performance deficits impacting engagement in ADL/IADL LOW  1 - 3 performance deficits    Client Assessment/Performance Deficits LOW - No comorbidities nor modifications of tasks    Clinical Decision Making LOW - Analysis of occupational profile, problem-focused assessments, limited treatment options    Overall Complexity LOW     OT Session Time: 15 minutes  Self-Care Home Management: 15 minutes

## 2025-02-28 NOTE — CM/SW NOTE
MDo- Patient has PT at assisted living please continue   Possible d/c sat/Sunday       Per chart review of 1/24/25 patient resides @ [facility] ([phone]) patient has been residing at this assisted living facility since 12-.  Per Bibi her nurse , patient is rather independent with dressing, toileting, bathing, eating, etc--does have nurse supervision with medication administration.       Confirmed patient is current with [facility]  Phone  [phone]  Fax  [phone]    CM req registration to [facility] facesheet with current address    CM sent [facility] ref as above with MD order & lanie order to confirm status      1330  CM confirmed pt is current and receiving home PT    Res'd in aidin    Plan  Home with [facility]     / to remain available for support and/or discharge planning.     Celine Hanley, RN    Ext [phone]

## 2025-02-28 NOTE — PHYSICAL THERAPY NOTE
PHYSICAL THERAPY EVALUATION - INPATIENT     Room Number: 471/471-A  Evaluation Date: 2/28/2025  Type of Evaluation: Initial   Physician Order: PT Eval and Treat    Presenting Problem: Malignant neoplasm of splenic flexure  Co-Morbidities : MVI, OP, dyspnea, adenocarcinomi, macular degeneration  Reason for Therapy: Mobility Dysfunction and Discharge Planning    PHYSICAL THERAPY ASSESSMENT   Patient is a 84 year old female admitted 2/27/2025 for Malignant neoplasm splenic flexure, status post robotic resection of splenic flexure colon  cancer  Prior to admission, patient's baseline is CGA with all mobility and ADL's last several weeks in [facility] prior to that living at home uses a RW.  Patient is currently functioning below baseline with bed mobility, transfers, gait, stair negotiation, maintaining seated position, standing prolonged periods, and performing household tasks.  Patient is requiring contact guard assist as a result of the following impairments: decreased functional strength, decreased endurance/aerobic capacity, impaired standing balance, impaired coordination, decreased muscular endurance, medical status, and decreased compliance/participation.  Physical Therapy will continue to follow for duration of hospitalization.    Patient will benefit from continued skilled PT Services at discharge to promote prior level of function and safety with additional support and return home with home health PT.    PLAN DURING HOSPITALIZATION  Nursing Mobility Recommendation : 1 Assist  PT Device Recommendation: Rolling walker  PT Treatment Plan: Bed mobility;Body mechanics;Endurance;Energy conservation;Patient education;Gait training;Strengthening;Transfer training;Balance training  Rehab Potential : Good  Frequency (Obs): 5x/week     PHYSICAL THERAPY MEDICAL/SOCIAL HISTORY   History related to current admission: Hoda Busby is a(n) 84 year old female    POD #1 status post robotic resection of splenic flexure colon   cancer  Patient doing well  Pulmonary toilet  Ambulate  Clear liquid diet  Will advance to full liquid diet for dinner  Elevated leukocytosis will continue to follow  VTE prophylaxis  Discussed intraoperative findings with the patient   for discharge planning with home physical therapy possibly Saturday/     Problem List  Active Problems:    Malignant neoplasm of splenic flexure (HCC)    Adenocarcinoma of colon (HCC)      HOME SITUATION  Type of Home: Assisted living facility                        Lives With: Staff 24 hours              Prior Level of [place]: Recently at [facility] assist with all mobility and ADL's with RW working with PT, prior to that living at home with mod indep with RW. Plans to return to [facility] with rehab to regain maximal PLOF.     SUBJECTIVE  I feel ok just a little short of breath and I take seated rest breaks as needed. It feels good to walk and I plan to return to [facility] with rehab.     PHYSICAL THERAPY EXAMINATION   OBJECTIVE  Precautions:  (desats with amb)  Fall Risk: Standard fall risk    WEIGHT BEARING RESTRICTION       PAIN ASSESSMENT  Ratin          COGNITION  Overall Cognitive Status:  WFL - within functional limits    RANGE OF MOTION AND STRENGTH ASSESSMENT  Upper extremity ROM and strength are within functional limits   Lower extremity ROM is within functional limits   Lower extremity strength is within functional limits 5/5    BALANCE  Static Sitting: Good  Dynamic Sitting: Fair +  Static Standing: Fair  Dynamic Standing: Fair -    ACTIVITY TOLERANCE  Pulse: 78  Heart Rate Source: Monitor  Resp: 16  BP: 122/71  BP Location: Right arm  BP Method: Automatic  Patient Position: Sitting    O2 WALK  Oxygen Therapy  SPO2% on Room Air at Rest: 96  SPO2% Ambulation on Room Air: 84 (seated rest break taken with 02 sats returning to 92% with 2 min seated rest pt denied dizziness)    AM-PAC '6-Clicks' INPATIENT SHORT FORM - BASIC MOBILITY  How much difficulty does the  patient currently have...  Patient Difficulty: Turning over in bed (including adjusting bedclothes, sheets and blankets)?: None   Patient Difficulty: Sitting down on and standing up from a chair with arms (e.g., wheelchair, bedside commode, etc.): None   Patient Difficulty: Moving from lying on back to sitting on the side of the bed?: None   How much help from another person does the patient currently need...   Help from Another: Moving to and from a bed to a chair (including a wheelchair)?: A Little   Help from Another: Need to walk in hospital room?: A Little   Help from Another: Climbing 3-5 steps with a railing?: A Little     AM-PAC Score:  Raw Score: 21   Approx Degree of Impairment: 28.97%   Standardized Score (AM-PAC Scale): 50.25   CMS Modifier (G-Code):     FUNCTIONAL ABILITY STATUS  Functional Mobility/Gait Assessment  Gait Assistance: Contact guard assist  Distance (ft): 100' x 2  Assistive Device: Rolling walker  Pattern:  (decreasedstep length and bonnie)  Rolling: supervision  Supine to Sit: supervision  Sit to Supine: contact guard assist  Sit to Stand: contact guard assist    Exercise/Education Provided:  Bed mobility  Body mechanics  Energy conservation  Functional activity tolerated  Gait training  Posture  ROM  Strengthening  Lower therapeutic exercise:  Ankle pumps  Heel slides  LAQ  Transfer training    Skilled Therapy Provided: Pt ed with bed mobility and transfers with RW with SBA with bed mobility and CGA with RW for transfers. Pt ed with amb 100' x 2 with RW with CGA with 2 min seated rest break to keep 02 sats in low 90's. Pt 02 sats did drop to 84% on room air with first 100' of amb and she required 2 min seated rest to return to low 90's with 02 sats. Pt assisted back to chair for LE therex x 10 reps. Pt is on track for a return to assisted living with [facility] PT as medical progress allows.     The patient's Approx Degree of Impairment: 28.97% has been calculated based on documentation in  the [facility] '6 clicks' Inpatient Basic Mobility Short Form.  Research supports that patients with this level of impairment may benefit from [facility] PT and a return to [facility] for assist.  Final disposition will be made by interdisciplinary medical team.    Patient End of Session: Up in chair;Needs met;With  staff;Call light within reach;RN aware of session/findings;All patient questions and concerns addressed;Alarm set    CURRENT GOALS  Goals to be met by: 3/20/2025  Patient Goal Patient's self-stated goal is: Return home   Goal #1 Patient is able to demonstrate supine - sit EOB @ level: independent     Goal #1   Current Status    Goal #2 Patient is able to demonstrate transfers Sit to/from Stand at assistance level: modified independent with walker - rolling     Goal #2  Current Status    Goal #3 Patient is able to ambulate 300 feet with assist device: walker - rolling at assistance level: modified independent   Goal #3   Current Status    Goal #4    Goal #4   Current Status    Goal #5 Patient to demonstrate independence with home activity/exercise instructions provided to patient in preparation for discharge.   Goal #5   Current Status    Goal #6    Goal #6  Current Status      Patient Evaluation Complexity Level:  History Low - no personal factors and/or co-morbidities   Examination of body systems Low -  addressing 1-2 elements   Clinical Presentation Low- Stable   Clinical Decision Making  Low Complexity     Gait Training: 15 minutes

## 2025-02-28 NOTE — OPERATIVE REPORT
[facility]    PATIENT'S NAME: ANUPAMA CARNEY   ATTENDING PHYSICIAN: Malathi Stuart MD   OPERATING PHYSICIAN: Viktor Cassidy Jr., MD   PATIENT ACCOUNT#:   [id number]    LOCATION:  [address] [place] [address]  MEDICAL RECORD #:   [id number]       YOB: 1940  ADMISSION DATE:       02/27/2025      OPERATION DATE:  02/27/2025    OPERATIVE REPORT      PREOPERATIVE DIAGNOSIS:  Colon cancer, splenic flexure, preoperative clinical stage II.  POSTOPERATIVE DIAGNOSIS:  Colon cancer, splenic flexure, postoperative clinical stage II.  PROCEDURE:  Robotic assisted left hemicolectomy with mobilization of splenic flexure, robotic omental flap.    ASSISTANT:  CORRINE Peck (the role of assistant was critical to the operation.  He acted in positioning the patient, docking the robot, passing instruments, passing sutures, retraction, assisted with anastomosis, closures of the wounds).    ANESTHESIA:  General endotracheal tube.    ESTIMATED BLOOD LOSS:  10 mL.    COMPLICATIONS:  None.    INDICATIONS:  This is an 84-year-old white female with a history of splenic flexure colon cancer.  The patient required TAVR preoperatively.  The patient now has been optimized and cleared for surgery.  Patient now for definitive colon resection.  Open versus laparoscopic versus robotic-assisted resection were discussed in detail.  Risks of procedure including bleeding, infection, postoperative hematoma, wound infection, nonhealing wound, scar formation, perforated hollow viscus, vascular injury, need to convert to an open procedure, 3% to 5% incidence of anastomotic leak, recurrent colon cancer, inability to remove colon cancer, hernia formation, wound infection, as well as risk with anesthesia including myocardial infarction, respiratory failure, renal failure, pulmonary embolus, DVT, CVA, and even death were all discussed in detail with the patient as well as her 2 nieces.  Both understand, consent, and wish to proceed  with the operation.      OPERATIVE TECHNIQUE:  Patient was brought to the operating room, placed on the operating table in supine position.  General anesthetic was administered via endotracheal tube anesthesia.  The stomach was decompressed with an orogastric tube.  The bladder was decompressed with a Funk catheter.  The abdomen was prepped and draped in routine sterile fashion.  A small incision was made in the right mid abdomen with a 15 blade.  A Veress needle was introduced into the abdominal cavity without difficulty.  Using saline drop test, placement was confirmed, and pneumoperitoneum was established to approximately 15 mmHg.  Next, an 8 mm robotic Visiport inserted to the abdominal cavity under direct visualization without difficulty.  Exploration of all 4 quadrants revealed no evidence of bowel injury or vascular injury present.  Next, an 8 mm robotic trocar was placed in the suprapubic region, an 8 mm robotic trocar was placed in the right upper quadrant, an 8 mm robotic trocar was placed in the left lower quadrant.  Visualization of the splenic flexure did reveal the tattooed area in the distal splenic flexure, proximal transverse colon.  At this point, the patient was placed in position.  The robot was brought in and docked, and the instruments were inserted.  I went to the console to perform the colectomy.  Mobilization of the transverse colon was completely performed by elevating the omentum off the transverse colon.  The mobilization of the splenic flexure was then performed freeing up the splenic flexure circumferentially and mobilizing this completely down to region of the mid descending colon.  The tattooed area was visualized.  The cancer was palpable with the robotic instruments.  The splenic flexure was completely mobilized with adequate length proximally and distally to allow adequate resection.  The mesentery of the descending colon was visualized, and this was divided with the vessel seal  device.  Inferior mesenteric artery was identified along the inferior mesenteric vein.  A high ligation of the inferior mesenteric vein and artery was performed at its origin.  This was performed with 2 hemoclips proximally, 1 distally, and divided with the vessel seal device.  The remaining mesentery of the colon was divided with the vessel seal device up to the region of the left branch of middle colic vessel.  This was clipped with hemoclips, 2 proximally and 1 distally on the colon.  At this time, the colon was fully mobilized without difficulty.  ICG green was given to visualize the area of resection and the area that was marked previously had adequate perfusion.  Next, the 12 mm robotic trocar was upsized in the right lower quadrant and left lower quadrant trocars.  Da Fanta 60 stapling device was placed with blue cartridge and was used to fire the proximal transverse colon and the mid descending colon with 2 separate firings without difficulty.  Specimen was placed in left lower quadrant.  A high ligation was performed of the vessel of the inferior mesenteric vessel with adequate resection of the mesentery at its origin to ensure adequate lymph nodes.  The entire proximal transverse, splenic flexure, and descending colon was completely removed.  Next, using a 2-0 silk suture, the proximal transverse colon and the proximal descending colon were reapproximated for side-to-side anastomosis.  Seromuscular suture was placed at the apex of the colon and at the staple line.  At this time, 2 enterotomies made in the proximal and distal colon with a scissor.  Next, the da Fanta stapling device with a 60 blue cartridge was placed into the 2 enterotomies and a side-to-side anastomosis was performed at the tenia of the anterior mesenteric border.  This was fired without difficulty.  Next, the enterotomy was closed with a #2-0 180-day V-Loc running suture starting at the inferior portion of the apex, reapproximating full  thickness, reapproximation with suture.  Second layer using the V-Loc was performed with a seromuscular Lembert sutures oversewing the anastomosis down to region back to the apex.  The anastomosis was intact with no evidence of leak.  At this time, omental flap was created with the vessel seal device.  The omental flap was secured overlying anastomosis into the descending colon with the 2-0 silk simple interrupted sutures.  The omental flap was placed for added reinforcement to the anastomosis.  There was no undue tension present.  The spleen was preserved with no evidence of injury.  Splenic flexure was completely mobilized.  The colon was not twisted in its orientation.  At this time, the instruments were removed, and the robot was undocked.  I went back to the patient's bedside, and the 12 mm trocar site was removed and incision was extended to facilitate extraction.  The Bravo wound retractor was placed into the abdominal cavity after the anterior rectus sheath was divided with electrocautery and the posterior rectus sheath was divided with electrocautery.  The rectus muscle was bluntly .  The Betadine-soaked laps were placed around the Bravo wound retractor ,and the specimen in left lower quadrant was completely removed off the field.  The mass was palpable at the tattooing.  Next, Bravo wound retractor was removed, and a bowel technique was performed, changing gloves and instruments.  The posterior rectus sheath was reapproximated with 0 looped PDS running suture.  Anterior rectus sheath was closed with a 0 looped PDS running suture.  Wounds irrigated thoroughly between each layer.  Next, pneumoperitoneum was re-established to approximately 15 mmHg.  Bilateral TAP block was performed with Exparel as well as Marcaine solution.  The extraction site was noted to be intact with no entrapment of any structures.  There was no bleeding present.  Anastomosis was visualized.  There was no twisting of the  colon.  The omental flap was in place.  At this time, the remaining pneumoperitoneum was removed, and the remaining 8 and 5 mm trocar sites were removed under visualization with no impingement of bowel or bleeding present.  The wound was irrigated thoroughly with saline solution.  The extraction site was closed in subcutaneous tissue with a 2-0 Vicryl simple interrupted sutures.  The skin was approximated with a 3-0 Vicryl running subcuticular suture.  Remaining wounds were closed with a 4-0 Vicryl running subcuticular suture.  Steri-Strips were applied to the wound.  Sterile dressings applied to the wound.  Patient was transferred off the operative table to recovery room, extubated in stable condition.  All counts were correct at the end of the case.     Dictated By Viktor Cassidy Jr., MD  d: 02/27/2025 14:42:06  t: 02/27/2025 20:18:21  Job [id number]  MM/    cc: Viktor Cassidy Jr., MD Malathi Ely MD Wajahat M. Hussain, DO

## 2025-03-01 NOTE — PLAN OF CARE
Hoda is alert and oriented X4, she is ambulating with stand by assistance with a walker. She denies pain or nausea and vomiting. She is tolerating a low fiber soft diet. Discharge education complete. IV removed.  Problem: Patient Centered Care  Goal: Patient preferences are identified and integrated in the patient's plan of care  Description: Interventions:  - What would you like us to know as we care for you?   - Provide timely, complete, and accurate information to patient/family  - Incorporate patient and family knowledge, values, beliefs, and cultural backgrounds into the planning and delivery of care  - Encourage patient/family to participate in care and decision-making at the level they choose  - Honor patient and family perspectives and choices  Outcome: Adequate for Discharge     Problem: PAIN - ADULT  Goal: Verbalizes/displays adequate comfort level or patient's stated pain goal  Description: INTERVENTIONS:  - Encourage pt to monitor pain and request assistance  - Assess pain using appropriate pain scale  - Administer analgesics based on type and severity of pain and evaluate response  - Implement non-pharmacological measures as appropriate and evaluate response  - Consider cultural and social influences on pain and pain management  - Manage/alleviate anxiety  - Utilize distraction and/or relaxation techniques  - Monitor for opioid side effects  - Notify MD/LIP if interventions unsuccessful or patient reports new pain  - Anticipate increased pain with activity and pre-medicate as appropriate  Outcome: Adequate for Discharge     Problem: RISK FOR INFECTION - ADULT  Goal: Absence of fever/infection during anticipated neutropenic period  Description: INTERVENTIONS  - Monitor WBC  - Administer growth factors as ordered  - Implement neutropenic guidelines  Outcome: Adequate for Discharge     Problem: SAFETY ADULT - FALL  Goal: Free from fall injury  Description: INTERVENTIONS:  - Assess pt frequently for  physical needs  - Identify cognitive and physical deficits and behaviors that affect risk of falls.  - [place] fall precautions as indicated by assessment.  - Educate pt/family on patient safety including physical limitations  - Instruct pt to call for assistance with activity based on assessment  - Modify environment to reduce risk of injury  - Provide assistive devices as appropriate  - Consider OT/PT consult to assist with strengthening/mobility  - Encourage toileting schedule  Outcome: Adequate for Discharge     Problem: DISCHARGE PLANNING  Goal: Discharge to home or other facility with appropriate resources  Description: INTERVENTIONS:  - Identify barriers to discharge w/pt and caregiver  - Include patient/family/discharge partner in discharge planning  - Arrange for needed discharge resources and transportation as appropriate  - Identify discharge learning needs (meds, wound care, etc)  - Arrange for interpreters to assist at discharge as needed  - Consider post-discharge preferences of patient/family/discharge partner  - Complete POLST form as appropriate  - Assess patient's ability to be responsible for managing their own health  - Refer to Case Management Department for coordinating discharge planning if the patient needs post-hospital services based on physician/LIP order or complex needs related to functional status, cognitive ability or social support system  Outcome: Adequate for Discharge

## 2025-03-01 NOTE — PLAN OF CARE
Patient is A/Ox4 on room air. Forgetful at times. VSS. Up SBA with walker. Voids freely. No BM overnight. Saline locked. Scheduled tylenol for pain. Denies nausea; tolerating diet. Call light within reach; safety precautions in place.   Problem: PAIN - ADULT  Goal: Verbalizes/displays adequate comfort level or patient's stated pain goal  Description: INTERVENTIONS:  - Encourage pt to monitor pain and request assistance  - Assess pain using appropriate pain scale  - Administer analgesics based on type and severity of pain and evaluate response  - Implement non-pharmacological measures as appropriate and evaluate response  - Consider cultural and social influences on pain and pain management  - Manage/alleviate anxiety  - Utilize distraction and/or relaxation techniques  - Monitor for opioid side effects  - Notify MD/LIP if interventions unsuccessful or patient reports new pain  - Anticipate increased pain with activity and pre-medicate as appropriate  Outcome: Progressing     Problem: RISK FOR INFECTION - ADULT  Goal: Absence of fever/infection during anticipated neutropenic period  Description: INTERVENTIONS  - Monitor WBC  - Administer growth factors as ordered  - Implement neutropenic guidelines  Outcome: Progressing     Problem: SAFETY ADULT - FALL  Goal: Free from fall injury  Description: INTERVENTIONS:  - Assess pt frequently for physical needs  - Identify cognitive and physical deficits and behaviors that affect risk of falls.  - [place] fall precautions as indicated by assessment.  - Educate pt/family on patient safety including physical limitations  - Instruct pt to call for assistance with activity based on assessment  - Modify environment to reduce risk of injury  - Provide assistive devices as appropriate  - Consider OT/PT consult to assist with strengthening/mobility  - Encourage toileting schedule  Outcome: Progressing

## 2025-03-01 NOTE — PLAN OF CARE
Patient received form PACU, alert and oriented X4, forgetful at times; vitals stable. Denies pain. IVFs infusing. SCDs on. Clear liquid/ERAs diet. Funk in place. Bed locked and in lowest position, call light within reach; bed alarm active for added safety.    Problem: Patient Centered Care  Goal: Patient preferences are identified and integrated in the patient's plan of care  Description: Interventions:  - What would you like us to know as we care for you?   - Provide timely, complete, and accurate information to patient/family  - Incorporate patient and family knowledge, values, beliefs, and cultural backgrounds into the planning and delivery of care  - Encourage patient/family to participate in care and decision-making at the level they choose  - Honor patient and family perspectives and choices  2/28/2025 2000 by Arin Juares, RN  Outcome: Progressing  2/28/2025 1955 by Arin Juares, RN  Outcome: Progressing     Problem: PAIN - ADULT  Goal: Verbalizes/displays adequate comfort level or patient's stated pain goal  Description: INTERVENTIONS:  - Encourage pt to monitor pain and request assistance  - Assess pain using appropriate pain scale  - Administer analgesics based on type and severity of pain and evaluate response  - Implement non-pharmacological measures as appropriate and evaluate response  - Consider cultural and social influences on pain and pain management  - Manage/alleviate anxiety  - Utilize distraction and/or relaxation techniques  - Monitor for opioid side effects  - Notify MD/LIP if interventions unsuccessful or patient reports new pain  - Anticipate increased pain with activity and pre-medicate as appropriate  2/28/2025 2000 by Arin Juares, RN  Outcome: Progressing  2/28/2025 1955 by Arin Juares, RN  Outcome: Progressing     Problem: RISK FOR INFECTION - ADULT  Goal: Absence of fever/infection during anticipated neutropenic period  Description: INTERVENTIONS  - Monitor  WBC  - Administer growth factors as ordered  - Implement neutropenic guidelines  2/28/2025 2000 by Arin Juares, RN  Outcome: Progressing  2/28/2025 1955 by Arin Juares, RN  Outcome: Progressing     Problem: SAFETY ADULT - FALL  Goal: Free from fall injury  Description: INTERVENTIONS:  - Assess pt frequently for physical needs  - Identify cognitive and physical deficits and behaviors that affect risk of falls.  - [place] fall precautions as indicated by assessment.  - Educate pt/family on patient safety including physical limitations  - Instruct pt to call for assistance with activity based on assessment  - Modify environment to reduce risk of injury  - Provide assistive devices as appropriate  - Consider OT/PT consult to assist with strengthening/mobility  - Encourage toileting schedule  2/28/2025 2000 by Arin Juares, RN  Outcome: Progressing  2/28/2025 1955 by Arin Juares, RN  Outcome: Progressing     Problem: DISCHARGE PLANNING  Goal: Discharge to home or other facility with appropriate resources  Description: INTERVENTIONS:  - Identify barriers to discharge w/pt and caregiver  - Include patient/family/discharge partner in discharge planning  - Arrange for needed discharge resources and transportation as appropriate  - Identify discharge learning needs (meds, wound care, etc)  - Arrange for interpreters to assist at discharge as needed  - Consider post-discharge preferences of patient/family/discharge partner  - Complete POLST form as appropriate  - Assess patient's ability to be responsible for managing their own health  - Refer to Case Management Department for coordinating discharge planning if the patient needs post-hospital services based on physician/LIP order or complex needs related to functional status, cognitive ability or social support system  2/28/2025 2000 by Arin Juares, RN  Outcome: Progressing  2/28/2025 1955 by Arin Juares, RN  Outcome: Progressing

## 2025-03-01 NOTE — PROGRESS NOTES
[facility]  part of [facility]    Progress Note    Hoda Busby Patient Status:  Inpatient    1940 MRN [id number]   Location [facility] 4W/SW/SE Attending Malathi Stuart MD   Hosp Day # 2 PCP Malathi Stuart MD       SUBJECTIVE:  Tolerating FLD, had BM yesterday  Ambulating well    OBJECTIVE:  /56 (BP Location: Right arm)   Pulse 81   Temp 98.3 °F (36.8 °C) (Oral)   Resp 18   Ht 5' 9\" (1.753 m)   Wt 180 lb (81.6 kg)   SpO2 93%   BMI 26.58 kg/m²     Intake/Output:  I/O last 3 completed shifts:  In: 480 [P.O.:480]  Out: 700 [Urine:700]    Wt Readings from Last 3 Encounters:   25 180 lb (81.6 kg)   25 173 lb (78.5 kg)   25 181 lb (82.1 kg)       Exam  Gen: No acute distress, alert and oriented x3  Pulm: Lungs CTAB, no rhonchi or wheezing  CV: Heart RRR, no murmurs   Abd: Abdomen soft, nontender, nondistended, no organomegaly, bowel sounds present  MSK: Full range of motion in extremities, no clubbing, no cyanosis, no edema  Skin: no rashes or lesions  Neuro: A&O x 3, 5/5 strength in all 4 extremities.     Labs:   Recent Labs   Lab 25  0553 25  0650   RBC 3.56* 3.66*   HGB 9.3* 9.9*   HCT 29.5* 30.5*   MCV 82.9 83.3   MCH 26.1 27.0   MCHC 31.5 32.5   RDW 16.6* 16.8*   NEPRELIM 16.55* 10.96*   WBC 18.3* 13.8*   .0 244.0         Recent Labs   Lab 25  0553 25  0651   * 91   BUN 18 21   CREATSERUM 0.88 1.01   EGFRCR 65 55*   CA 8.1* 8.4*    141   K 4.2 4.0    109   CO2 23.0 24.0         Imaging:  No results found.          Assessment and Plan:     Colon cancer  S/p robotic left hemicolectomy, mobilization of splenic flexure and omental flap closure with Dr. Cassidy on 2025  Leukocytosis--likely reactive; improved today, afebrile  Received perioperative invanz  Pain controlled  Tolerating FLD     2. H/o GI bleed  Recently hospitalized -24 for GI bleed, then with recurrence 2024;  had been taking Xarelto 20 mg daily for PE  -EGD 11/26/24 (Dr. Nuno) -  15mm clean-based, nonbleeding gastric antral ulcer (bx done); 3mm gastric antral AVM s/p APC.  -received total of 3 units PRBCs and also IV Ferrrlecit.  On 11/30/24, she was resumed on Xarelto 20 mg po daily, and oral prednisone.  She returned to [facility] 11/30/24 for rehab.    -Hgb 6.5 12/13/24 - back to ED  -Xarelto stopped  -colonoscopy and repeat EGD 12/17/24 by Dr. Staton -- grade 1 esophagitis; duod ulcers healing; large flat polyp in transverse colon   -on omeprazole 20mg BID (formulary change at the [place]) - to continue x 8 weeks (EOT 1/22/25)   Now on omeprazole 20mg daily     Anemia  H/o GI bleed; monitor for post op blood loss  Currently hgb stable (previously was 9.5)        BLE edema  -Lasix 20mg/day  -still with trace edema  -gets better with leg elevation     left peroneal palsy  In Nov 2024; has numbness to anterolateral left foot and ankle as well as inability to dorsiflex left foot; etiology unclear  -neurologist Dr Richardson consulted in hosp  -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   -MRI brain shows no acute intracranial process  -left foot drop persists but improving; able to dorsiflex somewhat; getting PT/OT for it     Interstitial pneumonitis  -dx'ed 10/2024 - presented with cough and severe hypoxia  -unresponsive to abx (amox/doxy, then IV zosyn/zithromax)  -CXR 10/24/24 extensive bilateral perihilar and bilateral upper and lower lobe   -S.pneumo, legionella Ag , mycoplasma IgM/G, Fungitell-beta-D glucan negative  -ANCA and URIEL/connective tissue disease panels negative  -anti-histone and anti-Keara Ab's negative  -Echo 10/24/24 - normal EF (55-60%), no obvious vegetatons  -CT with bilateral ground glass opacities, small consolidations of BLL  -per pulm, findings suspicious for NSIP (vs cryptogenic organizing pneumonia vs  hypersensitivity pneumonitis); NOT thought to be c/w UIP pattern  -on empiric steroids (solumedrol then prednisone) since 10/25/24 - prednisone 40mg/day decreased to 30mg/day on 12/17/24 by pulm Dr. Holloway , then on 12/19/24 decreased to 30mg/day x 10d, then 20mg/day x 10d, then 10mg/day x 10d.  Currently on 10mg/day.  -pulmon on consult     Bilateral pulmonary emboli  -dx'ed at time of interstitial pneumonitis  -CT chest 10/25/24 -- acute distal segmental/subsegmental pulmonary emboli in [place] and subsegmental PE in [place]  -unclear etiology; no recent hx of long travel; no family/personal hx of blood clots  -BLE venous dopplers negative 10/26/24  -started xarelto 20 mg po every day on 11/28/24  -repeat CT chest 12/9/24 neg PE  -stopped Xarelto 12/14/24 due to recurrent GI bleed     Severe aortic stenosis  S/p s/p TAVR 1/23/2025     Hypertension, primary  -(dyazide stopped due to NNACY)  -amlodipine held due to low BP in hosp  -BP actually good off meds (other than lasix)     Hx of hip OA  -s/p left THR (Dr. Lo) many years ago  -s/p elective R BEATRIZ on 10/5/23 by Dr. Diaz     Hyperlipidemia   Pt with strong family hx of high cholesterol  ; normal TG and HDL  No indication for statin given age     Osteopenia   On Fosamax from 2011 to 4/2016.     DEXA stable 5/10/17 and 2019 and 2021  DEXA 8/2024 -- osteoporosis of left forearm  -restarted Fosamax 8/2024 -- given esophagitis 12/2024, fosamax on hold     Vitamin D deficiency  On vitamin D 4000u/day        Dispo: eventually plan for home with home health when medically stable      Nadia Steel DO  3/1/2025  9:25 AM

## 2025-03-01 NOTE — DISCHARGE SUMMARY
Discharge Summary     Hoda Busby Patient Status:  Inpatient    1940 MRN [id number]   Location [facility] 4W/SW/SE Attending Malathi Stuart MD   Hosp Day # 2 PCP Malathi Stuart MD     Date of Admission: 2025  Date of Discharge: 3/1/2025  Discharge Disposition: Assisted Living    Discharge Diagnosis: colon cancer    History of Present Illness:     Hoda Busby is a 84 year old female. 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     Brief Synopsis:     Colon cancer  S/p robotic left hemicolectomy, mobilization of splenic flexure and omental flap closure with Dr. Cassidy on 2025  Leukocytosis--likely reactive; improved today, afebrile  Received perioperative invanz  Pain controlled  Tolerating FLD     2. H/o GI bleed  Recently hospitalized -24 for GI bleed, then with recurrence 2024; had been taking Xarelto 20 mg daily for PE  -EGD 24 (Dr. Nuno) -  15mm clean-based, nonbleeding gastric antral ulcer (bx done); 3mm gastric antral AVM s/p APC.  -received total of 3 units PRBCs and also IV Ferrrlecit.  On 24, she was resumed on Xarelto 20 mg po daily, and oral prednisone.  She returned to [facility] 24 for rehab.    -Hgb 6.5 24 - back to ED  -Xarelto stopped  -colonoscopy and repeat EGD 24 by Dr. Staton -- grade 1 esophagitis; duod ulcers healing; large flat polyp in transverse colon   -on omeprazole 20mg BID (formulary change at the [place]) - to continue x 8 weeks (EOT 25)   Now on omeprazole 20mg daily     Anemia  H/o GI bleed; monitor for post op blood loss  Currently hgb stable (previously was 9.5)        BLE edema  -Lasix 20mg/day  -still with trace edema  -gets better with leg elevation     left peroneal palsy  In 2024; has numbness to anterolateral left foot and ankle as well as inability to dorsiflex left foot; etiology unclear  -neurologist Dr Richardson  consulted in hosp  -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   -MRI brain shows no acute intracranial process  -left foot drop persists but improving; able to dorsiflex somewhat; getting PT/OT for it     Interstitial pneumonitis  -dx'ed 10/2024 - presented with cough and severe hypoxia  -unresponsive to abx (amox/doxy, then IV zosyn/zithromax)  -CXR 10/24/24 extensive bilateral perihilar and bilateral upper and lower lobe   -S.pneumo, legionella Ag , mycoplasma IgM/G, Fungitell-beta-D glucan negative  -ANCA and URIEL/connective tissue disease panels negative  -anti-histone and anti-K[place] Ab's negative  -Echo 10/24/24 - normal EF (55-60%), no obvious vegetatons  -CT with bilateral ground glass opacities, small consolidations of BLL  -per pulm, findings suspicious for NSIP (vs cryptogenic organizing pneumonia vs hypersensitivity pneumonitis); NOT thought to be c/w UIP pattern  -on empiric steroids (solumedrol then prednisone) since 10/25/24 - prednisone 40mg/day decreased to 30mg/day on 12/17/24 by pulm Dr. Holloway , then on 12/19/24 decreased to 30mg/day x 10d, then 20mg/day x 10d, then 10mg/day x 10d.  Currently on 10mg/day.  -pulmon on consult     Bilateral pulmonary emboli  -dx'ed at time of interstitial pneumonitis  -CT chest 10/25/24 -- acute distal segmental/subsegmental pulmonary emboli in R[facility] and subsegmental PE in [place]  -unclear etiology; no recent hx of long travel; no family/personal hx of blood clots  -BLE venous dopplers negative 10/26/24  -started xarelto 20 mg po every day on 11/28/24  -repeat CT chest 12/9/24 neg PE  -stopped Xarelto 12/14/24 due to recurrent GI bleed     Severe aortic stenosis  S/p s/p TAVR 1/23/2025     Hypertension, primary  -(dyazide stopped due to NANCY)  -amlodipine held due to low BP in hosp  -BP actually good off meds (other than lasix)     Hx of hip OA  -s/p left THR (Dr. Lo)  many years ago  -s/p elective R BEATRIZ on 10/5/23 by Dr. Diaz     Hyperlipidemia   Pt with strong family hx of high cholesterol  ; normal TG and HDL  No indication for statin given age     Osteopenia   On Fosamax from 2011 to 4/2016.     DEXA stable 5/10/17 and 2019 and 2021  DEXA 8/2024 -- osteoporosis of left forearm  -restarted Fosamax 8/2024 -- given esophagitis 12/2024, fosamax on hold     Vitamin D deficiency  On vitamin D 4000u/day        Lace+ Score: 64  59-90 High Risk  29-58 Medium Risk  0-28   Low Risk       TCM Follow-Up Recommendation:  LACE > 58: High Risk of readmission after discharge from the hospital.    Procedures during hospitalization:   Surgery as above    Incidental or significant findings and recommendations (brief descriptions):  none    Lab/Test results pending at Discharge:   none  Consultants:  Surgery. Pulmonology, cardiology    Discharge Medication List:     Discharge Medications        START taking these medications        Instructions Prescription details   docusate sodium 100 MG Caps  Commonly known as: Colace      Take 1 capsule (100 mg total) by mouth 2 (two) times daily for 7 days.   Stop taking on: March 6, 2025  Quantity: 14 capsule  Refills: 0     traMADol 50 MG Tabs  Commonly known as: Ultram      Take 1 tablet (50 mg total) by mouth every 6 (six) hours as needed for Pain.   Quantity: 12 tablet  Refills: 0            CONTINUE taking these medications        Instructions Prescription details   atorvastatin 20 MG Tabs  Commonly known as: Lipitor      Take 1 tablet (20 mg total) by mouth at bedtime.   Refills: 0     Calcium + D3 600-200 MG-UNIT Tabs      Take 1 tablet by mouth daily.   Refills: 0     cyanocobalamin 500 MCG Tabs  Commonly known as: Vitamin B12      Take 1 tablet (500 mcg total) by mouth daily.   Refills: 0     ferrous sulfate 325 (65 FE) MG Tbec      Take 1 tablet (325 mg total) by mouth daily.   Refills: 0     furosemide 20 MG Tabs  Commonly known as:  Lasix      Take 1 tablet (20 mg total) by mouth daily. Take 40mg daily x 3 days (12/19-12/21/24), then 20mg daily thereafter   Quantity: 90 tablet  Refills: 3     metoclopramide 10 MG Tabs  Commonly known as: Reglan      Take 1 tablet (10 mg total) by mouth As Directed.   Refills: 0     Multi-Vitamin/Minerals Tabs      Take 1 tablet by mouth daily.   Refills: 0     omeprazole 20 MG Cpdr  Commonly known as: PriLOSEC      Take 1 capsule (20 mg total) by mouth 2 (two) times daily.   Refills: 0     predniSONE 10 MG Tabs  Commonly known as: Deltasone      Take 1 tablet (10 mg total) by mouth daily.   Refills: 0     sulfamethoxazole-trimethoprim -160 MG Tabs per tablet  Commonly known as: Bactrim DS      Take 1 tablet by mouth 3 (three) times a week. Monday, Wednesday, Friday   Refills: 0     Vitamin D 50 MCG (2000 UT) Tabs      Take 4,000 Units by mouth daily.   Quantity: 1 tablet  Refills: 0            STOP taking these medications      alendronate 70 MG Tabs  Commonly known as: Fosamax        erythromycin base 500 MG Tabs  Commonly known as: E-Mycin        neomycin 500 MG Tabs  Commonly known as: Mycifradin        PEG 3350-KCl-Na Bicarb-NaCl 420 g Solr  Commonly known as: NULYTELY               ASK your doctor about these medications        Instructions Prescription details   aspirin 81 MG Tbec      Take 1 tablet (81 mg total) by mouth daily.   Quantity: 30 tablet  Refills: 11               Where to Get Your Medications        Please  your prescriptions at the location directed by your doctor or nurse    Bring a paper prescription for each of these medications  docusate sodium 100 MG Caps  traMADol 50 MG Tabs         Follow-up appointment:   Holland Harris PA  [address]  [facility] [id number] [phone]    Follow up in 12 day(s)  Call for follow-up after hospitalization    Appointments for Next 30 Days 3/1/2025 - 3/31/2025      None            Supplementary Documentation:   [facility]  reviewed: yes    Vital signs:  Temp:  [97.4 °F (36.3 °C)-98.3 °F (36.8 °C)] 97.8 °F (36.6 °C)  Pulse:  [81-86] 81  Resp:  [18] 18  BP: (103-146)/(53-79) 146/79  SpO2:  [92 %-98 %] 98 %    Physical Exam:    General:  NAD  Cardiovascular:  S1, S2    -----------------------------------------------------------------------------------------------  PATIENT DISCHARGE INSTRUCTIONS: See electronic chart    Tip: Documentation requirements: For split shared discharge, BOTH providers need to document specific floor, unit, and time spent on the discharge.  The note needs to be signed by the provider with > 50% of time and bill under their NPI.   Time spent:  >30 min         Nadia Steel DO

## 2025-03-01 NOTE — PLAN OF CARE
Patient alert and oriented X4, vitals stable. Pain controlled with scheduled tylenol, educated patient on reasoning for taking scheduled tylenol. Saline locked. Tolerating full liquids. Denies nausea and vomiting. Funk removed, voiding freely. Ambulating with walker and standby assist. Call light within reach, calls appropriately for assistance.    Problem: Patient Centered Care  Goal: Patient preferences are identified and integrated in the patient's plan of care  Description: Interventions:  - What would you like us to know as we care for you?   - Provide timely, complete, and accurate information to patient/family  - Incorporate patient and family knowledge, values, beliefs, and cultural backgrounds into the planning and delivery of care  - Encourage patient/family to participate in care and decision-making at the level they choose  - Honor patient and family perspectives and choices  Outcome: Progressing     Problem: PAIN - ADULT  Goal: Verbalizes/displays adequate comfort level or patient's stated pain goal  Description: INTERVENTIONS:  - Encourage pt to monitor pain and request assistance  - Assess pain using appropriate pain scale  - Administer analgesics based on type and severity of pain and evaluate response  - Implement non-pharmacological measures as appropriate and evaluate response  - Consider cultural and social influences on pain and pain management  - Manage/alleviate anxiety  - Utilize distraction and/or relaxation techniques  - Monitor for opioid side effects  - Notify MD/LIP if interventions unsuccessful or patient reports new pain  - Anticipate increased pain with activity and pre-medicate as appropriate  Outcome: Progressing     Problem: RISK FOR INFECTION - ADULT  Goal: Absence of fever/infection during anticipated neutropenic period  Description: INTERVENTIONS  - Monitor WBC  - Administer growth factors as ordered  - Implement neutropenic guidelines  Outcome: Progressing     Problem: SAFETY  ADULT - FALL  Goal: Free from fall injury  Description: INTERVENTIONS:  - Assess pt frequently for physical needs  - Identify cognitive and physical deficits and behaviors that affect risk of falls.  - [place] fall precautions as indicated by assessment.  - Educate pt/family on patient safety including physical limitations  - Instruct pt to call for assistance with activity based on assessment  - Modify environment to reduce risk of injury  - Provide assistive devices as appropriate  - Consider OT/PT consult to assist with strengthening/mobility  - Encourage toileting schedule  Outcome: Progressing     Problem: DISCHARGE PLANNING  Goal: Discharge to home or other facility with appropriate resources  Description: INTERVENTIONS:  - Identify barriers to discharge w/pt and caregiver  - Include patient/family/discharge partner in discharge planning  - Arrange for needed discharge resources and transportation as appropriate  - Identify discharge learning needs (meds, wound care, etc)  - Arrange for interpreters to assist at discharge as needed  - Consider post-discharge preferences of patient/family/discharge partner  - Complete POLST form as appropriate  - Assess patient's ability to be responsible for managing their own health  - Refer to Case Management Department for coordinating discharge planning if the patient needs post-hospital services based on physician/LIP order or complex needs related to functional status, cognitive ability or social support system  Outcome: Progressing

## 2025-03-01 NOTE — PROGRESS NOTES
[facility]  part of [facility]    Progress Note      Assessment and Plan:   1.  Status post partial colon resection for colon cancer-now doing well clinically.  All lymph nodes negative.    Recommendations:  1.  Aggressive antipain, antithrombosis and antiatelectasis measures.  2.  As per general surgery.    2.  History of venous thromboembolism-on subcutaneous heparin for DVT prophylaxis.    3.  Chronic respiratory impairment-NSIP versus hypersensitivity pneumonitis.  Doing well clinically at this juncture.  He has a few basilar crackles.    Recommendations: Can contemplate low-dose prednisone at some juncture in the future when patient fully healed.    4.  Severe aortic stenosis    Subjective:   Hoda Busby is a(n) 84 year old female who is breathing comfortably    Objective:   Blood pressure 146/79, pulse 81, temperature 97.8 °F (36.6 °C), temperature source Oral, resp. rate 18, height 5' 9\" (1.753 m), weight 180 lb (81.6 kg), SpO2 98%.    Physical Exam alert white female  HEENT examination is unremarkable with pupils equal round and reactive to light and accommodation.   Neck without adenopathy, thyromegaly, JVD nor bruit.   Lungs few basilar crackles to auscultation and percussion.  Cardiac regular rate and rhythm no murmur.   Abdomen nontender, without hepatosplenomegaly and no mass appreciable.   Extremities without clubbing cyanosis nor edema.   Neurologic grossly intact with symmetric tone and strength and reflex.  Skin without gross abnormality     Results:     Lab Results   Component Value Date    WBC 13.8 03/01/2025    HGB 9.9 03/01/2025    HCT 30.5 03/01/2025    .0 03/01/2025    CREATSERUM 1.01 03/01/2025    BUN 21 03/01/2025     03/01/2025    K 4.0 03/01/2025     03/01/2025    CO2 24.0 03/01/2025    GLU 91 03/01/2025    CA 8.4 03/01/2025    MG 2.2 03/01/2025    PHOS 3.0 03/01/2025       Stanton Sanchez MD  Medical Director, Critical Care, [facility]  Medical  Director, [facility]  Pager: [phone]

## 2025-03-04 NOTE — PROGRESS NOTES
TCM Request   Hospital Follow up for PCP (Discharge 3/1 elm)     PCP   Malathi Stuart   [address] [address]  [phone]  Pt declined to make follow up appt     Confirmed with pt   Closing encounter

## 2025-03-05 NOTE — PROGRESS NOTES
TCM Request   Hospital Follow up for PCP (Discharge 3/1 elm)     PCP   Malathi Stuart   [address] [address]  [phone]  Pt declined to make follow up appt     Confirmed with pt   Closing encounter

## 2025-03-20 NOTE — TELEPHONE ENCOUNTER
Patient's niece states that patient has been having more shortness of breath with exertion and was advised to see Dr. Holloway.  No appointments available.  Please call.

## 2025-03-20 NOTE — TELEPHONE ENCOUNTER
Dr. Holloway-patient requesting sooner appointment due to worsening shortness of breath. OK to add follow-up next week?

## 2025-03-27 NOTE — H&P
[facility]  part of [facility]    History and Physical    Hoda Busby Patient Status:  Inpatient    1940 MRN [id number]   Location [facility]5W Attending Malathi Stuart MD   Hosp Day # 1 PCP Malathi Stuart MD     Date:  3/27/2025  Date of Admission:  3/26/2025    History provided by:patient  HPI:     Chief Complaint   Patient presents with    Difficulty Breathing     HPI    The patient is an 84-year-old female with a past medical history of interstitial pneumonitis (off steroids since 25) , aortic stenosis status post recent TAVR in 2025, colon cancer status post robotic assisted left hemicolectomy on 2025 admitted with acute respiratory failure.    The patient felt well until last week when she noted dyspnea on exertion.  Prior to that, she had been doing quite well and was doing regular physical therapy and walking up and down the hallway.  She saw cardiology on 3/19/2025.  At that time she desaturated into the 70s but increased to the 90s at rest.  Given her history of PE, a CT chest was done which was negative for PE but did reveal findings consistent with interstitial edema and small pleural effusions.  The patient was to follow-up with cardiologist Dr. Rosalva Acosta.  However her dyspnea quickly progressed and she then presented to the ED where repeat CT chest showed progression of the interstitial edema and pleural effusions.  Her BNP was 4000.  She was started on IV Lasix as well as IV Solu-Medrol given her history of interstitial pneumonitis.  Cardiology and pulmonary were consulted and she was admitted for further evaluation and treatment.  On review of systems, she denies any chest pain or pressure.  She denies any recent URI symptoms including cough, fevers, or chills.  She states that her diet has been consistent and that she has not eaten any extra salty foods.      Recent past medical history is as follows:    The patient was diagnosed with  nonspecific interstitial pneumonitis in 10/2024 when she presented with dyspnea and hypoxia.  At that time she was treated with an extended course of steroids with excellent response.  Last dose of steroids was on 1/18/2025, and she has not required oxygen for several months.    Her known aortic stenosis had progressed, and she underwent bioprosthetic TAVR on 1/23/2025.  During evaluation for her TAVR, she was found to have severe coronary artery disease.  However during workup for acute blood loss anemia in 12/2024, she was not only found to have a gastric ulcer which was cauterized, she was also noted on colonoscopy to have a large transverse colon polyp which was found to be invasive adenocarcinoma.  Her coronary intervention has been delayed until after her hemicolectomy (done 2/17/25), as she would need to be on continuous DAPT for 6 months.          History     Past Medical History:    Acute pulmonary embolism (HCC)    Cancer (HCC)    Cataract    Colon cancer (HCC)    Coronary atherosclerosis    Foot drop, left foot    Heart valve disease    High blood pressure    High cholesterol    History of blood transfusion    History of stomach ulcers    History of transcatheter aortic valve replacement (TAVR)    Macular degeneration    Osteoarthritis    Osteopenia    Primary osteoarthritis of right hip    Pulmonary embolism (HCC)    Visual impairment    Readers    White coat hypertension     Past Surgical History:   Procedure Laterality Date    Cataract  3/2&3/16 2017    Cath transcatheter aortic valve replacement      Colonoscopy N/A 12/17/2024    Procedure: COLONOSCOPY;  Surgeon: Karen Staton MD;  [facility] ENDOSCOPY    Colonoscopy      Colonoscopy & polypectomy  01/04/2021          tavr outpt e&m est pt level 2 w proced  01/23/2025    Hip replacement surgery Left Around 2006    Other surgical history  [place] 2017    Bilateral cataract surgery    Trcath replace aortic valve  01/23/2025    TAVR    Upper gi  endoscopy,exam       Family History   Problem Relation Age of Onset    Cancer Father         bone (cause of death)    Stroke Mother         CVA (cause of death)    Diabetes Mother     Hypertension Mother     Dementia Brother         Alzheimer's    Heart Disease Brother         CAD - x2 brothers    Heart Disease Brother     Dementia Brother      Social History:  Social History     Socioeconomic History    Marital status:    Tobacco Use    Smoking status: Former     Current packs/day: 0.00     Types: Cigarettes     Start date: 1990     Quit date: 1990     Years since quittin.2     Passive exposure: Past    Smokeless tobacco: Never   Vaping Use    Vaping status: Never Used   Substance and Sexual Activity    Alcohol use: Not Currently     Alcohol/week: 5.0 standard drinks of alcohol     Types: 5 Glasses of wine per week     Comment: wine, occasionally    Drug use: No   Other Topics Concern    Caffeine Concern Yes     Comment: coffee/soda - 2cups/day     Social Drivers of Health     Food Insecurity: No Food Insecurity (3/26/2025)    NCSS - Food Insecurity     Worried About Running Out of Food in the Last Year: No     Ran Out of Food in the Last Year: No   Transportation Needs: No Transportation Needs (3/26/2025)    NCSS - Transportation     Lack of Transportation: No   Housing Stability: Not At Risk (3/26/2025)    NCSS - Housing/Utilities     Has Housing: Yes     Worried About Losing Housing: No     Unable to Get Utilities: No     Allergies/Medications:   Allergies: Allergies[1]  Medications Prior to Admission   Medication Sig    acetaminophen 325 MG Oral Tab Take 1 tablet (325 mg total) by mouth every 6 (six) hours as needed for Pain.    furosemide 20 MG Oral Tab Take 2 tablets (40 mg total) by mouth daily. 40mg daily for 5 days starting 2025    traMADol 50 MG Oral Tab Take 1 tablet (50 mg total) by mouth every 6 (six) hours as needed for Pain.    aspirin 81 MG Oral Tab EC Take 1 tablet (81 mg  total) by mouth daily.    omeprazole 20 MG Oral Capsule Delayed Release Take 1 capsule (20 mg total) by mouth 2 (two) times daily.    atorvastatin 20 MG Oral Tab Take 1 tablet (20 mg total) by mouth at bedtime.    furosemide 20 MG Oral Tab Take 1 tablet (20 mg total) by mouth daily. (Patient not taking: Reported on 3/26/2025)       Review of Systems:   Review of Systems    Physical Exam:   Vital Signs:  Blood pressure 142/82, pulse 76, temperature 97.9 °F (36.6 °C), temperature source Oral, resp. rate 22, height 5' 4\" (1.626 m), weight 187 lb 8 oz (85 kg), SpO2 97%.  Physical Exam  Cervical Papanicolaou contraindicated  General: The patient is alert and oriented x 3 and in no acute distress  Neck: Soft, supple, no lymphadenopathy  CV: Regular rate and rhythm, normal S1-S2, 1 out of 6 systolic ejection murmur at the right upper sternal border  Lungs: Minimal bibasilar crackles, no wheezing, good air entry  Abdomen: Soft, nontender, nondistended, normal bowel sounds  Extremities: No lower extremity edema, +2 DP pulses bilaterally  Neuro: Dorsiflexion 5 out of 5 in the right foot, 4 out of 5 in the left foot    Results:     Lab Results   Component Value Date    WBC 17.4 (H) 03/27/2025    HGB 10.8 (L) 03/27/2025    HCT 33.5 (L) 03/27/2025    .0 03/27/2025    CREATSERUM 1.06 (H) 03/27/2025    BUN 22 03/27/2025     03/27/2025    K 3.6 03/27/2025     03/27/2025    CO2 23.0 03/27/2025     (H) 03/27/2025    CA 8.4 (L) 03/27/2025    ALB 3.5 03/27/2025    ALKPHO 148 (H) 03/27/2025    BILT 0.9 03/27/2025    TP 6.2 03/27/2025    AST 66 (H) 03/27/2025    ALT 33 03/27/2025    PTT >240.0 (HH) 01/23/2025    INR 1.18 01/24/2025    TSH 1.316 03/27/2025    DDIMER 2.42 (H) 03/26/2025    ESRML 49 (H) 10/26/2024    MG 1.7 03/27/2025    PHOS 3.0 03/01/2025    TROPHS 58 (HH) 03/26/2025    B12 349 03/20/2017     CT CHEST PE AORTA (IV ONLY) (CPT=71260)    Result Date: 3/26/2025  CONCLUSION:  1.  No acute pulmonary  embolus to the subsegmental pulmonary artery level.  No acute aortic injury; no dissection. 2.  Findings suggesting CHF/edema:  Stable cardiomegaly and bilateral pleural effusions which are larger since prior exam.  Interstitial changes throughout the lungs, worse since previous exam from 6 days ago. 3.  Interstitial changes throughout the lungs, with a basal predominance compatible with fibrosis.  However these changes have progressed since previous exam, suggesting there is superimposed edema.  Confluent patchy pulmonary opacities have developed throughout the lung fields versus 3/26/25. 4.  Post TAVR. 5.  Nonspecific lymphadenopathy in the mediastinum.    Dictated by (CST): Benjamin Cisneros MD on 3/26/2025 at 8:48 PM     Finalized by (CST): Benjamin Cisneros MD on 3/26/2025 at 8:52 PM          XR CHEST AP PORTABLE  (CPT=71045)    Result Date: 3/26/2025  CONCLUSION: Findings compatible with CHF and/or increased fluid volume status of the patient.   Dictated by (CST): Benjamin Cisneros MD on 3/26/2025 at 7:58 PM     Finalized by (CST): Benjamin Cisneros MD on 3/26/2025 at 7:58 PM         EKG 12 Lead    Result Date: 3/26/2025  Sinus tachycardia with Premature supraventricular complexes Septal infarct (cited on or before 16-DEC-2024) Abnormal ECG When compared with ECG of 24-JAN-2025 13:14, Premature supraventricular complexes are now Present Vent. rate has increased BY  56 BPM Questionable change in initial forces of Anterior-septal leads     Assessment/Plan:         Acute hypoxic respiratory failure (HCC)  -ddx: CHF, recurrent interstitial pneumonitis  -pt initially seen by cardiology APN last week - CT chest 3/20/25 - no PE; +CHF with R>L basilar pleural effusions and pulm interstitial edema; coexistent pulm interstitial fibrosis and upper lung emphysema  -sx of dyspnea progressively worsened since then  -CT chest in ED 3/26/25 - no PE; worsening of pleural effusions and interstitial edema  -started on IV lasix and IV solumedrol to  cover both CHF and possible resurgence of NSIP respectively  -procalc sl elevated 0.25; WBC 17K; however, pt does not exhibit sx of pneumonia clinically; no recent URI sx/cough, no fever/chills.  D/w pulm Dr. Holloway; agree to hold off on abx for now  -recent echo 2/5/25 - normal LV systolic function (EF 60-65%); grade 2 diastolic dysfunction; normally functioning bioprosthetic TAVR; sl incr PAP 44mmHg  -cardiology consulted  -repeat echo to reeval LV fxn given known severe CAD      Adenocarcinoma of colon  -Bx of transverse colon polyp 12/17/24 -- invasive, moderately differentiated adenocarcinoma  -CEA normal (1.9)  -CT a/p neg for mets  -surgery delayed in anticipation of TAVR (done 1/23/25)  -s/p robotic assisted left hemicolectomy (Dr. Cassidy) - path - tumor invades into muscularis; margins neg for tumor; all 19 LN's neg for mets (0/19); pT2, pN0  -next appt with Dr. Cassidy 4/16/25     Coronary artery disease  -Cath 1/14/25 by Dr. Bernardo Liz (as w/u for TAVR) -- RCA non-obstructive; LM free of obst disease; LM plaque extending into ostium of LAD (20-30%); mid LAD (80-90%), cx ostium (60-70%)  -intervention delayed until after colon resection due to need for DAPT x 6 mos after stenting   -on Atorvastatin 20mg/day, ASA 81mg/day  -pt had upcoming appt w/Dr. Bird this month 3/2025 to discuss intervention; now consulted as above     Hx  GI bleed  -EGD 11/26/24 (Dr. Nuno) -15mm nonbleeding gastric antral ulcer; 3mm gastric antral AVM s/p APC  -recurrence in 12/2024  -colonoscopy and repeat EGD 12/17/24 by Dr. Staton -- grade 1 esophagitis; duod ulcers healing; large flat polyp in transverse colon (carcinoma)   -received total of 3 units PRBCs   -Xarelto stopped in 12/2024  -s/p omeprazole 20mg BID x 8 weeks (EOT 1/22/25), now on omeprazole 20mg daily -- would continue until she completes her upcoming coronary stenting     Anemia due to acute blood loss  -GI bleed as above  -on ferrous sulfate 325mg/day  -Hgb  10.8 but stable     Hx chronic mild BLE edema  -Lasix 20mg/day     Acute left peroneal palsy  In Nov 2024; had numbness to anterolateral left foot and ankle as well as inability to dorsiflex left foot; etiology unclear  -neurologist Dr Richardson consulted in hosp  -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   -MRI brain--no acute intracranial process  -still doing PT at [facility] -- continues to improve; dorsiflexion left foot 4/5; plantarflexion 5/5 this admission (3/26/25)     Interstitial pneumonitis/NSIP  -dx'ed 10/2024 - presented with cough and severe hypoxia  -unresponsive to abx   -CXR 10/24/24 extensive bilateral perihilar and bilateral upper and lower lobe   -S.pneumo, legionella Ag , mycoplasma IgM/G, Fungitell-beta-D glucan negative  -ANCA and URIEL/connective tissue disease panels negative  -anti-histone and anti-K[place] Ab's negative  -CT with bilateral ground glass opacities, small consolidations of BLL  -per pulm, findings suspicious for NSIP (vs cryptogenic organizing pneumonia vs hypersensitivity pneumonitis); NOT thought to be c/w UIP pattern  -s/p empiric steroids (solumedrol then prednisone taper) 10/25/24 - (EOT 1/18/25)  -dyspnea completely resolved until this admission 3/26/25     Bilateral pulmonary emboli  -dx'ed at time of interstitial pneumonitis  -CT chest 10/25/24 -- acute distal segmental/subsegmental pulmonary emboli in [place] and subsegmental PE in [place]  -unclear etiology; no recent hx of long travel; no family/personal hx of blood clots  -BLE venous dopplers negative 10/26/24  -started xarelto 20 mg po every day on 11/28/24  -repeat CT chest 12/9/24 neg PE  -stopped Xarelto 12/14/24 due to recurrent GI bleed  -repeat CT chest 3/26/25 -- neg PE     Severe aortic stenosis  -progression on serial echocardiograms  -s/p TAVR 1/23/25 (Dr. Reji Green)  -symptomatically much improved -- POPE  resolved     Hypertension, primary  -(dyazide stopped due to NANCY)  -(amlodipine held due to low BP in 11/2024)  -BP remains normal off meds     Hx of hip OA  -s/p left THR (Dr. Lo) many years ago  -s/p elective R BEATRIZ on 10/5/23 by Dr. Diaz     Hyperlipidemia   Pt with strong family hx of high cholesterol  Started on Atorvastatin 20mg/day in 1/2025 (after noted to have CAD on cath)     Osteopenia   On Fosamax from 2011 to 4/2016.     DEXA stable 5/10/17 and 2019 and 2021  DEXA 8/2024 -- osteoporosis of left forearm  -restarted Fosamax 8/2024      Vitamin D deficiency  On vitamin D 4000u/day     VTE proph  -SQH    **Certification      PHYSICIAN Certification of Need for Inpatient Hospitalization - Initial Certification    Patient will require inpatient services that will reasonably be expected to span two midnight's based on the clinical documentation in H+P.   Based on patients current state of illness, I anticipate that, after discharge, patient will require TBD.        Malathi Stuart MD  3/27/2025         [1] No Known Allergies

## 2025-03-27 NOTE — PLAN OF CARE
Monitoring vital signs, stable at this time. No acute changes at this moment.  Monitoring blood glucose levels. Fall precautions in place- bed alarm on, bed locked in lowest position, call light within reach. Frequent rounding by nursing staff.    Problem: Patient Centered Care  Goal: Patient preferences are identified and integrated in the patient's plan of care  Description: Interventions:- What would you like us to know as we care for you? - Provide timely, complete, and accurate information to patient/family- Incorporate patient and family knowledge, values, beliefs, and cultural backgrounds into the planning and delivery of care- Encourage patient/family to participate in care and decision-making at the level they choose- Honor patient and family perspectives and choices  Outcome: Progressing     Problem: Patient/Family Goals  Goal: Patient/Family Long Term Goal  Description: Patient's Long Term Goal: Interventions:- - See additional Care Plan goals for specific interventions  Outcome: Progressing  Goal: Patient/Family Short Term Goal  Description: Patient's Short Term Goal: Interventions: - - See additional Care Plan goals for specific interventions  Outcome: Progressing

## 2025-03-27 NOTE — CONSULTS
[facility]  part of [facility]    Cardiology Consultation    Hoda Busby Patient Status:  Inpatient    1940 MRN [id number]   Location [facility]5W Attending Malathi Stuart MD   Hosp Day # 1 PCP Malathi Stuart MD     Date of Admission:  3/26/2025  Date of Consult:  3/27/2025  Reason for Consultation: dyspnea, LE edema    History of Present Illness:   Patient is a 84 year old female with sig PMH/o severe aortic stenosis s/p TAVR on 2025, obstructive non-revascularized CAD, HFpEF, interstitial pneumonitis/NSIP, pulmonary emboli and recent colon Ca s/p left hemicolectomy on 25 presented with new onset productive cough, dyspnea and BLE edema.  In the ED, initially requiring nonrebreather mask and BiPAP.  Now down to 8 L of supplemental O2.  Had CTA of the chest that did not reveal pulmonary emboli, however did show ongoing interstitial pneumonitis, pneumonia with new edema and bilateral pleural effusions that have increased since her last study 6 days ago.  Labs significant for elevated proBNP of 4423 (last 755 in 2025) and leukocytosis with WBC of 19.8 K.  Assessment and Plan:   Colon adenocarcinoma s/p left hemicolectomy on 2025  History of bilateral PE    Acute hypoxic respiratory failure  Acute decompensated HFpEF  Pneumonia with history of intersititial pneumonitis   -Presents with progressive dyspnea and productive cough along with BLE edema  -No evidence of PE on CTA of the chest, however does show signs of pneumonia and decompensated CHF  -likely multifactorial, decompensated heart failure and pneumonia  -In regards to pneumonia, starting antibiotics per primary  -Regards to decompensated heart failure, will start IV Lasix 40 mg twice daily   -Strict I/O's   -Closely monitor renal function and electrolytes    Severe aortic stenosis s/p TAVR  -Underwent TAVR in 2025  -Limited TTE in 2025 with normal function and gradients  -Continue aspirin 81 mg  daily    CAD  -Patient underwent pre-TAVR coronary angiogram that revealed severe stenosis in the mid LAD (80-90%) and ostium of the left circumflex (60-70%); plan at the time was to defer intervention until her hemicolectomy for colon cancer given the need for uninterrupted DAPT  -Will discuss with interventional heart team and general surgery on timing  -In the interim continue antiplatelet and statin therapies    Past Medical History  Past Medical History:    Acute pulmonary embolism (HCC)    Cancer (HCC)    Cataract    Colon cancer (HCC)    Coronary atherosclerosis    Foot drop, left foot    Heart valve disease    High blood pressure    High cholesterol    History of blood transfusion    History of stomach ulcers    History of transcatheter aortic valve replacement (TAVR)    Macular degeneration    Osteoarthritis    Osteopenia    Primary osteoarthritis of right hip    Pulmonary embolism (HCC)    Visual impairment    Readers    White coat hypertension       Past Surgical History  Past Surgical History:   Procedure Laterality Date    Cataract  3/2&3/16 2017    Cath transcatheter aortic valve replacement      Colonoscopy N/A 12/17/2024    Procedure: COLONOSCOPY;  Surgeon: Karen Staton MD;  Location: [facility] ENDOSCOPY    Colonoscopy      Colonoscopy & polypectomy  01/04/2021          tavr outpt e&m est pt level 2 w proced  01/23/2025    Hip replacement surgery Left Around 2006    Other surgical history  [place] 2017    Bilateral cataract surgery    Trcath replace aortic valve  01/23/2025    TAVR    Upper gi endoscopy,exam         Family History  Family History   Problem Relation Age of Onset    Cancer Father         bone (cause of death)    Stroke Mother         CVA (cause of death)    Diabetes Mother     Hypertension Mother     Dementia Brother         Alzheimer's    Heart Disease Brother         CAD - x2 brothers    Heart Disease Brother     Dementia Brother        Social History  Pediatric History   Patient Parents     Not on file     Other Topics Concern     Service Not Asked    Blood Transfusions Not Asked    Caffeine Concern Yes     Comment: coffee/soda - 2cups/day    Occupational Exposure Not Asked    Hobby Hazards Not Asked    Sleep Concern Not Asked    Stress Concern Not Asked    Weight Concern Not Asked    Special Diet Not Asked    Back Care Not Asked    Exercise Not Asked    Bike Helmet Not Asked    Seat Belt Not Asked    Self-Exams Not Asked   Social History Narrative    Not on file           Current Medications:  Current Facility-Administered Medications   Medication Dose Route Frequency    ipratropium (Atrovent) 0.02 % nebulizer solution 1.5 mg  1.5 mg Nebulization Continuous    acetaminophen (Tylenol) tab 325 mg  325 mg Oral Q6H PRN    aspirin DR tab 81 mg  81 mg Oral Daily    atorvastatin (Lipitor) tab 20 mg  20 mg Oral Nightly    pantoprazole (Protonix) DR tab 40 mg  40 mg Oral QAM AC    traMADol (Ultram) tab 50 mg  50 mg Oral Q6H PRN    furosemide (Lasix) 10 mg/mL injection 40 mg  40 mg Intravenous q12h    ipratropium-albuterol (Duoneb) 0.5-2.5 (3) MG/3ML inhalation solution 3 mL  3 mL Nebulization Q6H PRN    heparin (Porcine) 5000 UNIT/ML injection 5,000 Units  5,000 Units Subcutaneous 2 times per day    methylPREDNISolone sodium succinate (Solu-MEDROL) injection 60 mg  60 mg Intravenous Q12H    glucose (Dex4) 15 GM/59ML oral liquid 15 g  15 g Oral Q15 Min PRN    Or    glucose (Glutose) 40% oral gel 15 g  15 g Oral Q15 Min PRN    Or    glucose-vitamin C (Dex-4) chewable tab 4 tablet  4 tablet Oral Q15 Min PRN    Or    dextrose 50% injection 50 mL  50 mL Intravenous Q15 Min PRN    Or    glucose (Dex4) 15 GM/59ML oral liquid 30 g  30 g Oral Q15 Min PRN    Or    glucose (Glutose) 40% oral gel 30 g  30 g Oral Q15 Min PRN    Or    glucose-vitamin C (Dex-4) chewable tab 8 tablet  8 tablet Oral Q15 Min PRN    insulin aspart (NovoLOG) 100 Units/mL FlexPen 1-7 Units  1-7 Units Subcutaneous TID CC    ferrous  sulfate DR tab 325 mg  325 mg Oral Daily with breakfast    docusate sodium (Colace) cap 100 mg  100 mg Oral BID     Medications Prior to Admission   Medication Sig    acetaminophen 325 MG Oral Tab Take 1 tablet (325 mg total) by mouth every 6 (six) hours as needed for Pain.    furosemide 20 MG Oral Tab Take 2 tablets (40 mg total) by mouth daily. 40mg daily for 5 days starting 03/26/2025    traMADol 50 MG Oral Tab Take 1 tablet (50 mg total) by mouth every 6 (six) hours as needed for Pain.    aspirin 81 MG Oral Tab EC Take 1 tablet (81 mg total) by mouth daily.    omeprazole 20 MG Oral Capsule Delayed Release Take 1 capsule (20 mg total) by mouth 2 (two) times daily.    atorvastatin 20 MG Oral Tab Take 1 tablet (20 mg total) by mouth at bedtime.    furosemide 20 MG Oral Tab Take 1 tablet (20 mg total) by mouth daily. (Patient not taking: Reported on 3/26/2025)       Allergies  Allergies[1]    Review of Systems:   ROS  10 point review of systems completed and negative except as noted.    Physical Exam:   Patient Vitals for the past 24 hrs:   BP Temp Temp src Pulse Resp SpO2 Height Weight   03/27/25 1208 137/81 97.7 °F (36.5 °C) Axillary 83 20 98 % -- --   03/27/25 1005 -- -- -- -- -- 100 % -- --   03/27/25 0802 142/82 97.9 °F (36.6 °C) Oral 76 22 97 % -- --   03/27/25 0455 142/78 97.2 °F (36.2 °C) Oral 76 24 100 % -- 187 lb 8 oz (85 kg)   03/27/25 0023 145/78 97.9 °F (36.6 °C) Oral 103 26 95 % -- --   03/26/25 2215 -- -- -- -- -- 99 % -- --   03/26/25 2158 154/86 99.2 °F (37.3 °C) Oral (!) 122 (!) 35 96 % -- 183 lb 4.8 oz (83.1 kg)   03/26/25 2145 137/80 -- -- 115 26 94 % -- --   03/26/25 1945 (!) 169/94 -- -- 119 (!) 34 100 % -- --   03/26/25 1939 (!) 158/100 -- -- -- -- -- 5' 4\" (1.626 m) 160 lb (72.6 kg)   03/26/25 1936 -- 98.4 °F (36.9 °C) Temporal (!) 128 -- -- -- --   03/26/25 1934 -- -- -- -- 20 99 % -- --       Intake/Output:   Last 3 shifts:   Intake/Output                   03/25/25 0700 - 03/26/25 0659  (Not Admitted) 03/26/25 0700 - 03/27/25 0659 03/27/25 0700 - 03/28/25 0659       Intake    P.O.  --  550  --    P.O. -- 550 --    Total Intake -- 550 --       Output    Urine  --  1500  400    Urine -- -- 400    Urine Occurrence -- -- 1 x    Output (mL) ([REMOVED] External Urinary Catheter) -- 1500 --    Stool  --  --  --    Stool Count Calculated for I/O -- -- 1 x    Total Output -- 1500 400       Net I/O     -- -950 -400          Vent Settings:    Hemodynamic parameters (last 24 hours):    Scheduled Meds:    aspirin  81 mg Oral Daily    atorvastatin  20 mg Oral Nightly    pantoprazole  40 mg Oral QAM AC    furosemide  40 mg Intravenous q12h    heparin  5,000 Units Subcutaneous 2 times per day    methylPREDNISolone  60 mg Intravenous Q12H    insulin aspart  1-7 Units Subcutaneous TID CC    ferrous sulfate  325 mg Oral Daily with breakfast    docusate sodium  100 mg Oral BID     Continuous Infusions:    ipratropium         Physical Exam:  General: Alert and oriented x 3. No apparent distress.   HEENT: Normocephalic, anicteric sclera, neck supple  Neck: +JVD, carotids 2+, no bruits.  Cardiac: Regular rate and rhythm. S1, S2 normal. +WADE  Lungs: diminished BS, +rhonchi, no crackles  Abdomen: Soft, non-tender.   Extremities:1+ pitting BLE edema  Neurologic: Alert and oriented, normal affect. No focal defects  Skin: Warm and dry.     Results:   Laboratory Data:  Lab Results   Component Value Date    WBC 17.4 (H) 03/27/2025    HGB 10.8 (L) 03/27/2025    HCT 33.5 (L) 03/27/2025    .0 03/27/2025    CREATSERUM 1.06 (H) 03/27/2025    BUN 22 03/27/2025     03/27/2025    K 3.6 03/27/2025     03/27/2025    CO2 23.0 03/27/2025     (H) 03/27/2025    CA 8.4 (L) 03/27/2025    ALB 3.5 03/27/2025    ALKPHO 148 (H) 03/27/2025    TP 6.2 03/27/2025    AST 66 (H) 03/27/2025    ALT 33 03/27/2025    PTT >240.0 (HH) 01/23/2025    INR 1.18 01/24/2025    PTP 15.1 (H) 01/24/2025    TSH 1.316 03/27/2025    DDIMER 2.42  (H) 03/26/2025    ESRML 49 (H) 10/26/2024    MG 1.7 03/27/2025    PHOS 3.0 03/01/2025    B12 349 03/20/2017         Recent Labs   Lab 03/26/25 1939 03/27/25 0222   * 170*   BUN 20 22   CREATSERUM 1.05* 1.06*   CA 8.6* 8.4*    137   K 4.2 3.6    103   CO2 21.0 23.0     Recent Labs   Lab 03/26/25 1939 03/27/25 0222   RBC 4.17 4.20   HGB 11.0* 10.8*   HCT 34.0* 33.5*   MCV 81.5 79.8*   MCH 26.4 25.7*   MCHC 32.4 32.2   RDW 17.6* 17.5*   NEPRELIM 16.17* 16.34*   WBC 19.8* 17.4*   .0 332.0       No results for input(s): \"BNPML\" in the last 168 hours.    No results for input(s): \"TROP\", \"CK\" in the last 168 hours.    Imaging:  CT CHEST PE AORTA (IV ONLY) (CPT=71260)    Result Date: 3/26/2025  CONCLUSION:  1.  No acute pulmonary embolus to the subsegmental pulmonary artery level.  No acute aortic injury; no dissection. 2.  Findings suggesting CHF/edema:  Stable cardiomegaly and bilateral pleural effusions which are larger since prior exam.  Interstitial changes throughout the lungs, worse since previous exam from 6 days ago. 3.  Interstitial changes throughout the lungs, with a basal predominance compatible with fibrosis.  However these changes have progressed since previous exam, suggesting there is superimposed edema.  Confluent patchy pulmonary opacities have developed throughout the lung fields versus 3/26/25. 4.  Post TAVR. 5.  Nonspecific lymphadenopathy in the mediastinum.    Dictated by (CST): Benjamin Cisneros MD on 3/26/2025 at 8:48 PM     Finalized by (CST): Benjamin Cisneros MD on 3/26/2025 at 8:52 PM          XR CHEST AP PORTABLE  (CPT=71045)    Result Date: 3/26/2025  CONCLUSION: Findings compatible with CHF and/or increased fluid volume status of the patient.   Dictated by (CST): Benjamin Cisneros MD on 3/26/2025 at 7:58 PM     Finalized by (CST): Benjamin Cisneros MD on 3/26/2025 at 7:58 PM           Thank you for allowing me to participate in the care of your patient.    Colin Wood,  DO  [facility]       [1] No Known Allergies

## 2025-03-27 NOTE — CONSULTS
[facility]    Report of Consultation    Hoda Busby Patient Status:  Inpatient    1940 MRN [id number]   Location [facility]5W Attending Malathi Stuart MD   Hosp Day # 1 PCP Malathi Stuart MD     Date of Admission:  3/26/2025    Reason for Consultation:   Dyspnea    History of Present Illness:   Patient is a 84-year-old female with past medical history significant for ILD, aortic stenosis status post TAVR, colon cancer status post hemicolectomy who presents with chief complaint of progressive worsening dyspnea over the course last week.  Increased dyspnea with exertion over the last several days.  Admits to chronic cough with no significant change in cough.  Not significantly productive in nature.  Denies significant wheezing.  Denies fevers or chills.  Denies any new medications that she has started over the last several weeks.  Significant hypoxia on presentation.  Currently on 6 L nasal cannula oxygen.  CT chest performed with evidence of small pleural effusions and groundglass findings concerning for possible edema.  Denies increased oral fluid intake recently.  Started on diuretic and steroid therapy during hospital course.  Improvement dyspnea during hospitalization.  Denies cold-like symptoms.    Past Medical History  Past Medical History:    Acute pulmonary embolism (HCC)    Cancer (HCC)    Cataract    Colon cancer (HCC)    Coronary atherosclerosis    Foot drop, left foot    Heart valve disease    High blood pressure    High cholesterol    History of blood transfusion    History of stomach ulcers    History of transcatheter aortic valve replacement (TAVR)    Macular degeneration    Osteoarthritis    Osteopenia    Primary osteoarthritis of right hip    Pulmonary embolism (HCC)    Visual impairment    Readers    White coat hypertension       Past Surgical History  Past Surgical History:   Procedure Laterality Date    Cataract  3/2&3/16 2017     Cath transcatheter aortic valve replacement      Colonoscopy N/A 2024    Procedure: COLONOSCOPY;  Surgeon: Karen Staton MD;  Location: [facility] ENDOSCOPY    Colonoscopy      Colonoscopy & polypectomy  2021         Hc tavr outpt e&m est pt level 2 w proced  2025    Hip replacement surgery Left Around     Other surgical history  [place]     Bilateral cataract surgery    Trcath replace aortic valve  2025    TAVR    Upper gi endoscopy,exam         Family History  Family History   Problem Relation Age of Onset    Cancer Father         bone (cause of death)    Stroke Mother         CVA (cause of death)    Diabetes Mother     Hypertension Mother     Dementia Brother         Alzheimer's    Heart Disease Brother         CAD - x2 brothers    Heart Disease Brother     Dementia Brother        Social History  Social History     Socioeconomic History    Marital status:    Tobacco Use    Smoking status: Former     Current packs/day: 0.00     Types: Cigarettes     Start date: 1990     Quit date: 1990     Years since quittin.2     Passive exposure: Past    Smokeless tobacco: Never   Vaping Use    Vaping status: Never Used   Substance and Sexual Activity    Alcohol use: Not Currently     Alcohol/week: 5.0 standard drinks of alcohol     Types: 5 Glasses of wine per week     Comment: wine, occasionally    Drug use: No   Other Topics Concern    Caffeine Concern Yes     Comment: coffee/soda - 2cups/day           Current Medications:  Current Facility-Administered Medications   Medication Dose Route Frequency    ipratropium (Atrovent) 0.02 % nebulizer solution 1.5 mg  1.5 mg Nebulization Continuous    acetaminophen (Tylenol) tab 325 mg  325 mg Oral Q6H PRN    aspirin DR tab 81 mg  81 mg Oral Daily    atorvastatin (Lipitor) tab 20 mg  20 mg Oral Nightly    pantoprazole (Protonix) DR tab 40 mg  40 mg Oral QAM AC    traMADol (Ultram) tab 50 mg  50 mg Oral Q6H PRN    furosemide (Lasix) 10 mg/mL  injection 40 mg  40 mg Intravenous q12h    ipratropium-albuterol (Duoneb) 0.5-2.5 (3) MG/3ML inhalation solution 3 mL  3 mL Nebulization Q6H PRN    heparin (Porcine) 5000 UNIT/ML injection 5,000 Units  5,000 Units Subcutaneous 2 times per day    methylPREDNISolone sodium succinate (Solu-MEDROL) injection 60 mg  60 mg Intravenous Q12H    glucose (Dex4) 15 GM/59ML oral liquid 15 g  15 g Oral Q15 Min PRN    Or    glucose (Glutose) 40% oral gel 15 g  15 g Oral Q15 Min PRN    Or    glucose-vitamin C (Dex-4) chewable tab 4 tablet  4 tablet Oral Q15 Min PRN    Or    dextrose 50% injection 50 mL  50 mL Intravenous Q15 Min PRN    Or    glucose (Dex4) 15 GM/59ML oral liquid 30 g  30 g Oral Q15 Min PRN    Or    glucose (Glutose) 40% oral gel 30 g  30 g Oral Q15 Min PRN    Or    glucose-vitamin C (Dex-4) chewable tab 8 tablet  8 tablet Oral Q15 Min PRN    insulin aspart (NovoLOG) 100 Units/mL FlexPen 1-7 Units  1-7 Units Subcutaneous TID CC    ferrous sulfate DR tab 325 mg  325 mg Oral Daily with breakfast    docusate sodium (Colace) cap 100 mg  100 mg Oral BID     Medications Prior to Admission   Medication Sig    acetaminophen 325 MG Oral Tab Take 1 tablet (325 mg total) by mouth every 6 (six) hours as needed for Pain.    furosemide 20 MG Oral Tab Take 2 tablets (40 mg total) by mouth daily. 40mg daily for 5 days starting 03/26/2025    traMADol 50 MG Oral Tab Take 1 tablet (50 mg total) by mouth every 6 (six) hours as needed for Pain.    aspirin 81 MG Oral Tab EC Take 1 tablet (81 mg total) by mouth daily.    omeprazole 20 MG Oral Capsule Delayed Release Take 1 capsule (20 mg total) by mouth 2 (two) times daily.    atorvastatin 20 MG Oral Tab Take 1 tablet (20 mg total) by mouth at bedtime.    furosemide 20 MG Oral Tab Take 1 tablet (20 mg total) by mouth daily. (Patient not taking: Reported on 3/26/2025)       Allergies  Allergies[1]    Review of Systems:   Constitutional: denies fevers, chills, weakness, fatigue, recent  illness  HEENT: denies headache, sore throat, vision loss  Cardio: denies chest pain, chest pressure, palpitations  Respiratory: dyspnea, cough, denies wheezing, hemoptysis   GI: denies nausea, vomiting, abdominal pain  : denies dysuria, hematuria  Musculoskeletal: denies arthralgia, myalgia  Integumentary: denies rash, itching  Neurological: denies syncope, weakness, dizziness,   Psychiatric: denies depression, anxiety  Hematologic: denies bruising        Physical Exam:   Blood pressure 137/81, pulse 83, temperature 97.7 °F (36.5 °C), temperature source Axillary, resp. rate 20, height 5' 4\" (1.626 m), weight 187 lb 8 oz (85 kg), SpO2 98%.    Constitutional: no acute distress  Eyes: PERRL  ENT: nares patent  Neck: neck supple, no JVD  Cardio: RRR, S1 S2  Respiratory: Basilar crackles  GI: abdomen soft, non tender, active bowel souds, no organomegaly  Extremities: no clubbing, cyanosis, + trace lower extremity edema  Neurologic: no gross motor deficits  Skin: warm, dry    Results:   Laboratory Data  Lab Results   Component Value Date    WBC 17.4 (H) 03/27/2025    HGB 10.8 (L) 03/27/2025    HCT 33.5 (L) 03/27/2025    .0 03/27/2025    CREATSERUM 1.06 (H) 03/27/2025    BUN 22 03/27/2025     03/27/2025    K 3.6 03/27/2025     03/27/2025    CO2 23.0 03/27/2025     (H) 03/27/2025    CA 8.4 (L) 03/27/2025    ALB 3.5 03/27/2025    ALKPHO 148 (H) 03/27/2025    TP 6.2 03/27/2025    AST 66 (H) 03/27/2025    ALT 33 03/27/2025    PTT >240.0 (HH) 01/23/2025    INR 1.18 01/24/2025    PTP 15.1 (H) 01/24/2025    TSH 1.316 03/27/2025    DDIMER 2.42 (H) 03/26/2025    ESRML 49 (H) 10/26/2024    MG 1.7 03/27/2025    PHOS 3.0 03/01/2025    B12 349 03/20/2017         Imaging  CT CHEST PE AORTA (IV ONLY) (CPT=71260)    Result Date: 3/26/2025  CONCLUSION:  1.  No acute pulmonary embolus to the subsegmental pulmonary artery level.  No acute aortic injury; no dissection. 2.  Findings suggesting CHF/edema:  Stable  cardiomegaly and bilateral pleural effusions which are larger since prior exam.  Interstitial changes throughout the lungs, worse since previous exam from 6 days ago. 3.  Interstitial changes throughout the lungs, with a basal predominance compatible with fibrosis.  However these changes have progressed since previous exam, suggesting there is superimposed edema.  Confluent patchy pulmonary opacities have developed throughout the lung fields versus 3/26/25. 4.  Post TAVR. 5.  Nonspecific lymphadenopathy in the mediastinum.    Dictated by (CST): Benjamin Cisneros MD on 3/26/2025 at 8:48 PM     Finalized by (CST): Benjamin Cisneros MD on 3/26/2025 at 8:52 PM          XR CHEST AP PORTABLE  (CPT=71045)    Result Date: 3/26/2025  CONCLUSION: Findings compatible with CHF and/or increased fluid volume status of the patient.   Dictated by (CST): Benjamin Cisneros MD on 3/26/2025 at 7:58 PM     Finalized by (CST): Benjamin Cisneros MD on 3/26/2025 at 7:58 PM           Assessment   1.  Acute hypoxemic respiratory failure  2.  ILD  3.  Small pleural effusions  4.  Pulmonary edema  5.  Colon adenocarcinoma status post hemicolectomy  6.  Coronary artery disease  7.  Prior history of GI bleed  8.  Prior VTE  9.  History of severe aortic stenosis status post TAVR  10.  Hypertension    Plan   -Patient presents with progressive worsening dyspnea and acute hypoxemic respiratory failure.  -CT chest on presentation on 3/26/2025 with no evidence of acute pulmonary embolism seen.  Evidence of small pleural effusions with groundglass opacities suggestive more likely of edema with worsening compared to 6 days prior from CT chest.  Some background of fibrotic changes present.  -In light of clinical presentation monitor response to diuresis and Solu-Medrol.  -2D echo pending  -Low suspicion for active infectious process discussed with primary care physician hold off antibiotic therapy for now  -Prior history of underlying ILD with hospitalization October 2024  responded to tapering steroid therapy at the time.  -Pulmonary function testing from February 2025 with mild restriction seen with significant decrease in diffusion capacity.  -Wean oxygen as tolerated.  Currently on 6 L  -Discussed with primary care physician  -Reviewed vitals, labs and imaging    Cody Holloway DO  Pulmonary Critical Care Medicine  [facility]  3/27/2025  12:28 PM        [1] No Known Allergies

## 2025-03-27 NOTE — ED QUICK NOTES
Orders for admission, patient is aware of plan and ready to go upstairs. Any questions, please call ED RN Aura at extension [phone].     Patient Covid vaccination status: Fully vaccinated     COVID Test Ordered in ED: SARS-CoV-2/Flu A and B/RSV by PCR (GeneXpert)    COVID Suspicion at Admission: N/A    Running Infusions:    ipratropium          Mental Status/LOC at time of transport: A/O x3    Other pertinent information:   CIWA score: N/A   NIH score:  N/A

## 2025-03-27 NOTE — PROGRESS NOTES
Hoda Busby Patient Status:  Inpatient    1940 MRN [id number]   Location [facility]5W Attending Malathi Stuart MD   Hosp Day # 1 PCP Malathi Stuart MD     Cardiology Nocturnal APN Note    Briefly: (Documentation from chart review)     Hoda Busby is a 83 y/o female who presented with SOB and has a PMH/PSH of:       Past Medical History:    Acute pulmonary embolism (HCC)    Cancer (HCC)    Cataract    Colon cancer (HCC)    Coronary atherosclerosis    Foot drop, left foot    Heart valve disease    High blood pressure    High cholesterol    History of blood transfusion    History of stomach ulcers    History of transcatheter aortic valve replacement (TAVR)    Macular degeneration    Osteoarthritis    Osteopenia    Primary osteoarthritis of right hip    Pulmonary embolism (HCC)    Visual impairment    Readers    White coat hypertension       Primary Cardiologist [place]    Vital Signs:       3/26/2025    10:15 PM 3/27/2025    12:23 AM   Vitals History   BP  145/78   BP Location  Right arm   Pulse  103   Resp  26   Temp  97.9 °F (36.6 °C)   SpO2 99 % 95 %        Labs:   Lab Results   Component Value Date    WBC 19.8 2025    HGB 11.0 2025    HCT 34.0 2025    .0 2025    CREATSERUM 1.05 2025    BUN 20 2025     2025    K 4.2 2025     2025    CO2 21.0 2025     2025    CA 8.6 2025    ALB 3.5 2025    ALKPHO 155 2025    BILT 0.9 2025    TP 6.3 2025    AST 71 2025    ALT 34 2025    DDIMER 2.42 2025    TROPHS 58 2025       Diagnostics:   CT CHEST PE AORTA (IV ONLY) (CPT=71260)    Result Date: 3/26/2025  PROCEDURE: CT CHEST PE AORTA (IV ONLY) (CPT=71260)  COMPARISON: [facility], CT CHEST PE AORTA (IV ONLY) (CPT=71260), 3/20/2025, 1:15 PM.  INDICATIONS: Dyspnea, tachycardia. History of cancer.  TECHNIQUE: CT images of the chest were  obtained with non-ionic intravenous contrast material.  Automated exposure control for dose reduction was used. Adjustment of the mA and/or kV was done based on the patient's size. Use of iterative reconstruction technique for dose reduction was used.  Dose information is transmitted to the [employer] National Radiology Data Registry and Dose Index Registry.  An independent workstation was not used to post process imaging  FINDINGS:  CARDIAC: The heart is enlarged.  There are coronary artery calcifications.  Post TAVR.  No significant pericardial effusion. MEDIASTINUM/TIM: Scattered lymphadenopathy throughout the mediastinum.  Index left suprahilar/prevascular node measures 13 x 13 mm.  Index subcarinal node is 14 x 11 mm.  LUNGS/PLEURA: Central airways are patent.  Small bilateral pleural effusions slightly larger on the right.  Bilateral perihilar bronchial wall thickening.  Diffuse intralobular septal thickening throughout the lungs which becomes confluent in the lung bases.  Intervening ground-glass opacities are present.  These findings have progressed since previous exam.  Scattered cicatricial bronchiectasis and bronchiolectasis. VASCULATURE: Main pulmonary artery is not enlarged.  No pulmonary embolus to the subsegmental pulmonary artery level.  Left-sided aortic arch with atherosclerosis without aneurysm or acute aortic injury CHEST WALL: Body wall edema.  No suspicious lymphadenopathy in the axilla I or clavicular chains.  LIMITED ABDOMEN: Limited images of the upper abdomen are unremarkable. BONES: Mild degenerative endplate change within the spine.  Chronic disc bulge/osteophyte complex within the midthoracic spine.  OTHER: Negative.          CONCLUSION:  1.  No acute pulmonary embolus to the subsegmental pulmonary artery level.  No acute aortic injury; no dissection. 2.  Findings suggesting CHF/edema:  Stable cardiomegaly and bilateral pleural effusions which are larger since prior  exam.  Interstitial changes throughout the lungs, worse since previous exam from 6 days ago. 3.  Interstitial changes throughout the lungs, with a basal predominance compatible with fibrosis.  However these changes have progressed since previous exam, suggesting there is superimposed edema.  Confluent patchy pulmonary opacities have developed throughout the lung fields versus 3/26/25. 4.  Post TAVR. 5.  Nonspecific lymphadenopathy in the mediastinum.    Dictated by (CST): Benjamin Cisneros MD on 3/26/2025 at 8:48 PM     Finalized by (CST): Benjamin Cisneros MD on 3/26/2025 at 8:52 PM          XR CHEST AP PORTABLE  (CPT=71045)    Result Date: 3/26/2025  PROCEDURE: XR CHEST AP PORTABLE  (CPT=71045) TIME: 7:51 p.m.   COMPARISON: [facility] , XR, XR CHEST AP PORTABLE  (CPT=71045), 1/24/2025, 5:04 AM.  INDICATIONS: Difficulty in breathing starting at 3pm today, 65% O2 on normal room air.  TECHNIQUE:   Single view.   FINDINGS:  CARDIAC/VASC: Cardiac silhouette is enlarged.  Post TAVR.  Moderate vascular congestion MEDIAST/TIM:   Atherosclerotic aorta with no visible aneurysm.  LUNGS/PLEURA: Interstitial prominence.  Small pleural effusions.  Patchy bibasilar pulmonary opacities. BONES: Mild degenerative disc disease and spondylosis without visible acute abnormalities.  OTHER: Negative.          CONCLUSION: Findings compatible with CHF and/or increased fluid volume status of the patient.   Dictated by (CST): Benjamin Cisneros MD on 3/26/2025 at 7:58 PM     Finalized by (CST): Benjamin Cisneros MD on 3/26/2025 at 7:58 PM            Allergies:  Allergies[1]    Medications:    ipratropium    acetaminophen    aspirin    atorvastatin    pantoprazole    traMADol    furosemide    ipratropium-albuterol    heparin    methylPREDNISolone    glucose **OR** glucose **OR** glucose-vitamin C **OR** dextrose **OR** glucose **OR** glucose **OR** glucose-vitamin C    insulin aspart    [START ON 3/28/2025] sulfamethoxazole-trimethoprim DS    ferrous sulfate     docusate sodium    Assessment:   Shortness of breath  - CTA negative for PE  - Pulmonary edema noted on x-ray and CTA  - LVEF 60-65% on ecgo 2/5/25 with G2DD  - s/p TAVR 1/2025  - CAD, severe ostial LCx and LAD-D on 1/14/2025 [facility]  - Got 60mg IVP lasix in ER  - Lasix 40mg IVP BID ordered by hospitalist  - Strict I/O      Plan:    - Continue to monitor overnight  - Formal Cardiology consult to follow in AM.       KALYAN Najera  [facility]  3/27/2025  1:13 AM         [1] No Known Allergies

## 2025-03-27 NOTE — CM/SW NOTE
03/27/25 1100   CM/SW Referral Data   Referral Source    Reason for Referral Discharge planning;Readmission   Informant Patient   Readmission Assessment   Factors that patient feels contributed to this readmission Acute/Chronic Clinical Presentation   Pt's living situation prior to admission? Assisted living   Pt's level of independence at discharge? Some assist (mod)   Pt. received education on diagnoses at time of discharge? Yes   Did you know who and how to call someone if you felt worse? Yes   Did any new symptoms or issues develop after you were discharged? Yes   ----Post D/C symptoms: Symptoms/issue related to previous hospitalization Yes   Did you understand your discharge instructions? Yes   Were medications taken as indicated on discharge instructions? Yes   Did you have a follow-up appointment scheduled at time of discharge? Yes   Was the recommended discharge plan achieved? Yes   Was pt. discharged w/out services? No   Medical Hx   Does patient have an established PCP? Yes   Significant Past Medical/Mental Health Hx had cardiac valve replacement  (at  then went to [facility])   Patient Info   Advanced directives? Yes  (tres Ferrari)   Patient's Current Mental Status at Time of Assessment Alert;Oriented   Patient's Home Environment Assisted Living   Patient lives with Other   Patient Status Prior to Admission   Independent with ADLs and Mobility No   Pt. requires assistance with Housework;Driving;Meals;Bathing;Ambulating;Dressing;Medications;Feeding;Toileting   Services in place prior to admission Other (comment)  (OP therapy through the [facility])   Discharge Needs   Anticipated D/C needs Outpatient therapy  (throught the [facility])     Has walker and wheelchair.  Plan is to resume OP services through [facility].  Will need script at [facility].    Tessa NYN RN CRRN   RN Case Manager  [phone]

## 2025-03-27 NOTE — ED INITIAL ASSESSMENT (HPI)
PT to ED via stretcher, RAMONITA starting at 1500 today, 65% on RA, hx of COPD and htn, 15 L NRBM applied in field.

## 2025-03-27 NOTE — HISTORICAL OFFICE NOTE
[facility]  [address], [address] [place] [address]  [phone]      Hoda Busby  Progress Note  Demographics:  Name: Hoda Busby YOB: 1940  Age: 84, Female Medical Record No: [id number]  Visited Date/Time: 03/19/2025 11:30 AM    Chief Complaints  f/u  History of Present Illness  Patient is an 83-year-old female with a history of HTN and aortic stenosis who presents for preoperative clearance for planned right total hip arthroplasty on 10/5/2023.  In general she reports feeling well, her primary issue is hip pain when she ambulates and is able to walk about a block before having to stop.  She denies any chest pain, shortness of breath, palpitations, edema, dizziness, or syncope.  Her last echocardiogram was on 10/2022 which noted moderate aortic stenosis with mean gradient of 30 mmHg.  She then had a nuclear stress test a week ago that came back unremarkable.    9/23/2024  Overall feels well, no cardiac symptoms.  Mobility much improved since hip arthroplasty.  No chest pain or shortness of breath.    1/15/2025  Patient was hospitalized end of October with pneumonitis, also found to have small pulmonary emboli; discharged on steroids and Xarelto.  Came back to the hospital end of November with anemia hemoglobin 6.1, underwent GI workup which revealed nonbleeding gastric ulcer and gastric antral AVM.  She had a colonic polyp with a biopsy which came back as adenocarcinoma.  Recommendation for surgical resection however felt she was high risk given evidence of severe aortic stenosis.  Was then expedited workup for TAVR and had a coronary angiogram yesterday which noted severe ostial LCx and LAD-D disease.  Overall doing well, breathing has improved and denies any chest pain.  Minimal lower extremity edema.  Continues with physical therapy.    3/19/2025  Patient successfully underwent TAVR on 1/23/2025, then had colon resection on 2/27/2025.  Found to  be stage I colon cancer.  Had been doing well however over the last week shortness of breath has worsened, desaturate into the 70s during ambulating here 2 hours today.  She denies any change in cough or lower extremity edema.  No chest pain, orthopnea, weight gain.    Cardiac history:  Severe aortic stenosis s/p TAVR with 23 mm S3 PUNEET valve 2025  CAD, severe ostial LCx and LAD-D on 2025 [facility]  Bilateral PE on 10/2024  Interstitial lung disease  HTN  HLD  Cardiac risk factors Former smoker  Past Medical History  1.SOB (shortness of breath)  2.Pre-operative clearance  3.History of mitral valve insufficiency  4.Hypercholesterolemia, familial  5.Hypertension (HTN), primary  Family History  1. Mother - Stroke; Hypertension  2. Brother - Heart problem; HHD (hypertensive heart disease)  She had 2 brothers had bypass surgery in the 50s.  Mother  of congestive heart failure in her 50s.  Social History  Smoking status Former smoker  Tobacco usage - No (Non-smoker for personal reasons (finding))  She lives at home alone.  She quit smoking 30 years ago.  She has a few glasses of wine throughout the week.  Review of systems  Cardiovascular No history of Chest pain, POPE, Palpitations and Edema  Respiratory SOB  Physical Examination  Constitutional o2sat 91%  Vitals Left Arm Sitting  / 58 mmHg, Pulse rate 98 bpm, Height in 5' 8\", BMI: 27.4, Weight in 180 lbs (or) 82 kgs and BSA : 2 cc/m²  General Appearance No Acute Distress and Normal Body habitus  Cardiovascular   EKG/Other abnormalities  GENERAL: in no apparent distress  HEENT: Normal  NECK: no JVD, normal carotid pulses without bruits. No lymphadenopathy  LUNGS: normal excursion, clear to auscultation bilaterally  HEART: RRR, nl S1/S2, 3 out of 6 systolic murmur  ABD: soft, nontender, nondistended, normal bowel sounds  EXTREMITIES: no cyanosis, clubbing or edema  SKIN: warm, dry, normal turgor  NEURO: alert, oriented x3, symmetrical face, no dysarthria,  no focal deficits  PSYCH: cooperative, calm  Allergies  No medication allergies noted.  Medications (Info obtained by: Verbal)  1.alendronate 70 MG Oral Tab, Take 1 tablet (70 mg total) by mouth every 7 days.  2.aspirin 81 mg tablet,delayed release, Take 1 tablet orally once a day.  3.atorvastatin 20 MG Oral Tab, Take 1 tablet (20 mg total) by mouth at bedtime.  4.Calcium 600 + D(3) 600 mg-10 mcg (400 unit) tablet, Take 1 tablet orally once a day.  5.ferrous sulfate 325 (65 FE) MG Oral Tab EC, Take 1 tablet (325 mg total) by mouth daily.  6.furosemide 20 mg tablet, Take 1 tablet orally once a day.  7.Multiple Vitamins-Minerals (MULTI-VITAMIN/MINERALS) Oral Tab, Take 1 tablet by mouth daily.  8.omeprazole 20 MG Oral Capsule Delayed Release, Take 1 capsule (20 mg total) by mouth 2 (two) times daily.  9.PreserVision AREDS-2 250 mg-90 mg-40 mg-1 mg capsule, Take 1 capsule orally once a day.  10.sulfamethoxazole-trimethoprim -160 MG Oral Tab per tablet, Take 1 tablet by mouth 3 (three) times a week. Monday, Wednesday, Friday  11.Vitamin B-12 500 mcg tablet, Take 1 tablet orally once a day.  12.Vitamin D3 50 mcg (2,000 unit) tablet, Take 1 tablet orally once a day.  Impression  1.Aortic stenosis, moderate  2.Hypertension (HTN), primary  Assessment & Plan  Shortness of breath, hypoxia  - Desaturating to the 70s ambulating here, came up into the 90s at rest  - Symptoms of worsening over the last 1 to 2 weeks  - Broad differential, recent blood test did not show significant anemia, less likely related to recent TAVR  - Need to rule out possibility of recurrent/progressive PE as she was not treated with anticoagulation back in October due to GI bleed  - Obtain CT chest PE protocol to assess, will also be able to look at lung parenchyma to assess for fluid or changes in chronic lung disease  - If CT of the chest unremarkable then will likely need to undergo coronary revascularization which may be contributing as well  -  Additionally recommend that she follow-up with pulmonary for further assessment    Severe aortic stenosis s/p TAVR with 23 mm S3 PUNEET valve 1/23/2025  - Post TAVR TTE unremarkable, continue TAVR protocol for echo monitoring  - Continue aspirin 81 mg daily    CAD, severe ostial LCx and LAD-D on 1/14/2025   -No chest pain, shortness of breath worsening as above  - Continue atorvastatin 40 mg daily  - Eventually will need revascularization, timing dependent on results of CT of the chest    Bilateral PE   - Noted small subsegmental bilateral pulmonary emboli on chest CT 10/2025  - No longer on anticoagulation given GI bleed secondary to colon adenocarcinoma  - CTA chest PE protocol    HLD  - , triglycerides 78 on 7/16/2024  - Continue atorvastatin 40 mg daily  - Will need repeat lipid panel in future now that she is taking atorvastatin    Plan  Chest CTA PE protocol  Follow-up with me after completion of testing  Future appointments  1.Referral Visit - Malathi Stuart ([email or web address]) : (Today)  Miscellaneous  1.Reviewed glucose, sodium, potassium, chloride, co2, anion gap, bun, creatinine, bun/ creat ratio, calcium, osmolality calculated, e gfr cr, alt, ast, alkaline phosphatase, bilirubin, total, total protein, albumin, globulin, a/g ratio, fasting patient cmp answer, pro-beta natriuretic peptide, glucose, sodium, potassium, chloride, co2, anion gap, bun, creatinine, calcium, osmolality calculated, e gfr cr and fasting patient bmp answer with the patient.  2.Reviewed trans thoracic echocardiogram with the patient.  3.Weight monitoring (regime/therapy)  Nurses documentation  Refills:None  Assistive devices: None  Upcoming sx or procedures: None   Patient instructions  Plan  Chest CTA PE protocol  Follow-up with me after completion of testing    Please bring in your medication bottles or updated medicine list to your next appointment Call [facility] if you have any problems or concerns at  [phone]   Lab Details  PIETRO (BLOOD TYPE)  01/20/2025 06:22:06 PM  ABO BLOOD TYPE A   F  RH BLOOD TYPE Positive   F  ANTIBODY SCREEN  01/20/2025 06:21:03 PM  ANTIBODY SCREEN Negative   F  COMP METABOLIC PANEL (14)  01/20/2025 11:58:39 AM  GLUCOSE 121 70-99 mg/dL H F  SODIUM 141 136-145 mmol/L  F  POTASSIUM 3.7 3.5-5.1 mmol/L  F  CHLORIDE 109  mmol/L  F  CO2 25.0 21.0-32.0 mmol/L  F  ANION GAP 7 0-18 mmol/L  F  BUN 21 9-23 mg/dL  F  CREATININE 1.04 0.55-1.02 mg/dL H F  BUN/ CREAT RATIO 20.2 10.0-20.0 H F  CALCIUM 9.3 8.7-10.4 mg/dL  F  OSMOLALITY CALCULATED 296 275-295 mOsm/kg H F  E GFR CR 53 >=60 mL/min/1.73m2 L F  ALT 19 10-49 U/L  F  AST 23 <34 U/L  F  ALKALINE PHOSPHATASE 70  U/L  F  BILIRUBIN, TOTAL 0.5 0.2-1.1 mg/dL  F  TOTAL PROTEIN 6.0 5.7-8.2 g/dL  F  ALBUMIN 3.9 3.2-4.8 g/dL  F  GLOBULIN 2.1 2.0-3.5 g/dL  F  A/G RATIO 1.9 1.0-2.0  F  FASTING PATIENT CMP ANSWER No   F  PRO BETA NATRIURETIC PEPTIDE  01/20/2025 11:58:39 AM  PRO-BETA NATRIURETIC PEPTIDE 755 <450 pg/mL H F  BASIC METABOLIC PANEL (8)  01/02/2025 10:04:14 AM  GLUCOSE 92 70-99 mg/dL  F  SODIUM 139 136-145 mmol/L  F  POTASSIUM 4.2 3.5-5.1 mmol/L  F  CHLORIDE 107  mmol/L  F  CO2 27.0 21.0-32.0 mmol/L  F  ANION GAP 5 0-18 mmol/L  F  BUN 23 9-23 mg/dL  F  CREATININE 0.98 0.55-1.02 mg/dL  F  CALCIUM 9.1 8.7-10.4 mg/dL  F  OSMOLALITY CALCULATED 291 275-295 mOsm/kg  F  E GFR CR 57 >=60 mL/min/1.73m2 L F  FASTING PATIENT BMP ANSWER No   F  CBC, PLATELET; NO DIFFERENTIAL  01/02/2025 09:54:15 AM  WBC 11.4 4.0-11.0 x10(3) uL H F  RED BLOOD COUNT 3.92 3.80-5.30 x10(6)uL  F  HGB 10.6 12.0-16.0 g/dL L F  HCT 34.8 35.0-48.0 % L F  PLATELETS 490.0 150.0-450.0 10(3)uL H F  MEAN CELL VOLUME 88.8 80.0-100.0 fL  F  MEAN CORPUSCULAR HEMOGLOBIN 27.0 26.0-34.0 pg  F  MEAN CORPUSCULAR HGB CONC 30.5 31.0-37.0 g/dL L F  RED CELL DISTRIBUTION WIDTH CV 17.7 %  F  Diagnostics Details  Trans Thoracic Echocardiogram 02/05/2025  1.The left ventricle is  normal in size. Left ventricular wall thickness is normal. Global left ventricular systolic function is normal. The left ventricular ejection fraction is 60-65%. No regional wall motion abnormalities. The left ventricle diastolic function is impaired (Grade II) with an elevated left atrial pressure.    2.There is a normal functioning bioprosthetic aortic valve (TAVR). Peak velocity 2.41 m/s. Mean gradient 12 mm Hg. KIKE (VTI) 2.08. DI 0.72.    3.Moderate mitral annular calcification. Mild mitral regurgitation.    4.The pulmonary artery systolic pressure is midly increased, estimated at 44 mm Hg.    Care Providers: Viktor Bird MD, Apryl SCOTT and Kinsey Barrios  Electronically Authenticated by  Viktor Bird MD  03/19/2025 12:10:30 PM  Disclaimer: Components of this note were documented using voice recognition system and are subject to errors not corrected at proofreading. Contact the author of this note for any clarifications.

## 2025-03-27 NOTE — PLAN OF CARE
Problem: Patient Centered Care  Goal: Patient preferences are identified and integrated in the patient's plan of care  Description: Interventions:- What would you like us to know as we care for you? I was at [facility] for rehab- Provide timely, complete, and accurate information to patient/family- Incorporate patient and family knowledge, values, beliefs, and cultural backgrounds into the planning and delivery of care- Encourage patient/family to participate in care and decision-making at the level they choose- Honor patient and family perspectives and choices  Outcome: Progressing     Problem: Patient/Family Goals  Goal: Patient/Family Long Term Goal  Description: Patient's Long Term Goal: Discharge from hospital Interventions:- Monitor vital signs  - Monitor appropriate labs  - Monitor blood glucose levels  - Pain management  - Oxygen and respiratory intervention as needed  - Administer medications per order  - Follow MD orders  - Diagnostics per order  - Update / inform patient and family on plan of care  - Discharge planning  - See additional Care Plan goals for specific interventions  Outcome: Progressing  Goal: Patient/Family Short Term Goal  Description: Patient's Short Term Goal: Discharge from hospital Interventions: - Monitor vital signs  - Monitor appropriate labs  - Monitor blood glucose levels  - Pain management  - Oxygen and respiratory intervention as needed  - Administer medications per order  - Follow MD orders  - Diagnostics per order  - Update / inform patient and family on plan of care  - Discharge planning   - See additional Care Plan goals for specific interventions  Outcome: Progressing     Problem: PAIN - ADULT  Goal: Verbalizes/displays adequate comfort level or patient's stated pain goal  Description: INTERVENTIONS:- Encourage pt to monitor pain and request assistance- Assess pain using appropriate pain scale- Administer analgesics based on type and severity of pain and evaluate  response- Implement non-pharmacological measures as appropriate and evaluate response- Consider cultural and social influences on pain and pain management- Manage/alleviate anxiety- Utilize distraction and/or relaxation techniques- Monitor for opioid side effects- Notify MD/LIP if interventions unsuccessful or patient reports new pain- Anticipate increased pain with activity and pre-medicate as appropriate  Outcome: Progressing     Problem: RISK FOR INFECTION - ADULT  Goal: Absence of fever/infection during anticipated neutropenic period  Description: INTERVENTIONS- Monitor WBC- Administer growth factors as ordered- Implement neutropenic guidelines  Outcome: Progressing     Problem: SAFETY ADULT - FALL  Goal: Free from fall injury  Description: INTERVENTIONS:- Assess pt frequently for physical needs- Identify cognitive and physical deficits and behaviors that affect risk of falls.- [place] fall precautions as indicated by assessment.- Educate pt/family on patient safety including physical limitations- Instruct pt to call for assistance with activity based on assessment- Modify environment to reduce risk of injury- Provide assistive devices as appropriate- Consider OT/PT consult to assist with strengthening/mobility- Encourage toileting schedule  Outcome: Progressing     Problem: DISCHARGE PLANNING  Goal: Discharge to home or other facility with appropriate resources  Description: INTERVENTIONS:- Identify barriers to discharge w/pt and caregiver- Include patient/family/discharge partner in discharge planning- Arrange for needed discharge resources and transportation as appropriate- Identify discharge learning needs (meds, wound care, etc)- Arrange for interpreters to assist at discharge as needed- Consider post-discharge preferences of patient/family/discharge partner- Complete POLST form as appropriate- Assess patient's ability to be responsible for managing their own health- Refer to Case Management Department  for coordinating discharge planning if the patient needs post-hospital services based on physician/LIP order or complex needs related to functional status, cognitive ability or social support system  Outcome: Progressing     Problem: RESPIRATORY - ADULT  Goal: Achieves optimal ventilation and oxygenation  Description: INTERVENTIONS:- Assess for changes in respiratory status- Assess for changes in mentation and behavior- Position to facilitate oxygenation and minimize respiratory effort- Oxygen supplementation based on oxygen saturation or ABGs- Provide Smoking Cessation handout, if applicable- Encourage broncho-pulmonary hygiene including cough, deep breathe, Incentive Spirometry- Assess the need for suctioning and perform as needed- Assess and instruct to report SOB or any respiratory difficulty- Respiratory Therapy support as indicated- Manage/alleviate anxiety- Monitor for signs/symptoms of CO2 retention  Outcome: Progressing     Problem: MUSCULOSKELETAL - ADULT  Goal: Return mobility to safest level of function  Description: INTERVENTIONS:- Assess patient stability and activity tolerance for standing, transferring and ambulating w/ or w/o assistive devices- Assist with transfers and ambulation using safe patient handling equipment as needed- Ensure adequate protection for wounds/incisions during mobilization- Obtain PT/OT consults as needed- Advance activity as appropriate- Communicate ordered activity level and limitations with patient/family  Outcome: Progressing  Goal: Maintain proper alignment of affected body part  Description: INTERVENTIONS:- Support and protect limb and body alignment per provider's orders- Instruct and reinforce with patient and family use of appropriate assistive device and precautions (e.g. spinal or hip dislocation precautions)  Outcome: Progressing

## 2025-03-27 NOTE — ED PROVIDER NOTES
Patient Seen in: [facility] Emergency Department    History     Chief Complaint   Patient presents with    Difficulty Breathing       HPI    84 year-old female who presents to the emergency department given that she has had 4 hours of worsening dyspnea.  Any fevers or any preceding cough or lower extremity edema orthopnea or PND.  No chest pain.    History reviewed.   Past Medical History:    Acute pulmonary embolism (HCC)    Cancer (HCC)    Cataract    Colon cancer (HCC)    Coronary atherosclerosis    Foot drop, left foot    Heart valve disease    High blood pressure    High cholesterol    History of blood transfusion    History of stomach ulcers    History of transcatheter aortic valve replacement (TAVR)    Macular degeneration    Osteoarthritis    Osteopenia    Primary osteoarthritis of right hip    Pulmonary embolism (HCC)    Visual impairment    Readers    White coat hypertension       History reviewed.   Past Surgical History:   Procedure Laterality Date    Cataract  3/2&3/16 2017    Cath transcatheter aortic valve replacement      Colonoscopy N/A 12/17/2024    Procedure: COLONOSCOPY;  Surgeon: Karen Staton MD;  Location: [facility] ENDOSCOPY    Colonoscopy      Colonoscopy & polypectomy  01/04/2021         Hc tavr outpt e&m est pt level 2 w proced  01/23/2025    Hip replacement surgery Left Around 2006    Other surgical history  [place] 2017    Bilateral cataract surgery    Trcath replace aortic valve  01/23/2025    TAVR    Upper gi endoscopy,exam           Medications :  Prescriptions Prior to Admission[1]     Family History   Problem Relation Age of Onset    Cancer Father         bone (cause of death)    Stroke Mother         CVA (cause of death)    Diabetes Mother     Hypertension Mother     Dementia Brother         Alzheimer's    Heart Disease Brother         CAD - x2 brothers    Heart Disease Brother     Dementia Brother        Smoking Status:   Social History     Socioeconomic History    Marital  status:    Tobacco Use    Smoking status: Former     Current packs/day: 0.00     Types: Cigarettes     Start date: 1990     Quit date: 1990     Years since quittin.2     Passive exposure: Past    Smokeless tobacco: Never   Vaping Use    Vaping status: Never Used   Substance and Sexual Activity    Alcohol use: Not Currently     Alcohol/week: 5.0 standard drinks of alcohol     Types: 5 Glasses of wine per week     Comment: wine, occasionally    Drug use: No   Other Topics Concern    Caffeine Concern Yes     Comment: coffee/soda - 2cups/day       Constitutional and vital signs reviewed.      Social History and Family History elements reviewed from today, pertinent positives to the presenting problem noted.    Physical Exam     ED Triage Vitals   BP 25 (!) 158/100   Pulse 25 (!) 128   Resp 25 20   Temp 25 98.4 °F (36.9 °C)   Temp src 25 Temporal   SpO2 25 99 %   O2 Device 25 Non-rebreather mask       All measures to prevent infection transmission during my interaction with the patient were taken. The patient was already wearing a droplet mask on my arrival to the room. Personal protective equipment was worn throughout the duration of the exam.  Handwashing was performed prior to and after the exam.  Stethoscope and any equipment used during my examination was cleaned with super sani-cloth germicidal wipes following the exam.     Physical Exam    General: in distress   Head: Normocephalic and atraumatic.  Mouth/Throat/Ears/Nose: Oropharynx is clear   Eyes: Conjunctivae and EOM are normal.   Neck: Normal range of motion. Supple.   Cardiovascular: tachycardic rate, regular rhythm   Respiratory/Chest: wheezing, accessory muscle usage, tachypnea. Exhibits no tenderness.  Gastrointestinal: Soft, non-tender, non-distended. Bowel sounds are normal.   Musculoskeletal:No swelling or deformity.   Neurological: Alert and appropriate. No  focal deficits.   Skin: Skin is warm and dry. No pallor.  Psychiatric: Has a normal mood and affect.      ED Course        Labs Reviewed   BASIC METABOLIC PANEL (8) - Abnormal; Notable for the following components:       Result Value    Glucose 114 (*)     Creatinine 1.05 (*)     Calcium, Total 8.6 (*)     eGFR-Cr 52 (*)     All other components within normal limits   HEPATIC FUNCTION PANEL (7) - Abnormal; Notable for the following components:    AST 71 (*)     Alkaline Phosphatase 155 (*)     All other components within normal limits   CBC WITH DIFFERENTIAL WITH PLATELET - Abnormal; Notable for the following components:    WBC 19.8 (*)     HGB 11.0 (*)     HCT 34.0 (*)     RDW-SD 50.9 (*)     RDW 17.6 (*)     Neutrophil Absolute Prelim 16.17 (*)     Neutrophil Absolute 16.17 (*)     Monocyte Absolute 1.49 (*)     All other components within normal limits   TROPONIN I HIGH SENSITIVITY - Abnormal; Notable for the following components:    Troponin I (High Sensitivity) 58 (*)     All other components within normal limits   PRO BETA NATRIURETIC PEPTIDE - Abnormal; Notable for the following components:    Pro-Beta Natriuretic Peptide 4,423 (*)     All other components within normal limits   D-DIMER - Abnormal; Notable for the following components:    D-Dimer 2.42 (*)     All other components within normal limits   LIPID PANEL - Abnormal; Notable for the following components:    HDL Cholesterol 38 (*)     All other components within normal limits   POCT GLUCOSE - Abnormal; Notable for the following components:    POC Glucose  154 (*)     All other components within normal limits   SARS-COV-2/FLU A AND B/RSV BY PCR (GENEXPERT) - Normal    Narrative:     This test is intended for the qualitative detection and differentiation of SARS-CoV-2, influenza A, influenza B, and respiratory syncytial virus (RSV) viral RNA in nasopharyngeal or nares swabs from individuals suspected of respiratory viral infection consistent with  COVID-19 by their healthcare provider. Signs and symptoms of respiratory viral infection due to SARS-CoV-2, influenza, and RSV can be similar.                                    Test performed using the Xpert Xpress SARS-CoV-2/FLU/RSV (real time RT-PCR)  assay on the GeneXpert instrument, [employer], [place] [address].                   This test is being used under the Food and Drug Administration's Emergency Use Authorization.                                    The authorized Fact Sheet for Healthcare Providers for this assay is available upon request from the laboratory.   CBC WITH DIFFERENTIAL WITH PLATELET   COMP METABOLIC PANEL (14)   ASSAY, THYROID STIM HORMONE   MAGNESIUM   LDH   RAINBOW DRAW LAVENDER   RAINBOW DRAW LIGHT GREEN   RAINBOW DRAW BLUE   RAINBOW DRAW GOLD     EKG    Rate, intervals and axes as noted on EKG Report.  Rate: 127  Rhythm: Sinus Rhythm  Reading: Sinus tachycardia . No STEMI.            As Interpreted by me    Imaging Results Available and Reviewed while in ED: CT CHEST PE AORTA (IV ONLY) (CPT=71260)    Result Date: 3/26/2025  CONCLUSION:  1.  No acute pulmonary embolus to the subsegmental pulmonary artery level.  No acute aortic injury; no dissection. 2.  Findings suggesting CHF/edema:  Stable cardiomegaly and bilateral pleural effusions which are larger since prior exam.  Interstitial changes throughout the lungs, worse since previous exam from 6 days ago. 3.  Interstitial changes throughout the lungs, with a basal predominance compatible with fibrosis.  However these changes have progressed since previous exam, suggesting there is superimposed edema.  Confluent patchy pulmonary opacities have developed throughout the lung fields versus 3/26/25. 4.  Post TAVR. 5.  Nonspecific lymphadenopathy in the mediastinum.    Dictated by (CST): Benjamin Cisneros MD on 3/26/2025 at 8:48 PM     Finalized by (CST): Benjamin Cisneros MD on 3/26/2025 at 8:52 PM          XR CHEST AP PORTABLE   (CPT=71045)    Result Date: 3/26/2025  CONCLUSION: Findings compatible with CHF and/or increased fluid volume status of the patient.   Dictated by (CST): Benjamin Cisneros MD on 3/26/2025 at 7:58 PM     Finalized by (CST): Benjamin Cisneros MD on 3/26/2025 at 7:58 PM         ED Medications Administered:   Medications   ipratropium (Atrovent) 0.02 % nebulizer solution 1.5 mg (1.5 mg Nebulization New Bag 3/26/25 1945)   acetaminophen (Tylenol) tab 325 mg (has no administration in time range)   aspirin DR tab 81 mg (has no administration in time range)   atorvastatin (Lipitor) tab 20 mg (20 mg Oral Given 3/26/25 2215)   pantoprazole (Protonix) DR tab 40 mg (has no administration in time range)   traMADol (Ultram) tab 50 mg (has no administration in time range)   furosemide (Lasix) 10 mg/mL injection 40 mg (has no administration in time range)   ipratropium-albuterol (Duoneb) 0.5-2.5 (3) MG/3ML inhalation solution 3 mL (has no administration in time range)   heparin (Porcine) 5000 UNIT/ML injection 5,000 Units (5,000 Units Subcutaneous Given 3/26/25 2215)   methylPREDNISolone sodium succinate (Solu-MEDROL) injection 60 mg (has no administration in time range)   glucose (Dex4) 15 GM/59ML oral liquid 15 g (has no administration in time range)     Or   glucose (Glutose) 40% oral gel 15 g (has no administration in time range)     Or   glucose-vitamin C (Dex-4) chewable tab 4 tablet (has no administration in time range)     Or   dextrose 50% injection 50 mL (has no administration in time range)     Or   glucose (Dex4) 15 GM/59ML oral liquid 30 g (has no administration in time range)     Or   glucose (Glutose) 40% oral gel 30 g (has no administration in time range)     Or   glucose-vitamin C (Dex-4) chewable tab 8 tablet (has no administration in time range)   insulin aspart (NovoLOG) 100 Units/mL FlexPen 1-7 Units (has no administration in time range)   sulfamethoxazole-trimethoprim DS (Bactrim DS) 800-160 MG per tab 1 tablet (has no  administration in time range)   ferrous sulfate DR tab 325 mg (has no administration in time range)   docusate sodium (Colace) cap 100 mg (has no administration in time range)   albuterol (Ventolin) (5 MG/ML) 0.5% nebulizer solution 15 mg (15 mg Nebulization Given 3/26/25 1945)   methylPREDNISolone sodium succinate (Solu-MEDROL) injection 125 mg (125 mg Intravenous Given 3/26/25 1942)   iopamidol 76% (ISOVUE-370) injection for power injector (80 mL Intravenous Given 3/26/25 2038)   furosemide (Lasix) 10 mg/mL injection 60 mg (60 mg Intravenous Given 3/26/25 2128)         MDM     Vitals:    03/26/25 1939 03/26/25 1945 03/26/25 2145 03/26/25 2158   BP: (!) 158/100 (!) 169/94 137/80 154/86   Pulse:  119 115 (!) 122   Resp:  (!) 34 26 (!) 35   Temp:    99.2 °F (37.3 °C)   TempSrc:    Oral   SpO2:  100% 94% 96%   Weight: 72.6 kg   83.1 kg   Height: 162.6 cm (5' 4\")        *I personally reviewed and interpreted all ED vitals.    Pulse Ox: 100%, BIPAP, abnormal     Monitor Interpretation:   sinus tachycardia as interpreted by me.  The cardiac monitor was ordered given dyspnea.      Medical Decision Making      Differential Diagnosis/ Diagnostic Considerations: ILD, CHF, COVID, PE    Complicating Factors: The patient already has aortic valve stenosis to contribute to the complexity of this ED evaluation.    I reviewed prior chart records including office note from 3/19/25. Labs notable for elevated BNP consistent with CXR and CTPE scans on my interpretation with pulmonary edema. Placed on BIPAP, on reevaluation appearing much improved. Given nebulizers, solumedrol, and lasix. Consulted [facility] cardiology and Dr. Baez. Admitted to and discussed with Dr. Apple.     Admitted In stable condition.  Patient is comfortable with the plan.      I spent 90 minutes of critical care time caring for this patient. This does not include time spent on separately reported billable procedures.       Disposition and Plan     Clinical  Impression:  1. Acute hypoxic respiratory failure (HCC)    2. Acute on chronic congestive heart failure, unspecified heart failure type (HCC)    3. Aortic valve stenosis, etiology of cardiac valve disease unspecified        Disposition:  Admit    Follow-up:  No follow-up provider specified.    Medications Prescribed:  Current Discharge Medication List          Hospital Problems       Present on Admission  Date Reviewed: 2/20/2025            ICD-10-CM Noted POA    * (Principal) Acute hypoxic respiratory failure (HCC) J96.01 3/26/2025 Unknown    Acute on chronic congestive heart failure, unspecified heart failure type (HCC) I50.9 3/26/2025 Unknown    Aortic valve stenosis, etiology of cardiac valve disease unspecified I35.0 3/26/2025 Unknown                       [1]   Medications Prior to Admission   Medication Sig Dispense Refill Last Dose/Taking    acetaminophen 325 MG Oral Tab Take 1 tablet (325 mg total) by mouth every 6 (six) hours as needed for Pain.   3/26/2025 at  8:00 AM    furosemide 20 MG Oral Tab Take 2 tablets (40 mg total) by mouth daily. 40mg daily for 5 days starting 03/26/2025   3/26/2025 at  8:00 AM    traMADol 50 MG Oral Tab Take 1 tablet (50 mg total) by mouth every 6 (six) hours as needed for Pain. 5 tablet 0 Past Week    aspirin 81 MG Oral Tab EC Take 1 tablet (81 mg total) by mouth daily. 30 tablet 11 3/26/2025 at  8:00 AM    omeprazole 20 MG Oral Capsule Delayed Release Take 1 capsule (20 mg total) by mouth 2 (two) times daily.   3/26/2025 at  7:00 AM    atorvastatin 20 MG Oral Tab Take 1 tablet (20 mg total) by mouth at bedtime.   3/25/2025 at  6:00 PM    furosemide 20 MG Oral Tab Take 1 tablet (20 mg total) by mouth daily. (Patient not taking: Reported on 3/26/2025)   Not Taking

## 2025-03-28 NOTE — PROGRESS NOTES
[facility]  part of [facility]     Progress Note    Hoda Busby Patient Status:  Inpatient    1940 MRN [id number]   Location [facility]W Attending Malathi Stuart MD   Hosp Day # 2 PCP Malathi Stuart MD       Subjective:   Patient seen and examined.  Admits to some ongoing cough.  Dyspnea slightly improved since arrival    Objective:   Blood pressure 126/66, pulse 93, temperature 97.8 °F (36.6 °C), temperature source Oral, resp. rate 18, height 5' 4\" (1.626 m), weight 174 lb 4.8 oz (79.1 kg), SpO2 100%.  Intake/Output:   Last 3 shifts: I/O last 3 completed shifts:  In: 1500 [P.O.:1500]  Out: 4450 [Urine:4450]   This shift: I/O this shift:  In: 574 [P.O.:574]  Out: 300 [Urine:300]     Vent Settings:      Hemodynamic parameters (last 24 hours):      Scheduled Meds:   Current Facility-Administered Medications   Medication Dose Route Frequency    cefTRIAXone (Rocephin) 2 g in sodium chloride 0.9% 100 mL IVPB-ADDV  2 g Intravenous Q24H    azithromycin (Zithromax) 500 mg in sodium chloride 0.9% 250mL IVPB premix  500 mg Intravenous Q24H    vancomycin (Vancocin) cap 125 mg  125 mg Oral Daily    acetaminophen (Tylenol) tab 325 mg  325 mg Oral Q6H PRN    aspirin DR tab 81 mg  81 mg Oral Daily    atorvastatin (Lipitor) tab 20 mg  20 mg Oral Nightly    pantoprazole (Protonix) DR tab 40 mg  40 mg Oral QAM AC    traMADol (Ultram) tab 50 mg  50 mg Oral Q6H PRN    furosemide (Lasix) 10 mg/mL injection 40 mg  40 mg Intravenous q12h    ipratropium-albuterol (Duoneb) 0.5-2.5 (3) MG/3ML inhalation solution 3 mL  3 mL Nebulization Q6H PRN    heparin (Porcine) 5000 UNIT/ML injection 5,000 Units  5,000 Units Subcutaneous 2 times per day    methylPREDNISolone sodium succinate (Solu-MEDROL) injection 60 mg  60 mg Intravenous Q12H    glucose (Dex4) 15 GM/59ML oral liquid 15 g  15 g Oral Q15 Min PRN    Or    glucose (Glutose) 40% oral gel 15 g  15 g Oral Q15 Min PRN    Or    glucose-vitamin C  (Dex-4) chewable tab 4 tablet  4 tablet Oral Q15 Min PRN    Or    dextrose 50% injection 50 mL  50 mL Intravenous Q15 Min PRN    Or    glucose (Dex4) 15 GM/59ML oral liquid 30 g  30 g Oral Q15 Min PRN    Or    glucose (Glutose) 40% oral gel 30 g  30 g Oral Q15 Min PRN    Or    glucose-vitamin C (Dex-4) chewable tab 8 tablet  8 tablet Oral Q15 Min PRN    insulin aspart (NovoLOG) 100 Units/mL FlexPen 1-7 Units  1-7 Units Subcutaneous TID CC    ferrous sulfate DR tab 325 mg  325 mg Oral Daily with breakfast    docusate sodium (Colace) cap 100 mg  100 mg Oral BID       Continuous Infusions:     Physical Exam  Constitutional: no acute distress  Eyes: PERRL  ENT: nares pateint  Neck: supple, no JVD  Cardio: RRR, S1 S2  Respiratory: Basilar crackles  GI: abdomen soft, non tender, active bowel sounds, no organomegaly  Extremities: no clubbing, cyanosis, + trace lower extremity edema   Neurologic: no gross motor deficits  Skin: warm, dry      Results:     Lab Results   Component Value Date    WBC 16.5 03/28/2025    HGB 10.5 03/28/2025    HCT 32.8 03/28/2025    .0 03/28/2025    CREATSERUM 1.14 03/28/2025    BUN 28 03/28/2025     03/28/2025    K 4.2 03/28/2025     03/28/2025    CO2 26.0 03/28/2025     03/28/2025    CA 8.7 03/28/2025    MG 1.9 03/28/2025       CT CHEST PE AORTA (IV ONLY) (CPT=71260)    Result Date: 3/26/2025  CONCLUSION:  1.  No acute pulmonary embolus to the subsegmental pulmonary artery level.  No acute aortic injury; no dissection. 2.  Findings suggesting CHF/edema:  Stable cardiomegaly and bilateral pleural effusions which are larger since prior exam.  Interstitial changes throughout the lungs, worse since previous exam from 6 days ago. 3.  Interstitial changes throughout the lungs, with a basal predominance compatible with fibrosis.  However these changes have progressed since previous exam, suggesting there is superimposed edema.  Confluent patchy pulmonary opacities have  developed throughout the lung fields versus 3/26/25. 4.  Post TAVR. 5.  Nonspecific lymphadenopathy in the mediastinum.    Dictated by (CST): Benjamin Cisneros MD on 3/26/2025 at 8:48 PM     Finalized by (CST): Benjamin Cisneros MD on 3/26/2025 at 8:52 PM          XR CHEST AP PORTABLE  (CPT=71045)    Result Date: 3/26/2025  CONCLUSION: Findings compatible with CHF and/or increased fluid volume status of the patient.   Dictated by (CST): Benjamin Cisneros MD on 3/26/2025 at 7:58 PM     Finalized by (CST): Benjamin Cisneros MD on 3/26/2025 at 7:58 PM           EKG 12 Lead    Result Date: 3/27/2025  Sinus tachycardia Septal infarct (cited on or before 16-DEC-2024) Abnormal ECG When compared with ECG of 24-JAN-2025 13:14, Vent. rate has increased BY  55 BPM Questionable change in initial forces of Anterior-septal leads Confirmed by марина lowe ([id number]) on 3/27/2025 4:03:00 PM     Assessment   1.  Acute hypoxemic respiratory failure  2.  ILD  3.  Small pleural effusions  4.  Pulmonary edema  5.  Colon adenocarcinoma status post hemicolectomy  6.  Coronary artery disease  7.  Prior history of GI bleed  8.  Prior VTE  9.  History of severe aortic stenosis status post TAVR  10.  Hypertension     Plan   -Patient presents with progressive worsening dyspnea and acute hypoxemic respiratory failure.  -CT chest on presentation on 3/26/2025 with no evidence of acute pulmonary embolism seen.  Evidence of small pleural effusions with groundglass opacities suggestive more likely of edema with worsening compared to 6 days prior from CT chest.  Some background of fibrotic changes present.  -In light of clinical presentation monitor response to diuresis and Solu-Medrol.  -2D echo with intact ejection fraction and grade 1 diastolic dysfunction.  Right ventricular dilatation seen with elevated pulmonary artery systolic pressure at 52.  Further recommendations per cardiology.  -Prior history of underlying ILD with hospitalization October 2024 responded to  tapering steroid therapy at the time.  -Pulmonary function testing from February 2025 with mild restriction seen with significant decrease in diffusion capacity.  -Wean oxygen as tolerated.    -Discussed with primary care physician.  Will start empiric antibiotic therapy in the meantime with Rocephin and azithromycin.  -DVT prophylaxis: Heparin    Cody Holloway DO  Pulmonary Critical Care Medicine  [facility]

## 2025-03-28 NOTE — PROGRESS NOTES
[facility]  part of [facility]    Progress Note    Hoda Busby Patient Status:  Inpatient    1940 MRN [id number]   Location [facility]5W Attending Malathi Stuart MD   Hosp Day # 2 PCP Malathi Stuart MD       SUBJECTIVE:  Feels less SOB today; has a wet cough.    OBJECTIVE:  Vital signs in last 24 hours:  BP 98/45 (BP Location: Right arm)   Pulse 99   Temp 97.5 °F (36.4 °C) (Oral)   Resp 19   Ht 5' 4\" (1.626 m)   Wt 174 lb 4.8 oz (79.1 kg)   SpO2 97%   BMI 29.92 kg/m²     Intake/Output:    Intake/Output Summary (Last 24 hours) at 3/28/2025 0936  Last data filed at 3/28/2025 0900  Gross per 24 hour   Intake 874 ml   Output 2850 ml   Net -1976 ml       Wt Readings from Last 3 Encounters:   25 174 lb 4.8 oz (79.1 kg)   25 180 lb (81.6 kg)   25 173 lb (78.5 kg)       EXAM:  GENERAL: well developed, well nourished, in no apparent distress, audible wet cough  LUNGS: crackles 1/2 up on R, 1/4 up on L, no wheezing  CARDIO: RRR, normal S1S2, without murmur   GI: soft, NT, ND, NABS  EXTREMITIES: trace BLE edema    Data Review:     Labs:   Lab Results   Component Value Date    WBC 16.5 2025    HGB 10.5 2025    HCT 32.8 2025    .0 2025    CREATSERUM 1.14 2025    BUN 28 2025     2025    K 4.2 2025     2025    CO2 26.0 2025     2025    CA 8.7 2025    MG 1.9 2025         Imaging:  CT CHEST PE AORTA (IV ONLY) (CPT=71260)    Result Date: 3/26/2025  CONCLUSION:  1.  No acute pulmonary embolus to the subsegmental pulmonary artery level.  No acute aortic injury; no dissection. 2.  Findings suggesting CHF/edema:  Stable cardiomegaly and bilateral pleural effusions which are larger since prior exam.  Interstitial changes throughout the lungs, worse since previous exam from 6 days ago. 3.  Interstitial changes throughout the lungs, with a basal predominance  compatible with fibrosis.  However these changes have progressed since previous exam, suggesting there is superimposed edema.  Confluent patchy pulmonary opacities have developed throughout the lung fields versus 3/26/25. 4.  Post TAVR. 5.  Nonspecific lymphadenopathy in the mediastinum.    Dictated by (CST): Benjamin Cisneros MD on 3/26/2025 at 8:48 PM     Finalized by (CST): Benjamin Cisneros MD on 3/26/2025 at 8:52 PM          XR CHEST AP PORTABLE  (CPT=71045)    Result Date: 3/26/2025  CONCLUSION: Findings compatible with CHF and/or increased fluid volume status of the patient.   Dictated by (CST): Benjamin Cisneros MD on 3/26/2025 at 7:58 PM     Finalized by (CST): Benjamin Cisneros MD on 3/26/2025 at 7:58 PM          CARD ECHO 2D DOPPLER (CPT=93306)    Result Date: 3/27/2025  Transthoracic Echocardiogram Name:Hoda Busby Date: 2025 :  1940 Ht:  (64in)  BP: 137 / 81 MRN:  [id number]    Age:  84years    Wt:  (187lb) HR: 75bpm Loc:  [facility]       Gndr: F          BSA: 1.9m^2 Sonographer: Casper JOHNSON Ordering:    Malathi Stuart Consulting:  Martita Baez O ---------------------------------------------------------------------------- History/Indications:   Congestive heart failure.  Risk factors: Hypertension. TAVR-23 mm Martha 3 Resilia bioprosthesis. Acute hypoxic respiratory failure. ---------------------------------------------------------------------------- Procedure information:  A transthoracic complete 2D study was performed. Additional evaluation included M-mode, complete spectral Doppler, and color Doppler.  Patient status:  Inpatient.  Location:  Bedside.    Comparison was made to the study of 2025.    This was a routine study. Transthoracic echocardiography for diagnosis and ventricular function evaluation. Image quality was adequate. ECG rhythm:   Normal sinus ---------------------------------------------------------------------------- Conclusions: 1. Left ventricle: The cavity size was normal.  Wall thickness was normal.    Systolic function was normal. The estimated ejection fraction was 60-65%.    No diagnostic evidence for regional wall motion abnormalities. Doppler    parameters are consistent with abnormal left ventricular relaxation -    grade 1 diastolic dysfunction. 2. Right ventricle: The cavity size was increased. Systolic function was    normal. 3. Left atrium: The atrium was mildly to moderately dilated. The left atrial    volume was mildly to moderately increased. 4. Aortic valve: A bioprosthetic valve is present. Mcnamara PUNEET S3 23mm    (TAVR) bioprosthesis was present. The peak systolic velocity was    2.69m/sec. The mean systolic gradient was 15mm Hg. The valve area (VTI)    was 1.52cm^2. 5. Pulmonary arteries: Systolic pressure was moderately increased, estimated    to be 52mm Hg. * ---------------------------------------------------------------------------- * Findings: Left ventricle:  The cavity size was normal. Wall thickness was normal. Systolic function was normal. The estimated ejection fraction was 60-65%. No diagnostic evidence for regional wall motion abnormalities. Doppler parameters are consistent with abnormal left ventricular relaxation - grade 1 diastolic dysfunction. Left atrium:  The atrium was mildly to moderately dilated. The left atrial volume was mildly to moderately increased. Right ventricle:  The cavity size was increased. Systolic function was normal. Right atrium:  Well visualized. The atrium was normal in size. Mitral valve:  The annulus was moderately calcified. The leaflets were mildly thickened and mildly to moderately calcified. Leaflet separation was normal.  Doppler:  Transvalvular velocity was within the normal range. There was no evidence for stenosis. There was no significant regurgitation. Aortic valve:  A bioprosthetic valve is present. Mcnamara PUNEET S3 23mm (TAVR) bioprosthesis was present. Cusp separation was normal.  Doppler: Transvalvular  velocity was within the normal range. There was no evidence for stenosis. There was no significant regurgitation.    The valve area (VTI) was 1.52cm^2. The valve area (VTI) index was 0.8cm^2/m^2.    The mean systolic gradient was 15mm Hg. The peak systolic gradient was 29mm Hg. Tricuspid valve:  The valve is structurally normal. Leaflet separation was normal.  Doppler:  Transvalvular velocity was within the normal range. There was no evidence for stenosis. There was mild regurgitation. Pulmonic valve:   The valve is structurally normal. Cusp separation was normal.  Doppler:  Transvalvular velocity was within the normal range. There was no evidence for stenosis. There was no significant regurgitation. Pericardium:   There was no pericardial effusion. Aorta: Aortic root: The aortic root was normal. Ascending aorta: The ascending aorta was normal. Pulmonary arteries: Systolic pressure was moderately increased, estimated to be 52mm Hg. Systemic veins:  Central venous respirophasic diameter changes are blunted (< 50%). Inferior vena cava: The IVC was dilated. ---------------------------------------------------------------------------- Measurements  Left ventricle       Value          Ref       01/24/2025  IVS thickness,   (H) 1.0   cm       0.6 - 0.9 1.0  ED, PLAX  LV ID, ED, PLAX  (H) 5.3   cm       3.8 - 5.2 5.0  LV ID, ES, PLAX      3.5   cm       2.2 - 3.5 3.1  LV PW thickness, (H) 1.0   cm       0.6 - 0.9 1.0  ED, PLAX  IVS/LV PW ratio,     1.00           --------- 1.02  ED, PLAX  LV PW/LV ID          0.19           --------- 0.2  ratio, ED, PLAX  LV area, ES, A4C     17.5  cm^2     --------- ----------  LV ejection          62    %        54 - 74   67  fraction  Stroke               45    ml/m^2   --------- 53  volume/bsa, 2D  LV end-diastolic     126   ml       48 - 140  ----------  volume, 1-p A4C  LV end-systolic      49    ml       12 - 60   ----------  volume, 1-p A4C  LV ejection          63    %        46 -  78   ----------  fraction, 1-p  A4C  Stroke volume,       79    ml       --------- ----------  1-p A4C  LV end-diastolic     66    ml/m^2   30 - 82   ----------  volume/bsa, 1-p  A4C  LV end-systolic      26    ml/m^2   7 - 35    ----------  volume/bsa, 1-p  A4C  Stroke               42    ml/m^2   --------- ----------  volume/bsa, 1-p  A4C  LV end-diastolic (H) 113   ml       46 - 106  ----------  volume, 2-p  LV end-systolic      42    ml       14 - 42   ----------  volume, 2-p  LV ejection          63    %        54 - 74   ----------  fraction, 2-p  Stroke volume,       72    ml       --------- ----------  2-p  LV end-diastolic     59    ml/m^2   29 - 61   ----------  volume/bsa, 2-p  LV end-systolic      22    ml/m^2   8 - 24    ----------  volume/bsa, 2-p  Stroke               37.6  ml/m^2   --------- ----------  volume/bsa, 2-p  LV e', lateral   (L) 4.9   cm/sec   >=10.0    ----------  LV E/e', lateral (H) 19             <=13      ----------  LV e', medial    (L) 3.2   cm/sec   >=7.0     ----------  LV E/e', medial      30             --------- ----------  LV e', average       4.0   cm/sec   --------- ----------  LV E/e', average (H) 23             <=14      ----------  LVOT                 Value          Ref       01/24/2025  LVOT ID              1.9   cm       --------- 2.2  LVOT peak            1.29  m/sec    --------- 1.31  velocity, S  LVOT VTI, S          29.9  cm       --------- 27.6  LVOT peak            7     mm Hg    --------- 7  gradient, S  LVOT mean            4     mm Hg    --------- 3  gradient, S  Stroke volume        85    ml       --------- 105  (SV), LVOT DP  Stroke index         45    ml/m^2   --------- 53  (SV/bsa), LVOT  DP  Aortic valve         Value          Ref       01/24/2025  Aortic valve         2.69  m/sec    --------- 2.4  peak velocity, S  Aortic valve         55.7  cm       --------- 51.6  VTI, S  Aortic mean          15    mm Hg    --------- 12  gradient, S  Aortic peak           29    mm Hg    --------- 23  gradient, S  Aortic valve         1.52  cm^2     --------- 2.03  area, VTI  Aortic valve         0.8   cm^2/m^2 --------- 1.03  area/bsa, VTI  Velocity ratio,      0.48           --------- 0.55  peak, LVOT/AV  Ascending aorta      Value          Ref       01/24/2025  Ascending aorta      3.5   cm       1.9 - 3.5 ----------  ID  Left atrium          Value          Ref       01/24/2025  LA ID, A-P, ES       3.3   cm       2.7 - 3.8 4.0  LA volume/bsa, S (H) 39    ml/m^2   16 - 34   56  LA volume, ES,   (H) 74    ml       22 - 52   101  1-p A4C  Mitral valve         Value          Ref       01/24/2025  Mitral E-wave        0.94  m/sec    --------- ----------  peak velocity  Mitral A-wave        1.24  m/sec    --------- ----------  peak velocity  Mitral               190   ms       --------- ----------  deceleration  time  Mitral peak          4     mm Hg    --------- ----------  gradient, D  Mitral E/A           0.8            --------- ----------  ratio, peak  Pulmonary artery     Value          Ref       01/24/2025  PA pressure, S,      52    mm Hg    --------- ----------  DP  Tricuspid valve      Value          Ref       01/24/2025  Tricuspid regurg (H) 3.06  m/sec    <=2.8     ----------  peak velocity  Tricuspid peak       37    mm Hg    --------- ----------  RV-RA gradient  Right atrium         Value          Ref       01/24/2025  RA ID, S-I, ES,  (H) 6.3   cm       3.4 - 5.3 ----------  A4C  RA ID/bsa, S-I,  (H) 3.3   cm/m^2   1.9 - 3.1 ----------  ES, A4C  RA area, ES, A4C     17    cm^2     10 - 18   ----------  RA volume, ES,       38    ml       --------- ----------  1-p A4C  RA volume/bsa,       20    ml/m^2   9 - 33    ----------  ES, 1-p A4C  Systemic veins       Value          Ref       01/24/2025  Estimated CVP        15    mm Hg    --------- ----------  Inferior vena        Value          Ref       01/24/2025  cava  ID               (H) 2.7   cm       <=2.1     ----------   Right ventricle      Value          Ref       01/24/2025  TAPSE, 2D            2.74  cm       >=1.70    ----------  TAPSE, MM            2.74  cm       >=1.70    ----------  RV pressure, S,      52    mm Hg    --------- ----------  DP  RV s', lateral       14.7  cm/sec   >=9.5     ---------- Legend: (L)  and  (H)  migel values outside specified reference range. ---------------------------------------------------------------------------- Prepared and electronically signed by Bran Salcedo 03/27/2025 16:38          Meds:   Current Facility-Administered Medications   Medication Dose Route Frequency    acetaminophen (Tylenol) tab 325 mg  325 mg Oral Q6H PRN    aspirin DR tab 81 mg  81 mg Oral Daily    atorvastatin (Lipitor) tab 20 mg  20 mg Oral Nightly    pantoprazole (Protonix) DR tab 40 mg  40 mg Oral QAM AC    traMADol (Ultram) tab 50 mg  50 mg Oral Q6H PRN    furosemide (Lasix) 10 mg/mL injection 40 mg  40 mg Intravenous q12h    ipratropium-albuterol (Duoneb) 0.5-2.5 (3) MG/3ML inhalation solution 3 mL  3 mL Nebulization Q6H PRN    heparin (Porcine) 5000 UNIT/ML injection 5,000 Units  5,000 Units Subcutaneous 2 times per day    methylPREDNISolone sodium succinate (Solu-MEDROL) injection 60 mg  60 mg Intravenous Q12H    glucose (Dex4) 15 GM/59ML oral liquid 15 g  15 g Oral Q15 Min PRN    Or    glucose (Glutose) 40% oral gel 15 g  15 g Oral Q15 Min PRN    Or    glucose-vitamin C (Dex-4) chewable tab 4 tablet  4 tablet Oral Q15 Min PRN    Or    dextrose 50% injection 50 mL  50 mL Intravenous Q15 Min PRN    Or    glucose (Dex4) 15 GM/59ML oral liquid 30 g  30 g Oral Q15 Min PRN    Or    glucose (Glutose) 40% oral gel 30 g  30 g Oral Q15 Min PRN    Or    glucose-vitamin C (Dex-4) chewable tab 8 tablet  8 tablet Oral Q15 Min PRN    insulin aspart (NovoLOG) 100 Units/mL FlexPen 1-7 Units  1-7 Units Subcutaneous TID CC    ferrous sulfate DR tab 325 mg  325 mg Oral Daily with breakfast    docusate sodium (Colace) cap 100  mg  100 mg Oral BID       Assessment & Plan    Acute hypoxic respiratory failure (HCC)  -ddx: CHF, recurrent interstitial pneumonitis  -CT chest in ED 3/26/25 - no PE; worsening of pleural effusions and interstitial edema  -started on IV lasix and IV solumedrol to cover both CHF and possible recurrence of NSIP respectively  -required BiPAP transiently  -procalc sl elevated 0.25; WBC 19.8 K on admission  -recent echo 2/5/25 - normal LV systolic function (EF 60-65%); grade 2 diastolic dysfunction; normally functioning bioprosthetic TAVR; sl incr PAP 44mmHg  -repeat echo 3/27/25 stable from 2/2025 except incr PAP to 52mmHg  -cards consult appreciated  -diuresing well on Lasix 40mg IV BID (net -3L); creat up slightly; monitor closely  -pt notes improvement in breathing though still not back to baseline; still requiring 10L O2  -pt with audible wet, loose cough today; yesterday pt denied any increase in her baseline cough, but when questioned again today, she states that her cough has been worse in the past 3-4 days.  -discussed with Dr. Holloway; will start ceftriaxone and azitho; po vanco for CDP  -further recs per pulm Dr. Holloway, cards Dr. Wood     Adenocarcinoma of colon  -Bx of transverse colon polyp 12/17/24 -- invasive, moderately differentiated adenocarcinoma  -CEA normal (1.9)  -CT a/p neg for mets  -surgery delayed in anticipation of TAVR (done 1/23/25)  -s/p robotic assisted left hemicolectomy (Dr. Cassidy) - path - tumor invades into muscularis; margins neg for tumor; all 19 LN's neg for mets (0/19); pT2, pN0  -next appt with Dr. Cassidy 4/16/25     Coronary artery disease  -Cath 1/14/25 by Dr. Bernardo Liz (as w/u for TAVR) -- RCA non-obstructive; LM free of obst disease; LM plaque extending into ostium of LAD (20-30%); mid LAD (80-90%), cx ostium (60-70%)  -intervention delayed until after colon resection due to need for DAPT x 6 mos after stenting   -on Atorvastatin 20mg/day, ASA 81mg/day  -pt had  upcoming appt w/Dr. Bird this month 3/2025 to discuss intervention; now consulted as above     Hx  GI bleed  -EGD 11/26/24 (Dr. Nuno) -15mm nonbleeding gastric antral ulcer; 3mm gastric antral AVM s/p APC  -recurrence in 12/2024  -colonoscopy and repeat EGD 12/17/24 by Dr. Staton -- grade 1 esophagitis; duod ulcers healing; large flat polyp in transverse colon (carcinoma)   -received total of 3 units PRBCs   -Xarelto stopped in 12/2024  -s/p omeprazole 20mg BID x 8 weeks (EOT 1/22/25), now on omeprazole 20mg daily -- would continue until she completes her upcoming coronary stenting     Anemia due to acute blood loss  -GI bleed as above  -on ferrous sulfate 325mg/day  -Hgb 10.8 but stable     Hx chronic mild BLE edema  -Lasix 20mg/day     Acute left peroneal palsy  In Nov 2024; had numbness to anterolateral left foot and ankle as well as inability to dorsiflex left foot; etiology unclear  -neurologist Dr Richardson consulted in hosp  -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   -MRI brain--no acute intracranial process  -still doing PT at [facility] -- continues to improve; dorsiflexion left foot 4/5; plantarflexion 5/5 this admission (3/26/25)     Interstitial pneumonitis/NSIP  -dx'ed 10/2024 - presented with cough and severe hypoxia  -unresponsive to abx   -CXR 10/24/24 extensive bilateral perihilar and bilateral upper and lower lobe   -S.pneumo, legionella Ag , mycoplasma IgM/G, Fungitell-beta-D glucan negative  -ANCA and URIEL/connective tissue disease panels negative  -anti-histone and anti-K[place]a Ab's negative  -CT with bilateral ground glass opacities, small consolidations of BLL  -per pulm, findings suspicious for NSIP (vs cryptogenic organizing pneumonia vs hypersensitivity pneumonitis); NOT thought to be c/w UIP pattern  -s/p empiric steroids (solumedrol then prednisone taper) 10/25/24 - (EOT 1/18/25)  -dyspnea  completely resolved until this admission 3/26/25     Bilateral pulmonary emboli  -dx'ed at time of interstitial pneumonitis  -CT chest 10/25/24 -- acute distal segmental/subsegmental pulmonary emboli in [place] and subsegmental PE in [place]  -unclear etiology; no recent hx of long travel; no family/personal hx of blood clots  -BLE venous dopplers negative 10/26/24  -started xarelto 20 mg po every day on 11/28/24  -repeat CT chest 12/9/24 neg PE  -stopped Xarelto 12/14/24 due to recurrent GI bleed  -repeat CT chest 3/26/25 -- neg PE     Severe aortic stenosis  -progression on serial echocardiograms  -s/p TAVR 1/23/25 (Dr. Reji Green)  -symptomatically much improved -- POPE resolved     Hypertension, primary  -(dyazide stopped due to NANCY)  -(amlodipine held due to low BP in 11/2024)  -BP remains normal off meds     Hx of hip OA  -s/p left THR (Dr. Lo) many years ago  -s/p elective R BEATRIZ on 10/5/23 by Dr. Diaz     Hyperlipidemia   Pt with strong family hx of high cholesterol  Started on Atorvastatin 20mg/day in 1/2025 (after noted to have CAD on cath)     Osteopenia   On Fosamax from 2011 to 4/2016.     DEXA stable 5/10/17 and 2019 and 2021  DEXA 8/2024 -- osteoporosis of left forearm  -restarted Fosamax 8/2024      Vitamin D deficiency  On vitamin D 4000u/day      VTE proph  -SQH       D/w pt, tres Ferrari, and Dr. Tristin Stuart MD  3/28/2025  9:36 AM

## 2025-03-28 NOTE — PLAN OF CARE
Patient alert and oriented. Assisted to ambulate in the [facility]rez with staff, patient need 15L oxygen with ambulation. At rest oxygen weaned to 6L. Patient assisted to shower today. Patient updated on plan of care, antibiotics given. All questions answered.     Problem: Patient Centered Care  Goal: Patient preferences are identified and integrated in the patient's plan of care  Description: Interventions:- What would you like us to know as we care for you?   - Provide timely, complete, and accurate information to patient/family- Incorporate patient and family knowledge, values, beliefs, and cultural backgrounds into the planning and delivery of care- Encourage patient/family to participate in care and decision-making at the level they choose- Honor patient and family perspectives and choices  Outcome: Progressing     Problem: PAIN - ADULT  Goal: Verbalizes/displays adequate comfort level or patient's stated pain goal  Description: INTERVENTIONS:- Encourage pt to monitor pain and request assistance- Assess pain using appropriate pain scale- Administer analgesics based on type and severity of pain and evaluate response- Implement non-pharmacological measures as appropriate and evaluate response- Consider cultural and social influences on pain and pain management- Manage/alleviate anxiety- Utilize distraction and/or relaxation techniques- Monitor for opioid side effects- Notify MD/LIP if interventions unsuccessful or patient reports new pain- Anticipate increased pain with activity and pre-medicate as appropriate  Outcome: Progressing     Problem: RISK FOR INFECTION - ADULT  Goal: Absence of fever/infection during anticipated neutropenic period  Description: INTERVENTIONS- Monitor WBC- Administer growth factors as ordered- Implement neutropenic guidelines  Outcome: Progressing     Problem: SAFETY ADULT - FALL  Goal: Free from fall injury  Description: INTERVENTIONS:- Assess pt frequently for physical needs- Identify  cognitive and physical deficits and behaviors that affect risk of falls.- [place] fall precautions as indicated by assessment.- Educate pt/family on patient safety including physical limitations- Instruct pt to call for assistance with activity based on assessment- Modify environment to reduce risk of injury- Provide assistive devices as appropriate- Consider OT/PT consult to assist with strengthening/mobility- Encourage toileting schedule  Outcome: Progressing     Problem: DISCHARGE PLANNING  Goal: Discharge to home or other facility with appropriate resources  Description: INTERVENTIONS:- Identify barriers to discharge w/pt and caregiver- Include patient/family/discharge partner in discharge planning- Arrange for needed discharge resources and transportation as appropriate- Identify discharge learning needs (meds, wound care, etc)- Arrange for interpreters to assist at discharge as needed- Consider post-discharge preferences of patient/family/discharge partner- Complete POLST form as appropriate- Assess patient's ability to be responsible for managing their own health- Refer to Case Management Department for coordinating discharge planning if the patient needs post-hospital services based on physician/LIP order or complex needs related to functional status, cognitive ability or social support system  Outcome: Progressing     Problem: RESPIRATORY - ADULT  Goal: Achieves optimal ventilation and oxygenation  Description: INTERVENTIONS:- Assess for changes in respiratory status- Assess for changes in mentation and behavior- Position to facilitate oxygenation and minimize respiratory effort- Oxygen supplementation based on oxygen saturation or ABGs- Provide Smoking Cessation handout, if applicable- Encourage broncho-pulmonary hygiene including cough, deep breathe, Incentive Spirometry- Assess the need for suctioning and perform as needed- Assess and instruct to report SOB or any respiratory difficulty- Respiratory  Therapy support as indicated- Manage/alleviate anxiety- Monitor for signs/symptoms of CO2 retention  Outcome: Progressing     Problem: MUSCULOSKELETAL - ADULT  Goal: Return mobility to safest level of function  Description: INTERVENTIONS:- Assess patient stability and activity tolerance for standing, transferring and ambulating w/ or w/o assistive devices- Assist with transfers and ambulation using safe patient handling equipment as needed- Ensure adequate protection for wounds/incisions during mobilization- Obtain PT/OT consults as needed- Advance activity as appropriate- Communicate ordered activity level and limitations with patient/family  Outcome: Progressing  Goal: Maintain proper alignment of affected body part  Description: INTERVENTIONS:- Support and protect limb and body alignment per provider's orders- Instruct and reinforce with patient and family use of appropriate assistive device and precautions (e.g. spinal or hip dislocation precautions)  Outcome: Progressing

## 2025-03-28 NOTE — PLAN OF CARE
Monitoring vital signs, stable at this time. No acute changes at this moment.  Monitoring blood glucose levels.   Fall precautions in place- bed alarm on, bed locked in lowest position, call light within reach. Frequent rounding by nursing staff.      Problem: Patient Centered Care  Goal: Patient preferences are identified and integrated in the patient's plan of care  Description: Interventions:- What would you like us to know as we care for you? - Provide timely, complete, and accurate information to patient/family- Incorporate patient and family knowledge, values, beliefs, and cultural backgrounds into the planning and delivery of care- Encourage patient/family to participate in care and decision-making at the level they choose- Honor patient and family perspectives and choices  Outcome: Progressing     Problem: Patient/Family Goals  Goal: Patient/Family Long Term Goal  Description: Patient's Long Term Goal: To be discharge from the hospital Interventions:-  Monitor vital signs   - Monitor appropriate labs   -  Monitor blood glucose levels   - Pain management   - Administer medications per order   - Follow MD orders   - Diagnostics per order   - Update/inform patient and family on plan of care   - Discharge planning  - See additional Care Plan goals for specific interventions  Outcome: Progressing  Goal: Patient/Family Short Term Goal  Description: Patient's Short Term Goal: Weaning oxygen as tolerated Interventions: -  Monitor vital signs   - Monitor appropriate labs   -  Monitor blood glucose levels   - Pain management   - Administer medications per order   - Follow MD orders   - Diagnostics per order   - Update/inform patient and family on plan of care   - Discharge planning  - See additional Care Plan goals for specific interventions  Outcome: Progressing     Problem: PAIN - ADULT  Goal: Verbalizes/displays adequate comfort level or patient's stated pain goal  Description: INTERVENTIONS:- Encourage pt to  monitor pain and request assistance- Assess pain using appropriate pain scale- Administer analgesics based on type and severity of pain and evaluate response- Implement non-pharmacological measures as appropriate and evaluate response- Consider cultural and social influences on pain and pain management- Manage/alleviate anxiety- Utilize distraction and/or relaxation techniques- Monitor for opioid side effects- Notify MD/LIP if interventions unsuccessful or patient reports new pain- Anticipate increased pain with activity and pre-medicate as appropriate  Outcome: Progressing     Problem: RISK FOR INFECTION - ADULT  Goal: Absence of fever/infection during anticipated neutropenic period  Description: INTERVENTIONS- Monitor WBC- Administer growth factors as ordered- Implement neutropenic guidelines  Outcome: Progressing     Problem: SAFETY ADULT - FALL  Goal: Free from fall injury  Description: INTERVENTIONS:- Assess pt frequently for physical needs- Identify cognitive and physical deficits and behaviors that affect risk of falls.- [place] fall precautions as indicated by assessment.- Educate pt/family on patient safety including physical limitations- Instruct pt to call for assistance with activity based on assessment- Modify environment to reduce risk of injury- Provide assistive devices as appropriate- Consider OT/PT consult to assist with strengthening/mobility- Encourage toileting schedule  Outcome: Progressing     Problem: DISCHARGE PLANNING  Goal: Discharge to home or other facility with appropriate resources  Description: INTERVENTIONS:- Identify barriers to discharge w/pt and caregiver- Include patient/family/discharge partner in discharge planning- Arrange for needed discharge resources and transportation as appropriate- Identify discharge learning needs (meds, wound care, etc)- Arrange for interpreters to assist at discharge as needed- Consider post-discharge preferences of patient/family/discharge partner-  Complete POLST form as appropriate- Assess patient's ability to be responsible for managing their own health- Refer to Case Management Department for coordinating discharge planning if the patient needs post-hospital services based on physician/LIP order or complex needs related to functional status, cognitive ability or social support system  Outcome: Progressing     Problem: RESPIRATORY - ADULT  Goal: Achieves optimal ventilation and oxygenation  Description: INTERVENTIONS:- Assess for changes in respiratory status- Assess for changes in mentation and behavior- Position to facilitate oxygenation and minimize respiratory effort- Oxygen supplementation based on oxygen saturation or ABGs- Provide Smoking Cessation handout, if applicable- Encourage broncho-pulmonary hygiene including cough, deep breathe, Incentive Spirometry- Assess the need for suctioning and perform as needed- Assess and instruct to report SOB or any respiratory difficulty- Respiratory Therapy support as indicated- Manage/alleviate anxiety- Monitor for signs/symptoms of CO2 retention  Outcome: Progressing     Problem: MUSCULOSKELETAL - ADULT  Goal: Return mobility to safest level of function  Description: INTERVENTIONS:- Assess patient stability and activity tolerance for standing, transferring and ambulating w/ or w/o assistive devices- Assist with transfers and ambulation using safe patient handling equipment as needed- Ensure adequate protection for wounds/incisions during mobilization- Obtain PT/OT consults as needed- Advance activity as appropriate- Communicate ordered activity level and limitations with patient/family  Outcome: Progressing  Goal: Maintain proper alignment of affected body part  Description: INTERVENTIONS:- Support and protect limb and body alignment per provider's orders- Instruct and reinforce with patient and family use of appropriate assistive device and precautions (e.g. spinal or hip dislocation precautions)  Outcome:  Progressing

## 2025-03-28 NOTE — PROGRESS NOTES
Progress Note  Hoda Busby Patient Status:  Inpatient    1940 MRN [id number]   Location [facility]5W Attending Malathi Stuart MD   Hosp Day # 2 PCP Malathi Stuart MD     Subjective   Feels better today. Still brewer with walking. Remains on 10L O2 via [place]. Not on oxygen at baseline.       Objective:   BP 98/45 (BP Location: Right arm)   Pulse 99   Temp 97.5 °F (36.4 °C) (Oral)   Resp 19   Ht 5' 4\" (1.626 m)   Wt 174 lb 4.8 oz (79.1 kg)   SpO2 98%   BMI 29.92 kg/m²     Telemetry: sinus rhythm     Intake/Output:    Intake/Output Summary (Last 24 hours) at 3/28/2025 1037  Last data filed at 3/28/2025 0954  Gross per 24 hour   Intake 1024 ml   Output 2850 ml   Net -1826 ml       Wt Readings from Last 3 Encounters:   25 174 lb 4.8 oz (79.1 kg)   25 180 lb (81.6 kg)   25 173 lb (78.5 kg)         Labs:  Recent Labs   Lab 25  1527 25  0430   * 170*  --  108*   BUN 20 22  --  28*   CREATSERUM 1.05* 1.06*  --  1.14*   EGFRCR 52* 52*  --  47*   CA 8.6* 8.4*  --  8.7    137  --  140   K 4.2 3.6 4.6 4.2    103  --  106   CO2 21.0 23.0  --  26.0     Recent Labs   Lab 25  0430   RBC 4.17 4.20 3.96   HGB 11.0* 10.8* 10.5*   HCT 34.0* 33.5* 32.8*   MCV 81.5 79.8* 82.8   MCH 26.4 25.7* 26.5   MCHC 32.4 32.2 32.0   RDW 17.6* 17.5* 17.3*   NEPRELIM 16.17* 16.34* 14.47*   WBC 19.8* 17.4* 16.5*   .0 332.0 319.0         Recent Labs   Lab 25  1939   TROPHS 58*       Review of Systems:     10 point review of systems completed and negative except as noted in HPI      Exam:     Physical Exam:  General: Alert and oriented x 3. No apparent distress.   HEENT: Normocephalic, neck supple, no thyromegaly or adenopathy.  Neck: No JVD, carotids 2+, no bruits.  Cardiac: Regular rate and rhythm. S1, S2 normal. No murmur, pericardial rub, S3, or extra cardiac sounds.  Lungs: Clear without wheezes,  rales, rhonchi or dullness.  Normal excursions and effort.  Abdomen: Soft, non-tender. BS-present.  Extremities: Without clubbing or cyanosis. No edema.    Neurologic: Alert and oriented, normal affect. No focal defects  Skin: Warm and dry.     Medications:     cefTRIAXone  2 g Intravenous Q24H    azithromycin  500 mg Intravenous Q24H    vancomycin  125 mg Oral Daily    aspirin  81 mg Oral Daily    atorvastatin  20 mg Oral Nightly    pantoprazole  40 mg Oral QAM AC    furosemide  40 mg Intravenous q12h    heparin  5,000 Units Subcutaneous 2 times per day    methylPREDNISolone  60 mg Intravenous Q12H    insulin aspart  1-7 Units Subcutaneous TID CC    ferrous sulfate  325 mg Oral Daily with breakfast    docusate sodium  100 mg Oral BID         Diagnostic Studies:     Echo 3/27/25  Conclusions:     1. Left ventricle: The cavity size was normal. Wall thickness was normal.      Systolic function was normal. The estimated ejection fraction was 60-65%.      No diagnostic evidence for regional wall motion abnormalities. Doppler      parameters are consistent with abnormal left ventricular relaxation -      grade 1 diastolic dysfunction.   2. Right ventricle: The cavity size was increased. Systolic function was      normal.   3. Left atrium: The atrium was mildly to moderately dilated. The left atrial      volume was mildly to moderately increased.   4. Aortic valve: A bioprosthetic valve is present. [place]ara PUNEET S3 23mm      (TAVR) bioprosthesis was present. The peak systolic velocity was      2.69m/sec. The mean systolic gradient was 15mm Hg. The valve area (VTI)      was 1.52cm^2.   5. Pulmonary arteries: Systolic pressure was moderately increased, estimated      to be 52mm Hg.     Assessment and Plan:     Assessment:  # acute hypoxic respiratory failure   # pneumonia with h/o interstitial pneumonitis   Again, multifactorial given pna and CHF findings on chest CTA   Negative for PE   IV diuresis   Further management  per primary and pulm team   # acute decompensated HFpEF - Likely multifactorial   No evidence of PE on CTA of the chest, however suggestive of pneumonia and decompensated CHF   Repeat echo with preserved LVEF and moderately elevated PASP   Responding well to IV diuresis; continue IV lasix 40 mg bid today   Strict I/Os and daily weights   PO furosemide 20 mg daily PTA     # Severe aortic stenosis s/p TAVR 1/23/25  Stable valve function   # CAD    LHC 1/14/25: severe stenosis in the mid LAD (80-90%) and ostium of the left circumflex (60-70%); plan at the time was to defer intervention until her hemicolectomy for colon cancer given the need for uninterrupted DAPT   Will discuss with interventional team in regards to time line for PCI   # colon adenocarcinoma s/p left hemicolectomy 2/27/25       Plan:  Continue IV diuresis today   Attempt to wean oxygen   Stric I/Os and daily weights     Plan of care discussed with patient, RN.      Nilda Pastor, APRN  3/28/2025  Ph [phone] ([place])  Ph [phone] ([place])

## 2025-03-29 NOTE — PROGRESS NOTES
[facility]  part of [facility]    Progress Note    Hoda Busby Patient Status:  Inpatient    1940 MRN [id number]   Location [facility]5W Attending Malathi Stuart MD   Hosp Day # 3 PCP Malathi Stuart MD       Subjective:     Constitutional:  Negative for fever.   HENT:  Negative for congestion.    Respiratory:  Positive for cough.    Cardiovascular:  Positive for leg swelling. Negative for chest pain.   Gastrointestinal:  Negative for abdominal distention.   Neurological:  Negative for seizures.   Psychiatric/Behavioral:  Negative for agitation.      Feeling better  Up to the chair comfortable on 2 L of oxygen  Still with lower extremity pitting edema  No fever or chills  Occasional dry cough  Objective:   Blood pressure 102/48, pulse 77, temperature 97.6 °F (36.4 °C), temperature source Oral, resp. rate 18, height 5' 4\" (1.626 m), weight 172 lb 11.2 oz (78.3 kg), SpO2 95%.  Physical Exam  Vitals and nursing note reviewed.   Constitutional:       General: She is not in acute distress.     Appearance: She is ill-appearing.   HENT:      Head: Atraumatic.      Nose: Nose normal.      Mouth/Throat:      Mouth: Mucous membranes are moist.   Eyes:      General: No scleral icterus.  Cardiovascular:      Rate and Rhythm: Normal rate.      Heart sounds: Murmur heard.      No gallop.   Pulmonary:      Effort: No respiratory distress.      Breath sounds: No stridor. Rales present. No wheezing or rhonchi.   Abdominal:      General: Abdomen is flat. Bowel sounds are normal. There is no distension.      Palpations: Abdomen is soft.   Musculoskeletal:      Cervical back: Normal range of motion.      Right lower leg: Edema present.      Left lower leg: Edema present.   Skin:     General: Skin is dry.   Neurological:      Mental Status: She is oriented to person, place, and time.         Results:   Lab Results   Component Value Date    WBC 14.3 (H) 2025    HGB 10.4 (L) 2025     HCT 33.2 (L) 03/29/2025    .0 03/29/2025    CREATSERUM 1.06 (H) 03/29/2025    BUN 34 (H) 03/29/2025     03/29/2025    K 4.3 03/29/2025     03/29/2025    CO2 29.0 03/29/2025     (H) 03/29/2025    CA 8.7 03/29/2025    ALB 3.5 03/27/2025    ALKPHO 148 (H) 03/27/2025    BILT 0.9 03/27/2025    TP 6.2 03/27/2025    AST 66 (H) 03/27/2025    ALT 33 03/27/2025    PTT >240.0 (HH) 01/23/2025    INR 1.18 01/24/2025    TSH 1.316 03/27/2025    DDIMER 2.42 (H) 03/26/2025    ESRML 49 (H) 10/26/2024    MG 1.9 03/28/2025    PHOS 3.0 03/01/2025    TROPHS 58 (HH) 03/26/2025    B12 349 03/20/2017         Assessment & Plan:       1-acute respiratory failure with hypoxia which is multifactorial  - Acute on chronic HFpEf with severe aortic stenosis with a prior TAVR  -Underlying ILD    Clinically better with diuresis and steroid  Will wean down Solu-Medrol  CHF management per cardiology  Currently on 2 L of oxygen  Home O2 eval prior to discharge    2-history of colon cancer with prior hemicolectomy    3-history of CAD    4-DVT prophylaxis  Heparin subcu                Martita Baez MD  3/29/2025

## 2025-03-29 NOTE — PLAN OF CARE
Problem: Patient Centered Care  Goal: Patient preferences are identified and integrated in the patient's plan of care  Description: Interventions:- What would you like us to know as we care for you? - Provide timely, complete, and accurate information to patient/family- Incorporate patient and family knowledge, values, beliefs, and cultural backgrounds into the planning and delivery of care- Encourage patient/family to participate in care and decision-making at the level they choose- Honor patient and family perspectives and choices  Outcome: Progressing      Problem: PAIN - ADULT  Goal: Verbalizes/displays adequate comfort level or patient's stated pain goal  Description: INTERVENTIONS:- Encourage pt to monitor pain and request assistance- Assess pain using appropriate pain scale- Administer analgesics based on type and severity of pain and evaluate response- Implement non-pharmacological measures as appropriate and evaluate response- Consider cultural and social influences on pain and pain management- Manage/alleviate anxiety- Utilize distraction and/or relaxation techniques- Monitor for opioid side effects- Notify MD/LIP if interventions unsuccessful or patient reports new pain- Anticipate increased pain with activity and pre-medicate as appropriate  Outcome: Progressing     Problem: RISK FOR INFECTION - ADULT  Goal: Absence of fever/infection during anticipated neutropenic period  Description: INTERVENTIONS- Monitor WBC- Administer growth factors as ordered- Implement neutropenic guidelines  Outcome: Progressing     Problem: SAFETY ADULT - FALL  Goal: Free from fall injury  Description: INTERVENTIONS:- Assess pt frequently for physical needs- Identify cognitive and physical deficits and behaviors that affect risk of falls.- [place] fall precautions as indicated by assessment.- Educate pt/family on patient safety including physical limitations- Instruct pt to call for assistance with activity based on  assessment- Modify environment to reduce risk of injury- Provide assistive devices as appropriate- Consider OT/PT consult to assist with strengthening/mobility- Encourage toileting schedule  Outcome: Progressing     Problem: DISCHARGE PLANNING  Goal: Discharge to home or other facility with appropriate resources  Description: INTERVENTIONS:- Identify barriers to discharge w/pt and caregiver- Include patient/family/discharge partner in discharge planning- Arrange for needed discharge resources and transportation as appropriate- Identify discharge learning needs (meds, wound care, etc)- Arrange for interpreters to assist at discharge as needed- Consider post-discharge preferences of patient/family/discharge partner- Complete POLST form as appropriate- Assess patient's ability to be responsible for managing their own health- Refer to Case Management Department for coordinating discharge planning if the patient needs post-hospital services based on physician/LIP order or complex needs related to functional status, cognitive ability or social support system  Outcome: Progressing     Problem: RESPIRATORY - ADULT  Goal: Achieves optimal ventilation and oxygenation  Description: INTERVENTIONS:- Assess for changes in respiratory status- Assess for changes in mentation and behavior- Position to facilitate oxygenation and minimize respiratory effort- Oxygen supplementation based on oxygen saturation or ABGs- Provide Smoking Cessation handout, if applicable- Encourage broncho-pulmonary hygiene including cough, deep breathe, Incentive Spirometry- Assess the need for suctioning and perform as needed- Assess and instruct to report SOB or any respiratory difficulty- Respiratory Therapy support as indicated- Manage/alleviate anxiety- Monitor for signs/symptoms of CO2 retention  Outcome: Progressing     Problem: MUSCULOSKELETAL - ADULT  Goal: Return mobility to safest level of function  Description: INTERVENTIONS:- Assess patient  stability and activity tolerance for standing, transferring and ambulating w/ or w/o assistive devices- Assist with transfers and ambulation using safe patient handling equipment as needed- Ensure adequate protection for wounds/incisions during mobilization- Obtain PT/OT consults as needed- Advance activity as appropriate- Communicate ordered activity level and limitations with patient/family  Outcome: Progressing  Goal: Maintain proper alignment of affected body part  Description: INTERVENTIONS:- Support and protect limb and body alignment per provider's orders- Instruct and reinforce with patient and family use of appropriate assistive device and precautions (e.g. spinal or hip dislocation precautions)  Outcome: Progressing     Problem: Diabetes/Glucose Control  Goal: Glucose maintained within prescribed range  Description: INTERVENTIONS:- Monitor Blood Glucose as ordered- Assess for signs and symptoms of hyperglycemia and hypoglycemia- Administer ordered medications to maintain glucose within target range- Assess barriers to adequate nutritional intake and initiate nutrition consult as needed- Instruct patient on self management of diabetes  Outcome: Progressing     Low BP this morning. MD notified. IV lasix held this morning.  Oxygen weaned off. Denies pain.  PRN Cepacol given for cough and throat discomfort. Tedhose in place, per order. Safety precautions in place.

## 2025-03-29 NOTE — PROGRESS NOTES
Progress Note  Hoda Busby Patient Status:  Inpatient    1940 MRN [id number]   Location [facility]5W Attending Malathi Stuart MD   Hosp Day # 3 PCP Malathi Stuart MD     Subjective   Feels better today. Breathing greatly improved. Oxygen need significantly decreased, she is back to 1-2L via NC.    Mild hypotension this morning, IV lasix was held. BP improved know.     Objective:   /62 (BP Location: Right arm)   Pulse 82   Temp 97.7 °F (36.5 °C) (Oral)   Resp 18   Ht 5' 4\" (1.626 m)   Wt 172 lb 11.2 oz (78.3 kg)   SpO2 100%   BMI 29.64 kg/m²     Telemetry: sinus rhythm     Intake/Output:    Intake/Output Summary (Last 24 hours) at 3/29/2025 1322  Last data filed at 3/29/2025 1300  Gross per 24 hour   Intake 2015 ml   Output 1000 ml   Net 1015 ml       Wt Readings from Last 3 Encounters:   25 172 lb 11.2 oz (78.3 kg)   25 180 lb (81.6 kg)   25 173 lb (78.5 kg)         Labs:  Recent Labs   Lab 25  1527 25  0430 25  0517   *  --  108* 119*   BUN 22  --  28* 34*   CREATSERUM 1.06*  --  1.14* 1.06*   EGFRCR 52*  --  47* 52*   CA 8.4*  --  8.7 8.7     --  140 139   K 3.6 4.6 4.2 4.3     --  106 103   CO2 23.0  --  26.0 29.0     Recent Labs   Lab 25  0430 25  0517   RBC 4.20 3.96 4.09   HGB 10.8* 10.5* 10.4*   HCT 33.5* 32.8* 33.2*   MCV 79.8* 82.8 81.2   MCH 25.7* 26.5 25.4*   MCHC 32.2 32.0 31.3   RDW 17.5* 17.3* 17.3*   NEPRELIM 16.34* 14.47* 12.35*   WBC 17.4* 16.5* 14.3*   .0 319.0 319.0         Recent Labs   Lab 25  1939   TROPHS 58*       Review of Systems:     10 point review of systems completed and negative except as noted in HPI      Exam:     Physical Exam:  General: Alert and oriented x 3. No apparent distress.   HEENT: Normocephalic, neck supple, no thyromegaly or adenopathy.  Neck: No JVD, carotids 2+, no bruits.  Cardiac: Regular rate and rhythm. S1, S2 normal.  No murmur, pericardial rub, S3, or extra cardiac sounds.  Lungs: Clear without wheezes, rales, rhonchi or dullness.  Normal excursions and effort.  Abdomen: Soft, non-tender. BS-present.  Extremities: Without clubbing or cyanosis. Mild edema.    Neurologic: Alert and oriented, normal affect. No focal defects  Skin: Warm and dry.     Medications:     methylPREDNISolone  40 mg Intravenous Q12H    cefTRIAXone  2 g Intravenous Q24H    azithromycin  500 mg Intravenous Q24H    vancomycin  125 mg Oral Daily    aspirin  81 mg Oral Daily    atorvastatin  20 mg Oral Nightly    pantoprazole  40 mg Oral QAM AC    [Held by provider] furosemide  40 mg Intravenous q12h    heparin  5,000 Units Subcutaneous 2 times per day    insulin aspart  1-7 Units Subcutaneous TID CC    ferrous sulfate  325 mg Oral Daily with breakfast    docusate sodium  100 mg Oral BID         Diagnostic Studies:     Echo 3/27/25  Conclusions:     1. Left ventricle: The cavity size was normal. Wall thickness was normal.      Systolic function was normal. The estimated ejection fraction was 60-65%.      No diagnostic evidence for regional wall motion abnormalities. Doppler      parameters are consistent with abnormal left ventricular relaxation -      grade 1 diastolic dysfunction.   2. Right ventricle: The cavity size was increased. Systolic function was      normal.   3. Left atrium: The atrium was mildly to moderately dilated. The left atrial      volume was mildly to moderately increased.   4. Aortic valve: A bioprosthetic valve is present. Mcnamara PUNEET S3 23mm      (TAVR) bioprosthesis was present. The peak systolic velocity was      2.69m/sec. The mean systolic gradient was 15mm Hg. The valve area (VTI)      was 1.52cm^2.   5. Pulmonary arteries: Systolic pressure was moderately increased, estimated      to be 52mm Hg.     Assessment and Plan:     Assessment:  # acute hypoxic respiratory failure   # pneumonia with h/o interstitial pneumonitis   Again,  multifactorial given pna and CHF findings on chest CTA   Negative for PE   Diuresis   Further management per primary and pulm team   # acute decompensated HFpEF - Likely multifactorial   No evidence of PE on CTA of the chest, however suggestive of pneumonia and decompensated CHF   Repeat echo with preserved LVEF and moderately elevated PASP   Responding well to IV diuresis; oxygen demand decreasing,   Strict I/Os and daily weights   PO furosemide 20 mg daily PTA   # Severe aortic stenosis s/p TAVR 1/23/25  Stable valve function   # CAD    LHC 1/14/25: severe stenosis in the mid LAD (80-90%) and ostium of the left circumflex (60-70%); plan at the time was to defer intervention until her hemicolectomy for colon cancer given the need for uninterrupted DAPT   Will discuss with interventional team in regards to time line for PCI   # colon adenocarcinoma s/p left hemicolectomy 2/27/25       Plan:  Breathing much better, O2 demands improving, would decrease IV lasix to 20 mg once daily given soft BP   Continue Stric I/Os and daily weights, monitor daily renal function, electrolytes   Hypotension this am, resolved, recommend compression stocking   Attempt to wean oxygen     Plan of care discussed with patient, RN.      Nilda Pastor, APRN  3/29/2025  Ph [phone] ([place])  Ph [phone] ([place])      Cardiology attending    Note reviewed and patient examined.    She feels much better..  Agree with assessment and plan as above

## 2025-03-29 NOTE — PLAN OF CARE
Problem: PAIN - ADULT  Goal: Verbalizes/displays adequate comfort level or patient's stated pain goal  Description: INTERVENTIONS:- Encourage pt to monitor pain and request assistance- Assess pain using appropriate pain scale- Administer analgesics based on type and severity of pain and evaluate response- Implement non-pharmacological measures as appropriate and evaluate response- Consider cultural and social influences on pain and pain management- Manage/alleviate anxiety- Utilize distraction and/or relaxation techniques- Monitor for opioid side effects- Notify MD/LIP if interventions unsuccessful or patient reports new pain- Anticipate increased pain with activity and pre-medicate as appropriate  Outcome: Progressing     Problem: RISK FOR INFECTION - ADULT  Goal: Absence of fever/infection during anticipated neutropenic period  Description: INTERVENTIONS- Monitor WBC- Administer growth factors as ordered- Implement neutropenic guidelines  Outcome: Progressing     Problem: SAFETY ADULT - FALL  Goal: Free from fall injury  Description: INTERVENTIONS:- Assess pt frequently for physical needs- Identify cognitive and physical deficits and behaviors that affect risk of falls.- Aragon fall precautions as indicated by assessment.- Educate pt/family on patient safety including physical limitations- Instruct pt to call for assistance with activity based on assessment- Modify environment to reduce risk of injury- Provide assistive devices as appropriate- Consider OT/PT consult to assist with strengthening/mobility- Encourage toileting schedule  Outcome: Progressing     Problem: DISCHARGE PLANNING  Goal: Discharge to home or other facility with appropriate resources  Description: INTERVENTIONS:- Identify barriers to discharge w/pt and caregiver- Include patient/family/discharge partner in discharge planning- Arrange for needed discharge resources and transportation as appropriate- Identify discharge learning needs (meds,  wound care, etc)- Arrange for interpreters to assist at discharge as needed- Consider post-discharge preferences of patient/family/discharge partner- Complete POLST form as appropriate- Assess patient's ability to be responsible for managing their own health- Refer to Case Management Department for coordinating discharge planning if the patient needs post-hospital services based on physician/LIP order or complex needs related to functional status, cognitive ability or social support system  Outcome: Progressing     Problem: RESPIRATORY - ADULT  Goal: Achieves optimal ventilation and oxygenation  Description: INTERVENTIONS:- Assess for changes in respiratory status- Assess for changes in mentation and behavior- Position to facilitate oxygenation and minimize respiratory effort- Oxygen supplementation based on oxygen saturation or ABGs- Provide Smoking Cessation handout, if applicable- Encourage broncho-pulmonary hygiene including cough, deep breathe, Incentive Spirometry- Assess the need for suctioning and perform as needed- Assess and instruct to report SOB or any respiratory difficulty- Respiratory Therapy support as indicated- Manage/alleviate anxiety- Monitor for signs/symptoms of CO2 retention  Outcome: Progressing     Problem: MUSCULOSKELETAL - ADULT  Goal: Return mobility to safest level of function  Description: INTERVENTIONS:- Assess patient stability and activity tolerance for standing, transferring and ambulating w/ or w/o assistive devices- Assist with transfers and ambulation using safe patient handling equipment as needed- Ensure adequate protection for wounds/incisions during mobilization- Obtain PT/OT consults as needed- Advance activity as appropriate- Communicate ordered activity level and limitations with patient/family  Outcome: Progressing  Goal: Maintain proper alignment of affected body part  Description: INTERVENTIONS:- Support and protect limb and body alignment per provider's orders-  Instruct and reinforce with patient and family use of appropriate assistive device and precautions (e.g. spinal or hip dislocation precautions)  Outcome: Progressing

## 2025-03-29 NOTE — PROGRESS NOTES
[facility]  part of [facility]    Progress Note    Hoda Busby Patient Status:  Inpatient    1940 MRN [id number]   Location [facility]5W Attending Malathi Stuart MD   Hosp Day # 3 PCP Malathi Stuart MD       SUBJECTIVE:    She feels better today--breathing feels improved; still with cough  SBP this am 87 (repeat improved); no dizziness/lightheadedness  Down 2lbs since yesterday    OBJECTIVE:  /48 (BP Location: Right arm)   Pulse 77   Temp 97.6 °F (36.4 °C) (Oral)   Resp 18   Ht 5' 4\" (1.626 m)   Wt 172 lb 11.2 oz (78.3 kg)   SpO2 95%   BMI 29.64 kg/m²     Intake/Output:  I/O last 3 completed shifts:  In: 1304 [P.O.:1304]  Out: 3050 [Urine:3050]    Wt Readings from Last 3 Encounters:   25 172 lb 11.2 oz (78.3 kg)   25 180 lb (81.6 kg)   25 173 lb (78.5 kg)       Exam  Gen: No acute distress, alert and oriented x3  Pulm: Lungs CTAB, no rhonchi or wheezing. +deep cough  CV: Heart RRR,mild systolic murmur  Abd: Abdomen soft, nontender, nondistended, no organomegaly, bowel sounds present  MSK: Full range of motion in extremities, no clubbing, no cyanosis, +1 BLE distal edema      Labs:   Recent Labs   Lab 25  0430 25  0517   RBC 4.20 3.96 4.09   HGB 10.8* 10.5* 10.4*   HCT 33.5* 32.8* 33.2*   MCV 79.8* 82.8 81.2   MCH 25.7* 26.5 25.4*   MCHC 32.2 32.0 31.3   RDW 17.5* 17.3* 17.3*   NEPRELIM 16.34* 14.47* 12.35*   WBC 17.4* 16.5* 14.3*   .0 319.0 319.0         Recent Labs   Lab 25  [id number]  1527 25  0430 25  0517   * 170*  --  108* 119*   BUN 20 22  --  28* 34*   CREATSERUM 1.05* 1.06*  --  1.14* 1.06*   EGFRCR 52* 52*  --  47* 52*   CA 8.6* 8.4*  --  8.7 8.7   ALB 3.5 3.5  --   --   --     137  --  140 139   K 4.2 3.6 4.6 4.2 4.3    103  --  106 103   CO2 21.0 23.0  --  26.0 29.0   ALKPHO 155* 148*  --   --   --    AST 71* 66*  --   --   --    ALT 34 33   --   --   --    BILT 0.9 0.9  --   --   --    TP 6.3 6.2  --   --   --          Imaging:  CT CHEST PE AORTA (IV ONLY) (CPT=71260)    Result Date: 3/26/2025  CONCLUSION:  1.  No acute pulmonary embolus to the subsegmental pulmonary artery level.  No acute aortic injury; no dissection. 2.  Findings suggesting CHF/edema:  Stable cardiomegaly and bilateral pleural effusions which are larger since prior exam.  Interstitial changes throughout the lungs, worse since previous exam from 6 days ago. 3.  Interstitial changes throughout the lungs, with a basal predominance compatible with fibrosis.  However these changes have progressed since previous exam, suggesting there is superimposed edema.  Confluent patchy pulmonary opacities have developed throughout the lung fields versus 3/26/25. 4.  Post TAVR. 5.  Nonspecific lymphadenopathy in the mediastinum.    Dictated by (CST): Benjamin Cisneros MD on 3/26/2025 at 8:48 PM     Finalized by (CST): Benjamin Cisneros MD on 3/26/2025 at 8:52 PM          XR CHEST AP PORTABLE  (CPT=71045)    Result Date: 3/26/2025  CONCLUSION: Findings compatible with CHF and/or increased fluid volume status of the patient.   Dictated by (CST): Benjamin Cisneros MD on 3/26/2025 at 7:58 PM     Finalized by (CST): Benjamin Cisneros MD on 3/26/2025 at 7:58 PM                 Assessment and Plan:       Acute hypoxic respiratory failure (HCC)  -ddx: CHF, recurrent interstitial pneumonitis  -CT chest in ED 3/26/25 - no PE; worsening of pleural effusions and interstitial edema  -started on IV lasix and IV solumedrol to cover both CHF and possible recurrence of NSIP respectively  -required BiPAP transiently  -procalc sl elevated 0.25; WBC 19.8 K on admission  -recent echo 2/5/25 - normal LV systolic function (EF 60-65%); grade 2 diastolic dysfunction; normally functioning bioprosthetic TAVR; sl incr PAP 44mmHg  -repeat echo 3/27/25 stable from 2/2025 except incr PAP to 52mmHg  -cards consult appreciated  -diuresing well on  Lasix 40mg IV BID (net -3L); creat up slightly; monitor closely  -pt notes improvement in breathing though still not back to baseline; still requiring 10L O2  -pt with audible wet, loose cough 3/28; 3/27 pt denied any increase in her baseline cough, but when questioned again today, she states that her cough has been worse in the past 3-4 days.  -discussed with Dr. Holloway; will start ceftriaxone and azitho; po vanco for CDP (day 2)  -further recs per pulm Dr. Holloway, cards Dr. Wood  -today respiratory status much improved; down to 3L; however with hypotension this morning, advised to hold lasix until discussing with cardiology  -has distal BLE edema--add compression stockings and keep legs elevated       Adenocarcinoma of colon  -Bx of transverse colon polyp 12/17/24 -- invasive, moderately differentiated adenocarcinoma  -CEA normal (1.9)  -CT a/p neg for mets  -surgery delayed in anticipation of TAVR (done 1/23/25)  -s/p robotic assisted left hemicolectomy (Dr. Cassidy) - path - tumor invades into muscularis; margins neg for tumor; all 19 LN's neg for mets (0/19); pT2, pN0  -next appt with Dr. Cassidy 4/16/25     Coronary artery disease  -Cath 1/14/25 by Dr. Bernardo Liz (as w/u for TAVR) -- RCA non-obstructive; LM free of obst disease; LM plaque extending into ostium of LAD (20-30%); mid LAD (80-90%), cx ostium (60-70%)  -intervention delayed until after colon resection due to need for DAPT x 6 mos after stenting   -on Atorvastatin 20mg/day, ASA 81mg/day  -pt had upcoming appt w/Dr. Bird this month 3/2025 to discuss intervention; now consulted as above     Hx  GI bleed  -EGD 11/26/24 (Dr. Nuno) -15mm nonbleeding gastric antral ulcer; 3mm gastric antral AVM s/p APC  -recurrence in 12/2024  -colonoscopy and repeat EGD 12/17/24 by Dr. Staton -- grade 1 esophagitis; duod ulcers healing; large flat polyp in transverse colon (carcinoma)   -received total of 3 units PRBCs   -Xarelto stopped in 12/2024  -s/p  omeprazole 20mg BID x 8 weeks (EOT 1/22/25), now on omeprazole 20mg daily -- would continue until she completes her upcoming coronary stenting     Anemia due to acute blood loss  -GI bleed as above  -on ferrous sulfate 325mg/day  -Hgb 10.8 but stable     Hx chronic mild BLE edema  -Lasix 20mg/day     Acute left peroneal palsy  In Nov 2024; had numbness to anterolateral left foot and ankle as well as inability to dorsiflex left foot; etiology unclear  -neurologist Dr Richardson consulted in hosp  -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   -MRI brain--no acute intracranial process  -still doing PT at [facility] -- continues to improve; dorsiflexion left foot 4/5; plantarflexion 5/5 this admission (3/26/25)     Interstitial pneumonitis/NSIP  -dx'ed 10/2024 - presented with cough and severe hypoxia  -unresponsive to abx   -CXR 10/24/24 extensive bilateral perihilar and bilateral upper and lower lobe   -S.pneumo, legionella Ag , mycoplasma IgM/G, Fungitell-beta-D glucan negative  -ANCA and URIEL/connective tissue disease panels negative  -anti-histone and anti-[place] Ab's negative  -CT with bilateral ground glass opacities, small consolidations of BLL  -per pulm, findings suspicious for NSIP (vs cryptogenic organizing pneumonia vs hypersensitivity pneumonitis); NOT thought to be c/w UIP pattern  -s/p empiric steroids (solumedrol then prednisone taper) 10/25/24 - (EOT 1/18/25)  -dyspnea completely resolved until this admission 3/26/25  -now on solumedrol 60mg q12h     Bilateral pulmonary emboli  -dx'ed at time of interstitial pneumonitis  -CT chest 10/25/24 -- acute distal segmental/subsegmental pulmonary emboli in [place] and subsegmental PE in [place]  -unclear etiology; no recent hx of long travel; no family/personal hx of blood clots  -BLE venous dopplers negative 10/26/24  -started xarelto 20 mg po every day on 11/28/24  -repeat CT  chest 12/9/24 neg PE  -stopped Xarelto 12/14/24 due to recurrent GI bleed  -repeat CT chest 3/26/25 -- neg PE     Severe aortic stenosis  -progression on serial echocardiograms  -s/p TAVR 1/23/25 (Dr. Reji Green)  -symptomatically much improved -- POPE resolved     Hypertension, primary  -(dyazide stopped due to NANCY)  -(amlodipine held due to low BP in 11/2024)  -BP remains normal off meds     Hx of hip OA  -s/p left THR (Dr. Lo) many years ago  -s/p elective R BEATRIZ on 10/5/23 by Dr. Diaz     Hyperlipidemia   Pt with strong family hx of high cholesterol  Started on Atorvastatin 20mg/day in 1/2025 (after noted to have CAD on cath)     Osteopenia   On Fosamax from 2011 to 4/2016.     DEXA stable 5/10/17 and 2019 and 2021  DEXA 8/2024 -- osteoporosis of left forearm  -restarted Fosamax 8/2024      Vitamin D deficiency  On vitamin D 4000u/day      VTE proph  -FIONA Steel DO  3/29/2025  10:41 AM

## 2025-03-30 NOTE — PROGRESS NOTES
Progress Note  Hoda Busby Patient Status:  Inpatient    1940 MRN [id number]   Location [facility]5W Attending Malathi Stuart MD   Hosp Day # 4 PCP Malathi Stuart MD     Subjective   Continue to feel better. Still with wet cough. Oxygen demands decreasing. BLE edema improved.     Objective:   /72 (BP Location: Right arm)   Pulse 68   Temp 98 °F (36.7 °C) (Oral)   Resp 18   Ht 5' 4\" (1.626 m)   Wt 172 lb 11.2 oz (78.3 kg)   SpO2 93%   BMI 29.64 kg/m²     Telemetry: sinus rhythm     Intake/Output:    Intake/Output Summary (Last 24 hours) at 3/30/2025 0542  Last data filed at 3/29/2025 2300  Gross per 24 hour   Intake 2035 ml   Output 850 ml   Net 1185 ml       Wt Readings from Last 3 Encounters:   25 172 lb 11.2 oz (78.3 kg)   25 180 lb (81.6 kg)   25 173 lb (78.5 kg)         Labs:  Recent Labs   Lab 25  0222 25  1527 25  0430 25  0517   *  --  108* 119*   BUN 22  --  28* 34*   CREATSERUM 1.06*  --  1.14* 1.06*   EGFRCR 52*  --  47* 52*   CA 8.4*  --  8.7 8.7     --  140 139   K 3.6 4.6 4.2 4.3     --  106 103   CO2 23.0  --  26.0 29.0     Recent Labs   Lab 25  0222 25  0430 25  0517   RBC 4.20 3.96 4.09   HGB 10.8* 10.5* 10.4*   HCT 33.5* 32.8* 33.2*   MCV 79.8* 82.8 81.2   MCH 25.7* 26.5 25.4*   MCHC 32.2 32.0 31.3   RDW 17.5* 17.3* 17.3*   NEPRELIM 16.34* 14.47* 12.35*   WBC 17.4* 16.5* 14.3*   .0 319.0 319.0         Recent Labs   Lab 25  1939   TROPHS 58*       Review of Systems:     10 point review of systems completed and negative except as noted in HPI      Exam:     Physical Exam:  General: Alert and oriented x 3. No apparent distress.   HEENT: Normocephalic, neck supple, no thyromegaly or adenopathy.  Neck: No JVD, carotids 2+, no bruits.  Cardiac: Regular rate and rhythm. S1, S2 normal. No murmur, pericardial rub, S3, or extra cardiac sounds.  Lungs: Clear without wheezes,  rales, rhonchi or dullness.  Normal excursions and effort.  Abdomen: Soft, non-tender. BS-present.  Extremities: Without clubbing or cyanosis. No edema.    Neurologic: Alert and oriented, normal affect. No focal defects  Skin: Warm and dry.     Medications:     methylPREDNISolone  40 mg Intravenous Q12H    furosemide  20 mg Intravenous Daily    cefTRIAXone  2 g Intravenous Q24H    azithromycin  500 mg Intravenous Q24H    vancomycin  125 mg Oral Daily    aspirin  81 mg Oral Daily    atorvastatin  20 mg Oral Nightly    pantoprazole  40 mg Oral QAM AC    heparin  5,000 Units Subcutaneous 2 times per day    insulin aspart  1-7 Units Subcutaneous TID CC    ferrous sulfate  325 mg Oral Daily with breakfast    docusate sodium  100 mg Oral BID         Diagnostic Studies:     Echo 3/27/25  Conclusions:     1. Left ventricle: The cavity size was normal. Wall thickness was normal.      Systolic function was normal. The estimated ejection fraction was 60-65%.      No diagnostic evidence for regional wall motion abnormalities. Doppler      parameters are consistent with abnormal left ventricular relaxation -      grade 1 diastolic dysfunction.   2. Right ventricle: The cavity size was increased. Systolic function was      normal.   3. Left atrium: The atrium was mildly to moderately dilated. The left atrial      volume was mildly to moderately increased.   4. Aortic valve: A bioprosthetic valve is present. Mcnamara PUNEET S3 23mm      (TAVR) bioprosthesis was present. The peak systolic velocity was      2.69m/sec. The mean systolic gradient was 15mm Hg. The valve area (VTI)      was 1.52cm^2.   5. Pulmonary arteries: Systolic pressure was moderately increased, estimated      to be 52mm Hg.     Assessment and Plan:     Assessment:  # acute hypoxic respiratory failure   # pneumonia with h/o interstitial pneumonitis   Again, multifactorial given pna and CHF findings on chest CTA   Negative for PE   IV diuresis   Further management  per primary and pulm team   # acute decompensated HFpEF - Likely multifactorial   No evidence of PE on CTA of the chest, however suggestive of pneumonia and decompensated CHF   Repeat echo with preserved LVEF and moderately elevated PASP   Responding well to IV diuresis; continue IV lasix 40 mg bid today   Strict I/Os and daily weights   PO furosemide 20 mg daily PTA   # Severe aortic stenosis s/p TAVR 1/23/25  Stable valve function   # CAD    LHC 1/14/25: severe stenosis in the mid LAD (80-90%) and ostium of the left circumflex (60-70%); plan at the time was to defer intervention until her hemicolectomy for colon cancer given the need for uninterrupted DAPT   Will discuss with interventional team in regards to time line for PCI   # colon adenocarcinoma s/p left hemicolectomy 2/27/25       Plan:  Respiratory status continue to improve, still with wet cough, was off of oxygen yesterday evening, wore O2 at night   Lower extremity edema almost at baseline   Ambulate hallways to assess for brewre and oxygen needs with exertion   Continue IV lasix 20 mg daily, Stric I/Os and daily weights   Attempt to wean oxygen       Plan of care discussed with patient, RN.      Nilda Pastor, APRN  3/30/2025  Ph [phone] ([place])  Ph [phone] ([place])

## 2025-03-30 NOTE — PROGRESS NOTES
[facility]  part of [facility]    Progress Note      Assessment and Plan:   1.  Acute on chronic respiratory failure-patient has pulmonary edema superimposed on mild chronic nonspecific interstitial pneumonitis.    Recommendations:  1.  Diuresis  2.  Steroid  3.  Continue short course antibiotic  4.  Will follow clinically.    2.  Low moods-patient is becoming somewhat nihilistic regarding her health.    3.  DVT prophylaxis-subcutaneous heparin    4.  Status post TAVR    5.  Colon cancer-status post robotic left hemicolectomy    Subjective:   Hoda Busby is a(n) 84 year old female who is breathing a little better.    Objective:   Blood pressure 101/46, pulse 82, temperature 97.2 °F (36.2 °C), temperature source Oral, resp. rate 18, height 5' 4\" (1.626 m), weight 166 lb (75.3 kg), SpO2 94%.    Physical Exam alert white female  HEENT examination is unremarkable with pupils equal round and reactive to light and accommodation.   Neck without adenopathy, thyromegaly, JVD nor bruit.   Lungs diminished to auscultation and percussion.  Cardiac regular rate and rhythm no murmur.   Abdomen nontender, without hepatosplenomegaly and no mass appreciable.   Extremities without clubbing cyanosis nor edema.   Neurologic grossly intact with symmetric tone and strength and reflex.  Skin without gross abnormality     Results:        Stanton Sanchez MD  Medical Director, Critical Care, [facility]  Medical Director, [facility]  Pager: [phone]

## 2025-03-30 NOTE — PLAN OF CARE
Problem: Patient Centered Care  Goal: Patient preferences are identified and integrated in the patient's plan of care  Description: Interventions:- What would you like us to know as we care for you? I live at the [facility].   - Provide timely, complete, and accurate information to patient/family- Incorporate patient and family knowledge, values, beliefs, and cultural backgrounds into the planning and delivery of care- Encourage patient/family to participate in care and decision-making at the level they choose- Honor patient and family perspectives and choices  Outcome: Progressing     Problem: Patient/Family Goals  Goal: Patient/Family Long Term Goal  Description: Patient's Long Term Goal: to go home Interventions:- Monitor vital signs and labs  - Administer medications per order  - Follow MD orders  - Fall precautions  - Diagnostics as ordered  - Update / inform patient on plan of care  - Discharge planning- See additional Care Plan goals for specific interventions  Outcome: Progressing  Goal: Patient/Family Short Term Goal  Description: Patient's Short Term Goal: to feel better Interventions: -  See additional Care Plan goals for specific interventionsMonitor vital signs and labs  - Administer medications per order  - Follow MD orders  - Fall precautions  - Diagnostics as ordered  - Update / inform patient on plan of care  - Discharge planning  Outcome: Progressing     Problem: PAIN - ADULT  Goal: Verbalizes/displays adequate comfort level or patient's stated pain goal  Description: INTERVENTIONS:- Encourage pt to monitor pain and request assistance- Assess pain using appropriate pain scale- Administer analgesics based on type and severity of pain and evaluate response- Implement non-pharmacological measures as appropriate and evaluate response- Consider cultural and social influences on pain and pain management- Manage/alleviate anxiety- Utilize distraction and/or relaxation techniques- Monitor for opioid side  effects- Notify MD/LIP if interventions unsuccessful or patient reports new pain- Anticipate increased pain with activity and pre-medicate as appropriate  Outcome: Progressing     Problem: RISK FOR INFECTION - ADULT  Goal: Absence of fever/infection during anticipated neutropenic period  Description: INTERVENTIONS- Monitor WBC- Administer growth factors as ordered- Implement neutropenic guidelines  Outcome: Progressing     Problem: SAFETY ADULT - FALL  Goal: Free from fall injury  Description: INTERVENTIONS:- Assess pt frequently for physical needs- Identify cognitive and physical deficits and behaviors that affect risk of falls.- [place] fall precautions as indicated by assessment.- Educate pt/family on patient safety including physical limitations- Instruct pt to call for assistance with activity based on assessment- Modify environment to reduce risk of injury- Provide assistive devices as appropriate- Consider OT/PT consult to assist with strengthening/mobility- Encourage toileting schedule  Outcome: Progressing     Problem: DISCHARGE PLANNING  Goal: Discharge to home or other facility with appropriate resources  Description: INTERVENTIONS:- Identify barriers to discharge w/pt and caregiver- Include patient/family/discharge partner in discharge planning- Arrange for needed discharge resources and transportation as appropriate- Identify discharge learning needs (meds, wound care, etc)- Arrange for interpreters to assist at discharge as needed- Consider post-discharge preferences of patient/family/discharge partner- Complete POLST form as appropriate- Assess patient's ability to be responsible for managing their own health- Refer to Case Management Department for coordinating discharge planning if the patient needs post-hospital services based on physician/LIP order or complex needs related to functional status, cognitive ability or social support system  Outcome: Progressing     Problem: RESPIRATORY -  ADULT  Goal: Achieves optimal ventilation and oxygenation  Description: INTERVENTIONS:- Assess for changes in respiratory status- Assess for changes in mentation and behavior- Position to facilitate oxygenation and minimize respiratory effort- Oxygen supplementation based on oxygen saturation or ABGs- Provide Smoking Cessation handout, if applicable- Encourage broncho-pulmonary hygiene including cough, deep breathe, Incentive Spirometry- Assess the need for suctioning and perform as needed- Assess and instruct to report SOB or any respiratory difficulty- Respiratory Therapy support as indicated- Manage/alleviate anxiety- Monitor for signs/symptoms of CO2 retention  Outcome: Progressing     Problem: MUSCULOSKELETAL - ADULT  Goal: Return mobility to safest level of function  Description: INTERVENTIONS:- Assess patient stability and activity tolerance for standing, transferring and ambulating w/ or w/o assistive devices- Assist with transfers and ambulation using safe patient handling equipment as needed- Ensure adequate protection for wounds/incisions during mobilization- Obtain PT/OT consults as needed- Advance activity as appropriate- Communicate ordered activity level and limitations with patient/family  Outcome: Progressing  Goal: Maintain proper alignment of affected body part  Description: INTERVENTIONS:- Support and protect limb and body alignment per provider's orders- Instruct and reinforce with patient and family use of appropriate assistive device and precautions (e.g. spinal or hip dislocation precautions)  Outcome: Progressing     Problem: Diabetes/Glucose Control  Goal: Glucose maintained within prescribed range  Description: INTERVENTIONS:- Monitor Blood Glucose as ordered- Assess for signs and symptoms of hyperglycemia and hypoglycemia- Administer ordered medications to maintain glucose within target range- Assess barriers to adequate nutritional intake and initiate nutrition consult as needed-  Instruct patient on self management of diabetes  Outcome: Progressing

## 2025-03-30 NOTE — PROGRESS NOTES
[facility]  part of [facility]    Progress Note    Hoda Busby Patient Status:  Inpatient    1940 MRN [id number]   Location [facility]5W Attending Malathi Stuart MD   Hosp Day # 4 PCP Malathi Stuart MD       SUBJECTIVE:  Pt feeling much better-was off oxygen last night evening but required O2 overnight.   Cough is better    OBJECTIVE:  /46 (BP Location: Right arm)   Pulse 86   Temp 97.2 °F (36.2 °C) (Oral)   Resp 18   Ht 5' 4\" (1.626 m)   Wt 166 lb (75.3 kg)   SpO2 92%   BMI 28.49 kg/m²     Intake/Output:  I/O last 3 completed shifts:  In:  [P.O.:1685; I.V.:30; IV PIGGYBACK:350]  Out:  [Urine:1950]    Wt Readings from Last 3 Encounters:   25 166 lb (75.3 kg)   25 180 lb (81.6 kg)   25 173 lb (78.5 kg)       Exam  Gen: No acute distress, alert and oriented x3  Pulm: Lungs CTAB, no rhonchi or wheezing  CV: Heart RRR, +4/6 systolic murmur  Abd: Abdomen soft, nontender, nondistended, no organomegaly, bowel sounds present  MSK: trace BLE edema (much improved from yesterday)  Skin: no rashes or lesions  Neuro: A&O x 3, 5/5 strength in all 4 extremities.     Labs:   Recent Labs   Lab 25  0430 25  0517   RBC 4.20 3.96 4.09   HGB 10.8* 10.5* 10.4*   HCT 33.5* 32.8* 33.2*   MCV 79.8* 82.8 81.2   MCH 25.7* 26.5 25.4*   MCHC 32.2 32.0 31.3   RDW 17.5* 17.3* 17.3*   NEPRELIM 16.34* 14.47* 12.35*   WBC 17.4* 16.5* 14.3*   .0 319.0 319.0         Recent Labs   Lab 25  19325  1527 25  0430 25  0517   * 170*  --  108* 119*   BUN 20 22  --  28* 34*   CREATSERUM 1.05* 1.06*  --  1.14* 1.06*   EGFRCR 52* 52*  --  47* 52*   CA 8.6* 8.4*  --  8.7 8.7   ALB 3.5 3.5  --   --   --     137  --  140 139   K 4.2 3.6 4.6 4.2 4.3    103  --  106 103   CO2 21.0 23.0  --  26.0 29.0   ALKPHO 155* 148*  --   --   --    AST 71* 66*  --   --   --    ALT 34 33  --   --   --     BILT 0.9 0.9  --   --   --    TP 6.3 6.2  --   --   --          Imaging:  No results found.          Assessment and Plan:           Acute hypoxic respiratory failure (HCC)  -ddx: CHF, recurrent interstitial pneumonitis  -CT chest in ED 3/26/25 - no PE; worsening of pleural effusions and interstitial edema  -started on IV lasix and IV solumedrol to cover both CHF and possible recurrence of NSIP respectively  -required BiPAP transiently  -procalc sl elevated 0.25; WBC 19.8 K on admission  -recent echo 2/5/25 - normal LV systolic function (EF 60-65%); grade 2 diastolic dysfunction; normally functioning bioprosthetic TAVR; sl incr PAP 44mmHg  -repeat echo 3/27/25 stable from 2/2025 except incr PAP to 52mmHg  -cards consult appreciated  -diuresing well on Lasix 40mg IV BID (net -3L); creat up slightly; monitor closely  -pt notes improvement in breathing though still not back to baseline; still requiring 10L O2  -pt with audible wet, loose cough 3/28; 3/27 pt denied any increase in her baseline cough, but when questioned again today, she states that her cough has been worse in the past 3-4 days.  -discussed with Dr. Holloway; will start ceftriaxone and azitho; po vanco for CDP (day 2)  -further recs per pulm Dr. Holloway, cards Dr. Wood  -respiratory status continues to improve; lasix changed to 20mg ivp yesterday d/t hypotension  -has distal BLE edema--improved with compression stockings  -awaiting lab today        Adenocarcinoma of colon  -Bx of transverse colon polyp 12/17/24 -- invasive, moderately differentiated adenocarcinoma  -CEA normal (1.9)  -CT a/p neg for mets  -surgery delayed in anticipation of TAVR (done 1/23/25)  -s/p robotic assisted left hemicolectomy (Dr. Cassidy) - path - tumor invades into muscularis; margins neg for tumor; all 19 LN's neg for mets (0/19); pT2, pN0  -next appt with Dr. Cassidy 4/16/25     Coronary artery disease  -Cath 1/14/25 by Dr. Bernardo Liz (as w/u for TAVR) -- RCA  non-obstructive; LM free of obst disease; LM plaque extending into ostium of LAD (20-30%); mid LAD (80-90%), cx ostium (60-70%)  -intervention delayed until after colon resection due to need for DAPT x 6 mos after stenting   -on Atorvastatin 20mg/day, ASA 81mg/day  -pt had upcoming appt w/Dr. Bird this month 3/2025 to discuss intervention; timing of intervention per cardiology team     Hx  GI bleed  -EGD 11/26/24 (Dr. Nuno) -15mm nonbleeding gastric antral ulcer; 3mm gastric antral AVM s/p APC  -recurrence in 12/2024  -colonoscopy and repeat EGD 12/17/24 by Dr. Staton -- grade 1 esophagitis; duod ulcers healing; large flat polyp in transverse colon (carcinoma)   -received total of 3 units PRBCs   -Xarelto stopped in 12/2024  -s/p omeprazole 20mg BID x 8 weeks (EOT 1/22/25), now on omeprazole 20mg daily -- would continue until she completes her upcoming coronary stenting     Anemia due to acute blood loss  -GI bleed as above  -on ferrous sulfate 325mg/day  -Hgb 10.8 but stable     Hx chronic mild BLE edema  -Lasix 20mg/day     Acute left peroneal palsy  In Nov 2024; had numbness to anterolateral left foot and ankle as well as inability to dorsiflex left foot; etiology unclear  -neurologist Dr Richardson consulted in hosp  -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   -MRI brain--no acute intracranial process  -still doing PT at [facility] -- continues to improve; dorsiflexion left foot 4/5; plantarflexion 5/5 this admission (3/26/25)     Interstitial pneumonitis/NSIP  -dx'ed 10/2024 - presented with cough and severe hypoxia  -unresponsive to abx   -CXR 10/24/24 extensive bilateral perihilar and bilateral upper and lower lobe   -S.pneumo, legionella Ag , mycoplasma IgM/G, Fungitell-beta-D glucan negative  -ANCA and URIEL/connective tissue disease panels negative  -anti-histone and anti-[place] Ab's negative  -CT with bilateral  ground glass opacities, small consolidations of BLL  -per pulm, findings suspicious for NSIP (vs cryptogenic organizing pneumonia vs hypersensitivity pneumonitis); NOT thought to be c/w UIP pattern  -s/p empiric steroids (solumedrol then prednisone taper) 10/25/24 - (EOT 1/18/25)  -dyspnea completely resolved until this admission 3/26/25  -now on solumedrol 60mg c95z--sneoothbs to 40mg q12h     Bilateral pulmonary emboli  -dx'ed at time of interstitial pneumonitis  -CT chest 10/25/24 -- acute distal segmental/subsegmental pulmonary emboli in [place] and subsegmental PE in [place]  -unclear etiology; no recent hx of long travel; no family/personal hx of blood clots  -BLE venous dopplers negative 10/26/24  -started xarelto 20 mg po every day on 11/28/24  -repeat CT chest 12/9/24 neg PE  -stopped Xarelto 12/14/24 due to recurrent GI bleed  -repeat CT chest 3/26/25 -- neg PE     Severe aortic stenosis  -progression on serial echocardiograms  -s/p TAVR 1/23/25 (Dr. Reji Green)  -symptomatically much improved -- POPE resolved     Hypertension, primary  -(dyazide stopped due to NANCY)  -(amlodipine held due to low BP in 11/2024)  -BP remains normal off meds     Hx of hip OA  -s/p left THR (Dr. Lo) many years ago  -s/p elective R BEATRIZ on 10/5/23 by Dr. Diaz     Hyperlipidemia   Pt with strong family hx of high cholesterol  Started on Atorvastatin 20mg/day in 1/2025 (after noted to have CAD on cath)     Osteopenia   On Fosamax from 2011 to 4/2016.     DEXA stable 5/10/17 and 2019 and 2021  DEXA 8/2024 -- osteoporosis of left forearm  -restarted Fosamax 8/2024      Vitamin D deficiency  On vitamin D 4000u/day      VTE proph  -[facility]                 Nadia Steel DO  3/30/2025  10:13 AM

## 2025-03-30 NOTE — PLAN OF CARE
Problem: Patient Centered Care  Goal: Patient preferences are identified and integrated in the patient's plan of care  Description: Interventions:- What would you like us to know as we care for you? - Provide timely, complete, and accurate information to patient/family- Incorporate patient and family knowledge, values, beliefs, and cultural backgrounds into the planning and delivery of care- Encourage patient/family to participate in care and decision-making at the level they choose- Honor patient and family perspectives and choices  Outcome: Progressing     Problem: Patient/Family Goals  Goal: Patient/Family Long Term Goal  Description: Patient's Long Term Goal: to go home Interventions:- Monitor vital signs and labs  - Administer medications per order  - Follow MD orders  - Fall precautions  - Diagnostics as ordered  - Update / inform patient on plan of care  - Discharge planning- See additional Care Plan goals for specific interventions  Outcome: Progressing  Goal: Patient/Family Short Term Goal  Description: Patient's Short Term Goal: to feel better Interventions: -  See additional Care Plan goals for specific interventionsMonitor vital signs and labs  - Administer medications per order  - Follow MD orders  - Fall precautions  - Diagnostics as ordered  - Update / inform patient on plan of care  - Discharge planning  Outcome: Progressing     Problem: PAIN - ADULT  Goal: Verbalizes/displays adequate comfort level or patient's stated pain goal  Description: INTERVENTIONS:- Encourage pt to monitor pain and request assistance- Assess pain using appropriate pain scale- Administer analgesics based on type and severity of pain and evaluate response- Implement non-pharmacological measures as appropriate and evaluate response- Consider cultural and social influences on pain and pain management- Manage/alleviate anxiety- Utilize distraction and/or relaxation techniques- Monitor for opioid side effects- Notify MD/LIP if  interventions unsuccessful or patient reports new pain- Anticipate increased pain with activity and pre-medicate as appropriate  Outcome: Progressing     Problem: RISK FOR INFECTION - ADULT  Goal: Absence of fever/infection during anticipated neutropenic period  Description: INTERVENTIONS- Monitor WBC- Administer growth factors as ordered- Implement neutropenic guidelines  Outcome: Progressing     Problem: SAFETY ADULT - FALL  Goal: Free from fall injury  Description: INTERVENTIONS:- Assess pt frequently for physical needs- Identify cognitive and physical deficits and behaviors that affect risk of falls.- [place] fall precautions as indicated by assessment.- Educate pt/family on patient safety including physical limitations- Instruct pt to call for assistance with activity based on assessment- Modify environment to reduce risk of injury- Provide assistive devices as appropriate- Consider OT/PT consult to assist with strengthening/mobility- Encourage toileting schedule  Outcome: Progressing     Problem: DISCHARGE PLANNING  Goal: Discharge to home or other facility with appropriate resources  Description: INTERVENTIONS:- Identify barriers to discharge w/pt and caregiver- Include patient/family/discharge partner in discharge planning- Arrange for needed discharge resources and transportation as appropriate- Identify discharge learning needs (meds, wound care, etc)- Arrange for interpreters to assist at discharge as needed- Consider post-discharge preferences of patient/family/discharge partner- Complete POLST form as appropriate- Assess patient's ability to be responsible for managing their own health- Refer to Case Management Department for coordinating discharge planning if the patient needs post-hospital services based on physician/LIP order or complex needs related to functional status, cognitive ability or social support system  Outcome: Progressing     Problem: RESPIRATORY - ADULT  Goal: Achieves optimal  ventilation and oxygenation  Description: INTERVENTIONS:- Assess for changes in respiratory status- Assess for changes in mentation and behavior- Position to facilitate oxygenation and minimize respiratory effort- Oxygen supplementation based on oxygen saturation or ABGs- Provide Smoking Cessation handout, if applicable- Encourage broncho-pulmonary hygiene including cough, deep breathe, Incentive Spirometry- Assess the need for suctioning and perform as needed- Assess and instruct to report SOB or any respiratory difficulty- Respiratory Therapy support as indicated- Manage/alleviate anxiety- Monitor for signs/symptoms of CO2 retention  Outcome: Progressing     Problem: MUSCULOSKELETAL - ADULT  Goal: Return mobility to safest level of function  Description: INTERVENTIONS:- Assess patient stability and activity tolerance for standing, transferring and ambulating w/ or w/o assistive devices- Assist with transfers and ambulation using safe patient handling equipment as needed- Ensure adequate protection for wounds/incisions during mobilization- Obtain PT/OT consults as needed- Advance activity as appropriate- Communicate ordered activity level and limitations with patient/family  Outcome: Progressing  Goal: Maintain proper alignment of affected body part  Description: INTERVENTIONS:- Support and protect limb and body alignment per provider's orders- Instruct and reinforce with patient and family use of appropriate assistive device and precautions (e.g. spinal or hip dislocation precautions)  Outcome: Progressing     Problem: Diabetes/Glucose Control  Goal: Glucose maintained within prescribed range  Description: INTERVENTIONS:- Monitor Blood Glucose as ordered- Assess for signs and symptoms of hyperglycemia and hypoglycemia- Administer ordered medications to maintain glucose within target range- Assess barriers to adequate nutritional intake and initiate nutrition consult as needed- Instruct patient on self management  of diabetes  Outcome: Progressing

## 2025-03-31 NOTE — PLAN OF CARE
Patient has been ambulating 1 assist w/walker. Is on 3L of O2. No complaints of pain. Is tolerating diet, is achs. Has been voiding ambulating to the bathroom. Is on heparin and has TEDs in place. SCDs at the bedside. DC plan pending.     Problem: Patient Centered Care  Goal: Patient preferences are identified and integrated in the patient's plan of care  Description: Interventions:- What would you like us to know as we care for you? I am feeling better today- Provide timely, complete, and accurate information to patient/family- Incorporate patient and family knowledge, values, beliefs, and cultural backgrounds into the planning and delivery of care- Encourage patient/family to participate in care and decision-making at the level they choose- Honor patient and family perspectives and choices  Outcome: Progressing     Problem: Patient/Family Goals  Goal: Patient/Family Long Term Goal  Description: Patient's Long Term Goal: to go home Interventions:- Monitor vital signs and labs  - Administer medications per order  - Follow MD orders  - Fall precautions  - Diagnostics as ordered  - Update / inform patient on plan of care  - Discharge planning- See additional Care Plan goals for specific interventions  Outcome: Progressing  Goal: Patient/Family Short Term Goal  Description: Patient's Short Term Goal: to feel better Interventions: -  See additional Care Plan goals for specific interventionsMonitor vital signs and labs  - Administer medications per order  - Follow MD orders  - Fall precautions  - Diagnostics as ordered  - Update / inform patient on plan of care  - Discharge planning  Outcome: Progressing     Problem: PAIN - ADULT  Goal: Verbalizes/displays adequate comfort level or patient's stated pain goal  Description: INTERVENTIONS:- Encourage pt to monitor pain and request assistance- Assess pain using appropriate pain scale- Administer analgesics based on type and severity of pain and evaluate response- Implement  non-pharmacological measures as appropriate and evaluate response- Consider cultural and social influences on pain and pain management- Manage/alleviate anxiety- Utilize distraction and/or relaxation techniques- Monitor for opioid side effects- Notify MD/LIP if interventions unsuccessful or patient reports new pain- Anticipate increased pain with activity and pre-medicate as appropriate  Outcome: Progressing     Problem: RISK FOR INFECTION - ADULT  Goal: Absence of fever/infection during anticipated neutropenic period  Description: INTERVENTIONS- Monitor WBC- Administer growth factors as ordered- Implement neutropenic guidelines  Outcome: Progressing     Problem: DISCHARGE PLANNING  Goal: Discharge to home or other facility with appropriate resources  Description: INTERVENTIONS:- Identify barriers to discharge w/pt and caregiver- Include patient/family/discharge partner in discharge planning- Arrange for needed discharge resources and transportation as appropriate- Identify discharge learning needs (meds, wound care, etc)- Arrange for interpreters to assist at discharge as needed- Consider post-discharge preferences of patient/family/discharge partner- Complete POLST form as appropriate- Assess patient's ability to be responsible for managing their own health- Refer to Case Management Department for coordinating discharge planning if the patient needs post-hospital services based on physician/LIP order or complex needs related to functional status, cognitive ability or social support system  Outcome: Progressing     Problem: RESPIRATORY - ADULT  Goal: Achieves optimal ventilation and oxygenation  Description: INTERVENTIONS:- Assess for changes in respiratory status- Assess for changes in mentation and behavior- Position to facilitate oxygenation and minimize respiratory effort- Oxygen supplementation based on oxygen saturation or ABGs- Provide Smoking Cessation handout, if applicable- Encourage broncho-pulmonary  hygiene including cough, deep breathe, Incentive Spirometry- Assess the need for suctioning and perform as needed- Assess and instruct to report SOB or any respiratory difficulty- Respiratory Therapy support as indicated- Manage/alleviate anxiety- Monitor for signs/symptoms of CO2 retention  Outcome: Progressing     Problem: MUSCULOSKELETAL - ADULT  Goal: Return mobility to safest level of function  Description: INTERVENTIONS:- Assess patient stability and activity tolerance for standing, transferring and ambulating w/ or w/o assistive devices- Assist with transfers and ambulation using safe patient handling equipment as needed- Ensure adequate protection for wounds/incisions during mobilization- Obtain PT/OT consults as needed- Advance activity as appropriate- Communicate ordered activity level and limitations with patient/family  Outcome: Progressing  Goal: Maintain proper alignment of affected body part  Description: INTERVENTIONS:- Support and protect limb and body alignment per provider's orders- Instruct and reinforce with patient and family use of appropriate assistive device and precautions (e.g. spinal or hip dislocation precautions)  Outcome: Progressing

## 2025-03-31 NOTE — PROGRESS NOTES
Progress Note  Hoda Busby Patient Status:  Inpatient    1940 MRN [id number]   Location [facility]5W Attending Malathi Stuart MD   Hosp Day # 5 PCP Malathi Stuart MD     Subjective:  Pt stated to be SOB with exertion. Needing more oxygen today compared to yesterday     Objective:  /52 (BP Location: Right arm)   Pulse 86   Temp 97.5 °F (36.4 °C) (Oral)   Resp 19   Ht 5' 4\" (1.626 m)   Wt 166 lb (75.3 kg)   SpO2 93%   BMI 28.49 kg/m²     Telemetry: NSR with PAC       Intake/Output:    Intake/Output Summary (Last 24 hours) at 3/31/2025 0838  Last data filed at 3/30/2025 2039  Gross per 24 hour   Intake 1020 ml   Output 900 ml   Net 120 ml       Last 3 Weights   25 0547 166 lb (75.3 kg)   25 0606 172 lb 11.2 oz (78.3 kg)   25 0441 174 lb 4.8 oz (79.1 kg)   25 0455 187 lb 8 oz (85 kg)   25 2158 183 lb 4.8 oz (83.1 kg)   25 1939 160 lb (72.6 kg)   25 1603 180 lb (81.6 kg)   25 0753 176 lb (79.8 kg)   25 1020 173 lb (78.5 kg)   25 1513 173 lb (78.5 kg)       Labs:  Recent Labs   Lab 25  0517 25  1229 25  0522   * 106* 124*   BUN 34* 34* 34*   CREATSERUM 1.06* 1.07* 0.99   EGFRCR 52* 51* 56*   CA 8.7 8.6* 8.5*    138 137   K 4.3 3.9 4.2    104 103   CO2 29.0 25.0 28.0     Recent Labs   Lab 25  0517 25  1229 25  0523   RBC 4.09 4.51 4.30   HGB 10.4* 11.7* 10.9*   HCT 33.2* 37.2 35.1   MCV 81.2 82.5 81.6   MCH 25.4* 25.9* 25.3*   MCHC 31.3 31.5 31.1   RDW 17.3* 17.4* 17.2*   NEPRELIM 12.35* 17.31* 12.86*   WBC 14.3* 19.5* 15.1*   .0 330.0 263.0         Recent Labs   Lab 25  1939   TROPHS 58*     Lab Results   Component Value Date    INR 1.18 2025    INR 1.33 (H) 2025    INR 1.06 2023       Diagnostics:       Echo 3/27/25  Conclusions:     1. Left ventricle: The cavity size was normal. Wall thickness was normal.      Systolic function was  normal. The estimated ejection fraction was 60-65%.      No diagnostic evidence for regional wall motion abnormalities. Doppler      parameters are consistent with abnormal left ventricular relaxation -      grade 1 diastolic dysfunction.   2. Right ventricle: The cavity size was increased. Systolic function was      normal.   3. Left atrium: The atrium was mildly to moderately dilated. The left atrial      volume was mildly to moderately increased.   4. Aortic valve: A bioprosthetic valve is present. Mcnamara PUNEET S3 23mm      (TAVR) bioprosthesis was present. The peak systolic velocity was      2.69m/sec. The mean systolic gradient was 15mm Hg. The valve area (VTI)      was 1.52cm^2.   5. Pulmonary arteries: Systolic pressure was moderately increased, estimated      to be 52mm Hg.     Review of Systems   Respiratory:  Positive for shortness of breath. Negative for cough, hemoptysis, sputum production and wheezing.    Cardiovascular: Negative.        Physical Exam:    Physical Exam  Vitals reviewed.   Constitutional:       General: She is not in acute distress.     Appearance: She is not ill-appearing.      Interventions: Nasal cannula in place.   Cardiovascular:      Rate and Rhythm: Normal rate and regular rhythm.      Heart sounds: S1 normal and S2 normal. Murmur heard.      No friction rub. No gallop. No S3 or S4 sounds.   Pulmonary:      Effort: Pulmonary effort is normal. No respiratory distress.      Breath sounds: No stridor. Examination of the right-lower field reveals rhonchi and rales. Examination of the left-lower field reveals rhonchi and rales. Rhonchi and rales present. No wheezing.   Chest:      Chest wall: No tenderness.   Abdominal:      Palpations: Abdomen is soft.   Musculoskeletal:      Right lower le+ Edema present.      Left lower le+ Edema present.   Skin:     General: Skin is warm and dry.   Neurological:      Mental Status: She is alert and oriented to person, place, and time.    Psychiatric:         Mood and Affect: Mood normal.         Medications:   methylPREDNISolone  40 mg Intravenous Q12H    furosemide  20 mg Intravenous Daily    cefTRIAXone  2 g Intravenous Q24H    azithromycin  500 mg Intravenous Q24H    vancomycin  125 mg Oral Daily    aspirin  81 mg Oral Daily    atorvastatin  20 mg Oral Nightly    pantoprazole  40 mg Oral QAM AC    heparin  5,000 Units Subcutaneous 2 times per day    insulin aspart  1-7 Units Subcutaneous TID CC    ferrous sulfate  325 mg Oral Daily with breakfast    docusate sodium  100 mg Oral BID         Assessment/Plan:    84yr old female patient with history of severe aortic stenosis s/p TAVR 1/2025, obstructive non revascularized CAD, HFpEF, interstitial pneumonitis, PE, colon cancer s/p left hemicolectomy presented with new onset productive cough and dyspnea and BLE edema.    Acute hypoxic respiratory failure( multifactorial: pneumonia/chronic nonspecific interstitial pneumonitis, CHF)   - on steroid, AB, diuresis  - Ct chest neg of PE   - pulmonary following    Acute decompensated HFpEF  - pro BNP 4,423  - chest xray with mild cardiomegaly with interstitial pulmonary edema, ongoing aspiration/pneumonia/ trace left pleural effusion  - echo from 3/27 with LVEF 60-65%, no RWMA, G1DD, LA dilated, Bioprosthetic valve with mean systolic gradient of 15mmHg, PASP of 52mmHg. IVC dilated  - on Iv lasix 20mg daily  - inaccurate output documentation, wt down 17lb.     CAD  - Trop of 58 on admission  -  [facility] 1/14/25: mild LAD diagonal focal bifurcation stenosis, moderately calcified, 80-90%, plan was for to medical management at that time with anemia and Gi procedure. Since pt will need uninterrupted DAPT post stent   - on aspirin, atorvasatin,     Severe aortic stenosis  s/p TAVR 1/23/25  - stable valve function per recent echo    H/o Splenic Flexure colon cancer   - s/p Robolic left hemicolectomy/mobilization of splenic flexure, omental flap closure  3/27/25    Chronic anemia  - hgb stable     Plan:  - Mildly volume expanded on exam, will increase lasix to 20mg TID  - May need to consider PCI once pt acute illness resolve, will discuss with rounding cardiologist   - will need strict intake and output monitoring   - Daily weight  - wean off O2 as tolerated.   - electrolyte replacement per protocol.    KALYAN Horne  [facility] [place]  3/31/2025  8:38 AM    Physician addendum:  Seen and examined, agree with plan, MDM per me    Acute hypoxic respiratory failure- multifactorial, PNA, interstitial pneumonitis, pulmonary edema. Sudden onset 1 week ago. Breathing slightly improved with abx, steroids and lasix   Acute on chronic hfpef   CAD- LAD lesion not revascularized, if treatment not successful may be of some benefit to relook and consider PCI now that her colon resection as taken place    Severe aortic stenosis s/p TAVR  Colon cancer s/p resection    Plan:  Continue IV diuresis, switch to lasix 40 mg bid     L3    Jose G Dutton MD  [facility]   [facility]

## 2025-03-31 NOTE — PLAN OF CARE
Problem: Patient Centered Care  Goal: Patient preferences are identified and integrated in the patient's plan of care  Description: Interventions:- What would you like us to know as we care for you? From Pearl Maddox - Provide timely, complete, and accurate information to patient/family- Incorporate patient and family knowledge, values, beliefs, and cultural backgrounds into the planning and delivery of care- Encourage patient/family to participate in care and decision-making at the level they choose- Honor patient and family perspectives and choices  Outcome: Progressing     Problem: Patient/Family Goals  Goal: Patient/Family Long Term Goal  Description: Patient's Long Term Goal: to go home Interventions:- Monitor vital signs and labs  - Administer medications per order  - Follow MD orders  - Fall precautions  - Diagnostics as ordered  - Update / inform patient on plan of care  - Discharge planning- See additional Care Plan goals for specific interventions  Outcome: Progressing  Goal: Patient/Family Short Term Goal  Description: Patient's Short Term Goal: to feel better Interventions: -  See additional Care Plan goals for specific interventionsMonitor vital signs and labs  - Administer medications per order  - Follow MD orders  - Fall precautions  - Diagnostics as ordered  - Update / inform patient on plan of care  - Discharge planning  Outcome: Progressing     Problem: PAIN - ADULT  Goal: Verbalizes/displays adequate comfort level or patient's stated pain goal  Description: INTERVENTIONS:- Encourage pt to monitor pain and request assistance- Assess pain using appropriate pain scale- Administer analgesics based on type and severity of pain and evaluate response- Implement non-pharmacological measures as appropriate and evaluate response- Consider cultural and social influences on pain and pain management- Manage/alleviate anxiety- Utilize distraction and/or relaxation techniques- Monitor for opioid side  effects- Notify MD/LIP if interventions unsuccessful or patient reports new pain- Anticipate increased pain with activity and pre-medicate as appropriate  Outcome: Progressing     Problem: RISK FOR INFECTION - ADULT  Goal: Absence of fever/infection during anticipated neutropenic period  Description: INTERVENTIONS- Monitor WBC- Administer growth factors as ordered- Implement neutropenic guidelines  Outcome: Progressing     Problem: SAFETY ADULT - FALL  Goal: Free from fall injury  Description: INTERVENTIONS:- Assess pt frequently for physical needs- Identify cognitive and physical deficits and behaviors that affect risk of falls.- [place] fall precautions as indicated by assessment.- Educate pt/family on patient safety including physical limitations- Instruct pt to call for assistance with activity based on assessment- Modify environment to reduce risk of injury- Provide assistive devices as appropriate- Consider OT/PT consult to assist with strengthening/mobility- Encourage toileting schedule  Outcome: Progressing     Problem: DISCHARGE PLANNING  Goal: Discharge to home or other facility with appropriate resources  Description: INTERVENTIONS:- Identify barriers to discharge w/pt and caregiver- Include patient/family/discharge partner in discharge planning- Arrange for needed discharge resources and transportation as appropriate- Identify discharge learning needs (meds, wound care, etc)- Arrange for interpreters to assist at discharge as needed- Consider post-discharge preferences of patient/family/discharge partner- Complete POLST form as appropriate- Assess patient's ability to be responsible for managing their own health- Refer to Case Management Department for coordinating discharge planning if the patient needs post-hospital services based on physician/LIP order or complex needs related to functional status, cognitive ability or social support system  Outcome: Progressing     Problem: RESPIRATORY -  ADULT  Goal: Achieves optimal ventilation and oxygenation  Description: INTERVENTIONS:- Assess for changes in respiratory status- Assess for changes in mentation and behavior- Position to facilitate oxygenation and minimize respiratory effort- Oxygen supplementation based on oxygen saturation or ABGs- Provide Smoking Cessation handout, if applicable- Encourage broncho-pulmonary hygiene including cough, deep breathe, Incentive Spirometry- Assess the need for suctioning and perform as needed- Assess and instruct to report SOB or any respiratory difficulty- Respiratory Therapy support as indicated- Manage/alleviate anxiety- Monitor for signs/symptoms of CO2 retention  Outcome: Progressing     Problem: MUSCULOSKELETAL - ADULT  Goal: Return mobility to safest level of function  Description: INTERVENTIONS:- Assess patient stability and activity tolerance for standing, transferring and ambulating w/ or w/o assistive devices- Assist with transfers and ambulation using safe patient handling equipment as needed- Ensure adequate protection for wounds/incisions during mobilization- Obtain PT/OT consults as needed- Advance activity as appropriate- Communicate ordered activity level and limitations with patient/family  Outcome: Progressing  Goal: Maintain proper alignment of affected body part  Description: INTERVENTIONS:- Support and protect limb and body alignment per provider's orders- Instruct and reinforce with patient and family use of appropriate assistive device and precautions (e.g. spinal or hip dislocation precautions)  Outcome: Progressing     Problem: Diabetes/Glucose Control  Goal: Glucose maintained within prescribed range  Description: INTERVENTIONS:- Monitor Blood Glucose as ordered- Assess for signs and symptoms of hyperglycemia and hypoglycemia- Administer ordered medications to maintain glucose within target range- Assess barriers to adequate nutritional intake and initiate nutrition consult as needed-  Instruct patient on self management of diabetes  Outcome: Progressing

## 2025-03-31 NOTE — PROGRESS NOTES
[facility]  part of [facility]    Progress Note    Hoda Busby Patient Status:  Inpatient    1940 MRN [id number]   Location [facility]5W Attending Malathi Stuart MD   Hosp Day # 5 PCP Malathi Stuart MD       SUBJECTIVE:  She is feeling well; had episode of SVT/?afib overnight; pt was asymptomatic; now back in sinus rhythm  Breathing is good      OBJECTIVE:  /52 (BP Location: Right arm)   Pulse 86   Temp 97.5 °F (36.4 °C) (Oral)   Resp 19   Ht 5' 4\" (1.626 m)   Wt 166 lb (75.3 kg)   SpO2 93%   BMI 28.49 kg/m²     Intake/Output:  I/O last 3 completed shifts:  In: 1600 [P.O.:1210; I.V.:40; IV PIGGYBACK:350]  Out: 1700 [Urine:1700]    Wt Readings from Last 3 Encounters:   25 166 lb (75.3 kg)   25 180 lb (81.6 kg)   25 173 lb (78.5 kg)       Exam  Gen: No acute distress, alert and oriented x3  Pulm: Lungs CTAB, no rhonchi or wheezing, no cough  CV: Heart RRR,+4/6 systolic murmuc  Abd: Abdomen soft, nontender, nondistended, no organomegaly, bowel sounds present  MSK: trace distal BLE edema      Labs:   Recent Labs   Lab 25  0517 25  1229 25  0523   RBC 4.09 4.51 4.30   HGB 10.4* 11.7* 10.9*   HCT 33.2* 37.2 35.1   MCV 81.2 82.5 81.6   MCH 25.4* 25.9* 25.3*   MCHC 31.3 31.5 31.1   RDW 17.3* 17.4* 17.2*   NEPRELIM 12.35* 17.31* 12.86*   WBC 14.3* 19.5* 15.1*   .0 330.0 263.0         Recent Labs   Lab 25  1939 25  0222 25  1527 25  0517 25  1229 25  0522   * 170*   < > 119* 106* 124*   BUN 20 22   < > 34* 34* 34*   CREATSERUM 1.05* 1.06*   < > 1.06* 1.07* 0.99   EGFRCR 52* 52*   < > 52* 51* 56*   CA 8.6* 8.4*   < > 8.7 8.6* 8.5*   ALB 3.5 3.5  --   --   --   --     137   < > 139 138 137   K 4.2 3.6   < > 4.3 3.9 4.2    103   < > 103 104 103   CO2 21.0 23.0   < > 29.0 25.0 28.0   ALKPHO 155* 148*  --   --   --   --    AST 71* 66*  --   --   --   --    ALT 34 33  --   --    --   --    BILT 0.9 0.9  --   --   --   --    TP 6.3 6.2  --   --   --   --     < > = values in this interval not displayed.         Imaging:  XR CHEST AP PORTABLE  (CPT=71045)    Result Date: 3/30/2025  CONCLUSION:   Ongoing multifocal alveolar opacities, most notably at the lung bases, concerning for ongoing aspiration/pneumonia.  Mild cardiomegaly with interstitial pulmonary edema.  Trace left pleural effusion, previously small.  Resolved right pleural effusion.  There is no endotracheal tube present    Dictated by (CST): Beatriz Torres MD on 3/30/2025 at 2:55 PM     Finalized by (CST): Beatriz Torres MD on 3/30/2025 at 2:56 PM                 Assessment and Plan:           Acute hypoxic respiratory failure (HCC)  -ddx: CHF, recurrent interstitial pneumonitis  -CT chest in ED 3/26/25 - no PE; worsening of pleural effusions and interstitial edema  -started on IV lasix and IV solumedrol to cover both CHF and possible recurrence of NSIP respectively  -required BiPAP transiently  -procalc sl elevated 0.25; WBC 19.8 K on admission  -recent echo 2/5/25 - normal LV systolic function (EF 60-65%); grade 2 diastolic dysfunction; normally functioning bioprosthetic TAVR; sl incr PAP 44mmHg  -repeat echo 3/27/25 stable from 2/2025 except incr PAP to 52mmHg  -cards consult appreciated  -diuresing well on Lasix 40mg IV BID (net -3L); creat up slightly; monitor closely  -pt notes improvement in breathing though still not back to baseline; still requiring 10L O2  -pt with audible wet, loose cough 3/28; 3/27 pt denied any increase in her baseline cough, but when questioned again today, she states that her cough has been worse in the past 3-4 days.  -discussed with Dr. Holloway; will start ceftriaxone and azitho; po vanco for CDP (day 2)  -further recs per pulm Dr. Holloway, cards Dr. Wood  -respiratory status continues to improve; lasix changed to 20mg ivp since 3/29 d/t hypotension  -has distal BLE edema--improved with  compression stockings  Leukocytosis likely 2/2 steroids; improving  Down to 1L during the day, but up to 3L at night  -consider increasing lasix to 20mg bid (await cardiology recommendations)        Adenocarcinoma of colon  -Bx of transverse colon polyp 12/17/24 -- invasive, moderately differentiated adenocarcinoma  -CEA normal (1.9)  -CT a/p neg for mets  -surgery delayed in anticipation of TAVR (done 1/23/25)  -s/p robotic assisted left hemicolectomy (Dr. Cassidy) - path - tumor invades into muscularis; margins neg for tumor; all 19 LN's neg for mets (0/19); pT2, pN0  -next appt with Dr. Cassidy 4/16/25     Coronary artery disease  -Cath 1/14/25 by Dr. Bernardo Liz (as w/u for TAVR) -- RCA non-obstructive; LM free of obst disease; LM plaque extending into ostium of LAD (20-30%); mid LAD (80-90%), cx ostium (60-70%)  -intervention delayed until after colon resection due to need for DAPT x 6 mos after stenting   -on Atorvastatin 20mg/day, ASA 81mg/day  -pt had upcoming appt w/Dr. Bird this month 3/2025 to discuss intervention; timing of intervention per cardiology team     Hx  GI bleed  -EGD 11/26/24 (Dr. Nuno) -15mm nonbleeding gastric antral ulcer; 3mm gastric antral AVM s/p APC  -recurrence in 12/2024  -colonoscopy and repeat EGD 12/17/24 by Dr. Satton -- grade 1 esophagitis; duod ulcers healing; large flat polyp in transverse colon (carcinoma)   -received total of 3 units PRBCs   -Xarelto stopped in 12/2024  -s/p omeprazole 20mg BID x 8 weeks (EOT 1/22/25), now on omeprazole 20mg daily -- would continue until she completes her upcoming coronary stenting     Anemia due to acute blood loss  -GI bleed as above  -on ferrous sulfate 325mg/day  -Hgb 10.8 but stable     Hx chronic mild BLE edema  -Lasix 20mg/day     Acute left peroneal palsy  In Nov 2024; had numbness to anterolateral left foot and ankle as well as inability to dorsiflex left foot; etiology unclear  -neurologist Dr Richardson consulted in hosp  -MRI Lumbar  spine showed severe multilevel DJD   -head CT neg for acute intracranial hemorrhage, midline shift, mass effect, or hydrocephalus.   -MRI brain--no acute intracranial process  -still doing PT at [facility] -- continues to improve; dorsiflexion left foot 4/5; plantarflexion 5/5 this admission (3/26/25)     Interstitial pneumonitis/NSIP  -dx'ed 10/2024 - presented with cough and severe hypoxia  -unresponsive to abx   -CXR 10/24/24 extensive bilateral perihilar and bilateral upper and lower lobe   -S.pneumo, legionella Ag , mycoplasma IgM/G, Fungitell-beta-D glucan negative  -ANCA and URIEL/connective tissue disease panels negative  -anti-histone and anti-K[place]a Ab's negative  -CT with bilateral ground glass opacities, small consolidations of BLL  -per pulm, findings suspicious for NSIP (vs cryptogenic organizing pneumonia vs hypersensitivity pneumonitis); NOT thought to be c/w UIP pattern  -s/p empiric steroids (solumedrol then prednisone taper) 10/25/24 - (EOT 1/18/25)  -dyspnea completely resolved until this admission 3/26/25  -now on solumedrol 60mg w52x--ghiflndal to 40mg q12h     Bilateral pulmonary emboli  -dx'ed at time of interstitial pneumonitis  -CT chest 10/25/24 -- acute distal segmental/subsegmental pulmonary emboli in [place] and subsegmental PE in [place]  -unclear etiology; no recent hx of long travel; no family/personal hx of blood clots  -BLE venous dopplers negative 10/26/24  -started xarelto 20 mg po every day on 11/28/24  -repeat CT chest 12/9/24 neg PE  -stopped Xarelto 12/14/24 due to recurrent GI bleed  -repeat CT chest 3/26/25 -- neg PE     Severe aortic stenosis  -progression on serial echocardiograms  -s/p TAVR 1/23/25 (Dr. Reji Green)  -symptomatically much improved -- POPE resolved     Hypertension, primary  -(dyazide stopped due to NANCY)  -(amlodipine held due to low BP in 11/2024)  -BP remains normal off meds     Hx of hip OA  -s/p left THR (Dr. Lo) many years ago  -s/p elective R BEATRIZ on  10/5/23 by Dr. Diaz     Hyperlipidemia   Pt with strong family hx of high cholesterol  Started on Atorvastatin 20mg/day in 1/2025 (after noted to have CAD on cath)     Osteopenia   On Fosamax from 2011 to 4/2016.     DEXA stable 5/10/17 and 2019 and 2021  DEXA 8/2024 -- osteoporosis of left forearm  -restarted Fosamax 8/2024      Vitamin D deficiency  On vitamin D 4000u/day      VTE proph  -[facility]             Nadia Steel DO  3/31/2025  8:40 AM

## 2025-03-31 NOTE — PLAN OF CARE
Notified of afib HR  100- 120's    RR 21   O2 sats 96%    bp  135/98 EKG shows sr 1 avb rate 84 asked to put tele strip in media pending patient asymptomatic    pt recently d/c from hospital on sunday after being admitted with pneumonia. pt now c/o returning sob and cp

## 2025-04-01 NOTE — PROGRESS NOTES
[facility]  part of [facility]    Progress Note      Assessment and Plan:   1.  Acute on chronic respiratory failure-patient has pulmonary edema superimposed on mild chronic nonspecific interstitial pneumonitis.    The patient is breathing better today and certainly in better spirits.  Chest x-ray yesterday showed mild right and more prominent left basilar haziness.  The pleural effusion was improved on the right.  Requiring less oxygen.    Recommendations:  1.  Ongoing diuresis  2.  Steroid ongoing  3.  Continue short course antibiotic  4.  Will follow clinically.    2.  Low moods-feeling better today.    3.  DVT prophylaxis-subcutaneous heparin    4.  Status post TAVR    5.  Colon cancer-status post robotic left hemicolectomy    Subjective:   Hoda Busby is a(n) 84 year old female who is breathing better today.  Objective:   Blood pressure 109/63, pulse 86, temperature 98 °F (36.7 °C), temperature source Oral, resp. rate 16, height 5' 4\" (1.626 m), weight 166 lb (75.3 kg), SpO2 93%.    Physical Exam alert white female  HEENT examination is unremarkable with pupils equal round and reactive to light and accommodation.   Neck without adenopathy, thyromegaly, JVD nor bruit.   Lungs diminished to auscultation and percussion.  Cardiac regular rate and rhythm no murmur.   Abdomen nontender, without hepatosplenomegaly and no mass appreciable.   Extremities without clubbing cyanosis nor edema.   Neurologic grossly intact with symmetric tone and strength and reflex.  Skin without gross abnormality     Results:     Lab Results   Component Value Date    WBC 15.1 03/31/2025    HGB 10.9 03/31/2025    HCT 35.1 03/31/2025    .0 03/31/2025    CREATSERUM 0.99 03/31/2025    BUN 34 03/31/2025     03/31/2025    K 4.2 03/31/2025     03/31/2025    CO2 28.0 03/31/2025     03/31/2025    CA 8.5 03/31/2025       Stanton Sanchez MD  Medical Director, Critical Care, [facility]  Medical Director,  [facility]  Pager: [phone]

## 2025-04-01 NOTE — PROGRESS NOTES
[facility]  part of [facility]    Progress Note      Assessment and Plan:   1.  Acute on chronic respiratory failure-patient has pulmonary edema superimposed on mild chronic nonspecific interstitial pneumonitis.    The patient is breathing much better than on admission.  However, the fluid balance remains positive over the last 2 days.  Yet with basilar crackles.    Recommendations:  1.  Ongoing diuresis-40 mg Lasix twice daily  2.  Steroid ongoing  3.  Continue short course antibiotic-can continue short course at discharge.  4.  Will follow clinically.    2.  Low moods-feeling better today.    3.  DVT prophylaxis-subcutaneous heparin    4.  Status post TAVR    5.  Colon cancer-status post robotic left hemicolectomy    6.  Coronary artery disease-PCI in the future.    Subjective:   Hoda Busby is a(n) 84 year old female who is breathing better today.  Objective:   Blood pressure 113/51, pulse 86, temperature 97.7 °F (36.5 °C), temperature source Oral, resp. rate 16, height 5' 4\" (1.626 m), weight 166 lb (75.3 kg), SpO2 94%.    Physical Exam alert white female  HEENT examination is unremarkable with pupils equal round and reactive to light and accommodation.   Neck without adenopathy, thyromegaly, JVD nor bruit.   Lungs diminished to auscultation and percussion.  Cardiac regular rate and rhythm no murmur.   Abdomen nontender, without hepatosplenomegaly and no mass appreciable.   Extremities without clubbing cyanosis nor edema.   Neurologic grossly intact with symmetric tone and strength and reflex.  Skin without gross abnormality     Results:     Lab Results   Component Value Date    WBC 15.8 04/01/2025    HGB 11.1 04/01/2025    HCT 33.8 04/01/2025    .0 04/01/2025    CREATSERUM 1.00 04/01/2025    CREATSERUM 1.00 04/01/2025    BUN 33 04/01/2025    BUN 33 04/01/2025     04/01/2025     04/01/2025    K 4.1 04/01/2025    K 4.1 04/01/2025     04/01/2025      04/01/2025    CO2 27.0 04/01/2025    CO2 27.0 04/01/2025     04/01/2025     04/01/2025    CA 8.5 04/01/2025    CA 8.5 04/01/2025    ALB 3.1 04/01/2025    ALKPHO 86 04/01/2025    BILT 0.6 04/01/2025    TP 5.4 04/01/2025    AST 47 04/01/2025    ALT 42 04/01/2025       Stanton Sanchez MD  Medical Director, Critical Care, [facility]  Medical Director, [facility]  Pager: [phone]

## 2025-04-01 NOTE — PROGRESS NOTES
[facility]  Cardiology Progress Note    Hoda Busby Patient Status:  Inpatient    1940 MRN [id number]   Location [facility]5W Attending Malathi Stuart MD   Hosp Day # 6 PCP Malathi Stuart MD     Subjective     Feeling better today, no chest pain or palpitation    Objective:   Patient Vitals for the past 24 hrs:   BP Temp Temp src Pulse Resp SpO2   25 1052 112/54 -- -- -- -- 91 %   25 0825 101/48 97.7 °F (36.5 °C) Oral 78 16 98 %   25 0550 139/66 97.8 °F (36.6 °C) Oral 81 17 98 %   25 0300 -- -- -- 77 -- --   25 0033 109/47 97.9 °F (36.6 °C) Oral 74 18 90 %   25 2027 107/60 97.7 °F (36.5 °C) Oral 76 17 98 %   25 1900 -- -- -- 84 -- --   25 1827 109/63 98 °F (36.7 °C) Oral -- 16 --   25 1200 113/59 97.7 °F (36.5 °C) Oral -- 17 --     Intake/Output:   Last 3 shifts:   Intake/Output                   25 0700 - 25 0659 25 0700 - 25 0659 25 0700 - 25 0659       Intake    P.O.  1110  916  240    P.O. 1110 916 240    I.V.  10  --  --    I.V. 10 -- --    IV PIGGYBACK  350  --  --    Volume (mL) (azithromycin (Zithromax) 500 mg in sodium chloride 0.9% 250mL IVPB premix) 250 -- --    Volume (mL) (cefTRIAXone (Rocephin) 2 g in sodium chloride 0.9% 100 mL IVPB-ADDV) 100 -- --    Total Intake 1470 916 240       Output    Urine  900  750  250    Urine 900 750 250    Urine Occurrence 5 x 5 x 1 x    Incontinent Urine Occurrence -- 0 x --    Stool  --  --  --    Stool Count Calculated for I/O 1 x 2 x --    Total Output 900 750 250       Net I/O     570 166 -10             Scheduled Meds:    furosemide  40 mg Intravenous BID (Diuretic)    methylPREDNISolone  40 mg Intravenous Q12H    cefTRIAXone  2 g Intravenous Q24H    azithromycin  500 mg Intravenous Q24H    vancomycin  125 mg Oral Daily    aspirin  81 mg Oral Daily    atorvastatin  20 mg Oral Nightly    pantoprazole  40 mg Oral QAM AC    heparin   5,000 Units Subcutaneous 2 times per day    insulin aspart  1-7 Units Subcutaneous TID CC    ferrous sulfate  325 mg Oral Daily with breakfast    docusate sodium  100 mg Oral BID       Results:     Lab Results   Component Value Date    WBC 15.8 (H) 04/01/2025    HGB 11.1 (L) 04/01/2025    HCT 33.8 (L) 04/01/2025    .0 04/01/2025    CREATSERUM 1.00 04/01/2025    CREATSERUM 1.00 04/01/2025    BUN 33 (H) 04/01/2025    BUN 33 (H) 04/01/2025     04/01/2025     04/01/2025    K 4.1 04/01/2025    K 4.1 04/01/2025     04/01/2025     04/01/2025    CO2 27.0 04/01/2025    CO2 27.0 04/01/2025     (H) 04/01/2025     (H) 04/01/2025    CA 8.5 (L) 04/01/2025    CA 8.5 (L) 04/01/2025    ALB 3.1 (L) 04/01/2025    ALKPHO 86 04/01/2025    BILT 0.6 04/01/2025    TP 5.4 (L) 04/01/2025    AST 47 (H) 04/01/2025    ALT 42 04/01/2025    PTT >240.0 (HH) 01/23/2025    INR 1.18 01/24/2025    TSH 1.316 03/27/2025    DDIMER 2.42 (H) 03/26/2025    ESRML 49 (H) 10/26/2024    MG 1.9 03/28/2025    PHOS 3.0 03/01/2025    B12 349 03/20/2017       Recent Labs   Lab 03/30/25  1229 03/31/25  0522 04/01/25  0530   * 124* 114*  114*   BUN 34* 34* 33*  33*   CREATSERUM 1.07* 0.99 1.00  1.00   CA 8.6* 8.5* 8.5*  8.5*    137 137  137   K 3.9 4.2 4.1  4.1    103 102  102   CO2 25.0 28.0 27.0  27.0     Recent Labs   Lab 03/30/25  1229 03/31/25  0523 04/01/25  0530   RBC 4.51 4.30 4.11   HGB 11.7* 10.9* 11.1*   HCT 37.2 35.1 33.8*   MCV 82.5 81.6 82.2   MCH 25.9* 25.3* 27.0   MCHC 31.5 31.1 32.8   RDW 17.4* 17.2* 16.9*   NEPRELIM 17.31* 12.86* 13.29*   WBC 19.5* 15.1* 15.8*   .0 263.0 245.0       No results for input(s): \"BNPML\" in the last 168 hours.    No results for input(s): \"TROP\", \"CK\" in the last 168 hours.    XR CHEST AP PORTABLE  (CPT=71045)    Result Date: 3/30/2025  CONCLUSION:   Ongoing multifocal alveolar opacities, most notably at the lung bases, concerning for ongoing  aspiration/pneumonia.  Mild cardiomegaly with interstitial pulmonary edema.  Trace left pleural effusion, previously small.  Resolved right pleural effusion.  There is no endotracheal tube present    Dictated by (CST): Beatriz Torres MD on 3/30/2025 at 2:55 PM     Finalized by (CST): Beatriz Torres MD on 3/30/2025 at 2:56 PM         EKG 12 Lead    Result Date: 2025  Sinus rhythm with 1st degree A-V block Possible Left atrial enlargement Septal infarct (cited on or before 16-DEC-2024) Abnormal ECG When compared with ECG of 26-MAR-2025 20:05, CO interval has increased Vent. rate has decreased BY  42 BPM Confirmed by JENNA CHAHAL ([id number]) on 2025 10:00:53 AM   CARD ECHO 2D DOPPLER (CPT=93306)    Result Date: 3/27/2025  Transthoracic Echocardiogram Name:Hoda Busby Date: 2025 :  1940 Ht:  (64in)  BP: 137 / 81 MRN:  [id number]    Age:  84years    Wt:  (187lb) HR: 75bpm Loc:  [facility]       Gndr: F          BSA: 1.9m^2 Sonographer: Casper JOHNSON Ordering:    Malathi Stuart Consulting:  Martita Baez ---------------------------------------------------------------------------- History/Indications:   Congestive heart failure.  Risk factors: Hypertension. TAVR-23 mm Martha 3 Resilia bioprosthesis. Acute hypoxic respiratory failure. ---------------------------------------------------------------------------- Procedure information:  A transthoracic complete 2D study was performed. Additional evaluation included M-mode, complete spectral Doppler, and color Doppler.  Patient status:  Inpatient.  Location:  Bedside.    Comparison was made to the study of 2025.    This was a routine study. Transthoracic echocardiography for diagnosis and ventricular function evaluation. Image quality was adequate. ECG rhythm:   Normal sinus ---------------------------------------------------------------------------- Conclusions: 1. Left ventricle: The cavity size was normal. Wall thickness was normal.     Systolic function was normal. The estimated ejection fraction was 60-65%.    No diagnostic evidence for regional wall motion abnormalities. Doppler    parameters are consistent with abnormal left ventricular relaxation -    grade 1 diastolic dysfunction. 2. Right ventricle: The cavity size was increased. Systolic function was    normal. 3. Left atrium: The atrium was mildly to moderately dilated. The left atrial    volume was mildly to moderately increased. 4. Aortic valve: A bioprosthetic valve is present. Mcnamara PUNEET S3 23mm    (TAVR) bioprosthesis was present. The peak systolic velocity was    2.69m/sec. The mean systolic gradient was 15mm Hg. The valve area (VTI)    was 1.52cm^2. 5. Pulmonary arteries: Systolic pressure was moderately increased, estimated    to be 52mm Hg. * ---------------------------------------------------------------------------- * Findings: Left ventricle:  The cavity size was normal. Wall thickness was normal. Systolic function was normal. The estimated ejection fraction was 60-65%. No diagnostic evidence for regional wall motion abnormalities. Doppler parameters are consistent with abnormal left ventricular relaxation - grade 1 diastolic dysfunction. Left atrium:  The atrium was mildly to moderately dilated. The left atrial volume was mildly to moderately increased. Right ventricle:  The cavity size was increased. Systolic function was normal. Right atrium:  Well visualized. The atrium was normal in size. Mitral valve:  The annulus was moderately calcified. The leaflets were mildly thickened and mildly to moderately calcified. Leaflet separation was normal.  Doppler:  Transvalvular velocity was within the normal range. There was no evidence for stenosis. There was no significant regurgitation. Aortic valve:  A bioprosthetic valve is present. Mcnamara PUNEET S3 23mm (TAVR) bioprosthesis was present. Cusp separation was normal.  Doppler: Transvalvular velocity was within the normal range.  There was no evidence for stenosis. There was no significant regurgitation.    The valve area (VTI) was 1.52cm^2. The valve area (VTI) index was 0.8cm^2/m^2.    The mean systolic gradient was 15mm Hg. The peak systolic gradient was 29mm Hg. Tricuspid valve:  The valve is structurally normal. Leaflet separation was normal.  Doppler:  Transvalvular velocity was within the normal range. There was no evidence for stenosis. There was mild regurgitation. Pulmonic valve:   The valve is structurally normal. Cusp separation was normal.  Doppler:  Transvalvular velocity was within the normal range. There was no evidence for stenosis. There was no significant regurgitation. Pericardium:   There was no pericardial effusion. Aorta: Aortic root: The aortic root was normal. Ascending aorta: The ascending aorta was normal. Pulmonary arteries: Systolic pressure was moderately increased, estimated to be 52mm Hg. Systemic veins:  Central venous respirophasic diameter changes are blunted (< 50%). Inferior vena cava: The IVC was dilated. ---------------------------------------------------------------------------- Measurements  Left ventricle       Value          Ref       01/24/2025  IVS thickness,   (H) 1.0   cm       0.6 - 0.9 1.0  ED, PLAX  LV ID, ED, PLAX  (H) 5.3   cm       3.8 - 5.2 5.0  LV ID, ES, PLAX      3.5   cm       2.2 - 3.5 3.1  LV PW thickness, (H) 1.0   cm       0.6 - 0.9 1.0  ED, PLAX  IVS/LV PW ratio,     1.00           --------- 1.02  ED, PLAX  LV PW/LV ID          0.19           --------- 0.2  ratio, ED, PLAX  LV area, ES, A4C     17.5  cm^2     --------- ----------  LV ejection          62    %        54 - 74   67  fraction  Stroke               45    ml/m^2   --------- 53  volume/bsa, 2D  LV end-diastolic     126   ml       48 - 140  ----------  volume, 1-p A4C  LV end-systolic      49    ml       12 - 60   ----------  volume, 1-p A4C  LV ejection          63    %        46 - 78   ----------  fraction, 1-p  A4C   Stroke volume,       79    ml       --------- ----------  1-p A4C  LV end-diastolic     66    ml/m^2   30 - 82   ----------  volume/bsa, 1-p  A4C  LV end-systolic      26    ml/m^2   7 - 35    ----------  volume/bsa, 1-p  A4C  Stroke               42    ml/m^2   --------- ----------  volume/bsa, 1-p  A4C  LV end-diastolic (H) 113   ml       46 - 106  ----------  volume, 2-p  LV end-systolic      42    ml       14 - 42   ----------  volume, 2-p  LV ejection          63    %        54 - 74   ----------  fraction, 2-p  Stroke volume,       72    ml       --------- ----------  2-p  LV end-diastolic     59    ml/m^2   29 - 61   ----------  volume/bsa, 2-p  LV end-systolic      22    ml/m^2   8 - 24    ----------  volume/bsa, 2-p  Stroke               37.6  ml/m^2   --------- ----------  volume/bsa, 2-p  LV e', lateral   (L) 4.9   cm/sec   >=10.0    ----------  LV E/e', lateral (H) 19             <=13      ----------  LV e', medial    (L) 3.2   cm/sec   >=7.0     ----------  LV E/e', medial      30             --------- ----------  LV e', average       4.0   cm/sec   --------- ----------  LV E/e', average (H) 23             <=14      ----------  LVOT                 Value          Ref       01/24/2025  LVOT ID              1.9   cm       --------- 2.2  LVOT peak            1.29  m/sec    --------- 1.31  velocity, S  LVOT VTI, S          29.9  cm       --------- 27.6  LVOT peak            7     mm Hg    --------- 7  gradient, S  LVOT mean            4     mm Hg    --------- 3  gradient, S  Stroke volume        85    ml       --------- 105  (SV), LVOT DP  Stroke index         45    ml/m^2   --------- 53  (SV/bsa), LVOT  DP  Aortic valve         Value          Ref       01/24/2025  Aortic valve         2.69  m/sec    --------- 2.4  peak velocity, S  Aortic valve         55.7  cm       --------- 51.6  VTI, S  Aortic mean          15    mm Hg    --------- 12  gradient, S  Aortic peak          29    mm Hg    --------- 23   gradient, S  Aortic valve         1.52  cm^2     --------- 2.03  area, VTI  Aortic valve         0.8   cm^2/m^2 --------- 1.03  area/bsa, VTI  Velocity ratio,      0.48           --------- 0.55  peak, LVOT/AV  Ascending aorta      Value          Ref       01/24/2025  Ascending aorta      3.5   cm       1.9 - 3.5 ----------  ID  Left atrium          Value          Ref       01/24/2025  LA ID, A-P, ES       3.3   cm       2.7 - 3.8 4.0  LA volume/bsa, S (H) 39    ml/m^2   16 - 34   56  LA volume, ES,   (H) 74    ml       22 - 52   101  1-p A4C  Mitral valve         Value          Ref       01/24/2025  Mitral E-wave        0.94  m/sec    --------- ----------  peak velocity  Mitral A-wave        1.24  m/sec    --------- ----------  peak velocity  Mitral               190   ms       --------- ----------  deceleration  time  Mitral peak          4     mm Hg    --------- ----------  gradient, D  Mitral E/A           0.8            --------- ----------  ratio, peak  Pulmonary artery     Value          Ref       01/24/2025  PA pressure, S,      52    mm Hg    --------- ----------  DP  Tricuspid valve      Value          Ref       01/24/2025  Tricuspid regurg (H) 3.06  m/sec    <=2.8     ----------  peak velocity  Tricuspid peak       37    mm Hg    --------- ----------  RV-RA gradient  Right atrium         Value          Ref       01/24/2025  RA ID, S-I, ES,  (H) 6.3   cm       3.4 - 5.3 ----------  A4C  RA ID/bsa, S-I,  (H) 3.3   cm/m^2   1.9 - 3.1 ----------  ES, A4C  RA area, ES, A4C     17    cm^2     10 - 18   ----------  RA volume, ES,       38    ml       --------- ----------  1-p A4C  RA volume/bsa,       20    ml/m^2   9 - 33    ----------  ES, 1-p A4C  Systemic veins       Value          Ref       01/24/2025  Estimated CVP        15    mm Hg    --------- ----------  Inferior vena        Value          Ref       01/24/2025  cava  ID               (H) 2.7   cm       <=2.1     ----------  Right ventricle      Value           Ref       01/24/2025  TAPSE, 2D            2.74  cm       >=1.70    ----------  TAPSE, MM            2.74  cm       >=1.70    ----------  RV pressure, S,      52    mm Hg    --------- ----------  DP  RV s', lateral       14.7  cm/sec   >=9.5     ---------- Legend: (L)  and  (H)  migel values outside specified reference range. ---------------------------------------------------------------------------- Prepared and electronically signed by Bran Salcedo 03/27/2025 16:38     Exam:     General: Alert and oriented x 3. No apparent distress.   HEENT: Normocephalic, anicteric sclera, neck supple, no thyromegaly or adenopathy.  Neck: No JVD, carotids 2+, no bruits.  Cardiac: Regular rate and rhythm. S1, S2 normal. No murmur, pericardial rub, S3, or extra cardiac sounds.  Lungs: Clear without wheezes, rales, rhonchi or dullness.  Normal excursions and effort.  Abdomen: Soft, non-tender. No organosplenomegally, mass or rebound, BS-present.  Extremities: Without clubbing or cyanosis. 1+ L E edema.    Neurologic: Alert and oriented, normal affect. No focal defects  Skin: Warm and dry.     Assessment and Plan:    84yr old female patient with history of severe aortic stenosis s/p TAVR 1/2025, obstructive non revascularized CAD, HFpEF, interstitial pneumonitis, PE, colon cancer s/p left hemicolectomy presented with new onset productive cough and dyspnea and BLE edema.     Acute hypoxic respiratory failure( multifactorial: pneumonia/chronic nonspecific interstitial pneumonitis, CHF)   - on steroid, AB, diuresis  - Ct chest neg of PE   - pulmonary following     Acute decompensated HFpEF  - pro BNP 4,423  - chest xray with mild cardiomegaly with interstitial pulmonary edema, ongoing aspiration/pneumonia/ trace left pleural effusion  - echo from 3/27 with LVEF 60-65%, no RWMA, G1DD, LA dilated, Bioprosthetic valve with mean systolic gradient of 15mmHg, PASP of 52mmHg. IVC dilated  - Continue Lasix 40 mg IV twice daily      CAD  - Trop of 58 on admission  -  [facility] 1/14/25: mild LAD diagonal focal bifurcation stenosis, moderately calcified, 80-90%, plan was for to medical management at that time with anemia and Gi procedure. Since pt will need uninterrupted DAPT post stent   - on aspirin, atorvasatin,      Severe aortic stenosis  s/p TAVR 1/23/25  - stable valve function per recent echo     H/o Splenic Flexure colon cancer   - s/p Robolic left hemicolectomy/mobilization of splenic flexure, omental flap closure 3/27/25     Chronic anemia  - hgb stable      Plan:  - Continue Lasix 40 mg IV twice daily  - Continue steroids, antibiotics, management per pulmonary  - Eventual need PCI as outpatient after resolution of acute illness  - Hopefully may be to work on discharge planning next few days    Viktor Bird MD  [facility]  4/1/2025

## 2025-04-01 NOTE — PLAN OF CARE
A&Ox4. Pt ambulates SBA with walker, monitoring blood glucose levels per order- ACHS, voiding up to bathroom, tolerating diet, on 1L via NC. Frequent rounding by nursing staff. Safety precautions maintained/call light within reach.     Problem: Patient Centered Care  Goal: Patient preferences are identified and integrated in the patient's plan of care  Description: Interventions:- What would you like us to know as we care for you? - Provide timely, complete, and accurate information to patient/family- Incorporate patient and family knowledge, values, beliefs, and cultural backgrounds into the planning and delivery of care- Encourage patient/family to participate in care and decision-making at the level they choose- Honor patient and family perspectives and choices  Outcome: Progressing     Problem: Patient/Family Goals  Goal: Patient/Family Long Term Goal  Description: Patient's Long Term Goal: to go home Interventions:- Monitor vital signs and labs  - Administer medications per order  - Follow MD orders  - Fall precautions  - Diagnostics as ordered  - Update / inform patient on plan of care  - Discharge planning- See additional Care Plan goals for specific interventions  Outcome: Progressing  Goal: Patient/Family Short Term Goal  Description: Patient's Short Term Goal: to feel better Interventions: -  See additional Care Plan goals for specific interventionsMonitor vital signs and labs  - Administer medications per order  - Follow MD orders  - Fall precautions  - Diagnostics as ordered  - Update / inform patient on plan of care  - Discharge planning  Outcome: Progressing     Problem: PAIN - ADULT  Goal: Verbalizes/displays adequate comfort level or patient's stated pain goal  Description: INTERVENTIONS:- Encourage pt to monitor pain and request assistance- Assess pain using appropriate pain scale- Administer analgesics based on type and severity of pain and evaluate response- Implement non-pharmacological measures  as appropriate and evaluate response- Consider cultural and social influences on pain and pain management- Manage/alleviate anxiety- Utilize distraction and/or relaxation techniques- Monitor for opioid side effects- Notify MD/LIP if interventions unsuccessful or patient reports new pain- Anticipate increased pain with activity and pre-medicate as appropriate  Outcome: Progressing     Problem: RISK FOR INFECTION - ADULT  Goal: Absence of fever/infection during anticipated neutropenic period  Description: INTERVENTIONS- Monitor WBC- Administer growth factors as ordered- Implement neutropenic guidelines  Outcome: Progressing     Problem: SAFETY ADULT - FALL  Goal: Free from fall injury  Description: INTERVENTIONS:- Assess pt frequently for physical needs- Identify cognitive and physical deficits and behaviors that affect risk of falls.- [place] fall precautions as indicated by assessment.- Educate pt/family on patient safety including physical limitations- Instruct pt to call for assistance with activity based on assessment- Modify environment to reduce risk of injury- Provide assistive devices as appropriate- Consider OT/PT consult to assist with strengthening/mobility- Encourage toileting schedule  Outcome: Progressing     Problem: DISCHARGE PLANNING  Goal: Discharge to home or other facility with appropriate resources  Description: INTERVENTIONS:- Identify barriers to discharge w/pt and caregiver- Include patient/family/discharge partner in discharge planning- Arrange for needed discharge resources and transportation as appropriate- Identify discharge learning needs (meds, wound care, etc)- Arrange for interpreters to assist at discharge as needed- Consider post-discharge preferences of patient/family/discharge partner- Complete POLST form as appropriate- Assess patient's ability to be responsible for managing their own health- Refer to Case Management Department for coordinating discharge planning if the patient  needs post-hospital services based on physician/LIP order or complex needs related to functional status, cognitive ability or social support system  Outcome: Progressing     Problem: RESPIRATORY - ADULT  Goal: Achieves optimal ventilation and oxygenation  Description: INTERVENTIONS:- Assess for changes in respiratory status- Assess for changes in mentation and behavior- Position to facilitate oxygenation and minimize respiratory effort- Oxygen supplementation based on oxygen saturation or ABGs- Provide Smoking Cessation handout, if applicable- Encourage broncho-pulmonary hygiene including cough, deep breathe, Incentive Spirometry- Assess the need for suctioning and perform as needed- Assess and instruct to report SOB or any respiratory difficulty- Respiratory Therapy support as indicated- Manage/alleviate anxiety- Monitor for signs/symptoms of CO2 retention  Outcome: Progressing     Problem: MUSCULOSKELETAL - ADULT  Goal: Return mobility to safest level of function  Description: INTERVENTIONS:- Assess patient stability and activity tolerance for standing, transferring and ambulating w/ or w/o assistive devices- Assist with transfers and ambulation using safe patient handling equipment as needed- Ensure adequate protection for wounds/incisions during mobilization- Obtain PT/OT consults as needed- Advance activity as appropriate- Communicate ordered activity level and limitations with patient/family  Outcome: Progressing  Goal: Maintain proper alignment of affected body part  Description: INTERVENTIONS:- Support and protect limb and body alignment per provider's orders- Instruct and reinforce with patient and family use of appropriate assistive device and precautions (e.g. spinal or hip dislocation precautions)  Outcome: Progressing     Problem: Diabetes/Glucose Control  Goal: Glucose maintained within prescribed range  Description: INTERVENTIONS:- Monitor Blood Glucose as ordered- Assess for signs and symptoms of  hyperglycemia and hypoglycemia- Administer ordered medications to maintain glucose within target range- Assess barriers to adequate nutritional intake and initiate nutrition consult as needed- Instruct patient on self management of diabetes  Outcome: Progressing

## 2025-04-01 NOTE — PLAN OF CARE
Problem: Patient Centered Care  Goal: Patient preferences are identified and integrated in the patient's plan of care  Description: Interventions:- What would you like us to know as we care for you? From [place] sheriff - Provide timely, complete, and accurate information to patient/family- Incorporate patient and family knowledge, values, beliefs, and cultural backgrounds into the planning and delivery of care- Encourage patient/family to participate in care and decision-making at the level they choose- Honor patient and family perspectives and choices  Outcome: Progressing     Problem: Patient/Family Goals  Goal: Patient/Family Long Term Goal  Description: Patient's Long Term Goal: to go home Interventions:- Monitor vital signs and labs  - Administer medications per order  - Follow MD orders  - Fall precautions  - Diagnostics as ordered  - Update / inform patient on plan of care  - Discharge planning- See additional Care Plan goals for specific interventions  Outcome: Progressing  Goal: Patient/Family Short Term Goal  Description: Patient's Short Term Goal: to feel better Interventions: -  See additional Care Plan goals for specific interventionsMonitor vital signs and labs  - Administer medications per order  - Follow MD orders  - Fall precautions  - Diagnostics as ordered  - Update / inform patient on plan of care  - Discharge planning  Outcome: Progressing     Problem: PAIN - ADULT  Goal: Verbalizes/displays adequate comfort level or patient's stated pain goal  Description: INTERVENTIONS:- Encourage pt to monitor pain and request assistance- Assess pain using appropriate pain scale- Administer analgesics based on type and severity of pain and evaluate response- Implement non-pharmacological measures as appropriate and evaluate response- Consider cultural and social influences on pain and pain management- Manage/alleviate anxiety- Utilize distraction and/or relaxation techniques- Monitor for opioid side  effects- Notify MD/LIP if interventions unsuccessful or patient reports new pain- Anticipate increased pain with activity and pre-medicate as appropriate  Outcome: Progressing     Problem: RISK FOR INFECTION - ADULT  Goal: Absence of fever/infection during anticipated neutropenic period  Description: INTERVENTIONS- Monitor WBC- Administer growth factors as ordered- Implement neutropenic guidelines  Outcome: Progressing     Problem: SAFETY ADULT - FALL  Goal: Free from fall injury  Description: INTERVENTIONS:- Assess pt frequently for physical needs- Identify cognitive and physical deficits and behaviors that affect risk of falls.- [place] fall precautions as indicated by assessment.- Educate pt/family on patient safety including physical limitations- Instruct pt to call for assistance with activity based on assessment- Modify environment to reduce risk of injury- Provide assistive devices as appropriate- Consider OT/PT consult to assist with strengthening/mobility- Encourage toileting schedule  Outcome: Progressing     Problem: DISCHARGE PLANNING  Goal: Discharge to home or other facility with appropriate resources  Description: INTERVENTIONS:- Identify barriers to discharge w/pt and caregiver- Include patient/family/discharge partner in discharge planning- Arrange for needed discharge resources and transportation as appropriate- Identify discharge learning needs (meds, wound care, etc)- Arrange for interpreters to assist at discharge as needed- Consider post-discharge preferences of patient/family/discharge partner- Complete POLST form as appropriate- Assess patient's ability to be responsible for managing their own health- Refer to Case Management Department for coordinating discharge planning if the patient needs post-hospital services based on physician/LIP order or complex needs related to functional status, cognitive ability or social support system  Outcome: Progressing     Problem: RESPIRATORY -  ADULT  Goal: Achieves optimal ventilation and oxygenation  Description: INTERVENTIONS:- Assess for changes in respiratory status- Assess for changes in mentation and behavior- Position to facilitate oxygenation and minimize respiratory effort- Oxygen supplementation based on oxygen saturation or ABGs- Provide Smoking Cessation handout, if applicable- Encourage broncho-pulmonary hygiene including cough, deep breathe, Incentive Spirometry- Assess the need for suctioning and perform as needed- Assess and instruct to report SOB or any respiratory difficulty- Respiratory Therapy support as indicated- Manage/alleviate anxiety- Monitor for signs/symptoms of CO2 retention  Outcome: Progressing     Problem: MUSCULOSKELETAL - ADULT  Goal: Return mobility to safest level of function  Description: INTERVENTIONS:- Assess patient stability and activity tolerance for standing, transferring and ambulating w/ or w/o assistive devices- Assist with transfers and ambulation using safe patient handling equipment as needed- Ensure adequate protection for wounds/incisions during mobilization- Obtain PT/OT consults as needed- Advance activity as appropriate- Communicate ordered activity level and limitations with patient/family  Outcome: Progressing  Goal: Maintain proper alignment of affected body part  Description: INTERVENTIONS:- Support and protect limb and body alignment per provider's orders- Instruct and reinforce with patient and family use of appropriate assistive device and precautions (e.g. spinal or hip dislocation precautions)  Outcome: Progressing     Problem: Diabetes/Glucose Control  Goal: Glucose maintained within prescribed range  Description: INTERVENTIONS:- Monitor Blood Glucose as ordered- Assess for signs and symptoms of hyperglycemia and hypoglycemia- Administer ordered medications to maintain glucose within target range- Assess barriers to adequate nutritional intake and initiate nutrition consult as needed-  Instruct patient on self management of diabetes  Outcome: Progressing     Monitoring vital signs, stable at this moment, wearing 1L oxygen. Monitoring blood glucose levels. Pain medication provided as needed. Call light within reach, bed locked in lowest position, all fall precautions in place. All needs addressed. Frequent rounding maintained by nursing staff. Patient completing IV antibiotics, IV diuretics and IV steroid per MD order, no acute changes at this time.

## 2025-04-01 NOTE — PROGRESS NOTES
[facility]  part of [facility]    Progress Note    Hoda Busby Patient Status:  Inpatient    1940 MRN [id number]   Location [facility]5W Attending Malathi Stuart MD   Hosp Day # 6 PCP Malathi Stuart MD       SUBJECTIVE:  Pt feeling well; no issues overnight    OBJECTIVE:  /66 (BP Location: Right arm)   Pulse 81   Temp 97.8 °F (36.6 °C) (Oral)   Resp 17   Ht 5' 4\" (1.626 m)   Wt 166 lb (75.3 kg)   SpO2 98%   BMI 28.49 kg/m²     Intake/Output:  I/O last 3 completed shifts:  In: 2266 [P.O.:1906; I.V.:10; IV PIGGYBACK:350]  Out: 1000 [Urine:1000]    Wt Readings from Last 3 Encounters:   25 166 lb (75.3 kg)   25 180 lb (81.6 kg)   25 173 lb (78.5 kg)       Exam  Gen: No acute distress, alert and oriented x3  Pulm: Lungs CTAB, no rhonchi or wheezing  CV: Heart RRR, no murmurs   Abd: Abdomen soft, nontender, nondistended, no organomegaly, bowel sounds present  MSK: Full range of motion in extremities, no clubbing, no cyanosis, trace BLE edema    Labs:   Recent Labs   Lab 25  1229 25  0523 25  0530   RBC 4.51 4.30 4.11   HGB 11.7* 10.9* 11.1*   HCT 37.2 35.1 33.8*   MCV 82.5 81.6 82.2   MCH 25.9* 25.3* 27.0   MCHC 31.5 31.1 32.8   RDW 17.4* 17.2* 16.9*   NEPRELIM 17.31* 12.86* 13.29*   WBC 19.5* 15.1* 15.8*   .0 263.0 245.0         Recent Labs   Lab 25  1939 25  0222 25  1527 25  0517 25  1229 25  0522   * 170*   < > 119* 106* 124*   BUN 20 22   < > 34* 34* 34*   CREATSERUM 1.05* 1.06*   < > 1.06* 1.07* 0.99   EGFRCR 52* 52*   < > 52* 51* 56*   CA 8.6* 8.4*   < > 8.7 8.6* 8.5*   ALB 3.5 3.5  --   --   --   --     137   < > 139 138 137   K 4.2 3.6   < > 4.3 3.9 4.2    103   < > 103 104 103   CO2 21.0 23.0   < > 29.0 25.0 28.0   ALKPHO 155* 148*  --   --   --   --    AST 71* 66*  --   --   --   --    ALT 34 33  --   --   --   --    BILT 0.9 0.9  --   --   --   --    TP  6.3 6.2  --   --   --   --     < > = values in this interval not displayed.         Imaging:  XR CHEST AP PORTABLE  (CPT=71045)    Result Date: 3/30/2025  CONCLUSION:   Ongoing multifocal alveolar opacities, most notably at the lung bases, concerning for ongoing aspiration/pneumonia.  Mild cardiomegaly with interstitial pulmonary edema.  Trace left pleural effusion, previously small.  Resolved right pleural effusion.  There is no endotracheal tube present    Dictated by (CST): Beatriz Torres MD on 3/30/2025 at 2:55 PM     Finalized by (CST): Beatriz Torres MD on 3/30/2025 at 2:56 PM                 Assessment and Plan:               Acute hypoxic respiratory failure (HCC)  -ddx: CHF, recurrent interstitial pneumonitis  -CT chest in ED 3/26/25 - no PE; worsening of pleural effusions and interstitial edema  -started on IV lasix and IV solumedrol to cover both CHF and possible recurrence of NSIP respectively  -required BiPAP transiently  -procalc sl elevated 0.25; WBC 19.8 K on admission  -recent echo 2/5/25 - normal LV systolic function (EF 60-65%); grade 2 diastolic dysfunction; normally functioning bioprosthetic TAVR; sl incr PAP 44mmHg  -repeat echo 3/27/25 stable from 2/2025 except incr PAP to 52mmHg  -cards consult appreciated  -diuresing well on Lasix 40mg IV BID (net -3L); creat up slightly; monitor closely  -pt notes improvement in breathing though still not back to baseline; still requiring 10L O2  -pt with audible wet, loose cough 3/28; 3/27 pt denied any increase in her baseline cough, but when questioned again today, she states that her cough has been worse in the past 3-4 days.  -discussed with Dr. Holloway; will start ceftriaxone and azitho; po vanco for CDP (day 2)  -further recs per pulm Dr. Holloway, cards Dr. Wood  -respiratory status continues to improve; lasix changed to 20mg ivp since 3/29 d/t hypotension  -has distal BLE edema--improved with compression stockings  Leukocytosis likely 2/2  steroids;improving  -lasix increased to 40mg bid--continue to wean oxygen        Adenocarcinoma of colon  -Bx of transverse colon polyp 12/17/24 -- invasive, moderately differentiated adenocarcinoma  -CEA normal (1.9)  -CT a/p neg for mets  -surgery delayed in anticipation of TAVR (done 1/23/25)  -s/p robotic assisted left hemicolectomy (Dr. Cassidy) - path - tumor invades into muscularis; margins neg for tumor; all 19 LN's neg for mets (0/19); pT2, pN0  -next appt with Dr. Cassidy 4/16/25     Coronary artery disease  -Cath 1/14/25 by Dr. Bernardo Liz (as w/u for TAVR) -- RCA non-obstructive; LM free of obst disease; LM plaque extending into ostium of LAD (20-30%); mid LAD (80-90%), cx ostium (60-70%)  -intervention delayed until after colon resection due to need for DAPT x 6 mos after stenting   -on Atorvastatin 20mg/day, ASA 81mg/day  -pt had upcoming appt w/Dr. Bird this month 3/2025 to discuss intervention; timing of intervention per cardiology team     Hx  GI bleed  -EGD 11/26/24 (Dr. Nuno) -15mm nonbleeding gastric antral ulcer; 3mm gastric antral AVM s/p APC  -recurrence in 12/2024  -colonoscopy and repeat EGD 12/17/24 by Dr. Staton -- grade 1 esophagitis; duod ulcers healing; large flat polyp in transverse colon (carcinoma)   -received total of 3 units PRBCs   -Xarelto stopped in 12/2024  -s/p omeprazole 20mg BID x 8 weeks (EOT 1/22/25), now on omeprazole 20mg daily -- would continue until she completes her upcoming coronary stenting     Anemia due to acute blood loss  -GI bleed as above  -on ferrous sulfate 325mg/day  -Hgb 10.8 but stable     Hx chronic mild BLE edema  -Lasix 20mg/day     Acute left peroneal palsy  In Nov 2024; had numbness to anterolateral left foot and ankle as well as inability to dorsiflex left foot; etiology unclear  -neurologist Dr Richardson consulted in hosp  -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   -MRI brain--no acute intracranial process  -still doing PT at [facility] -- continues to improve; dorsiflexion left foot 4/5; plantarflexion 5/5 this admission (3/26/25)     Interstitial pneumonitis/NSIP  -dx'ed 10/2024 - presented with cough and severe hypoxia  -unresponsive to abx   -CXR 10/24/24 extensive bilateral perihilar and bilateral upper and lower lobe   -S.pneumo, legionella Ag , mycoplasma IgM/G, Fungitell-beta-D glucan negative  -ANCA and URIEL/connective tissue disease panels negative  -anti-histone and anti-Keara Ab's negative  -CT with bilateral ground glass opacities, small consolidations of BLL  -per pulm, findings suspicious for NSIP (vs cryptogenic organizing pneumonia vs hypersensitivity pneumonitis); NOT thought to be c/w UIP pattern  -s/p empiric steroids (solumedrol then prednisone taper) 10/25/24 - (EOT 1/18/25)  -dyspnea completely resolved until this admission 3/26/25  -now on solumedrol 60mg [id number]--[id number] to 40mg q12h     Bilateral pulmonary emboli  -dx'ed at time of interstitial pneumonitis  -CT chest 10/25/24 -- acute distal segmental/subsegmental pulmonary emboli in RUL and subsegmental PE in [place]  -unclear etiology; no recent hx of long travel; no family/personal hx of blood clots  -BLE venous dopplers negative 10/26/24  -started xarelto 20 mg po every day on 11/28/24  -repeat CT chest 12/9/24 neg PE  -stopped Xarelto 12/14/24 due to recurrent GI bleed  -repeat CT chest 3/26/25 -- neg PE     Severe aortic stenosis  -progression on serial echocardiograms  -s/p TAVR 1/23/25 (Dr. Reji Green)  -symptomatically much improved -- POPE resolved     Hypertension, primary  -(dyazide stopped due to NANCY)  -(amlodipine held due to low BP in 11/2024)  -BP remains normal off meds     Hx of hip OA  -s/p left THR (Dr. Lo) many years ago  -s/p elective R BEATRIZ on 10/5/23 by Dr. Diaz     Hyperlipidemia   Pt with strong family hx of high cholesterol  Started on Atorvastatin  20mg/day in 1/2025 (after noted to have CAD on cath)     Osteopenia   On Fosamax from 2011 to 4/2016.     DEXA stable 5/10/17 and 2019 and 2021  DEXA 8/2024 -- osteoporosis of left forearm  -restarted Fosamax 8/2024      Vitamin D deficiency  On vitamin D 4000u/day      VTE proph  -FIONA Steel DO  4/1/2025  6:45 AM

## 2025-04-02 NOTE — SPIRITUAL CARE NOTE
Spiritual Care Visit Note    Patient Name: Hoda Busby Date of Spiritual Care Visit: 25   : 1940 Primary Dx: Acute hypoxic respiratory failure (HCC)       Referred By: Referral From: Care Coordination    Spiritual Care Taxonomy:    Intended Effects: Build relationship of care and support    Methods: Accompany someone in their spiritual/Sikhism practice outside your manuel[place] tradition, Collaborate with care team member, Offer emotional support, Offer spiritual/Sikhism support, Offer support    Interventions: Explain  role, Provide hospitality, Silent prayer    Visit Type/Summary:     - Spiritual Care: Consulted with RN prior to visit. Offered empathic listening and emotional support. Provided information regarding how to contact Spiritual Care and left a Spiritual Care information card. Patient looks happy and stated that she had her  visit yesterday. She expressed has a lot of family and  friends support.   remains available as needed for follow up.    Spiritual Care support can be requested via an Epic consult. For urgent/immediate needs, please contact the On Call  at: [place]: ext [phone]    Raudel BUENO  Chaplain Resident  [phone]

## 2025-04-02 NOTE — PROGRESS NOTES
[facility]  part of [facility]    Progress Note    Hoda Busby Patient Status:  Inpatient    1940 MRN [id number]   Location [facility]5W Attending Malathi Stuart MD   Hosp Day # 7 PCP Malathi Stuart MD       SUBJECTIVE:  Pt feeling well; still feels sob when walking longer distances  On 1L at rest, 3L with ambulation    OBJECTIVE:  /49 (BP Location: Right arm)   Pulse 92   Temp 97.7 °F (36.5 °C) (Oral)   Resp 20   Ht 5' 4\" (1.626 m)   Wt 174 lb 9.6 oz (79.2 kg)   SpO2 93%   BMI 29.97 kg/m²     Intake/Output:  I/O last 3 completed shifts:  In: 588 [P.O.:578; I.V.:10]  Out: 1500 [Urine:1500]    Wt Readings from Last 3 Encounters:   25 174 lb 9.6 oz (79.2 kg)   25 180 lb (81.6 kg)   25 173 lb (78.5 kg)       Exam  Gen: No acute distress, alert and oriented x3  Pulm: bibasilar crackles  CV: Heart RRR, no murmurs   Abd: Abdomen soft, nontender, nondistended, no organomegaly, bowel sounds present  MSK: distal BLE edema +1  Neuro: A&O x 3, 5/5 strength in all 4 extremities.     Labs:   Recent Labs   Lab 25  1229 25  0523 25  0530   RBC 4.51 4.30 4.11   HGB 11.7* 10.9* 11.1*   HCT 37.2 35.1 33.8*   MCV 82.5 81.6 82.2   MCH 25.9* 25.3* 27.0   MCHC 31.5 31.1 32.8   RDW 17.4* 17.2* 16.9*   NEPRELIM 17.31* 12.86* 13.29*   WBC 19.5* 15.1* 15.8*   .0 263.0 245.0         Recent Labs   Lab 25  1939 25  0222 25  1527 25  0522 25  0530 25  0502   * 170*   < > 124* 114*  114* 114*   BUN 20 22   < > 34* 33*  33* 37*   CREATSERUM 1.05* 1.06*   < > 0.99 1.00  1.00 1.08*   EGFRCR 52* 52*   < > 56* 56*  56* 51*   CA 8.6* 8.4*   < > 8.5* 8.5*  8.5* 8.5*   ALB 3.5 3.5  --   --  3.1*  --     137   < > 137 137  137 138   K 4.2 3.6   < > 4.2 4.1  4.1 4.2    103   < > 103 102  102 104   CO2 21.0 23.0   < > 28.0 27.0  27.0 27.0   ALKPHO 155* 148*  --   --  86  --    AST 71* 66*  --    --  47*  --    ALT 34 33  --   --  42  --    BILT 0.9 0.9  --   --  0.6  --    TP 6.3 6.2  --   --  5.4*  --     < > = values in this interval not displayed.         Imaging:  XR CHEST AP PORTABLE  (CPT=71045)    Result Date: 3/30/2025  CONCLUSION:   Ongoing multifocal alveolar opacities, most notably at the lung bases, concerning for ongoing aspiration/pneumonia.  Mild cardiomegaly with interstitial pulmonary edema.  Trace left pleural effusion, previously small.  Resolved right pleural effusion.  There is no endotracheal tube present    Dictated by (CST): Beatriz Torres MD on 3/30/2025 at 2:55 PM     Finalized by (CST): Beatriz Torres MD on 3/30/2025 at 2:56 PM                 Assessment and Plan:                 Acute hypoxic respiratory failure (HCC)  -ddx: CHF, recurrent interstitial pneumonitis  -CT chest in ED 3/26/25 - no PE; worsening of pleural effusions and interstitial edema  -started on IV lasix and IV solumedrol to cover both CHF and possible recurrence of NSIP respectively  -required BiPAP transiently  -procalc sl elevated 0.25; WBC 19.8 K on admission  -recent echo 2/5/25 - normal LV systolic function (EF 60-65%); grade 2 diastolic dysfunction; normally functioning bioprosthetic TAVR; sl incr PAP 44mmHg  -repeat echo 3/27/25 stable from 2/2025 except incr PAP to 52mmHg  -cards consult appreciated  -diuresing well on Lasix 40mg IV BID (net -3L); creat up slightly; monitor closely  -pt notes improvement in breathing though still not back to baseline; still requiring 10L O2  -pt with audible wet, loose cough 3/28; 3/27 pt denied any increase in her baseline cough, but when questioned again today, she states that her cough has been worse in the past 3-4 days.  -discussed with Dr. Holloway; will start ceftriaxone and azitho; po vanco for CDP (day 2)  -further recs per pulm Dr. Holloway, cards Dr. Wood  -respiratory status continues to improve; lasix changed to 20mg ivp since 3/29 d/t  hypotension  -has distal BLE edema--improved with compression stockings  Leukocytosis likely 2/2 steroids;improving  -lasix increased to 40mg bid--discussing with pulm and cardiology next steps as patient is still not weaning oxygen much further        Adenocarcinoma of colon  -Bx of transverse colon polyp 12/17/24 -- invasive, moderately differentiated adenocarcinoma  -CEA normal (1.9)  -CT a/p neg for mets  -surgery delayed in anticipation of TAVR (done 1/23/25)  -s/p robotic assisted left hemicolectomy (Dr. Cassidy) - path - tumor invades into muscularis; margins neg for tumor; all 19 LN's neg for mets (0/19); pT2, pN0  -next appt with Dr. Cassidy 4/16/25     Coronary artery disease  -Cath 1/14/25 by Dr. Bernardo Liz (as w/u for TAVR) -- RCA non-obstructive; LM free of obst disease; LM plaque extending into ostium of LAD (20-30%); mid LAD (80-90%), cx ostium (60-70%)  -intervention delayed until after colon resection due to need for DAPT x 6 mos after stenting   -on Atorvastatin 20mg/day, ASA 81mg/day  -pt had upcoming appt w/Dr. Bird this month 3/2025 to discuss intervention; timing of intervention per cardiology team     Hx  GI bleed  -EGD 11/26/24 (Dr. Nuno) -15mm nonbleeding gastric antral ulcer; 3mm gastric antral AVM s/p APC  -recurrence in 12/2024  -colonoscopy and repeat EGD 12/17/24 by Dr. Staton -- grade 1 esophagitis; duod ulcers healing; large flat polyp in transverse colon (carcinoma)   -received total of 3 units PRBCs   -Xarelto stopped in 12/2024  -s/p omeprazole 20mg BID x 8 weeks (EOT 1/22/25), now on omeprazole 20mg daily -- would continue until she completes her upcoming coronary stenting     Anemia due to acute blood loss  -GI bleed as above  -on ferrous sulfate 325mg/day  -Hgb 10.8 but stable     Hx chronic mild BLE edema  -Lasix 20mg/day     Acute left peroneal palsy  In Nov 2024; had numbness to anterolateral left foot and ankle as well as inability to dorsiflex left foot; etiology  unclear  -neurologist Dr Richardson consulted in hosp  -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   -MRI brain--no acute intracranial process  -still doing PT at [facility] -- continues to improve; dorsiflexion left foot 4/5; plantarflexion 5/5 this admission (3/26/25)     Interstitial pneumonitis/NSIP  -dx'ed 10/2024 - presented with cough and severe hypoxia  -unresponsive to abx   -CXR 10/24/24 extensive bilateral perihilar and bilateral upper and lower lobe   -S.pneumo, legionella Ag , mycoplasma IgM/G, Fungitell-beta-D glucan negative  -ANCA and URIEL/connective tissue disease panels negative  -anti-histone and anti-Keara Ab's negative  -CT with bilateral ground glass opacities, small consolidations of BLL  -per pulm, findings suspicious for NSIP (vs cryptogenic organizing pneumonia vs hypersensitivity pneumonitis); NOT thought to be c/w UIP pattern  -s/p empiric steroids (solumedrol then prednisone taper) 10/25/24 - (EOT 1/18/25)  -dyspnea completely resolved until this admission 3/26/25  -now on solumedrol 60mg x09n--wqprbeaws to 40mg q12h     Bilateral pulmonary emboli  -dx'ed at time of interstitial pneumonitis  -CT chest 10/25/24 -- acute distal segmental/subsegmental pulmonary emboli in RUL and subsegmental PE in [place]  -unclear etiology; no recent hx of long travel; no family/personal hx of blood clots  -BLE venous dopplers negative 10/26/24  -started xarelto 20 mg po every day on 11/28/24  -repeat CT chest 12/9/24 neg PE  -stopped Xarelto 12/14/24 due to recurrent GI bleed  -repeat CT chest 3/26/25 -- neg PE     Severe aortic stenosis  -progression on serial echocardiograms  -s/p TAVR 1/23/25 (Dr. Reji Green)  -symptomatically much improved -- POPE resolved     Hypertension, primary  -(dyazide stopped due to NANCY)  -(amlodipine held due to low BP in 11/2024)  -BP remains normal off meds     Hx of hip OA  -s/p  left THR (Dr. Lo) many years ago  -s/p elective R BEATRIZ on 10/5/23 by Dr. Diaz     Hyperlipidemia   Pt with strong family hx of high cholesterol  Started on Atorvastatin 20mg/day in 1/2025 (after noted to have CAD on cath)     Osteopenia   On Fosamax from 2011 to 4/2016.     DEXA stable 5/10/17 and 2019 and 2021  DEXA 8/2024 -- osteoporosis of left forearm  -restarted Fosamax 8/2024      Vitamin D deficiency  On vitamin D 4000u/day      VTE proph  -MILA Steel DO  4/2/2025  11:19 AM

## 2025-04-02 NOTE — PLAN OF CARE
Problem: Patient Centered Care  Goal: Patient preferences are identified and integrated in the patient's plan of care  Description: Interventions:- What would you like us to know as we care for you? From [employer] [place]  - Provide timely, complete, and accurate information to patient/family- Incorporate patient and family knowledge, values, beliefs, and cultural backgrounds into the planning and delivery of care- Encourage patient/family to participate in care and decision-making at the level they choose- Honor patient and family perspectives and choices  Outcome: Progressing     Problem: Patient/Family Goals  Goal: Patient/Family Long Term Goal  Description: Patient's Long Term Goal: to go home Interventions:- Monitor vital signs and labs  - Administer medications per order  - Follow MD orders  - Fall precautions  - Diagnostics as ordered  - Update / inform patient on plan of care  - Discharge planning- See additional Care Plan goals for specific interventions  Outcome: Progressing  Goal: Patient/Family Short Term Goal  Description: Patient's Short Term Goal: to feel better Interventions: -  See additional Care Plan goals for specific interventionsMonitor vital signs and labs  - Administer medications per order  - Follow MD orders  - Fall precautions  - Diagnostics as ordered  - Update / inform patient on plan of care  - Discharge planning  Outcome: Progressing     Problem: PAIN - ADULT  Goal: Verbalizes/displays adequate comfort level or patient's stated pain goal  Description: INTERVENTIONS:- Encourage pt to monitor pain and request assistance- Assess pain using appropriate pain scale- Administer analgesics based on type and severity of pain and evaluate response- Implement non-pharmacological measures as appropriate and evaluate response- Consider cultural and social influences on pain and pain management- Manage/alleviate anxiety- Utilize distraction and/or relaxation techniques- Monitor for opioid side  effects- Notify MD/LIP if interventions unsuccessful or patient reports new pain- Anticipate increased pain with activity and pre-medicate as appropriate  Outcome: Progressing     Problem: RISK FOR INFECTION - ADULT  Goal: Absence of fever/infection during anticipated neutropenic period  Description: INTERVENTIONS- Monitor WBC- Administer growth factors as ordered- Implement neutropenic guidelines  Outcome: Progressing     Problem: SAFETY ADULT - FALL  Goal: Free from fall injury  Description: INTERVENTIONS:- Assess pt frequently for physical needs- Identify cognitive and physical deficits and behaviors that affect risk of falls.- [place] fall precautions as indicated by assessment.- Educate pt/family on patient safety including physical limitations- Instruct pt to call for assistance with activity based on assessment- Modify environment to reduce risk of injury- Provide assistive devices as appropriate- Consider OT/PT consult to assist with strengthening/mobility- Encourage toileting schedule  Outcome: Progressing     Problem: DISCHARGE PLANNING  Goal: Discharge to home or other facility with appropriate resources  Description: INTERVENTIONS:- Identify barriers to discharge w/pt and caregiver- Include patient/family/discharge partner in discharge planning- Arrange for needed discharge resources and transportation as appropriate- Identify discharge learning needs (meds, wound care, etc)- Arrange for interpreters to assist at discharge as needed- Consider post-discharge preferences of patient/family/discharge partner- Complete POLST form as appropriate- Assess patient's ability to be responsible for managing their own health- Refer to Case Management Department for coordinating discharge planning if the patient needs post-hospital services based on physician/LIP order or complex needs related to functional status, cognitive ability or social support system  Outcome: Progressing     Problem: RESPIRATORY -  ADULT  Goal: Achieves optimal ventilation and oxygenation  Description: INTERVENTIONS:- Assess for changes in respiratory status- Assess for changes in mentation and behavior- Position to facilitate oxygenation and minimize respiratory effort- Oxygen supplementation based on oxygen saturation or ABGs- Provide Smoking Cessation handout, if applicable- Encourage broncho-pulmonary hygiene including cough, deep breathe, Incentive Spirometry- Assess the need for suctioning and perform as needed- Assess and instruct to report SOB or any respiratory difficulty- Respiratory Therapy support as indicated- Manage/alleviate anxiety- Monitor for signs/symptoms of CO2 retention  Outcome: Progressing     Problem: MUSCULOSKELETAL - ADULT  Goal: Return mobility to safest level of function  Description: INTERVENTIONS:- Assess patient stability and activity tolerance for standing, transferring and ambulating w/ or w/o assistive devices- Assist with transfers and ambulation using safe patient handling equipment as needed- Ensure adequate protection for wounds/incisions during mobilization- Obtain PT/OT consults as needed- Advance activity as appropriate- Communicate ordered activity level and limitations with patient/family  Outcome: Progressing  Goal: Maintain proper alignment of affected body part  Description: INTERVENTIONS:- Support and protect limb and body alignment per provider's orders- Instruct and reinforce with patient and family use of appropriate assistive device and precautions (e.g. spinal or hip dislocation precautions)  Outcome: Progressing     Problem: Diabetes/Glucose Control  Goal: Glucose maintained within prescribed range  Description: INTERVENTIONS:- Monitor Blood Glucose as ordered- Assess for signs and symptoms of hyperglycemia and hypoglycemia- Administer ordered medications to maintain glucose within target range- Assess barriers to adequate nutritional intake and initiate nutrition consult as needed-  Instruct patient on self management of diabetes  Outcome: Progressing

## 2025-04-02 NOTE — PLAN OF CARE
Problem: Patient Centered Care  Goal: Patient preferences are identified and integrated in the patient's plan of care  Description: Interventions:- What would you like us to know as we care for you- Provide timely, complete, and accurate information to patient/family- Incorporate patient and family knowledge, values, beliefs, and cultural backgrounds into the planning and delivery of care- Encourage patient/family to participate in care and decision-making at the level they choose- Honor patient and family perspectives and choices  Outcome: Progressing     Problem: Patient/Family Goals  Goal: Patient/Family Long Term Goal  Description: Patient's Long Term Goal: to go home Interventions:- Monitor vital signs and labs  - Administer medications per order  - Follow MD orders  - Fall precautions  - Diagnostics as ordered  - Update / inform patient on plan of care  - Discharge planning- See additional Care Plan goals for specific interventions  Outcome: Progressing  Goal: Patient/Family Short Term Goal  Description: Patient's Short Term Goal: to feel better Interventions: -  See additional Care Plan goals for specific interventionsMonitor vital signs and labs  - Administer medications per order  - Follow MD orders  - Fall precautions  - Diagnostics as ordered  - Update / inform patient on plan of care  - Discharge planning  Outcome: Progressing     Problem: PAIN - ADULT  Goal: Verbalizes/displays adequate comfort level or patient's stated pain goal  Description: INTERVENTIONS:- Encourage pt to monitor pain and request assistance- Assess pain using appropriate pain scale- Administer analgesics based on type and severity of pain and evaluate response- Implement non-pharmacological measures as appropriate and evaluate response- Consider cultural and social influences on pain and pain management- Manage/alleviate anxiety- Utilize distraction and/or relaxation techniques- Monitor for opioid side effects- Notify MD/LIP if  interventions unsuccessful or patient reports new pain- Anticipate increased pain with activity and pre-medicate as appropriate  Outcome: Progressing     Problem: RISK FOR INFECTION - ADULT  Goal: Absence of fever/infection during anticipated neutropenic period  Description: INTERVENTIONS- Monitor WBC- Administer growth factors as ordered- Implement neutropenic guidelines  Outcome: Progressing     Problem: SAFETY ADULT - FALL  Goal: Free from fall injury  Description: INTERVENTIONS:- Assess pt frequently for physical needs- Identify cognitive and physical deficits and behaviors that affect risk of falls.- [place] fall precautions as indicated by assessment.- Educate pt/family on patient safety including physical limitations- Instruct pt to call for assistance with activity based on assessment- Modify environment to reduce risk of injury- Provide assistive devices as appropriate- Consider OT/PT consult to assist with strengthening/mobility- Encourage toileting schedule  Outcome: Progressing     Problem: DISCHARGE PLANNING  Goal: Discharge to home or other facility with appropriate resources  Description: INTERVENTIONS:- Identify barriers to discharge w/pt and caregiver- Include patient/family/discharge partner in discharge planning- Arrange for needed discharge resources and transportation as appropriate- Identify discharge learning needs (meds, wound care, etc)- Arrange for interpreters to assist at discharge as needed- Consider post-discharge preferences of patient/family/discharge partner- Complete POLST form as appropriate- Assess patient's ability to be responsible for managing their own health- Refer to Case Management Department for coordinating discharge planning if the patient needs post-hospital services based on physician/LIP order or complex needs related to functional status, cognitive ability or social support system  Outcome: Progressing     Problem: RESPIRATORY - ADULT  Goal: Achieves optimal  ventilation and oxygenation  Description: INTERVENTIONS:- Assess for changes in respiratory status- Assess for changes in mentation and behavior- Position to facilitate oxygenation and minimize respiratory effort- Oxygen supplementation based on oxygen saturation or ABGs- Provide Smoking Cessation handout, if applicable- Encourage broncho-pulmonary hygiene including cough, deep breathe, Incentive Spirometry- Assess the need for suctioning and perform as needed- Assess and instruct to report SOB or any respiratory difficulty- Respiratory Therapy support as indicated- Manage/alleviate anxiety- Monitor for signs/symptoms of CO2 retention  Outcome: Progressing     Problem: MUSCULOSKELETAL - ADULT  Goal: Return mobility to safest level of function  Description: INTERVENTIONS:- Assess patient stability and activity tolerance for standing, transferring and ambulating w/ or w/o assistive devices- Assist with transfers and ambulation using safe patient handling equipment as needed- Ensure adequate protection for wounds/incisions during mobilization- Obtain PT/OT consults as needed- Advance activity as appropriate- Communicate ordered activity level and limitations with patient/family  Outcome: Progressing  Goal: Maintain proper alignment of affected body part  Description: INTERVENTIONS:- Support and protect limb and body alignment per provider's orders- Instruct and reinforce with patient and family use of appropriate assistive device and precautions (e.g. spinal or hip dislocation precautions)  Outcome: Progressing     Problem: Diabetes/Glucose Control  Goal: Glucose maintained within prescribed range  Description: INTERVENTIONS:- Monitor Blood Glucose as ordered- Assess for signs and symptoms of hyperglycemia and hypoglycemia- Administer ordered medications to maintain glucose within target range- Assess barriers to adequate nutritional intake and initiate nutrition consult as needed- Instruct patient on self management  of diabetes  Outcome: Progressing

## 2025-04-02 NOTE — PROGRESS NOTES
Progress Note  Hoda Busby Patient Status:  Inpatient    1940 MRN [id number]   Location [facility]5W Attending Malathi Stuart MD   Hosp Day # 7 PCP Malathi Stuart MD     Subjective:  Pt stated to be feeling much better than when she came in. SOB improved.     Objective:  /53 (BP Location: Right arm)   Pulse 79   Temp 97.7 °F (36.5 °C) (Oral)   Resp 18   Ht 5' 4\" (1.626 m)   Wt 174 lb 9.6 oz (79.2 kg)   SpO2 94%   BMI 29.97 kg/m²     Telemetry: NSR      Intake/Output:    Intake/Output Summary (Last 24 hours) at 2025 1457  Last data filed at 2025 1239  Gross per 24 hour   Intake 675 ml   Output 300 ml   Net 375 ml       Last 3 Weights   25 0500 174 lb 9.6 oz (79.2 kg)   25 0547 166 lb (75.3 kg)   25 0606 172 lb 11.2 oz (78.3 kg)   25 0441 174 lb 4.8 oz (79.1 kg)   25 0455 187 lb 8 oz (85 kg)   25 2158 183 lb 4.8 oz (83.1 kg)   25 1939 160 lb (72.6 kg)   25 1603 180 lb (81.6 kg)   25 0753 176 lb (79.8 kg)   25 1020 173 lb (78.5 kg)   25 1513 173 lb (78.5 kg)       Labs:  Recent Labs   Lab 25  0522 25  0530 25  0502   * 114*  114* 114*   BUN 34* 33*  33* 37*   CREATSERUM 0.99 1.00  1.00 1.08*   EGFRCR 56* 56*  56* 51*   CA 8.5* 8.5*  8.5* 8.5*    137  137 138   K 4.2 4.1  4.1 4.2    102  102 104   CO2 28.0 27.0  27.0 27.0     Recent Labs   Lab 25  1229 25  0523 25  0530   RBC 4.51 4.30 4.11   HGB 11.7* 10.9* 11.1*   HCT 37.2 35.1 33.8*   MCV 82.5 81.6 82.2   MCH 25.9* 25.3* 27.0   MCHC 31.5 31.1 32.8   RDW 17.4* 17.2* 16.9*   NEPRELIM 17.31* 12.86* 13.29*   WBC 19.5* 15.1* 15.8*   .0 263.0 245.0         Recent Labs   Lab 25  1939   TROPHS 58*     Lab Results   Component Value Date    INR 1.18 2025    INR 1.33 (H) 2025    INR 1.06 2023       Diagnostics:     Review of Systems   Respiratory:  Positive for shortness  of breath.    Cardiovascular: Negative.          Physical Exam:    Physical Exam  Vitals reviewed.   Constitutional:       General: She is not in acute distress.     Appearance: She is not ill-appearing.      Interventions: Nasal cannula in place.   Cardiovascular:      Rate and Rhythm: Normal rate and regular rhythm.      Heart sounds: S1 normal and S2 normal.      No friction rub. No gallop. No S3 or S4 sounds.      Comments: Mild edema  Pulmonary:      Effort: Pulmonary effort is normal. No respiratory distress.      Breath sounds: No stridor. No wheezing, rhonchi or rales.      Comments: Fine crakcles on bases  Chest:      Chest wall: No tenderness.   Abdominal:      Palpations: Abdomen is soft.   Musculoskeletal:      Right lower leg: Edema present.      Left lower leg: Edema present.   Skin:     General: Skin is warm and dry.   Neurological:      Mental Status: She is alert and oriented to person, place, and time.   Psychiatric:         Mood and Affect: Mood normal.         Medications:   furosemide  40 mg Intravenous BID (Diuretic)    methylPREDNISolone  40 mg Intravenous Q12H    cefTRIAXone  2 g Intravenous Q24H    azithromycin  500 mg Intravenous Q24H    vancomycin  125 mg Oral Daily    aspirin  81 mg Oral Daily    atorvastatin  20 mg Oral Nightly    pantoprazole  40 mg Oral QAM AC    heparin  5,000 Units Subcutaneous 2 times per day    insulin aspart  1-7 Units Subcutaneous TID CC    ferrous sulfate  325 mg Oral Daily with breakfast    docusate sodium  100 mg Oral BID         Assessment/Plan:    84yr old female patient with history of severe aortic stenosis s/p TAVR 1/2025, obstructive non revascularized CAD, HFpEF, interstitial pneumonitis, PE, colon cancer s/p left hemicolectomy presented with new onset productive cough and dyspnea and BLE edema.     Acute hypoxic respiratory failure( multifactorial: pneumonia/chronic nonspecific interstitial pneumonitis, CHF)   - on steroid, AB, diuresis  - Ct chest neg  of PE   - pulmonary following     Acute decompensated HFpEF  - pro BNP 4,423  - chest xray with mild cardiomegaly with interstitial pulmonary edema, ongoing aspiration/pneumonia/ trace left pleural effusion  - echo from 3/27 with LVEF 60-65%, no RWMA, G1DD, LA dilated, Bioprosthetic valve with mean systolic gradient of 15mmHg, PASP of 52mmHg. IVC dilated  - on Iv lasix 40mg daily  - inaccurate output documentation      CAD  - Trop of 58 on admission  -  [facility] 1/14/25: mild LAD diagonal focal bifurcation stenosis, moderately calcified, 80-90%, plan was for to medical management at that time with anemia and Gi procedure. Since pt will need uninterrupted DAPT post stent   - on aspirin, atorvasatin,      Severe aortic stenosis  s/p TAVR 1/23/25  - stable valve function per recent echo     H/o Splenic Flexure colon cancer   - s/p Robolic left hemicolectomy/mobilization of splenic flexure, omental flap closure 3/27/25     Chronic anemia  - hgb stable      Plan:  - continue with IV lasix 40mg BID  - strict intake and output monitoring  - daily weight  - eventual PCI as OP     Plan discussed with pt and RN     KALYAN Horne  [facility]  4/2/2025  2:57 PM    I have reviewed the above note and discussed the case with Jesica Trejo.  I agree with the findings, assessment and plan as documented above. I am the primary contributor to the assessment and plan.     Thank you for allowing me to take part in the care of Hoda OXANA Kehinde. Please call with any questions of concerns.    L3    Evaristo Villasenor,   [facility]   Interventional Cardiac and Vascular Services

## 2025-04-02 NOTE — PROGRESS NOTES
[facility]  part of [facility]    Progress Note      Assessment and Plan:   1.  Acute on chronic respiratory failure-patient has pulmonary edema superimposed on mild chronic nonspecific interstitial pneumonitis.    The patient is breathing much better than on admission.  However, the fluid balance remains positive over the last 2 days.  Yet with basilar crackles.  The interstitial findings on serial CT images have worsened markedly over the past 3 months suggesting significant component of edema, as pulmonary fibrosis and nonspecific pneumonitis do not progress in that fashion.  Small bilateral pleural effusions.    Recommendations:  1.  Ongoing diuresis-40 mg Lasix twice daily  2.  Steroid ongoing  3.  Continue short course antibiotic-can continue short course at discharge.  4.  Will follow clinically.    2.  Low moods-feeling better today.    3.  DVT prophylaxis-subcutaneous heparin    4.  Status post TAVR    5.  Colon cancer-status post robotic left hemicolectomy    6.  Coronary artery disease-PCI in the future.  Primary care has raised the possibility of accelerating the intervention.    Subjective:   Hoda Busby is a(n) 84 year old female who is breathing better than on admission but still with dyspnea and oxygen requirement.  Objective:   Blood pressure 119/53, pulse 79, temperature 97.7 °F (36.5 °C), temperature source Oral, resp. rate 18, height 5' 4\" (1.626 m), weight 174 lb 9.6 oz (79.2 kg), SpO2 94%.    Physical Exam alert white female  HEENT examination is unremarkable with pupils equal round and reactive to light and accommodation.   Neck without adenopathy, thyromegaly, JVD nor bruit.   Lungs diminished to auscultation and percussion.  Cardiac regular rate and rhythm no murmur.   Abdomen nontender, without hepatosplenomegaly and no mass appreciable.   Extremities without clubbing cyanosis nor edema.   Neurologic grossly intact with symmetric tone and strength and reflex.  Skin  without gross abnormality     Results:     Lab Results   Component Value Date    CREATSERUM 1.08 04/02/2025    BUN 37 04/02/2025     04/02/2025    K 4.2 04/02/2025     04/02/2025    CO2 27.0 04/02/2025     04/02/2025    CA 8.5 04/02/2025       Stanton Sanchez MD  Medical Director, Critical Care, [facility]  Medical Director, [facility]  Pager: [phone]

## 2025-04-03 NOTE — PROGRESS NOTES
[facility]  part of [facility]    Progress Note      Assessment and Plan:   1.  Acute on chronic respiratory failure-patient has pulmonary edema superimposed on mild chronic nonspecific interstitial pneumonitis.    The patient is breathing much better than on admission.  Yet with basilar crackles.  The interstitial findings on serial CT images have worsened markedly over the past 3 months suggesting significant component of edema, as pulmonary fibrosis and nonspecific pneumonitis do not progress in that fashion.  Small bilateral pleural effusions.  The patient is breathing fairly comfortably albeit still requiring supplemental oxygen.  The fluid balance is slightly negative today.  Will transition to oral antibiotic to further delineate fluid administration.  Will also transition to oral steroid.    Recommendations:  1.  Ongoing diuresis-40 mg Lasix twice daily  2.  Stop methylprednisolone and initiate prednisone  3.  Will discontinue IV antibiotics and initiate Augmentin orally.  4.  Will follow clinically.    2.  Low moods-much better.    3.  DVT prophylaxis-subcutaneous heparin    4.  Status post TAVR    5.  Colon cancer-status post robotic left hemicolectomy    6.  Coronary artery disease-PCI in the future.  Primary care has raised the possibility of accelerating the intervention.    Subjective:   Hoda Busby is a(n) 84 year old female who is breathing better than on admission but still with dyspnea and oxygen requirement.  Objective:   Blood pressure 118/56, pulse 80, temperature 97.6 °F (36.4 °C), temperature source Axillary, resp. rate 18, height 5' 4\" (1.626 m), weight 173 lb 9.6 oz (78.7 kg), SpO2 97%.    Physical Exam alert white female  HEENT examination is unremarkable with pupils equal round and reactive to light and accommodation.   Neck without adenopathy, thyromegaly, JVD nor bruit.   Lungs diminished with basilar crackles to auscultation and percussion.  Cardiac regular  rate and rhythm no murmur.   Abdomen nontender, without hepatosplenomegaly and no mass appreciable.   Extremities without clubbing cyanosis nor edema.   Neurologic grossly intact with symmetric tone and strength and reflex.  Skin without gross abnormality     Results:     Lab Results   Component Value Date    WBC 15.5 04/03/2025    HGB 10.9 04/03/2025    HCT 33.8 04/03/2025    .0 04/03/2025    CREATSERUM 1.05 04/03/2025    BUN 39 04/03/2025     04/03/2025    K 4.2 04/03/2025     04/03/2025    CO2 27.0 04/03/2025     04/03/2025    CA 8.3 04/03/2025       Stanton Sanchez MD  Medical Director, Critical Care, [facility]  Medical Director, [facility]  Pager: [phone]

## 2025-04-03 NOTE — PROGRESS NOTES
Antimicrobial Dose Adjustment for Amoxicillin     Indication:  Standard dose  Renal Function:  Recent Labs   Lab 04/01/25  0530 04/02/25  0502 04/03/25  0507   CREATSERUM 1.00  1.00 1.08* 1.05*      Estimated Creatinine Clearance: 34.4 mL/min (A) (based on SCr of 1.05 mg/dL (H)).   Renal Replacement:  No        Amoxicillin adjusted per P&T Protocol.

## 2025-04-03 NOTE — PLAN OF CARE
Problem: Patient Centered Care  Goal: Patient preferences are identified and integrated in the patient's plan of care  Description: Interventions:- What would you like us to know as we care for you? Im from [place] - Provide timely, complete, and accurate information to patient/family- Incorporate patient and family knowledge, values, beliefs, and cultural backgrounds into the planning and delivery of care- Encourage patient/family to participate in care and decision-making at the level they choose- Honor patient and family perspectives and choices  Outcome: Progressing     Problem: Patient/Family Goals  Goal: Patient/Family Long Term Goal  Description: Patient's Long Term Goal: to go home Interventions:- Monitor vital signs and labs  - Administer medications per order  - Follow MD orders  - Fall precautions  - Diagnostics as ordered  - Update / inform patient on plan of care  - Discharge planning- See additional Care Plan goals for specific interventions  Outcome: Progressing  Goal: Patient/Family Short Term Goal  Description: Patient's Short Term Goal: to feel better Interventions: -  See additional Care Plan goals for specific interventionsMonitor vital signs and labs  - Administer medications per order  - Follow MD orders  - Fall precautions  - Diagnostics as ordered  - Update / inform patient on plan of care  - Discharge planning  Outcome: Progressing     Problem: PAIN - ADULT  Goal: Verbalizes/displays adequate comfort level or patient's stated pain goal  Description: INTERVENTIONS:- Encourage pt to monitor pain and request assistance- Assess pain using appropriate pain scale- Administer analgesics based on type and severity of pain and evaluate response- Implement non-pharmacological measures as appropriate and evaluate response- Consider cultural and social influences on pain and pain management- Manage/alleviate anxiety- Utilize distraction and/or relaxation techniques- Monitor for opioid side  effects- Notify MD/LIP if interventions unsuccessful or patient reports new pain- Anticipate increased pain with activity and pre-medicate as appropriate  Outcome: Progressing     Problem: RISK FOR INFECTION - ADULT  Goal: Absence of fever/infection during anticipated neutropenic period  Description: INTERVENTIONS- Monitor WBC- Administer growth factors as ordered- Implement neutropenic guidelines  Outcome: Progressing     Problem: SAFETY ADULT - FALL  Goal: Free from fall injury  Description: INTERVENTIONS:- Assess pt frequently for physical needs- Identify cognitive and physical deficits and behaviors that affect risk of falls.- [place] fall precautions as indicated by assessment.- Educate pt/family on patient safety including physical limitations- Instruct pt to call for assistance with activity based on assessment- Modify environment to reduce risk of injury- Provide assistive devices as appropriate- Consider OT/PT consult to assist with strengthening/mobility- Encourage toileting schedule  Outcome: Progressing     Problem: DISCHARGE PLANNING  Goal: Discharge to home or other facility with appropriate resources  Description: INTERVENTIONS:- Identify barriers to discharge w/pt and caregiver- Include patient/family/discharge partner in discharge planning- Arrange for needed discharge resources and transportation as appropriate- Identify discharge learning needs (meds, wound care, etc)- Arrange for interpreters to assist at discharge as needed- Consider post-discharge preferences of patient/family/discharge partner- Complete POLST form as appropriate- Assess patient's ability to be responsible for managing their own health- Refer to Case Management Department for coordinating discharge planning if the patient needs post-hospital services based on physician/LIP order or complex needs related to functional status, cognitive ability or social support system  Outcome: Progressing     Problem: RESPIRATORY -  ADULT  Goal: Achieves optimal ventilation and oxygenation  Description: INTERVENTIONS:- Assess for changes in respiratory status- Assess for changes in mentation and behavior- Position to facilitate oxygenation and minimize respiratory effort- Oxygen supplementation based on oxygen saturation or ABGs- Provide Smoking Cessation handout, if applicable- Encourage broncho-pulmonary hygiene including cough, deep breathe, Incentive Spirometry- Assess the need for suctioning and perform as needed- Assess and instruct to report SOB or any respiratory difficulty- Respiratory Therapy support as indicated- Manage/alleviate anxiety- Monitor for signs/symptoms of CO2 retention  Outcome: Progressing     Problem: MUSCULOSKELETAL - ADULT  Goal: Return mobility to safest level of function  Description: INTERVENTIONS:- Assess patient stability and activity tolerance for standing, transferring and ambulating w/ or w/o assistive devices- Assist with transfers and ambulation using safe patient handling equipment as needed- Ensure adequate protection for wounds/incisions during mobilization- Obtain PT/OT consults as needed- Advance activity as appropriate- Communicate ordered activity level and limitations with patient/family  Outcome: Progressing  Goal: Maintain proper alignment of affected body part  Description: INTERVENTIONS:- Support and protect limb and body alignment per provider's orders- Instruct and reinforce with patient and family use of appropriate assistive device and precautions (e.g. spinal or hip dislocation precautions)  Outcome: Progressing     Problem: Diabetes/Glucose Control  Goal: Glucose maintained within prescribed range  Description: INTERVENTIONS:- Monitor Blood Glucose as ordered- Assess for signs and symptoms of hyperglycemia and hypoglycemia- Administer ordered medications to maintain glucose within target range- Assess barriers to adequate nutritional intake and initiate nutrition consult as needed-  Instruct patient on self management of diabetes  Outcome: Progressing

## 2025-04-03 NOTE — PROGRESS NOTES
[facility] Cardiology Progress Note    Hoda Busby Patient Status:  Inpatient    1940 MRN [id number]   Location [facility]5W Attending Malathi Stuart MD   Hosp Day # 8 PCP Malathi Stuart MD     Subjective:  Denies cp. Dyspnea improved . On  02 nc    Objective:  /47 (BP Location: Right arm)   Pulse 85   Temp 97.5 °F (36.4 °C) (Axillary)   Resp 18   Ht 162.6 cm (5' 4\")   Wt 173 lb 9.6 oz (78.7 kg)   SpO2 96%   BMI 29.80 kg/m²     Telemetry: NSR       Intake/Output:    Intake/Output Summary (Last 24 hours) at 4/3/2025 1153  Last data filed at 4/3/2025 1101  Gross per 24 hour   Intake 1128 ml   Output 1350 ml   Net -222 ml       Last 3 Weights   25 0958 173 lb 9.6 oz (78.7 kg)   25 0500 174 lb 9.6 oz (79.2 kg)   25 0547 166 lb (75.3 kg)   25 0606 172 lb 11.2 oz (78.3 kg)   25 0441 174 lb 4.8 oz (79.1 kg)   25 0455 187 lb 8 oz (85 kg)   25 2158 183 lb 4.8 oz (83.1 kg)   25 1939 160 lb (72.6 kg)   25 1603 180 lb (81.6 kg)   25 0753 176 lb (79.8 kg)   25 1020 173 lb (78.5 kg)   25 1513 173 lb (78.5 kg)       Labs:  Recent Labs   Lab 25  0530 25  0502 25  0507   *  114* 114* 115*   BUN 33*  33* 37* 39*   CREATSERUM 1.00  1.00 1.08* 1.05*   EGFRCR 56*  56* 51* 52*   CA 8.5*  8.5* 8.5* 8.3*     137 138 136   K 4.1  4.1 4.2 4.2     102 104 101   CO2 27.0  27.0 27.0 27.0     Recent Labs   Lab 25  0530 25  0528 25  0507   RBC 4.11 4.22 4.11   HGB 11.1* 10.8* 10.9*   HCT 33.8* 36.3 33.8*   MCV 82.2 86.0 82.2   MCH 27.0 25.6* 26.5   MCHC 32.8 29.8* 32.2   RDW 16.9* 17.6* 17.2*   NEPRELIM 13.29* 14.68* 13.55*   WBC 15.8* 17.0* 15.5*   .0 269.0 217.0         No results for input(s): \"TROP\", \"TROPHS\", \"CK\" in the last 168 hours.    Diagnostics:     3/27/25 Echo  1. Left ventricle: The cavity size was normal. Wall thickness was normal.       Systolic function was normal. The estimated ejection fraction was 60-65%.      No diagnostic evidence for regional wall motion abnormalities. Doppler      parameters are consistent with abnormal left ventricular relaxation -      grade 1 diastolic dysfunction.   2. Right ventricle: The cavity size was increased. Systolic function was      normal.   3. Left atrium: The atrium was mildly to moderately dilated. The left atrial      volume was mildly to moderately increased.   4. Aortic valve: A bioprosthetic valve is present. Mcnamara PUNEET S3 23mm      (TAVR) bioprosthesis was present. The peak systolic velocity was      2.69m/sec. The mean systolic gradient was 15mm Hg. The valve area (VTI)      was 1.52cm^2.   5. Pulmonary arteries: Systolic pressure was moderately increased, estimated      to be 52mm Hg.     Review of Systems   Respiratory:  Negative for shortness of breath and wheezing.    Cardiovascular:  Positive for leg swelling. Negative for chest pain, palpitations and orthopnea.     Physical Exam:    General: Alert and oriented x 3. No apparent distress.   HEENT: Normocephalic, anicteric sclera, neck supple, no thyromegaly or adenopathy.  Neck: No JVD, carotids 2+, no bruits.  Cardiac: Regular rate & rhythm. S1, S2 normal. No murmur, pericardial rub, S3, or extra cardiac sounds.  Lungs: Clear without wheezes, rales, rhonchi or dullness.  Normal excursions and effort.  Abdomen: Soft, non-tender. No organosplenomegally, mass or rebound, BS-present.  Extremities: Without clubbing or cyanosis.  Trivial BLE edema   Neurologic: Alert and oriented, normal affect. No focal defects  Skin: Warm and dry.       Medications:   furosemide  40 mg Intravenous BID (Diuretic)    methylPREDNISolone  40 mg Intravenous Q12H    cefTRIAXone  2 g Intravenous Q24H    azithromycin  500 mg Intravenous Q24H    vancomycin  125 mg Oral Daily    aspirin  81 mg Oral Daily    atorvastatin  20 mg Oral Nightly    pantoprazole  40 mg Oral QAM  AC    heparin  5,000 Units Subcutaneous 2 times per day    insulin aspart  1-7 Units Subcutaneous TID CC    ferrous sulfate  325 mg Oral Daily with breakfast    docusate sodium  100 mg Oral BID         Assessment:    84yr old female patient with history of severe aortic stenosis s/p TAVR 1/2025, obstructive non revascularized CAD, HFpEF, interstitial pneumonitis, PE, colon cancer s/p left hemicolectomy presented with new onset productive cough and dyspnea and BLE edema.     Acute hypoxic respiratory failure (multifactorial: pneumonia/chronic nonspecific interstitial pneumonitis, CHF)   - on steroids, IV antibiotics, diuresis  - Ct chest neg for PE   - pulmonary following     Acute decompensated HFpEF  - pBNP 4,423 & CXR w/ CHF   - echo w/ LVEF 60-65%, no RWMA, G1DD, LA dilated, Bioprosthetic aortic valve with mean systolic gradient of 15mmHg, PASP of 52mmHg. IVC dilated  - on Iv lasix 40mg twice daily. Scr stable. I/o inaccurate & wts inconsistent ; however pt has clinically improved      CAD  - Trop of 58 on admission  -  [facility] 1/14/25: mild LAD diagonal focal bifurcation stenosis, moderately calcified, 80-90%, plan was for to medical management at that time with anemia and Gi procedure. Since pt will need uninterrupted DAPT post stent   - on aspirin, atorvasatin,      Severe aortic stenosis  s/p TAVR 1/23/25  - stable valve function per recent echo     H/o Splenic Flexure colon cancer   - s/p Robolic left hemicolectomy/mobilization of splenic flexure, omental flap closure 3/27/25     Chronic anemia  - hgb stable        Plan:    Volume status improving. Scr stable. Continue ivp lasix. Likely transition to po tomorrow   Monitor strict I/o, daily wts & renal fx closely   Eventual PCI as OP when clinically stable     WINSTON Joseph  4/3/2025  11:53 AM  Ph [phone] (Kevin)  Ph [phone] ([place])

## 2025-04-03 NOTE — PROGRESS NOTES
[facility]  part of [facility]    Progress Note    Hoda Busby Patient Status:  Inpatient    1940 MRN [id number]   Location [facility]5W Attending Malathi Stuart MD   Hosp Day # 8 PCP Malathi Stuart MD       SUBJECTIVE:  Feeling better today    OBJECTIVE:  /47 (BP Location: Right arm)   Pulse 85   Temp 97.5 °F (36.4 °C) (Axillary)   Resp 18   Ht 5' 4\" (1.626 m)   Wt 173 lb 9.6 oz (78.7 kg)   SpO2 96%   BMI 29.80 kg/m²     Intake/Output:  I/O last 3 completed shifts:  In: 1382 [P.O.:1002; I.V.:30; IV PIGGYBACK:350]  Out: 1150 [Urine:1150]    Wt Readings from Last 3 Encounters:   25 173 lb 9.6 oz (78.7 kg)   25 180 lb (81.6 kg)   25 173 lb (78.5 kg)       Exam  Gen: No acute distress, alert and oriented x3  Pulm: L basilar crackles  CV: Heart RRR 4/6 systolic murmur  Abd: Abdomen soft, nontender, nondistended, no organomegaly, bowel sounds present  MSK: Full range of motion in extremities, no clubbing, no cyanosis, no edema  Skin: no rashes or lesions  Neuro: A&O x 3, 5/5 strength in all 4 extremities.     Labs:   Recent Labs   Lab 25  0530 25  0528 25  0507   RBC 4.11 4.22 4.11   HGB 11.1* 10.8* 10.9*   HCT 33.8* 36.3 33.8*   MCV 82.2 86.0 82.2   MCH 27.0 25.6* 26.5   MCHC 32.8 29.8* 32.2   RDW 16.9* 17.6* 17.2*   NEPRELIM 13.29* 14.68* 13.55*   WBC 15.8* 17.0* 15.5*   .0 269.0 217.0         Recent Labs   Lab 25  0530 25  0502 25  0507   *  114* 114* 115*   BUN 33*  33* 37* 39*   CREATSERUM 1.00  1.00 1.08* 1.05*   EGFRCR 56*  56* 51* 52*   CA 8.5*  8.5* 8.5* 8.3*   ALB 3.1*  --   --      137 138 136   K 4.1  4.1 4.2 4.2     102 104 101   CO2 27.0  27.0 27.0 27.0   ALKPHO 86  --   --    AST 47*  --   --    ALT 42  --   --    BILT 0.6  --   --    TP 5.4*  --   --          Imaging:  XR CHEST AP PORTABLE  (CPT=71045)    Result Date: 4/3/2025  CONCLUSION:  1. Cardiomegaly.   TAVR. 2. Bilateral mixed alveolar and interstitial airspace disease redemonstrated.    Dictated by (CST): Nguyễn Montenegro MD on 4/03/2025 at 9:13 AM     Finalized by (CST): Nguyễn Montenegro MD on 4/03/2025 at 9:16 AM                 Assessment and Plan:               Acute hypoxic respiratory failure (HCC)  -ddx: CHF, recurrent interstitial pneumonitis  -CT chest in ED 3/26/25 - no PE; worsening of pleural effusions and interstitial edema  -started on IV lasix and IV solumedrol to cover both CHF and possible recurrence of NSIP respectively  -required BiPAP transiently  -procalc sl elevated 0.25; WBC 19.8 K on admission  -recent echo 2/5/25 - normal LV systolic function (EF 60-65%); grade 2 diastolic dysfunction; normally functioning bioprosthetic TAVR; sl incr PAP 44mmHg  -repeat echo 3/27/25 stable from 2/2025 except incr PAP to 52mmHg  -cards consult appreciated  -diuresing well on Lasix 40mg IV BID (net -3L); creat up slightly; monitor closely  -pt notes improvement in breathing though still not back to baseline; still requiring 10L O2  -pt with audible wet, loose cough 3/28; 3/27 pt denied any increase in her baseline cough, but when questioned again today, she states that her cough has been worse in the past 3-4 days.  -discussed with Dr. Holloway; will start ceftriaxone and azitho; po vanco for CDP (day 2)  -further recs per pulm Dr. Holloway, cards Dr. Wood  -respiratory status continues to improve; lasix changed to 20mg ivp since 3/29 d/t hypotension  -has distal BLE edema--improved with compression stockings  Leukocytosis likely 2/2 steroids;improving  -overall better than yesterday; lasix 40mg bid; continue to wean O2 as tolerated        Adenocarcinoma of colon  -Bx of transverse colon polyp 12/17/24 -- invasive, moderately differentiated adenocarcinoma  -CEA normal (1.9)  -CT a/p neg for mets  -surgery delayed in anticipation of TAVR (done 1/23/25)  -s/p robotic assisted left hemicolectomy (  [facility]) - path - tumor invades into muscularis; margins neg for tumor; all 19 LN's neg for mets (0/19); pT2, pN0  -next appt with Dr. Cassidy 4/16/25     Coronary artery disease  -Cath 1/14/25 by Dr. Bernardo Liz (as w/u for TAVR) -- RCA non-obstructive; LM free of obst disease; LM plaque extending into ostium of LAD (20-30%); mid LAD (80-90%), cx ostium (60-70%)  -intervention delayed until after colon resection due to need for DAPT x 6 mos after stenting   -on Atorvastatin 20mg/day, ASA 81mg/day  -pt had upcoming appt w/Dr. Bird this month 3/2025 to discuss intervention; timing of intervention per cardiology team     Hx  GI bleed  -EGD 11/26/24 (Dr. Nuno) -15mm nonbleeding gastric antral ulcer; 3mm gastric antral AVM s/p APC  -recurrence in 12/2024  -colonoscopy and repeat EGD 12/17/24 by Dr. Staton -- grade 1 esophagitis; duod ulcers healing; large flat polyp in transverse colon (carcinoma)   -received total of 3 units PRBCs   -Xarelto stopped in 12/2024  -s/p omeprazole 20mg BID x 8 weeks (EOT 1/22/25), now on omeprazole 20mg daily -- would continue until she completes her upcoming coronary stenting     Anemia due to acute blood loss  -GI bleed as above  -on ferrous sulfate 325mg/day  -Hgb 10.8 but stable     Hx chronic mild BLE edema  -Lasix 20mg/day     Acute left peroneal palsy  In Nov 2024; had numbness to anterolateral left foot and ankle as well as inability to dorsiflex left foot; etiology unclear  -neurologist Dr Richardson consulted in hosp  -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   -MRI brain--no acute intracranial process  -still doing PT at [facility] -- continues to improve; dorsiflexion left foot 4/5; plantarflexion 5/5 this admission (3/26/25)     Interstitial pneumonitis/NSIP  -dx'ed 10/2024 - presented with cough and severe hypoxia  -unresponsive to abx   -CXR 10/24/24 extensive bilateral  perihilar and bilateral upper and lower lobe   -S.pneumo, legionella Ag , mycoplasma IgM/G, Fungitell-beta-D glucan negative  -ANCA and URIEL/connective tissue disease panels negative  -anti-histone and anti-[place] Ab's negative  -CT with bilateral ground glass opacities, small consolidations of BLL  -per pulm, findings suspicious for NSIP (vs cryptogenic organizing pneumonia vs hypersensitivity pneumonitis); NOT thought to be c/w UIP pattern  -s/p empiric steroids (solumedrol then prednisone taper) 10/25/24 - (EOT 1/18/25)  -dyspnea completely resolved until this admission 3/26/25  -now on solumedrol 60mg w26e--hybymmlpz to 40mg q12h     Bilateral pulmonary emboli  -dx'ed at time of interstitial pneumonitis  -CT chest 10/25/24 -- acute distal segmental/subsegmental pulmonary emboli in [place] and subsegmental PE in [place]  -unclear etiology; no recent hx of long travel; no family/personal hx of blood clots  -BLE venous dopplers negative 10/26/24  -started xarelto 20 mg po every day on 11/28/24  -repeat CT chest 12/9/24 neg PE  -stopped Xarelto 12/14/24 due to recurrent GI bleed  -repeat CT chest 3/26/25 -- neg PE     Severe aortic stenosis  -progression on serial echocardiograms  -s/p TAVR 1/23/25 (Dr. Reji Green)  -symptomatically much improved -- POPE resolved     Hypertension, primary  -(dyazide stopped due to NANCY)  -(amlodipine held due to low BP in 11/2024)  -BP remains normal off meds     Hx of hip OA  -s/p left THR (Dr. Lo) many years ago  -s/p elective R BEATRIZ on 10/5/23 by Dr. Diaz     Hyperlipidemia   Pt with strong family hx of high cholesterol  Started on Atorvastatin 20mg/day in 1/2025 (after noted to have CAD on cath)     Osteopenia   On Fosamax from 2011 to 4/2016.     DEXA stable 5/10/17 and 2019 and 2021  DEXA 8/2024 -- osteoporosis of left forearm  -restarted Fosamax 8/2024      Vitamin D deficiency  On vitamin D 4000u/day      VTE proph  -[facility]           Nadia Steel DO  4/3/2025  11:58 AM

## 2025-04-03 NOTE — PLAN OF CARE
Problem: Patient Centered Care  Goal: Patient preferences are identified and integrated in the patient's plan of care  Description: Interventions:- What would you like us to know as we care for you? - Provide timely, complete, and accurate information to patient/family- Incorporate patient and family knowledge, values, beliefs, and cultural backgrounds into the planning and delivery of care- Encourage patient/family to participate in care and decision-making at the level they choose- Honor patient and family perspectives and choices  Outcome: Progressing     Problem: Patient/Family Goals  Goal: Patient/Family Long Term Goal  Description: Patient's Long Term Goal: to go home Interventions:- Monitor vital signs and labs  - Administer medications per order  - Follow MD orders  - Fall precautions  - Diagnostics as ordered  - Update / inform patient on plan of care  - Discharge planning- See additional Care Plan goals for specific interventions  Outcome: Progressing  Goal: Patient/Family Short Term Goal  Description: Patient's Short Term Goal: to feel better Interventions: -  See additional Care Plan goals for specific interventionsMonitor vital signs and labs  - Administer medications per order  - Follow MD orders  - Fall precautions  - Diagnostics as ordered  - Update / inform patient on plan of care  - Discharge planning  Outcome: Progressing     Problem: PAIN - ADULT  Goal: Verbalizes/displays adequate comfort level or patient's stated pain goal  Description: INTERVENTIONS:- Encourage pt to monitor pain and request assistance- Assess pain using appropriate pain scale- Administer analgesics based on type and severity of pain and evaluate response- Implement non-pharmacological measures as appropriate and evaluate response- Consider cultural and social influences on pain and pain management- Manage/alleviate anxiety- Utilize distraction and/or relaxation techniques- Monitor for opioid side effects- Notify MD/LIP if  interventions unsuccessful or patient reports new pain- Anticipate increased pain with activity and pre-medicate as appropriate  Outcome: Progressing     Problem: RISK FOR INFECTION - ADULT  Goal: Absence of fever/infection during anticipated neutropenic period  Description: INTERVENTIONS- Monitor WBC- Administer growth factors as ordered- Implement neutropenic guidelines  Outcome: Progressing     Problem: SAFETY ADULT - FALL  Goal: Free from fall injury  Description: INTERVENTIONS:- Assess pt frequently for physical needs- Identify cognitive and physical deficits and behaviors that affect risk of falls.- [place] fall precautions as indicated by assessment.- Educate pt/family on patient safety including physical limitations- Instruct pt to call for assistance with activity based on assessment- Modify environment to reduce risk of injury- Provide assistive devices as appropriate- Consider OT/PT consult to assist with strengthening/mobility- Encourage toileting schedule  Outcome: Progressing     Problem: DISCHARGE PLANNING  Goal: Discharge to home or other facility with appropriate resources  Description: INTERVENTIONS:- Identify barriers to discharge w/pt and caregiver- Include patient/family/discharge partner in discharge planning- Arrange for needed discharge resources and transportation as appropriate- Identify discharge learning needs (meds, wound care, etc)- Arrange for interpreters to assist at discharge as needed- Consider post-discharge preferences of patient/family/discharge partner- Complete POLST form as appropriate- Assess patient's ability to be responsible for managing their own health- Refer to Case Management Department for coordinating discharge planning if the patient needs post-hospital services based on physician/LIP order or complex needs related to functional status, cognitive ability or social support system  Outcome: Progressing     Problem: RESPIRATORY - ADULT  Goal: Achieves optimal  ventilation and oxygenation  Description: INTERVENTIONS:- Assess for changes in respiratory status- Assess for changes in mentation and behavior- Position to facilitate oxygenation and minimize respiratory effort- Oxygen supplementation based on oxygen saturation or ABGs- Provide Smoking Cessation handout, if applicable- Encourage broncho-pulmonary hygiene including cough, deep breathe, Incentive Spirometry- Assess the need for suctioning and perform as needed- Assess and instruct to report SOB or any respiratory difficulty- Respiratory Therapy support as indicated- Manage/alleviate anxiety- Monitor for signs/symptoms of CO2 retention  Outcome: Progressing     Problem: MUSCULOSKELETAL - ADULT  Goal: Return mobility to safest level of function  Description: INTERVENTIONS:- Assess patient stability and activity tolerance for standing, transferring and ambulating w/ or w/o assistive devices- Assist with transfers and ambulation using safe patient handling equipment as needed- Ensure adequate protection for wounds/incisions during mobilization- Obtain PT/OT consults as needed- Advance activity as appropriate- Communicate ordered activity level and limitations with patient/family  Outcome: Progressing  Goal: Maintain proper alignment of affected body part  Description: INTERVENTIONS:- Support and protect limb and body alignment per provider's orders- Instruct and reinforce with patient and family use of appropriate assistive device and precautions (e.g. spinal or hip dislocation precautions)  Outcome: Progressing     Problem: Diabetes/Glucose Control  Goal: Glucose maintained within prescribed range  Description: INTERVENTIONS:- Monitor Blood Glucose as ordered- Assess for signs and symptoms of hyperglycemia and hypoglycemia- Administer ordered medications to maintain glucose within target range- Assess barriers to adequate nutritional intake and initiate nutrition consult as needed- Instruct patient on self management  of diabetes  Outcome: Progressing

## 2025-04-04 NOTE — PLAN OF CARE
Problem: Patient Centered Care  Goal: Patient preferences are identified and integrated in the patient's plan of care  Description: Interventions:- What would you like us to know as we care for you? - Provide timely, complete, and accurate information to patient/family- Incorporate patient and family knowledge, values, beliefs, and cultural backgrounds into the planning and delivery of care- Encourage patient/family to participate in care and decision-making at the level they choose- Honor patient and family perspectives and choices  Outcome: Progressing     Problem: Patient/Family Goals  Goal: Patient/Family Long Term Goal  Description: Patient's Long Term Goal: to go home Interventions:- Monitor vital signs and labs  - Administer medications per order  - Follow MD orders  - Fall precautions  - Diagnostics as ordered  - Update / inform patient on plan of care  - Discharge planning- See additional Care Plan goals for specific interventions  Outcome: Progressing  Goal: Patient/Family Short Term Goal  Description: Patient's Short Term Goal: to feel better Interventions: -  See additional Care Plan goals for specific interventionsMonitor vital signs and labs  - Administer medications per order  - Follow MD orders  - Fall precautions  - Diagnostics as ordered  - Update / inform patient on plan of care  - Discharge planning  Outcome: Progressing     Problem: PAIN - ADULT  Goal: Verbalizes/displays adequate comfort level or patient's stated pain goal  Description: INTERVENTIONS:- Encourage pt to monitor pain and request assistance- Assess pain using appropriate pain scale- Administer analgesics based on type and severity of pain and evaluate response- Implement non-pharmacological measures as appropriate and evaluate response- Consider cultural and social influences on pain and pain management- Manage/alleviate anxiety- Utilize distraction and/or relaxation techniques- Monitor for opioid side effects- Notify MD/LIP if  interventions unsuccessful or patient reports new pain- Anticipate increased pain with activity and pre-medicate as appropriate  Outcome: Progressing     Problem: RISK FOR INFECTION - ADULT  Goal: Absence of fever/infection during anticipated neutropenic period  Description: INTERVENTIONS- Monitor WBC- Administer growth factors as ordered- Implement neutropenic guidelines  Outcome: Progressing     Problem: SAFETY ADULT - FALL  Goal: Free from fall injury  Description: INTERVENTIONS:- Assess pt frequently for physical needs- Identify cognitive and physical deficits and behaviors that affect risk of falls.- [place] fall precautions as indicated by assessment.- Educate pt/family on patient safety including physical limitations- Instruct pt to call for assistance with activity based on assessment- Modify environment to reduce risk of injury- Provide assistive devices as appropriate- Consider OT/PT consult to assist with strengthening/mobility- Encourage toileting schedule  Outcome: Progressing     Problem: DISCHARGE PLANNING  Goal: Discharge to home or other facility with appropriate resources  Description: INTERVENTIONS:- Identify barriers to discharge w/pt and caregiver- Include patient/family/discharge partner in discharge planning- Arrange for needed discharge resources and transportation as appropriate- Identify discharge learning needs (meds, wound care, etc)- Arrange for interpreters to assist at discharge as needed- Consider post-discharge preferences of patient/family/discharge partner- Complete POLST form as appropriate- Assess patient's ability to be responsible for managing their own health- Refer to Case Management Department for coordinating discharge planning if the patient needs post-hospital services based on physician/LIP order or complex needs related to functional status, cognitive ability or social support system  Outcome: Progressing     Problem: RESPIRATORY - ADULT  Goal: Achieves optimal  ventilation and oxygenation  Description: INTERVENTIONS:- Assess for changes in respiratory status- Assess for changes in mentation and behavior- Position to facilitate oxygenation and minimize respiratory effort- Oxygen supplementation based on oxygen saturation or ABGs- Provide Smoking Cessation handout, if applicable- Encourage broncho-pulmonary hygiene including cough, deep breathe, Incentive Spirometry- Assess the need for suctioning and perform as needed- Assess and instruct to report SOB or any respiratory difficulty- Respiratory Therapy support as indicated- Manage/alleviate anxiety- Monitor for signs/symptoms of CO2 retention  Outcome: Progressing     Problem: MUSCULOSKELETAL - ADULT  Goal: Return mobility to safest level of function  Description: INTERVENTIONS:- Assess patient stability and activity tolerance for standing, transferring and ambulating w/ or w/o assistive devices- Assist with transfers and ambulation using safe patient handling equipment as needed- Ensure adequate protection for wounds/incisions during mobilization- Obtain PT/OT consults as needed- Advance activity as appropriate- Communicate ordered activity level and limitations with patient/family  Outcome: Progressing  Goal: Maintain proper alignment of affected body part  Description: INTERVENTIONS:- Support and protect limb and body alignment per provider's orders- Instruct and reinforce with patient and family use of appropriate assistive device and precautions (e.g. spinal or hip dislocation precautions)  Outcome: Progressing     Problem: Diabetes/Glucose Control  Goal: Glucose maintained within prescribed range  Description: INTERVENTIONS:- Monitor Blood Glucose as ordered- Assess for signs and symptoms of hyperglycemia and hypoglycemia- Administer ordered medications to maintain glucose within target range- Assess barriers to adequate nutritional intake and initiate nutrition consult as needed- Instruct patient on self management  of diabetes  Outcome: Progressing

## 2025-04-04 NOTE — PROGRESS NOTES
[facility]  part of [facility]     Progress Note    Hoda Busby Patient Status:  Inpatient    1940 MRN [id number]   Location [facility]W Attending Malathi Stuart MD   Hosp Day # 9 PCP Malathi Stuart MD       Subjective:   Patient seen and examined.  Admits to gradual improvement in dyspnea.  Hospital course although still with significant desaturation with exertion.  Resting comfortably on 2 L.    Objective:   Blood pressure 111/54, pulse 87, temperature 97.8 °F (36.6 °C), temperature source Axillary, resp. rate 16, height 5' 4\" (1.626 m), weight 172 lb 14.4 oz (78.4 kg), SpO2 100%.  Intake/Output:   Last 3 shifts: I/O last 3 completed shifts:  In: 1128 [P.O.:1078; I.V.:50]  Out: 2100 [Urine:2100]   This shift: I/O this shift:  In: 265 [P.O.:250; I.V.:15]  Out: 300 [Urine:300]     Vent Settings:      Hemodynamic parameters (last 24 hours):      Scheduled Meds:   Current Facility-Administered Medications   Medication Dose Route Frequency    methylPREDNISolone sodium succinate (Solu-MEDROL) injection 40 mg  40 mg Intravenous Daily    [START ON 2025] furosemide (Lasix) tab 40 mg  40 mg Oral Daily    amoxicillin clavulanate (Augmentin) 500-125 MG per tab 500 mg  500 mg Oral Q8H    polyethylene glycol (PEG 3350) (Miralax) 17 g oral packet 17 g  17 g Oral Daily PRN    guaiFENesin (Robitussin) 100 MG/5 ML oral liquid 200 mg  200 mg Oral Q4H PRN    benzocaine-menthol (Cepacol) lozenge 1 lozenge  1 lozenge Oral PRN    vancomycin (Vancocin) cap 125 mg  125 mg Oral Daily    acetaminophen (Tylenol) tab 325 mg  325 mg Oral Q6H PRN    aspirin DR tab 81 mg  81 mg Oral Daily    atorvastatin (Lipitor) tab 20 mg  20 mg Oral Nightly    pantoprazole (Protonix) DR tab 40 mg  40 mg Oral QAM AC    traMADol (Ultram) tab 50 mg  50 mg Oral Q6H PRN    ipratropium-albuterol (Duoneb) 0.5-2.5 (3) MG/3ML inhalation solution 3 mL  3 mL Nebulization Q6H PRN    heparin (Porcine) 5000 UNIT/ML  injection 5,000 Units  5,000 Units Subcutaneous 2 times per day    glucose (Dex4) 15 GM/59ML oral liquid 15 g  15 g Oral Q15 Min PRN    Or    glucose (Glutose) 40% oral gel 15 g  15 g Oral Q15 Min PRN    Or    glucose-vitamin C (Dex-4) chewable tab 4 tablet  4 tablet Oral Q15 Min PRN    Or    dextrose 50% injection 50 mL  50 mL Intravenous Q15 Min PRN    Or    glucose (Dex4) 15 GM/59ML oral liquid 30 g  30 g Oral Q15 Min PRN    Or    glucose (Glutose) 40% oral gel 30 g  30 g Oral Q15 Min PRN    Or    glucose-vitamin C (Dex-4) chewable tab 8 tablet  8 tablet Oral Q15 Min PRN    insulin aspart (NovoLOG) 100 Units/mL FlexPen 1-7 Units  1-7 Units Subcutaneous TID CC    ferrous sulfate DR tab 325 mg  325 mg Oral Daily with breakfast    docusate sodium (Colace) cap 100 mg  100 mg Oral BID       Continuous Infusions:     Physical Exam  Constitutional: no acute distress  Eyes: PERRL  ENT: nares pateint  Neck: supple, no JVD  Cardio: RRR, S1 S2  Respiratory: Basilar crackles  GI: abdomen soft, non tender, active bowel sounds, no organomegaly  Extremities: no clubbing, cyanosis, + trace lower extremity edema   Neurologic: no gross motor deficits  Skin: warm, dry      Results:     Lab Results   Component Value Date    WBC 16.0 04/04/2025    HGB 10.5 04/04/2025    HCT 32.1 04/04/2025    .0 04/04/2025    CREATSERUM 1.12 04/04/2025    BUN 37 04/04/2025     04/04/2025    K 3.7 04/04/2025     04/04/2025    CO2 30.0 04/04/2025    GLU 84 04/04/2025    CA 8.4 04/04/2025       CT CHEST HI RESOLUTION (CPT=71250)    Result Date: 4/4/2025  CONCLUSION:   1. 1. Pulmonary edema and bilateral pleural effusions are significantly improved.  There is persistent trace right pleural effusion.  2. Advanced interstitial lung disease is noted in an NSIP (nonspecific insterstitial pneumonitis) pattern.  This limits evaluation for pulmonary nodules.  3. Additional chronic or incidental findings are described in the body of this  report.     Dictated by (CST): Miky Lowery MD on 4/04/2025 at 12:24 PM     Finalized by (CST): Miky Lowery MD on 4/04/2025 at 12:31 PM          XR CHEST AP PORTABLE  (CPT=71045)    Result Date: 4/3/2025  CONCLUSION:  1. Cardiomegaly.  TAVR. 2. Bilateral mixed alveolar and interstitial airspace disease redemonstrated.    Dictated by (CST): Nguyễn Montenegro MD on 4/03/2025 at 9:13 AM     Finalized by (CST): Nguyễn Montenegro MD on 4/03/2025 at 9:16 AM                 Assessment   1.  Acute hypoxemic respiratory failure  2.  ILD  3.  Small pleural effusions  4.  Pulmonary edema  5.  Colon adenocarcinoma status post hemicolectomy  6.  Coronary artery disease  7.  Prior history of GI bleed  8.  Prior VTE  9.  History of severe aortic stenosis status post TAVR  10.  Hypertension     Plan   -Patient presents with progressive worsening dyspnea and acute hypoxemic respiratory failure.  -CT chest on presentation on 3/26/2025 with no evidence of acute pulmonary embolism seen.  Evidence of small pleural effusions with groundglass opacities suggestive more likely of edema with worsening compared to 6 days prior from CT chest.  Some background of fibrotic changes present.  -Repeat CT high-resolution chest on 4/4/2025 with improvement in pulmonary edema and pleural effusions.  Underlying ILD changes present.  -Gradually wean steroid therapy.  -Continue diuresis as tolerated although appears to be significantly improved from pulmonary edema perspective  -Prior history of underlying ILD with hospitalization October 2024 responded to tapering steroid therapy at the time.  -Pulmonary function testing from February 2025 with mild restriction seen with significant decrease in diffusion capacity.  -Wean oxygen as tolerated.  Tolerating 2 L at rest but significant hypoxia with exertion.  -Complete course of antibiotic therapy with Augmentin.  -DVT prophylaxis: Heparin  - Discussed with primary care physician.    Cody Holloway  DO  Pulmonary Critical Care Medicine  [facility]

## 2025-04-04 NOTE — PROGRESS NOTES
[facility]  part of [facility]    Progress Note    Hoda Busby Patient Status:  Inpatient    1940 MRN [id number]   Location [facility]5W Attending Malathi Stuart MD   Hosp Day # 9 PCP Malathi Stuart MD       SUBJECTIVE:  Pt doing ok--feels cough is better; still having difficulty weaning oxygen    OBJECTIVE:  /54 (BP Location: Right arm)   Pulse 87   Temp 97.8 °F (36.6 °C) (Axillary)   Resp 16   Ht 5' 4\" (1.626 m)   Wt 172 lb 14.4 oz (78.4 kg)   SpO2 100%   BMI 29.68 kg/m²     Intake/Output:  I/O last 3 completed shifts:  In: 1128 [P.O.:1078; I.V.:50]  Out: 2100 [Urine:2100]    Wt Readings from Last 3 Encounters:   25 172 lb 14.4 oz (78.4 kg)   25 180 lb (81.6 kg)   25 173 lb (78.5 kg)       Exam  Gen: No acute distress, alert and oriented x3  Pulm: Lungs CTAB, no rhonchi or wheezing  CV: Heart RRR, 3/6 systolic murmur  Abd: Abdomen soft, nontender, nondistended, no organomegaly, bowel sounds present  MSK: Full range of motion in extremities, no clubbing, no cyanosis, no edema  Skin: no rashes or lesions  Neuro: A&O x 3, 5/5 strength in all 4 extremities.     Labs:   Recent Labs   Lab 25  0528 25  0507 25  0505   RBC 4.22 4.11 3.92   HGB 10.8* 10.9* 10.5*   HCT 36.3 33.8* 32.1*   MCV 86.0 82.2 81.9   MCH 25.6* 26.5 26.8   MCHC 29.8* 32.2 32.7   RDW 17.6* 17.2* 17.4*   NEPRELIM 14.68* 13.55* 11.91*   WBC 17.0* 15.5* 16.0*   .0 217.0 196.0         Recent Labs   Lab 25  0530 25  05025  05025  0505   *  114* 114* 115* 84   BUN 33*  33* 37* 39* 37*   CREATSERUM 1.00  1.00 1.08* 1.05* 1.12*   EGFRCR 56*  56* 51* 52* 48*   CA 8.5*  8.5* 8.5* 8.3* 8.4*   ALB 3.1*  --   --   --      137 138 136 137   K 4.1  4.1 4.2 4.2 3.7     102 104 101 101   CO2 27.0  27.0 27.0 27.0 30.0   ALKPHO 86  --   --   --    AST 47*  --   --   --    ALT 42  --   --   --    BILT 0.6  --   --    --    TP 5.4*  --   --   --          Imaging:  CT CHEST HI RESOLUTION (CPT=71250)    Result Date: 4/4/2025  CONCLUSION:   1. 1. Pulmonary edema and bilateral pleural effusions are significantly improved.  There is persistent trace right pleural effusion.  2. Advanced interstitial lung disease is noted in an NSIP (nonspecific insterstitial pneumonitis) pattern.  This limits evaluation for pulmonary nodules.  3. Additional chronic or incidental findings are described in the body of this report.     Dictated by (CST): Miky Lowery MD on 4/04/2025 at 12:24 PM     Finalized by (CST): Miky Lowery MD on 4/04/2025 at 12:31 PM          XR CHEST AP PORTABLE  (CPT=71045)    Result Date: 4/3/2025  CONCLUSION:  1. Cardiomegaly.  TAVR. 2. Bilateral mixed alveolar and interstitial airspace disease redemonstrated.    Dictated by (CST): Nguyễn Montenegro MD on 4/03/2025 at 9:13 AM     Finalized by (CST): Nguyễn Montenegro MD on 4/03/2025 at 9:16 AM                 Assessment and Plan:             Acute hypoxic respiratory failure (HCC)  -ddx: CHF, recurrent interstitial pneumonitis  -CT chest in ED 3/26/25 - no PE; worsening of pleural effusions and interstitial edema  -started on IV lasix and IV solumedrol to cover both CHF and possible recurrence of NSIP respectively  -required BiPAP transiently  -procalc sl elevated 0.25; WBC 19.8 K on admission  -recent echo 2/5/25 - normal LV systolic function (EF 60-65%); grade 2 diastolic dysfunction; normally functioning bioprosthetic TAVR; sl incr PAP 44mmHg  -repeat echo 3/27/25 stable from 2/2025 except incr PAP to 52mmHg  -cards consult appreciated  -diuresing well on Lasix 40mg IV BID (net -3L); creat up slightly; monitor closely  -pt notes improvement in breathing though still not back to baseline; still requiring 10L O2  -pt with audible wet, loose cough 3/28; 3/27 pt denied any increase in her baseline cough, but when questioned again today, she states that her cough has  been worse in the past 3-4 days.  -discussed with Dr. Holloway; will start ceftriaxone and azitho; po vanco for CDP (day 2)  -further recs per pulm Dr. Holloway, cards Dr. Wood  -respiratory status continues to improve; lasix changed to 20mg ivp since 3/29 d/t hypotension  -has distal BLE edema--improved with compression stockings  Leukocytosis likely 2/2 steroids;worsened--unclear etiology as pt feeling better  -lasix 40mg bid; continue to wean O2 as tolerated  Concerned that patient is not able to wean O2--d/w pulm, will order ct chest high resolution        Adenocarcinoma of colon  -Bx of transverse colon polyp 12/17/24 -- invasive, moderately differentiated adenocarcinoma  -CEA normal (1.9)  -CT a/p neg for mets  -surgery delayed in anticipation of TAVR (done 1/23/25)  -s/p robotic assisted left hemicolectomy (Dr. Cassidy) - path - tumor invades into muscularis; margins neg for tumor; all 19 LN's neg for mets (0/19); pT2, pN0  -next appt with Dr. Cassidy 4/16/25     Coronary artery disease  -Cath 1/14/25 by Dr. Bernardo Liz (as w/u for TAVR) -- RCA non-obstructive; LM free of obst disease; LM plaque extending into ostium of LAD (20-30%); mid LAD (80-90%), cx ostium (60-70%)  -intervention delayed until after colon resection due to need for DAPT x 6 mos after stenting   -on Atorvastatin 20mg/day, ASA 81mg/day  -pt had upcoming appt w/Dr. Bird this month 3/2025 to discuss intervention; timing of intervention per cardiology team     Hx  GI bleed  -EGD 11/26/24 (Dr. Nuno) -15mm nonbleeding gastric antral ulcer; 3mm gastric antral AVM s/p APC  -recurrence in 12/2024  -colonoscopy and repeat EGD 12/17/24 by Dr. Staton -- grade 1 esophagitis; duod ulcers healing; large flat polyp in transverse colon (carcinoma)   -received total of 3 units PRBCs   -Xarelto stopped in 12/2024  -s/p omeprazole 20mg BID x 8 weeks (EOT 1/22/25), now on omeprazole 20mg daily -- would continue until she completes her upcoming coronary  stenting     Anemia due to acute blood loss  -GI bleed as above  -on ferrous sulfate 325mg/day  -Hgb 10.8 but stable     Hx chronic mild BLE edema  -Lasix 20mg/day     Acute left peroneal palsy  In Nov 2024; had numbness to anterolateral left foot and ankle as well as inability to dorsiflex left foot; etiology unclear  -neurologist Dr Richardson consulted in hosp  -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   -MRI brain--no acute intracranial process  -still doing PT at [facility] -- continues to improve; dorsiflexion left foot 4/5; plantarflexion 5/5 this admission (3/26/25)     Interstitial pneumonitis/NSIP  -dx'ed 10/2024 - presented with cough and severe hypoxia  -unresponsive to abx   -CXR 10/24/24 extensive bilateral perihilar and bilateral upper and lower lobe   -S.pneumo, legionella Ag , mycoplasma IgM/G, Fungitell-beta-D glucan negative  -ANCA and URIEL/connective tissue disease panels negative  -anti-histone and anti-Kaera Ab's negative  -CT with bilateral ground glass opacities, small consolidations of BLL  -per pulm, findings suspicious for NSIP (vs cryptogenic organizing pneumonia vs hypersensitivity pneumonitis); NOT thought to be c/w UIP pattern  -s/p empiric steroids (solumedrol then prednisone taper) 10/25/24 - (EOT 1/18/25)  -dyspnea completely resolved until this admission 3/26/25  -now on solumedrol 60mg b40q--kwondehjg to 40mg q12h     Bilateral pulmonary emboli  -dx'ed at time of interstitial pneumonitis  -CT chest 10/25/24 -- acute distal segmental/subsegmental pulmonary emboli in [place] and subsegmental PE in [place]  -unclear etiology; no recent hx of long travel; no family/personal hx of blood clots  -BLE venous dopplers negative 10/26/24  -started xarelto 20 mg po every day on 11/28/24  -repeat CT chest 12/9/24 neg PE  -stopped Xarelto 12/14/24 due to recurrent GI bleed  -repeat CT chest 3/26/25 -- neg PE      Severe aortic stenosis  -progression on serial echocardiograms  -s/p TAVR 1/23/25 (Dr. Reji Green)  -symptomatically much improved -- POPE resolved     Hypertension, primary  -(dyazide stopped due to NANCY)  -(amlodipine held due to low BP in 11/2024)  -BP remains normal off meds     Hx of hip OA  -s/p left THR (Dr. Lo) many years ago  -s/p elective R BEATRIZ on 10/5/23 by Dr. Diaz     Hyperlipidemia   Pt with strong family hx of high cholesterol  Started on Atorvastatin 20mg/day in 1/2025 (after noted to have CAD on cath)     Osteopenia   On Fosamax from 2011 to 4/2016.     DEXA stable 5/10/17 and 2019 and 2021  DEXA 8/2024 -- osteoporosis of left forearm  -restarted Fosamax 8/2024      Vitamin D deficiency  On vitamin D 4000u/day      VTE proph  -[facility]                   Nadia Steel DO  4/4/2025  12:39 PM

## 2025-04-04 NOTE — CM/SW NOTE
CM sent referral to E in case home O2 is required at [place].    Tessa Armstrong MBA BSN RN CRRN   RN Case Manager  [phone]

## 2025-04-04 NOTE — PLAN OF CARE
Problem: Patient Centered Care  Goal: Patient preferences are identified and integrated in the patient's plan of care  Description: Interventions:- What would you like us to know as we care for you? Im from [place]- Provide timely, complete, and accurate information to patient/family- Incorporate patient and family knowledge, values, beliefs, and cultural backgrounds into the planning and delivery of care- Encourage patient/family to participate in care and decision-making at the level they choose- Honor patient and family perspectives and choices  Outcome: Progressing     Problem: Patient/Family Goals  Goal: Patient/Family Long Term Goal  Description: Patient's Long Term Goal: to go home Interventions:- Monitor vital signs and labs  - Administer medications per order  - Follow MD orders  - Fall precautions  - Diagnostics as ordered  - Update / inform patient on plan of care  - Discharge planning- See additional Care Plan goals for specific interventions  Outcome: Progressing  Goal: Patient/Family Short Term Goal  Description: Patient's Short Term Goal: to feel better Interventions: -  See additional Care Plan goals for specific interventionsMonitor vital signs and labs  - Administer medications per order  - Follow MD orders  - Fall precautions  - Diagnostics as ordered  - Update / inform patient on plan of care  - Discharge planning  Outcome: Progressing     Problem: PAIN - ADULT  Goal: Verbalizes/displays adequate comfort level or patient's stated pain goal  Description: INTERVENTIONS:- Encourage pt to monitor pain and request assistance- Assess pain using appropriate pain scale- Administer analgesics based on type and severity of pain and evaluate response- Implement non-pharmacological measures as appropriate and evaluate response- Consider cultural and social influences on pain and pain management- Manage/alleviate anxiety- Utilize distraction and/or relaxation techniques- Monitor for opioid side  effects- Notify MD/LIP if interventions unsuccessful or patient reports new pain- Anticipate increased pain with activity and pre-medicate as appropriate  Outcome: Progressing     Problem: RISK FOR INFECTION - ADULT  Goal: Absence of fever/infection during anticipated neutropenic period  Description: INTERVENTIONS- Monitor WBC- Administer growth factors as ordered- Implement neutropenic guidelines  Outcome: Progressing     Problem: SAFETY ADULT - FALL  Goal: Free from fall injury  Description: INTERVENTIONS:- Assess pt frequently for physical needs- Identify cognitive and physical deficits and behaviors that affect risk of falls.- [place] fall precautions as indicated by assessment.- Educate pt/family on patient safety including physical limitations- Instruct pt to call for assistance with activity based on assessment- Modify environment to reduce risk of injury- Provide assistive devices as appropriate- Consider OT/PT consult to assist with strengthening/mobility- Encourage toileting schedule  Outcome: Progressing     Problem: DISCHARGE PLANNING  Goal: Discharge to home or other facility with appropriate resources  Description: INTERVENTIONS:- Identify barriers to discharge w/pt and caregiver- Include patient/family/discharge partner in discharge planning- Arrange for needed discharge resources and transportation as appropriate- Identify discharge learning needs (meds, wound care, etc)- Arrange for interpreters to assist at discharge as needed- Consider post-discharge preferences of patient/family/discharge partner- Complete POLST form as appropriate- Assess patient's ability to be responsible for managing their own health- Refer to Case Management Department for coordinating discharge planning if the patient needs post-hospital services based on physician/LIP order or complex needs related to functional status, cognitive ability or social support system  Outcome: Progressing     Problem: RESPIRATORY -  ADULT  Goal: Achieves optimal ventilation and oxygenation  Description: INTERVENTIONS:- Assess for changes in respiratory status- Assess for changes in mentation and behavior- Position to facilitate oxygenation and minimize respiratory effort- Oxygen supplementation based on oxygen saturation or ABGs- Provide Smoking Cessation handout, if applicable- Encourage broncho-pulmonary hygiene including cough, deep breathe, Incentive Spirometry- Assess the need for suctioning and perform as needed- Assess and instruct to report SOB or any respiratory difficulty- Respiratory Therapy support as indicated- Manage/alleviate anxiety- Monitor for signs/symptoms of CO2 retention  Outcome: Progressing     Problem: MUSCULOSKELETAL - ADULT  Goal: Return mobility to safest level of function  Description: INTERVENTIONS:- Assess patient stability and activity tolerance for standing, transferring and ambulating w/ or w/o assistive devices- Assist with transfers and ambulation using safe patient handling equipment as needed- Ensure adequate protection for wounds/incisions during mobilization- Obtain PT/OT consults as needed- Advance activity as appropriate- Communicate ordered activity level and limitations with patient/family  Outcome: Progressing  Goal: Maintain proper alignment of affected body part  Description: INTERVENTIONS:- Support and protect limb and body alignment per provider's orders- Instruct and reinforce with patient and family use of appropriate assistive device and precautions (e.g. spinal or hip dislocation precautions)  Outcome: Progressing     Problem: Diabetes/Glucose Control  Goal: Glucose maintained within prescribed range  Description: INTERVENTIONS:- Monitor Blood Glucose as ordered- Assess for signs and symptoms of hyperglycemia and hypoglycemia- Administer ordered medications to maintain glucose within target range- Assess barriers to adequate nutritional intake and initiate nutrition consult as needed-  Instruct patient on self management of diabetes  Outcome: Progressing

## 2025-04-04 NOTE — PROGRESS NOTES
[facility] Progress Note    Hoda Busby Patient Status:  Inpatient    1940 MRN [id number]   Location [facility]5W Attending Malathi Stuart MD   Hosp Day # 9 PCP Malathi Stuart MD     Subjective:  Denies cp. +Dyspnea on exertion     Objective:  /54 (BP Location: Right arm)   Pulse 87   Temp 97.8 °F (36.6 °C) (Axillary)   Resp 16   Ht 162.6 cm (5' 4\")   Wt 172 lb 14.4 oz (78.4 kg)   SpO2 100%   BMI 29.68 kg/m²     Telemetry: NSR       Intake/Output:    Intake/Output Summary (Last 24 hours) at 2025 1229  Last data filed at 2025 0708  Gross per 24 hour   Intake 467 ml   Output 1050 ml   Net -583 ml       Last 3 Weights   25 0414 172 lb 14.4 oz (78.4 kg)   25 0958 173 lb 9.6 oz (78.7 kg)   25 0500 174 lb 9.6 oz (79.2 kg)   25 0547 166 lb (75.3 kg)   25 0606 172 lb 11.2 oz (78.3 kg)   25 0441 174 lb 4.8 oz (79.1 kg)   25 0455 187 lb 8 oz (85 kg)   25 2158 183 lb 4.8 oz (83.1 kg)   25 1939 160 lb (72.6 kg)   25 1603 180 lb (81.6 kg)   25 0753 176 lb (79.8 kg)   25 1020 173 lb (78.5 kg)   25 1513 173 lb (78.5 kg)       Labs:  Recent Labs   Lab 25  0502 25  0507 25  0505   * 115* 84   BUN 37* 39* 37*   CREATSERUM 1.08* 1.05* 1.12*   EGFRCR 51* 52* 48*   CA 8.5* 8.3* 8.4*    136 137   K 4.2 4.2 3.7    101 101   CO2 27.0 27.0 30.0     Recent Labs   Lab 25  0528 25  0507 25  0505   RBC 4.22 4.11 3.92   HGB 10.8* 10.9* 10.5*   HCT 36.3 33.8* 32.1*   MCV 86.0 82.2 81.9   MCH 25.6* 26.5 26.8   MCHC 29.8* 32.2 32.7   RDW 17.6* 17.2* 17.4*   NEPRELIM 14.68* 13.55* 11.91*   WBC 17.0* 15.5* 16.0*   .0 217.0 196.0         No results for input(s): \"TROP\", \"TROPHS\", \"CK\" in the last 168 hours.    Diagnostics:     3/27/25 Echo  1. Left ventricle: The cavity size was normal. Wall thickness was normal.      Systolic function was normal.  The estimated ejection fraction was 60-65%.      No diagnostic evidence for regional wall motion abnormalities. Doppler      parameters are consistent with abnormal left ventricular relaxation -      grade 1 diastolic dysfunction.   2. Right ventricle: The cavity size was increased. Systolic function was      normal.   3. Left atrium: The atrium was mildly to moderately dilated. The left atrial      volume was mildly to moderately increased.   4. Aortic valve: A bioprosthetic valve is present. Mcnamara PUNEET S3 23mm      (TAVR) bioprosthesis was present. The peak systolic velocity was      2.69m/sec. The mean systolic gradient was 15mm Hg. The valve area (VTI)      was 1.52cm^2.   5. Pulmonary arteries: Systolic pressure was moderately increased, estimated      to be 52mm Hg.     Review of Systems   Respiratory:  Positive for shortness of breath. Negative for wheezing.    Cardiovascular:  Negative for chest pain, palpitations, orthopnea and leg swelling.     Physical Exam:    General: Alert and oriented x 3. No apparent distress.   HEENT: Normocephalic, anicteric sclera, neck supple, no thyromegaly or adenopathy.  Neck: No JVD, carotids 2+, no bruits.  Cardiac: Regular rate & rhythm. S1, S2 normal. No murmur, pericardial rub, S3, or extra cardiac sounds.  Lungs: Clear without wheezes, rales, rhonchi or dullness.  Normal excursions and effort.  Abdomen: Soft, non-tender. No organosplenomegally, mass or rebound, BS-present.  Extremities: Without clubbing or cyanosis.  No BLE edema   Neurologic: Alert and oriented, normal affect. No focal defects  Skin: Warm and dry.       Medications:   methylPREDNISolone  40 mg Intravenous Daily    amoxicillin clavulanate  500 mg Oral Q8H    furosemide  40 mg Intravenous BID (Diuretic)    vancomycin  125 mg Oral Daily    aspirin  81 mg Oral Daily    atorvastatin  20 mg Oral Nightly    pantoprazole  40 mg Oral QAM AC    heparin  5,000 Units Subcutaneous 2 times per day    insulin  aspart  1-7 Units Subcutaneous TID CC    ferrous sulfate  325 mg Oral Daily with breakfast    docusate sodium  100 mg Oral BID         Assessment:    84yr old female patient with history of severe aortic stenosis s/p TAVR 1/2025, obstructive non revascularized CAD, HFpEF, interstitial pneumonitis, PE, colon cancer s/p left hemicolectomy presented with new onset productive cough and dyspnea and BLE edema.     Acute hypoxic respiratory failure (multifactorial: pneumonia/chronic nonspecific interstitial pneumonitis, CHF)   - on steroids, IV antibiotics, diuresis  - Ct chest neg for PE   - pulmonary following     Acute decompensated HFpEF  - pBNP 4,423 & CXR w/ CHF   - echo w/ LVEF 60-65%, no RWMA, G1DD, LA dilated, Bioprosthetic aortic valve with mean systolic gradient of 15mmHg, PASP of 52mmHg. IVC dilated  - on Iv lasix 40mg twice daily. Scr stable. I/o inaccurate & wts inconsistent ; however pt has clinically improved      CAD  - Trop of 58 on admission  -  [facility] 1/14/25: mild LAD diagonal focal bifurcation stenosis, moderately calcified, 80-90%, plan was for to medical management at that time with anemia and Gi procedure. Since pt will need uninterrupted DAPT post stent   - on aspirin, atorvastatin       Severe aortic stenosis  s/p TAVR 1/23/25  - stable valve function per recent echo     H/o Splenic Flexure colon cancer   - s/p Robolic left hemicolectomy/mobilization of splenic flexure, omental flap closure 3/27/25     Chronic anemia  - hgb stable        Plan:    Compensated on exam . Scr stable. Cxr improved yesterday. Switch to 40mg po daily   Monitor strict I/o, daily wts & renal fx closely   Pt remains hypoxic w/ activity. Unable to wean supplemental 02 . CT chest to r/o PE ordered by PMD   Tentative plan for PCI as OP when clinically stable     Case/plan d/w Dr. Pelon Martinez, MSN, FPA-APRN, FNP-BC, CCK   4/4/2025  12:30 PM  Ph [phone] ([place])  Ph [phone] ([place])      I saw and  examined the patient agree with attached findings.  Remains on oxygen however symptoms have improved since admission.  Reviewed chest CT which notes advanced interstitial lung disease/pneumonitis likely cause of residual hypoxia history of pulmonary edema and effusions have significantly improved.  IV Lasix is now being converted to oral Lasix.  Long discussion regarding known ostial LCx and LAD-D bifurcation disease from coronary angiogram 1/2025 pre-TAVR.  Explained that this is unlikely to be contributing to underlying hypoxia given CT findings patient has a strong preference to get this fixed while she is hospitalized rather than coming back as outpatient.  Will tentatively plan on doing this Monday pending clinical assessment over the weekend.    Viktor Bird MD  [place] [facility] [place]

## 2025-04-04 NOTE — PHYSICAL THERAPY NOTE
PHYSICAL THERAPY TREATMENT NOTE - INPATIENT     Room Number: 517/517-A       Presenting Problem: HF exacerbation, aortic valve stenosis, acute hypoxic respiratory failure  Co-Morbidities : PE, cancer, colon cancer, CAD, L foot drop, HTN, TAVR 1/2025, OA, macular degeneration, osteopenia, L BEATRIZ 2006, S/P colon resection 2/27/25    Problem List  Principal Problem:    Acute hypoxic respiratory failure (HCC)  Active Problems:    Pneumonia due to infectious organism    Acute on chronic congestive heart failure, unspecified heart failure type (HCC)    Aortic valve stenosis, etiology of cardiac valve disease unspecified      PHYSICAL THERAPY ASSESSMENT   Patient demonstrates fair progress this session, goals  remain in progress.      Patient is requiring minimal assist as a result of the following impairments: decreased functional strength, decreased endurance/aerobic capacity, impaired standing balance, decreased muscular endurance, and medical status.     Patient continues to function below baseline with bed mobility, transfers, gait, maintaining seated position, standing prolonged periods, and performing household tasks.  Next session anticipate patient to progress bed mobility, transfers, gait, stair negotiation, maintaining seated position, standing prolonged periods, and performing household tasks.  Physical Therapy will continue to follow patient for duration of hospitalization.    Patient continues to benefit from continued skilled PT services: at discharge to promote prior level of function and safety with additional support and return home with OP PT.    PLAN DURING HOSPITALIZATION  Nursing Mobility Recommendation : 1 Assist  PT Device Recommendation: Rolling walker  PT Treatment Plan: Bed mobility, Body mechanics, Endurance, Energy conservation, Patient education, Gait training, Strengthening, Transfer training, Balance training  Frequency (Obs): 5x/week     SUBJECTIVE  I feel ok just weak from the respiratory  problems and I get short of breath with walking.     OBJECTIVE  Precautions: Cardiac    WEIGHT BEARING RESTRICTION       PAIN ASSESSMENT   Ratin  Location: denies       BALANCE  Static Sitting: Good  Dynamic Sitting: Good  Static Standing: Fair +  Dynamic Standing: Fair    ACTIVITY TOLERANCE                          O2 WALK       AM-PAC '6-Clicks' INPATIENT SHORT FORM - BASIC MOBILITY  How much difficulty does the patient currently have...  Patient Difficulty: Turning over in bed (including adjusting bedclothes, sheets and blankets)?: None   Patient Difficulty: Sitting down on and standing up from a chair with arms (e.g., wheelchair, bedside commode, etc.): None   Patient Difficulty: Moving from lying on back to sitting on the side of the bed?: None   How much help from another person does the patient currently need...   Help from Another: Moving to and from a bed to a chair (including a wheelchair)?: A Little   Help from Another: Need to walk in hospital room?: A Little   Help from Another: Climbing 3-5 steps with a railing?: A Little     AM-PAC Score:  Raw Score: 21   Approx Degree of Impairment: 28.97%   Standardized Score (AM-PAC Scale): 50.25   CMS Modifier (G-Code):     FUNCTIONAL ABILITY STATUS  Functional Mobility/Gait Assessment  Gait Assistance: Contact guard assist  Distance (ft): 40  Assistive Device: Rolling walker  Pattern:  (decreased step length limited by dyspnea)  Rolling: supervision  Supine to Sit: supervision  Sit to Supine: contact guard assist  Sit to Stand: minimal assist    Skilled Therapy Provided: Pt ed with bed mobility with SBA to EOB. Pt ed with transfers with CGA with RW. Pt ed with amb progression 20' with RW with shuffling unsteady gait with min A. Pt ed with energy conservation strategies with amb pursed lip breathing. Pt ed with therex in chair x 10 reps for LE strength and ROM. Pt is on track to return home with OP PT as medical progress allows.     The patient's Approx  Degree of Impairment: 28.97% has been calculated based on documentation in the [facility] '6 clicks' Inpatient Daily Activity Short Form.  Research supports that patients with this level of impairment may benefit from Home with OP PT as medical progress allows.  Final disposition will be made by interdisciplinary medical team.    THERAPEUTIC EXERCISES  Lower Extremity Ankle pumps  LAQ  SLR     Position Sitting & Standing       Patient End of Session: Up in chair, With  staff, Needs met, Call light within reach, RN aware of session/findings, All patient questions and concerns addressed, Alarm set    CURRENT GOALS   Goals to be met by: 4/20/25  Patient Goal Patient's self-stated goal is: return home safely   Goal #1 Patient is able to demonstrate supine - sit EOB @ level: independent      Goal #1   Current Status  SBA   Goal #2 Patient is able to demonstrate transfers EOB to/from BSC at assistance level: modified independent with least restrictive assistive device       Goal #2  Current Status  CGA with RW   Goal #3 Patient is able to ambulate 150 feet with assist device: least restrictive assistive device at assistance level: modified independent   Goal #3   Current Status  Min A with amb 20'    Goal #4 Patient is able to demonstrate transfers sit to/from stand at assistance level: modified independent with least restrictive assistive device       Goal #4   Current Status  Ongoing   Goal #5 Patient to demonstrate independence with home activity/exercise instructions provided to patient in preparation for discharge.   Goal #5   Current Status  Ongoing   Goal #6       Gait Training: 15 minutes  Therapeutic Exercise: 9 minutes

## 2025-04-05 NOTE — PROGRESS NOTES
[facility]  part of [facility]    Progress Note      Assessment and Plan:   1.  Acute on chronic respiratory failure-patient has pulmonary edema superimposed on mild chronic nonspecific interstitial pneumonitis.    The patient is breathing much better than on admission. The interstitial findings on serial CT images have worsened markedly over the past 3 months suggesting significant component of edema, as pulmonary fibrosis and nonspecific pneumonitis do not progress in that fashion.    The patient tells me that she is breathing better and the crackles at the base of the lung are improved today as well.  She remains on 1 to 2 L supplemental oxygen.  Yet with some desaturation with activity.    Recommendations:  1.  Ongoing diuresis-40 mg Lasix twice daily  2.  Prednisone  3.  Augmentin  4.  Will follow clinically.    2.  Low moods-much better.    3.  DVT prophylaxis-subcutaneous heparin    4.  Status post TAVR    5.  Colon cancer-status post robotic left hemicolectomy    6.  Coronary artery disease-PCI planned for Monday.    Subjective:   Hoda Busby is a(n) 84 year old female who is breathing better than on admission but still with dyspnea and oxygen requirement.  Objective:   Blood pressure 109/57, pulse 78, temperature 98.4 °F (36.9 °C), temperature source Oral, resp. rate 18, height 5' 4\" (1.626 m), weight 171 lb 8 oz (77.8 kg), SpO2 90%.    Physical Exam alert white female  HEENT examination is unremarkable with pupils equal round and reactive to light and accommodation.   Neck without adenopathy, thyromegaly, JVD nor bruit.   Lungs diminished with basilar crackles to auscultation and percussion.  Cardiac regular rate and rhythm no murmur.   Abdomen nontender, without hepatosplenomegaly and no mass appreciable.   Extremities without clubbing cyanosis nor edema.   Neurologic grossly intact with symmetric tone and strength and reflex.  Skin without gross abnormality     Results:     Lab  Results   Component Value Date    WBC 16.7 04/05/2025    HGB 10.5 04/05/2025    HCT 33.4 04/05/2025    .0 04/05/2025    CREATSERUM 1.00 04/05/2025    BUN 36 04/05/2025     04/05/2025    K 4.4 04/05/2025    K 4.4 04/05/2025     04/05/2025    CO2 29.0 04/05/2025    GLU 80 04/05/2025    CA 8.4 04/05/2025       Stanton Sanchez MD  Medical Director, Critical Care, [facility]  Medical Director, [facility]  Pager: [phone]

## 2025-04-05 NOTE — PLAN OF CARE
Problem: Patient Centered Care  Goal: Patient preferences are identified and integrated in the patient's plan of care  Description: Interventions:- What would you like us to know as we care for you? - Provide timely, complete, and accurate information to patient/family- Incorporate patient and family knowledge, values, beliefs, and cultural backgrounds into the planning and delivery of care- Encourage patient/family to participate in care and decision-making at the level they choose- Honor patient and family perspectives and choices  Outcome: Progressing     Problem: Patient/Family Goals  Goal: Patient/Family Long Term Goal  Description: Patient's Long Term Goal: to go home Interventions:- Monitor vital signs and labs  - Administer medications per order  - Follow MD orders  - Fall precautions  - Diagnostics as ordered  - Update / inform patient on plan of care  - Discharge planning- See additional Care Plan goals for specific interventions  Outcome: Progressing  Goal: Patient/Family Short Term Goal  Description: Patient's Short Term Goal: to feel better Interventions: -  See additional Care Plan goals for specific interventionsMonitor vital signs and labs  - Administer medications per order  - Follow MD orders  - Fall precautions  - Diagnostics as ordered  - Update / inform patient on plan of care  - Discharge planning  Outcome: Progressing     Problem: PAIN - ADULT  Goal: Verbalizes/displays adequate comfort level or patient's stated pain goal  Description: INTERVENTIONS:- Encourage pt to monitor pain and request assistance- Assess pain using appropriate pain scale- Administer analgesics based on type and severity of pain and evaluate response- Implement non-pharmacological measures as appropriate and evaluate response- Consider cultural and social influences on pain and pain management- Manage/alleviate anxiety- Utilize distraction and/or relaxation techniques- Monitor for opioid side effects- Notify MD/LIP if  interventions unsuccessful or patient reports new pain- Anticipate increased pain with activity and pre-medicate as appropriate  Outcome: Progressing     Problem: RISK FOR INFECTION - ADULT  Goal: Absence of fever/infection during anticipated neutropenic period  Description: INTERVENTIONS- Monitor WBC- Administer growth factors as ordered- Implement neutropenic guidelines  Outcome: Progressing     Problem: SAFETY ADULT - FALL  Goal: Free from fall injury  Description: INTERVENTIONS:- Assess pt frequently for physical needs- Identify cognitive and physical deficits and behaviors that affect risk of falls.- Amigo fall precautions as indicated by assessment.- Educate pt/family on patient safety including physical limitations- Instruct pt to call for assistance with activity based on assessment- Modify environment to reduce risk of injury- Provide assistive devices as appropriate- Consider OT/PT consult to assist with strengthening/mobility- Encourage toileting schedule  Outcome: Progressing     Problem: DISCHARGE PLANNING  Goal: Discharge to home or other facility with appropriate resources  Description: INTERVENTIONS:- Identify barriers to discharge w/pt and caregiver- Include patient/family/discharge partner in discharge planning- Arrange for needed discharge resources and transportation as appropriate- Identify discharge learning needs (meds, wound care, etc)- Arrange for interpreters to assist at discharge as needed- Consider post-discharge preferences of patient/family/discharge partner- Complete POLST form as appropriate- Assess patient's ability to be responsible for managing their own health- Refer to Case Management Department for coordinating discharge planning if the patient needs post-hospital services based on physician/LIP order or complex needs related to functional status, cognitive ability or social support system  Outcome: Progressing     Problem: RESPIRATORY - ADULT  Goal: Achieves optimal  ventilation and oxygenation  Description: INTERVENTIONS:- Assess for changes in respiratory status- Assess for changes in mentation and behavior- Position to facilitate oxygenation and minimize respiratory effort- Oxygen supplementation based on oxygen saturation or ABGs- Provide Smoking Cessation handout, if applicable- Encourage broncho-pulmonary hygiene including cough, deep breathe, Incentive Spirometry- Assess the need for suctioning and perform as needed- Assess and instruct to report SOB or any respiratory difficulty- Respiratory Therapy support as indicated- Manage/alleviate anxiety- Monitor for signs/symptoms of CO2 retention  Outcome: Progressing     Problem: MUSCULOSKELETAL - ADULT  Goal: Return mobility to safest level of function  Description: INTERVENTIONS:- Assess patient stability and activity tolerance for standing, transferring and ambulating w/ or w/o assistive devices- Assist with transfers and ambulation using safe patient handling equipment as needed- Ensure adequate protection for wounds/incisions during mobilization- Obtain PT/OT consults as needed- Advance activity as appropriate- Communicate ordered activity level and limitations with patient/family  Outcome: Progressing  Goal: Maintain proper alignment of affected body part  Description: INTERVENTIONS:- Support and protect limb and body alignment per provider's orders- Instruct and reinforce with patient and family use of appropriate assistive device and precautions (e.g. spinal or hip dislocation precautions)  Outcome: Progressing     Problem: Diabetes/Glucose Control  Goal: Glucose maintained within prescribed range  Description: INTERVENTIONS:- Monitor Blood Glucose as ordered- Assess for signs and symptoms of hyperglycemia and hypoglycemia- Administer ordered medications to maintain glucose within target range- Assess barriers to adequate nutritional intake and initiate nutrition consult as needed- Instruct patient on self management  of diabetes  Outcome: Progressing

## 2025-04-05 NOTE — PROGRESS NOTES
[facility]  part of [facility]    Progress Note    Hoda Busby Patient Status:  Inpatient    1940 MRN [id number]   Location [facility]5W Attending Malathi Stuart MD   Hosp Day # 10 PCP Malathi Stuart MD         Assessment and Plan:     Acute hypoxic respiratory failure (HCC)  -ddx: CHF, recurrent interstitial pneumonitis  -CT chest in ED 3/26/25 - no PE; worsening of pleural effusions and interstitial edema  -started on IV lasix and IV solumedrol to cover both CHF and possible recurrence of NSIP respectively  -required BiPAP transiently  -procalc sl elevated 0.25; WBC 19.8 K on admission  -recent echo 25 - normal LV systolic function (EF 60-65%); grade 2 diastolic dysfunction; normally functioning bioprosthetic TAVR; sl incr PAP 44mmHg  -repeat echo 3/27/25 stable from 2025 except incr PAP to 52mmHg  -cards consult appreciated  -diuresing well on Lasix 40mg IV BID (net -3L); creat up slightly; monitor closely  -pt notes improvement in breathing though still not back to baseline; still requiring 10L O2  -pt with audible wet, loose cough 3/28; 3/27 pt denied any increase in her baseline cough, but when questioned again today, she states that her cough has been worse in the past 3-4 days.  -discussed with Dr. Holloway; will start ceftriaxone and azitho; po vanco for CDP (day 2)  -further recs per pulm Dr. Holloway, cards Dr. Wood  -respiratory status continues to improve; s/p lasix changed to 20mg ivp since 3/29 d/t hypotension  -has distal BLE edema--improved with compression stockings  -hi res CT chest 25 shows pulmonary edema and bilateral pleural effusions are significantly improved.  There is persistent trace right pleural effusion.  Advanced interstitial lung disease is noted in an NSIP (nonspecific insterstitial pneumonitis) pattern.  This limits evaluation for pulmonary nodules.   -Leukocytosis likely 2/2 steroids; on Solumedrol 40 mg IV every day; pt  feeling better; on Augmentin 500 mg po TID, d#3  -on Lasix 40mg po qD; on oxygen 1.5 L NC; continue to wean O2 as tolerated    Adenocarcinoma of colon  -Bx of transverse colon polyp 12/17/24 -- invasive, moderately differentiated adenocarcinoma  -CEA normal (1.9)  -CT a/p neg for mets  -surgery delayed in anticipation of TAVR (done 1/23/25)  -s/p robotic assisted left hemicolectomy (Dr. Cassidy) - path - tumor invades into muscularis; margins neg for tumor; all 19 LN's neg for mets (0/19); pT2, pN0  -next appt with Dr. Cassidy 4/16/25     Coronary artery disease  -Cath 1/14/25 by Dr. Bernardo Liz (as w/u for TAVR) -- RCA non-obstructive; LM free of obst disease; LM plaque extending into ostium of LAD (20-30%); mid LAD (80-90%), cx ostium (60-70%)  -intervention delayed until after colon resection due to need for DAPT x 6 mos after stenting   -on Atorvastatin 20mg/day, ASA 81mg/day  -awaits CATH per Dr Grady; starting Plavix 75 mg po qD     Hx  GI bleed  -EGD 11/26/24 (Dr. Nuno) -15mm nonbleeding gastric antral ulcer; 3mm gastric antral AVM s/p APC  -recurrence in 12/2024  -colonoscopy and repeat EGD 12/17/24 by Dr. Staton -- grade 1 esophagitis; duod ulcers healing; large flat polyp in transverse colon (carcinoma)   -received total of 3 units PRBCs   -Xarelto stopped in 12/2024  -s/p omeprazole 20mg BID x 8 weeks (EOT 1/22/25), now on omeprazole 20mg daily -- would continue until she completes her upcoming coronary stenting     Anemia due to acute blood loss  -GI bleed as above  -on ferrous sulfate 325mg/day  -Hgb 10.8 but stable     Hx chronic mild BLE edema  -Lasix 20mg/day     Acute left peroneal palsy  In Nov 2024; had numbness to anterolateral left foot and ankle as well as inability to dorsiflex left foot; etiology unclear  -neurologist Dr Richardson consulted in hosp  -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   -MRI  brain--no acute intracranial process  -still doing PT at [facility] -- continues to improve; dorsiflexion left foot 4/5; plantarflexion 5/5 this admission (3/26/25)     Interstitial pneumonitis/NSIP  -dx'ed 10/2024 - presented with cough and severe hypoxia  -unresponsive to abx   -CXR 10/24/24 extensive bilateral perihilar and bilateral upper and lower lobe   -S.pneumo, legionella Ag , mycoplasma IgM/G, Fungitell-beta-D glucan negative  -ANCA and URIEL/connective tissue disease panels negative  -anti-histone and anti-K[place]a Ab's negative  -CT with bilateral ground glass opacities, small consolidations of BLL  -per pulm, findings suspicious for NSIP (vs cryptogenic organizing pneumonia vs hypersensitivity pneumonitis); NOT thought to be c/w UIP pattern  -s/p empiric steroids (solumedrol then prednisone taper) 10/25/24 - (EOT 1/18/25)  -dyspnea completely resolved until this admission 3/26/25  -now on solumedrol 60mg p66g--bgujhduqa to 40mg q12h     Bilateral pulmonary emboli  -dx'ed at time of interstitial pneumonitis  -CT chest 10/25/24 -- acute distal segmental/subsegmental pulmonary emboli in [place] and subsegmental PE in [place]  -unclear etiology; no recent hx of long travel; no family/personal hx of blood clots  -BLE venous dopplers negative 10/26/24  -started xarelto 20 mg po every day on 11/28/24  -repeat CT chest 12/9/24 neg PE  -stopped Xarelto 12/14/24 due to recurrent GI bleed  -repeat CT chest 3/26/25 -- neg PE     Severe aortic stenosis  -progression on serial echocardiograms  -s/p TAVR 1/23/25 (Dr. Reji Green)  -symptomatically much improved -- POPE resolved     Hypertension, primary  -(dyazide stopped due to NANCY)  -(amlodipine held due to low BP in 11/2024)  -BP remains normal off meds     Hx of hip OA  -s/p left THR (Dr. Lo) many years ago  -s/p elective R BEATRIZ on 10/5/23 by Dr. Diaz     Hyperlipidemia   Pt with strong family hx of high cholesterol  Started on Atorvastatin 20mg/day in 1/2025 (after  noted to have CAD on cath)     Osteopenia   On Fosamax from 2011 to 4/2016.     DEXA stable 5/10/17 and 2019 and 2021  DEXA 8/2024 -- osteoporosis of left forearm  -restarted Fosamax 8/2024      Vitamin D deficiency  On vitamin D 4000u/day      VTE proph  -SQH              SUBJECTIVE:    On oxygen 1.5 L NC; no CP; +POPE        OBJECTIVE:  PHYSICAL EXAM:     Vital signs in last 24 hours:  /58 (BP Location: Right arm)   Pulse 85   Temp 98 °F (36.7 °C) (Oral)   Resp 18   Ht 5' 4\" (1.626 m)   Wt 171 lb 8 oz (77.8 kg)   SpO2 92%   BMI 29.44 kg/m²     Intake/Output:    Intake/Output Summary (Last 24 hours) at 4/5/2025 1238  Last data filed at 4/5/2025 1018  Gross per 24 hour   Intake 677 ml   Output 1100 ml   Net -423 ml       Wt Readings from Last 3 Encounters:   04/05/25 171 lb 8 oz (77.8 kg)   02/27/25 180 lb (81.6 kg)   02/20/25 173 lb (78.5 kg)         Constitutional: alert and oriented x3 in no acute distress  HEENT- EOMI, PERRL  Nose/Mouth/Throat: pharynx without erythema  Neck/Thyroid: neck supple; no thyromegaly  Cardiovascular: RRR, S1, S2, no S3 or murmur  Respiratory: lungs with +left basilar crackles; no wheezes  Abdomen: normoactive bowel sounds, soft, non-tender and non-distended  Extremities: no clubbing, cyanosis or edema          Meds:   Current Facility-Administered Medications   Medication Dose Route Frequency    clopidogrel (Plavix) tab 75 mg  75 mg Oral Daily    methylPREDNISolone sodium succinate (Solu-MEDROL) injection 40 mg  40 mg Intravenous Daily    furosemide (Lasix) tab 40 mg  40 mg Oral Daily    amoxicillin clavulanate (Augmentin) 500-125 MG per tab 500 mg  500 mg Oral Q8H    polyethylene glycol (PEG 3350) (Miralax) 17 g oral packet 17 g  17 g Oral Daily PRN    guaiFENesin (Robitussin) 100 MG/5 ML oral liquid 200 mg  200 mg Oral Q4H PRN    benzocaine-menthol (Cepacol) lozenge 1 lozenge  1 lozenge Oral PRN    vancomycin (Vancocin) cap 125 mg  125 mg Oral Daily    acetaminophen  (Tylenol) tab 325 mg  325 mg Oral Q6H PRN    aspirin DR tab 81 mg  81 mg Oral Daily    atorvastatin (Lipitor) tab 20 mg  20 mg Oral Nightly    pantoprazole (Protonix) DR tab 40 mg  40 mg Oral QAM AC    traMADol (Ultram) tab 50 mg  50 mg Oral Q6H PRN    ipratropium-albuterol (Duoneb) 0.5-2.5 (3) MG/3ML inhalation solution 3 mL  3 mL Nebulization Q6H PRN    heparin (Porcine) 5000 UNIT/ML injection 5,000 Units  5,000 Units Subcutaneous 2 times per day    glucose (Dex4) 15 GM/59ML oral liquid 15 g  15 g Oral Q15 Min PRN    Or    glucose (Glutose) 40% oral gel 15 g  15 g Oral Q15 Min PRN    Or    glucose-vitamin C (Dex-4) chewable tab 4 tablet  4 tablet Oral Q15 Min PRN    Or    dextrose 50% injection 50 mL  50 mL Intravenous Q15 Min PRN    Or    glucose (Dex4) 15 GM/59ML oral liquid 30 g  30 g Oral Q15 Min PRN    Or    glucose (Glutose) 40% oral gel 30 g  30 g Oral Q15 Min PRN    Or    glucose-vitamin C (Dex-4) chewable tab 8 tablet  8 tablet Oral Q15 Min PRN    insulin aspart (NovoLOG) 100 Units/mL FlexPen 1-7 Units  1-7 Units Subcutaneous TID CC    ferrous sulfate DR tab 325 mg  325 mg Oral Daily with breakfast    docusate sodium (Colace) cap 100 mg  100 mg Oral BID            Data Review:     Labs:   Lab Results   Component Value Date    GLU 80 04/05/2025     04/05/2025    K 4.4 04/05/2025    K 4.4 04/05/2025     04/05/2025    CO2 29.0 04/05/2025    BUN 36 04/05/2025    CREATSERUM 1.00 04/05/2025    CA 8.4 04/05/2025       Recent Labs   Lab 04/05/25  0552   WBC 16.7*   HGB 10.5*   HCT 33.4*   MCV 82.7   MCH 26.0   MCHC 31.4   RDW 17.7*   NEPRELIM 12.91*   .0       No results for input(s): \"COLORUR\", \"CLARITY\", \"SPECGRAVITY\", \"GLUUR\", \"BILUR\", \"KETUR\", \"BLOODURINE\", \"PHURINE\", \"PROUR\", \"UROBILINOGEN\", \"NITRITE\", \"LEUUR\", \"WBCUR\", \"RBCUR\", \"BACUR\", \"EPIUR\" in the last 168 hours.    No results for input(s): \"URINE\", \"CULTI\", \"BLDSMR\" in the last 168 hours.      Imaging:  CT CHEST HI RESOLUTION  (CPT=71250)    Result Date: 4/4/2025  CONCLUSION:   1. 1. Pulmonary edema and bilateral pleural effusions are significantly improved.  There is persistent trace right pleural effusion.  2. Advanced interstitial lung disease is noted in an NSIP (nonspecific insterstitial pneumonitis) pattern.  This limits evaluation for pulmonary nodules.  3. Additional chronic or incidental findings are described in the body of this report.     Dictated by (CST): Miky Lowery MD on 4/04/2025 at 12:24 PM     Finalized by (CST): Miky Lowery MD on 4/04/2025 at 12:31 PM          XR CHEST AP PORTABLE  (CPT=71045)    Result Date: 4/3/2025  CONCLUSION:  1. Cardiomegaly.  TAVR. 2. Bilateral mixed alveolar and interstitial airspace disease redemonstrated.    Dictated by (CST): Nguyễn Montenegro MD on 4/03/2025 at 9:13 AM     Finalized by (CST): Nguyễn Montenegro MD on 4/03/2025 at 9:16 AM                            Jigar Apple MD

## 2025-04-05 NOTE — PROGRESS NOTES
[facility] Cardiology Progress Note    Hoda Busby Patient Status:  Inpatient    1940 MRN [id number]   Location [facility]5W Attending Malathi Stuart MD   Hosp Day # 10 PCP Malathi Stuart MD     Subjective:  Denies cp. +Dyspnea on exertion     Objective:  BP 94/42 (BP Location: Right arm)   Pulse 88   Temp 97.8 °F (36.6 °C) (Oral)   Resp 20   Ht 5' 4\" (1.626 m)   Wt 171 lb 8 oz (77.8 kg)   SpO2 94%   BMI 29.44 kg/m²     Telemetry: NSR       Intake/Output:    Intake/Output Summary (Last 24 hours) at 2025 0907  Last data filed at 2025 0255  Gross per 24 hour   Intake 350 ml   Output 950 ml   Net -600 ml       Last 3 Weights   25 0613 171 lb 8 oz (77.8 kg)   25 0414 172 lb 14.4 oz (78.4 kg)   25 0958 173 lb 9.6 oz (78.7 kg)   25 0500 174 lb 9.6 oz (79.2 kg)   25 0547 166 lb (75.3 kg)   25 0606 172 lb 11.2 oz (78.3 kg)   25 0441 174 lb 4.8 oz (79.1 kg)   25 0455 187 lb 8 oz (85 kg)   25 2158 183 lb 4.8 oz (83.1 kg)   25 1939 160 lb (72.6 kg)   25 1603 180 lb (81.6 kg)   25 0753 176 lb (79.8 kg)   25 1020 173 lb (78.5 kg)   25 1513 173 lb (78.5 kg)       Labs:  Recent Labs   Lab 25  0507 25  0505 25  0552   * 84 80   BUN 39* 37* 36*   CREATSERUM 1.05* 1.12* 1.00   EGFRCR 52* 48* 56*   CA 8.3* 8.4* 8.4*    137 139   K 4.2 3.7 4.4  4.4    101 103   CO2 27.0 30.0 29.0     Recent Labs   Lab 25  0507 25  0505 25  0552   RBC 4.11 3.92 4.04   HGB 10.9* 10.5* 10.5*   HCT 33.8* 32.1* 33.4*   MCV 82.2 81.9 82.7   MCH 26.5 26.8 26.0   MCHC 32.2 32.7 31.4   RDW 17.2* 17.4* 17.7*   NEPRELIM 13.55* 11.91* 12.91*   WBC 15.5* 16.0* 16.7*   .0 196.0 212.0         No results for input(s): \"TROP\", \"TROPHS\", \"CK\" in the last 168 hours.    Diagnostics:     3/27/25 Echo  1. Left ventricle: The cavity size was normal. Wall thickness was normal.       Systolic function was normal. The estimated ejection fraction was 60-65%.      No diagnostic evidence for regional wall motion abnormalities. Doppler      parameters are consistent with abnormal left ventricular relaxation -      grade 1 diastolic dysfunction.   2. Right ventricle: The cavity size was increased. Systolic function was      normal.   3. Left atrium: The atrium was mildly to moderately dilated. The left atrial      volume was mildly to moderately increased.   4. Aortic valve: A bioprosthetic valve is present. Mcnamara PUNEET S3 23mm      (TAVR) bioprosthesis was present. The peak systolic velocity was      2.69m/sec. The mean systolic gradient was 15mm Hg. The valve area (VTI)      was 1.52cm^2.   5. Pulmonary arteries: Systolic pressure was moderately increased, estimated      to be 52mm Hg.     Review of Systems   Respiratory:  Positive for shortness of breath. Negative for wheezing.    Cardiovascular:  Negative for chest pain, palpitations, orthopnea and leg swelling.     Physical Exam:    General: Alert and oriented x 3. No apparent distress.   HEENT: Normocephalic, anicteric sclera, neck supple, no thyromegaly or adenopathy.  Neck: No JVD, carotids 2+, no bruits.  Cardiac: Regular rate & rhythm. S1, S2 normal. No murmur, pericardial rub, S3, or extra cardiac sounds.  Lungs: Clear without wheezes, rales, rhonchi or dullness.  Normal excursions and effort.  Abdomen: Soft, non-tender. No organosplenomegally, mass or rebound, BS-present.  Extremities: Without clubbing or cyanosis.  No BLE edema   Neurologic: Alert and oriented, normal affect. No focal defects  Skin: Warm and dry.       Medications:   methylPREDNISolone  40 mg Intravenous Daily    furosemide  40 mg Oral Daily    amoxicillin clavulanate  500 mg Oral Q8H    vancomycin  125 mg Oral Daily    aspirin  81 mg Oral Daily    atorvastatin  20 mg Oral Nightly    pantoprazole  40 mg Oral QAM AC    heparin  5,000 Units Subcutaneous 2 times per day     insulin aspart  1-7 Units Subcutaneous TID CC    ferrous sulfate  325 mg Oral Daily with breakfast    docusate sodium  100 mg Oral BID         Assessment:    84yr old female patient with history of severe aortic stenosis s/p TAVR 1/2025, obstructive non revascularized CAD, HFpEF, interstitial pneumonitis, PE, colon cancer s/p left hemicolectomy presented with new onset productive cough and dyspnea and BLE edema.     Acute hypoxic respiratory failure (multifactorial: pneumonia/chronic nonspecific interstitial pneumonitis, CHF)   - on steroids, IV antibiotics, diuresis  - Ct chest neg for PE   - pulmonary following     Acute decompensated HFpEF  - pBNP 4,423 & CXR w/ CHF   - echo w/ LVEF 60-65%, no RWMA, G1DD, LA dilated, Bioprosthetic aortic valve with mean systolic gradient of 15mmHg, PASP of 52mmHg. IVC dilated  - on Iv lasix 40mg twice daily. Scr stable. I/o inaccurate & wts inconsistent ; however pt has clinically improved      CAD  - Trop of 58 on admission  -  [facility] 1/14/25: mild LAD diagonal focal bifurcation stenosis, moderately calcified, 80-90%, plan was for to medical management at that time with anemia and Gi procedure. Since pt will need uninterrupted DAPT post stent   - on aspirin, atorvastatin       Severe aortic stenosis  s/p TAVR 1/23/25  - stable valve function per recent echo     H/o Splenic Flexure colon cancer   - s/p Robolic left hemicolectomy/mobilization of splenic flexure, omental flap closure 3/27/25     Chronic anemia  - hgb stable        Plan:  Coronary angiogram films reviewed  I would recommend starting Plavix today to see if she can tolerate it prior to multivessel PCI including left main which is unprotected  GI evaluation from last December reviewed, had gastric ulcer and also had colonic polyps    Angelito Grady MD        3

## 2025-04-05 NOTE — PLAN OF CARE
Problem: Patient Centered Care  Goal: Patient preferences are identified and integrated in the patient's plan of care  Description: Interventions:- What would you like us to know as we care for you? - Provide timely, complete, and accurate information to patient/family- Incorporate patient and family knowledge, values, beliefs, and cultural backgrounds into the planning and delivery of care- Encourage patient/family to participate in care and decision-making at the level they choose- Honor patient and family perspectives and choices  Outcome: Progressing     Problem: Patient/Family Goals  Goal: Patient/Family Long Term Goal  Description: Patient's Long Term Goal: to go home Interventions:- Monitor vital signs and labs  - Administer medications per order  - Follow MD orders  - Fall precautions  - Diagnostics as ordered  - Update / inform patient on plan of care  - Discharge planning- See additional Care Plan goals for specific interventions  Outcome: Progressing  Goal: Patient/Family Short Term Goal  Description: Patient's Short Term Goal: to feel better Interventions: -  See additional Care Plan goals for specific interventionsMonitor vital signs and labs  - Administer medications per order  - Follow MD orders  - Fall precautions  - Diagnostics as ordered  - Update / inform patient on plan of care  - Discharge planning  Outcome: Progressing     Problem: PAIN - ADULT  Goal: Verbalizes/displays adequate comfort level or patient's stated pain goal  Description: INTERVENTIONS:- Encourage pt to monitor pain and request assistance- Assess pain using appropriate pain scale- Administer analgesics based on type and severity of pain and evaluate response- Implement non-pharmacological measures as appropriate and evaluate response- Consider cultural and social influences on pain and pain management- Manage/alleviate anxiety- Utilize distraction and/or relaxation techniques- Monitor for opioid side effects- Notify MD/LIP if  interventions unsuccessful or patient reports new pain- Anticipate increased pain with activity and pre-medicate as appropriate  Outcome: Progressing     Problem: RISK FOR INFECTION - ADULT  Goal: Absence of fever/infection during anticipated neutropenic period  Description: INTERVENTIONS- Monitor WBC- Administer growth factors as ordered- Implement neutropenic guidelines  Outcome: Progressing     Problem: SAFETY ADULT - FALL  Goal: Free from fall injury  Description: INTERVENTIONS:- Assess pt frequently for physical needs- Identify cognitive and physical deficits and behaviors that affect risk of falls.- [place] fall precautions as indicated by assessment.- Educate pt/family on patient safety including physical limitations- Instruct pt to call for assistance with activity based on assessment- Modify environment to reduce risk of injury- Provide assistive devices as appropriate- Consider OT/PT consult to assist with strengthening/mobility- Encourage toileting schedule  Outcome: Progressing     Problem: DISCHARGE PLANNING  Goal: Discharge to home or other facility with appropriate resources  Description: INTERVENTIONS:- Identify barriers to discharge w/pt and caregiver- Include patient/family/discharge partner in discharge planning- Arrange for needed discharge resources and transportation as appropriate- Identify discharge learning needs (meds, wound care, etc)- Arrange for interpreters to assist at discharge as needed- Consider post-discharge preferences of patient/family/discharge partner- Complete POLST form as appropriate- Assess patient's ability to be responsible for managing their own health- Refer to Case Management Department for coordinating discharge planning if the patient needs post-hospital services based on physician/LIP order or complex needs related to functional status, cognitive ability or social support system  Outcome: Progressing     Problem: RESPIRATORY - ADULT  Goal: Achieves optimal  ventilation and oxygenation  Description: INTERVENTIONS:- Assess for changes in respiratory status- Assess for changes in mentation and behavior- Position to facilitate oxygenation and minimize respiratory effort- Oxygen supplementation based on oxygen saturation or ABGs- Provide Smoking Cessation handout, if applicable- Encourage broncho-pulmonary hygiene including cough, deep breathe, Incentive Spirometry- Assess the need for suctioning and perform as needed- Assess and instruct to report SOB or any respiratory difficulty- Respiratory Therapy support as indicated- Manage/alleviate anxiety- Monitor for signs/symptoms of CO2 retention  Outcome: Progressing     Problem: MUSCULOSKELETAL - ADULT  Goal: Return mobility to safest level of function  Description: INTERVENTIONS:- Assess patient stability and activity tolerance for standing, transferring and ambulating w/ or w/o assistive devices- Assist with transfers and ambulation using safe patient handling equipment as needed- Ensure adequate protection for wounds/incisions during mobilization- Obtain PT/OT consults as needed- Advance activity as appropriate- Communicate ordered activity level and limitations with patient/family  Outcome: Progressing  Goal: Maintain proper alignment of affected body part  Description: INTERVENTIONS:- Support and protect limb and body alignment per provider's orders- Instruct and reinforce with patient and family use of appropriate assistive device and precautions (e.g. spinal or hip dislocation precautions)  Outcome: Progressing     Problem: Diabetes/Glucose Control  Goal: Glucose maintained within prescribed range  Description: INTERVENTIONS:- Monitor Blood Glucose as ordered- Assess for signs and symptoms of hyperglycemia and hypoglycemia- Administer ordered medications to maintain glucose within target range- Assess barriers to adequate nutritional intake and initiate nutrition consult as needed- Instruct patient on self management  of diabetes  Outcome: Progressing

## 2025-04-06 NOTE — PROGRESS NOTES
[facility]  part of [facility]    Progress Note    Hoda Busby Patient Status:  Inpatient    1940 MRN [id number]   Location [facility]5W Attending Malathi Stuart MD   Hosp Day # 11 PCP Malathi Stuart MD         Assessment and Plan:     Acute hypoxic respiratory failure (HCC)  -ddx: CHF, recurrent interstitial pneumonitis  -CT chest in ED 3/26/25 - no PE; worsening of pleural effusions and interstitial edema  -started on IV lasix and IV solumedrol to cover both CHF and possible recurrence of NSIP respectively  -required BiPAP transiently  -procalc sl elevated 0.25; WBC 19.8 K on admission  -recent echo 25 - normal LV systolic function (EF 60-65%); grade 2 diastolic dysfunction; normally functioning bioprosthetic TAVR; sl incr PAP 44mmHg  -repeat echo 3/27/25 stable from 2025 except incr PAP to 52mmHg  -cards consult appreciated  -diuresing well on Lasix 40mg IV BID (net -3L); creat up slightly; monitor closely  -pt notes improvement in breathing though still not back to baseline; still requiring 10L O2  -pt with audible wet, loose cough 3/28; 3/27 pt denied any increase in her baseline cough, but when questioned again today, she states that her cough has been worse in the past 3-4 days.  -discussed with Dr. Holloway; will start ceftriaxone and azitho; po vanco for CDP (day 2)  -further recs per pulm Dr. Holloway, cards Dr. Wood  -respiratory status continues to improve; s/p lasix changed to 20mg ivp since 3/29 d/t hypotension  -has distal BLE edema--improved with compression stockings  -hi res CT chest 25 shows pulmonary edema and bilateral pleural effusions are significantly improved.  There is persistent trace right pleural effusion.  Advanced interstitial lung disease is noted in an NSIP (nonspecific insterstitial pneumonitis) pattern.  This limits evaluation for pulmonary nodules.   -Leukocytosis likely 2/2 steroids; on Solumedrol 40 mg IV every day; pt  feeling better; on Augmentin 500 mg po TID, d#4  -on Lasix 40mg po qD; on oxygen 1.5 L NC; continue to wean O2 as tolerated     Adenocarcinoma of colon  -Bx of transverse colon polyp 12/17/24 -- invasive, moderately differentiated adenocarcinoma  -CEA normal (1.9)  -CT a/p neg for mets  -surgery delayed in anticipation of TAVR (done 1/23/25)  -s/p robotic assisted left hemicolectomy (Dr. Cassidy) - path - tumor invades into muscularis; margins neg for tumor; all 19 LN's neg for mets (0/19); pT2, pN0  -next appt with Dr. Cassidy 4/16/25     Coronary artery disease  -Cath 1/14/25 by Dr. Bernardo Liz (as w/u for TAVR) -- RCA non-obstructive; LM free of obst disease; LM plaque extending into ostium of LAD (20-30%); mid LAD (80-90%), cx ostium (60-70%)  -intervention delayed until after colon resection due to need for DAPT x 6 mos after stenting   -on Atorvastatin 20mg/day, ASA 81mg/day  -awaits CATH per Dr Grady; starting Plavix 75 mg po every day; NPO after MN     Hx  GI bleed  -EGD 11/26/24 (Dr. Nuno) -15mm nonbleeding gastric antral ulcer; 3mm gastric antral AVM s/p APC  -recurrence in 12/2024  -colonoscopy and repeat EGD 12/17/24 by Dr. Staton -- grade 1 esophagitis; duod ulcers healing; large flat polyp in transverse colon (carcinoma)   -received total of 3 units PRBCs   -Xarelto stopped in 12/2024  -s/p omeprazole 20mg BID x 8 weeks (EOT 1/22/25), now on omeprazole 20mg daily -- would continue until she completes her upcoming coronary stenting     Anemia due to acute blood loss  -GI bleed as above  -on ferrous sulfate 325mg/day  -Hgb 10.8 but stable     Hx chronic mild BLE edema  -Lasix 20mg/day     Acute left peroneal palsy  In Nov 2024; had numbness to anterolateral left foot and ankle as well as inability to dorsiflex left foot; etiology unclear  -neurologist Dr Richardson consulted in hosp  -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   -MRI brain--no acute intracranial process  -still doing PT at [facility] -- continues to improve; dorsiflexion left foot 4/5; plantarflexion 5/5 this admission (3/26/25)     Interstitial pneumonitis/NSIP  -dx'ed 10/2024 - presented with cough and severe hypoxia  -unresponsive to abx   -CXR 10/24/24 extensive bilateral perihilar and bilateral upper and lower lobe   -S.pneumo, legionella Ag , mycoplasma IgM/G, Fungitell-beta-D glucan negative  -ANCA and URIEL/connective tissue disease panels negative  -anti-histone and anti-[place] Ab's negative  -CT with bilateral ground glass opacities, small consolidations of BLL  -per pulm, findings suspicious for NSIP (vs cryptogenic organizing pneumonia vs hypersensitivity pneumonitis); NOT thought to be c/w UIP pattern  -s/p empiric steroids (solumedrol then prednisone taper) 10/25/24 - (EOT 1/18/25)  -dyspnea completely resolved until this admission 3/26/25  -now on solumedrol 60mg l78b--xlhcyupfb to 40mg q12h     Bilateral pulmonary emboli  -dx'ed at time of interstitial pneumonitis  -CT chest 10/25/24 -- acute distal segmental/subsegmental pulmonary emboli in [place] and subsegmental PE in [place]  -unclear etiology; no recent hx of long travel; no family/personal hx of blood clots  -BLE venous dopplers negative 10/26/24  -started xarelto 20 mg po every day on 11/28/24  -repeat CT chest 12/9/24 neg PE  -stopped Xarelto 12/14/24 due to recurrent GI bleed  -repeat CT chest 3/26/25 -- neg PE     Severe aortic stenosis  -progression on serial echocardiograms  -s/p TAVR 1/23/25 (Dr. Reji Green)  -symptomatically much improved -- POPE resolved     Hypertension, primary  -(dyazide stopped due to NANCY)  -(amlodipine held due to low BP in 11/2024)  -BP remains normal off meds     Hx of hip OA  -s/p left THR (Dr. Lo) many years ago  -s/p elective R BEATRIZ on 10/5/23 by Dr. Diaz     Hyperlipidemia   Pt with strong family hx of high cholesterol  Started on Atorvastatin  20mg/day in 1/2025 (after noted to have CAD on cath)     Osteopenia   On Fosamax from 2011 to 4/2016.     DEXA stable 5/10/17 and 2019 and 2021  DEXA 8/2024 -- osteoporosis of left forearm  -restarted Fosamax 8/2024      Vitamin D deficiency  On vitamin D 4000u/day      VTE proph  -SQH                 SUBJECTIVE:     On oxygen 1.5 L NC; no CP; +POPE           OBJECTIVE:  PHYSICAL EXAM:     Vital signs in last 24 hours:  BP 90/43 (BP Location: Right arm)   Pulse 99   Temp 98.4 °F (36.9 °C) (Oral)   Resp 18   Ht 5' 4\" (1.626 m)   Wt 170 lb 3.2 oz (77.2 kg)   SpO2 90%   BMI 29.21 kg/m²     Intake/Output:    Intake/Output Summary (Last 24 hours) at 4/6/2025 1154  Last data filed at 4/6/2025 0922  Gross per 24 hour   Intake 1272 ml   Output 501 ml   Net 771 ml       Wt Readings from Last 3 Encounters:   04/06/25 170 lb 3.2 oz (77.2 kg)   02/27/25 180 lb (81.6 kg)   02/20/25 173 lb (78.5 kg)         Constitutional: alert and oriented x3 in no acute distress  HEENT- EOMI, PERRL  Nose/Mouth/Throat: pharynx without erythema  Neck/Thyroid: neck supple; no thyromegaly  Cardiovascular: RRR, S1, S2, no S3 or murmur  Respiratory: lungs without crackles or wheezes  Abdomen: normoactive bowel sounds, soft, non-tender and non-distended  Extremities: no clubbing, cyanosis or edema          Meds:   Current Facility-Administered Medications   Medication Dose Route Frequency    [START ON 4/7/2025] predniSONE (Deltasone) tab 40 mg  40 mg Oral Daily with breakfast    clopidogrel (Plavix) tab 75 mg  75 mg Oral Daily    furosemide (Lasix) tab 40 mg  40 mg Oral Daily    amoxicillin clavulanate (Augmentin) 500-125 MG per tab 500 mg  500 mg Oral Q8H    polyethylene glycol (PEG 3350) (Miralax) 17 g oral packet 17 g  17 g Oral Daily PRN    guaiFENesin (Robitussin) 100 MG/5 ML oral liquid 200 mg  200 mg Oral Q4H PRN    benzocaine-menthol (Cepacol) lozenge 1 lozenge  1 lozenge Oral PRN    vancomycin (Vancocin) cap 125 mg  125 mg Oral Daily     acetaminophen (Tylenol) tab 325 mg  325 mg Oral Q6H PRN    aspirin DR tab 81 mg  81 mg Oral Daily    atorvastatin (Lipitor) tab 20 mg  20 mg Oral Nightly    pantoprazole (Protonix) DR tab 40 mg  40 mg Oral QAM AC    traMADol (Ultram) tab 50 mg  50 mg Oral Q6H PRN    ipratropium-albuterol (Duoneb) 0.5-2.5 (3) MG/3ML inhalation solution 3 mL  3 mL Nebulization Q6H PRN    heparin (Porcine) 5000 UNIT/ML injection 5,000 Units  5,000 Units Subcutaneous 2 times per day    glucose (Dex4) 15 GM/59ML oral liquid 15 g  15 g Oral Q15 Min PRN    Or    glucose (Glutose) 40% oral gel 15 g  15 g Oral Q15 Min PRN    Or    glucose-vitamin C (Dex-4) chewable tab 4 tablet  4 tablet Oral Q15 Min PRN    Or    dextrose 50% injection 50 mL  50 mL Intravenous Q15 Min PRN    Or    glucose (Dex4) 15 GM/59ML oral liquid 30 g  30 g Oral Q15 Min PRN    Or    glucose (Glutose) 40% oral gel 30 g  30 g Oral Q15 Min PRN    Or    glucose-vitamin C (Dex-4) chewable tab 8 tablet  8 tablet Oral Q15 Min PRN    insulin aspart (NovoLOG) 100 Units/mL FlexPen 1-7 Units  1-7 Units Subcutaneous TID CC    ferrous sulfate DR tab 325 mg  325 mg Oral Daily with breakfast    docusate sodium (Colace) cap 100 mg  100 mg Oral BID            Data Review:     Labs:        Recent Labs   Lab 04/05/25  0552   WBC 16.7*   HGB 10.5*   HCT 33.4*   MCV 82.7   MCH 26.0   MCHC 31.4   RDW 17.7*   NEPRELIM 12.91*   .0       No results for input(s): \"COLORUR\", \"CLARITY\", \"SPECGRAVITY\", \"GLUUR\", \"BILUR\", \"KETUR\", \"BLOODURINE\", \"PHURINE\", \"PROUR\", \"UROBILINOGEN\", \"NITRITE\", \"LEUUR\", \"WBCUR\", \"RBCUR\", \"BACUR\", \"EPIUR\" in the last 168 hours.    No results for input(s): \"URINE\", \"CULTI\", \"BLDSMR\" in the last 168 hours.      Imaging:  CT CHEST HI RESOLUTION (CPT=71250)    Result Date: 4/4/2025  CONCLUSION:   1. 1. Pulmonary edema and bilateral pleural effusions are significantly improved.  There is persistent trace right pleural effusion.  2. Advanced interstitial lung disease  is noted in an NSIP (nonspecific insterstitial pneumonitis) pattern.  This limits evaluation for pulmonary nodules.  3. Additional chronic or incidental findings are described in the body of this report.     Dictated by (CST): Miky Lowery MD on 4/04/2025 at 12:24 PM     Finalized by (CST): Miky Lowery MD on 4/04/2025 at 12:31 PM                            Jigar Apple MD

## 2025-04-06 NOTE — PLAN OF CARE
Problem: Patient/Family Goals  Goal: Patient/Family Long Term Goal  Description: Patient's Long Term Goal: to go home Interventions:- Monitor vital signs and labs  - Administer medications per order  - Follow MD orders  - Fall precautions  - Diagnostics as ordered  - Update / inform patient on plan of care  - Discharge planning- See additional Care Plan goals for specific interventions  Outcome: Progressing  Goal: Patient/Family Short Term Goal  Description: Patient's Short Term Goal: to feel better Interventions: -  See additional Care Plan goals for specific interventionsMonitor vital signs and labs  - Administer medications per order  - Follow MD orders  - Fall precautions  - Diagnostics as ordered  - Update / inform patient on plan of care  - Discharge planning  Outcome: Progressing     Problem: PAIN - ADULT  Goal: Verbalizes/displays adequate comfort level or patient's stated pain goal  Description: INTERVENTIONS:- Encourage pt to monitor pain and request assistance- Assess pain using appropriate pain scale- Administer analgesics based on type and severity of pain and evaluate response- Implement non-pharmacological measures as appropriate and evaluate response- Consider cultural and social influences on pain and pain management- Manage/alleviate anxiety- Utilize distraction and/or relaxation techniques- Monitor for opioid side effects- Notify MD/LIP if interventions unsuccessful or patient reports new pain- Anticipate increased pain with activity and pre-medicate as appropriate  Outcome: Progressing     Problem: SAFETY ADULT - FALL  Goal: Free from fall injury  Description: INTERVENTIONS:- Assess pt frequently for physical needs- Identify cognitive and physical deficits and behaviors that affect risk of falls.- [place] fall precautions as indicated by assessment.- Educate pt/family on patient safety including physical limitations- Instruct pt to call for assistance with activity based on assessment- Modify  environment to reduce risk of injury- Provide assistive devices as appropriate- Consider OT/PT consult to assist with strengthening/mobility- Encourage toileting schedule  4/6/2025 0008 by Krystyna Hutchinson RN  Outcome: Progressing  4/5/2025 2255 by Krystyna Hutchinson, RN  Outcome: Progressing     Problem: DISCHARGE PLANNING  Goal: Discharge to home or other facility with appropriate resources  Description: INTERVENTIONS:- Identify barriers to discharge w/pt and caregiver- Include patient/family/discharge partner in discharge planning- Arrange for needed discharge resources and transportation as appropriate- Identify discharge learning needs (meds, wound care, etc)- Arrange for interpreters to assist at discharge as needed- Consider post-discharge preferences of patient/family/discharge partner- Complete POLST form as appropriate- Assess patient's ability to be responsible for managing their own health- Refer to Case Management Department for coordinating discharge planning if the patient needs post-hospital services based on physician/LIP order or complex needs related to functional status, cognitive ability or social support system  4/6/2025 0008 by Krystyna Hutchinson, RN  Outcome: Progressing  4/5/2025 2255 by Krystyna Hutchinson, RN  Outcome: Progressing

## 2025-04-06 NOTE — PROGRESS NOTES
Progress Note  Hoda Busby Patient Status:  Inpatient    1940 MRN [id number]   Location [facility]5W Attending Malathi Stuart MD   Hosp Day # 11 PCP Malathi Stuart MD     Subjective:  No acute events overnight.  Sitting up in a chair this morning, on O2 at 2L, denies any cardiac symptoms currently. Breathing significantly improved.  Plan for [facility] tomorrow with Dr Grady.    Objective:  BP 90/43 (BP Location: Right arm)   Pulse 99   Temp 98.4 °F (36.9 °C) (Oral)   Resp 18   Ht 5' 4\" (1.626 m)   Wt 170 lb 3.2 oz (77.2 kg)   SpO2 90%   BMI 29.21 kg/m²     Telemetry: SR, HR 90s      Intake/Output:    Intake/Output Summary (Last 24 hours) at 2025 1157  Last data filed at 2025 0922  Gross per 24 hour   Intake 1272 ml   Output 501 ml   Net 771 ml       Last 3 Weights   25 0625 170 lb 3.2 oz (77.2 kg)   25 0613 171 lb 8 oz (77.8 kg)   25 0414 172 lb 14.4 oz (78.4 kg)   25 0958 173 lb 9.6 oz (78.7 kg)   25 0500 174 lb 9.6 oz (79.2 kg)   25 0547 166 lb (75.3 kg)   25 0606 172 lb 11.2 oz (78.3 kg)   25 0441 174 lb 4.8 oz (79.1 kg)   25 0455 187 lb 8 oz (85 kg)   25 2158 183 lb 4.8 oz (83.1 kg)   25 1939 160 lb (72.6 kg)   25 1603 180 lb (81.6 kg)   25 0753 176 lb (79.8 kg)   25 1020 173 lb (78.5 kg)   25 1513 173 lb (78.5 kg)       Labs:  Recent Labs   Lab 25  0507 25  0505 25  0552   * 84 80   BUN 39* 37* 36*   CREATSERUM 1.05* 1.12* 1.00   EGFRCR 52* 48* 56*   CA 8.3* 8.4* 8.4*    137 139   K 4.2 3.7 4.4  4.4    101 103   CO2 27.0 30.0 29.0     Recent Labs   Lab 25  0507 25  0505 25  0552   RBC 4.11 3.92 4.04   HGB 10.9* 10.5* 10.5*   HCT 33.8* 32.1* 33.4*   MCV 82.2 81.9 82.7   MCH 26.5 26.8 26.0   MCHC 32.2 32.7 31.4   RDW 17.2* 17.4* 17.7*   NEPRELIM 13.55* 11.91* 12.91*   WBC 15.5* 16.0* 16.7*   .0 196.0 212.0         No results  for input(s): \"TROP\", \"TROPHS\", \"CK\" in the last 168 hours.    Review of Systems   Constitutional: Negative.   Cardiovascular:  Positive for dyspnea on exertion.   Respiratory: Negative.         Physical Exam:    Gen: alert, oriented x 3, NAD  Heent: pupils equal, reactive. Mucous membranes moist.   Neck: no jvd  Cardiac: regular rate and rhythm, normal S1,S2, WADE  Lungs: scattered crackles, diminished, O2 at 2L  Abd: soft, NT/ND +bs  Ext: no edema  Skin: Warm, dry  Neuro: No focal deficits        Medications:     [START ON 4/7/2025] predniSONE  40 mg Oral Daily with breakfast    clopidogrel  75 mg Oral Daily    furosemide  40 mg Oral Daily    amoxicillin clavulanate  500 mg Oral Q8H    vancomycin  125 mg Oral Daily    aspirin  81 mg Oral Daily    atorvastatin  20 mg Oral Nightly    pantoprazole  40 mg Oral QAM AC    heparin  5,000 Units Subcutaneous 2 times per day    insulin aspart  1-7 Units Subcutaneous TID CC    ferrous sulfate  325 mg Oral Daily with breakfast    docusate sodium  100 mg Oral BID     Assessment:  Acute decompensated HFpEF-presented with productive cough, dyspnea and lower extremities edema   CXR showed pulmonary edema/CHF  proBNP 4,423  Echo LVEF 60-65%, no rwma, G1dd, bioprosthetic aortic valve with normal function, PASP 52 mmHg  S/P IV lasix, now switched to oral, net fluid off 1.2L   Acute hypoxic respiratory filure, likely multifactorial pneumonia, CHF  S/P abx, on steroids  CT chest neg for PE  Pulm following   CAD s/p LHC 1/14/25 showed mid LAD diagonal focal bifurcation stenosis  Initiated on plavix 4/5/25 to see if will tolerate w/o bleeding   On asa, statin   Hx of severe aortic stenosis s/p TAVR 1/23/25-TTE showed normal function       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  Tolerating plavix w/o any issues.  Plan for [facility] with multivessel PCI with Dr Grady tomorrow 4/6/25, keep NPO after midnight.  Continue asa,  statin.  Continue abx per primary/pulm.  Wean off O2 as able.    Plan of care discussed with patient, RN.    Evelyn Cali, APRN  4/6/2025  11:57 AM  [phone]

## 2025-04-06 NOTE — PLAN OF CARE
Problem: Patient/Family Goals  Goal: Patient/Family Long Term Goal  Description: Patient's Long Term Goal: to go home Interventions:- Monitor vital signs and labs  - Administer medications per order  - Follow MD orders  - Fall precautions  - Diagnostics as ordered  - Update / inform patient on plan of care  - Discharge planning- See additional Care Plan goals for specific interventions  Outcome: Progressing  Goal: Patient/Family Short Term Goal  Description: Patient's Short Term Goal: to feel better Interventions: -  See additional Care Plan goals for specific interventionsMonitor vital signs and labs  - Administer medications per order  - Follow MD orders  - Fall precautions  - Diagnostics as ordered  - Update / inform patient on plan of care  - Discharge planning  Outcome: Progressing     Problem: PAIN - ADULT  Goal: Verbalizes/displays adequate comfort level or patient's stated pain goal  Description: INTERVENTIONS:- Encourage pt to monitor pain and request assistance- Assess pain using appropriate pain scale- Administer analgesics based on type and severity of pain and evaluate response- Implement non-pharmacological measures as appropriate and evaluate response- Consider cultural and social influences on pain and pain management- Manage/alleviate anxiety- Utilize distraction and/or relaxation techniques- Monitor for opioid side effects- Notify MD/LIP if interventions unsuccessful or patient reports new pain- Anticipate increased pain with activity and pre-medicate as appropriate  Outcome: Progressing     Problem: RISK FOR INFECTION - ADULT  Goal: Absence of fever/infection during anticipated neutropenic period  Description: INTERVENTIONS- Monitor WBC- Administer growth factors as ordered- Implement neutropenic guidelines  Outcome: Progressing     Problem: SAFETY ADULT - FALL  Goal: Free from fall injury  Description: INTERVENTIONS:- Assess pt frequently for physical needs- Identify cognitive and physical  deficits and behaviors that affect risk of falls.- [place] fall precautions as indicated by assessment.- Educate pt/family on patient safety including physical limitations- Instruct pt to call for assistance with activity based on assessment- Modify environment to reduce risk of injury- Provide assistive devices as appropriate- Consider OT/PT consult to assist with strengthening/mobility- Encourage toileting schedule  Outcome: Progressing     Problem: MUSCULOSKELETAL - ADULT  Goal: Return mobility to safest level of function  Description: INTERVENTIONS:- Assess patient stability and activity tolerance for standing, transferring and ambulating w/ or w/o assistive devices- Assist with transfers and ambulation using safe patient handling equipment as needed- Ensure adequate protection for wounds/incisions during mobilization- Obtain PT/OT consults as needed- Advance activity as appropriate- Communicate ordered activity level and limitations with patient/family  Outcome: Progressing  Goal: Maintain proper alignment of affected body part  Description: INTERVENTIONS:- Support and protect limb and body alignment per provider's orders- Instruct and reinforce with patient and family use of appropriate assistive device and precautions (e.g. spinal or hip dislocation precautions)  Outcome: Progressing     Problem: RESPIRATORY - ADULT  Goal: Achieves optimal ventilation and oxygenation  Description: INTERVENTIONS:- Assess for changes in respiratory status- Assess for changes in mentation and behavior- Position to facilitate oxygenation and minimize respiratory effort- Oxygen supplementation based on oxygen saturation or ABGs- Provide Smoking Cessation handout, if applicable- Encourage broncho-pulmonary hygiene including cough, deep breathe, Incentive Spirometry- Assess the need for suctioning and perform as needed- Assess and instruct to report SOB or any respiratory difficulty- Respiratory Therapy support as indicated-  Manage/alleviate anxiety- Monitor for signs/symptoms of CO2 retention  Outcome: Progressing

## 2025-04-06 NOTE — PLAN OF CARE
Problem: Patient Centered Care  Goal: Patient preferences are identified and integrated in the patient's plan of care  Description: Interventions:- What would you like us to know as we care for you? - Provide timely, complete, and accurate information to patient/family- Incorporate patient and family knowledge, values, beliefs, and cultural backgrounds into the planning and delivery of care- Encourage patient/family to participate in care and decision-making at the level they choose- Honor patient and family perspectives and choices  Outcome: Progressing     Problem: Patient/Family Goals  Goal: Patient/Family Long Term Goal  Description: Patient's Long Term Goal: to go home Interventions:- Monitor vital signs and labs  - Administer medications per order  - Follow MD orders  - Fall precautions  - Diagnostics as ordered  - Update / inform patient on plan of care  - Discharge planning- See additional Care Plan goals for specific interventions  Outcome: Progressing  Goal: Patient/Family Short Term Goal  Description: Patient's Short Term Goal: to feel better Interventions: -  See additional Care Plan goals for specific interventionsMonitor vital signs and labs  - Administer medications per order  - Follow MD orders  - Fall precautions  - Diagnostics as ordered  - Update / inform patient on plan of care  - Discharge planning  Outcome: Progressing     Problem: PAIN - ADULT  Goal: Verbalizes/displays adequate comfort level or patient's stated pain goal  Description: INTERVENTIONS:- Encourage pt to monitor pain and request assistance- Assess pain using appropriate pain scale- Administer analgesics based on type and severity of pain and evaluate response- Implement non-pharmacological measures as appropriate and evaluate response- Consider cultural and social influences on pain and pain management- Manage/alleviate anxiety- Utilize distraction and/or relaxation techniques- Monitor for opioid side effects- Notify MD/LIP if  interventions unsuccessful or patient reports new pain- Anticipate increased pain with activity and pre-medicate as appropriate  Outcome: Progressing     Problem: RISK FOR INFECTION - ADULT  Goal: Absence of fever/infection during anticipated neutropenic period  Description: INTERVENTIONS- Monitor WBC- Administer growth factors as ordered- Implement neutropenic guidelines  Outcome: Progressing     Problem: SAFETY ADULT - FALL  Goal: Free from fall injury  Description: INTERVENTIONS:- Assess pt frequently for physical needs- Identify cognitive and physical deficits and behaviors that affect risk of falls.- Filion fall precautions as indicated by assessment.- Educate pt/family on patient safety including physical limitations- Instruct pt to call for assistance with activity based on assessment- Modify environment to reduce risk of injury- Provide assistive devices as appropriate- Consider OT/PT consult to assist with strengthening/mobility- Encourage toileting schedule  Outcome: Progressing     Problem: DISCHARGE PLANNING  Goal: Discharge to home or other facility with appropriate resources  Description: INTERVENTIONS:- Identify barriers to discharge w/pt and caregiver- Include patient/family/discharge partner in discharge planning- Arrange for needed discharge resources and transportation as appropriate- Identify discharge learning needs (meds, wound care, etc)- Arrange for interpreters to assist at discharge as needed- Consider post-discharge preferences of patient/family/discharge partner- Complete POLST form as appropriate- Assess patient's ability to be responsible for managing their own health- Refer to Case Management Department for coordinating discharge planning if the patient needs post-hospital services based on physician/LIP order or complex needs related to functional status, cognitive ability or social support system  Outcome: Progressing     Problem: RESPIRATORY - ADULT  Goal: Achieves optimal  ventilation and oxygenation  Description: INTERVENTIONS:- Assess for changes in respiratory status- Assess for changes in mentation and behavior- Position to facilitate oxygenation and minimize respiratory effort- Oxygen supplementation based on oxygen saturation or ABGs- Provide Smoking Cessation handout, if applicable- Encourage broncho-pulmonary hygiene including cough, deep breathe, Incentive Spirometry- Assess the need for suctioning and perform as needed- Assess and instruct to report SOB or any respiratory difficulty- Respiratory Therapy support as indicated- Manage/alleviate anxiety- Monitor for signs/symptoms of CO2 retention  Outcome: Progressing     Problem: MUSCULOSKELETAL - ADULT  Goal: Return mobility to safest level of function  Description: INTERVENTIONS:- Assess patient stability and activity tolerance for standing, transferring and ambulating w/ or w/o assistive devices- Assist with transfers and ambulation using safe patient handling equipment as needed- Ensure adequate protection for wounds/incisions during mobilization- Obtain PT/OT consults as needed- Advance activity as appropriate- Communicate ordered activity level and limitations with patient/family  Outcome: Progressing  Goal: Maintain proper alignment of affected body part  Description: INTERVENTIONS:- Support and protect limb and body alignment per provider's orders- Instruct and reinforce with patient and family use of appropriate assistive device and precautions (e.g. spinal or hip dislocation precautions)  Outcome: Progressing     Problem: Diabetes/Glucose Control  Goal: Glucose maintained within prescribed range  Description: INTERVENTIONS:- Monitor Blood Glucose as ordered- Assess for signs and symptoms of hyperglycemia and hypoglycemia- Administer ordered medications to maintain glucose within target range- Assess barriers to adequate nutritional intake and initiate nutrition consult as needed- Instruct patient on self management  of diabetes  Outcome: Progressing

## 2025-04-06 NOTE — PROGRESS NOTES
[facility]  part of [facility]     Progress Note    Hoda Busby Patient Status:  Inpatient    1940 MRN [id number]   Location [facility]5W Attending Malathi Stuart MD   Hosp Day # 11 PCP Malathi Stuart MD       Subjective:   Patient seen and examined.  Dyspnea gradually improving per patient.  Oxygenation requirements with exertion also improving.    Objective:   Blood pressure 90/43, pulse 99, temperature 98.4 °F (36.9 °C), temperature source Oral, resp. rate 18, height 5' 4\" (1.626 m), weight 170 lb 3.2 oz (77.2 kg), SpO2 90%.  Intake/Output:   Last 3 shifts: I/O last 3 completed shifts:  In: 1059 [P.O.:1049; I.V.:10]  Out: 1500 [Urine:1500]   This shift: I/O this shift:  In: 210 [P.O.:200; I.V.:10]  Out: -      Vent Settings:      Hemodynamic parameters (last 24 hours):      Scheduled Meds:   Current Facility-Administered Medications   Medication Dose Route Frequency    clopidogrel (Plavix) tab 75 mg  75 mg Oral Daily    methylPREDNISolone sodium succinate (Solu-MEDROL) injection 40 mg  40 mg Intravenous Daily    furosemide (Lasix) tab 40 mg  40 mg Oral Daily    amoxicillin clavulanate (Augmentin) 500-125 MG per tab 500 mg  500 mg Oral Q8H    polyethylene glycol (PEG 3350) (Miralax) 17 g oral packet 17 g  17 g Oral Daily PRN    guaiFENesin (Robitussin) 100 MG/5 ML oral liquid 200 mg  200 mg Oral Q4H PRN    benzocaine-menthol (Cepacol) lozenge 1 lozenge  1 lozenge Oral PRN    vancomycin (Vancocin) cap 125 mg  125 mg Oral Daily    acetaminophen (Tylenol) tab 325 mg  325 mg Oral Q6H PRN    aspirin DR tab 81 mg  81 mg Oral Daily    atorvastatin (Lipitor) tab 20 mg  20 mg Oral Nightly    pantoprazole (Protonix) DR tab 40 mg  40 mg Oral QAM AC    traMADol (Ultram) tab 50 mg  50 mg Oral Q6H PRN    ipratropium-albuterol (Duoneb) 0.5-2.5 (3) MG/3ML inhalation solution 3 mL  3 mL Nebulization Q6H PRN    heparin (Porcine) 5000 UNIT/ML injection 5,000 Units  5,000 Units  Subcutaneous 2 times per day    glucose (Dex4) 15 GM/59ML oral liquid 15 g  15 g Oral Q15 Min PRN    Or    glucose (Glutose) 40% oral gel 15 g  15 g Oral Q15 Min PRN    Or    glucose-vitamin C (Dex-4) chewable tab 4 tablet  4 tablet Oral Q15 Min PRN    Or    dextrose 50% injection 50 mL  50 mL Intravenous Q15 Min PRN    Or    glucose (Dex4) 15 GM/59ML oral liquid 30 g  30 g Oral Q15 Min PRN    Or    glucose (Glutose) 40% oral gel 30 g  30 g Oral Q15 Min PRN    Or    glucose-vitamin C (Dex-4) chewable tab 8 tablet  8 tablet Oral Q15 Min PRN    insulin aspart (NovoLOG) 100 Units/mL FlexPen 1-7 Units  1-7 Units Subcutaneous TID CC    ferrous sulfate DR tab 325 mg  325 mg Oral Daily with breakfast    docusate sodium (Colace) cap 100 mg  100 mg Oral BID       Continuous Infusions:     Physical Exam  Constitutional: no acute distress  Eyes: PERRL  ENT: nares pateint  Neck: supple, no JVD  Cardio: RRR, S1 S2  Respiratory: Basilar crackles  GI: abdomen soft, non tender, active bowel sounds, no organomegaly  Extremities: no clubbing, cyanosis, + trace lower extremity edema   Neurologic: no gross motor deficits  Skin: warm, dry      Results:            CT CHEST HI RESOLUTION (CPT=71250)    Result Date: 4/4/2025  CONCLUSION:   1. 1. Pulmonary edema and bilateral pleural effusions are significantly improved.  There is persistent trace right pleural effusion.  2. Advanced interstitial lung disease is noted in an NSIP (nonspecific insterstitial pneumonitis) pattern.  This limits evaluation for pulmonary nodules.  3. Additional chronic or incidental findings are described in the body of this report.     Dictated by (CST): Miyk Lowery MD on 4/04/2025 at 12:24 PM     Finalized by (CST): Miky Lowery MD on 4/04/2025 at 12:31 PM                 Assessment   1.  Acute hypoxemic respiratory failure  2.  ILD  3.  Small pleural effusions  4.  Pulmonary edema  5.  Colon adenocarcinoma status post hemicolectomy  6.  Coronary artery  disease  7.  Prior history of GI bleed  8.  Prior VTE  9.  History of severe aortic stenosis status post TAVR  10.  Hypertension     Plan   -Patient presents with progressive worsening dyspnea and acute hypoxemic respiratory failure.  -CT chest on presentation on 3/26/2025 with no evidence of acute pulmonary embolism seen.  Evidence of small pleural effusions with groundglass opacities suggestive more likely of edema with worsening compared to 6 days prior from CT chest.  Some background of fibrotic changes present.  -Repeat CT high-resolution chest on 4/4/2025 with improvement in pulmonary edema and pleural effusions.  Underlying ILD changes present.  -Gradually wean steroid therapy.  -Continue diuresis as tolerated although appears to be significantly improved from pulmonary edema perspective  -Prior history of underlying ILD with hospitalization October 2024 responded to tapering steroid therapy at the time.  -Pulmonary function testing from February 2025 with mild restriction seen with significant decrease in diffusion capacity.  -Wean oxygen as tolerated.  Tolerating 2 L at rest but requiring more oxygen with exertion.  Evaluate for home oxygen prior to discharge  -Complete course of antibiotic therapy with Augmentin.  -Discussed with cardiologist.  Plan for cardiac cath tomorrow.  -DVT prophylaxis: Heparin      Cody Holloway DO  Pulmonary Critical Care Medicine  [facility]

## 2025-04-07 NOTE — PROGRESS NOTES
[facility] Cardiology Progress Note    Hoda Busby Patient Status:  Inpatient    1940 MRN [id number]   Location [facility]5W Attending Malathi Stuart MD   Hosp Day # 12 PCP Malathi Stuart MD     Subjective:  Denies cp, sob at rest. Ongoing brewer. Remains on 1L 02 nc     Objective:  /46 (BP Location: Right arm)   Pulse 78   Temp 97.6 °F (36.4 °C) (Axillary)   Resp 18   Ht 162.6 cm (5' 4\")   Wt 170 lb 3.2 oz (77.2 kg)   SpO2 93%   BMI 29.21 kg/m²     Telemetry: NSR , 78 bpm       Intake/Output:    Intake/Output Summary (Last 24 hours) at 2025 1210  Last data filed at 2025 0945  Gross per 24 hour   Intake 634 ml   Output 1001 ml   Net -367 ml       Last 3 Weights   25 0625 170 lb 3.2 oz (77.2 kg)   25 0613 171 lb 8 oz (77.8 kg)   25 0414 172 lb 14.4 oz (78.4 kg)   25 0958 173 lb 9.6 oz (78.7 kg)   25 0500 174 lb 9.6 oz (79.2 kg)   25 0547 166 lb (75.3 kg)   25 0606 172 lb 11.2 oz (78.3 kg)   25 0441 174 lb 4.8 oz (79.1 kg)   25 0455 187 lb 8 oz (85 kg)   25 2158 183 lb 4.8 oz (83.1 kg)   25 1939 160 lb (72.6 kg)   25 1603 180 lb (81.6 kg)   25 0753 176 lb (79.8 kg)   25 1020 173 lb (78.5 kg)   25 1513 173 lb (78.5 kg)       Labs:  Recent Labs   Lab 25  0552 25  1242 25  0534   GLU 80 150* 77   BUN 36* 29* 31*   CREATSERUM 1.00 1.05* 1.05*   EGFRCR 56* 52* 52*   CA 8.4* 8.7 8.3*    136 139   K 4.4  4.4 4.5 4.2    103 104   CO2 29.0 25.0 28.0     Recent Labs   Lab 25  0552 25  1242 25  0534   RBC 4.04 4.35 3.74*   HGB 10.5* 11.2* 10.1*   HCT 33.4* 37.8 30.9*   MCV 82.7 86.9 82.6   MCH 26.0 25.7* 27.0   MCHC 31.4 29.6* 32.7   RDW 17.7* 18.1* 17.5*   NEPRELIM 12.91* 16.87* 9.92*   WBC 16.7* 18.0* 13.6*   .0 220.0 185.0         No results for input(s): \"TROP\", \"TROPHS\", \"CK\" in the last 168 hours.    Diagnostics:     3/27/25  Echo  1. Left ventricle: The cavity size was normal. Wall thickness was normal.      Systolic function was normal. The estimated ejection fraction was 60-65%.      No diagnostic evidence for regional wall motion abnormalities. Doppler      parameters are consistent with abnormal left ventricular relaxation -      grade 1 diastolic dysfunction.   2. Right ventricle: The cavity size was increased. Systolic function was      normal.   3. Left atrium: The atrium was mildly to moderately dilated. The left atrial      volume was mildly to moderately increased.   4. Aortic valve: A bioprosthetic valve is present. Mcnamara PUNEET S3 23mm      (TAVR) bioprosthesis was present. The peak systolic velocity was      2.69m/sec. The mean systolic gradient was 15mm Hg. The valve area (VTI)      was 1.52cm^2.   5. Pulmonary arteries: Systolic pressure was moderately increased, estimated      to be 52mm Hg.     Review of Systems   Respiratory:  Positive for shortness of breath. Negative for wheezing.    Cardiovascular:  Negative for chest pain, palpitations, orthopnea and leg swelling.     Physical Exam:    General: Alert and oriented x 3. No apparent distress.   HEENT: Normocephalic, anicteric sclera, neck supple, no thyromegaly or adenopathy.  Neck: No JVD, carotids 2+, no bruits.  Cardiac: Regular rate & rhythm. S1, S2 normal. No murmur, pericardial rub, S3, or extra cardiac sounds.  Lungs: Clear without wheezes, rales, rhonchi or dullness.  Normal excursions and effort.  Abdomen: Soft, non-tender. No organosplenomegally, mass or rebound, BS-present.  Extremities: Without clubbing or cyanosis.  No BLE edema   Neurologic: Alert and oriented, normal affect. No focal defects  Skin: Warm and dry.       Medications:   predniSONE  40 mg Oral Daily with breakfast    clopidogrel  75 mg Oral Daily    furosemide  40 mg Oral Daily    amoxicillin clavulanate  500 mg Oral Q8H    vancomycin  125 mg Oral Daily    aspirin  81 mg Oral Daily     atorvastatin  20 mg Oral Nightly    pantoprazole  40 mg Oral QAM AC    heparin  5,000 Units Subcutaneous 2 times per day    insulin aspart  1-7 Units Subcutaneous TID CC    ferrous sulfate  325 mg Oral Daily with breakfast    docusate sodium  100 mg Oral BID         Assessment:    84yr old female patient with history of severe aortic stenosis s/p TAVR 1/2025, obstructive non revascularized CAD, HFpEF, interstitial pneumonitis, PE, colon cancer s/p left hemicolectomy presented with new onset productive cough and dyspnea and BLE edema.     Acute hypoxic respiratory failure (multifactorial: pneumonia/chronic nonspecific interstitial pneumonitis, CHF)   - on steroids, antibiotics, diuresis  - Ct chest neg for PE   - pulmonary following     Acute decompensated HFpEF  - pBNP 4,423 & CXR w/ CHF   - echo w/ LVEF 60-65%, no RWMA, G1DD, LA dilated, Bioprosthetic aortic valve with mean systolic gradient of 15mmHg, PASP of 52mmHg. IVC dilated  - on po lasix.  Scr stable. I/o inaccurate & wts inconsistent ; however pt has clinically improved & compensated on exam      CAD  - Trop of 58 on admission  - [facility] 1/14/25: mild LAD diagonal focal bifurcation stenosis, moderately calcified, 80-90%, plan was for to medical management at that time with anemia and Gi procedure. Since pt will need uninterrupted DAPT post stent   - on aspirin, atorvastatin. Tolerating plavix      Severe aortic stenosis  s/p TAVR 1/23/25  - stable valve function per recent echo     H/o Splenic Flexure colon cancer   - s/p Robolic left hemicolectomy/mobilization of splenic flexure, omental flap closure 3/27/25     Chronic anemia  - hgb stable        Plan:    Denies angina. Ongoing dyspnea on exertion (overall improved) w/ supplemental 02 need   Compensated on exam . Scr stable. Continue po lasix tomorrow   Monitor strict I/o, daily wts & renal fx closely   Tolerating asa & plavix. Continue atorvastatin. Plan for multivessel PCI today     Vivian Martinez, XOCHITL,  YADIRA-KALYAN, FNP-BC, CCK   4/7/2025  8:52 AM  Ph [phone] (Kevin)  Ph [phone] (Bartolo)      Physician addendum:  Seen and examined, agree with note, MDM for me    84-year-old with acute hypoxic respiratory failure    Acute hypoxic respiratory failure multifactorial from pneumonia/interstitial pneumonitis initially with possible CHF  CAD-on revascularized LAD lesion  Severe arctic stenosis status post TAVR 1/2025  Recent history of splenic colon cancer status post resection    Plan:  She is being treated by pulmonary for her respiratory issues, there was some concern that her CAD may be contributing to her dyspnea however that is not completely clear.  Will plan on revascularizing her LAD and hopes to continue to treat her symptoms.  Tolerating aspirin and Plavix  Plan for PCI of her LAD today as well as FFR of her left circumflex    L3    Jose G Dutton MD  Interventional cardiology  [facility]

## 2025-04-07 NOTE — IVS NOTE
Hand-Off     Procedure hand off report given to Ed RN.   Pt's vital signs are stable.   Procedural access site is dry and intact with no signs or symptoms of bleeding or hematoma.   Dr. Dutton spoke with patient/family post procedure.   Stent card recreated for pt.

## 2025-04-07 NOTE — PROGRESS NOTES
[facility]  part of [facility]    Progress Note      Assessment and Plan:   1.  Acute on chronic respiratory failure-patient has pulmonary edema superimposed on mild chronic nonspecific interstitial pneumonitis.    The pulmonary edema is much better on the repeat CAT scan; however, the underlying nonspecific interstitial pneumonitis is persistent.  The patient yet desaturates with activity and is otherwise yet requiring supplemental oxygen.  She underwent PCI to the mid LAD today.    Recommendations:  1.  Ongoing diuresis-40 mg Lasix daily  2.  Prednisone 40 mg daily  3.  Okay to stop Augmentin today  4.  Will follow clinically.    2.  Low moods-much better.    3.  DVT prophylaxis-subcutaneous heparin    4.  Status post TAVR    5.  Colon cancer-status post robotic left hemicolectomy    6.  Coronary artery disease-PCI performed today.    Subjective:   Hoda Busby is a(n) 84 year old female who is breathing better than on admission but still with dyspnea and oxygen requirement.  Objective:   Blood pressure 98/60, pulse 73, temperature 97.6 °F (36.4 °C), temperature source Oral, resp. rate 18, height 5' 4\" (1.626 m), weight 170 lb 3.2 oz (77.2 kg), SpO2 95%.    Physical Exam alert white female  HEENT examination is unremarkable with pupils equal round and reactive to light and accommodation.   Neck without adenopathy, thyromegaly, JVD nor bruit.   Lungs diminished with basilar crackles to auscultation and percussion.  Cardiac regular rate and rhythm no murmur.   Abdomen nontender, without hepatosplenomegaly and no mass appreciable.   Extremities without clubbing cyanosis nor edema.   Neurologic grossly intact with symmetric tone and strength and reflex.  Skin without gross abnormality     Results:     Lab Results   Component Value Date    WBC 13.6 04/07/2025    HGB 10.1 04/07/2025    HCT 30.9 04/07/2025    .0 04/07/2025    CREATSERUM 1.05 04/07/2025    BUN 31 04/07/2025      04/07/2025    K 4.2 04/07/2025     04/07/2025    CO2 28.0 04/07/2025    GLU 77 04/07/2025    CA 8.3 04/07/2025       Stanton Sanchez MD  Medical Director, Critical Care, [facility]  Medical Director, [facility]  Pager: [phone]

## 2025-04-07 NOTE — PLAN OF CARE
Problem: Patient/Family Goals  Goal: Patient/Family Long Term Goal  Description: Patient's Long Term Goal: to go home Interventions:- Monitor vital signs and labs  - Administer medications per order  - Follow MD orders  - Fall precautions  - Diagnostics as ordered  - Update / inform patient on plan of care  - Discharge planning- See additional Care Plan goals for specific interventions  Outcome: Progressing  Goal: Patient/Family Short Term Goal  Description: Patient's Short Term Goal: to feel better Interventions: -  See additional Care Plan goals for specific interventionsMonitor vital signs and labs  - Administer medications per order  - Follow MD orders  - Fall precautions  - Diagnostics as ordered  - Update / inform patient on plan of care  - Discharge planning  Outcome: Progressing     Problem: PAIN - ADULT  Goal: Verbalizes/displays adequate comfort level or patient's stated pain goal  Description: INTERVENTIONS:- Encourage pt to monitor pain and request assistance- Assess pain using appropriate pain scale- Administer analgesics based on type and severity of pain and evaluate response- Implement non-pharmacological measures as appropriate and evaluate response- Consider cultural and social influences on pain and pain management- Manage/alleviate anxiety- Utilize distraction and/or relaxation techniques- Monitor for opioid side effects- Notify MD/LIP if interventions unsuccessful or patient reports new pain- Anticipate increased pain with activity and pre-medicate as appropriate  Outcome: Progressing     Problem: RISK FOR INFECTION - ADULT  Goal: Absence of fever/infection during anticipated neutropenic period  Description: INTERVENTIONS- Monitor WBC- Administer growth factors as ordered- Implement neutropenic guidelines  Outcome: Progressing     Problem: SAFETY ADULT - FALL  Goal: Free from fall injury  Description: INTERVENTIONS:- Assess pt frequently for physical needs- Identify cognitive and physical  deficits and behaviors that affect risk of falls.- [place] fall precautions as indicated by assessment.- Educate pt/family on patient safety including physical limitations- Instruct pt to call for assistance with activity based on assessment- Modify environment to reduce risk of injury- Provide assistive devices as appropriate- Consider OT/PT consult to assist with strengthening/mobility- Encourage toileting schedule  Outcome: Progressing     Problem: DISCHARGE PLANNING  Goal: Discharge to home or other facility with appropriate resources  Description: INTERVENTIONS:- Identify barriers to discharge w/pt and caregiver- Include patient/family/discharge partner in discharge planning- Arrange for needed discharge resources and transportation as appropriate- Identify discharge learning needs (meds, wound care, etc)- Arrange for interpreters to assist at discharge as needed- Consider post-discharge preferences of patient/family/discharge partner- Complete POLST form as appropriate- Assess patient's ability to be responsible for managing their own health- Refer to Case Management Department for coordinating discharge planning if the patient needs post-hospital services based on physician/LIP order or complex needs related to functional status, cognitive ability or social support system  Outcome: Progressing     Problem: RESPIRATORY - ADULT  Goal: Achieves optimal ventilation and oxygenation  Description: INTERVENTIONS:- Assess for changes in respiratory status- Assess for changes in mentation and behavior- Position to facilitate oxygenation and minimize respiratory effort- Oxygen supplementation based on oxygen saturation or ABGs- Provide Smoking Cessation handout, if applicable- Encourage broncho-pulmonary hygiene including cough, deep breathe, Incentive Spirometry- Assess the need for suctioning and perform as needed- Assess and instruct to report SOB or any respiratory difficulty- Respiratory Therapy support as  indicated- Manage/alleviate anxiety- Monitor for signs/symptoms of CO2 retention  Outcome: Progressing     Problem: MUSCULOSKELETAL - ADULT  Goal: Return mobility to safest level of function  Description: INTERVENTIONS:- Assess patient stability and activity tolerance for standing, transferring and ambulating w/ or w/o assistive devices- Assist with transfers and ambulation using safe patient handling equipment as needed- Ensure adequate protection for wounds/incisions during mobilization- Obtain PT/OT consults as needed- Advance activity as appropriate- Communicate ordered activity level and limitations with patient/family  Outcome: Progressing  Goal: Maintain proper alignment of affected body part  Description: INTERVENTIONS:- Support and protect limb and body alignment per provider's orders- Instruct and reinforce with patient and family use of appropriate assistive device and precautions (e.g. spinal or hip dislocation precautions)  Outcome: Progressing     Problem: Diabetes/Glucose Control  Goal: Glucose maintained within prescribed range  Description: INTERVENTIONS:- Monitor Blood Glucose as ordered- Assess for signs and symptoms of hyperglycemia and hypoglycemia- Administer ordered medications to maintain glucose within target range- Assess barriers to adequate nutritional intake and initiate nutrition consult as needed- Instruct patient on self management of diabetes  Outcome: Progressing

## 2025-04-07 NOTE — PROGRESS NOTES
[facility]  part of [facility]    Progress Note    Hoda Busby Patient Status:  Inpatient    1940 MRN [id number]   Location [facility]5W Attending Malathi Stuart MD   Hosp Day # 12 PCP Malathi Stuart MD       SUBJECTIVE:      OBJECTIVE:  /64 (BP Location: Right arm)   Pulse 78   Temp 97.6 °F (36.4 °C) (Oral)   Resp 18   Ht 5' 4\" (1.626 m)   Wt 170 lb 3.2 oz (77.2 kg)   SpO2 93%   BMI 29.21 kg/m²     Intake/Output:  I/O last 3 completed shifts:  In: 1344 [P.O.:1324; I.V.:20]  Out: 1001 [Urine:1001]    Wt Readings from Last 3 Encounters:   25 170 lb 3.2 oz (77.2 kg)   25 180 lb (81.6 kg)   25 173 lb (78.5 kg)       Exam  Gen: No acute distress, alert and oriented x3  Pulm: left basilar crackles  CV: Heart RRR, no murmurs   Abd: Abdomen soft, nontender, nondistended, no organomegaly, bowel sounds present  MSK: Full range of motion in extremities, no clubbing, no cyanosis, no edema  Skin: no rashes or lesions  Neuro: A&O x 3, 5/5 strength in all 4 extremities.     Labs:   Recent Labs   Lab 25  0552 25  12425  0534   RBC 4.04 4.35 3.74*   HGB 10.5* 11.2* 10.1*   HCT 33.4* 37.8 30.9*   MCV 82.7 86.9 82.6   MCH 26.0 25.7* 27.0   MCHC 31.4 29.6* 32.7   RDW 17.7* 18.1* 17.5*   NEPRELIM 12.91* 16.87* 9.92*   WBC 16.7* 18.0* 13.6*   .0 220.0 185.0         Recent Labs   Lab 25  0530 25  0502 25  0552 25  1242 25  0534   *  114*   < > 80 150* 77   BUN 33*  33*   < > 36* 29* 31*   CREATSERUM 1.00  1.00   < > 1.00 1.05* 1.05*   EGFRCR 56*  56*   < > 56* 52* 52*   CA 8.5*  8.5*   < > 8.4* 8.7 8.3*   ALB 3.1*  --   --   --   --      137   < > 139 136 139   K 4.1  4.1   < > 4.4  4.4 4.5 4.2     102   < > 103 103 104   CO2 27.0  27.0   < > 29.0 25.0 28.0   ALKPHO 86  --   --   --   --    AST 47*  --   --   --   --    ALT 42  --   --   --   --    BILT 0.6  --   --   --   --     TP 5.4*  --   --   --   --     < > = values in this interval not displayed.         Imaging:  CT CHEST HI RESOLUTION (CPT=71250)    Result Date: 4/4/2025  CONCLUSION:   1. 1. Pulmonary edema and bilateral pleural effusions are significantly improved.  There is persistent trace right pleural effusion.  2. Advanced interstitial lung disease is noted in an NSIP (nonspecific insterstitial pneumonitis) pattern.  This limits evaluation for pulmonary nodules.  3. Additional chronic or incidental findings are described in the body of this report.     Dictated by (CST): Miky Lowery MD on 4/04/2025 at 12:24 PM     Finalized by (CST): Miky Lowery MD on 4/04/2025 at 12:31 PM                 Assessment and Plan:     Acute hypoxic respiratory failure (HCC)  -ddx: CHF, recurrent interstitial pneumonitis  -CT chest in ED 3/26/25 - no PE; worsening of pleural effusions and interstitial edema  -started on IV lasix and IV solumedrol to cover both CHF and possible recurrence of NSIP respectively  -required BiPAP transiently  -procalc sl elevated 0.25; WBC 19.8 K on admission  -recent echo 2/5/25 - normal LV systolic function (EF 60-65%); grade 2 diastolic dysfunction; normally functioning bioprosthetic TAVR; sl incr PAP 44mmHg  -repeat echo 3/27/25 stable from 2/2025 except incr PAP to 52mmHg  -cards consult appreciated  -diuresing well on Lasix 40mg IV BID (net -3L); creat up slightly; monitor closely  -pt notes improvement in breathing though still not back to baseline; still requiring 10L O2  -pt with audible wet, loose cough 3/28; 3/27 pt denied any increase in her baseline cough, but when questioned again today, she states that her cough has been worse in the past 3-4 days.  -discussed with Dr. Holloway; will start ceftriaxone and azitho; po vanco for CDP (day 2)  -further recs per pulm Dr. Holloway, cards Dr. Wood  -respiratory status continues to improve; s/p lasix changed to 20mg ivp since 3/29 d/t hypotension  -has  distal BLE edema--improved with compression stockings  -hi res CT chest 4/4/25 shows pulmonary edema and bilateral pleural effusions are significantly improved.  There is persistent trace right pleural effusion.  Advanced interstitial lung disease is noted in an NSIP (nonspecific insterstitial pneumonitis) pattern.  This limits evaluation for pulmonary nodules.   -Leukocytosis likely 2/2 steroids; on Solumedrol 40 mg IV every day; pt feeling better; on Augmentin 500 mg po TID, d#4  -on Lasix 40mg po qD; on oxygen 1.5 L NC; continue to wean O2 as tolerated; was off oxygen briefly yesterday; now on 1L     Adenocarcinoma of colon  -Bx of transverse colon polyp 12/17/24 -- invasive, moderately differentiated adenocarcinoma  -CEA normal (1.9)  -CT a/p neg for mets  -surgery delayed in anticipation of TAVR (done 1/23/25)  -s/p robotic assisted left hemicolectomy (Dr. Cassidy) - path - tumor invades into muscularis; margins neg for tumor; all 19 LN's neg for mets (0/19); pT2, pN0  -next appt with Dr. Cassidy 4/16/25     Coronary artery disease  -Cath 1/14/25 by Dr. Bernardo Liz (as w/u for TAVR) -- RCA non-obstructive; LM free of obst disease; LM plaque extending into ostium of LAD (20-30%); mid LAD (80-90%), cx ostium (60-70%)  -intervention delayed until after colon resection due to need for DAPT x 6 mos after stenting   -on Atorvastatin 20mg/day, ASA 81mg/day  -awaits CATH per Dr Grady today; starting Plavix 75 mg po every day;      Hx  GI bleed  -EGD 11/26/24 (Dr. Nuno) -15mm nonbleeding gastric antral ulcer; 3mm gastric antral AVM s/p APC  -recurrence in 12/2024  -colonoscopy and repeat EGD 12/17/24 by Dr. Staton -- grade 1 esophagitis; duod ulcers healing; large flat polyp in transverse colon (carcinoma)   -received total of 3 units PRBCs   -Xarelto stopped in 12/2024  -s/p omeprazole 20mg BID x 8 weeks (EOT 1/22/25), now on omeprazole 20mg daily -- would continue until she completes her upcoming coronary stenting      Anemia due to acute blood loss  -GI bleed as above  -on ferrous sulfate 325mg/day  -Hgb 10.8 but stable     Hx chronic mild BLE edema  -Lasix 20mg/day     Acute left peroneal palsy  In Nov 2024; had numbness to anterolateral left foot and ankle as well as inability to dorsiflex left foot; etiology unclear  -neurologist Dr Richardson consulted in hosp  -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   -MRI brain--no acute intracranial process  -still doing PT at [facility] -- continues to improve; dorsiflexion left foot 4/5; plantarflexion 5/5 this admission (3/26/25)     Interstitial pneumonitis/NSIP  -dx'ed 10/2024 - presented with cough and severe hypoxia  -unresponsive to abx   -CXR 10/24/24 extensive bilateral perihilar and bilateral upper and lower lobe   -S.pneumo, legionella Ag , mycoplasma IgM/G, Fungitell-beta-D glucan negative  -ANCA and URIEL/connective tissue disease panels negative  -anti-histone and anti-[place] Ab's negative  -CT with bilateral ground glass opacities, small consolidations of BLL  -per pulm, findings suspicious for NSIP (vs cryptogenic organizing pneumonia vs hypersensitivity pneumonitis); NOT thought to be c/w UIP pattern  -s/p empiric steroids (solumedrol then prednisone taper) 10/25/24 - (EOT 1/18/25)  -dyspnea completely resolved until this admission 3/26/25  -now on solumedrol 60mg e21q--ruyagzrfd to 40mg daily     Bilateral pulmonary emboli  -dx'ed at time of interstitial pneumonitis  -CT chest 10/25/24 -- acute distal segmental/subsegmental pulmonary emboli in [place] and subsegmental PE in [place]  -unclear etiology; no recent hx of long travel; no family/personal hx of blood clots  -BLE venous dopplers negative 10/26/24  -started xarelto 20 mg po every day on 11/28/24  -repeat CT chest 12/9/24 neg PE  -stopped Xarelto 12/14/24 due to recurrent GI bleed  -repeat CT chest 3/26/25 -- neg PE     Severe  aortic stenosis  -progression on serial echocardiograms  -s/p TAVR 1/23/25 (Dr. Reji Green)  -symptomatically much improved -- POPE resolved     Hypertension, primary  -(dyazide stopped due to NANCY)  -(amlodipine held due to low BP in 11/2024)  -BP remains normal off meds     Hx of hip OA  -s/p left THR (Dr. Lo) many years ago  -s/p elective R BEATRIZ on 10/5/23 by Dr. Diaz     Hyperlipidemia   Pt with strong family hx of high cholesterol  Started on Atorvastatin 20mg/day in 1/2025 (after noted to have CAD on cath)     Osteopenia   On Fosamax from 2011 to 4/2016.     DEXA stable 5/10/17 and 2019 and 2021  DEXA 8/2024 -- osteoporosis of left forearm  -restarted Fosamax 8/2024      Vitamin D deficiency  On vitamin D 4000u/day      VTE proph  -[facility]                Nadia Steel DO  4/7/2025  8:28 AM

## 2025-04-07 NOTE — PLAN OF CARE
Pt transferred to . Full RN to RN report given to RN Ed  Problem: Patient Centered Care  Goal: Patient preferences are identified and integrated in the patient's plan of care  Description: Interventions:- What would you like us to know as we care for you? Im from [place]. - Provide timely, complete, and accurate information to patient/family- Incorporate patient and family knowledge, values, beliefs, and cultural backgrounds into the planning and delivery of care- Encourage patient/family to participate in care and decision-making at the level they choose- Honor patient and family perspectives and choices  Outcome: Progressing     Problem: Patient/Family Goals  Goal: Patient/Family Long Term Goal  Description: Patient's Long Term Goal: to go home Interventions:- Monitor vital signs and labs  - Administer medications per order  - Follow MD orders  - Fall precautions  - Diagnostics as ordered  - Update / inform patient on plan of care  - Discharge planning- See additional Care Plan goals for specific interventions  Outcome: Progressing  Goal: Patient/Family Short Term Goal  Description: Patient's Short Term Goal: to feel better Interventions: -  See additional Care Plan goals for specific interventionsMonitor vital signs and labs  - Administer medications per order  - Follow MD orders  - Fall precautions  - Diagnostics as ordered  - Update / inform patient on plan of care  - Discharge planning  Outcome: Progressing     Problem: PAIN - ADULT  Goal: Verbalizes/displays adequate comfort level or patient's stated pain goal  Description: INTERVENTIONS:- Encourage pt to monitor pain and request assistance- Assess pain using appropriate pain scale- Administer analgesics based on type and severity of pain and evaluate response- Implement non-pharmacological measures as appropriate and evaluate response- Consider cultural and social influences on pain and pain management- Manage/alleviate anxiety- Utilize  distraction and/or relaxation techniques- Monitor for opioid side effects- Notify MD/LIP if interventions unsuccessful or patient reports new pain- Anticipate increased pain with activity and pre-medicate as appropriate  Outcome: Progressing     Problem: RISK FOR INFECTION - ADULT  Goal: Absence of fever/infection during anticipated neutropenic period  Description: INTERVENTIONS- Monitor WBC- Administer growth factors as ordered- Implement neutropenic guidelines  Outcome: Progressing     Problem: SAFETY ADULT - FALL  Goal: Free from fall injury  Description: INTERVENTIONS:- Assess pt frequently for physical needs- Identify cognitive and physical deficits and behaviors that affect risk of falls.- [place] fall precautions as indicated by assessment.- Educate pt/family on patient safety including physical limitations- Instruct pt to call for assistance with activity based on assessment- Modify environment to reduce risk of injury- Provide assistive devices as appropriate- Consider OT/PT consult to assist with strengthening/mobility- Encourage toileting schedule  Outcome: Progressing     Problem: DISCHARGE PLANNING  Goal: Discharge to home or other facility with appropriate resources  Description: INTERVENTIONS:- Identify barriers to discharge w/pt and caregiver- Include patient/family/discharge partner in discharge planning- Arrange for needed discharge resources and transportation as appropriate- Identify discharge learning needs (meds, wound care, etc)- Arrange for interpreters to assist at discharge as needed- Consider post-discharge preferences of patient/family/discharge partner- Complete POLST form as appropriate- Assess patient's ability to be responsible for managing their own health- Refer to Case Management Department for coordinating discharge planning if the patient needs post-hospital services based on physician/LIP order or complex needs related to functional status, cognitive ability or social support  system  Outcome: Progressing     Problem: RESPIRATORY - ADULT  Goal: Achieves optimal ventilation and oxygenation  Description: INTERVENTIONS:- Assess for changes in respiratory status- Assess for changes in mentation and behavior- Position to facilitate oxygenation and minimize respiratory effort- Oxygen supplementation based on oxygen saturation or ABGs- Provide Smoking Cessation handout, if applicable- Encourage broncho-pulmonary hygiene including cough, deep breathe, Incentive Spirometry- Assess the need for suctioning and perform as needed- Assess and instruct to report SOB or any respiratory difficulty- Respiratory Therapy support as indicated- Manage/alleviate anxiety- Monitor for signs/symptoms of CO2 retention  Outcome: Progressing     Problem: MUSCULOSKELETAL - ADULT  Goal: Return mobility to safest level of function  Description: INTERVENTIONS:- Assess patient stability and activity tolerance for standing, transferring and ambulating w/ or w/o assistive devices- Assist with transfers and ambulation using safe patient handling equipment as needed- Ensure adequate protection for wounds/incisions during mobilization- Obtain PT/OT consults as needed- Advance activity as appropriate- Communicate ordered activity level and limitations with patient/family  Outcome: Progressing  Goal: Maintain proper alignment of affected body part  Description: INTERVENTIONS:- Support and protect limb and body alignment per provider's orders- Instruct and reinforce with patient and family use of appropriate assistive device and precautions (e.g. spinal or hip dislocation precautions)  Outcome: Progressing     Problem: Diabetes/Glucose Control  Goal: Glucose maintained within prescribed range  Description: INTERVENTIONS:- Monitor Blood Glucose as ordered- Assess for signs and symptoms of hyperglycemia and hypoglycemia- Administer ordered medications to maintain glucose within target range- Assess barriers to adequate nutritional  intake and initiate nutrition consult as needed- Instruct patient on self management of diabetes  Outcome: Progressing

## 2025-04-07 NOTE — PLAN OF CARE
Patient came to unit post-R heart cath. Hematoma developed at site, manual pressure applied. Hematoma controlled. Uneventful shift.   Problem: PAIN - ADULT  Goal: Verbalizes/displays adequate comfort level or patient's stated pain goal  Description: INTERVENTIONS:- Encourage pt to monitor pain and request assistance- Assess pain using appropriate pain scale- Administer analgesics based on type and severity of pain and evaluate response- Implement non-pharmacological measures as appropriate and evaluate response- Consider cultural and social influences on pain and pain management- Manage/alleviate anxiety- Utilize distraction and/or relaxation techniques- Monitor for opioid side effects- Notify MD/LIP if interventions unsuccessful or patient reports new pain- Anticipate increased pain with activity and pre-medicate as appropriate  Outcome: Progressing     Problem: RISK FOR INFECTION - ADULT  Goal: Absence of fever/infection during anticipated neutropenic period  Description: INTERVENTIONS- Monitor WBC- Administer growth factors as ordered- Implement neutropenic guidelines  Outcome: Progressing     Problem: SAFETY ADULT - FALL  Goal: Free from fall injury  Description: INTERVENTIONS:- Assess pt frequently for physical needs- Identify cognitive and physical deficits and behaviors that affect risk of falls.- [place] fall precautions as indicated by assessment.- Educate pt/family on patient safety including physical limitations- Instruct pt to call for assistance with activity based on assessment- Modify environment to reduce risk of injury- Provide assistive devices as appropriate- Consider OT/PT consult to assist with strengthening/mobility- Encourage toileting schedule  Outcome: Progressing     Problem: DISCHARGE PLANNING  Goal: Discharge to home or other facility with appropriate resources  Description: INTERVENTIONS:- Identify barriers to discharge w/pt and caregiver- Include patient/family/discharge partner in  discharge planning- Arrange for needed discharge resources and transportation as appropriate- Identify discharge learning needs (meds, wound care, etc)- Arrange for interpreters to assist at discharge as needed- Consider post-discharge preferences of patient/family/discharge partner- Complete POLST form as appropriate- Assess patient's ability to be responsible for managing their own health- Refer to Case Management Department for coordinating discharge planning if the patient needs post-hospital services based on physician/LIP order or complex needs related to functional status, cognitive ability or social support system  Outcome: Progressing     Problem: RESPIRATORY - ADULT  Goal: Achieves optimal ventilation and oxygenation  Description: INTERVENTIONS:- Assess for changes in respiratory status- Assess for changes in mentation and behavior- Position to facilitate oxygenation and minimize respiratory effort- Oxygen supplementation based on oxygen saturation or ABGs- Provide Smoking Cessation handout, if applicable- Encourage broncho-pulmonary hygiene including cough, deep breathe, Incentive Spirometry- Assess the need for suctioning and perform as needed- Assess and instruct to report SOB or any respiratory difficulty- Respiratory Therapy support as indicated- Manage/alleviate anxiety- Monitor for signs/symptoms of CO2 retention  Outcome: Progressing

## 2025-04-07 NOTE — PLAN OF CARE
Notified by RN of small hematoma to right groin post PCI. 10 minutes of manual pressure held already. Site is soft, +2 right DP pulse, skin warm. Pt asymptomatic. Vitals stable. Plan to continue manual pressure for an additional 15 minutes. Pt & family updated.     Vivian Martinez, MSN, FPA-APRN, FNP-BC, CCK   4/7/2025  2:26 PM  Ph [phone] ([place])  Ph [phone] ([place])

## 2025-04-07 NOTE — OPERATIVE REPORT
[facility]  part of [facility]    Cardiac Catheterization Note    Primary Proceduralist: Jose G Dutton MD  Procedure Performed: LHC, RHC, COR, and iFR of Lcx, iFR of LAD, IVUS of LAD, PCI of mLAD  Date of Procedure: 4/7/2025   Indication: Coronary disease, dyspnea on exertion  Pre Operative Diagnosis: Same  Post Operative Diagnosis: Status post one-vessel PCI  Estimated blood loss: Less than 5  Specimens: None     Consent:   Informed written consent was obtained from the patient after risk benefits and alternative explained the patient.  Patient agreed to proceed    Sedation  IV conscious sedation was achieved with Versed and fentanyl.  It was administered under my direct supervision.  Hemodynamic monitoring took place throughout the entire procedure by myself in the Cath Lab staff.  2 mg of Versed and 75 mcg of Fentanyl were given.  Start Time: 1043 end Time: 1212      Description of the procedure: After written informed consent was obtained from the patient, patient was brought to the cardiac catheterization laboratory in a stable condition and fasting state.  Patient was prepped and draped in the usual sterile fashion.  IV conscious sedation was achieved with Versed and fentanyl.  Lidocaine 1% was used to infiltrate the right femoral artery, ultrasound and micropuncture were used to obtain access and a 7 Niuean sheath was placed.  Ultrasound and micropuncture used to obtain access in the right femoral vein and a 7 Niuean sheath was placed.  A 7 Niuean Kirwin was advanced into the PDA and right heart cath was performed.  A JR4 diagnostic catheter was used to engage the right coronary artery to perform selective angiography.  As this was a planned possible intervention a EBU 3.50  Niuean guide was advanced and used to selectively engage the left main however given TAVR valve it was difficult to navigate the struts therefore we exchanged this for an EBU 3.75, 6 Niuean guide  which sat well within the left main.  This was used to obtain diagnostic pictures followed by intervention see results below.    Coronary Angiogram Findings:  LM: Large caliber artery giving rise to LAD & LCX.  Distal left main with a 20 to 30% calcified lesion  LAD: Large caliber artery giving rise to diagonal and septal branches.  50% proximal calcified lesion, 70% mid to distal calcified lesion.  LCX: Medium to large caliber artery giving rise to OM branches.  Ostial 50 to 60% lesion.  RCA: Large caliber artery giving rise to acute marginal branches, and bifurcates into RPDA & RPL.  Diffuse nonobstructive plaque some of which was calcified.      Hemodynamics:    RHC;  RA: 8  RV: 44/9  PA: 40/14/26  Wedge: 13    Ao saturation was 90.4  PA saturation was 62.4    Cardiac output was 5.7  Cardiac index was 3.1  PVR was 2.3  SVR was 1100    Intervention:  Lesion 1: Ostial circumflex-heavily calcified 60% lesion for which iFR was performed  Guide: EBU 3.75  Guidewire: Huma blue  iFR was performed with the assist catheter, it was equalized in the left main advanced past the lesion.  iFR was 0.98 which is not significant    Lesion 2: Mid LAD, heavily calcified 70% lesion with the bifurcation of a moderate-sized branching diagonal branch  Guide: EBU 3.75  Guidewire Huma blue  iFR was performed using the assist catheter, the catheter was advanced distal to the mid lesion and iFR was performed this value was 0.87 which is significant, a pullback was performed in proximal to mid lesion the IFR was 0.93.  Therefore decision was made to stent the mid LAD lesion.  The lesion was predilated with a 2.5 mm semicompliant followed by a noncompliant balloon  De Correspondent IVUS was attempted to pass the distal lesion however we were unable to maneuver the catheter down iFR, therefore IVUS was aborted however just proximal to the lesion the vessel measured 3.25 mm²  We then attempted to place a stent however the stent would not  cross the lesion therefore this was withdrawn  A guide liner was advanced and using a 3.0 NC balloon as a inch warm to the mid LAD, the lesion was predilated  A 3.0 x 18 mm [employer] drug-eluting stent was placed  Stent was postdilated with a 3.0 mm NC balloon  Final angiographic result was excellent, stenosis from 70% to 0%, JAYNA-3 flow at the end of the case.  Heparin was used for anticoagulation and ACT's were monitored to ensure it was therapeutic.    Monitored sedation administered by the cath lab RN, and supervised throughout the procedure by myself. Total time 89 minutes.     Closure: Femoral angiogram performed.  Perclose was deployed.  Mynx device was used for the femoral vein    No immediate complications.    A/P:  Status post drug-eluting stent to the mid LAD, the remainder of her coronaries are nonobstructive though heavily calcified  Her filling pressures are normal, with mild pulmonary hypertension and her normal cardiac output and index    Continue aspirin and Plavix  Continue high intensity statin  Continue her oral diuretics she does not need desiccation of her treatment  Continue management of her pulmonary issues  4 hours bedrest  Return to floor for further management.    Jose G Dutton MD  Interventional Cardiology  [facility]

## 2025-04-08 NOTE — PHYSICAL THERAPY NOTE
PHYSICAL THERAPY RE-EVALUATION - INPATIENT     Room Number: 305/305-A  Evaluation Date: 4/8/2025  Type of Evaluation: Re-evaluation   Physician Order: PT Eval and Treat    Presenting Problem: HF exacerbation, aortic valve stenosis, acute hypoxic respiratory failure  Co-Morbidities : PE, cancer, colon cancer, CAD, L foot drop, HTN, TAVR 1/2025, OA, macular degeneration, osteopenia, L BEATRIZ 2006, S/P colon resection 2/27/25  Reason for Therapy: Mobility Dysfunction and Discharge Planning    PHYSICAL THERAPY ASSESSMENT   Patient is a 84 year old female admitted 3/26/2025 for CHF exacerbation and hospital course complicated by need for RHC 4/7/25 and now increased O2 needs with desaturation during mobility.  Prior to admission, patient's baseline is independent with a RW living at [facility] and going for OP PT. Recovering from surgeries earlier this year.  Patient is currently functioning below baseline with bed mobility, transfers, gait, stair negotiation, maintaining seated position, standing prolonged periods, and performing household tasks.  Patient is requiring minimal assist as a result of the following impairments: decreased endurance/aerobic capacity, decreased muscular endurance, medical status, and increased O2 needs from baseline.  Physical Therapy will continue to follow for duration of hospitalization.    Patient will benefit from continued skilled PT Services at discharge to promote prior level of function and safety with additional support and return home with home health PT. Patient is needing ongoing supplemental O2 and may benefit from GR vs back to [facility] w/HH pending facility requirement.    PLAN DURING HOSPITALIZATION  Nursing Mobility Recommendation : 1 Assist  PT Device Recommendation: Rolling walker  PT Treatment Plan: Bed mobility, Body mechanics, Endurance, Energy conservation, Patient education, Gait training, Strengthening, Transfer training, Balance training  Rehab Potential : Good  Frequency  (Obs): 5x/week     PHYSICAL THERAPY MEDICAL/SOCIAL HISTORY   History related to current admission: History related to current admission: admitted 2025 for Malignant neoplasm splenic flexure, status post robotic resection of splenic flexure colon cancer, was requiring CGA using a rolling walker, D/C'ed to assisted living facility with home-health PT       Problem List  Principal Problem:    Acute hypoxic respiratory failure (HCC)  Active Problems:    Acute on chronic congestive heart failure, unspecified heart failure type (HCC)    Aortic valve stenosis, etiology of cardiac valve disease unspecified     Problem List  Principal Problem:    Acute hypoxic respiratory failure (HCC)  Active Problems:    Pneumonia due to infectious organism    Acute on chronic congestive heart failure, unspecified heart failure type (HCC)    Aortic valve stenosis, etiology of cardiac valve disease unspecified    Coronary artery disease involving native coronary artery of native heart without angina pectoris      HOME SITUATION  Type of Home: Assisted living facility  Home Layout: One level, Elevator  Stairs to Enter : 0   Railing: No    Stairs to Bedroom: 0    Railing: No    Lives With: Alone        Patient Regularly Uses: Rolling walker, Wheelchair     Prior Level of [place]: Patient has been living at [facility] since dc in Feb. Using a RW and wc and going for OP PT at her facility.    SUBJECTIVE  \"My daughter is working on where I'll go once I discharge\"    PHYSICAL THERAPY EXAMINATION   OBJECTIVE  Precautions: Cardiac  Fall Risk: High fall risk    WEIGHT BEARING RESTRICTION       PAIN ASSESSMENT  Ratin  Location: no pain  Management Techniques: Activity promotion    COGNITION  Overall Cognitive Status:  WFL - within functional limits    RANGE OF MOTION AND STRENGTH ASSESSMENT  Upper extremity ROM and strength are within functional limits   Lower extremity ROM is within functional limits   Lower extremity strength is within  functional limits     BALANCE  Static Sitting: Good  Dynamic Sitting: Good  Static Standing: Fair +  Dynamic Standing: Fair    ACTIVITY TOLERANCE  Pulse: 105  Heart Rate Source: Monitor                   O2 WALK  Oxygen Therapy  SPO2% on Oxygen at Rest: 93  At rest oxygen flow (liters per minute): 3  SPO2% Ambulation on Oxygen: 77 (after walking - needs 3 min to return to 90% WITH PLB)  Ambulation oxygen flow (liters per minute): 5    AM-PAC '6-Clicks' INPATIENT SHORT FORM - BASIC MOBILITY  How much difficulty does the patient currently have...  Patient Difficulty: Turning over in bed (including adjusting bedclothes, sheets and blankets)?: None   Patient Difficulty: Sitting down on and standing up from a chair with arms (e.g., wheelchair, bedside commode, etc.): A Little   Patient Difficulty: Moving from lying on back to sitting on the side of the bed?: A Little   How much help from another person does the patient currently need...   Help from Another: Moving to and from a bed to a chair (including a wheelchair)?: A Little   Help from Another: Need to walk in hospital room?: A Little   Help from Another: Climbing 3-5 steps with a railing?: A Lot     AM-PAC Score:  Raw Score: 18   Approx Degree of Impairment: 46.58%   Standardized Score (AM-PAC Scale): 43.63   CMS Modifier (G-Code): CK    FUNCTIONAL ABILITY STATUS  Functional Mobility/Gait Assessment  Gait Assistance: Contact guard assist (with chair follow)  Distance (ft): 25'x3 with seated rest break between  Assistive Device: Rolling walker  Pattern: Within Functional Limits  Rolling: independent  Supine to Sit: contact guard assist  Sit to Supine: contact guard assist  Sit to Stand: minimal assist    Exercise/Education Provided:  Bed mobility  Energy conservation  Functional activity tolerated  Gait training  Posture  Strengthening  Transfer training  Breathing technique/diaphragmatic breathing and PLB    Skilled Therapy Provided: RN Ed approves participation,  reports he was walking pt to bathroom and she suddenly became quiet/couldn't talk and was noted to have low SpO2 of 81%. Pt reports she is feeling very SOB and weak when walking today, improves with sitting. Pt presents in chair and agreeable to therapy session. She is s/p RHC yesterday and currently on 3L O2 at rest (SpO2 93-94%) and needing 5L for walking with SpO2 77-78% and needing 3-4 min to recover to 90%. Education and training in both diaphragmatic breathing technique as well as pursed lip breathing. Performed seated, standing and walking but pt still with desaturation. She has decreased lower lung motion and mostly using upper lungs/noted accessory muscle use. Manual retraining with facilitation at diaphragm with further training needed. HEP for deep breath in with holding x 3 seconds and then strong exhalation to be performed hourly x 3 reps with good return demo. Discussion about walking with upright posture and PLB and then recovery breathing for a few minutes and she has NO episodes of lightheaded/feeling too SOB although she is winded. Chair follow today given symptoms earlier. She is motivated and hopeful to go back to [facility] even if it is with O2. IF she needs oxygen would recommend she had HOME HEALTH PT for now and progress to OP as tolerated and she is agreeable. She is up in chair with all needs in reach and RN aware    The patient's Approx Degree of Impairment: 46.58% has been calculated based on documentation in the [facility] '6 clicks' Inpatient Basic Mobility Short Form.  Research supports that patients with this level of impairment may benefit from home with increased support and HHPT and this is the recommendation. Final disposition will be made by interdisciplinary medical team.    Patient End of Session: Up in chair, Needs met, Call light within reach, RN aware of session/findings, All patient questions and concerns addressed, Hospital anti-slip socks, Discussed recommendations with case  manager/    CURRENT GOALS  Goals to be met by: 25  Patient Goal Patient's self-stated goal is: to get back to [facility] and get stronger   Goal #1 Patient is able to demonstrate supine - sit EOB @ level: modified independent     Goal #1   Current Status    Goal #2 Patient is able to demonstrate transfers Sit to/from Stand at assistance level: modified independent with walker - rolling     Goal #2  Current Status    Goal #3 Patient is able to ambulate 50 feet with assist device: walker - rolling and supplemental O2 and manage lines  at assistance level: modified independent and SpO2 of 90% or better   Goal #3   Current Status    Goal #4 Patient will negotiate bed to/from chair transfers with Rw and able to manage O2 lines with modified independent and SpO2 90% or better   Goal #4   Current Status    Goal #5 Patient to demonstrate independence with home activity/exercise instructions provided to patient in preparation for discharge.   Goal #5   Current Status    Goal #6    Goal #6  Current Status      Re-evaluation: 15 min  Gait Trainin minutes  Therapeutic Activity:  12 minutes

## 2025-04-08 NOTE — PROGRESS NOTES
Progress Note  Hoda Busby Patient Status:  Inpatient    1940 MRN [id number]   Location [facility] 3W/SW Attending Malathi Stuart MD   Hosp Day # 13 PCP Malathi Stuart MD     Subjective:  Pt c/o continued cough, dyspnea; still on O2. Denies chest pain, SOB at rest or orthopnea.     Objective:  BP 90/39 (BP Location: Right arm)   Pulse 86   Temp 97.7 °F (36.5 °C) (Oral)   Resp 18   Ht 5' 4\" (1.626 m)   Wt 169 lb (76.7 kg)   SpO2 93%   BMI 29.01 kg/m²     Telemetry: NSR    Intake/Output:    Intake/Output Summary (Last 24 hours) at 2025 0951  Last data filed at 2025 0819  Gross per 24 hour   Intake 919.17 ml   Output 300 ml   Net 619.17 ml       Last 3 Weights   25 0428 169 lb (76.7 kg)   25 0625 170 lb 3.2 oz (77.2 kg)   25 0613 171 lb 8 oz (77.8 kg)   25 0414 172 lb 14.4 oz (78.4 kg)   25 0958 173 lb 9.6 oz (78.7 kg)   25 0500 174 lb 9.6 oz (79.2 kg)   25 0547 166 lb (75.3 kg)   25 0606 172 lb 11.2 oz (78.3 kg)   25 0441 174 lb 4.8 oz (79.1 kg)   25 0455 187 lb 8 oz (85 kg)   25 2158 183 lb 4.8 oz (83.1 kg)   25 1939 160 lb (72.6 kg)   25 1603 180 lb (81.6 kg)   25 0753 176 lb (79.8 kg)   25 1020 173 lb (78.5 kg)   25 1513 173 lb (78.5 kg)       Labs:  Recent Labs   Lab 25  1242 25  0534 25  0839   * 77 112*   BUN 29* 31* 28*   CREATSERUM 1.05* 1.05* 1.15*   EGFRCR 52* 52* 47*   CA 8.7 8.3* 8.1*    139 139   K 4.5 4.2 3.6    104 105   CO2 25.0 28.0 24.0     Recent Labs   Lab 25  0552 25  1242 25  0534 25  0839   RBC 4.04 4.35 3.74*  --  3.35*   HGB 10.5* 11.2* 10.1* 9.9* 8.9*   HCT 33.4* 37.8 30.9* 31.6* 28.1*   MCV 82.7 86.9 82.6  --  83.9   MCH 26.0 25.7* 27.0  --  26.6   MCHC 31.4 29.6* 32.7  --  31.7   RDW 17.7* 18.1* 17.5*  --  18.1*   NEPRELIM 12.91* 16.87* 9.92*  --   --    WBC 16.7* 18.0* 13.6*  --   19.9*   .0 220.0 185.0  --  181.0         No results for input(s): \"TROP\", \"TROPHS\", \"CK\" in the last 168 hours.    Diagnostics:  No results found.   Review of Systems   Cardiovascular:  Positive for dyspnea on exertion. Negative for chest pain, leg swelling, orthopnea and palpitations.   Respiratory:  Positive for cough and shortness of breath.        Physical Exam:    Gen: alert, oriented x 3, NAD  Heent: pupils equal, reactive. Mucous membranes moist.   Neck: no jvd  Cardiac: regular rate and rhythm, normal S1,S2, 2/6 RUSB murmur  Lungs: CTA  Abd: soft, NT/ND +bs  Ext: no edema  Skin: Warm, dry, R groin site soft, ecchymotic  Neuro: No focal deficits      Medications:     atorvastatin  40 mg Oral Nightly    clopidogrel  75 mg Oral Daily    aspirin  81 mg Oral Daily    predniSONE  40 mg Oral Daily with breakfast    furosemide  40 mg Oral Daily    vancomycin  125 mg Oral Daily    pantoprazole  40 mg Oral QAM AC    heparin  5,000 Units Subcutaneous 2 times per day    insulin aspart  1-7 Units Subcutaneous TID CC    ferrous sulfate  325 mg Oral Daily with breakfast    docusate sodium  100 mg Oral BID      sodium chloride 50 mL/hr at 04/07/25 2125         Assessment:  Acute Hypoxic Respiratory Failure - Multifactorial (Interstitial Pneumonitis, HFpEF)  S/P antibx  Steroids, diuresis  Pulm following  CAD s/p PCI - JUVENTINO to LAD 4/7/25  Trop 58 on admit  Hx known CAD from pre-TAVR angiogram; med mgmt planned at that time d/t anemia, surgical procedure  4/7 [facility] - now s/p PCI to LAD, otherwise diffuse non-obs, FFR neg, heavily calcified CAD   On asa, plavix, statin  Acute on Chronic HFpEF  proBNP 4423, CXR/CT w/pulm edema, pleural effusions  Echo LVEF 60-65%, G1DD, PASP 52mmHg  S/P Diuresis w/IV Lasix > now po Lasix 40mg daily  4/7 [facility] w/normal CO/CI, normal filling pressures, mild pulm HTN  I&O inaccurate, wt -14lbs  GDMT - not hx on MRA, SGLT2i  Valvular HD - Severe Aortic Stenosis s/p Recent TAVR Martha S3 23mm  1/2025  Bioprosthetic TAVR valve - MG 15mmHg, PV 2.69m/sec   Colon Cancer s/p L hemicolectomy 2/2025  Chronic Anemia - Hgb 8.9 today  Baseline ~ 9-10  Hyperlipidemia - LDL 76, on atorvastatin 40mg    Plan:  Increase atorvastatin to 80mg po nightly  Continue uninterrupted DAPT with asa, plavix s/p PCI yesterday  RHC with normal filling pressures - continue usual Lasix 40mg po daily to maintain euvolemia  Further recommendations per pulmonology    Plan of care discussed with patient, RN.    Veronica Rojas, APRN  4/8/2025  9:51 AM

## 2025-04-08 NOTE — PLAN OF CARE
Patient SpO2 dropped while ambulating to restroom with staff. Patient unable follow commands. 5L O2 applied, patient able to recover quickly. Attending and Cardiology notified. No additional events.     Problem: Patient Centered Care  Goal: Patient preferences are identified and integrated in the patient's plan of care  Description: Interventions:- What would you like us to know as we care for you? - Provide timely, complete, and accurate information to patient/family- Incorporate patient and family knowledge, values, beliefs, and cultural backgrounds into the planning and delivery of care- Encourage patient/family to participate in care and decision-making at the level they choose- Honor patient and family perspectives and choices  Outcome: Progressing     Problem: Patient/Family Goals  Goal: Patient/Family Long Term Goal  Description: Patient's Long Term Goal: to go home Interventions:- Monitor vital signs and labs  - Administer medications per order  - Follow MD orders  - Fall precautions  - Diagnostics as ordered  - Update / inform patient on plan of care  - Discharge planning- See additional Care Plan goals for specific interventions  Outcome: Progressing  Goal: Patient/Family Short Term Goal  Description: Patient's Short Term Goal: to feel better Interventions: -  See additional Care Plan goals for specific interventionsMonitor vital signs and labs  - Administer medications per order  - Follow MD orders  - Fall precautions  - Diagnostics as ordered  - Update / inform patient on plan of care  - Discharge planning  Outcome: Progressing     Problem: PAIN - ADULT  Goal: Verbalizes/displays adequate comfort level or patient's stated pain goal  Description: INTERVENTIONS:- Encourage pt to monitor pain and request assistance- Assess pain using appropriate pain scale- Administer analgesics based on type and severity of pain and evaluate response- Implement non-pharmacological measures as appropriate and evaluate  response- Consider cultural and social influences on pain and pain management- Manage/alleviate anxiety- Utilize distraction and/or relaxation techniques- Monitor for opioid side effects- Notify MD/LIP if interventions unsuccessful or patient reports new pain- Anticipate increased pain with activity and pre-medicate as appropriate  Outcome: Progressing     Problem: RISK FOR INFECTION - ADULT  Goal: Absence of fever/infection during anticipated neutropenic period  Description: INTERVENTIONS- Monitor WBC- Administer growth factors as ordered- Implement neutropenic guidelines  Outcome: Progressing     Problem: SAFETY ADULT - FALL  Goal: Free from fall injury  Description: INTERVENTIONS:- Assess pt frequently for physical needs- Identify cognitive and physical deficits and behaviors that affect risk of falls.- [place] fall precautions as indicated by assessment.- Educate pt/family on patient safety including physical limitations- Instruct pt to call for assistance with activity based on assessment- Modify environment to reduce risk of injury- Provide assistive devices as appropriate- Consider OT/PT consult to assist with strengthening/mobility- Encourage toileting schedule  Outcome: Progressing     Problem: MUSCULOSKELETAL - ADULT  Goal: Return mobility to safest level of function  Description: INTERVENTIONS:- Assess patient stability and activity tolerance for standing, transferring and ambulating w/ or w/o assistive devices- Assist with transfers and ambulation using safe patient handling equipment as needed- Ensure adequate protection for wounds/incisions during mobilization- Obtain PT/OT consults as needed- Advance activity as appropriate- Communicate ordered activity level and limitations with patient/family  Outcome: Progressing  Goal: Maintain proper alignment of affected body part  Description: INTERVENTIONS:- Support and protect limb and body alignment per provider's orders- Instruct and reinforce with  patient and family use of appropriate assistive device and precautions (e.g. spinal or hip dislocation precautions)  Outcome: Progressing     Problem: RESPIRATORY - ADULT  Goal: Achieves optimal ventilation and oxygenation  Description: INTERVENTIONS:- Assess for changes in respiratory status- Assess for changes in mentation and behavior- Position to facilitate oxygenation and minimize respiratory effort- Oxygen supplementation based on oxygen saturation or ABGs- Provide Smoking Cessation handout, if applicable- Encourage broncho-pulmonary hygiene including cough, deep breathe, Incentive Spirometry- Assess the need for suctioning and perform as needed- Assess and instruct to report SOB or any respiratory difficulty- Respiratory Therapy support as indicated- Manage/alleviate anxiety- Monitor for signs/symptoms of CO2 retention  Outcome: Progressing     Problem: Diabetes/Glucose Control  Goal: Glucose maintained within prescribed range  Description: INTERVENTIONS:- Monitor Blood Glucose as ordered- Assess for signs and symptoms of hyperglycemia and hypoglycemia- Administer ordered medications to maintain glucose within target range- Assess barriers to adequate nutritional intake and initiate nutrition consult as needed- Instruct patient on self management of diabetes  Outcome: Progressing

## 2025-04-08 NOTE — PROGRESS NOTES
[facility]  part of [facility]    Progress Note    Hoda Busby Patient Status:  Inpatient    1940 MRN [id number]   Location [facility] 3W/SW Attending Malathi Stuart MD   Hosp Day # 13 PCP Malathi Stuart MD       SUBJECTIVE:  Small nose bleed from left nostril this am; pt feels left nostril is irritated from the nasal cannula.  Still with intermittent phlegmy cough.    OBJECTIVE:  Vital signs in last 24 hours:  BP 90/39 (BP Location: Right arm)   Pulse 86   Temp 97.7 °F (36.5 °C) (Oral)   Resp 18   Ht 5' 4\" (1.626 m)   Wt 169 lb (76.7 kg)   SpO2 93%   BMI 29.01 kg/m²     Intake/Output:    Intake/Output Summary (Last 24 hours) at 2025 0842  Last data filed at 2025 0819  Gross per 24 hour   Intake 959.17 ml   Output 300 ml   Net 659.17 ml       Wt Readings from Last 3 Encounters:   25 169 lb (76.7 kg)   25 180 lb (81.6 kg)   25 173 lb (78.5 kg)       EXAM:  GENERAL: well developed, well nourished, in no apparent distress  LUNGS: minimal basilar crackles, no wheezing  CARDIO: RRR, normal S1S2, 2/6 WADE RUSB  EXTREMITIES: no LE edema; mild ecchymosis around R groin site - no spreading beyond outline; no tenderness  NEURO: 5/5 dorsiflexion b/l feet    Data Review:     Labs:   Lab Results   Component Value Date    HGB 9.9 2025    HCT 31.6 2025         Imaging:  No results found.   CARD ECHO 2D DOPPLER (CPT=93306)    Result Date: 3/27/2025  Transthoracic Echocardiogram Name:Hoda Busby Date: 2025 :  1940 Ht:  (64in)  BP: 137 / 81 MRN:  [id number]    Age:  84years    Wt:  (187lb) HR: 75bpm Loc:  [facility]       Gndr: F          BSA: 1.9m^2 Sonographer: Casper JOHNSON Ordering:    Malathi Stuart Consulting:  Raslan, Ahmad O ---------------------------------------------------------------------------- History/Indications:   Congestive heart failure.  Risk factors: Hypertension. TAVR-23 mm Martha 3 Resilia bioprosthesis.  Acute hypoxic respiratory failure. ---------------------------------------------------------------------------- Procedure information:  A transthoracic complete 2D study was performed. Additional evaluation included M-mode, complete spectral Doppler, and color Doppler.  Patient status:  Inpatient.  Location:  Bedside.    Comparison was made to the study of 02/05/2025.    This was a routine study. Transthoracic echocardiography for diagnosis and ventricular function evaluation. Image quality was adequate. ECG rhythm:   Normal sinus ---------------------------------------------------------------------------- Conclusions: 1. Left ventricle: The cavity size was normal. Wall thickness was normal.    Systolic function was normal. The estimated ejection fraction was 60-65%.    No diagnostic evidence for regional wall motion abnormalities. Doppler    parameters are consistent with abnormal left ventricular relaxation -    grade 1 diastolic dysfunction. 2. Right ventricle: The cavity size was increased. Systolic function was    normal. 3. Left atrium: The atrium was mildly to moderately dilated. The left atrial    volume was mildly to moderately increased. 4. Aortic valve: A bioprosthetic valve is present. Mcnamara PUNEET S3 23mm    (TAVR) bioprosthesis was present. The peak systolic velocity was    2.69m/sec. The mean systolic gradient was 15mm Hg. The valve area (VTI)    was 1.52cm^2. 5. Pulmonary arteries: Systolic pressure was moderately increased, estimated    to be 52mm Hg. * ---------------------------------------------------------------------------- * Findings: Left ventricle:  The cavity size was normal. Wall thickness was normal. Systolic function was normal. The estimated ejection fraction was 60-65%. No diagnostic evidence for regional wall motion abnormalities. Doppler parameters are consistent with abnormal left ventricular relaxation - grade 1 diastolic dysfunction. Left atrium:  The atrium was mildly to  moderately dilated. The left atrial volume was mildly to moderately increased. Right ventricle:  The cavity size was increased. Systolic function was normal. Right atrium:  Well visualized. The atrium was normal in size. Mitral valve:  The annulus was moderately calcified. The leaflets were mildly thickened and mildly to moderately calcified. Leaflet separation was normal.  Doppler:  Transvalvular velocity was within the normal range. There was no evidence for stenosis. There was no significant regurgitation. Aortic valve:  A bioprosthetic valve is present. Mcnamara PUNEET S3 23mm (TAVR) bioprosthesis was present. Cusp separation was normal.  Doppler: Transvalvular velocity was within the normal range. There was no evidence for stenosis. There was no significant regurgitation.    The valve area (VTI) was 1.52cm^2. The valve area (VTI) index was 0.8cm^2/m^2.    The mean systolic gradient was 15mm Hg. The peak systolic gradient was 29mm Hg. Tricuspid valve:  The valve is structurally normal. Leaflet separation was normal.  Doppler:  Transvalvular velocity was within the normal range. There was no evidence for stenosis. There was mild regurgitation. Pulmonic valve:   The valve is structurally normal. Cusp separation was normal.  Doppler:  Transvalvular velocity was within the normal range. There was no evidence for stenosis. There was no significant regurgitation. Pericardium:   There was no pericardial effusion. Aorta: Aortic root: The aortic root was normal. Ascending aorta: The ascending aorta was normal. Pulmonary arteries: Systolic pressure was moderately increased, estimated to be 52mm Hg. Systemic veins:  Central venous respirophasic diameter changes are blunted (< 50%). Inferior vena cava: The IVC was dilated. ---------------------------------------------------------------------------- Measurements  Left ventricle       Value          Ref       01/24/2025  IVS thickness,   (H) 1.0   cm       0.6 - 0.9 1.0  ED,  PLAX  LV ID, ED, PLAX  (H) 5.3   cm       3.8 - 5.2 5.0  LV ID, ES, PLAX      3.5   cm       2.2 - 3.5 3.1  LV PW thickness, (H) 1.0   cm       0.6 - 0.9 1.0  ED, PLAX  IVS/LV PW ratio,     1.00           --------- 1.02  ED, PLAX  LV PW/LV ID          0.19           --------- 0.2  ratio, ED, PLAX  LV area, ES, A4C     17.5  cm^2     --------- ----------  LV ejection          62    %        54 - 74   67  fraction  Stroke               45    ml/m^2   --------- 53  volume/bsa, 2D  LV end-diastolic     126   ml       48 - 140  ----------  volume, 1-p A4C  LV end-systolic      49    ml       12 - 60   ----------  volume, 1-p A4C  LV ejection          63    %        46 - 78   ----------  fraction, 1-p  A4C  Stroke volume,       79    ml       --------- ----------  1-p A4C  LV end-diastolic     66    ml/m^2   30 - 82   ----------  volume/bsa, 1-p  A4C  LV end-systolic      26    ml/m^2   7 - 35    ----------  volume/bsa, 1-p  A4C  Stroke               42    ml/m^2   --------- ----------  volume/bsa, 1-p  A4C  LV end-diastolic (H) 113   ml       46 - 106  ----------  volume, 2-p  LV end-systolic      42    ml       14 - 42   ----------  volume, 2-p  LV ejection          63    %        54 - 74   ----------  fraction, 2-p  Stroke volume,       72    ml       --------- ----------  2-p  LV end-diastolic     59    ml/m^2   29 - 61   ----------  volume/bsa, 2-p  LV end-systolic      22    ml/m^2   8 - 24    ----------  volume/bsa, 2-p  Stroke               37.6  ml/m^2   --------- ----------  volume/bsa, 2-p  LV e', lateral   (L) 4.9   cm/sec   >=10.0    ----------  LV E/e', lateral (H) 19             <=13      ----------  LV e', medial    (L) 3.2   cm/sec   >=7.0     ----------  LV E/e', medial      30             --------- ----------  LV e', average       4.0   cm/sec   --------- ----------  LV E/e', average (H) 23             <=14      ----------  LVOT                 Value          Ref       01/24/2025  LVOT ID               1.9   cm       --------- 2.2  LVOT peak            1.29  m/sec    --------- 1.31  velocity, S  LVOT VTI, S          29.9  cm       --------- 27.6  LVOT peak            7     mm Hg    --------- 7  gradient, S  LVOT mean            4     mm Hg    --------- 3  gradient, S  Stroke volume        85    ml       --------- 105  (SV), LVOT DP  Stroke index         45    ml/m^2   --------- 53  (SV/bsa), LVOT  DP  Aortic valve         Value          Ref       01/24/2025  Aortic valve         2.69  m/sec    --------- 2.4  peak velocity, S  Aortic valve         55.7  cm       --------- 51.6  VTI, S  Aortic mean          15    mm Hg    --------- 12  gradient, S  Aortic peak          29    mm Hg    --------- 23  gradient, S  Aortic valve         1.52  cm^2     --------- 2.03  area, VTI  Aortic valve         0.8   cm^2/m^2 --------- 1.03  area/bsa, VTI  Velocity ratio,      0.48           --------- 0.55  peak, LVOT/AV  Ascending aorta      Value          Ref       01/24/2025  Ascending aorta      3.5   cm       1.9 - 3.5 ----------  ID  Left atrium          Value          Ref       01/24/2025  LA ID, A-P, ES       3.3   cm       2.7 - 3.8 4.0  LA volume/bsa, S (H) 39    ml/m^2   16 - 34   56  LA volume, ES,   (H) 74    ml       22 - 52   101  1-p A4C  Mitral valve         Value          Ref       01/24/2025  Mitral E-wave        0.94  m/sec    --------- ----------  peak velocity  Mitral A-wave        1.24  m/sec    --------- ----------  peak velocity  Mitral               190   ms       --------- ----------  deceleration  time  Mitral peak          4     mm Hg    --------- ----------  gradient, D  Mitral E/A           0.8            --------- ----------  ratio, peak  Pulmonary artery     Value          Ref       01/24/2025  PA pressure, S,      52    mm Hg    --------- ----------  DP  Tricuspid valve      Value          Ref       01/24/2025  Tricuspid regurg (H) 3.06  m/sec    <=2.8     ----------  peak velocity  Tricuspid peak        37    mm Hg    --------- ----------  RV-RA gradient  Right atrium         Value          Ref       01/24/2025  RA ID, S-I, ES,  (H) 6.3   cm       3.4 - 5.3 ----------  A4C  RA ID/bsa, S-I,  (H) 3.3   cm/m^2   1.9 - 3.1 ----------  ES, A4C  RA area, ES, A4C     17    cm^2     10 - 18   ----------  RA volume, ES,       38    ml       --------- ----------  1-p A4C  RA volume/bsa,       20    ml/m^2   9 - 33    ----------  ES, 1-p A4C  Systemic veins       Value          Ref       01/24/2025  Estimated CVP        15    mm Hg    --------- ----------  Inferior vena        Value          Ref       01/24/2025  cava  ID               (H) 2.7   cm       <=2.1     ----------  Right ventricle      Value          Ref       01/24/2025  TAPSE, 2D            2.74  cm       >=1.70    ----------  TAPSE, MM            2.74  cm       >=1.70    ----------  RV pressure, S,      52    mm Hg    --------- ----------  DP  RV s', lateral       14.7  cm/sec   >=9.5     ---------- Legend: (L)  and  (H)  migel values outside specified reference range. ---------------------------------------------------------------------------- Prepared and electronically signed by Bran Salcedo 03/27/2025 16:38          Meds:   Current Facility-Administered Medications   Medication Dose Route Frequency    atorvastatin (Lipitor) tab 40 mg  40 mg Oral Nightly    clopidogrel (Plavix) tab 75 mg  75 mg Oral Daily    aspirin DR tab 81 mg  81 mg Oral Daily    sodium chloride 0.9% infusion   Intravenous Continuous    predniSONE (Deltasone) tab 40 mg  40 mg Oral Daily with breakfast    acetaminophen (Tylenol) tab 650 mg  650 mg Oral Q6H PRN    furosemide (Lasix) tab 40 mg  40 mg Oral Daily    polyethylene glycol (PEG 3350) (Miralax) 17 g oral packet 17 g  17 g Oral Daily PRN    guaiFENesin (Robitussin) 100 MG/5 ML oral liquid 200 mg  200 mg Oral Q4H PRN    benzocaine-menthol (Cepacol) lozenge 1 lozenge  1 lozenge Oral PRN    vancomycin (Vancocin) cap 125 mg  125 mg  Oral Daily    pantoprazole (Protonix) DR tab 40 mg  40 mg Oral QAM AC    traMADol (Ultram) tab 50 mg  50 mg Oral Q6H PRN    ipratropium-albuterol (Duoneb) 0.5-2.5 (3) MG/3ML inhalation solution 3 mL  3 mL Nebulization Q6H PRN    heparin (Porcine) 5000 UNIT/ML injection 5,000 Units  5,000 Units Subcutaneous 2 times per day    glucose (Dex4) 15 GM/59ML oral liquid 15 g  15 g Oral Q15 Min PRN    Or    glucose (Glutose) 40% oral gel 15 g  15 g Oral Q15 Min PRN    Or    glucose-vitamin C (Dex-4) chewable tab 4 tablet  4 tablet Oral Q15 Min PRN    Or    dextrose 50% injection 50 mL  50 mL Intravenous Q15 Min PRN    Or    glucose (Dex4) 15 GM/59ML oral liquid 30 g  30 g Oral Q15 Min PRN    Or    glucose (Glutose) 40% oral gel 30 g  30 g Oral Q15 Min PRN    Or    glucose-vitamin C (Dex-4) chewable tab 8 tablet  8 tablet Oral Q15 Min PRN    insulin aspart (NovoLOG) 100 Units/mL FlexPen 1-7 Units  1-7 Units Subcutaneous TID CC    ferrous sulfate DR tab 325 mg  325 mg Oral Daily with breakfast    docusate sodium (Colace) cap 100 mg  100 mg Oral BID       Assessment & Plan    Acute hypoxic respiratory failure (HCC)  -multifactorial: acute HFpEF, recurrent interstitial pneumonitis, CAP  -CT chest in ED 3/26/25 - no PE; worsening of pleural effusions and interstitial edema  -required BiPAP transiently; was up to 15L O2  -procalc sl elevated 0.25; WBC 19.8 K on admission  -echo 2/5/25 - normal LV systolic function (EF 60-65%); grade 2 diastolic dysfunction; normally functioning bioprosthetic TAVR; sl incr PAP 44mmHg  -repeat echo 3/27/25 stable from 2/2025 except incr PAP to 52mmHg  -discussed with Dr. Holloway; will start ceftriaxone and azitho; po vanco for CDP (day 2)  -has distal BLE edema--improved with compression stockings  -hi res CT chest 4/4/25 --pulmonary edema and bilateral pleural effusions significantly improved.  There is persistent trace right pleural effusion.  Advanced interstitial lung disease is noted in an NSIP  (nonspecific insterstitial pneumonitis) pattern.   -overall improved with IV solumedrol, IV lasix, and antibiotics (ceftriaxone/azithro, then augmentin)  -Augmentin stopped 4/7  -IV lasix changed to po lasix - currently 20mg/day (for HFpEF)  -IV solumedrol changed to prednisone - currently 40mg/day per pulm (for NSIP)  -still requiring O2 3L NC -- dropped to 75% on 5L last pm while ambulating; will likely need home O2     Hypotension  -BP down to 85/39 last pm  -currently 90/39  -hold lasix this am  -on 0.9NS at 50cc/hr  -suspect dry, but will check am cortisol tomorrow given recent chronic steroid use and deescalation of steroids this admit    Coronary artery disease  -Cath 1/14/25 by Dr. Bernardo Liz (as w/u for TAVR) -- RCA non-obstructive; LM free of obst disease; LM plaque extending into ostium of LAD (20-30%); mid LAD (80-90%), cx ostium (60-70%)  -intervention delayed until after colon resection due to need for DAPT x 6 mos after stenting   -s/p PCI/JUVENTINO of mid LAD (70%>0%) on 4/7/25 by Dr. Dutton  -cont ASA, Plavix  -increase atorvastatin from 20mg to 40mg/day (LDL 76 on 3/26/25)  -further recs per cardiology    Interstitial pneumonitis/NSIP  -dx'ed 10/2024 - presented with cough and severe hypoxia  -unresponsive to abx   -CXR 10/24/24 extensive bilateral perihilar and bilateral upper and lower lobe   -S.pneumo, legionella Ag , mycoplasma IgM/G, Fungitell-beta-D glucan negative  -ANCA and URIEL/connective tissue disease panels negative  -anti-histone and anti-Keara Ab's negative  -CT with bilateral ground glass opacities, small consolidations of BLL  -indings suspicious for NSIP (vs cryptogenic organizing pneumonia vs hypersensitivity pneumonitis); NOT thought to be c/w UIP pattern  -s/p empiric steroids (solumedrol then prednisone taper) 10/25/24 - (EOT 1/18/25)  -dyspnea and hypoxia completely resolved until this admission 3/26/25  -back on steroids (IV solumedrol, now prednisone 40mg/day) - taper per  pulm    Epistaxis  -mild; from left nostril  -saline nasal spray; O2 already humidified     Adenocarcinoma of colon  -Bx of transverse colon polyp 12/17/24 -- invasive, moderately differentiated adenocarcinoma  -CEA normal (1.9)  -CT a/p neg for mets  -surgery delayed in anticipation of TAVR (done 1/23/25)  -s/p robotic assisted left hemicolectomy (Dr. Cassidy) - path - tumor invades into muscularis; margins neg for tumor; all 19 LN's neg for mets (0/19); pT2, pN0  -next appt with Dr. Cassidy 4/16/25     Hx  GI bleed  -EGD 11/26/24 (Dr. Nuno) -15mm nonbleeding gastric antral ulcer; 3mm gastric antral AVM s/p APC  -recurrence in 12/2024  -colonoscopy and repeat EGD 12/17/24 by Dr. Staton -- grade 1 esophagitis; duod ulcers healing; large flat polyp in transverse colon (carcinoma)   -received total of 3 units PRBCs   -Xarelto stopped in 12/2024  -s/p omeprazole 20mg BID x 8 weeks (EOT 1/22/25), now on omeprazole 20mg daily -- plan has been to continue until she completed her coronary stenting.  Will stop at discharge.      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   -MRI brain--no acute intracranial process  -was doing PT at [facility] -- continues to improve; dorsiflexion left foot 4/5; plantarflexion 5/5 this admission (3/26/25)     Bilateral pulmonary emboli  -dx'ed at time of interstitial pneumonitis  -CT chest 10/25/24 -- acute distal segmental/subsegmental pulmonary emboli in RUL and subsegmental PE in  [place]  -unclear etiology; no recent hx of long travel; no family/personal hx of blood clots  -BLE venous dopplers negative 10/26/24  -started xarelto 20 mg po every day on 11/28/24  -repeat CT chest 12/9/24 neg PE  -stopped Xarelto 12/14/24 due to recurrent GI bleed  -repeat CT chest 3/26/25 -- neg PE     Severe aortic stenosis  -progression on serial echocardiograms  -s/p TAVR 1/23/25 (Dr. Reji Green)  -symptomatically much improved     Hx of Hypertension, primary  -(dyazide stopped due to NANCY)  -(amlodipine held due to low BP in 11/2024)  -BP remains normal off meds     Hx of hip OA  -s/p left THR (Dr. Lo) many years ago  -s/p elective R BEATRIZ on 10/5/23 by Dr. Diaz     Hyperlipidemia   Pt with strong family hx of high cholesterol  Started on Atorvastatin 20mg/day in 1/2025 (after noted to have CAD on cath)  Increased atorvastatin to 40mg/day as above     Osteopenia   On Fosamax from 2011 to 4/2016.     DEXA stable 5/10/17 and 2019 and 2021  DEXA 8/2024 -- osteoporosis of left forearm  -restarted Fosamax 8/2024 (on hold this admission)     Vitamin D deficiency  On vitamin D 4000u/day      VTE proph  -SQH        Dispo  -pt has been residing at [facility] for past several months (for respite care and PT), and is scheduled to be there until July, after which she plans to return home  -will ask PT to eval  -anticipate discharge in next 1-2 days                Malathi Stuart MD  4/8/2025  8:42 AM

## 2025-04-08 NOTE — PROGRESS NOTES
[facility]  part of [facility]    Progress Note      Assessment and Plan:   1.  Acute on chronic respiratory failure-patient has pulmonary edema superimposed on mild chronic nonspecific interstitial pneumonitis.    The pulmonary edema is much better on the repeat CAT scan; however, the underlying nonspecific interstitial pneumonitis is persistent.  The patient yet desaturates with activity and is otherwise yet requiring supplemental oxygen.  She underwent PCI to the mid LAD yesterday.  She has ongoing leukocytosis likely related to the steroid.  Would anticipate discharge home with oxygen.  She should follow-up with Dr. Holloway in the 1 to 2-week interval.  At the time of discharge, would decrease prednisone to 20 mg daily.    Recommendations:  1.  Ongoing diuresis-40 mg Lasix daily  2.  Prednisone 40 mg daily  3.  Okay to stop Augmentin today  4.  Will follow clinically.    2.  Low moods-much better.    3.  DVT prophylaxis-subcutaneous heparin    4.  Status post TAVR    5.  Colon cancer-status post robotic left hemicolectomy    6.  Coronary artery disease-PCI performed today.    Subjective:   Hoda Busby is a(n) 84 year old female who is breathing better than on admission but still with dyspnea and oxygen requirement.  Objective:   Blood pressure 147/60, pulse 100, temperature 97.7 °F (36.5 °C), temperature source Oral, resp. rate 22, height 5' 4\" (1.626 m), weight 169 lb (76.7 kg), SpO2 (!) 85%.    Physical Exam alert white female  HEENT examination is unremarkable with pupils equal round and reactive to light and accommodation.   Neck without adenopathy, thyromegaly, JVD nor bruit.   Lungs diminished with basilar crackles to auscultation and percussion.  Cardiac regular rate and rhythm with 2 out of 6 systolic ejection murmur.   Abdomen nontender, without hepatosplenomegaly and no mass appreciable.   Extremities without clubbing cyanosis nor edema.   Neurologic grossly intact with  symmetric tone and strength and reflex.  Skin without gross abnormality     Results:     Lab Results   Component Value Date    WBC 19.9 04/08/2025    HGB 8.9 04/08/2025    HCT 28.1 04/08/2025    .0 04/08/2025    CREATSERUM 1.15 04/08/2025    BUN 28 04/08/2025     04/08/2025    K 3.6 04/08/2025     04/08/2025    CO2 24.0 04/08/2025     04/08/2025    CA 8.1 04/08/2025       Stanton Sanchez MD  Medical Director, Critical Care, [facility]  Medical Director, [facility]  Pager: [phone]

## 2025-04-08 NOTE — PLAN OF CARE
Notified of bp now 85/39 (53) HR 62 asymptomatic has sm hematoma at groin site checking H&H   Cont to monitor  H&H 9.9/31.6 BP 92/54 (66) IVF 50/hr cont IVF overnight dw nurse

## 2025-04-08 NOTE — DIETARY NOTE
NUTRITION EDUCATION NOTE     Received consult for cardiac nutrition education. Verbally reviewed basic cardiac diet restrictions. Provided with Eating Heart-Healthy handout to reinforce. Receptive to instruction. May benefit from outpt f/u. Expect fair compliance. RD contact information provided to pt.          Nena Guevara, MS, RDN, LDN  Clinical Dietitian

## 2025-04-08 NOTE — PLAN OF CARE
Patient alert on 3L oxygen with no complaints of pain. Hgb checked at beginning of shift d/t fluctuating b/p w/ hematoma during the day. IVF continued overnight per cardiology. Call light in reach and no needs noted at this time  Problem: Patient Centered Care  Goal: Patient preferences are identified and integrated in the patient's plan of care  Description: Interventions:- What would you like us to know as we care for you?- Provide timely, complete, and accurate information to patient/family- Incorporate patient and family knowledge, values, beliefs, and cultural backgrounds into the planning and delivery of care- Encourage patient/family to participate in care and decision-making at the level they choose- Honor patient and family perspectives and choices  Outcome: Progressing     Problem: Patient/Family Goals  Goal: Patient/Family Long Term Goal  Description: Patient's Long Term Goal: to go home Interventions:- Monitor vital signs and labs  - Administer medications per order  - Follow MD orders  - Fall precautions  - Diagnostics as ordered  - Update / inform patient on plan of care  - Discharge planning- See additional Care Plan goals for specific interventions  Outcome: Progressing  Goal: Patient/Family Short Term Goal  Description: Patient's Short Term Goal: to feel better Interventions: -  See additional Care Plan goals for specific interventionsMonitor vital signs and labs  - Administer medications per order  - Follow MD orders  - Fall precautions  - Diagnostics as ordered  - Update / inform patient on plan of care  - Discharge planning  Outcome: Progressing     Problem: PAIN - ADULT  Goal: Verbalizes/displays adequate comfort level or patient's stated pain goal  Description: INTERVENTIONS:- Encourage pt to monitor pain and request assistance- Assess pain using appropriate pain scale- Administer analgesics based on type and severity of pain and evaluate response- Implement non-pharmacological measures as  appropriate and evaluate response- Consider cultural and social influences on pain and pain management- Manage/alleviate anxiety- Utilize distraction and/or relaxation techniques- Monitor for opioid side effects- Notify MD/LIP if interventions unsuccessful or patient reports new pain- Anticipate increased pain with activity and pre-medicate as appropriate  Outcome: Progressing     Problem: RISK FOR INFECTION - ADULT  Goal: Absence of fever/infection during anticipated neutropenic period  Description: INTERVENTIONS- Monitor WBC- Administer growth factors as ordered- Implement neutropenic guidelines  Outcome: Progressing     Problem: SAFETY ADULT - FALL  Goal: Free from fall injury  Description: INTERVENTIONS:- Assess pt frequently for physical needs- Identify cognitive and physical deficits and behaviors that affect risk of falls.- [place] fall precautions as indicated by assessment.- Educate pt/family on patient safety including physical limitations- Instruct pt to call for assistance with activity based on assessment- Modify environment to reduce risk of injury- Provide assistive devices as appropriate- Consider OT/PT consult to assist with strengthening/mobility- Encourage toileting schedule  Outcome: Progressing     Problem: DISCHARGE PLANNING  Goal: Discharge to home or other facility with appropriate resources  Description: INTERVENTIONS:- Identify barriers to discharge w/pt and caregiver- Include patient/family/discharge partner in discharge planning- Arrange for needed discharge resources and transportation as appropriate- Identify discharge learning needs (meds, wound care, etc)- Arrange for interpreters to assist at discharge as needed- Consider post-discharge preferences of patient/family/discharge partner- Complete POLST form as appropriate- Assess patient's ability to be responsible for managing their own health- Refer to Case Management Department for coordinating discharge planning if the patient  needs post-hospital services based on physician/LIP order or complex needs related to functional status, cognitive ability or social support system  Outcome: Progressing     Problem: RESPIRATORY - ADULT  Goal: Achieves optimal ventilation and oxygenation  Description: INTERVENTIONS:- Assess for changes in respiratory status- Assess for changes in mentation and behavior- Position to facilitate oxygenation and minimize respiratory effort- Oxygen supplementation based on oxygen saturation or ABGs- Provide Smoking Cessation handout, if applicable- Encourage broncho-pulmonary hygiene including cough, deep breathe, Incentive Spirometry- Assess the need for suctioning and perform as needed- Assess and instruct to report SOB or any respiratory difficulty- Respiratory Therapy support as indicated- Manage/alleviate anxiety- Monitor for signs/symptoms of CO2 retention  Outcome: Progressing     Problem: MUSCULOSKELETAL - ADULT  Goal: Return mobility to safest level of function  Description: INTERVENTIONS:- Assess patient stability and activity tolerance for standing, transferring and ambulating w/ or w/o assistive devices- Assist with transfers and ambulation using safe patient handling equipment as needed- Ensure adequate protection for wounds/incisions during mobilization- Obtain PT/OT consults as needed- Advance activity as appropriate- Communicate ordered activity level and limitations with patient/family  Outcome: Progressing  Goal: Maintain proper alignment of affected body part  Description: INTERVENTIONS:- Support and protect limb and body alignment per provider's orders- Instruct and reinforce with patient and family use of appropriate assistive device and precautions (e.g. spinal or hip dislocation precautions)  Outcome: Progressing     Problem: Diabetes/Glucose Control  Goal: Glucose maintained within prescribed range  Description: INTERVENTIONS:- Monitor Blood Glucose as ordered- Assess for signs and symptoms of  hyperglycemia and hypoglycemia- Administer ordered medications to maintain glucose within target range- Assess barriers to adequate nutritional intake and initiate nutrition consult as needed- Instruct patient on self management of diabetes  Outcome: Progressing

## 2025-04-09 NOTE — PROGRESS NOTES
Progress Note  Hoda Busby Patient Status:  Inpatient    1940 MRN [id number]   Location [facility] 3W/SW Attending Malathi Stuart MD   Hosp Day # 14 PCP Malathi Stuart MD     Subjective:  Admits to sob when ambulating,  s/P JUVENTINO LAD   Remains on 4L O2, not on home O2 at baseline    Objective:  /47 (BP Location: Right arm)   Pulse 93   Temp 98.6 °F (37 °C) (Oral)   Resp 18   Ht 5' 4\" (1.626 m)   Wt 171 lb (77.6 kg)   SpO2 91%   BMI 29.35 kg/m²     Telemetry: SR, 70s-90s    Intake/Output:    Intake/Output Summary (Last 24 hours) at 2025 1155  Last data filed at 2025 0930  Gross per 24 hour   Intake 940 ml   Output --   Net 940 ml     Last 3 Weights   25 0611 171 lb (77.6 kg)   25 0428 169 lb (76.7 kg)   25 0625 170 lb 3.2 oz (77.2 kg)   25 0613 171 lb 8 oz (77.8 kg)   25 0414 172 lb 14.4 oz (78.4 kg)   25 0958 173 lb 9.6 oz (78.7 kg)   25 0500 174 lb 9.6 oz (79.2 kg)   25 0547 166 lb (75.3 kg)   25 0606 172 lb 11.2 oz (78.3 kg)   25 0441 174 lb 4.8 oz (79.1 kg)   25 0455 187 lb 8 oz (85 kg)   25 2158 183 lb 4.8 oz (83.1 kg)   25 1939 160 lb (72.6 kg)   25 1603 180 lb (81.6 kg)   25 0753 176 lb (79.8 kg)   25 1020 173 lb (78.5 kg)   25 1513 173 lb (78.5 kg)     Labs:  Recent Labs   Lab 25  0534 25  0839 25  1856 25  0644   GLU 77 112*  --  80   BUN 31* 28*  --  26*   CREATSERUM 1.05* 1.15*  --  0.97   EGFRCR 52* 47*  --  58*   CA 8.3* 8.1*  --  8.2*    139  --  139   K 4.2 3.6 4.8 4.4    105  --  107   CO2 28.0 24.0  --  23.0     Recent Labs   Lab 25  1242 25  0534 25  1951 25  0839 25  0644   RBC 4.35 3.74*  --  3.35* 3.35*   HGB 11.2* 10.1* 9.9* 8.9* 8.9*   HCT 37.8 30.9* 31.6* 28.1* 29.7*   MCV 86.9 82.6  --  83.9 88.7   MCH 25.7* 27.0  --  26.6 26.6   MCHC 29.6* 32.7  --  31.7 30.0*   RDW 18.1* 17.5*   --  18.1* 18.9*   NEPRELIM 16.87* 9.92*  --   --  14.27*   WBC 18.0* 13.6*  --  19.9* 17.5*   .0 185.0  --  181.0 181.0     No results for input(s): \"TROP\", \"TROPHS\", \"CK\" in the last 168 hours.  Lab Results   Component Value Date/Time    HDL 38 (L) 03/26/2025 07:39 PM    LDL 76 03/26/2025 07:39 PM    TRIG 97 03/26/2025 07:39 PM     Lab Results   Component Value Date    DDIMER 1.19 (H) 04/09/2025     Lab Results   Component Value Date    TSH 1.316 03/27/2025     Review of Systems:   Constitutional: No fevers, chills, fatigue or night sweats.  Respiratory: Denies cough, wheezing or shortness of breath.  CV: Denies chest pain, palpitations, orthopnea, PND or dizziness.  GI: No nausea, vomiting or diarrhea. No blood in stools.    Physical Exam:   General: Alert, cooperative, no distress, appears stated age.  Neck: no JVD.  Lungs: Clear to auscultation bilaterally.  Chest wall: No tenderness or deformity.  Heart: Regular rate and rhythm, S1, S2 normal, no murmur, click, rub or gallop.  Abdomen: Soft, non-tender. Bowel sounds normal. No masses,  No organomegaly.  Extremities: Extremities normal, atraumatic, no cyanosis, trace BLE edema.  Pulses: 2+ and symmetric all extremities.  Neurologic: Grossly intact.    Diagnostics:  XR CHEST AP PORTABLE  (CPT=71045)  Result Date: 4/9/2025  CONCLUSION:   Stable diffuse bilateral interstitial opacity.    Dictated by (CST): Arash Rueda MD on 4/09/2025 at 9:37 AM     Finalized by (CST): Arash Rueda MD on 4/09/2025 at 9:38 AM          Medications:  Scheduled Medications[1]  Medication Infusions[2]    Assessment:  84 year old female with PMH of severe aortic stenosis s/sp TAVR in January, CAD, HFpEF, interstitial pneumonitis, pulmonary emboli, recent colon ca s/p left hemicolectomy in February who presented with new onset productive cough, dyspnea, and LE edema.     Acute hypoxic respiratory failure  Multifactorial due to interstitial pneumonitis and HFpEF  -pulm  following  -s/p abx  -steroids  -diuresed with lasix, now on PO  -remains on 4L O2, not on home O2, reports persistent sob with light activity    CAD s/p PCI JUVENTINO, 4/7/25  Known CAD hx from previous TAVR workup  -trop 58 on admission  -[facility] 4/7: s/p PCI LAD, diffuse non-obstructive, FFR negative, heavily calcified CAD  -asa, plavix, statin    Acute on chronic HFpEF  Shortness of breathi wht edema on admission   -proBNP 4423, CXR/CT chest revealed pulmonary edema and pleural effusions  -LVEF 60-65% on echo with G1DD, PASP 52mmHg  -diuresed with IV lasix, now on PO, I/Os not accurately documented, weight down 16# from peak   -appears compensated on exam, still reports sob with minimal exertion, remains on 4L O2  -RHC on 4/7 revealed normal filling pressures, mild pulm HTN, normal CO/CI  -GDMT: not on MRA/SGLT2i/BB, baseline BP borderline 100s    Severe aortic stenosis s/p TAVR 1/2025  Mean gradient 15mmg    HLD-statin, LDL 76    Colon Cancer s/p L hemicolectomy, 2/2025    Chronic anemia-hgb baseline 9-10    Plan:  -Continue PO lasix daily  -Continue DAPT with recent PCI  -compensated on exam, at this time, hypoxia does not appear cardiac related, appreciate pulmonary recs for further treatment  -stable from cardiac standpoint, we will sign off and arrange for OP follow up with Dr. Bird      Plan of care discussed with patient, RN.        Kristal Bone, MSN, APN, FNP-BC, CCK  4/9/2025  11:55 AM  [phone] [place]  [phone] [place]           [1]    atorvastatin  80 mg Oral Nightly    clopidogrel  75 mg Oral Daily    aspirin  81 mg Oral Daily    predniSONE  40 mg Oral Daily with breakfast    furosemide  40 mg Oral Daily    vancomycin  125 mg Oral Daily    pantoprazole  40 mg Oral QAM AC    heparin  5,000 Units Subcutaneous 2 times per day    insulin aspart  1-7 Units Subcutaneous TID CC    ferrous sulfate  325 mg Oral Daily with breakfast    docusate sodium  100 mg Oral BID   [2]

## 2025-04-09 NOTE — PLAN OF CARE
Patient alert and oriented on 4L oxygen this morning. Call light in reach and no needs noted at this time. IVF continued.   Problem: Patient Centered Care  Goal: Patient preferences are identified and integrated in the patient's plan of care  Description: Interventions:- What would you like us to know as we care for you? - Provide timely, complete, and accurate information to patient/family- Incorporate patient and family knowledge, values, beliefs, and cultural backgrounds into the planning and delivery of care- Encourage patient/family to participate in care and decision-making at the level they choose- Honor patient and family perspectives and choices  Outcome: Progressing     Problem: Patient/Family Goals  Goal: Patient/Family Long Term Goal  Description: Patient's Long Term Goal: to go home Interventions:- Monitor vital signs and labs  - Administer medications per order  - Follow MD orders  - Fall precautions  - Diagnostics as ordered  - Update / inform patient on plan of care  - Discharge planning- See additional Care Plan goals for specific interventions  Outcome: Progressing  Goal: Patient/Family Short Term Goal  Description: Patient's Short Term Goal: to feel better Interventions: -  See additional Care Plan goals for specific interventionsMonitor vital signs and labs  - Administer medications per order  - Follow MD orders  - Fall precautions  - Diagnostics as ordered  - Update / inform patient on plan of care  - Discharge planning  Outcome: Progressing     Problem: PAIN - ADULT  Goal: Verbalizes/displays adequate comfort level or patient's stated pain goal  Description: INTERVENTIONS:- Encourage pt to monitor pain and request assistance- Assess pain using appropriate pain scale- Administer analgesics based on type and severity of pain and evaluate response- Implement non-pharmacological measures as appropriate and evaluate response- Consider cultural and social influences on pain and pain management-  Manage/alleviate anxiety- Utilize distraction and/or relaxation techniques- Monitor for opioid side effects- Notify MD/LIP if interventions unsuccessful or patient reports new pain- Anticipate increased pain with activity and pre-medicate as appropriate  Outcome: Progressing     Problem: RISK FOR INFECTION - ADULT  Goal: Absence of fever/infection during anticipated neutropenic period  Description: INTERVENTIONS- Monitor WBC- Administer growth factors as ordered- Implement neutropenic guidelines  Outcome: Progressing     Problem: SAFETY ADULT - FALL  Goal: Free from fall injury  Description: INTERVENTIONS:- Assess pt frequently for physical needs- Identify cognitive and physical deficits and behaviors that affect risk of falls.- [place] fall precautions as indicated by assessment.- Educate pt/family on patient safety including physical limitations- Instruct pt to call for assistance with activity based on assessment- Modify environment to reduce risk of injury- Provide assistive devices as appropriate- Consider OT/PT consult to assist with strengthening/mobility- Encourage toileting schedule  Outcome: Progressing     Problem: DISCHARGE PLANNING  Goal: Discharge to home or other facility with appropriate resources  Description: INTERVENTIONS:- Identify barriers to discharge w/pt and caregiver- Include patient/family/discharge partner in discharge planning- Arrange for needed discharge resources and transportation as appropriate- Identify discharge learning needs (meds, wound care, etc)- Arrange for interpreters to assist at discharge as needed- Consider post-discharge preferences of patient/family/discharge partner- Complete POLST form as appropriate- Assess patient's ability to be responsible for managing their own health- Refer to Case Management Department for coordinating discharge planning if the patient needs post-hospital services based on physician/LIP order or complex needs related to functional status,  cognitive ability or social support system  Outcome: Progressing     Problem: RESPIRATORY - ADULT  Goal: Achieves optimal ventilation and oxygenation  Description: INTERVENTIONS:- Assess for changes in respiratory status- Assess for changes in mentation and behavior- Position to facilitate oxygenation and minimize respiratory effort- Oxygen supplementation based on oxygen saturation or ABGs- Provide Smoking Cessation handout, if applicable- Encourage broncho-pulmonary hygiene including cough, deep breathe, Incentive Spirometry- Assess the need for suctioning and perform as needed- Assess and instruct to report SOB or any respiratory difficulty- Respiratory Therapy support as indicated- Manage/alleviate anxiety- Monitor for signs/symptoms of CO2 retention  Outcome: Progressing     Problem: MUSCULOSKELETAL - ADULT  Goal: Return mobility to safest level of function  Description: INTERVENTIONS:- Assess patient stability and activity tolerance for standing, transferring and ambulating w/ or w/o assistive devices- Assist with transfers and ambulation using safe patient handling equipment as needed- Ensure adequate protection for wounds/incisions during mobilization- Obtain PT/OT consults as needed- Advance activity as appropriate- Communicate ordered activity level and limitations with patient/family  Outcome: Progressing  Goal: Maintain proper alignment of affected body part  Description: INTERVENTIONS:- Support and protect limb and body alignment per provider's orders- Instruct and reinforce with patient and family use of appropriate assistive device and precautions (e.g. spinal or hip dislocation precautions)  Outcome: Progressing     Problem: Diabetes/Glucose Control  Goal: Glucose maintained within prescribed range  Description: INTERVENTIONS:- Monitor Blood Glucose as ordered- Assess for signs and symptoms of hyperglycemia and hypoglycemia- Administer ordered medications to maintain glucose within target range-  Assess barriers to adequate nutritional intake and initiate nutrition consult as needed- Instruct patient on self management of diabetes  Outcome: Progressing

## 2025-04-09 NOTE — PROGRESS NOTES
[facility]  part of [facility]     Progress Note    Hoda Busby Patient Status:  Inpatient    1940 MRN [id number]   Location [facility]5W Attending Malathi Stuart MD   Hosp Day # 14 PCP Malathi Stuart MD       Subjective:   Patient seen and examined.  Ongoing dyspnea palpation especially with exertion.    Objective:   Blood pressure 103/47, pulse 93, temperature 98.6 °F (37 °C), temperature source Oral, resp. rate 18, height 5' 4\" (1.626 m), weight 171 lb (77.6 kg), SpO2 91%.  Intake/Output:   Last 3 shifts: I/O last 3 completed shifts:  In: 1319.2 [P.O.:970; I.V.:349.2]  Out: 300 [Urine:300]   This shift: I/O this shift:  In: 300 [P.O.:300]  Out: -      Vent Settings:      Hemodynamic parameters (last 24 hours):      Scheduled Meds:   Current Facility-Administered Medications   Medication Dose Route Frequency    sodium chloride (Saline Mist) 0.65 % nasal solution 1 spray  1 spray Each Nare Q3H PRN    atorvastatin (Lipitor) tab 80 mg  80 mg Oral Nightly    clopidogrel (Plavix) tab 75 mg  75 mg Oral Daily    aspirin DR tab 81 mg  81 mg Oral Daily    predniSONE (Deltasone) tab 40 mg  40 mg Oral Daily with breakfast    acetaminophen (Tylenol) tab 650 mg  650 mg Oral Q6H PRN    furosemide (Lasix) tab 40 mg  40 mg Oral Daily    polyethylene glycol (PEG 3350) (Miralax) 17 g oral packet 17 g  17 g Oral Daily PRN    guaiFENesin (Robitussin) 100 MG/5 ML oral liquid 200 mg  200 mg Oral Q4H PRN    benzocaine-menthol (Cepacol) lozenge 1 lozenge  1 lozenge Oral PRN    vancomycin (Vancocin) cap 125 mg  125 mg Oral Daily    pantoprazole (Protonix) DR tab 40 mg  40 mg Oral QAM AC    traMADol (Ultram) tab 50 mg  50 mg Oral Q6H PRN    ipratropium-albuterol (Duoneb) 0.5-2.5 (3) MG/3ML inhalation solution 3 mL  3 mL Nebulization Q6H PRN    heparin (Porcine) 5000 UNIT/ML injection 5,000 Units  5,000 Units Subcutaneous 2 times per day    glucose (Dex4) 15 GM/59ML oral liquid 15 g  15 g Oral  Q15 Min PRN    Or    glucose (Glutose) 40% oral gel 15 g  15 g Oral Q15 Min PRN    Or    glucose-vitamin C (Dex-4) chewable tab 4 tablet  4 tablet Oral Q15 Min PRN    Or    dextrose 50% injection 50 mL  50 mL Intravenous Q15 Min PRN    Or    glucose (Dex4) 15 GM/59ML oral liquid 30 g  30 g Oral Q15 Min PRN    Or    glucose (Glutose) 40% oral gel 30 g  30 g Oral Q15 Min PRN    Or    glucose-vitamin C (Dex-4) chewable tab 8 tablet  8 tablet Oral Q15 Min PRN    insulin aspart (NovoLOG) 100 Units/mL FlexPen 1-7 Units  1-7 Units Subcutaneous TID CC    ferrous sulfate DR tab 325 mg  325 mg Oral Daily with breakfast    docusate sodium (Colace) cap 100 mg  100 mg Oral BID       Continuous Infusions:     Physical Exam  Constitutional: no acute distress  Eyes: PERRL  ENT: nares pateint  Neck: supple, no JVD  Cardio: RRR, S1 S2  Respiratory: Basilar crackles  GI: abdomen soft, non tender, active bowel sounds, no organomegaly  Extremities: no clubbing, cyanosis, + trace lower extremity edema   Neurologic: no gross motor deficits  Skin: warm, dry      Results:     Lab Results   Component Value Date    WBC 17.5 04/09/2025    HGB 8.9 04/09/2025    HCT 29.7 04/09/2025    .0 04/09/2025    CREATSERUM 0.97 04/09/2025    BUN 26 04/09/2025     04/09/2025    K 4.4 04/09/2025     04/09/2025    CO2 23.0 04/09/2025    GLU 80 04/09/2025    CA 8.2 04/09/2025    DDIMER 1.19 04/09/2025         XR CHEST AP PORTABLE  (CPT=71045)  Result Date: 4/9/2025  CONCLUSION:   Stable diffuse bilateral interstitial opacity.    Dictated by (CST): Arash Rueda MD on 4/09/2025 at 9:37 AM     Finalized by (CST): Arash Rueda MD on 4/09/2025 at 9:38 AM                  Assessment   1.  Acute hypoxemic respiratory failure  2.  ILD  3.  Small pleural effusions  4.  Pulmonary edema  5.  Colon adenocarcinoma status post hemicolectomy  6.  Coronary artery disease  7.  Prior history of GI bleed  8.  Prior VTE  9.  History of severe aortic  stenosis status post TAVR  10.  Hypertension     Plan   -Patient presents with progressive worsening dyspnea and acute hypoxemic respiratory failure.  -CT chest on presentation on 3/26/2025 with no evidence of acute pulmonary embolism seen.  Evidence of small pleural effusions with groundglass opacities suggestive more likely of edema with worsening compared to 6 days prior from CT chest.  Some background of fibrotic changes present.  -Repeat CT high-resolution chest on 4/4/2025 with improvement in pulmonary edema and pleural effusions.  Underlying ILD changes present.  -Gradually wean steroid therapy.  Prednisone 40 mg  -Diuresis as need be.  -Prior history of underlying ILD with hospitalization October 2024 responded to tapering steroid therapy at the time.  -Pulmonary function testing from February 2025 with mild restriction seen with significant decrease in diffusion capacity.  -Wean oxygen as tolerated.  Admits to ongoing dyspnea currently on 5 L oxygen at rest and still desaturating with exertion.  Discussed with primary care physician.  D-dimer ordered which is elevated.  Will obtain CT PE to further evaluate.  -Completed course of antibiotic therapy with Augmentin.  -Status post PCI of mLAD on 4/7/2025  -DVT prophylaxis: Heparin      Cody Holloway DO  Pulmonary Critical Care Medicine  [facility]

## 2025-04-09 NOTE — CM/SW NOTE
07:45AM  Per chart, PT note entered after SW left for the day. Per note, Anticipated therapy need: Home with Home Healthcare vs Gradual Rehab (due to O2 needs).    Pt's ADOLFO can accommodate home O2 - that would not be a reason for pt to DC to [facility] when cleared. If O2 need is indicated prior to DC, SW/CM to arrange.    SW sent HH referrals in Aidin. F2F entered/sent.  Per chart, pt has hx w/ [facility].    SW to f/up w/ pt as appropriate and as schedule permits to discuss HH.    09:45AM  SW met w/ pt at bedside and discussed HH services. Pt is agreeable and confirmed hx w/ HH previously - she can not recall the agency name.    Pt agreed to review the HH list of quality data provided for choice prior to DC.    SW also dicussed Home O2 pending needs. All questions addressed/answered.    Pt remains on 4L Oxygen. SW to monitor for needs closer to DC.    Documentation for O2 sats: (RN to add to progress note)  Patient's O2 sat on room air is ____% at rest. Pt's O2 sat on room is ____% when ambulating, and ____% on ____ liter while ambulating.     (Reminder: The \"at rest\" and \"while ambulating\" are required statements. If patient is non ambulatory you can use \"with exertion\" such as during a transfer to assess their O2 level)    MD to document AFTER O2 sats (if needed):  I had a face to face visit with this patient and I evaluated the patient's O2 saturations.  The patient is mobile in their home and is in need of a portable oxygen tank.  The home oxygen will improve the patient's condition.        Once O2 sats and MD verbiage are completed and IF home O2 is indicated, SW to place KEYSHAWN in [facility] and request MD to cosign as appropriate. (Dx: CHF/COPD/Pleural Effusion)      PLAN: ADOLFO w/ poss HH & poss HME O2 - pending choice, O2 needs, & med clear      SW/CM to remain available for support and/or discharge planning.       Dori, MSW, LSW [id number]

## 2025-04-09 NOTE — PROGRESS NOTES
[facility]  part of [facility]    Progress Note    Hoda Busby Patient Status:  Inpatient    1940 MRN [id number]   Location [facility] 3W/SW Attending Malathi Stuart MD   Hosp Day # 14 PCP Malathi Stuart MD       SUBJECTIVE:  Pt notes increased POPE starting yesterday.  No increase cough.  No SOB at rest    OBJECTIVE:  Vital signs in last 24 hours:  /47 (BP Location: Right arm)   Pulse 93   Temp 98.6 °F (37 °C) (Oral)   Resp 18   Ht 5' 4\" (1.626 m)   Wt 171 lb (77.6 kg)   SpO2 91%   BMI 29.35 kg/m²     Intake/Output:    Intake/Output Summary (Last 24 hours) at 2025 0855  Last data filed at 2025 0539  Gross per 24 hour   Intake 640 ml   Output --   Net 640 ml       Wt Readings from Last 3 Encounters:   25 171 lb (77.6 kg)   25 180 lb (81.6 kg)   25 173 lb (78.5 kg)       EXAM:  GENERAL: well developed, well nourished, in no apparent distress  LUNGS: clear to auscultation  CARDIO: RRR, normal S1S2, without murmur   EXTREMITIES: trace b/l LE edema    Data Review:     Labs:   Lab Results   Component Value Date    WBC 17.5 2025    HGB 8.9 2025    HCT 29.7 2025    .0 2025    CREATSERUM 0.97 2025    BUN 26 2025     2025    K 4.4 2025     2025    CO2 23.0 2025    GLU 80 2025    CA 8.2 2025         Imaging:  No results found.   CARD ECHO 2D DOPPLER (CPT=93306)  Result Date: 3/27/2025  Transthoracic Echocardiogram Name:Hoda Busby Date: 2025 :  1940 Ht:  (64in)  BP: 137 / 81 MRN:  [id number]    Age:  84years    Wt:  (187lb) HR: 75bpm Loc:  [facility]       Gndr: F          BSA: 1.9m^2 Sonographer: Casper JOHNSON Ordering:    Malathi Stuart Consulting:  Martita Baez ---------------------------------------------------------------------------- History/Indications:   Congestive heart failure.  Risk factors: Hypertension. TAVR-23 mm Martha  3 Resilia bioprosthesis. Acute hypoxic respiratory failure. ---------------------------------------------------------------------------- Procedure information:  A transthoracic complete 2D study was performed. Additional evaluation included M-mode, complete spectral Doppler, and color Doppler.  Patient status:  Inpatient.  Location:  Bedside.    Comparison was made to the study of 02/05/2025.    This was a routine study. Transthoracic echocardiography for diagnosis and ventricular function evaluation. Image quality was adequate. ECG rhythm:   Normal sinus ---------------------------------------------------------------------------- Conclusions: 1. Left ventricle: The cavity size was normal. Wall thickness was normal.    Systolic function was normal. The estimated ejection fraction was 60-65%.    No diagnostic evidence for regional wall motion abnormalities. Doppler    parameters are consistent with abnormal left ventricular relaxation -    grade 1 diastolic dysfunction. 2. Right ventricle: The cavity size was increased. Systolic function was    normal. 3. Left atrium: The atrium was mildly to moderately dilated. The left atrial    volume was mildly to moderately increased. 4. Aortic valve: A bioprosthetic valve is present. Mcnamara PUNEET S3 23mm    (TAVR) bioprosthesis was present. The peak systolic velocity was    2.69m/sec. The mean systolic gradient was 15mm Hg. The valve area (VTI)    was 1.52cm^2. 5. Pulmonary arteries: Systolic pressure was moderately increased, estimated    to be 52mm Hg. * ---------------------------------------------------------------------------- * Findings: Left ventricle:  The cavity size was normal. Wall thickness was normal. Systolic function was normal. The estimated ejection fraction was 60-65%. No diagnostic evidence for regional wall motion abnormalities. Doppler parameters are consistent with abnormal left ventricular relaxation - grade 1 diastolic dysfunction. Left atrium:  The  atrium was mildly to moderately dilated. The left atrial volume was mildly to moderately increased. Right ventricle:  The cavity size was increased. Systolic function was normal. Right atrium:  Well visualized. The atrium was normal in size. Mitral valve:  The annulus was moderately calcified. The leaflets were mildly thickened and mildly to moderately calcified. Leaflet separation was normal.  Doppler:  Transvalvular velocity was within the normal range. There was no evidence for stenosis. There was no significant regurgitation. Aortic valve:  A bioprosthetic valve is present. Mcnamara PUNEET S3 23mm (TAVR) bioprosthesis was present. Cusp separation was normal.  Doppler: Transvalvular velocity was within the normal range. There was no evidence for stenosis. There was no significant regurgitation.    The valve area (VTI) was 1.52cm^2. The valve area (VTI) index was 0.8cm^2/m^2.    The mean systolic gradient was 15mm Hg. The peak systolic gradient was 29mm Hg. Tricuspid valve:  The valve is structurally normal. Leaflet separation was normal.  Doppler:  Transvalvular velocity was within the normal range. There was no evidence for stenosis. There was mild regurgitation. Pulmonic valve:   The valve is structurally normal. Cusp separation was normal.  Doppler:  Transvalvular velocity was within the normal range. There was no evidence for stenosis. There was no significant regurgitation. Pericardium:   There was no pericardial effusion. Aorta: Aortic root: The aortic root was normal. Ascending aorta: The ascending aorta was normal. Pulmonary arteries: Systolic pressure was moderately increased, estimated to be 52mm Hg. Systemic veins:  Central venous respirophasic diameter changes are blunted (< 50%). Inferior vena cava: The IVC was dilated. ---------------------------------------------------------------------------- Measurements  Left ventricle       Value          Ref       01/24/2025  IVS thickness,   (H) 1.0   cm        0.6 - 0.9 1.0  ED, PLAX  LV ID, ED, PLAX  (H) 5.3   cm       3.8 - 5.2 5.0  LV ID, ES, PLAX      3.5   cm       2.2 - 3.5 3.1  LV PW thickness, (H) 1.0   cm       0.6 - 0.9 1.0  ED, PLAX  IVS/LV PW ratio,     1.00           --------- 1.02  ED, PLAX  LV PW/LV ID          0.19           --------- 0.2  ratio, ED, PLAX  LV area, ES, A4C     17.5  cm^2     --------- ----------  LV ejection          62    %        54 - 74   67  fraction  Stroke               45    ml/m^2   --------- 53  volume/bsa, 2D  LV end-diastolic     126   ml       48 - 140  ----------  volume, 1-p A4C  LV end-systolic      49    ml       12 - 60   ----------  volume, 1-p A4C  LV ejection          63    %        46 - 78   ----------  fraction, 1-p  A4C  Stroke volume,       79    ml       --------- ----------  1-p A4C  LV end-diastolic     66    ml/m^2   30 - 82   ----------  volume/bsa, 1-p  A4C  LV end-systolic      26    ml/m^2   7 - 35    ----------  volume/bsa, 1-p  A4C  Stroke               42    ml/m^2   --------- ----------  volume/bsa, 1-p  A4C  LV end-diastolic (H) 113   ml       46 - 106  ----------  volume, 2-p  LV end-systolic      42    ml       14 - 42   ----------  volume, 2-p  LV ejection          63    %        54 - 74   ----------  fraction, 2-p  Stroke volume,       72    ml       --------- ----------  2-p  LV end-diastolic     59    ml/m^2   29 - 61   ----------  volume/bsa, 2-p  LV end-systolic      22    ml/m^2   8 - 24    ----------  volume/bsa, 2-p  Stroke               37.6  ml/m^2   --------- ----------  volume/bsa, 2-p  LV e', lateral   (L) 4.9   cm/sec   >=10.0    ----------  LV E/e', lateral (H) 19             <=13      ----------  LV e', medial    (L) 3.2   cm/sec   >=7.0     ----------  LV E/e', medial      30             --------- ----------  LV e', average       4.0   cm/sec   --------- ----------  LV E/e', average (H) 23             <=14      ----------  LVOT                 Value          Ref       01/24/2025   LVOT ID              1.9   cm       --------- 2.2  LVOT peak            1.29  m/sec    --------- 1.31  velocity, S  LVOT VTI, S          29.9  cm       --------- 27.6  LVOT peak            7     mm Hg    --------- 7  gradient, S  LVOT mean            4     mm Hg    --------- 3  gradient, S  Stroke volume        85    ml       --------- 105  (SV), LVOT DP  Stroke index         45    ml/m^2   --------- 53  (SV/bsa), LVOT  DP  Aortic valve         Value          Ref       01/24/2025  Aortic valve         2.69  m/sec    --------- 2.4  peak velocity, S  Aortic valve         55.7  cm       --------- 51.6  VTI, S  Aortic mean          15    mm Hg    --------- 12  gradient, S  Aortic peak          29    mm Hg    --------- 23  gradient, S  Aortic valve         1.52  cm^2     --------- 2.03  area, VTI  Aortic valve         0.8   cm^2/m^2 --------- 1.03  area/bsa, VTI  Velocity ratio,      0.48           --------- 0.55  peak, LVOT/AV  Ascending aorta      Value          Ref       01/24/2025  Ascending aorta      3.5   cm       1.9 - 3.5 ----------  ID  Left atrium          Value          Ref       01/24/2025  LA ID, A-P, ES       3.3   cm       2.7 - 3.8 4.0  LA volume/bsa, S (H) 39    ml/m^2   16 - 34   56  LA volume, ES,   (H) 74    ml       22 - 52   101  1-p A4C  Mitral valve         Value          Ref       01/24/2025  Mitral E-wave        0.94  m/sec    --------- ----------  peak velocity  Mitral A-wave        1.24  m/sec    --------- ----------  peak velocity  Mitral               190   ms       --------- ----------  deceleration  time  Mitral peak          4     mm Hg    --------- ----------  gradient, D  Mitral E/A           0.8            --------- ----------  ratio, peak  Pulmonary artery     Value          Ref       01/24/2025  PA pressure, S,      52    mm Hg    --------- ----------  DP  Tricuspid valve      Value          Ref       01/24/2025  Tricuspid regurg (H) 3.06  m/sec    <=2.8     ----------  peak  velocity  Tricuspid peak       37    mm Hg    --------- ----------  RV-RA gradient  Right atrium         Value          Ref       01/24/2025  RA ID, S-I, ES,  (H) 6.3   cm       3.4 - 5.3 ----------  A4C  RA ID/bsa, S-I,  (H) 3.3   cm/m^2   1.9 - 3.1 ----------  ES, A4C  RA area, ES, A4C     17    cm^2     10 - 18   ----------  RA volume, ES,       38    ml       --------- ----------  1-p A4C  RA volume/bsa,       20    ml/m^2   9 - 33    ----------  ES, 1-p A4C  Systemic veins       Value          Ref       01/24/2025  Estimated CVP        15    mm Hg    --------- ----------  Inferior vena        Value          Ref       01/24/2025  cava  ID               (H) 2.7   cm       <=2.1     ----------  Right ventricle      Value          Ref       01/24/2025  TAPSE, 2D            2.74  cm       >=1.70    ----------  TAPSE, MM            2.74  cm       >=1.70    ----------  RV pressure, S,      52    mm Hg    --------- ----------  DP  RV s', lateral       14.7  cm/sec   >=9.5     ---------- Legend: (L)  and  (H)  migel values outside specified reference range. ---------------------------------------------------------------------------- Prepared and electronically signed by Bran Salcedo 03/27/2025 16:38          Meds:   Current Hospital Medications[1]    Assessment & Plan    Acute hypoxic respiratory failure (HCC)  -multifactorial: acute HFpEF, recurrent interstitial pneumonitis, CAP  -CT chest in ED 3/26/25 - no PE; worsening of pleural effusions and interstitial edema  -required BiPAP transiently; was up to 15L O2  -procalc sl elevated 0.25; WBC 19.8 K on admission  -echo 2/5/25 - normal LV systolic function (EF 60-65%); grade 2 diastolic dysfunction; normally functioning bioprosthetic TAVR; sl incr PAP 44mmHg  -repeat echo 3/27/25 stable from 2/2025 except incr PAP to 52mmHg  -hi res CT chest 4/4/25 --pulmonary edema and bilateral pleural effusions significantly improved.  There is persistent trace right pleural  effusion.  Advanced interstitial lung disease is noted in an NSIP (nonspecific insterstitial pneumonitis) pattern.   -overall improved with IV solumedrol, IV lasix, and antibiotics (ceftriaxone/azithro, then augmentin)  -s/p ceftriaxone/azithro then augmentin (stopped 4/7)  -IV lasix changed to po lasix - currently 40mg/day (for HFpEF)  -IV solumedrol changed to prednisone - currently 40mg/day per pulm (for NSIP)  -still requiring O2 3L NC --again dropped yesterday to 80% on 5L NC while walking to the bathroom  -pt notes increased exertional dyspnea since yesterday; prior to that, she had been feeling better; no SOB at rest  -unclear why pt continues to have such profound hypoxia despite abx, diuresis, and steroids  -d/w Dr. Holloway; will check D-dimer -- if elevated, will do CT chest w/contrast r/o PE (hx of PE in 10/2024)     Hypotension  -BP improved today  -lasix held x 1 yesterday  -will stop IVF's (0.9NS at 50cc/hr)  -cortisol level pending     Coronary artery disease  -Cath 1/14/25 by Dr. Bernardo Liz (as w/u for TAVR) -- RCA non-obstructive; LM free of obst disease; LM plaque extending into ostium of LAD (20-30%); mid LAD (80-90%), cx ostium (60-70%)  -intervention delayed until after colon resection due to need for DAPT x 6 mos after stenting   -s/p PCI/JUVENTINO of mid LAD (70%>0%) on 4/7/25 by Dr. Dutton  -cont ASA, Plavix  -increased atorvastatin from 20mg to 80mg/day (LDL 76 on 3/26/25)  -further recs per cardiology     Interstitial pneumonitis/NSIP  -dx'ed 10/2024 - presented with cough and severe hypoxia  -unresponsive to abx   -CXR 10/24/24 extensive bilateral perihilar and bilateral upper and lower lobe   -S.pneumo, legionella Ag , mycoplasma IgM/G, Fungitell-beta-D glucan negative  -ANCA and URIEL/connective tissue disease panels negative  -anti-histone and anti-Keara Ab's negative  -CT with bilateral ground glass opacities, small consolidations of BLL  -indings suspicious for NSIP (vs cryptogenic organizing  pneumonia vs hypersensitivity pneumonitis); NOT thought to be c/w UIP pattern  -s/p empiric steroids (solumedrol then prednisone taper) 10/25/24 - (EOT 1/18/25)  -dyspnea and hypoxia completely resolved until this admission 3/26/25  -back on steroids (IV solumedrol, now prednisone 40mg/day) - taper per pulm     Epistaxis  -mild; from left nostril  -saline nasal spray; O2 already humidified     Adenocarcinoma of colon  -Bx of transverse colon polyp 12/17/24 -- invasive, moderately differentiated adenocarcinoma  -CEA normal (1.9)  -CT a/p neg for mets  -surgery delayed in anticipation of TAVR (done 1/23/25)  -s/p robotic assisted left hemicolectomy (Dr. Cassidy) - path - tumor invades into muscularis; margins neg for tumor; all 19 LN's neg for mets (0/19); pT2, pN0  -next appt with Dr. Cassidy 4/16/25     Hx  GI bleed  -EGD 11/26/24 (Dr. Nuno) -15mm nonbleeding gastric antral ulcer; 3mm gastric antral AVM s/p APC  -recurrence in 12/2024  -colonoscopy and repeat EGD 12/17/24 by Dr. Staton -- grade 1 esophagitis; duod ulcers healing; large flat polyp in transverse colon (carcinoma)   -received total of 3 units PRBCs   -Xarelto stopped in 12/2024  -s/p omeprazole 20mg BID x 8 weeks (EOT 1/22/25), now on omeprazole 20mg daily -- plan has been to continue until she completed her coronary stenting.  Will stop at discharge.      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   -MRI brain--no acute intracranial process  -was doing  PT at [facility] -- continues to improve; dorsiflexion left foot 4/5; plantarflexion 5/5 this admission (3/26/25)     Hx of Bilateral pulmonary emboli (10/2024)  -dx'ed at time of interstitial pneumonitis  -CT chest 10/25/24 -- acute distal segmental/subsegmental pulmonary emboli in [place] and subsegmental PE in [place]  -unclear etiology; no recent hx of long travel; no family/personal hx of blood clots  -BLE venous dopplers negative 10/26/24  -started xarelto 20 mg po every day on 11/28/24  -repeat CT chest 12/9/24 neg PE  -stopped Xarelto 12/14/24 due to recurrent GI bleed  -repeat CT chest 3/26/25 -- neg PE     Severe aortic stenosis  -progression on serial echocardiograms  -s/p TAVR 1/23/25 (Dr. Reji Green)  -symptomatically much improved     Hx of Hypertension, primary  -(dyazide stopped due to NANCY)  -(amlodipine held due to low BP in 11/2024)  -BP remains normal off meds     Hx of hip OA  -s/p left THR (Dr. Lo) many years ago  -s/p elective R BEATRIZ on 10/5/23 by Dr. Diaz     Hyperlipidemia   Pt with strong family hx of high cholesterol  Started on Atorvastatin 20mg/day in 1/2025 (after noted to have CAD on cath)  Increased atorvastatin to 40mg/day as above     Osteopenia   On Fosamax from 2011 to 4/2016.     DEXA stable 5/10/17 and 2019 and 2021  DEXA 8/2024 -- osteoporosis of left forearm  -restarted Fosamax 8/2024 (on hold this admission)     Vitamin D deficiency  On vitamin D 4000u/day      VTE proph  -SQH        Dispo  -pt has been residing at [facility] assisted living facility for past several months (for respite care and PT), and is scheduled to be there until July, after which she plans to return home  -PT gricelda appreciated - eventual home with RITIKA Stuart MD  4/9/2025  8:55 AM       [1]   Current Facility-Administered Medications   Medication Dose Route Frequency    sodium chloride (Saline Mist) 0.65 % nasal solution 1 spray  1 spray Each Nare  Q3H PRN    atorvastatin (Lipitor) tab 80 mg  80 mg Oral Nightly    clopidogrel (Plavix) tab 75 mg  75 mg Oral Daily    aspirin DR tab 81 mg  81 mg Oral Daily    sodium chloride 0.9% infusion   Intravenous Continuous    predniSONE (Deltasone) tab 40 mg  40 mg Oral Daily with breakfast    acetaminophen (Tylenol) tab 650 mg  650 mg Oral Q6H PRN    furosemide (Lasix) tab 40 mg  40 mg Oral Daily    polyethylene glycol (PEG 3350) (Miralax) 17 g oral packet 17 g  17 g Oral Daily PRN    guaiFENesin (Robitussin) 100 MG/5 ML oral liquid 200 mg  200 mg Oral Q4H PRN    benzocaine-menthol (Cepacol) lozenge 1 lozenge  1 lozenge Oral PRN    vancomycin (Vancocin) cap 125 mg  125 mg Oral Daily    pantoprazole (Protonix) DR tab 40 mg  40 mg Oral QAM AC    traMADol (Ultram) tab 50 mg  50 mg Oral Q6H PRN    ipratropium-albuterol (Duoneb) 0.5-2.5 (3) MG/3ML inhalation solution 3 mL  3 mL Nebulization Q6H PRN    heparin (Porcine) 5000 UNIT/ML injection 5,000 Units  5,000 Units Subcutaneous 2 times per day    glucose (Dex4) 15 GM/59ML oral liquid 15 g  15 g Oral Q15 Min PRN    Or    glucose (Glutose) 40% oral gel 15 g  15 g Oral Q15 Min PRN    Or    glucose-vitamin C (Dex-4) chewable tab 4 tablet  4 tablet Oral Q15 Min PRN    Or    dextrose 50% injection 50 mL  50 mL Intravenous Q15 Min PRN    Or    glucose (Dex4) 15 GM/59ML oral liquid 30 g  30 g Oral Q15 Min PRN    Or    glucose (Glutose) 40% oral gel 30 g  30 g Oral Q15 Min PRN    Or    glucose-vitamin C (Dex-4) chewable tab 8 tablet  8 tablet Oral Q15 Min PRN    insulin aspart (NovoLOG) 100 Units/mL FlexPen 1-7 Units  1-7 Units Subcutaneous TID CC    ferrous sulfate DR tab 325 mg  325 mg Oral Daily with breakfast    docusate sodium (Colace) cap 100 mg  100 mg Oral BID

## 2025-04-09 NOTE — PLAN OF CARE
Patient had an uneventful shift.   Problem: Patient Centered Care  Goal: Patient preferences are identified and integrated in the patient's plan of care  Description: Interventions:- What would you like us to know as we care for you? - Provide timely, complete, and accurate information to patient/family- Incorporate patient and family knowledge, values, beliefs, and cultural backgrounds into the planning and delivery of care- Encourage patient/family to participate in care and decision-making at the level they choose- Honor patient and family perspectives and choices  Outcome: Progressing     Problem: Patient/Family Goals  Goal: Patient/Family Long Term Goal  Description: Patient's Long Term Goal: to go home Interventions:- Monitor vital signs and labs  - Administer medications per order  - Follow MD orders  - Fall precautions  - Diagnostics as ordered  - Update / inform patient on plan of care  - Discharge planning- See additional Care Plan goals for specific interventions  Outcome: Progressing  Goal: Patient/Family Short Term Goal  Description: Patient's Short Term Goal: to feel better Interventions: -  See additional Care Plan goals for specific interventionsMonitor vital signs and labs  - Administer medications per order  - Follow MD orders  - Fall precautions  - Diagnostics as ordered  - Update / inform patient on plan of care  - Discharge planning  Outcome: Progressing     Problem: PAIN - ADULT  Goal: Verbalizes/displays adequate comfort level or patient's stated pain goal  Description: INTERVENTIONS:- Encourage pt to monitor pain and request assistance- Assess pain using appropriate pain scale- Administer analgesics based on type and severity of pain and evaluate response- Implement non-pharmacological measures as appropriate and evaluate response- Consider cultural and social influences on pain and pain management- Manage/alleviate anxiety- Utilize distraction and/or relaxation techniques- Monitor for opioid  side effects- Notify MD/LIP if interventions unsuccessful or patient reports new pain- Anticipate increased pain with activity and pre-medicate as appropriate  Outcome: Progressing     Problem: RISK FOR INFECTION - ADULT  Goal: Absence of fever/infection during anticipated neutropenic period  Description: INTERVENTIONS- Monitor WBC- Administer growth factors as ordered- Implement neutropenic guidelines  Outcome: Progressing     Problem: SAFETY ADULT - FALL  Goal: Free from fall injury  Description: INTERVENTIONS:- Assess pt frequently for physical needs- Identify cognitive and physical deficits and behaviors that affect risk of falls.- [place] fall precautions as indicated by assessment.- Educate pt/family on patient safety including physical limitations- Instruct pt to call for assistance with activity based on assessment- Modify environment to reduce risk of injury- Provide assistive devices as appropriate- Consider OT/PT consult to assist with strengthening/mobility- Encourage toileting schedule  Outcome: Progressing     Problem: DISCHARGE PLANNING  Goal: Discharge to home or other facility with appropriate resources  Description: INTERVENTIONS:- Identify barriers to discharge w/pt and caregiver- Include patient/family/discharge partner in discharge planning- Arrange for needed discharge resources and transportation as appropriate- Identify discharge learning needs (meds, wound care, etc)- Arrange for interpreters to assist at discharge as needed- Consider post-discharge preferences of patient/family/discharge partner- Complete POLST form as appropriate- Assess patient's ability to be responsible for managing their own health- Refer to Case Management Department for coordinating discharge planning if the patient needs post-hospital services based on physician/LIP order or complex needs related to functional status, cognitive ability or social support system  Outcome: Progressing

## 2025-04-10 NOTE — PROGRESS NOTES
[facility]  part of [facility]    Progress Note    Hoda Busby Patient Status:  Inpatient    1940 MRN [id number]   Location [facility] 3W/SW Attending Malathi Stuart MD   Hosp Day # 15 PCP Malathi Stuart MD       SUBJECTIVE:  Still dyspneic after minimal exertion (stood up with PT this am)    OBJECTIVE:  Vital signs in last 24 hours:  /53   Pulse 84   Temp 97.8 °F (36.6 °C) (Oral)   Resp 18   Ht 5' 4\" (1.626 m)   Wt 179 lb 6.4 oz (81.4 kg)   SpO2 90%   BMI 30.79 kg/m²     Intake/Output:    Intake/Output Summary (Last 24 hours) at 4/10/2025 1030  Last data filed at 4/10/2025 0928  Gross per 24 hour   Intake 840 ml   Output --   Net 840 ml       Wt Readings from Last 3 Encounters:   04/10/25 179 lb 6.4 oz (81.4 kg)   25 180 lb (81.6 kg)   25 173 lb (78.5 kg)       EXAM:  GENERAL: well developed, well nourished, in no apparent distress  LUNGS: +coarse crackles b/l lower lung fields with slow inspiration; no wheezing  CARDIO: RRR, normal S1S2, 2/6 WADE  GI: soft, NT, ND, NABS  EXTREMITIES: no LE edema    Data Review:     Labs:   Lab Results   Component Value Date    WBC 17.9 04/10/2025    HGB 8.9 04/10/2025    HCT 27.9 04/10/2025    .0 04/10/2025    CREATSERUM 0.90 04/10/2025    BUN 22 04/10/2025     04/10/2025    K 4.0 04/10/2025     04/10/2025    CO2 23.0 04/10/2025    GLU 77 04/10/2025    CA 8.2 04/10/2025    MG 2.1 04/10/2025         Imaging:  CT CHEST PE AORTA (IV ONLY) (CPT=71260)  Result Date: 2025  CONCLUSION:  1. No evidence of acute pulmonary embolism to the level of the first order subsegmental pulmonary artery branches.  2. Peripheral and basilar predominant pulmonary interstitial fibrosis with suggestion of underlying possible nonspecific interstitial pneumonitis.  3. Small bilateral pleural effusions and associated basilar atelectasis, with or without concurrent superimposed pneumonia.  4. Possible mild pulmonary  interstitial edema.  5. Underlying centrilobular emphysema.  6. Ascending thoracic aortic ectasia.  7. Postprocedural changes of TAVR.  8. Lesser incidental findings as above.    elm-remote.   Dictated by (CST): Viktor Jennings MD on 2025 at 6:44 PM     Finalized by (CST): Viktor Jennings MD on 2025 at 6:55 PM          XR CHEST AP PORTABLE  (CPT=71045)  Result Date: 2025  CONCLUSION:   Stable diffuse bilateral interstitial opacity.    Dictated by (CST): Arash Rueda MD on 2025 at 9:37 AM     Finalized by (CST): Arash Rueda MD on 2025 at 9:38 AM           CARD ECHO 2D DOPPLER (CPT=93306)  Result Date: 3/27/2025  Transthoracic Echocardiogram Name:Hoda Busby Date: 2025 :  1940 Ht:  (64in)  BP: 137 / 81 MRN:  [id number]    Age:  84years    Wt:  (187lb) HR: 75bpm Loc:  [facility]       Gndr: F          BSA: 1.9m^2 Sonographer: Casper JOHNSON Ordering:    Malathi Stuart Consulting:  Martita Baez ---------------------------------------------------------------------------- History/Indications:   Congestive heart failure.  Risk factors: Hypertension. TAVR-23 mm Martha 3 Resilia bioprosthesis. Acute hypoxic respiratory failure. ---------------------------------------------------------------------------- Procedure information:  A transthoracic complete 2D study was performed. Additional evaluation included M-mode, complete spectral Doppler, and color Doppler.  Patient status:  Inpatient.  Location:  Bedside.    Comparison was made to the study of 2025.    This was a routine study. Transthoracic echocardiography for diagnosis and ventricular function evaluation. Image quality was adequate. ECG rhythm:   Normal sinus ---------------------------------------------------------------------------- Conclusions: 1. Left ventricle: The cavity size was normal. Wall thickness was normal.    Systolic function was normal. The estimated ejection fraction was 60-65%.    No diagnostic  evidence for regional wall motion abnormalities. Doppler    parameters are consistent with abnormal left ventricular relaxation -    grade 1 diastolic dysfunction. 2. Right ventricle: The cavity size was increased. Systolic function was    normal. 3. Left atrium: The atrium was mildly to moderately dilated. The left atrial    volume was mildly to moderately increased. 4. Aortic valve: A bioprosthetic valve is present. Mcnamara PUNEET S3 23mm    (TAVR) bioprosthesis was present. The peak systolic velocity was    2.69m/sec. The mean systolic gradient was 15mm Hg. The valve area (VTI)    was 1.52cm^2. 5. Pulmonary arteries: Systolic pressure was moderately increased, estimated    to be 52mm Hg. * ---------------------------------------------------------------------------- * Findings: Left ventricle:  The cavity size was normal. Wall thickness was normal. Systolic function was normal. The estimated ejection fraction was 60-65%. No diagnostic evidence for regional wall motion abnormalities. Doppler parameters are consistent with abnormal left ventricular relaxation - grade 1 diastolic dysfunction. Left atrium:  The atrium was mildly to moderately dilated. The left atrial volume was mildly to moderately increased. Right ventricle:  The cavity size was increased. Systolic function was normal. Right atrium:  Well visualized. The atrium was normal in size. Mitral valve:  The annulus was moderately calcified. The leaflets were mildly thickened and mildly to moderately calcified. Leaflet separation was normal.  Doppler:  Transvalvular velocity was within the normal range. There was no evidence for stenosis. There was no significant regurgitation. Aortic valve:  A bioprosthetic valve is present. Mcnamara PUNEET S3 23mm (TAVR) bioprosthesis was present. Cusp separation was normal.  Doppler: Transvalvular velocity was within the normal range. There was no evidence for stenosis. There was no significant regurgitation.    The valve  area (VTI) was 1.52cm^2. The valve area (VTI) index was 0.8cm^2/m^2.    The mean systolic gradient was 15mm Hg. The peak systolic gradient was 29mm Hg. Tricuspid valve:  The valve is structurally normal. Leaflet separation was normal.  Doppler:  Transvalvular velocity was within the normal range. There was no evidence for stenosis. There was mild regurgitation. Pulmonic valve:   The valve is structurally normal. Cusp separation was normal.  Doppler:  Transvalvular velocity was within the normal range. There was no evidence for stenosis. There was no significant regurgitation. Pericardium:   There was no pericardial effusion. Aorta: Aortic root: The aortic root was normal. Ascending aorta: The ascending aorta was normal. Pulmonary arteries: Systolic pressure was moderately increased, estimated to be 52mm Hg. Systemic veins:  Central venous respirophasic diameter changes are blunted (< 50%). Inferior vena cava: The IVC was dilated. ---------------------------------------------------------------------------- Measurements  Left ventricle       Value          Ref       01/24/2025  IVS thickness,   (H) 1.0   cm       0.6 - 0.9 1.0  ED, PLAX  LV ID, ED, PLAX  (H) 5.3   cm       3.8 - 5.2 5.0  LV ID, ES, PLAX      3.5   cm       2.2 - 3.5 3.1  LV PW thickness, (H) 1.0   cm       0.6 - 0.9 1.0  ED, PLAX  IVS/LV PW ratio,     1.00           --------- 1.02  ED, PLAX  LV PW/LV ID          0.19           --------- 0.2  ratio, ED, PLAX  LV area, ES, A4C     17.5  cm^2     --------- ----------  LV ejection          62    %        54 - 74   67  fraction  Stroke               45    ml/m^2   --------- 53  volume/bsa, 2D  LV end-diastolic     126   ml       48 - 140  ----------  volume, 1-p A4C  LV end-systolic      49    ml       12 - 60   ----------  volume, 1-p A4C  LV ejection          63    %        46 - 78   ----------  fraction, 1-p  A4C  Stroke volume,       79    ml       --------- ----------  1-p A4C  LV end-diastolic     66     ml/m^2   30 - 82   ----------  volume/bsa, 1-p  A4C  LV end-systolic      26    ml/m^2   7 - 35    ----------  volume/bsa, 1-p  A4C  Stroke               42    ml/m^2   --------- ----------  volume/bsa, 1-p  A4C  LV end-diastolic (H) 113   ml       46 - 106  ----------  volume, 2-p  LV end-systolic      42    ml       14 - 42   ----------  volume, 2-p  LV ejection          63    %        54 - 74   ----------  fraction, 2-p  Stroke volume,       72    ml       --------- ----------  2-p  LV end-diastolic     59    ml/m^2   29 - 61   ----------  volume/bsa, 2-p  LV end-systolic      22    ml/m^2   8 - 24    ----------  volume/bsa, 2-p  Stroke               37.6  ml/m^2   --------- ----------  volume/bsa, 2-p  LV e', lateral   (L) 4.9   cm/sec   >=10.0    ----------  LV E/e', lateral (H) 19             <=13      ----------  LV e', medial    (L) 3.2   cm/sec   >=7.0     ----------  LV E/e', medial      30             --------- ----------  LV e', average       4.0   cm/sec   --------- ----------  LV E/e', average (H) 23             <=14      ----------  LVOT                 Value          Ref       01/24/2025  LVOT ID              1.9   cm       --------- 2.2  LVOT peak            1.29  m/sec    --------- 1.31  velocity, S  LVOT VTI, S          29.9  cm       --------- 27.6  LVOT peak            7     mm Hg    --------- 7  gradient, S  LVOT mean            4     mm Hg    --------- 3  gradient, S  Stroke volume        85    ml       --------- 105  (SV), LVOT DP  Stroke index         45    ml/m^2   --------- 53  (SV/bsa), LVOT  DP  Aortic valve         Value          Ref       01/24/2025  Aortic valve         2.69  m/sec    --------- 2.4  peak velocity, S  Aortic valve         55.7  cm       --------- 51.6  VTI, S  Aortic mean          15    mm Hg    --------- 12  gradient, S  Aortic peak          29    mm Hg    --------- 23  gradient, S  Aortic valve         1.52  cm^2     --------- 2.03  area, VTI  Aortic valve          0.8   cm^2/m^2 --------- 1.03  area/bsa, VTI  Velocity ratio,      0.48           --------- 0.55  peak, LVOT/AV  Ascending aorta      Value          Ref       01/24/2025  Ascending aorta      3.5   cm       1.9 - 3.5 ----------  ID  Left atrium          Value          Ref       01/24/2025  LA ID, A-P, ES       3.3   cm       2.7 - 3.8 4.0  LA volume/bsa, S (H) 39    ml/m^2   16 - 34   56  LA volume, ES,   (H) 74    ml       22 - 52   101  1-p A4C  Mitral valve         Value          Ref       01/24/2025  Mitral E-wave        0.94  m/sec    --------- ----------  peak velocity  Mitral A-wave        1.24  m/sec    --------- ----------  peak velocity  Mitral               190   ms       --------- ----------  deceleration  time  Mitral peak          4     mm Hg    --------- ----------  gradient, D  Mitral E/A           0.8            --------- ----------  ratio, peak  Pulmonary artery     Value          Ref       01/24/2025  PA pressure, S,      52    mm Hg    --------- ----------  DP  Tricuspid valve      Value          Ref       01/24/2025  Tricuspid regurg (H) 3.06  m/sec    <=2.8     ----------  peak velocity  Tricuspid peak       37    mm Hg    --------- ----------  RV-RA gradient  Right atrium         Value          Ref       01/24/2025  RA ID, S-I, ES,  (H) 6.3   cm       3.4 - 5.3 ----------  A4C  RA ID/bsa, S-I,  (H) 3.3   cm/m^2   1.9 - 3.1 ----------  ES, A4C  RA area, ES, A4C     17    cm^2     10 - 18   ----------  RA volume, ES,       38    ml       --------- ----------  1-p A4C  RA volume/bsa,       20    ml/m^2   9 - 33    ----------  ES, 1-p A4C  Systemic veins       Value          Ref       01/24/2025  Estimated CVP        15    mm Hg    --------- ----------  Inferior vena        Value          Ref       01/24/2025  cava  ID               (H) 2.7   cm       <=2.1     ----------  Right ventricle      Value          Ref       01/24/2025  TAPSE, 2D            2.74  cm       >=1.70    ----------  TAPSE, MM             2.74  cm       >=1.70    ----------  RV pressure, S,      52    mm Hg    --------- ----------  DP  RV s', lateral       14.7  cm/sec   >=9.5     ---------- Legend: (L)  and  (H)  migel values outside specified reference range. ---------------------------------------------------------------------------- Prepared and electronically signed by Bran Salcedo 03/27/2025 16:38          Meds:   Current Hospital Medications[1]    Assessment & Plan    Acute hypoxic respiratory failure (HCC)  -multifactorial: acute HFpEF, recurrent interstitial pneumonitis, CAP  -CT chest in ED 3/26/25 - no PE; worsening of pleural effusions and interstitial edema  -required BiPAP transiently; was up to 15L O2  -procalc sl elevated 0.25; WBC 19.8 K on admission  -echo 2/5/25 - normal LV systolic function (EF 60-65%); grade 2 diastolic dysfunction; normally functioning bioprosthetic TAVR; sl incr PAP 44mmHg  -repeat echo 3/27/25 stable from 2/2025 except incr PAP to 52mmHg  -hi res CT chest 4/4/25 --pulmonary edema and bilateral pleural effusions significantly improved.  There is persistent trace right pleural effusion.  Advanced interstitial lung disease is noted in an NSIP (nonspecific insterstitial pneumonitis) pattern.   -s/p ceftriaxone/azithro then augmentin (stopped 4/7)  -IV lasix changed to po lasix - currently 40mg/day (for HFpEF)  -IV solumedrol changed to prednisone 40mg/day on 4/7 per pulm (for NSIP)  -initially with clinical improvement - decreased O2 requirement, improvement in dyspnea; however, pt noted an acute worsening in her sx on 4/8.  CT chest w/contrast on 4/9 -- negative for PE; no significant fluid overload; only small b/l pleural effusions and atelectasis; it did show peripheral and basilar predominant pulmonary interstitial fibrosis with suggestion of underlying nonspecific interstitial pneumonitis.  RHC on 4/7 showed normal filling pressures.  Pt appears euvolemic.  -O2 sats down to 71% on RA; significant  dyspnea with just standing up this am.  -suspect pt's subacute decompensation due to NSIP -- sx occurred the day after her solumedrol was deescalated to oral prednisone and CT findings are c/w NSIP.    -discussed with Dr. Holloway -- will increase prednisone to 40mg po q8 hrs     Hypotension  -likely due to mild overdiuresis (lasix held x 1 dose)  -BP improved  -cortisol level 4/9 - normal     Coronary artery disease  -Cath 1/14/25 by Dr. Bernardo Liz (as w/u for TAVR) -- RCA non-obstructive; LM free of obst disease; LM plaque extending into ostium of LAD (20-30%); mid LAD (80-90%), cx ostium (60-70%)  -intervention delayed until after colon resection due to need for DAPT x 6 mos after stenting   -s/p PCI/JUVENTINO of mid LAD (70%>0%) on 4/7/25 by Dr. Dutton  -cont ASA, Plavix  -increased atorvastatin from 20mg to 80mg/day (LDL 76 on 3/26/25)  -further recs per cardiology     Interstitial pneumonitis/NSIP  -dx'ed 10/2024 - presented with cough and severe hypoxia  -unresponsive to abx   -CXR 10/24/24 extensive bilateral perihilar and bilateral upper and lower lobe   -S.pneumo, legionella Ag , mycoplasma IgM/G, Fungitell-beta-D glucan negative  -ANCA and URIEL/connective tissue disease panels negative  -anti-histone and anti-Keara Ab's negative  -CT with bilateral ground glass opacities, small consolidations of BLL  -indings suspicious for NSIP (vs cryptogenic organizing pneumonia vs hypersensitivity pneumonitis); NOT thought to be c/w UIP pattern  -s/p empiric steroids (solumedrol then prednisone taper) 10/25/24 - (EOT 1/18/25)  -dyspnea and hypoxia completely resolved until this admission 3/26/25 -- POPE, hypoxia  -steroids restarted as above     Epistaxis  -mild; from left nostril  -saline nasal spray; O2 already humidified     Adenocarcinoma of colon  -Bx of transverse colon polyp 12/17/24 -- invasive, moderately differentiated adenocarcinoma  -CEA normal (1.9)  -CT a/p neg for mets  -surgery delayed in anticipation of TAVR (done  1/23/25)  -s/p robotic assisted left hemicolectomy (Dr. Cassidy) - path - tumor invades into muscularis; margins neg for tumor; all 19 LN's neg for mets (0/19); pT2, pN0  -next appt with Dr. Cassidy 4/16/25     Hx  GI bleed  -EGD 11/26/24 (Dr. Nuno) -15mm nonbleeding gastric antral ulcer; 3mm gastric antral AVM s/p APC  -recurrence in 12/2024  -colonoscopy and repeat EGD 12/17/24 by Dr. Staton -- grade 1 esophagitis; duod ulcers healing; large flat polyp in transverse colon (carcinoma)   -received total of 3 units PRBCs   -Xarelto stopped in 12/2024  -s/p omeprazole 20mg BID x 8 weeks (EOT 1/22/25), now on omeprazole 20mg daily -- plan has been to continue until she completed her coronary stenting.  Will stop at discharge.      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   -MRI brain--no acute intracranial process  -was doing PT at [facility] -- continues to improve; almost back to normal      Hx of Bilateral pulmonary emboli (10/2024)  -dx'ed at time of interstitial pneumonitis  -CT chest 10/25/24 -- acute distal segmental/subsegmental pulmonary emboli in [place] and subsegmental PE in [place]  -unclear etiology; no recent hx of long travel; no family/personal hx of blood clots  -BLE venous dopplers negative 10/26/24  -started xarelto 20 mg po every day on 11/28/24  -repeat CT chest 12/9/24 neg PE  -stopped Xarelto 12/14/24 due to recurrent GI bleed  -repeat CT chest 3/26/25 and 4/9/25 -- neg PE     Severe aortic  stenosis  -progression on serial echocardiograms  -s/p TAVR 1/23/25 (Dr. Reji Green)     Hx of Hypertension, primary  -(dyazide stopped due to NANCY)  -(amlodipine held due to low BP in 11/2024)  -BP remains normal off meds     Hx of hip OA  -s/p left THR (Dr. Lo) many years ago  -s/p elective R BEATRIZ on 10/5/23 by Dr. Diaz     Hyperlipidemia   Pt with strong family hx of high cholesterol  Started on Atorvastatin 20mg/day in 1/2025 (after noted to have CAD on cath)  Increased atorvastatin to 80mg/day as above     Osteopenia   On Fosamax from 2011 to 4/2016.     DEXA stable 5/10/17 and 2019 and 2021  DEXA 8/2024 -- osteoporosis of left forearm  -restarted Fosamax 8/2024 (on hold this admission)     Vitamin D deficiency  On vitamin D 4000u/day      VTE proph  -SQH        Dispo  -pt has been residing at [facility] for past several months (for respite care and PT), and is scheduled to be there until July, after which she plans to return home  -PT eval appreciated - recommending [facility] (alternatively [place] w/PT)                       Discussed with pt, RN, tres Ferrari, and Dr. Jewel Stuart MD  4/10/2025  10:30 AM       [1]   Current Facility-Administered Medications   Medication Dose Route Frequency    methylPREDNISolone sodium succinate (Solu-MEDROL) injection 40 mg  40 mg Intravenous Q8H    sodium chloride (Saline Mist) 0.65 % nasal solution 1 spray  1 spray Each Nare Q3H PRN    atorvastatin (Lipitor) tab 80 mg  80 mg Oral Nightly    clopidogrel (Plavix) tab 75 mg  75 mg Oral Daily    aspirin DR tab 81 mg  81 mg Oral Daily    acetaminophen (Tylenol) tab 650 mg  650 mg Oral Q6H PRN    furosemide (Lasix) tab 40 mg  40 mg Oral Daily    polyethylene glycol (PEG 3350) (Miralax) 17 g oral packet 17 g  17 g Oral Daily PRN    guaiFENesin (Robitussin) 100 MG/5 ML oral liquid 200 mg  200 mg Oral Q4H PRN    benzocaine-menthol (Cepacol) lozenge 1 lozenge  1 lozenge Oral PRN     vancomycin (Vancocin) cap 125 mg  125 mg Oral Daily    pantoprazole (Protonix) DR tab 40 mg  40 mg Oral QAM AC    traMADol (Ultram) tab 50 mg  50 mg Oral Q6H PRN    ipratropium-albuterol (Duoneb) 0.5-2.5 (3) MG/3ML inhalation solution 3 mL  3 mL Nebulization Q6H PRN    heparin (Porcine) 5000 UNIT/ML injection 5,000 Units  5,000 Units Subcutaneous 2 times per day    glucose (Dex4) 15 GM/59ML oral liquid 15 g  15 g Oral Q15 Min PRN    Or    glucose (Glutose) 40% oral gel 15 g  15 g Oral Q15 Min PRN    Or    glucose-vitamin C (Dex-4) chewable tab 4 tablet  4 tablet Oral Q15 Min PRN    Or    dextrose 50% injection 50 mL  50 mL Intravenous Q15 Min PRN    Or    glucose (Dex4) 15 GM/59ML oral liquid 30 g  30 g Oral Q15 Min PRN    Or    glucose (Glutose) 40% oral gel 30 g  30 g Oral Q15 Min PRN    Or    glucose-vitamin C (Dex-4) chewable tab 8 tablet  8 tablet Oral Q15 Min PRN    insulin aspart (NovoLOG) 100 Units/mL FlexPen 1-7 Units  1-7 Units Subcutaneous TID CC    ferrous sulfate DR tab 325 mg  325 mg Oral Daily with breakfast    docusate sodium (Colace) cap 100 mg  100 mg Oral BID

## 2025-04-10 NOTE — PROGRESS NOTES
04/10/25 1000   Mobility   O2 walk? Yes   SPO2% on Room Air at Rest 85   SPO2% on Oxygen at Rest 96   At rest oxygen flow (liters per minute) 5   SPO2% Ambulation on Room Air 71   SPO2% Ambulation on Oxygen 85   Ambulation oxygen flow (liters per minute) 5

## 2025-04-10 NOTE — PLAN OF CARE
Starting IV solumedrol q8 per order. O2 walk performed notified  aware of findings. Call light within reach and fall precautions in place.     Problem: Patient Centered Care  Goal: Patient preferences are identified and integrated in the patient's plan of care  Description: Interventions:- What would you like us to know as we care for you? From [facility] - Provide timely, complete, and accurate information to patient/family- Incorporate patient and family knowledge, values, beliefs, and cultural backgrounds into the planning and delivery of care- Encourage patient/family to participate in care and decision-making at the level they choose- Honor patient and family perspectives and choices  Outcome: Progressing     Problem: Patient/Family Goals  Goal: Patient/Family Long Term Goal  Description: Patient's Long Term Goal: to go home Interventions:- Monitor vital signs and labs  - Administer medications per order  - Follow MD orders  - Fall precautions  - Diagnostics as ordered  - Update / inform patient on plan of care  - Discharge planning- See additional Care Plan goals for specific interventions  Outcome: Progressing  Goal: Patient/Family Short Term Goal  Description: Patient's Short Term Goal: to feel better Interventions: -  See additional Care Plan goals for specific interventionsMonitor vital signs and labs  - Administer medications per order  - Follow MD orders  - Fall precautions  - Diagnostics as ordered  - Update / inform patient on plan of care  - Discharge planning  Outcome: Progressing     Problem: PAIN - ADULT  Goal: Verbalizes/displays adequate comfort level or patient's stated pain goal  Description: INTERVENTIONS:- Encourage pt to monitor pain and request assistance- Assess pain using appropriate pain scale- Administer analgesics based on type and severity of pain and evaluate response- Implement non-pharmacological measures as appropriate and evaluate response- Consider cultural  and social influences on pain and pain management- Manage/alleviate anxiety- Utilize distraction and/or relaxation techniques- Monitor for opioid side effects- Notify MD/LIP if interventions unsuccessful or patient reports new pain- Anticipate increased pain with activity and pre-medicate as appropriate  Outcome: Progressing     Problem: RISK FOR INFECTION - ADULT  Goal: Absence of fever/infection during anticipated neutropenic period  Description: INTERVENTIONS- Monitor WBC- Administer growth factors as ordered- Implement neutropenic guidelines  Outcome: Progressing     Problem: SAFETY ADULT - FALL  Goal: Free from fall injury  Description: INTERVENTIONS:- Assess pt frequently for physical needs- Identify cognitive and physical deficits and behaviors that affect risk of falls.- [place] fall precautions as indicated by assessment.- Educate pt/family on patient safety including physical limitations- Instruct pt to call for assistance with activity based on assessment- Modify environment to reduce risk of injury- Provide assistive devices as appropriate- Consider OT/PT consult to assist with strengthening/mobility- Encourage toileting schedule  Outcome: Progressing     Problem: DISCHARGE PLANNING  Goal: Discharge to home or other facility with appropriate resources  Description: INTERVENTIONS:- Identify barriers to discharge w/pt and caregiver- Include patient/family/discharge partner in discharge planning- Arrange for needed discharge resources and transportation as appropriate- Identify discharge learning needs (meds, wound care, etc)- Arrange for interpreters to assist at discharge as needed- Consider post-discharge preferences of patient/family/discharge partner- Complete POLST form as appropriate- Assess patient's ability to be responsible for managing their own health- Refer to Case Management Department for coordinating discharge planning if the patient needs post-hospital services based on physician/LIP  order or complex needs related to functional status, cognitive ability or social support system  Outcome: Progressing     Problem: RESPIRATORY - ADULT  Goal: Achieves optimal ventilation and oxygenation  Description: INTERVENTIONS:- Assess for changes in respiratory status- Assess for changes in mentation and behavior- Position to facilitate oxygenation and minimize respiratory effort- Oxygen supplementation based on oxygen saturation or ABGs- Provide Smoking Cessation handout, if applicable- Encourage broncho-pulmonary hygiene including cough, deep breathe, Incentive Spirometry- Assess the need for suctioning and perform as needed- Assess and instruct to report SOB or any respiratory difficulty- Respiratory Therapy support as indicated- Manage/alleviate anxiety- Monitor for signs/symptoms of CO2 retention  Outcome: Progressing     Problem: MUSCULOSKELETAL - ADULT  Goal: Return mobility to safest level of function  Description: INTERVENTIONS:- Assess patient stability and activity tolerance for standing, transferring and ambulating w/ or w/o assistive devices- Assist with transfers and ambulation using safe patient handling equipment as needed- Ensure adequate protection for wounds/incisions during mobilization- Obtain PT/OT consults as needed- Advance activity as appropriate- Communicate ordered activity level and limitations with patient/family  Outcome: Progressing  Goal: Maintain proper alignment of affected body part  Description: INTERVENTIONS:- Support and protect limb and body alignment per provider's orders- Instruct and reinforce with patient and family use of appropriate assistive device and precautions (e.g. spinal or hip dislocation precautions)  Outcome: Progressing     Problem: Diabetes/Glucose Control  Goal: Glucose maintained within prescribed range  Description: INTERVENTIONS:- Monitor Blood Glucose as ordered- Assess for signs and symptoms of hyperglycemia and hypoglycemia- Administer ordered  medications to maintain glucose within target range- Assess barriers to adequate nutritional intake and initiate nutrition consult as needed- Instruct patient on self management of diabetes  Outcome: Progressing

## 2025-04-10 NOTE — PHYSICAL THERAPY NOTE
PHYSICAL THERAPY TREATMENT NOTE - INPATIENT     Room Number: 305/305-A       Presenting Problem: HF exacerbation, aortic valve stenosis, acute hypoxic respiratory failure  Co-Morbidities : PE, cancer, colon cancer, CAD, L foot drop, HTN, TAVR 1/2025, OA, macular degeneration, osteopenia, L BEATRIZ 2006, S/P colon resection 2/27/25    Problem List  Principal Problem:    Acute hypoxic respiratory failure (HCC)  Active Problems:    Pneumonia due to infectious organism    Acute on chronic congestive heart failure, unspecified heart failure type (HCC)    Aortic valve stenosis, etiology of cardiac valve disease unspecified    Coronary artery disease involving native coronary artery of native heart without angina pectoris      PHYSICAL THERAPY ASSESSMENT   Patient demonstrates limited progress this session, goals  remain in progress.    Patient's O2 sat on room air is __85__% at rest. Pt's O2 sat when ambulating is 71% on room air.      Patient is requiring moderate assist as a result of the following impairments: decreased functional strength, decreased endurance/aerobic capacity, impaired dynamic balance, medical status, and increased O2 needs from baseline.     Patient continues to function below baseline with transfers and gait.  Next session anticipate patient to progress transfers and gait.  Physical Therapy will continue to follow patient for duration of hospitalization.    Patient continues to benefit from continued skilled PT services: to promote return to prior level of function and safety with continuous assistance and gradual rehabilitative therapy .    PLAN DURING HOSPITALIZATION  Nursing Mobility Recommendation : 1 Assist  PT Device Recommendation: Rolling walker  PT Treatment Plan: Bed mobility, Patient education, Family education, Gait training, Transfer training  Frequency (Obs): 5x/week     SUBJECTIVE  \"My breathing is not so great\"    OBJECTIVE  Precautions: Cardiac    WEIGHT BEARING RESTRICTION       PAIN  ASSESSMENT   Ratin  Location: no pain  Management Techniques: Activity promotion    BALANCE  Static Sitting: Good  Dynamic Sitting: Good  Static Standing: Fair  Dynamic Standing: Fair -    ACTIVITY TOLERANCE           BP: 117/53              O2 WALK  Oxygen Therapy  SPO2% on Room Air at Rest: 85  SPO2% on Oxygen at Rest:  (96)  At rest oxygen flow (liters per minute): 5  SPO2% Ambulation on Room Air: 71  SPO2% Ambulation on Oxygen: 80  Ambulation oxygen flow (liters per minute): 5 (up to 7L for recovery tp 90%)    AM-PAC '6-Clicks' INPATIENT SHORT FORM - BASIC MOBILITY  How much difficulty does the patient currently have...  Patient Difficulty: Turning over in bed (including adjusting bedclothes, sheets and blankets)?: None   Patient Difficulty: Sitting down on and standing up from a chair with arms (e.g., wheelchair, bedside commode, etc.): A Lot   Patient Difficulty: Moving from lying on back to sitting on the side of the bed?: A Little   How much help from another person does the patient currently need...   Help from Another: Moving to and from a bed to a chair (including a wheelchair)?: A Little   Help from Another: Need to walk in hospital room?: A Little   Help from Another: Climbing 3-5 steps with a railing?: A Lot     AM-PAC Score:  Raw Score: 17   Approx Degree of Impairment: 50.57%   Standardized Score (AM-PAC Scale): 42.13   CMS Modifier (G-Code): CK    FUNCTIONAL ABILITY STATUS  Functional Mobility/Gait Assessment  Gait Assistance: Minimum assistance (gait distance limited by SOB)  Distance (ft):  (5, > 2 min to recover SOB and SPO2 >88%)  Assistive Device: Rolling walker  Pattern:  (gait distance limited by SOB)  Rolling: supervision  Supine to Sit: supervision  Sit to Supine: supervision  Sit to Stand: moderate assist    Skilled Therapy Provided: activity pacing, monitoring SPO2, when to use w/c vs walker    The patient's Approx Degree of Impairment: 50.57% has been calculated based on  documentation in the [facility] '6 clicks' Inpatient Daily Activity Short Form.  Research supports that patients with this level of impairment may benefit from rehab.  Final disposition will be made by interdisciplinary medical team.        Patient End of Session: Up in chair    CURRENT GOALS   Goals to be met by: 25  Patient Goal Patient's self-stated goal is: return home safely   Goal #1 Patient is able to demonstrate supine - sit EOB @ level: independent      Goal #1   Current Status  SBA   Goal #2 Patient is able to demonstrate transfers EOB to/from BSC at assistance level: modified independent with least restrictive assistive device       Goal #2  Current Status  mod Awith RW   Goal #3 Patient is able to ambulate 150 feet with assist device: least restrictive assistive device at assistance level: modified independent   Goal #3   Current Status  Min A with amb 5'    Goal #4 Patient is able to demonstrate transfers sit to/from stand at assistance level: modified independent with least restrictive assistive device       Goal #4   Current Status  Ongoing   Goal #5 Patient to demonstrate independence with home activity/exercise instructions provided to patient in preparation for discharge.   Goal #5   Current Status  Ongoing   Goal #6       Gait Trainin minutes  Therapeutic Activity: 14 minutes

## 2025-04-10 NOTE — PLAN OF CARE
O2 4l n/c. P.o lasix continues.  Plan[facility] with possible [facility], possible O2- pending medical clearance.     Problem: Patient Centered Care  Goal: Patient preferences are identified and integrated in the patient's plan of care  Description: Interventions:- What would you like us to know as we care for you? Lives at [facility].- Provide timely, complete, and accurate information to patient/family- Incorporate patient and family knowledge, values, beliefs, and cultural backgrounds into the planning and delivery of care- Encourage patient/family to participate in care and decision-making at the level they choose- Honor patient and family perspectives and choices  Outcome: Progressing     Problem: PAIN - ADULT  Goal: Verbalizes/displays adequate comfort level or patient's stated pain goal  Description: INTERVENTIONS:- Encourage pt to monitor pain and request assistance- Assess pain using appropriate pain scale- Administer analgesics based on type and severity of pain and evaluate response- Implement non-pharmacological measures as appropriate and evaluate response- Consider cultural and social influences on pain and pain management- Manage/alleviate anxiety- Utilize distraction and/or relaxation techniques- Monitor for opioid side effects- Notify MD/LIP if interventions unsuccessful or patient reports new pain- Anticipate increased pain with activity and pre-medicate as appropriate  Outcome: Progressing     Problem: RISK FOR INFECTION - ADULT  Goal: Absence of fever/infection during anticipated neutropenic period  Description: INTERVENTIONS- Monitor WBC- Administer growth factors as ordered- Implement neutropenic guidelines  Outcome: Progressing     Problem: SAFETY ADULT - FALL  Goal: Free from fall injury  Description: INTERVENTIONS:- Assess pt frequently for physical needs- Identify cognitive and physical deficits and behaviors that affect risk of falls.- [place] fall precautions as indicated by assessment.-  Educate pt/family on patient safety including physical limitations- Instruct pt to call for assistance with activity based on assessment- Modify environment to reduce risk of injury- Provide assistive devices as appropriate- Consider OT/PT consult to assist with strengthening/mobility- Encourage toileting schedule  Outcome: Progressing     Problem: DISCHARGE PLANNING  Goal: Discharge to home or other facility with appropriate resources  Description: INTERVENTIONS:- Identify barriers to discharge w/pt and caregiver- Include patient/family/discharge partner in discharge planning- Arrange for needed discharge resources and transportation as appropriate- Identify discharge learning needs (meds, wound care, etc)- Arrange for interpreters to assist at discharge as needed- Consider post-discharge preferences of patient/family/discharge partner- Complete POLST form as appropriate- Assess patient's ability to be responsible for managing their own health- Refer to Case Management Department for coordinating discharge planning if the patient needs post-hospital services based on physician/LIP order or complex needs related to functional status, cognitive ability or social support system  Outcome: Progressing     Problem: RESPIRATORY - ADULT  Goal: Achieves optimal ventilation and oxygenation  Description: INTERVENTIONS:- Assess for changes in respiratory status- Assess for changes in mentation and behavior- Position to facilitate oxygenation and minimize respiratory effort- Oxygen supplementation based on oxygen saturation or ABGs- Provide Smoking Cessation handout, if applicable- Encourage broncho-pulmonary hygiene including cough, deep breathe, Incentive Spirometry- Assess the need for suctioning and perform as needed- Assess and instruct to report SOB or any respiratory difficulty- Respiratory Therapy support as indicated- Manage/alleviate anxiety- Monitor for signs/symptoms of CO2 retention  Outcome: Progressing      Problem: MUSCULOSKELETAL - ADULT  Goal: Return mobility to safest level of function  Description: INTERVENTIONS:- Assess patient stability and activity tolerance for standing, transferring and ambulating w/ or w/o assistive devices- Assist with transfers and ambulation using safe patient handling equipment as needed- Ensure adequate protection for wounds/incisions during mobilization- Obtain PT/OT consults as needed- Advance activity as appropriate- Communicate ordered activity level and limitations with patient/family  Outcome: Progressing  Goal: Maintain proper alignment of affected body part  Description: INTERVENTIONS:- Support and protect limb and body alignment per provider's orders- Instruct and reinforce with patient and family use of appropriate assistive device and precautions (e.g. spinal or hip dislocation precautions)  Outcome: Progressing     Problem: Diabetes/Glucose Control  Goal: Glucose maintained within prescribed range  Description: INTERVENTIONS:- Monitor Blood Glucose as ordered- Assess for signs and symptoms of hyperglycemia and hypoglycemia- Administer ordered medications to maintain glucose within target range- Assess barriers to adequate nutritional intake and initiate nutrition consult as needed- Instruct patient on self management of diabetes  Outcome: Progressing

## 2025-04-10 NOTE — PROGRESS NOTES
[facility]  part of [facility]     Progress Note    Hoda Busby Patient Status:  Inpatient    1940 MRN [id number]   Location [facility]5W Attending Malathi Stuart MD   Hosp Day # 15 PCP Malathi Stuart MD       Subjective:   Patient seen and examined.  Ongoing dyspnea with exertion.  Reluctant to be discharged.  Currently on 4 L oxygen  Objective:   Blood pressure 117/53, pulse 84, temperature 97.8 °F (36.6 °C), temperature source Oral, resp. rate 18, height 5' 4\" (1.626 m), weight 179 lb 6.4 oz (81.4 kg), SpO2 90%.  Intake/Output:   Last 3 shifts: I/O last 3 completed shifts:  In: 1050 [P.O.:1030; I.V.:20]  Out: -    This shift: I/O this shift:  In: 250 [P.O.:250]  Out: -      Vent Settings:      Hemodynamic parameters (last 24 hours):      Scheduled Meds:   Current Facility-Administered Medications   Medication Dose Route Frequency    sodium chloride (Saline Mist) 0.65 % nasal solution 1 spray  1 spray Each Nare Q3H PRN    atorvastatin (Lipitor) tab 80 mg  80 mg Oral Nightly    clopidogrel (Plavix) tab 75 mg  75 mg Oral Daily    aspirin DR tab 81 mg  81 mg Oral Daily    predniSONE (Deltasone) tab 40 mg  40 mg Oral Daily with breakfast    acetaminophen (Tylenol) tab 650 mg  650 mg Oral Q6H PRN    furosemide (Lasix) tab 40 mg  40 mg Oral Daily    polyethylene glycol (PEG 3350) (Miralax) 17 g oral packet 17 g  17 g Oral Daily PRN    guaiFENesin (Robitussin) 100 MG/5 ML oral liquid 200 mg  200 mg Oral Q4H PRN    benzocaine-menthol (Cepacol) lozenge 1 lozenge  1 lozenge Oral PRN    vancomycin (Vancocin) cap 125 mg  125 mg Oral Daily    pantoprazole (Protonix) DR tab 40 mg  40 mg Oral QAM AC    traMADol (Ultram) tab 50 mg  50 mg Oral Q6H PRN    ipratropium-albuterol (Duoneb) 0.5-2.5 (3) MG/3ML inhalation solution 3 mL  3 mL Nebulization Q6H PRN    heparin (Porcine) 5000 UNIT/ML injection 5,000 Units  5,000 Units Subcutaneous 2 times per day    glucose (Dex4) 15 GM/59ML oral  liquid 15 g  15 g Oral Q15 Min PRN    Or    glucose (Glutose) 40% oral gel 15 g  15 g Oral Q15 Min PRN    Or    glucose-vitamin C (Dex-4) chewable tab 4 tablet  4 tablet Oral Q15 Min PRN    Or    dextrose 50% injection 50 mL  50 mL Intravenous Q15 Min PRN    Or    glucose (Dex4) 15 GM/59ML oral liquid 30 g  30 g Oral Q15 Min PRN    Or    glucose (Glutose) 40% oral gel 30 g  30 g Oral Q15 Min PRN    Or    glucose-vitamin C (Dex-4) chewable tab 8 tablet  8 tablet Oral Q15 Min PRN    insulin aspart (NovoLOG) 100 Units/mL FlexPen 1-7 Units  1-7 Units Subcutaneous TID CC    ferrous sulfate DR tab 325 mg  325 mg Oral Daily with breakfast    docusate sodium (Colace) cap 100 mg  100 mg Oral BID       Continuous Infusions:     Physical Exam  Constitutional: no acute distress  Eyes: PERRL  ENT: nares pateint  Neck: supple, no JVD  Cardio: RRR, S1 S2  Respiratory: Basilar crackles  GI: abdomen soft, non tender, active bowel sounds, no organomegaly  Extremities: no clubbing, cyanosis, + trace lower extremity edema   Neurologic: no gross motor deficits  Skin: warm, dry      Results:     Lab Results   Component Value Date    WBC 17.9 04/10/2025    HGB 8.9 04/10/2025    HCT 27.9 04/10/2025    .0 04/10/2025    CREATSERUM 0.90 04/10/2025    BUN 22 04/10/2025     04/10/2025    K 4.0 04/10/2025     04/10/2025    CO2 23.0 04/10/2025    GLU 77 04/10/2025    CA 8.2 04/10/2025    MG 2.1 04/10/2025         CT CHEST PE AORTA (IV ONLY) (CPT=71260)  Result Date: 4/9/2025  CONCLUSION:  1. No evidence of acute pulmonary embolism to the level of the first order subsegmental pulmonary artery branches.  2. Peripheral and basilar predominant pulmonary interstitial fibrosis with suggestion of underlying possible nonspecific interstitial pneumonitis.  3. Small bilateral pleural effusions and associated basilar atelectasis, with or without concurrent superimposed pneumonia.  4. Possible mild pulmonary interstitial edema.  5.  Underlying centrilobular emphysema.  6. Ascending thoracic aortic ectasia.  7. Postprocedural changes of TAVR.  8. Lesser incidental findings as above.    elm-remote.   Dictated by (CST): Viktor Jennings MD on 4/09/2025 at 6:44 PM     Finalized by (CST): Viktor Jennings MD on 4/09/2025 at 6:55 PM          XR CHEST AP PORTABLE  (CPT=71045)  Result Date: 4/9/2025  CONCLUSION:   Stable diffuse bilateral interstitial opacity.    Dictated by (CST): Arash Rueda MD on 4/09/2025 at 9:37 AM     Finalized by (CST): Arash Rueda MD on 4/09/2025 at 9:38 AM                  Assessment   1.  Acute hypoxemic respiratory failure  2.  ILD  3.  Small pleural effusions  4.  Pulmonary edema  5.  Colon adenocarcinoma status post hemicolectomy  6.  Coronary artery disease  7.  Prior history of GI bleed  8.  Prior VTE  9.  History of severe aortic stenosis status post TAVR  10.  Hypertension     Plan   -Patient presents with progressive worsening dyspnea and acute hypoxemic respiratory failure.  -CT chest on presentation on 3/26/2025 with no evidence of acute pulmonary embolism seen.  Evidence of small pleural effusions with groundglass opacities suggestive more likely of edema with worsening compared to 6 days prior from CT chest.  Some background of fibrotic changes present.  -Repeat CT high-resolution chest on 4/4/2025 with improvement in pulmonary edema and pleural effusions.  Underlying ILD changes present.  -CT chest with contrast on 4/9/2025 with no evidence of pulmonary embolism seen.  Fibrotic changes seen otherwise with small pleural effusions and mild edema.  -Gradually wean steroid therapy.  Currently on prednisone 40 mg daily.  Discussed with primary care physician will increase dosage to Solu-Medrol 40 mg every 8 hours and monitor response.  -Diuresis as need be.  -Prior history of underlying ILD with hospitalization October 2024 responded to tapering steroid therapy at the time.  -Pulmonary function testing from  February 2025 with mild restriction seen with significant decrease in diffusion capacity.  -Wean oxygen as tolerated.  Admits to ongoing dyspnea currently on 4 L oxygen at rest with desaturation with activity.  Will need to evaluate for home oxygen prior to discharge but patient still reluctant to be discharged at this time given ongoing dyspnea and significant hypoxia with exertion  -Completed course of antibiotic therapy with Augmentin.  -Status post PCI of mLAD on 4/7/2025  -DVT prophylaxis: Heparin      Cody Holloway DO  Pulmonary Critical Care Medicine  [facility]

## 2025-04-10 NOTE — CONGREGATE LIVING REVIEW
[facility] Living Authorization    The [facility] Review Committee has reviewed this case and the patient IS APPROVED for discharge to a facility for Short Term Skilled once the following procedure is followed:     - The physician discharge instructions (contained within the RAÚL note for SNF) must inlcude the below appropriate and approved COVID instructions to the facility    For questions regarding CLRC approval process, please contact the CM assigned to the case.  For questions regarding RN discharge workflow, please contact the unit Clinical Leader.

## 2025-04-10 NOTE — CM/SW NOTE
10;20AM  Per chart, PT worked w/ pt this AM and Anticipated therapy need: Gradual Rehabilitative Therapy    Pt requiring further assistance due to continuous increased O2 needs.    SW sent tentative [facility] referrals in [place]. PASRR completed/uploaded. [facility] sent.    SW to f/up w/ pt for discussion once [facility] list is generated for review and as schedule permits.    01:00PM  SW met w/ pt and pt's nirema Ferrari at bedside. Discussed anticipated therapy need for [facility].    Multiple questions addressed/answered.    Pt and niece are declining [facility] due to pt's poor experience last time. They also confirmed pt's half-way ([facility] [place] [facility]) will and can assist w/ her needs.    Discussed HH vs Onsite/Outpatient PT - agreeable to consider HH PT to begin and then transition to Onsite/Outpatient PT. They will provide choice for HH closer to [place].    Documentation for O2 sats: (RN to add to progress note)  Patient's O2 sat on room air is ____% at rest. Pt's O2 sat on room is ____% when ambulating, and ____% on ____ liter while ambulating.      (Reminder: The \"at rest\" and \"while ambulating\" are required statements. If patient is non ambulatory you can use \"with exertion\" such as during a transfer to assess their O2 level)     MD to document AFTER O2 sats (if needed):  I had a face to face visit with this patient and I evaluated the patient's O2 saturations.  The patient is mobile in their home and is in need of a portable oxygen tank.  The home oxygen will improve the patient's condition.          Once O2 sats and MD verbiage are completed and IF home O2 is indicated, SW to place MDO in [facility] and request MD to cosign as appropriate. (Dx: CHF/COPD/Pleural Effusion)      PLAN: [facility] [place] half-way w/ HH & poss HME O2- pending HH choice, O2 needs, & med clear        SW/CM to remain available for support and/or discharge planning.         MS DoriW, LSW [id number]

## 2025-04-11 NOTE — PLAN OF CARE
AxOx4, 4L NC, NSR, Cont x2, x1 walker. ACHS.   PLAN: IV solumedrol, PO lasix, wean o2  Problem: Patient Centered Care  Goal: Patient preferences are identified and integrated in the patient's plan of care  Description: Interventions:- What would you like us to know as we care for you? From [facility] - Provide timely, complete, and accurate information to patient/family- Incorporate patient and family knowledge, values, beliefs, and cultural backgrounds into the planning and delivery of care- Encourage patient/family to participate in care and decision-making at the level they choose- Honor patient and family perspectives and choices  Outcome: Progressing     Problem: Patient/Family Goals  Goal: Patient/Family Long Term Goal  Description: Patient's Long Term Goal: to go home Interventions:- Monitor vital signs and labs  - Administer medications per order  - Follow MD orders  - Fall precautions  - Diagnostics as ordered  - Update / inform patient on plan of care  - Discharge planning- See additional Care Plan goals for specific interventions  Outcome: Progressing  Goal: Patient/Family Short Term Goal  Description: Patient's Short Term Goal: to feel better Interventions: -  See additional Care Plan goals for specific interventionsMonitor vital signs and labs  - Administer medications per order  - Follow MD orders  - Fall precautions  - Diagnostics as ordered  - Update / inform patient on plan of care  - Discharge planning  Outcome: Progressing     Problem: PAIN - ADULT  Goal: Verbalizes/displays adequate comfort level or patient's stated pain goal  Description: INTERVENTIONS:- Encourage pt to monitor pain and request assistance- Assess pain using appropriate pain scale- Administer analgesics based on type and severity of pain and evaluate response- Implement non-pharmacological measures as appropriate and evaluate response- Consider cultural and social influences on pain and pain management- Manage/alleviate  anxiety- Utilize distraction and/or relaxation techniques- Monitor for opioid side effects- Notify MD/LIP if interventions unsuccessful or patient reports new pain- Anticipate increased pain with activity and pre-medicate as appropriate  Outcome: Progressing     Problem: RISK FOR INFECTION - ADULT  Goal: Absence of fever/infection during anticipated neutropenic period  Description: INTERVENTIONS- Monitor WBC- Administer growth factors as ordered- Implement neutropenic guidelines  Outcome: Progressing     Problem: SAFETY ADULT - FALL  Goal: Free from fall injury  Description: INTERVENTIONS:- Assess pt frequently for physical needs- Identify cognitive and physical deficits and behaviors that affect risk of falls.- [place] fall precautions as indicated by assessment.- Educate pt/family on patient safety including physical limitations- Instruct pt to call for assistance with activity based on assessment- Modify environment to reduce risk of injury- Provide assistive devices as appropriate- Consider OT/PT consult to assist with strengthening/mobility- Encourage toileting schedule  Outcome: Progressing     Problem: DISCHARGE PLANNING  Goal: Discharge to home or other facility with appropriate resources  Description: INTERVENTIONS:- Identify barriers to discharge w/pt and caregiver- Include patient/family/discharge partner in discharge planning- Arrange for needed discharge resources and transportation as appropriate- Identify discharge learning needs (meds, wound care, etc)- Arrange for interpreters to assist at discharge as needed- Consider post-discharge preferences of patient/family/discharge partner- Complete POLST form as appropriate- Assess patient's ability to be responsible for managing their own health- Refer to Case Management Department for coordinating discharge planning if the patient needs post-hospital services based on physician/LIP order or complex needs related to functional status, cognitive ability  or social support system  Outcome: Progressing     Problem: RESPIRATORY - ADULT  Goal: Achieves optimal ventilation and oxygenation  Description: INTERVENTIONS:- Assess for changes in respiratory status- Assess for changes in mentation and behavior- Position to facilitate oxygenation and minimize respiratory effort- Oxygen supplementation based on oxygen saturation or ABGs- Provide Smoking Cessation handout, if applicable- Encourage broncho-pulmonary hygiene including cough, deep breathe, Incentive Spirometry- Assess the need for suctioning and perform as needed- Assess and instruct to report SOB or any respiratory difficulty- Respiratory Therapy support as indicated- Manage/alleviate anxiety- Monitor for signs/symptoms of CO2 retention  Outcome: Progressing     Problem: MUSCULOSKELETAL - ADULT  Goal: Return mobility to safest level of function  Description: INTERVENTIONS:- Assess patient stability and activity tolerance for standing, transferring and ambulating w/ or w/o assistive devices- Assist with transfers and ambulation using safe patient handling equipment as needed- Ensure adequate protection for wounds/incisions during mobilization- Obtain PT/OT consults as needed- Advance activity as appropriate- Communicate ordered activity level and limitations with patient/family  Outcome: Progressing  Goal: Maintain proper alignment of affected body part  Description: INTERVENTIONS:- Support and protect limb and body alignment per provider's orders- Instruct and reinforce with patient and family use of appropriate assistive device and precautions (e.g. spinal or hip dislocation precautions)  Outcome: Progressing     Problem: Diabetes/Glucose Control  Goal: Glucose maintained within prescribed range  Description: INTERVENTIONS:- Monitor Blood Glucose as ordered- Assess for signs and symptoms of hyperglycemia and hypoglycemia- Administer ordered medications to maintain glucose within target range- Assess barriers to  adequate nutritional intake and initiate nutrition consult as needed- Instruct patient on self management of diabetes  Outcome: Progressing

## 2025-04-11 NOTE — PROGRESS NOTES
[facility]  part of [facility]     Progress Note    Hoda Busby Patient Status:  Inpatient    1940 MRN [id number]   Location [facility]5W Attending Malathi Stuart MD   Hosp Day # 16 PCP Malathi Stuart MD       Subjective:   Patient seen and examined.  Ongoing dyspnea with exertion.  Currently on 4 L oxygen.  Some occasional cough  Objective:   Blood pressure 110/63, pulse 92, temperature 97.8 °F (36.6 °C), temperature source Oral, resp. rate 18, height 5' 4\" (1.626 m), weight 179 lb 1.6 oz (81.2 kg), SpO2 91%.  Intake/Output:   Last 3 shifts: I/O last 3 completed shifts:  In: 860 [P.O.:830; I.V.:30]  Out: -    This shift: No intake/output data recorded.     Vent Settings:      Hemodynamic parameters (last 24 hours):      Scheduled Meds:   Current Facility-Administered Medications   Medication Dose Route Frequency    methylPREDNISolone sodium succinate (Solu-MEDROL) injection 40 mg  40 mg Intravenous Q8H    sodium chloride (Saline Mist) 0.65 % nasal solution 1 spray  1 spray Each Nare Q3H PRN    atorvastatin (Lipitor) tab 80 mg  80 mg Oral Nightly    clopidogrel (Plavix) tab 75 mg  75 mg Oral Daily    aspirin DR tab 81 mg  81 mg Oral Daily    acetaminophen (Tylenol) tab 650 mg  650 mg Oral Q6H PRN    furosemide (Lasix) tab 40 mg  40 mg Oral Daily    polyethylene glycol (PEG 3350) (Miralax) 17 g oral packet 17 g  17 g Oral Daily PRN    guaiFENesin (Robitussin) 100 MG/5 ML oral liquid 200 mg  200 mg Oral Q4H PRN    benzocaine-menthol (Cepacol) lozenge 1 lozenge  1 lozenge Oral PRN    vancomycin (Vancocin) cap 125 mg  125 mg Oral Daily    pantoprazole (Protonix) DR tab 40 mg  40 mg Oral QAM AC    traMADol (Ultram) tab 50 mg  50 mg Oral Q6H PRN    ipratropium-albuterol (Duoneb) 0.5-2.5 (3) MG/3ML inhalation solution 3 mL  3 mL Nebulization Q6H PRN    heparin (Porcine) 5000 UNIT/ML injection 5,000 Units  5,000 Units Subcutaneous 2 times per day    glucose (Dex4) 15 GM/59ML  oral liquid 15 g  15 g Oral Q15 Min PRN    Or    glucose (Glutose) 40% oral gel 15 g  15 g Oral Q15 Min PRN    Or    glucose-vitamin C (Dex-4) chewable tab 4 tablet  4 tablet Oral Q15 Min PRN    Or    dextrose 50% injection 50 mL  50 mL Intravenous Q15 Min PRN    Or    glucose (Dex4) 15 GM/59ML oral liquid 30 g  30 g Oral Q15 Min PRN    Or    glucose (Glutose) 40% oral gel 30 g  30 g Oral Q15 Min PRN    Or    glucose-vitamin C (Dex-4) chewable tab 8 tablet  8 tablet Oral Q15 Min PRN    insulin aspart (NovoLOG) 100 Units/mL FlexPen 1-7 Units  1-7 Units Subcutaneous TID CC    ferrous sulfate DR tab 325 mg  325 mg Oral Daily with breakfast    docusate sodium (Colace) cap 100 mg  100 mg Oral BID       Continuous Infusions:     Physical Exam  Constitutional: no acute distress  Eyes: PERRL  ENT: nares pateint  Neck: supple, no JVD  Cardio: RRR, S1 S2  Respiratory: Basilar crackles  GI: abdomen soft, non tender, active bowel sounds, no organomegaly  Extremities: no clubbing, cyanosis, + trace lower extremity edema   Neurologic: no gross motor deficits  Skin: warm, dry      Results:              CT CHEST PE AORTA (IV ONLY) (CPT=71260)  Result Date: 4/9/2025  CONCLUSION:  1. No evidence of acute pulmonary embolism to the level of the first order subsegmental pulmonary artery branches.  2. Peripheral and basilar predominant pulmonary interstitial fibrosis with suggestion of underlying possible nonspecific interstitial pneumonitis.  3. Small bilateral pleural effusions and associated basilar atelectasis, with or without concurrent superimposed pneumonia.  4. Possible mild pulmonary interstitial edema.  5. Underlying centrilobular emphysema.  6. Ascending thoracic aortic ectasia.  7. Postprocedural changes of TAVR.  8. Lesser incidental findings as above.    elm-remote.   Dictated by (CST): Viktor Jennings MD on 4/09/2025 at 6:44 PM     Finalized by (CST): Viktor Jennings MD on 4/09/2025 at 6:55 PM          XR CHEST AP PORTABLE   (CPT=71045)  Result Date: 4/9/2025  CONCLUSION:   Stable diffuse bilateral interstitial opacity.    Dictated by (CST): Arash Rueda MD on 4/09/2025 at 9:37 AM     Finalized by (CST): Arash Rueda MD on 4/09/2025 at 9:38 AM                  Assessment   1.  Acute hypoxemic respiratory failure  2.  ILD  3.  Small pleural effusions  4.  Pulmonary edema  5.  Colon adenocarcinoma status post hemicolectomy  6.  Coronary artery disease  7.  Prior history of GI bleed  8.  Prior VTE  9.  History of severe aortic stenosis status post TAVR  10.  Hypertension     Plan   -Patient presents with progressive worsening dyspnea and acute hypoxemic respiratory failure.  -CT chest on presentation on 3/26/2025 with no evidence of acute pulmonary embolism seen.  Evidence of small pleural effusions with groundglass opacities suggestive more likely of edema with worsening compared to 6 days prior from CT chest.  Some background of fibrotic changes present.  -Repeat CT high-resolution chest on 4/4/2025 with improvement in pulmonary edema and pleural effusions.  Underlying ILD changes present.  -CT chest with contrast on 4/9/2025 with no evidence of pulmonary embolism seen.  Fibrotic changes seen otherwise with small pleural effusions and mild edema.  -Gradually wean steroid therapy.  Currently on prednisone 40 mg daily.  Discussed with primary care physician increased dosage to Solu-Medrol 40 mg every 8 hours and monitor response.  -Diuresis as need be.  -Prior history of underlying ILD with hospitalization October 2024 responded to tapering steroid therapy at the time.  -Pulmonary function testing from February 2025 with mild restriction seen with significant decrease in diffusion capacity.  -Wean oxygen as tolerated.  Admits to ongoing dyspnea currently on 4 L oxygen at rest with desaturation with activity.  Will need to evaluate for home oxygen prior to discharge but patient still reluctant to be discharged at this time given  ongoing dyspnea and significant hypoxia with exertion  -Completed course of antibiotic therapy with Augmentin.  -Status post PCI of mLAD on 4/7/2025  -DVT prophylaxis: Isaac Holloway DO  Pulmonary Critical Care Medicine  [facility]

## 2025-04-11 NOTE — PROGRESS NOTES
[facility]  part of [facility]    Progress Note    Hoda Busby Patient Status:  Inpatient    1940 MRN [id number]   Location [facility] 3W/SW Attending Malathi Stuart MD   Hosp Day # 16 PCP Malathi Stuart MD       SUBJECTIVE:  Feeling winded with even slight activity    OBJECTIVE:  /68 (BP Location: Right arm)   Pulse 91   Temp 98 °F (36.7 °C) (Oral)   Resp 18   Ht 5' 4\" (1.626 m)   Wt 179 lb 1.6 oz (81.2 kg)   SpO2 91%   BMI 30.74 kg/m²     Intake/Output:  I/O last 3 completed shifts:  In: 860 [P.O.:830; I.V.:30]  Out: -     Wt Readings from Last 3 Encounters:   25 179 lb 1.6 oz (81.2 kg)   25 180 lb (81.6 kg)   25 173 lb (78.5 kg)       Exam  Gen: No acute distress, alert and oriented x3  Pulm: mild rales with inspiration  CV: Heart RRR, no murmurs   Abd: Abdomen soft, nontender, nondistended, no organomegaly, bowel sounds present  MSK: Full range of motion in extremities, no clubbing, no cyanosis, no edema      Labs:   Recent Labs   Lab 25  0534 25  1951 25  0839 25  0644 04/10/25  0700   RBC 3.74*  --  3.35* 3.35* 3.26*   HGB 10.1*   < > 8.9* 8.9* 8.9*   HCT 30.9*   < > 28.1* 29.7* 27.9*   MCV 82.6  --  83.9 88.7 85.6   MCH 27.0  --  26.6 26.6 27.3   MCHC 32.7  --  31.7 30.0* 31.9   RDW 17.5*  --  18.1* 18.9* 18.3*   NEPRELIM 9.92*  --   --  14.27* 14.50*   WBC 13.6*  --  19.9* 17.5* 17.9*   .0  --  181.0 181.0 177.0    < > = values in this interval not displayed.         Recent Labs   Lab 25  0839 25  1856 25  0644 04/10/25  0700   *  --  80 77   BUN 28*  --  26* 22   CREATSERUM 1.15*  --  0.97 0.90   EGFRCR 47*  --  58* 63   CA 8.1*  --  8.2* 8.2*     --  139 137   K 3.6 4.8 4.4 4.0     --  107 105   CO2 24.0  --  23.0 23.0         Imaging:  CT CHEST PE AORTA (IV ONLY) (CPT=71260)  Result Date: 2025  CONCLUSION:  1. No evidence of acute pulmonary embolism to the  level of the first order subsegmental pulmonary artery branches.  2. Peripheral and basilar predominant pulmonary interstitial fibrosis with suggestion of underlying possible nonspecific interstitial pneumonitis.  3. Small bilateral pleural effusions and associated basilar atelectasis, with or without concurrent superimposed pneumonia.  4. Possible mild pulmonary interstitial edema.  5. Underlying centrilobular emphysema.  6. Ascending thoracic aortic ectasia.  7. Postprocedural changes of TAVR.  8. Lesser incidental findings as above.    elm-remote.   Dictated by (CST): Viktor Jennings MD on 4/09/2025 at 6:44 PM     Finalized by (CST): Viktor Jennings MD on 4/09/2025 at 6:55 PM          XR CHEST AP PORTABLE  (CPT=71045)  Result Date: 4/9/2025  CONCLUSION:   Stable diffuse bilateral interstitial opacity.    Dictated by (CST): Arash Rueda MD on 4/09/2025 at 9:37 AM     Finalized by (CST): Arash Rueda MD on 4/09/2025 at 9:38 AM                  Assessment and Plan:         Acute hypoxic respiratory failure (HCC)  -multifactorial: acute HFpEF, recurrent interstitial pneumonitis, CAP  -CT chest in ED 3/26/25 - no PE; worsening of pleural effusions and interstitial edema  -required BiPAP transiently; was up to 15L O2  -procalc sl elevated 0.25; WBC 19.8 K on admission  -echo 2/5/25 - normal LV systolic function (EF 60-65%); grade 2 diastolic dysfunction; normally functioning bioprosthetic TAVR; sl incr PAP 44mmHg  -repeat echo 3/27/25 stable from 2/2025 except incr PAP to 52mmHg  -hi res CT chest 4/4/25 --pulmonary edema and bilateral pleural effusions significantly improved.  There is persistent trace right pleural effusion.  Advanced interstitial lung disease is noted in an NSIP (nonspecific insterstitial pneumonitis) pattern.   -s/p ceftriaxone/azithro then augmentin (stopped 4/7)  -IV lasix changed to po lasix - currently 40mg/day (for HFpEF)  -IV solumedrol changed to prednisone 40mg/day on 4/7 per pulm  (for NSIP)  -initially with clinical improvement - decreased O2 requirement, improvement in dyspnea; however, pt noted an acute worsening in her sx on 4/8.  CT chest w/contrast on 4/9 -- negative for PE; no significant fluid overload; only small b/l pleural effusions and atelectasis; it did show peripheral and basilar predominant pulmonary interstitial fibrosis with suggestion of underlying nonspecific interstitial pneumonitis.  RHC on 4/7 showed normal filling pressures.  Pt appears euvolemic.  -O2 sats down to 71% on RA; significant dyspnea with just standing up this am.  -suspect pt's subacute decompensation due to NSIP -- sx occurred the day after her solumedrol was deescalated to oral prednisone and CT findings are c/w NSIP.    -prednisone 40mg po q8 hrs (day #2) today     Hypotension  -likely due to mild overdiuresis (lasix held x 1 dose)  -BP improved  -cortisol level 4/9 - normal     Coronary artery disease  -Cath 1/14/25 by Dr. Bernardo Liz (as w/u for TAVR) -- RCA non-obstructive; LM free of obst disease; LM plaque extending into ostium of LAD (20-30%); mid LAD (80-90%), cx ostium (60-70%)  -intervention delayed until after colon resection due to need for DAPT x 6 mos after stenting   -s/p PCI/JUVENTINO of mid LAD (70%>0%) on 4/7/25 by Dr. Dutton  -cont ASA, Plavix  -increased atorvastatin from 20mg to 80mg/day (LDL 76 on 3/26/25)  -further recs per cardiology     Interstitial pneumonitis/NSIP  -dx'ed 10/2024 - presented with cough and severe hypoxia  -unresponsive to abx   -CXR 10/24/24 extensive bilateral perihilar and bilateral upper and lower lobe   -S.pneumo, legionella Ag , mycoplasma IgM/G, Fungitell-beta-D glucan negative  -ANCA and URIEL/connective tissue disease panels negative  -anti-histone and anti-[place] Ab's negative  -CT with bilateral ground glass opacities, small consolidations of BLL  -indings suspicious for NSIP (vs cryptogenic organizing pneumonia vs hypersensitivity pneumonitis); NOT thought to be  c/w UIP pattern  -s/p empiric steroids (solumedrol then prednisone taper) 10/25/24 - (EOT 1/18/25)  -dyspnea and hypoxia completely resolved until this admission 3/26/25 -- POPE, hypoxia  -steroids restarted as above     Epistaxis  -mild; from left nostril  -saline nasal spray; O2 already humidified     Adenocarcinoma of colon  -Bx of transverse colon polyp 12/17/24 -- invasive, moderately differentiated adenocarcinoma  -CEA normal (1.9)  -CT a/p neg for mets  -surgery delayed in anticipation of TAVR (done 1/23/25)  -s/p robotic assisted left hemicolectomy (Dr. Cassidy) - path - tumor invades into muscularis; margins neg for tumor; all 19 LN's neg for mets (0/19); pT2, pN0  -next appt with Dr. Cassidy 4/16/25     Hx  GI bleed  -EGD 11/26/24 (Dr. Nuno) -15mm nonbleeding gastric antral ulcer; 3mm gastric antral AVM s/p APC  -recurrence in 12/2024  -colonoscopy and repeat EGD 12/17/24 by Dr. Staton -- grade 1 esophagitis; duod ulcers healing; large flat polyp in transverse colon (carcinoma)   -received total of 3 units PRBCs   -Xarelto stopped in 12/2024  -s/p omeprazole 20mg BID x 8 weeks (EOT 1/22/25), now on omeprazole 20mg daily -- plan has been to continue until she completed her coronary stenting.  Will stop at discharge.      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   -MRI brain--no acute intracranial process  -was doing PT at [facility] -- continues to improve; almost back to normal      Hx of Bilateral  pulmonary emboli (10/2024)  -dx'ed at time of interstitial pneumonitis  -CT chest 10/25/24 -- acute distal segmental/subsegmental pulmonary emboli in RUL and subsegmental PE in [place]ITO  -unclear etiology; no recent hx of long travel; no family/personal hx of blood clots  -BLE venous dopplers negative 10/26/24  -started xarelto 20 mg po every day on 11/28/24  -repeat CT chest 12/9/24 neg PE  -stopped Xarelto 12/14/24 due to recurrent GI bleed  -repeat CT chest 3/26/25 and 4/9/25 -- neg PE     Severe aortic stenosis  -progression on serial echocardiograms  -s/p TAVR 1/23/25 (Dr. Reji Green)     Hx of Hypertension, primary  -(dyazide stopped due to NANCY)  -(amlodipine held due to low BP in 11/2024)  -BP remains normal off meds     Hx of hip OA  -s/p left THR (Dr. Lo) many years ago  -s/p elective R BEATRIZ on 10/5/23 by Dr. Diaz     Hyperlipidemia   Pt with strong family hx of high cholesterol  Started on Atorvastatin 20mg/day in 1/2025 (after noted to have CAD on cath)  Increased atorvastatin to 80mg/day as above     Osteopenia   On Fosamax from 2011 to 4/2016.     DEXA stable 5/10/17 and 2019 and 2021  DEXA 8/2024 -- osteoporosis of left forearm  -restarted Fosamax 8/2024 (on hold this admission)     Vitamin D deficiency  On vitamin D 4000u/day      VTE proph  -SQH        Dispo  -pt has been residing at [facility] assisted [facility] for past several months (for respite care and PT), and is scheduled to be there until July, after which she plans to return home  -PT eval appreciated - recommending [facility] (alternatively [place] w/PT); pt declines [facility] d/t poor experience previously                                    Discussed with pt, RN, tres Ferrari, and Dr. Holloway        I had a face to face visit with this patient and I evaluated the patient's O2 saturations. The patient is mobile in their home and is in need of a portable oxygen tank. The home oxygen will improve the patient's condition.       Nadia Steel,  DO  4/11/2025  7:52 AM

## 2025-04-11 NOTE — PLAN OF CARE
Problem: Patient Centered Care  Goal: Patient preferences are identified and integrated in the patient's plan of care  Description: Interventions:- What would you like us to know as we care for you? From [place] - Provide timely, complete, and accurate information to patient/family- Incorporate patient and family knowledge, values, beliefs, and cultural backgrounds into the planning and delivery of care- Encourage patient/family to participate in care and decision-making at the level they choose- Honor patient and family perspectives and choices  Outcome: Not Progressing     Problem: Patient/Family Goals  Goal: Patient/Family Long Term Goal  Description: Patient's Long Term Goal: to go home Interventions:- Monitor vital signs and labs  - Administer medications per order  - Follow MD orders  - Fall precautions  - Diagnostics as ordered  - Update / inform patient on plan of care  - Discharge planning- See additional Care Plan goals for specific interventions  Outcome: Not Progressing  Goal: Patient/Family Short Term Goal  Description: Patient's Short Term Goal: to feel better Interventions: -  See additional Care Plan goals for specific interventionsMonitor vital signs and labs  - Administer medications per order  - Follow MD orders  - Fall precautions  - Diagnostics as ordered  - Update / inform patient on plan of care  - Discharge planning  Outcome: Not Progressing     Problem: PAIN - ADULT  Goal: Verbalizes/displays adequate comfort level or patient's stated pain goal  Description: INTERVENTIONS:- Encourage pt to monitor pain and request assistance- Assess pain using appropriate pain scale- Administer analgesics based on type and severity of pain and evaluate response- Implement non-pharmacological measures as appropriate and evaluate response- Consider cultural and social influences on pain and pain management- Manage/alleviate anxiety- Utilize distraction and/or relaxation techniques- Monitor for  opioid side effects- Notify MD/LIP if interventions unsuccessful or patient reports new pain- Anticipate increased pain with activity and pre-medicate as appropriate  Outcome: Not Progressing     Problem: RISK FOR INFECTION - ADULT  Goal: Absence of fever/infection during anticipated neutropenic period  Description: INTERVENTIONS- Monitor WBC- Administer growth factors as ordered- Implement neutropenic guidelines  Outcome: Not Progressing     Problem: SAFETY ADULT - FALL  Goal: Free from fall injury  Description: INTERVENTIONS:- Assess pt frequently for physical needs- Identify cognitive and physical deficits and behaviors that affect risk of falls.- [place] fall precautions as indicated by assessment.- Educate pt/family on patient safety including physical limitations- Instruct pt to call for assistance with activity based on assessment- Modify environment to reduce risk of injury- Provide assistive devices as appropriate- Consider OT/PT consult to assist with strengthening/mobility- Encourage toileting schedule  Outcome: Not Progressing     Problem: DISCHARGE PLANNING  Goal: Discharge to home or other facility with appropriate resources  Description: INTERVENTIONS:- Identify barriers to discharge w/pt and caregiver- Include patient/family/discharge partner in discharge planning- Arrange for needed discharge resources and transportation as appropriate- Identify discharge learning needs (meds, wound care, etc)- Arrange for interpreters to assist at discharge as needed- Consider post-discharge preferences of patient/family/discharge partner- Complete POLST form as appropriate- Assess patient's ability to be responsible for managing their own health- Refer to Case Management Department for coordinating discharge planning if the patient needs post-hospital services based on physician/LIP order or complex needs related to functional status, cognitive ability or social support system  Outcome: Not Progressing      Problem: RESPIRATORY - ADULT  Goal: Achieves optimal ventilation and oxygenation  Description: INTERVENTIONS:- Assess for changes in respiratory status- Assess for changes in mentation and behavior- Position to facilitate oxygenation and minimize respiratory effort- Oxygen supplementation based on oxygen saturation or ABGs- Provide Smoking Cessation handout, if applicable- Encourage broncho-pulmonary hygiene including cough, deep breathe, Incentive Spirometry- Assess the need for suctioning and perform as needed- Assess and instruct to report SOB or any respiratory difficulty- Respiratory Therapy support as indicated- Manage/alleviate anxiety- Monitor for signs/symptoms of CO2 retention  Outcome: Not Progressing     Problem: MUSCULOSKELETAL - ADULT  Goal: Return mobility to safest level of function  Description: INTERVENTIONS:- Assess patient stability and activity tolerance for standing, transferring and ambulating w/ or w/o assistive devices- Assist with transfers and ambulation using safe patient handling equipment as needed- Ensure adequate protection for wounds/incisions during mobilization- Obtain PT/OT consults as needed- Advance activity as appropriate- Communicate ordered activity level and limitations with patient/family  Outcome: Not Progressing  Goal: Maintain proper alignment of affected body part  Description: INTERVENTIONS:- Support and protect limb and body alignment per provider's orders- Instruct and reinforce with patient and family use of appropriate assistive device and precautions (e.g. spinal or hip dislocation precautions)  Outcome: Not Progressing     Problem: Diabetes/Glucose Control  Goal: Glucose maintained within prescribed range  Description: INTERVENTIONS:- Monitor Blood Glucose as ordered- Assess for signs and symptoms of hyperglycemia and hypoglycemia- Administer ordered medications to maintain glucose within target range- Assess barriers to adequate nutritional intake and initiate  nutrition consult as needed- Instruct patient on self management of diabetes  Outcome: Not Progressing

## 2025-04-12 NOTE — PLAN OF CARE
Continuing IV solumedrol q8. Patient requiring 2Lo2. Call light within reach and fall precautions in place.     Problem: Patient Centered Care  Goal: Patient preferences are identified and integrated in the patient's plan of care  Description: Interventions:- What would you like us to know as we care for you? From [place] - Provide timely, complete, and accurate information to patient/family- Incorporate patient and family knowledge, values, beliefs, and cultural backgrounds into the planning and delivery of care- Encourage patient/family to participate in care and decision-making at the level they choose- Honor patient and family perspectives and choices  Outcome: Progressing     Problem: Patient/Family Goals  Goal: Patient/Family Long Term Goal  Description: Patient's Long Term Goal: to go home Interventions:- Monitor vital signs and labs  - Administer medications per order  - Follow MD orders  - Fall precautions  - Diagnostics as ordered  - Update / inform patient on plan of care  - Discharge planning- See additional Care Plan goals for specific interventions  Outcome: Progressing  Goal: Patient/Family Short Term Goal  Description: Patient's Short Term Goal: to feel better Interventions: -  See additional Care Plan goals for specific interventionsMonitor vital signs and labs  - Administer medications per order  - Follow MD orders  - Fall precautions  - Diagnostics as ordered  - Update / inform patient on plan of care  - Discharge planning  Outcome: Progressing     Problem: PAIN - ADULT  Goal: Verbalizes/displays adequate comfort level or patient's stated pain goal  Description: INTERVENTIONS:- Encourage pt to monitor pain and request assistance- Assess pain using appropriate pain scale- Administer analgesics based on type and severity of pain and evaluate response- Implement non-pharmacological measures as appropriate and evaluate response- Consider cultural and social influences on pain and pain  management- Manage/alleviate anxiety- Utilize distraction and/or relaxation techniques- Monitor for opioid side effects- Notify MD/LIP if interventions unsuccessful or patient reports new pain- Anticipate increased pain with activity and pre-medicate as appropriate  Outcome: Progressing     Problem: RISK FOR INFECTION - ADULT  Goal: Absence of fever/infection during anticipated neutropenic period  Description: INTERVENTIONS- Monitor WBC- Administer growth factors as ordered- Implement neutropenic guidelines  Outcome: Progressing     Problem: SAFETY ADULT - FALL  Goal: Free from fall injury  Description: INTERVENTIONS:- Assess pt frequently for physical needs- Identify cognitive and physical deficits and behaviors that affect risk of falls.- [place] fall precautions as indicated by assessment.- Educate pt/family on patient safety including physical limitations- Instruct pt to call for assistance with activity based on assessment- Modify environment to reduce risk of injury- Provide assistive devices as appropriate- Consider OT/PT consult to assist with strengthening/mobility- Encourage toileting schedule  Outcome: Progressing     Problem: DISCHARGE PLANNING  Goal: Discharge to home or other facility with appropriate resources  Description: INTERVENTIONS:- Identify barriers to discharge w/pt and caregiver- Include patient/family/discharge partner in discharge planning- Arrange for needed discharge resources and transportation as appropriate- Identify discharge learning needs (meds, wound care, etc)- Arrange for interpreters to assist at discharge as needed- Consider post-discharge preferences of patient/family/discharge partner- Complete POLST form as appropriate- Assess patient's ability to be responsible for managing their own health- Refer to Case Management Department for coordinating discharge planning if the patient needs post-hospital services based on physician/LIP order or complex needs related to  functional status, cognitive ability or social support system  Outcome: Progressing     Problem: RESPIRATORY - ADULT  Goal: Achieves optimal ventilation and oxygenation  Description: INTERVENTIONS:- Assess for changes in respiratory status- Assess for changes in mentation and behavior- Position to facilitate oxygenation and minimize respiratory effort- Oxygen supplementation based on oxygen saturation or ABGs- Provide Smoking Cessation handout, if applicable- Encourage broncho-pulmonary hygiene including cough, deep breathe, Incentive Spirometry- Assess the need for suctioning and perform as needed- Assess and instruct to report SOB or any respiratory difficulty- Respiratory Therapy support as indicated- Manage/alleviate anxiety- Monitor for signs/symptoms of CO2 retention  Outcome: Progressing     Problem: MUSCULOSKELETAL - ADULT  Goal: Return mobility to safest level of function  Description: INTERVENTIONS:- Assess patient stability and activity tolerance for standing, transferring and ambulating w/ or w/o assistive devices- Assist with transfers and ambulation using safe patient handling equipment as needed- Ensure adequate protection for wounds/incisions during mobilization- Obtain PT/OT consults as needed- Advance activity as appropriate- Communicate ordered activity level and limitations with patient/family  Outcome: Progressing  Goal: Maintain proper alignment of affected body part  Description: INTERVENTIONS:- Support and protect limb and body alignment per provider's orders- Instruct and reinforce with patient and family use of appropriate assistive device and precautions (e.g. spinal or hip dislocation precautions)  Outcome: Progressing     Problem: Diabetes/Glucose Control  Goal: Glucose maintained within prescribed range  Description: INTERVENTIONS:- Monitor Blood Glucose as ordered- Assess for signs and symptoms of hyperglycemia and hypoglycemia- Administer ordered medications to maintain glucose within  target range- Assess barriers to adequate nutritional intake and initiate nutrition consult as needed- Instruct patient on self management of diabetes  Outcome: Progressing

## 2025-04-12 NOTE — PROGRESS NOTES
[facility]  part of [facility]     Progress Note    Hoda Busby Patient Status:  Inpatient    1940 MRN [id number]   Location [facility]5W Attending Malathi Stuart MD   Hosp Day # 17 PCP Malathi Stuart MD       Subjective:   Patient seen and examined.  Admits to some improvement in dyspnea after increasing steroid dosage.  Currently on 4 L.  Denies significant cough  Objective:   Blood pressure 121/70, pulse 85, temperature 98.2 °F (36.8 °C), temperature source Oral, resp. rate 20, height 5' 4\" (1.626 m), weight 176 lb 12.8 oz (80.2 kg), SpO2 96%.  Intake/Output:   Last 3 shifts: I/O last 3 completed shifts:  In: 720 [P.O.:690; I.V.:30]  Out: 1 [Urine:1]   This shift: No intake/output data recorded.     Vent Settings:      Hemodynamic parameters (last 24 hours):      Scheduled Meds:   Current Facility-Administered Medications   Medication Dose Route Frequency    methylPREDNISolone sodium succinate (Solu-MEDROL) injection 40 mg  40 mg Intravenous Q8H    sodium chloride (Saline Mist) 0.65 % nasal solution 1 spray  1 spray Each Nare Q3H PRN    atorvastatin (Lipitor) tab 80 mg  80 mg Oral Nightly    clopidogrel (Plavix) tab 75 mg  75 mg Oral Daily    aspirin DR tab 81 mg  81 mg Oral Daily    acetaminophen (Tylenol) tab 650 mg  650 mg Oral Q6H PRN    furosemide (Lasix) tab 40 mg  40 mg Oral Daily    polyethylene glycol (PEG 3350) (Miralax) 17 g oral packet 17 g  17 g Oral Daily PRN    guaiFENesin (Robitussin) 100 MG/5 ML oral liquid 200 mg  200 mg Oral Q4H PRN    benzocaine-menthol (Cepacol) lozenge 1 lozenge  1 lozenge Oral PRN    vancomycin (Vancocin) cap 125 mg  125 mg Oral Daily    pantoprazole (Protonix) DR tab 40 mg  40 mg Oral QAM AC    traMADol (Ultram) tab 50 mg  50 mg Oral Q6H PRN    ipratropium-albuterol (Duoneb) 0.5-2.5 (3) MG/3ML inhalation solution 3 mL  3 mL Nebulization Q6H PRN    heparin (Porcine) 5000 UNIT/ML injection 5,000 Units  5,000 Units Subcutaneous 2  times per day    glucose (Dex4) 15 GM/59ML oral liquid 15 g  15 g Oral Q15 Min PRN    Or    glucose (Glutose) 40% oral gel 15 g  15 g Oral Q15 Min PRN    Or    glucose-vitamin C (Dex-4) chewable tab 4 tablet  4 tablet Oral Q15 Min PRN    Or    dextrose 50% injection 50 mL  50 mL Intravenous Q15 Min PRN    Or    glucose (Dex4) 15 GM/59ML oral liquid 30 g  30 g Oral Q15 Min PRN    Or    glucose (Glutose) 40% oral gel 30 g  30 g Oral Q15 Min PRN    Or    glucose-vitamin C (Dex-4) chewable tab 8 tablet  8 tablet Oral Q15 Min PRN    insulin aspart (NovoLOG) 100 Units/mL FlexPen 1-7 Units  1-7 Units Subcutaneous TID CC    ferrous sulfate DR tab 325 mg  325 mg Oral Daily with breakfast    docusate sodium (Colace) cap 100 mg  100 mg Oral BID       Continuous Infusions:     Physical Exam  Constitutional: no acute distress  Eyes: PERRL  ENT: nares pateint  Neck: supple, no JVD  Cardio: RRR, S1 S2  Respiratory: Basilar crackles  GI: abdomen soft, non tender, active bowel sounds, no organomegaly  Extremities: no clubbing, cyanosis, + trace lower extremity edema   Neurologic: no gross motor deficits  Skin: warm, dry      Results:     Lab Results   Component Value Date    CREATSERUM 0.83 04/12/2025    BUN 28 04/12/2025     04/12/2025    K 4.6 04/12/2025     04/12/2025    CO2 24.0 04/12/2025     04/12/2025    CA 8.1 04/12/2025           No results found.            Assessment   1.  Acute hypoxemic respiratory failure  2.  ILD  3.  Small pleural effusions  4.  Pulmonary edema  5.  Colon adenocarcinoma status post hemicolectomy  6.  Coronary artery disease  7.  Prior history of GI bleed  8.  Prior VTE  9.  History of severe aortic stenosis status post TAVR  10.  Hypertension     Plan   -Patient presents with progressive worsening dyspnea and acute hypoxemic respiratory failure.  -CT chest on presentation on 3/26/2025 with no evidence of acute pulmonary embolism seen.  Evidence of small pleural effusions with  groundglass opacities suggestive more likely of edema with worsening compared to 6 days prior from CT chest.  Some background of fibrotic changes present.  -Repeat CT high-resolution chest on 4/4/2025 with improvement in pulmonary edema and pleural effusions.  Underlying ILD changes present.  -CT chest with contrast on 4/9/2025 with no evidence of pulmonary embolism seen.  Fibrotic changes seen otherwise with small pleural effusions and mild edema.  -Discussed with primary care physician increased dosage to Solu-Medrol 40 mg every 8 hours on 4/10/2025.  Patient admits to some improvement in dyspnea after steroid dosage increase.  Will gradually wean.  Likely transition to Solu-Medrol 40 mg every 12 hours tomorrow  -Diuresis as need be.  -Prior history of underlying ILD with hospitalization October 2024 responded to tapering steroid therapy at the time.  -Pulmonary function testing from February 2025 with mild restriction seen with significant decrease in diffusion capacity.  -Wean oxygen as tolerated.  Admits to ongoing dyspnea currently on 4 L oxygen at rest with desaturation with activity.  Will need to evaluate for home oxygen prior to discharge.  -Completed course of antibiotic therapy with Augmentin.  -Status post PCI of mLAD on 4/7/2025  -DVT prophylaxis: Heparin      Cody Holloway DO  Pulmonary Critical Care Medicine  [facility]

## 2025-04-12 NOTE — PLAN OF CARE
Problem: Patient Centered Care  Goal: Patient preferences are identified and integrated in the patient's plan of care  Description: Interventions:- What would you like us to know as we care for you? From [place] - Provide timely, complete, and accurate information to patient/family- Incorporate patient and family knowledge, values, beliefs, and cultural backgrounds into the planning and delivery of care- Encourage patient/family to participate in care and decision-making at the level they choose- Honor patient and family perspectives and choices  Outcome: Progressing     Problem: Patient/Family Goals  Goal: Patient/Family Long Term Goal  Description: Patient's Long Term Goal: to go home Interventions:- Monitor vital signs and labs  - Administer medications per order  - Follow MD orders  - Fall precautions  - Diagnostics as ordered  - Update / inform patient on plan of care  - Discharge planning- See additional Care Plan goals for specific interventions  Outcome: Progressing  Goal: Patient/Family Short Term Goal  Description: Patient's Short Term Goal: to feel better Interventions: -  See additional Care Plan goals for specific interventionsMonitor vital signs and labs  - Administer medications per order  - Follow MD orders  - Fall precautions  - Diagnostics as ordered  - Update / inform patient on plan of care  - Discharge planning  Outcome: Progressing     Problem: PAIN - ADULT  Goal: Verbalizes/displays adequate comfort level or patient's stated pain goal  Description: INTERVENTIONS:- Encourage pt to monitor pain and request assistance- Assess pain using appropriate pain scale- Administer analgesics based on type and severity of pain and evaluate response- Implement non-pharmacological measures as appropriate and evaluate response- Consider cultural and social influences on pain and pain management- Manage/alleviate anxiety- Utilize distraction and/or relaxation techniques- Monitor for opioid side  effects- Notify MD/LIP if interventions unsuccessful or patient reports new pain- Anticipate increased pain with activity and pre-medicate as appropriate  Outcome: Progressing     Problem: RISK FOR INFECTION - ADULT  Goal: Absence of fever/infection during anticipated neutropenic period  Description: INTERVENTIONS- Monitor WBC- Administer growth factors as ordered- Implement neutropenic guidelines  Outcome: Progressing     Problem: SAFETY ADULT - FALL  Goal: Free from fall injury  Description: INTERVENTIONS:- Assess pt frequently for physical needs- Identify cognitive and physical deficits and behaviors that affect risk of falls.- [place] fall precautions as indicated by assessment.- Educate pt/family on patient safety including physical limitations- Instruct pt to call for assistance with activity based on assessment- Modify environment to reduce risk of injury- Provide assistive devices as appropriate- Consider OT/PT consult to assist with strengthening/mobility- Encourage toileting schedule  Outcome: Progressing     Problem: DISCHARGE PLANNING  Goal: Discharge to home or other facility with appropriate resources  Description: INTERVENTIONS:- Identify barriers to discharge w/pt and caregiver- Include patient/family/discharge partner in discharge planning- Arrange for needed discharge resources and transportation as appropriate- Identify discharge learning needs (meds, wound care, etc)- Arrange for interpreters to assist at discharge as needed- Consider post-discharge preferences of patient/family/discharge partner- Complete POLST form as appropriate- Assess patient's ability to be responsible for managing their own health- Refer to Case Management Department for coordinating discharge planning if the patient needs post-hospital services based on physician/LIP order or complex needs related to functional status, cognitive ability or social support system  Outcome: Progressing     Problem: RESPIRATORY -  ADULT  Goal: Achieves optimal ventilation and oxygenation  Description: INTERVENTIONS:- Assess for changes in respiratory status- Assess for changes in mentation and behavior- Position to facilitate oxygenation and minimize respiratory effort- Oxygen supplementation based on oxygen saturation or ABGs- Provide Smoking Cessation handout, if applicable- Encourage broncho-pulmonary hygiene including cough, deep breathe, Incentive Spirometry- Assess the need for suctioning and perform as needed- Assess and instruct to report SOB or any respiratory difficulty- Respiratory Therapy support as indicated- Manage/alleviate anxiety- Monitor for signs/symptoms of CO2 retention  Outcome: Progressing     Problem: MUSCULOSKELETAL - ADULT  Goal: Return mobility to safest level of function  Description: INTERVENTIONS:- Assess patient stability and activity tolerance for standing, transferring and ambulating w/ or w/o assistive devices- Assist with transfers and ambulation using safe patient handling equipment as needed- Ensure adequate protection for wounds/incisions during mobilization- Obtain PT/OT consults as needed- Advance activity as appropriate- Communicate ordered activity level and limitations with patient/family  Outcome: Progressing  Goal: Maintain proper alignment of affected body part  Description: INTERVENTIONS:- Support and protect limb and body alignment per provider's orders- Instruct and reinforce with patient and family use of appropriate assistive device and precautions (e.g. spinal or hip dislocation precautions)  Outcome: Progressing     Problem: Diabetes/Glucose Control  Goal: Glucose maintained within prescribed range  Description: INTERVENTIONS:- Monitor Blood Glucose as ordered- Assess for signs and symptoms of hyperglycemia and hypoglycemia- Administer ordered medications to maintain glucose within target range- Assess barriers to adequate nutritional intake and initiate nutrition consult as needed-  Instruct patient on self management of diabetes  Outcome: Progressing     Patient alert and oriented x4.  O2 at 4L/min via nasal cannula.  Call light and belongings within reach.  No complaints of pain at this time.  Safety precautions in place.

## 2025-04-12 NOTE — PROGRESS NOTES
[facility]  part of [facility]    Progress Note    Hoda Busby Patient Status:  Inpatient    1940 MRN [id number]   Location [facility] 3W/SW Attending Malathi Stuart MD   Hosp Day # 17 PCP Malathi Stuart MD       SUBJECTIVE:  Feels better today.  Was winded yesterday when walking (but improved)    OBJECTIVE:  Vital signs in last 24 hours:  /49 (BP Location: Right arm)   Pulse 98   Temp 97.7 °F (36.5 °C) (Oral)   Resp 18   Ht 5' 4\" (1.626 m)   Wt 176 lb 12.8 oz (80.2 kg)   SpO2 96%   BMI 30.35 kg/m²     Intake/Output:    Intake/Output Summary (Last 24 hours) at 2025 1257  Last data filed at 2025 0836  Gross per 24 hour   Intake 700 ml   Output --   Net 700 ml       Wt Readings from Last 3 Encounters:   25 176 lb 12.8 oz (80.2 kg)   25 180 lb (81.6 kg)   25 173 lb (78.5 kg)       EXAM:  GENERAL: well developed, well nourished, in no apparent distress  LUNGS: coarse crackles b/l 1/3 up, no wheezing  CARDIO: RRR, normal S1S2, 1/6 WADE  GI: soft, NT, ND, NABS  EXTREMITIES:1+ BLE edema    Data Review:     Labs:   Lab Results   Component Value Date    CREATSERUM 0.83 2025    BUN 28 2025     2025    K 4.6 2025     2025    CO2 24.0 2025     2025    CA 8.1 2025         Imaging:  No results found.   CARD ECHO 2D DOPPLER (CPT=93306)  Result Date: 3/27/2025  Transthoracic Echocardiogram Name:Hoda Busby Date: 2025 :  1940 Ht:  (64in)  BP: 137 / 81 MRN:  [id number]    Age:  84years    Wt:  (187lb) HR: 75bpm Loc:  [facility]       Gndr: F          BSA: 1.9m^2 Sonographer: Casper TAM RCS Ordering:    Malathi Stuart Consulting:  Martita Baez O ---------------------------------------------------------------------------- History/Indications:   Congestive heart failure.  Risk factors: Hypertension. TAVR-23 mm Martha 3 Resilia bioprosthesis. Acute hypoxic respiratory  failure. ---------------------------------------------------------------------------- Procedure information:  A transthoracic complete 2D study was performed. Additional evaluation included M-mode, complete spectral Doppler, and color Doppler.  Patient status:  Inpatient.  Location:  Bedside.    Comparison was made to the study of 02/05/2025.    This was a routine study. Transthoracic echocardiography for diagnosis and ventricular function evaluation. Image quality was adequate. ECG rhythm:   Normal sinus ---------------------------------------------------------------------------- Conclusions: 1. Left ventricle: The cavity size was normal. Wall thickness was normal.    Systolic function was normal. The estimated ejection fraction was 60-65%.    No diagnostic evidence for regional wall motion abnormalities. Doppler    parameters are consistent with abnormal left ventricular relaxation -    grade 1 diastolic dysfunction. 2. Right ventricle: The cavity size was increased. Systolic function was    normal. 3. Left atrium: The atrium was mildly to moderately dilated. The left atrial    volume was mildly to moderately increased. 4. Aortic valve: A bioprosthetic valve is present. Mcnamaar PUNEET S3 23mm    (TAVR) bioprosthesis was present. The peak systolic velocity was    2.69m/sec. The mean systolic gradient was 15mm Hg. The valve area (VTI)    was 1.52cm^2. 5. Pulmonary arteries: Systolic pressure was moderately increased, estimated    to be 52mm Hg. * ---------------------------------------------------------------------------- * Findings: Left ventricle:  The cavity size was normal. Wall thickness was normal. Systolic function was normal. The estimated ejection fraction was 60-65%. No diagnostic evidence for regional wall motion abnormalities. Doppler parameters are consistent with abnormal left ventricular relaxation - grade 1 diastolic dysfunction. Left atrium:  The atrium was mildly to moderately dilated. The left  atrial volume was mildly to moderately increased. Right ventricle:  The cavity size was increased. Systolic function was normal. Right atrium:  Well visualized. The atrium was normal in size. Mitral valve:  The annulus was moderately calcified. The leaflets were mildly thickened and mildly to moderately calcified. Leaflet separation was normal.  Doppler:  Transvalvular velocity was within the normal range. There was no evidence for stenosis. There was no significant regurgitation. Aortic valve:  A bioprosthetic valve is present. Mcnamara PUNEET S3 23mm (TAVR) bioprosthesis was present. Cusp separation was normal.  Doppler: Transvalvular velocity was within the normal range. There was no evidence for stenosis. There was no significant regurgitation.    The valve area (VTI) was 1.52cm^2. The valve area (VTI) index was 0.8cm^2/m^2.    The mean systolic gradient was 15mm Hg. The peak systolic gradient was 29mm Hg. Tricuspid valve:  The valve is structurally normal. Leaflet separation was normal.  Doppler:  Transvalvular velocity was within the normal range. There was no evidence for stenosis. There was mild regurgitation. Pulmonic valve:   The valve is structurally normal. Cusp separation was normal.  Doppler:  Transvalvular velocity was within the normal range. There was no evidence for stenosis. There was no significant regurgitation. Pericardium:   There was no pericardial effusion. Aorta: Aortic root: The aortic root was normal. Ascending aorta: The ascending aorta was normal. Pulmonary arteries: Systolic pressure was moderately increased, estimated to be 52mm Hg. Systemic veins:  Central venous respirophasic diameter changes are blunted (< 50%). Inferior vena cava: The IVC was dilated. ---------------------------------------------------------------------------- Measurements  Left ventricle       Value          Ref       01/24/2025  IVS thickness,   (H) 1.0   cm       0.6 - 0.9 1.0  ED, PLAX  LV ID, ED, PLAX  (H) 5.3    cm       3.8 - 5.2 5.0  LV ID, ES, PLAX      3.5   cm       2.2 - 3.5 3.1  LV PW thickness, (H) 1.0   cm       0.6 - 0.9 1.0  ED, PLAX  IVS/LV PW ratio,     1.00           --------- 1.02  ED, PLAX  LV PW/LV ID          0.19           --------- 0.2  ratio, ED, PLAX  LV area, ES, A4C     17.5  cm^2     --------- ----------  LV ejection          62    %        54 - 74   67  fraction  Stroke               45    ml/m^2   --------- 53  volume/bsa, 2D  LV end-diastolic     126   ml       48 - 140  ----------  volume, 1-p A4C  LV end-systolic      49    ml       12 - 60   ----------  volume, 1-p A4C  LV ejection          63    %        46 - 78   ----------  fraction, 1-p  A4C  Stroke volume,       79    ml       --------- ----------  1-p A4C  LV end-diastolic     66    ml/m^2   30 - 82   ----------  volume/bsa, 1-p  A4C  LV end-systolic      26    ml/m^2   7 - 35    ----------  volume/bsa, 1-p  A4C  Stroke               42    ml/m^2   --------- ----------  volume/bsa, 1-p  A4C  LV end-diastolic (H) 113   ml       46 - 106  ----------  volume, 2-p  LV end-systolic      42    ml       14 - 42   ----------  volume, 2-p  LV ejection          63    %        54 - 74   ----------  fraction, 2-p  Stroke volume,       72    ml       --------- ----------  2-p  LV end-diastolic     59    ml/m^2   29 - 61   ----------  volume/bsa, 2-p  LV end-systolic      22    ml/m^2   8 - 24    ----------  volume/bsa, 2-p  Stroke               37.6  ml/m^2   --------- ----------  volume/bsa, 2-p  LV e', lateral   (L) 4.9   cm/sec   >=10.0    ----------  LV E/e', lateral (H) 19             <=13      ----------  LV e', medial    (L) 3.2   cm/sec   >=7.0     ----------  LV E/e', medial      30             --------- ----------  LV e', average       4.0   cm/sec   --------- ----------  LV E/e', average (H) 23             <=14      ----------  LVOT                 Value          Ref       01/24/2025  LVOT ID              1.9   cm       --------- 2.2   LVOT peak            1.29  m/sec    --------- 1.31  velocity, S  LVOT VTI, S          29.9  cm       --------- 27.6  LVOT peak            7     mm Hg    --------- 7  gradient, S  LVOT mean            4     mm Hg    --------- 3  gradient, S  Stroke volume        85    ml       --------- 105  (SV), LVOT DP  Stroke index         45    ml/m^2   --------- 53  (SV/bsa), LVOT  DP  Aortic valve         Value          Ref       01/24/2025  Aortic valve         2.69  m/sec    --------- 2.4  peak velocity, S  Aortic valve         55.7  cm       --------- 51.6  VTI, S  Aortic mean          15    mm Hg    --------- 12  gradient, S  Aortic peak          29    mm Hg    --------- 23  gradient, S  Aortic valve         1.52  cm^2     --------- 2.03  area, VTI  Aortic valve         0.8   cm^2/m^2 --------- 1.03  area/bsa, VTI  Velocity ratio,      0.48           --------- 0.55  peak, LVOT/AV  Ascending aorta      Value          Ref       01/24/2025  Ascending aorta      3.5   cm       1.9 - 3.5 ----------  ID  Left atrium          Value          Ref       01/24/2025  LA ID, A-P, ES       3.3   cm       2.7 - 3.8 4.0  LA volume/bsa, S (H) 39    ml/m^2   16 - 34   56  LA volume, ES,   (H) 74    ml       22 - 52   101  1-p A4C  Mitral valve         Value          Ref       01/24/2025  Mitral E-wave        0.94  m/sec    --------- ----------  peak velocity  Mitral A-wave        1.24  m/sec    --------- ----------  peak velocity  Mitral               190   ms       --------- ----------  deceleration  time  Mitral peak          4     mm Hg    --------- ----------  gradient, D  Mitral E/A           0.8            --------- ----------  ratio, peak  Pulmonary artery     Value          Ref       01/24/2025  PA pressure, S,      52    mm Hg    --------- ----------  DP  Tricuspid valve      Value          Ref       01/24/2025  Tricuspid regurg (H) 3.06  m/sec    <=2.8     ----------  peak velocity  Tricuspid peak       37    mm Hg    ---------  ----------  RV-RA gradient  Right atrium         Value          Ref       01/24/2025  RA ID, S-I, ES,  (H) 6.3   cm       3.4 - 5.3 ----------  A4C  RA ID/bsa, S-I,  (H) 3.3   cm/m^2   1.9 - 3.1 ----------  ES, A4C  RA area, ES, A4C     17    cm^2     10 - 18   ----------  RA volume, ES,       38    ml       --------- ----------  1-p A4C  RA volume/bsa,       20    ml/m^2   9 - 33    ----------  ES, 1-p A4C  Systemic veins       Value          Ref       01/24/2025  Estimated CVP        15    mm Hg    --------- ----------  Inferior vena        Value          Ref       01/24/2025  cava  ID               (H) 2.7   cm       <=2.1     ----------  Right ventricle      Value          Ref       01/24/2025  TAPSE, 2D            2.74  cm       >=1.70    ----------  TAPSE, MM            2.74  cm       >=1.70    ----------  RV pressure, S,      52    mm Hg    --------- ----------  DP  RV s', lateral       14.7  cm/sec   >=9.5     ---------- Legend: (L)  and  (H)  migel values outside specified reference range. ---------------------------------------------------------------------------- Prepared and electronically signed by Bran Salcedo 03/27/2025 16:38          Meds:   Current Hospital Medications[1]    Assessment & Plan      Acute hypoxic respiratory failure (HCC)  -multifactorial: acute HFpEF, recurrent interstitial pneumonitis, CAP  -CT chest in ED 3/26/25 - no PE; worsening of pleural effusions and interstitial edema  -required BiPAP transiently; was up to 15L O2  -procalc sl elevated 0.25; WBC 19.8 K on admission  -echo 2/5/25 - normal LV systolic function (EF 60-65%); grade 2 diastolic dysfunction; normally functioning bioprosthetic TAVR; sl incr PAP 44mmHg  -repeat echo 3/27/25 stable from 2/2025 except incr PAP to 52mmHg  -hi res CT chest 4/4/25 --pulmonary edema and bilateral pleural effusions significantly improved.  There is persistent trace right pleural effusion.  Advanced interstitial lung disease is noted in  an NSIP (nonspecific insterstitial pneumonitis) pattern.   -s/p ceftriaxone/azithro then augmentin (stopped 4/7)  -IV lasix changed to po lasix - currently 40mg/day (for HFpEF)  -IV solumedrol changed to prednisone 40mg/day on 4/7 per pulm (for NSIP)  -initially with clinical improvement - decreased O2 requirement, improvement in dyspnea; however, pt noted an acute worsening in her sx on 4/8.  CT chest w/contrast on 4/9 -- negative for PE; no significant fluid overload; only small b/l pleural effusions and atelectasis; it did show peripheral and basilar predominant pulmonary interstitial fibrosis with suggestion of underlying nonspecific interstitial pneumonitis.  RHC on 4/7 showed normal filling pressures.  Pt appears euvolemic.  -O2 sats down to 71% on RA; significant dyspnea with just standing up on 4/10  -suspect pt's subacute decompensation due to NSIP -- sx occurred the day after her solumedrol was deescalated to oral prednisone and CT findings are c/w NSIP.    -increased prednisone to 40mg po q8 hrs (day #3) today  -O2 down to 3L today  -clinically improved; less dyspnea     Hypotension  -likely due to mild overdiuresis (lasix held x 1 dose)  -BP improved (100's-130' systolic)  -cortisol level 4/9 - normal     Coronary artery disease  -Cath 1/14/25 by Dr. Bernardo Lzi (as w/u for TAVR) -- RCA non-obstructive; LM free of obst disease; LM plaque extending into ostium of LAD (20-30%); mid LAD (80-90%), cx ostium (60-70%)  -intervention delayed until after colon resection due to need for DAPT x 6 mos after stenting   -s/p PCI/JUVENTINO of mid LAD (70%>0%) on 4/7/25 by Dr. Dutton  -cont ASA, Plavix  -increased atorvastatin from 20mg to 80mg/day (LDL 76 on 3/26/25)  -further recs per cardiology     Interstitial pneumonitis/NSIP  -dx'ed 10/2024 - presented with cough and severe hypoxia  -unresponsive to abx   -CXR 10/24/24 extensive bilateral perihilar and bilateral upper and lower lobe   -S.pneumo, legionella Ag ,  mycoplasma IgM/G, Fungitell-beta-D glucan negative  -ANCA and URIEL/connective tissue disease panels negative  -anti-histone and anti-Keara Ab's negative  -CT with bilateral ground glass opacities, small consolidations of BLL  -indings suspicious for NSIP (vs cryptogenic organizing pneumonia vs hypersensitivity pneumonitis); NOT thought to be c/w UIP pattern  -s/p empiric steroids (solumedrol then prednisone taper) 10/25/24 - (EOT 1/18/25)  -dyspnea and hypoxia completely resolved until this admission 3/26/25 -- POPE, hypoxia  -steroids restarted as above     Epistaxis  -mild; from left nostril  -saline nasal spray; O2 already humidified     Adenocarcinoma of colon  -Bx of transverse colon polyp 12/17/24 -- invasive, moderately differentiated adenocarcinoma  -CEA normal (1.9)  -CT a/p neg for mets  -surgery delayed in anticipation of TAVR (done 1/23/25)  -s/p robotic assisted left hemicolectomy (Dr. Cassidy) - path - tumor invades into muscularis; margins neg for tumor; all 19 LN's neg for mets (0/19); pT2, pN0  -next appt with Dr. Cassidy 4/16/25     Hx  GI bleed  -EGD 11/26/24 (Dr. Nuno) -15mm nonbleeding gastric antral ulcer; 3mm gastric antral AVM s/p APC  -recurrence in 12/2024  -colonoscopy and repeat EGD 12/17/24 by Dr. Staton -- grade 1 esophagitis; duod ulcers healing; large flat polyp in transverse colon (carcinoma)   -received total of 3 units PRBCs   -Xarelto stopped in 12/2024  -s/p omeprazole 20mg BID x 8 weeks (EOT 1/22/25), now on omeprazole 20mg daily -- plan has been to continue until she completed her coronary stenting.  Will stop at discharge.      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   -MRI brain--no acute intracranial process  -was doing PT at [facility] -- continues to improve; almost back to normal      Hx of Bilateral pulmonary emboli (10/2024)  -dx'ed at time of interstitial pneumonitis  -CT chest 10/25/24 -- acute distal segmental/subsegmental pulmonary emboli in [place] and subsegmental PE in [place]  -unclear etiology; no recent hx of long travel; no family/personal hx of blood clots  -BLE venous dopplers negative 10/26/24  -started xarelto 20 mg po every day on 11/28/24  -repeat CT chest 12/9/24 neg PE  -stopped Xarelto 12/14/24 due to recurrent GI bleed  -repeat CT chest 3/26/25 and 4/9/25 -- neg PE     Severe aortic stenosis  -progression on serial echocardiograms  -s/p TAVR 1/23/25 (Dr. Reji Green)     Hx of Hypertension, primary  -(dyazide stopped due to NANCY)  -(amlodipine held due to low BP in 11/2024)  -BP remains normal off meds     Hx of hip OA  -s/p left THR (Dr. Lo) many years ago  -s/p elective R BEATRIZ on 10/5/23 by Dr. Diaz     Hyperlipidemia   Pt with strong family hx of high cholesterol  Started on Atorvastatin 20mg/day in 1/2025 (after noted to have CAD on cath)  Increased atorvastatin to 80mg/day as above     Osteopenia   On Fosamax from 2011 to 4/2016.     DEXA stable 5/10/17 and 2019 and 2021  DEXA 8/2024 -- osteoporosis of left forearm  -restarted Fosamax 8/2024 (on hold this admission)     Vitamin D deficiency  On vitamin D 4000u/day      VTE proph  -SQH        Dispo  -pt has been residing at [facility] assisted [facility] for past several months (for respite care and PT), and is scheduled to be there until July, after which she plans to return home  -PT eval appreciated - recommending [facility] (alternatively [facility] w/PT); pt  declines [facility] d/t poor experience previously                                               Malathi Stuart MD  4/12/2025  12:57 PM         [1]   Current Facility-Administered Medications   Medication Dose Route Frequency    methylPREDNISolone sodium succinate (Solu-MEDROL) injection 40 mg  40 mg Intravenous Q8H    sodium chloride (Saline Mist) 0.65 % nasal solution 1 spray  1 spray Each Nare Q3H PRN    atorvastatin (Lipitor) tab 80 mg  80 mg Oral Nightly    clopidogrel (Plavix) tab 75 mg  75 mg Oral Daily    aspirin DR tab 81 mg  81 mg Oral Daily    acetaminophen (Tylenol) tab 650 mg  650 mg Oral Q6H PRN    furosemide (Lasix) tab 40 mg  40 mg Oral Daily    polyethylene glycol (PEG 3350) (Miralax) 17 g oral packet 17 g  17 g Oral Daily PRN    guaiFENesin (Robitussin) 100 MG/5 ML oral liquid 200 mg  200 mg Oral Q4H PRN    benzocaine-menthol (Cepacol) lozenge 1 lozenge  1 lozenge Oral PRN    vancomycin (Vancocin) cap 125 mg  125 mg Oral Daily    pantoprazole (Protonix) DR tab 40 mg  40 mg Oral QAM AC    traMADol (Ultram) tab 50 mg  50 mg Oral Q6H PRN    ipratropium-albuterol (Duoneb) 0.5-2.5 (3) MG/3ML inhalation solution 3 mL  3 mL Nebulization Q6H PRN    heparin (Porcine) 5000 UNIT/ML injection 5,000 Units  5,000 Units Subcutaneous 2 times per day    glucose (Dex4) 15 GM/59ML oral liquid 15 g  15 g Oral Q15 Min PRN    Or    glucose (Glutose) 40% oral gel 15 g  15 g Oral Q15 Min PRN    Or    glucose-vitamin C (Dex-4) chewable tab 4 tablet  4 tablet Oral Q15 Min PRN    Or    dextrose 50% injection 50 mL  50 mL Intravenous Q15 Min PRN    Or    glucose (Dex4) 15 GM/59ML oral liquid 30 g  30 g Oral Q15 Min PRN    Or    glucose (Glutose) 40% oral gel 30 g  30 g Oral Q15 Min PRN    Or    glucose-vitamin C (Dex-4) chewable tab 8 tablet  8 tablet Oral Q15 Min PRN    insulin aspart (NovoLOG) 100 Units/mL FlexPen 1-7 Units  1-7 Units Subcutaneous TID CC    ferrous sulfate DR tab 325 mg  325 mg Oral Daily with breakfast     docusate sodium (Colace) cap 100 mg  100 mg Oral BID

## 2025-04-13 NOTE — PROGRESS NOTES
Patient's O2 sat on room air is 88% at rest. Pt's O2 sat on room is 77% when ambulating, and 91% on 4-6 liter while ambulating.

## 2025-04-13 NOTE — PROGRESS NOTES
[facility]  part of [facility]    Progress Note    Hoda Busby Patient Status:  Inpatient    1940 MRN [id number]   Location [facility] 3W/SW Attending Malathi Stuart MD   Hosp Day # 18 PCP Malathi Stuart MD       SUBJECTIVE:  Breathing now normal at rest; still with POPE    OBJECTIVE:  Vital signs in last 24 hours:  /56 (BP Location: Right arm)   Pulse 82   Temp 98.3 °F (36.8 °C) (Oral)   Resp 16   Ht 5' 4\" (1.626 m)   Wt 177 lb 6.4 oz (80.5 kg)   SpO2 94%   BMI 30.45 kg/m²     Intake/Output:    Intake/Output Summary (Last 24 hours) at 2025 1151  Last data filed at 2025 0822  Gross per 24 hour   Intake 650 ml   Output --   Net 650 ml       Wt Readings from Last 3 Encounters:   25 177 lb 6.4 oz (80.5 kg)   25 180 lb (81.6 kg)   25 173 lb (78.5 kg)       EXAM:  GENERAL: well developed, well nourished, in no apparent distress  LUNGS: coarse crackles (1/2 up on R, 1/3 up on L)  CARDIO: RRR, normal S1S2 , 1/6 WADE  GI: soft, NT, ND, NABS  EXTREMITIES: tr BLE edema (CRISTA hose in place)    Data Review:     Labs:   Lab Results   Component Value Date    CREATSERUM 0.86 2025    BUN 30 2025     2025    K 4.7 2025     2025    CO2 23.0 2025    GLU 99 2025    CA 8.1 2025    MG 2.1 2025         Imaging:  No results found.   CARD ECHO 2D DOPPLER (CPT=93306)  Result Date: 3/27/2025  Transthoracic Echocardiogram Name:Hoda Busby Date: 2025 :  1940 Ht:  (64in)  BP: 137 / 81 MRN:  [id number]    Age:  84years    Wt:  (187lb) HR: 75bpm Loc:  [facility]       Gndr: F          BSA: 1.9m^2 Sonographer: Casper JOHNSON Ordering:    Malathi Stuart Consulting:  Martita Baez ---------------------------------------------------------------------------- History/Indications:   Congestive heart failure.  Risk factors: Hypertension. TAVR-23 mm Martha 3 Resilia bioprosthesis. Acute  hypoxic respiratory failure. ---------------------------------------------------------------------------- Procedure information:  A transthoracic complete 2D study was performed. Additional evaluation included M-mode, complete spectral Doppler, and color Doppler.  Patient status:  Inpatient.  Location:  Bedside.    Comparison was made to the study of 02/05/2025.    This was a routine study. Transthoracic echocardiography for diagnosis and ventricular function evaluation. Image quality was adequate. ECG rhythm:   Normal sinus ---------------------------------------------------------------------------- Conclusions: 1. Left ventricle: The cavity size was normal. Wall thickness was normal.    Systolic function was normal. The estimated ejection fraction was 60-65%.    No diagnostic evidence for regional wall motion abnormalities. Doppler    parameters are consistent with abnormal left ventricular relaxation -    grade 1 diastolic dysfunction. 2. Right ventricle: The cavity size was increased. Systolic function was    normal. 3. Left atrium: The atrium was mildly to moderately dilated. The left atrial    volume was mildly to moderately increased. 4. Aortic valve: A bioprosthetic valve is present. Mcnamara PUNEET S3 23mm    (TAVR) bioprosthesis was present. The peak systolic velocity was    2.69m/sec. The mean systolic gradient was 15mm Hg. The valve area (VTI)    was 1.52cm^2. 5. Pulmonary arteries: Systolic pressure was moderately increased, estimated    to be 52mm Hg. * ---------------------------------------------------------------------------- * Findings: Left ventricle:  The cavity size was normal. Wall thickness was normal. Systolic function was normal. The estimated ejection fraction was 60-65%. No diagnostic evidence for regional wall motion abnormalities. Doppler parameters are consistent with abnormal left ventricular relaxation - grade 1 diastolic dysfunction. Left atrium:  The atrium was mildly to moderately  dilated. The left atrial volume was mildly to moderately increased. Right ventricle:  The cavity size was increased. Systolic function was normal. Right atrium:  Well visualized. The atrium was normal in size. Mitral valve:  The annulus was moderately calcified. The leaflets were mildly thickened and mildly to moderately calcified. Leaflet separation was normal.  Doppler:  Transvalvular velocity was within the normal range. There was no evidence for stenosis. There was no significant regurgitation. Aortic valve:  A bioprosthetic valve is present. Mcnamara PUNEET S3 23mm (TAVR) bioprosthesis was present. Cusp separation was normal.  Doppler: Transvalvular velocity was within the normal range. There was no evidence for stenosis. There was no significant regurgitation.    The valve area (VTI) was 1.52cm^2. The valve area (VTI) index was 0.8cm^2/m^2.    The mean systolic gradient was 15mm Hg. The peak systolic gradient was 29mm Hg. Tricuspid valve:  The valve is structurally normal. Leaflet separation was normal.  Doppler:  Transvalvular velocity was within the normal range. There was no evidence for stenosis. There was mild regurgitation. Pulmonic valve:   The valve is structurally normal. Cusp separation was normal.  Doppler:  Transvalvular velocity was within the normal range. There was no evidence for stenosis. There was no significant regurgitation. Pericardium:   There was no pericardial effusion. Aorta: Aortic root: The aortic root was normal. Ascending aorta: The ascending aorta was normal. Pulmonary arteries: Systolic pressure was moderately increased, estimated to be 52mm Hg. Systemic veins:  Central venous respirophasic diameter changes are blunted (< 50%). Inferior vena cava: The IVC was dilated. ---------------------------------------------------------------------------- Measurements  Left ventricle       Value          Ref       01/24/2025  IVS thickness,   (H) 1.0   cm       0.6 - 0.9 1.0  ED, PLAX  LV ID,  ED, PLAX  (H) 5.3   cm       3.8 - 5.2 5.0  LV ID, ES, PLAX      3.5   cm       2.2 - 3.5 3.1  LV PW thickness, (H) 1.0   cm       0.6 - 0.9 1.0  ED, PLAX  IVS/LV PW ratio,     1.00           --------- 1.02  ED, PLAX  LV PW/LV ID          0.19           --------- 0.2  ratio, ED, PLAX  LV area, ES, A4C     17.5  cm^2     --------- ----------  LV ejection          62    %        54 - 74   67  fraction  Stroke               45    ml/m^2   --------- 53  volume/bsa, 2D  LV end-diastolic     126   ml       48 - 140  ----------  volume, 1-p A4C  LV end-systolic      49    ml       12 - 60   ----------  volume, 1-p A4C  LV ejection          63    %        46 - 78   ----------  fraction, 1-p  A4C  Stroke volume,       79    ml       --------- ----------  1-p A4C  LV end-diastolic     66    ml/m^2   30 - 82   ----------  volume/bsa, 1-p  A4C  LV end-systolic      26    ml/m^2   7 - 35    ----------  volume/bsa, 1-p  A4C  Stroke               42    ml/m^2   --------- ----------  volume/bsa, 1-p  A4C  LV end-diastolic (H) 113   ml       46 - 106  ----------  volume, 2-p  LV end-systolic      42    ml       14 - 42   ----------  volume, 2-p  LV ejection          63    %        54 - 74   ----------  fraction, 2-p  Stroke volume,       72    ml       --------- ----------  2-p  LV end-diastolic     59    ml/m^2   29 - 61   ----------  volume/bsa, 2-p  LV end-systolic      22    ml/m^2   8 - 24    ----------  volume/bsa, 2-p  Stroke               37.6  ml/m^2   --------- ----------  volume/bsa, 2-p  LV e', lateral   (L) 4.9   cm/sec   >=10.0    ----------  LV E/e', lateral (H) 19             <=13      ----------  LV e', medial    (L) 3.2   cm/sec   >=7.0     ----------  LV E/e', medial      30             --------- ----------  LV e', average       4.0   cm/sec   --------- ----------  LV E/e', average (H) 23             <=14      ----------  LVOT                 Value          Ref       01/24/2025  LVOT ID              1.9   cm        --------- 2.2  LVOT peak            1.29  m/sec    --------- 1.31  velocity, S  LVOT VTI, S          29.9  cm       --------- 27.6  LVOT peak            7     mm Hg    --------- 7  gradient, S  LVOT mean            4     mm Hg    --------- 3  gradient, S  Stroke volume        85    ml       --------- 105  (SV), LVOT DP  Stroke index         45    ml/m^2   --------- 53  (SV/bsa), LVOT  DP  Aortic valve         Value          Ref       01/24/2025  Aortic valve         2.69  m/sec    --------- 2.4  peak velocity, S  Aortic valve         55.7  cm       --------- 51.6  VTI, S  Aortic mean          15    mm Hg    --------- 12  gradient, S  Aortic peak          29    mm Hg    --------- 23  gradient, S  Aortic valve         1.52  cm^2     --------- 2.03  area, VTI  Aortic valve         0.8   cm^2/m^2 --------- 1.03  area/bsa, VTI  Velocity ratio,      0.48           --------- 0.55  peak, LVOT/AV  Ascending aorta      Value          Ref       01/24/2025  Ascending aorta      3.5   cm       1.9 - 3.5 ----------  ID  Left atrium          Value          Ref       01/24/2025  LA ID, A-P, ES       3.3   cm       2.7 - 3.8 4.0  LA volume/bsa, S (H) 39    ml/m^2   16 - 34   56  LA volume, ES,   (H) 74    ml       22 - 52   101  1-p A4C  Mitral valve         Value          Ref       01/24/2025  Mitral E-wave        0.94  m/sec    --------- ----------  peak velocity  Mitral A-wave        1.24  m/sec    --------- ----------  peak velocity  Mitral               190   ms       --------- ----------  deceleration  time  Mitral peak          4     mm Hg    --------- ----------  gradient, D  Mitral E/A           0.8            --------- ----------  ratio, peak  Pulmonary artery     Value          Ref       01/24/2025  PA pressure, S,      52    mm Hg    --------- ----------  DP  Tricuspid valve      Value          Ref       01/24/2025  Tricuspid regurg (H) 3.06  m/sec    <=2.8     ----------  peak velocity  Tricuspid peak       37    mm Hg     --------- ----------  RV-RA gradient  Right atrium         Value          Ref       01/24/2025  RA ID, S-I, ES,  (H) 6.3   cm       3.4 - 5.3 ----------  A4C  RA ID/bsa, S-I,  (H) 3.3   cm/m^2   1.9 - 3.1 ----------  ES, A4C  RA area, ES, A4C     17    cm^2     10 - 18   ----------  RA volume, ES,       38    ml       --------- ----------  1-p A4C  RA volume/bsa,       20    ml/m^2   9 - 33    ----------  ES, 1-p A4C  Systemic veins       Value          Ref       01/24/2025  Estimated CVP        15    mm Hg    --------- ----------  Inferior vena        Value          Ref       01/24/2025  cava  ID               (H) 2.7   cm       <=2.1     ----------  Right ventricle      Value          Ref       01/24/2025  TAPSE, 2D            2.74  cm       >=1.70    ----------  TAPSE, MM            2.74  cm       >=1.70    ----------  RV pressure, S,      52    mm Hg    --------- ----------  DP  RV s', lateral       14.7  cm/sec   >=9.5     ---------- Legend: (L)  and  (H)  migel values outside specified reference range. ---------------------------------------------------------------------------- Prepared and electronically signed by Bran Salcedo 03/27/2025 16:38          Meds:   Current Hospital Medications[1]    Assessment & Plan      Acute hypoxic respiratory failure (HCC)  -multifactorial: acute HFpEF, recurrent interstitial pneumonitis, CAP  -CT chest in ED 3/26/25 - no PE; worsening of pleural effusions and interstitial edema  -required BiPAP transiently; was up to 15L O2  -procalc sl elevated 0.25; WBC 19.8 K on admission  -echo 2/5/25 - normal LV systolic function (EF 60-65%); grade 2 diastolic dysfunction; normally functioning bioprosthetic TAVR; sl incr PAP 44mmHg  -repeat echo 3/27/25 stable from 2/2025 except incr PAP to 52mmHg  -hi res CT chest 4/4/25 --pulmonary edema and bilateral pleural effusions significantly improved.  There is persistent trace right pleural effusion.  Advanced interstitial lung disease  is noted in an NSIP (nonspecific insterstitial pneumonitis) pattern.   -s/p ceftriaxone/azithro then augmentin (stopped 4/7)  -IV lasix changed to po lasix - currently 40mg/day (for HFpEF)  -IV solumedrol changed to prednisone 40mg/day on 4/7 per pulm (for NSIP)  -initially with clinical improvement - decreased O2 requirement, improvement in dyspnea; however, pt noted an acute worsening in her sx on 4/8.  CT chest w/contrast on 4/9 -- negative for PE; no significant fluid overload; only small b/l pleural effusions and atelectasis; it did show peripheral and basilar predominant pulmonary interstitial fibrosis with suggestion of underlying nonspecific interstitial pneumonitis.  RHC on 4/7 showed normal filling pressures.  Pt appears euvolemic.  -O2 sats down to 71% on RA; significant dyspnea with just standing up on 4/10  -suspect pt's subacute decompensation due to NSIP -- sx occurred the day after her solumedrol was deescalated to oral prednisone and CT findings are c/w NSIP.    -restarted solumedrol 40mg IV q8hrs on 4/10 with improvement -- decreased to q12 hrs today  -O2 down to 2L today  -clinically improved; no longer SOB at rest  -per RN, pt ambulated about 10 ft to the bathroom this am and desaturated to 77% on 2L NC and c/o dyspnea     Hypotension  -likely due to mild overdiuresis (lasix held x 1 dose)  -BP improved (100's-130' systolic)  -cortisol level 4/9 - normal     Coronary artery disease  -Cath 1/14/25 by Dr. Bernardo Liz (as w/u for TAVR) -- RCA non-obstructive; LM free of obst disease; LM plaque extending into ostium of LAD (20-30%); mid LAD (80-90%), cx ostium (60-70%)  -intervention delayed until after colon resection due to need for DAPT x 6 mos after stenting   -s/p PCI/JUVENTINO of mid LAD (70%>0%) on 4/7/25 by Dr. Dutton  -cont ASA, Plavix  -increased atorvastatin from 20mg to 80mg/day (LDL 76 on 3/26/25)  -further recs per cardiology     Interstitial pneumonitis/NSIP  -dx'ed 10/2024 - presented with  cough and severe hypoxia  -unresponsive to abx   -CXR 10/24/24 extensive bilateral perihilar and bilateral upper and lower lobe   -S.pneumo, legionella Ag , mycoplasma IgM/G, Fungitell-beta-D glucan negative  -ANCA and URIEL/connective tissue disease panels negative  -anti-histone and anti-Keara Ab's negative  -CT with bilateral ground glass opacities, small consolidations of BLL  -indings suspicious for NSIP (vs cryptogenic organizing pneumonia vs hypersensitivity pneumonitis); NOT thought to be c/w UIP pattern  -s/p empiric steroids (solumedrol then prednisone taper) 10/25/24 - (EOT 1/18/25)  -dyspnea and hypoxia completely resolved until this admission 3/26/25 -- POPE, hypoxia  -steroids restarted as above     Epistaxis  -mild; from left nostril  -saline nasal spray; O2 already humidified     Adenocarcinoma of colon  -Bx of transverse colon polyp 12/17/24 -- invasive, moderately differentiated adenocarcinoma  -CEA normal (1.9)  -CT a/p neg for mets  -surgery delayed in anticipation of TAVR (done 1/23/25)  -s/p robotic assisted left hemicolectomy (Dr. Cassidy) - path - tumor invades into muscularis; margins neg for tumor; all 19 LN's neg for mets (0/19); pT2, pN0  -next appt with Dr. Cassidy 4/16/25     Hx  GI bleed  -EGD 11/26/24 (Dr. Nuno) -15mm nonbleeding gastric antral ulcer; 3mm gastric antral AVM s/p APC  -recurrence in 12/2024  -colonoscopy and repeat EGD 12/17/24 by Dr. Staton -- grade 1 esophagitis; duod ulcers healing; large flat polyp in transverse colon (carcinoma)   -received total of 3 units PRBCs   -Xarelto stopped in 12/2024  -s/p omeprazole 20mg BID x 8 weeks (EOT 1/22/25), now on omeprazole 20mg daily -- plan has been to continue until she completed her coronary stenting.  Will stop at discharge.      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   -MRI brain--no acute intracranial process  -was doing PT at [facility] -- continues to improve; almost back to normal      Hx of Bilateral pulmonary emboli (10/2024)  -dx'ed at time of interstitial pneumonitis  -CT chest 10/25/24 -- acute distal segmental/subsegmental pulmonary emboli in [place] and subsegmental PE in [place]  -unclear etiology; no recent hx of long travel; no family/personal hx of blood clots  -BLE venous dopplers negative 10/26/24  -started xarelto 20 mg po every day on 11/28/24  -repeat CT chest 12/9/24 neg PE  -stopped Xarelto 12/14/24 due to recurrent GI bleed  -repeat CT chest 3/26/25 and 4/9/25 -- neg PE     Severe aortic stenosis  -progression on serial echocardiograms  -s/p TAVR 1/23/25 (Dr. Reji Green)     Hx of Hypertension, primary  -(dyazide stopped due to NANCY)  -(amlodipine held due to low BP in 11/2024)  -BP remains normal off meds     Hx of hip OA  -s/p left THR (Dr. Lo) many years ago  -s/p elective R BEATRIZ on 10/5/23 by Dr. Diaz     Hyperlipidemia   Pt with strong family hx of high cholesterol  Started on Atorvastatin 20mg/day in 1/2025 (after noted to have CAD on cath)  Increased atorvastatin to 80mg/day as above     Osteopenia   On Fosamax from 2011 to 4/2016.     DEXA stable 5/10/17 and 2019 and 2021  DEXA 8/2024 -- osteoporosis of left forearm  -restarted Fosamax 8/2024 (on hold this admission)     Vitamin D deficiency  On vitamin D 4000u/day      VTE proph  -SQH        Dispo  -pt has been residing at [facility] for past several months (for respite care and PT),  and is scheduled to be there until July, after which she plans to return home  -PT gricelda ferrer - recommending [facility] (alternatively [facility]PT); pt declines [facility] d/t poor experience previously; would like to return to [facility]/PT  -still with significant dyspnea with short distances (and desaturation to 70's); would like her to at least be able to walk short distances w/o dyspnea or hypoxia before discharge      D/w pt and RN Ezequiel Stuart MD  4/13/2025  11:51 AM       [1]   Current Facility-Administered Medications   Medication Dose Route Frequency    methylPREDNISolone sodium succinate (Solu-MEDROL) injection 40 mg  40 mg Intravenous Q12H    sodium chloride (Saline Mist) 0.65 % nasal solution 1 spray  1 spray Each Nare Q3H PRN    atorvastatin (Lipitor) tab 80 mg  80 mg Oral Nightly    clopidogrel (Plavix) tab 75 mg  75 mg Oral Daily    aspirin DR tab 81 mg  81 mg Oral Daily    acetaminophen (Tylenol) tab 650 mg  650 mg Oral Q6H PRN    furosemide (Lasix) tab 40 mg  40 mg Oral Daily    polyethylene glycol (PEG 3350) (Miralax) 17 g oral packet 17 g  17 g Oral Daily PRN    guaiFENesin (Robitussin) 100 MG/5 ML oral liquid 200 mg  200 mg Oral Q4H PRN    benzocaine-menthol (Cepacol) lozenge 1 lozenge  1 lozenge Oral PRN    vancomycin (Vancocin) cap 125 mg  125 mg Oral Daily    pantoprazole (Protonix) DR tab 40 mg  40 mg Oral QAM AC    traMADol (Ultram) tab 50 mg  50 mg Oral Q6H PRN    ipratropium-albuterol (Duoneb) 0.5-2.5 (3) MG/3ML inhalation solution 3 mL  3 mL Nebulization Q6H PRN    heparin (Porcine) 5000 UNIT/ML injection 5,000 Units  5,000 Units Subcutaneous 2 times per day    glucose (Dex4) 15 GM/59ML oral liquid 15 g  15 g Oral Q15 Min PRN    Or    glucose (Glutose) 40% oral gel 15 g  15 g Oral Q15 Min PRN    Or    glucose-vitamin C (Dex-4) chewable tab 4 tablet  4 tablet Oral Q15 Min PRN    Or    dextrose 50% injection 50 mL  50 mL Intravenous Q15 Min PRN    Or    glucose (Dex4) 15  GM/59ML oral liquid 30 g  30 g Oral Q15 Min PRN    Or    glucose (Glutose) 40% oral gel 30 g  30 g Oral Q15 Min PRN    Or    glucose-vitamin C (Dex-4) chewable tab 8 tablet  8 tablet Oral Q15 Min PRN    insulin aspart (NovoLOG) 100 Units/mL FlexPen 1-7 Units  1-7 Units Subcutaneous TID CC    ferrous sulfate DR tab 325 mg  325 mg Oral Daily with breakfast    docusate sodium (Colace) cap 100 mg  100 mg Oral BID

## 2025-04-13 NOTE — PLAN OF CARE
O2 2 L N/C. Iv steroids continues.  PLAN: [facility] home with HH & possible HME O2- pending choice & medical clearance.    Problem: Patient Centered Care  Goal: Patient preferences are identified and integrated in the patient's plan of care  Description: Interventions:- What would you like us to know as we care for you? From [facility] - Provide timely, complete, and accurate information to patient/family- Incorporate patient and family knowledge, values, beliefs, and cultural backgrounds into the planning and delivery of care- Encourage patient/family to participate in care and decision-making at the level they choose- Honor patient and family perspectives and choices  Outcome: Progressing     Problem: PAIN - ADULT  Goal: Verbalizes/displays adequate comfort level or patient's stated pain goal  Description: INTERVENTIONS:- Encourage pt to monitor pain and request assistance- Assess pain using appropriate pain scale- Administer analgesics based on type and severity of pain and evaluate response- Implement non-pharmacological measures as appropriate and evaluate response- Consider cultural and social influences on pain and pain management- Manage/alleviate anxiety- Utilize distraction and/or relaxation techniques- Monitor for opioid side effects- Notify MD/LIP if interventions unsuccessful or patient reports new pain- Anticipate increased pain with activity and pre-medicate as appropriate  Outcome: Progressing     Problem: RISK FOR INFECTION - ADULT  Goal: Absence of fever/infection during anticipated neutropenic period  Description: INTERVENTIONS- Monitor WBC- Administer growth factors as ordered- Implement neutropenic guidelines  Outcome: Progressing     Problem: SAFETY ADULT - FALL  Goal: Free from fall injury  Description: INTERVENTIONS:- Assess pt frequently for physical needs- Identify cognitive and physical deficits and behaviors that affect risk of falls.- [place] fall precautions as  indicated by assessment.- Educate pt/family on patient safety including physical limitations- Instruct pt to call for assistance with activity based on assessment- Modify environment to reduce risk of injury- Provide assistive devices as appropriate- Consider OT/PT consult to assist with strengthening/mobility- Encourage toileting schedule  Outcome: Progressing     Problem: DISCHARGE PLANNING  Goal: Discharge to home or other facility with appropriate resources  Description: INTERVENTIONS:- Identify barriers to discharge w/pt and caregiver- Include patient/family/discharge partner in discharge planning- Arrange for needed discharge resources and transportation as appropriate- Identify discharge learning needs (meds, wound care, etc)- Arrange for interpreters to assist at discharge as needed- Consider post-discharge preferences of patient/family/discharge partner- Complete POLST form as appropriate- Assess patient's ability to be responsible for managing their own health- Refer to Case Management Department for coordinating discharge planning if the patient needs post-hospital services based on physician/LIP order or complex needs related to functional status, cognitive ability or social support system  Outcome: Progressing     Problem: RESPIRATORY - ADULT  Goal: Achieves optimal ventilation and oxygenation  Description: INTERVENTIONS:- Assess for changes in respiratory status- Assess for changes in mentation and behavior- Position to facilitate oxygenation and minimize respiratory effort- Oxygen supplementation based on oxygen saturation or ABGs- Provide Smoking Cessation handout, if applicable- Encourage broncho-pulmonary hygiene including cough, deep breathe, Incentive Spirometry- Assess the need for suctioning and perform as needed- Assess and instruct to report SOB or any respiratory difficulty- Respiratory Therapy support as indicated- Manage/alleviate anxiety- Monitor for signs/symptoms of CO2  retention  Outcome: Progressing     Problem: MUSCULOSKELETAL - ADULT  Goal: Return mobility to safest level of function  Description: INTERVENTIONS:- Assess patient stability and activity tolerance for standing, transferring and ambulating w/ or w/o assistive devices- Assist with transfers and ambulation using safe patient handling equipment as needed- Ensure adequate protection for wounds/incisions during mobilization- Obtain PT/OT consults as needed- Advance activity as appropriate- Communicate ordered activity level and limitations with patient/family  Outcome: Progressing  Goal: Maintain proper alignment of affected body part  Description: INTERVENTIONS:- Support and protect limb and body alignment per provider's orders- Instruct and reinforce with patient and family use of appropriate assistive device and precautions (e.g. spinal or hip dislocation precautions)  Outcome: Progressing     Problem: Diabetes/Glucose Control  Goal: Glucose maintained within prescribed range  Description: INTERVENTIONS:- Monitor Blood Glucose as ordered- Assess for signs and symptoms of hyperglycemia and hypoglycemia- Administer ordered medications to maintain glucose within target range- Assess barriers to adequate nutritional intake and initiate nutrition consult as needed- Instruct patient on self management of diabetes  Outcome: Progressing

## 2025-04-13 NOTE — PHYSICAL THERAPY NOTE
PHYSICAL THERAPY TREATMENT NOTE - INPATIENT     Room Number: 305/305-A       Presenting Problem: HF exacerbation, aortic valve stenosis, acute hypoxic respiratory failure  Co-Morbidities : PE, cancer, colon cancer, CAD, L foot drop, HTN, TAVR 2025, OA, macular degeneration, osteopenia, L BEATRIZ , S/P colon resection 25    Problem List  Principal Problem:    Acute hypoxic respiratory failure (HCC)  Active Problems:    Pneumonia due to infectious organism    Acute on chronic congestive heart failure, unspecified heart failure type (HCC)    Aortic valve stenosis, etiology of cardiac valve disease unspecified    Coronary artery disease involving native coronary artery of native heart without angina pectoris      PHYSICAL THERAPY ASSESSMENT   Patient demonstrates good  progress this session, goals  remain in progress.      Patient is requiring minimal assist as a result of the following impairments: decreased functional strength, decreased endurance/aerobic capacity, pain, impaired coordination, medical status, and increased O2 needs from baseline.     Patient continues to function below baseline with transfers, gait, stair negotiation, and standing prolonged periods.  Next session anticipate patient to progress transfers, gait, and stair negotiation.  Physical Therapy will continue to follow patient for duration of hospitalization.    Patient continues to benefit from continued skilled PT services: to promote return to prior level of function and safety with continuous assistance and gradual rehabilitative therapy .    PLAN DURING HOSPITALIZATION  Nursing Mobility Recommendation : 1 Assist  PT Device Recommendation: Rolling walker  PT Treatment Plan: Endurance, Body mechanics, Balance training, Transfer training, Gait training  Frequency (Obs): 5x/week     SUBJECTIVE  I like to walk.    OBJECTIVE  Precautions: Cardiac    WEIGHT BEARING RESTRICTION       PAIN ASSESSMENT   Ratin  Location: no pain  Management  Techniques: Activity promotion    BALANCE  Static Sitting: Good  Dynamic Sitting: Good  Static Standing: Fair +  Dynamic Standing: Fair    ACTIVITY TOLERANCE  Pulse: 88  Heart Rate Source: Monitor  Resp: 16  BP: 109/54  BP Location: Right arm  BP Method: Automatic  Patient Position: Sitting     O2 WALK  Oxygen Therapy  SPO2% on Oxygen at Rest: 94  At rest oxygen flow (liters per minute): 2  SPO2% Ambulation on Room Air: 82  SPO2% Ambulation on Oxygen: 4    AM-PAC '6-Clicks' INPATIENT SHORT FORM - BASIC MOBILITY  How much difficulty does the patient currently have...  Patient Difficulty: Turning over in bed (including adjusting bedclothes, sheets and blankets)?: None   Patient Difficulty: Sitting down on and standing up from a chair with arms (e.g., wheelchair, bedside commode, etc.): A Lot   Patient Difficulty: Moving from lying on back to sitting on the side of the bed?: A Lot   How much help from another person does the patient currently need...   Help from Another: Moving to and from a bed to a chair (including a wheelchair)?: A Lot   Help from Another: Need to walk in hospital room?: A Lot   Help from Another: Climbing 3-5 steps with a railing?: Total     AM-PAC Score:  Raw Score: 13   Approx Degree of Impairment: 64.91%   Standardized Score (AM-PAC Scale): 36.74   CMS Modifier (G-Code): CL    FUNCTIONAL ABILITY STATUS  Functional Mobility/Gait Assessment  Gait Assistance: Minimum assistance, Contact guard assist  Distance (ft): 15+20  Assistive Device: Rolling walker  Pattern: Shuffle  Rolling:  NT  Supine to Sit:  NT  Sit to Supine:  NT  Sit to Stand: contact guard assist    Skilled Therapy Provided: Chart reviewed and rN approved session. Patient in chair agree to amb in room. Patient on 2 LO2 with (94% sat and 94 HR).instructed on proper breathing with mobility, patient amb ~ 15 ft with RW slow and small bonnie, cues for breathing dec sat 85% with SOB. After 6 min rest patient amb 20 ft with RW with CGA on  6 LO2 with 85% sat. Patient dec saturation 80% at times with amb, SOB noted. Steady gait.        The patient's Approx Degree of Impairment: 64.91% has been calculated based on documentation in the [facility] '6 clicks' Inpatient Daily Activity Short Form.  Research supports that patients with this level of impairment may benefit from GR.  Final disposition will be made by interdisciplinary medical team.    THERAPEUTIC EXERCISES  Lower Extremity Alternating marching  Ankle pumps  Heel slides  Knee extension     Position Sitting       Patient End of Session: Up in chair    CURRENT GOALS   Goals to be met by: 4/20/25  Patient Goal Patient's self-stated goal is: return home safely   Goal #1 Patient is able to demonstrate supine - sit EOB @ level: independent      Goal #1   Current Status NT   Goal #2 Patient is able to demonstrate transfers EOB to/from BSC at assistance level: modified independent with least restrictive assistive device       Goal #2  Current Status CGA with RW   Goal #3 Patient is able to ambulate 150 feet with assist device: least restrictive assistive device at assistance level: modified independent   Goal #3   Current Status CGA with  RW 29 ft + 15 ft   Goal #4 Patient is able to demonstrate transfers sit to/from stand at assistance level: modified independent with least restrictive assistive device       Goal #4   Current Status CGA   Goal #5 Patient to demonstrate independence with home activity/exercise instructions provided to patient in preparation for discharge.   Goal #5   Current Status educated   Goal #6       Gait Training: 15 minutes  Therapeutic Activity: 15 minutes  Neuromuscular Re-education:  minutes  Therapeutic Exercise:  minutes  Canalith Repositioning:  minutes  Manual Therapy:  minutes  Can add/delete any of these

## 2025-04-13 NOTE — PROGRESS NOTES
Pulmonary Medicine Inpatient Progress Note                 Subjective:  Feels better. No sign overnight events       ALLERGIES:  Allergies[1]     MEDS:  Home Medications:  Medications Taking[2]  Scheduled Medication:  Scheduled Medications[3]  Continuous Infusing Medication:  Medication Infusions[4]  PRN Medications:  PRN Medications[5]       PHYSICAL EXAM:  /72 (BP Location: Right arm)   Pulse 84   Temp 98.2 °F (36.8 °C) (Oral)   Resp 14   Ht 5' 4\" (1.626 m)   Wt 177 lb 6.4 oz (80.5 kg)   SpO2 92%   BMI 30.45 kg/m²   CONSTITUTIONAL: alert, oriented, no apparent distress  HEENT: atraumatic normocephalic  MOUTH: mucous membranes are moist. No OP exudates  NECK/THROAT: no JVD. Trachea midline. No obvious thyromegaly  LUNG: mostly clear with no wheezing, + faint crackles. Chest symmetric with respiratory motion  HEART: regular rate and rhythm, no obvious murmers or gallops note  ABD: soft non tender. + bowel sounds. No organomegaly noted  EXT: no clubbing, cyanosis, or edema noted       IMAGES:  Chest CT PE 4/9  CONCLUSION:   1. No evidence of acute pulmonary embolism to the level of the first order subsegmental pulmonary artery branches.      2. Peripheral and basilar predominant pulmonary interstitial fibrosis with suggestion of underlying possible nonspecific interstitial pneumonitis.      3. Small bilateral pleural effusions and associated basilar atelectasis, with or without concurrent superimposed pneumonia.      4. Possible mild pulmonary interstitial edema.      5. Underlying centrilobular emphysema.      6. Ascending thoracic aortic ectasia.      7. Postprocedural changes of TAVR.      8. Lesser incidental findings as above.     LABS:  Recent Labs   Lab 04/07/25  0534 04/07/25  1951 04/08/25  0839 04/09/25  0644 04/10/25  0700   RBC 3.74*  --  3.35* 3.35* 3.26*   HGB 10.1*   < > 8.9* 8.9* 8.9*   HCT 30.9*   < > 28.1* 29.7* 27.9*   MCV 82.6  --  83.9 88.7 85.6   MCH 27.0  --  26.6 26.6 27.3   MCHC  32.7  --  31.7 30.0* 31.9   RDW 17.5*  --  18.1* 18.9* 18.3*   NEPRELIM 9.92*  --   --  14.27* 14.50*   WBC 13.6*  --  19.9* 17.5* 17.9*   .0  --  181.0 181.0 177.0    < > = values in this interval not displayed.       Recent Labs   Lab 25  0644 04/10/25  0700 25  0616   GLU 80 77 107*   BUN 26* 22 28*   CREATSERUM 0.97 0.90 0.83   EGFRCR 58* 63 69   CA 8.2* 8.2* 8.1*    137 136   K 4.4 4.0 4.6    105 108   CO2 23.0 23.0 24.0          ASSESSMENT/PLAN:  Acute hypoxemic resp failure  -PFTS with restrictive pattern and dec in DLCO  -secondary to ILD flare  -on solumedrol with plan for taper-taper to BID  -completed augmentin  -overall feels better and on 2 LNC. Desat screen prior to discharge    Pulm edema and small pleural effusions  -gentle diuresis as tolerated    CAD  -seen by cards during current hospitalization    Proph  -DVT: hep    Dispo  -full      Thank you for the opportunity to care for Hoda De Leon DO, MPH  Pulmonary Critical Care Medicine  [place] [facility]          [1] No Known Allergies  [2]   Outpatient Medications Marked as Taking for the 3/26/25 encounter (Hospital Encounter)   Medication Sig Dispense Refill    atorvastatin 80 MG Oral Tab Take 1 tablet (80 mg total) by mouth at bedtime. 30 tablet 1    furosemide 40 MG Oral Tab Take 1 tablet (40 mg total) by mouth daily. 30 tablet 1    clopidogrel 75 MG Oral Tab Take 1 tablet (75 mg total) by mouth daily. 30 tablet 1    acetaminophen 325 MG Oral Tab Take 1 tablet (325 mg total) by mouth every 6 (six) hours as needed for Pain.      [] furosemide 20 MG Oral Tab Take 2 tablets (40 mg total) by mouth daily. 40mg daily for 5 days starting 2025      traMADol 50 MG Oral Tab Take 1 tablet (50 mg total) by mouth every 6 (six) hours as needed for Pain. 5 tablet 0    aspirin 81 MG Oral Tab EC Take 1 tablet (81 mg total) by mouth daily. 30 tablet 11     omeprazole 20 MG Oral Capsule Delayed Release Take 1 capsule (20 mg total) by mouth 2 (two) times daily.      atorvastatin 20 MG Oral Tab Take 1 tablet (20 mg total) by mouth at bedtime.     [3]    methylPREDNISolone  40 mg Intravenous Q8H    atorvastatin  80 mg Oral Nightly    clopidogrel  75 mg Oral Daily    aspirin  81 mg Oral Daily    furosemide  40 mg Oral Daily    vancomycin  125 mg Oral Daily    pantoprazole  40 mg Oral QAM AC    heparin  5,000 Units Subcutaneous 2 times per day    insulin aspart  1-7 Units Subcutaneous TID CC    ferrous sulfate  325 mg Oral Daily with breakfast    docusate sodium  100 mg Oral BID   [4] [5]   sodium chloride    acetaminophen    polyethylene glycol (PEG 3350)    guaiFENesin    benzocaine-menthol    traMADol    ipratropium-albuterol    glucose **OR** glucose **OR** glucose-vitamin C **OR** dextrose **OR** glucose **OR** glucose **OR** glucose-vitamin C

## 2025-04-13 NOTE — PLAN OF CARE
Patient is alert and oriented times 4. On 2L NC. No complains of pain at this time. Awaiting patient choice for HH, O2 needed after walking test was completed recently. Once medically cleared will return back to [facility].     Patient continues to have dyspnea on exertion. MD aware.      Problem: Patient Centered Care  Goal: Patient preferences are identified and integrated in the patient's plan of care  Description: Interventions:- What would you like us to know as we care for you? From [facility] - Provide timely, complete, and accurate information to patient/family- Incorporate patient and family knowledge, values, beliefs, and cultural backgrounds into the planning and delivery of care- Encourage patient/family to participate in care and decision-making at the level they choose- Honor patient and family perspectives and choices  Outcome: Progressing

## 2025-04-14 NOTE — PROGRESS NOTES
Pulmonary Medicine Inpatient Progress Note                 Subjective:  Continues to feel well  On 1 LNC       ALLERGIES:  Allergies[1]     MEDS:  Home Medications:  Medications Taking[2]  Scheduled Medication:  Scheduled Medications[3]  Continuous Infusing Medication:  Medication Infusions[4]  PRN Medications:  PRN Medications[5]       PHYSICAL EXAM:  /49 (BP Location: Left arm)   Pulse 87   Temp 98 °F (36.7 °C) (Oral)   Resp 16   Ht 5' 4\" (1.626 m)   Wt 180 lb 8 oz (81.9 kg)   SpO2 93%   BMI 30.98 kg/m²   CONSTITUTIONAL: alert, oriented, no apparent distress  HEENT: atraumatic normocephalic  MOUTH: mucous membranes are moist. No OP exudates  NECK/THROAT: no JVD. Trachea midline. No obvious thyromegaly  LUNG: mostly clear with no wheezing, + faint crackles. Chest symmetric with respiratory motion  HEART: regular rate and rhythm, no obvious murmers or gallops note  ABD: soft non tender. + bowel sounds. No organomegaly noted  EXT: no clubbing, cyanosis, or edema noted       IMAGES:  Chest CT PE 4/9  CONCLUSION:   1. No evidence of acute pulmonary embolism to the level of the first order subsegmental pulmonary artery branches.   2. Peripheral and basilar predominant pulmonary interstitial fibrosis with suggestion of underlying possible nonspecific interstitial pneumonitis.   3. Small bilateral pleural effusions and associated basilar atelectasis, with or without concurrent superimposed pneumonia.   4. Possible mild pulmonary interstitial edema.   5. Underlying centrilobular emphysema.   6. Ascending thoracic aortic ectasia.   7. Postprocedural changes of TAVR.   8. Lesser incidental findings as above.       LABS:  Recent Labs   Lab 04/08/25  0839 04/09/25  0644 04/10/25  0700   RBC 3.35* 3.35* 3.26*   HGB 8.9* 8.9* 8.9*   HCT 28.1* 29.7* 27.9*   MCV 83.9 88.7 85.6   MCH 26.6 26.6 27.3   MCHC 31.7 30.0* 31.9   RDW 18.1* 18.9* 18.3*   NEPRELIM  --  14.27* 14.50*   WBC 19.9* 17.5* 17.9*   .0 181.0  177.0       Recent Labs   Lab 25  0616 25  0535 25  0701   * 99 82   BUN 28* 30* 33*   CREATSERUM 0.83 0.86 0.89   EGFRCR 69 67 64   CA 8.1* 8.1* 8.1*    137 137   K 4.6 4.7 4.6    107 107   CO2 24.0 23.0 24.0          ASSESSMENT/PLAN:  Acute hypoxemic resp failure  -PFTS with restrictive pattern and dec in DLCO  -secondary to ILD flare  -continue solumedrol-tomorrow switch to prednisone 40 mg and taper as follows: 40 mg/day x 3 days, then 30 mg/day x 3 days, then 20 mg/day x 3 days, then 10 mg/day x 3 days  -completed augmentin  -overall feels better and on 1 LNC. Desat screen prior to discharge    Pulm edema and small pleural effusions  -gentle diuresis as tolerated    CAD  -seen by cards during current hospitalization    Proph  -DVT: hep    Dispo  -full code     Thank you for the opportunity to care for Hoda De Leon DO, MPH  Pulmonary Critical Care Medicine  [facility]          [1] No Known Allergies  [2]   Outpatient Medications Marked as Taking for the 3/26/25 encounter (Hospital Encounter)   Medication Sig Dispense Refill    atorvastatin 80 MG Oral Tab Take 1 tablet (80 mg total) by mouth at bedtime. 30 tablet 1    furosemide 40 MG Oral Tab Take 1 tablet (40 mg total) by mouth daily. 30 tablet 1    clopidogrel 75 MG Oral Tab Take 1 tablet (75 mg total) by mouth daily. 30 tablet 1    acetaminophen 325 MG Oral Tab Take 1 tablet (325 mg total) by mouth every 6 (six) hours as needed for Pain.      [] furosemide 20 MG Oral Tab Take 2 tablets (40 mg total) by mouth daily. 40mg daily for 5 days starting 2025      traMADol 50 MG Oral Tab Take 1 tablet (50 mg total) by mouth every 6 (six) hours as needed for Pain. 5 tablet 0    aspirin 81 MG Oral Tab EC Take 1 tablet (81 mg total) by mouth daily. 30 tablet 11    omeprazole 20 MG Oral Capsule Delayed Release Take 1 capsule (20 mg total) by mouth 2 (two)  times daily.      atorvastatin 20 MG Oral Tab Take 1 tablet (20 mg total) by mouth at bedtime.     [3]    methylPREDNISolone  40 mg Intravenous Q12H    atorvastatin  80 mg Oral Nightly    clopidogrel  75 mg Oral Daily    aspirin  81 mg Oral Daily    furosemide  40 mg Oral Daily    vancomycin  125 mg Oral Daily    pantoprazole  40 mg Oral QAM AC    heparin  5,000 Units Subcutaneous 2 times per day    insulin aspart  1-7 Units Subcutaneous TID CC    ferrous sulfate  325 mg Oral Daily with breakfast    docusate sodium  100 mg Oral BID   [4] [5]   sodium chloride    acetaminophen    polyethylene glycol (PEG 3350)    guaiFENesin    benzocaine-menthol    traMADol    ipratropium-albuterol    glucose **OR** glucose **OR** glucose-vitamin C **OR** dextrose **OR** glucose **OR** glucose **OR** glucose-vitamin C

## 2025-04-14 NOTE — PHYSICAL THERAPY NOTE
PHYSICAL THERAPY TREATMENT NOTE - INPATIENT     Room Number: 305/305-A       Presenting Problem: HF exacerbation, aortic valve stenosis, acute hypoxic respiratory failure  Co-Morbidities : PE, cancer, colon cancer, CAD, L foot drop, HTN, TAVR 1/2025, OA, macular degeneration, osteopenia, L BEATRIZ 2006, S/P colon resection 2/27/25    Problem List  Principal Problem:    Acute hypoxic respiratory failure (HCC)  Active Problems:    Pneumonia due to infectious organism    Acute on chronic congestive heart failure, unspecified heart failure type (HCC)    Aortic valve stenosis, etiology of cardiac valve disease unspecified    Coronary artery disease involving native coronary artery of native heart without angina pectoris      PHYSICAL THERAPY ASSESSMENT   Patient demonstrates fair progress this session, goals  remain in progress.      Patient is requiring stand-by assist and contact guard assist as a result of the following impairments: decreased endurance/aerobic capacity, decreased muscular endurance, and increased O2 needs from baseline.     Patient continues to function below baseline with transfers, gait, and stair negotiation.  Next session anticipate patient to progress transfers, gait, and stair negotiation.  Physical Therapy will continue to follow patient for duration of hospitalization.    Patient continues to benefit from continued skilled PT services: to promote return to prior level of function and safety with continuous assistance and gradual rehabilitative therapy . Pending progress, pt may be able to progress to home with HHPT.    PLAN DURING HOSPITALIZATION  Nursing Mobility Recommendation : 1 Assist  PT Device Recommendation: Rolling walker  PT Treatment Plan: Bed mobility, Body mechanics, Endurance, Energy conservation, Patient education, Family education, Gait training, Strengthening, Stair training, Transfer training, Balance training  Frequency (Obs): 5x/week     SUBJECTIVE  \"I feel fine. I don't feel  short of breath.\"    OBJECTIVE  Precautions: Cardiac    WEIGHT BEARING RESTRICTION       PAIN ASSESSMENT   Ratin  Location: denies  Management Techniques: Activity promotion    BALANCE  Static Sitting: Good  Dynamic Sitting: Good  Static Standing: Fair  Dynamic Standing: Fair -    ACTIVITY TOLERANCE  Pulse: 80  Heart Rate Source: Monitor                    O2 WALK  Oxygen Therapy  SPO2% on Oxygen at Rest: 94  At rest oxygen flow (liters per minute): 2  SPO2% Ambulation on Oxygen: 85  Ambulation oxygen flow (liters per minute): 6    AM-PAC '6-Clicks' INPATIENT SHORT FORM - BASIC MOBILITY  How much difficulty does the patient currently have...  Patient Difficulty: Turning over in bed (including adjusting bedclothes, sheets and blankets)?: None   Patient Difficulty: Sitting down on and standing up from a chair with arms (e.g., wheelchair, bedside commode, etc.): A Little   Patient Difficulty: Moving from lying on back to sitting on the side of the bed?: A Little   How much help from another person does the patient currently need...   Help from Another: Moving to and from a bed to a chair (including a wheelchair)?: A Little   Help from Another: Need to walk in hospital room?: A Little   Help from Another: Climbing 3-5 steps with a railing?: A Lot     AM-PAC Score:  Raw Score: 18   Approx Degree of Impairment: 46.58%   Standardized Score (AM-PAC Scale): 43.63   CMS Modifier (G-Code): CK    FUNCTIONAL ABILITY STATUS  Functional Mobility/Gait Assessment  Gait Assistance: Contact guard assist (SBA)  Distance (ft): 2x40  Assistive Device: Rolling walker  Pattern:  (dec bonnie)    Sit to Stand: contact guard assist    Skilled Therapy Provided: Pt ok to be seen per CHIARA Braswell. Pt educ in role of PT and PT POC, willing to participate. Spoke with RN, and inc O2 to 6L for ambulation. RN plans to reduce O2 after therapy session. Pt amb with proper breathing pattern, did not sound SOB. Pt instructed not to converse while amb,  but able to talk easily once sitting. Pt's O2 down to 87 and 84 with amb. Pt required 2 minutes for return to 91% on both trials of amb on 6LO2. Pt encouraged by progress. Pt declined further amb into hallway with portable tank, but plans to amb with nursing/mobility aide later today. RN aware. Pt educ in seated therex at end of session.    The patient's Approx Degree of Impairment: 46.58% has been calculated based on documentation in the [facility] '6 clicks' Inpatient Daily Activity Short Form.  Research supports that patients with this level of impairment may benefit from home with HHPT vs GR pending progress and O2 needs.  Final disposition will be made by interdisciplinary medical team.      Patient End of Session: Up in chair, Needs met, Call light within reach, RN aware of session/findings, All patient questions and concerns addressed, Hospital anti-slip socks    CURRENT GOALS   Goals to be met by: 4/20/25  Patient Goal Patient's self-stated goal is: return home safely   Goal #1 Patient is able to demonstrate supine - sit EOB @ level: independent      Goal #1   Current Status NT, received up in chair   Goal #2 Patient is able to demonstrate transfers EOB to/from BSC at assistance level: modified independent with least restrictive assistive device       Goal #2  Current Status NT   Goal #3 Patient is able to ambulate 150 feet with assist device: least restrictive assistive device at assistance level: modified independent   Goal #3   Current Status CGA to SBA with RW 2x40' with rest breaks   Goal #4 Patient is able to demonstrate transfers sit to/from stand at assistance level: modified independent with least restrictive assistive device       Goal #4   Current Status CGA to SBA with RW   Goal #5 Patient to demonstrate independence with home activity/exercise instructions provided to patient in preparation for discharge.   Goal #5   Current Status Ongoing   Goal #6       Gait Training: 15 minutes  Therapeutic  Exercise: 8 minutes

## 2025-04-14 NOTE — PLAN OF CARE
Patient is alert and oriented x4. Patient weaned to 1 liter oxygen at rest. Patient still requiring 5 liters of oxygen with ambulation as patient drops to 85% with activity while using oxygen. Plan is to continue to wean oxygen as able and increase activity. PT eval this morning. Patient updated on plan of care.     Problem: Patient Centered Care  Goal: Patient preferences are identified and integrated in the patient's plan of care  Description: Interventions:- What would you like us to know as we care for you? From [facility] [place] - Provide timely, complete, and accurate information to patient/family- Incorporate patient and family knowledge, values, beliefs, and cultural backgrounds into the planning and delivery of care- Encourage patient/family to participate in care and decision-making at the level they choose- Honor patient and family perspectives and choices  Outcome: Progressing     Problem: Patient/Family Goals  Goal: Patient/Family Long Term Goal  Description: Patient's Long Term Goal: to go home Interventions:- Monitor vital signs and labs  - Administer medications per order  - Follow MD orders  - Fall precautions  - Diagnostics as ordered  - Update / inform patient on plan of care  - Discharge planning- See additional Care Plan goals for specific interventions  Outcome: Progressing  Goal: Patient/Family Short Term Goal  Description: Patient's Short Term Goal: to feel better Interventions: -  See additional Care Plan goals for specific interventionsMonitor vital signs and labs  - Administer medications per order  - Follow MD orders  - Fall precautions  - Diagnostics as ordered  - Update / inform patient on plan of care  - Discharge planning  Outcome: Progressing     Problem: PAIN - ADULT  Goal: Verbalizes/displays adequate comfort level or patient's stated pain goal  Description: INTERVENTIONS:- Encourage pt to monitor pain and request assistance- Assess pain using appropriate pain scale- Administer  analgesics based on type and severity of pain and evaluate response- Implement non-pharmacological measures as appropriate and evaluate response- Consider cultural and social influences on pain and pain management- Manage/alleviate anxiety- Utilize distraction and/or relaxation techniques- Monitor for opioid side effects- Notify MD/LIP if interventions unsuccessful or patient reports new pain- Anticipate increased pain with activity and pre-medicate as appropriate  Outcome: Progressing     Problem: RISK FOR INFECTION - ADULT  Goal: Absence of fever/infection during anticipated neutropenic period  Description: INTERVENTIONS- Monitor WBC- Administer growth factors as ordered- Implement neutropenic guidelines  Outcome: Progressing     Problem: SAFETY ADULT - FALL  Goal: Free from fall injury  Description: INTERVENTIONS:- Assess pt frequently for physical needs- Identify cognitive and physical deficits and behaviors that affect risk of falls.- [place] fall precautions as indicated by assessment.- Educate pt/family on patient safety including physical limitations- Instruct pt to call for assistance with activity based on assessment- Modify environment to reduce risk of injury- Provide assistive devices as appropriate- Consider OT/PT consult to assist with strengthening/mobility- Encourage toileting schedule  Outcome: Progressing     Problem: DISCHARGE PLANNING  Goal: Discharge to home or other facility with appropriate resources  Description: INTERVENTIONS:- Identify barriers to discharge w/pt and caregiver- Include patient/family/discharge partner in discharge planning- Arrange for needed discharge resources and transportation as appropriate- Identify discharge learning needs (meds, wound care, etc)- Arrange for interpreters to assist at discharge as needed- Consider post-discharge preferences of patient/family/discharge partner- Complete POLST form as appropriate- Assess patient's ability to be responsible for  managing their own health- Refer to Case Management Department for coordinating discharge planning if the patient needs post-hospital services based on physician/LIP order or complex needs related to functional status, cognitive ability or social support system  Outcome: Progressing     Problem: RESPIRATORY - ADULT  Goal: Achieves optimal ventilation and oxygenation  Description: INTERVENTIONS:- Assess for changes in respiratory status- Assess for changes in mentation and behavior- Position to facilitate oxygenation and minimize respiratory effort- Oxygen supplementation based on oxygen saturation or ABGs- Provide Smoking Cessation handout, if applicable- Encourage broncho-pulmonary hygiene including cough, deep breathe, Incentive Spirometry- Assess the need for suctioning and perform as needed- Assess and instruct to report SOB or any respiratory difficulty- Respiratory Therapy support as indicated- Manage/alleviate anxiety- Monitor for signs/symptoms of CO2 retention  Outcome: Progressing     Problem: MUSCULOSKELETAL - ADULT  Goal: Return mobility to safest level of function  Description: INTERVENTIONS:- Assess patient stability and activity tolerance for standing, transferring and ambulating w/ or w/o assistive devices- Assist with transfers and ambulation using safe patient handling equipment as needed- Ensure adequate protection for wounds/incisions during mobilization- Obtain PT/OT consults as needed- Advance activity as appropriate- Communicate ordered activity level and limitations with patient/family  Outcome: Progressing  Goal: Maintain proper alignment of affected body part  Description: INTERVENTIONS:- Support and protect limb and body alignment per provider's orders- Instruct and reinforce with patient and family use of appropriate assistive device and precautions (e.g. spinal or hip dislocation precautions)  Outcome: Progressing     Problem: Diabetes/Glucose Control  Goal: Glucose maintained within  prescribed range  Description: INTERVENTIONS:- Monitor Blood Glucose as ordered- Assess for signs and symptoms of hyperglycemia and hypoglycemia- Administer ordered medications to maintain glucose within target range- Assess barriers to adequate nutritional intake and initiate nutrition consult as needed- Instruct patient on self management of diabetes  Outcome: Progressing

## 2025-04-14 NOTE — PROGRESS NOTES
[facility]  part of [facility]    Progress Note    Hoda Busby Patient Status:  Inpatient    1940 MRN [id number]   Location [facility] 3W/SW Attending Malathi Stuart MD   Hosp Day # 19 PCP Malathi Stuart MD       SUBJECTIVE:  Feels good when sitting; feels very sob with any activity    OBJECTIVE:  /72 (BP Location: Left arm)   Pulse 88   Temp 98 °F (36.7 °C) (Oral)   Resp 16   Ht 5' 4\" (1.626 m)   Wt 180 lb 8 oz (81.9 kg)   SpO2 91%   BMI 30.98 kg/m²     Intake/Output:  I/O last 3 completed shifts:  In: 1440 [P.O.:1420; I.V.:20]  Out: -     Wt Readings from Last 3 Encounters:   25 180 lb 8 oz (81.9 kg)   25 180 lb (81.6 kg)   25 173 lb (78.5 kg)       Exam  Gen: No acute distress, alert and oriented x3  Pulm: Lungs CTAB, no rhonchi or wheezing  CV: Heart RRR, no murmurs   Abd: Abdomen soft, nontender, nondistended, no organomegaly, bowel sounds present  MSK: Full range of motion in extremities, no clubbing, no cyanosis, no edema    Labs:   Recent Labs   Lab 25  0839 25  0644 04/10/25  0700   RBC 3.35* 3.35* 3.26*   HGB 8.9* 8.9* 8.9*   HCT 28.1* 29.7* 27.9*   MCV 83.9 88.7 85.6   MCH 26.6 26.6 27.3   MCHC 31.7 30.0* 31.9   RDW 18.1* 18.9* 18.3*   NEPRELIM  --  14.27* 14.50*   WBC 19.9* 17.5* 17.9*   .0 181.0 177.0         Recent Labs   Lab 04/10/25  0700 25  0616 25  0535   GLU 77 107* 99   BUN 22 28* 30*   CREATSERUM 0.90 0.83 0.86   EGFRCR 63 69 67   CA 8.2* 8.1* 8.1*    136 137   K 4.0 4.6 4.7    108 107   CO2 23.0 24.0 23.0         Imaging:  No results found.          Assessment and Plan:               Acute hypoxic respiratory failure (HCC)  -multifactorial: acute HFpEF, recurrent interstitial pneumonitis, CAP  -CT chest in ED 3/26/25 - no PE; worsening of pleural effusions and interstitial edema  -required BiPAP transiently; was up to 15L O2  -procalc sl elevated 0.25; WBC 19.8 K on  admission  -echo 2/5/25 - normal LV systolic function (EF 60-65%); grade 2 diastolic dysfunction; normally functioning bioprosthetic TAVR; sl incr PAP 44mmHg  -repeat echo 3/27/25 stable from 2/2025 except incr PAP to 52mmHg  -hi res CT chest 4/4/25 --pulmonary edema and bilateral pleural effusions significantly improved.  There is persistent trace right pleural effusion.  Advanced interstitial lung disease is noted in an NSIP (nonspecific insterstitial pneumonitis) pattern.   -s/p ceftriaxone/azithro then augmentin (stopped 4/7)  -IV lasix changed to po lasix - currently 40mg/day (for HFpEF)  -IV solumedrol changed to prednisone 40mg/day on 4/7 per pulm (for NSIP)  -initially with clinical improvement - decreased O2 requirement, improvement in dyspnea; however, pt noted an acute worsening in her sx on 4/8.  CT chest w/contrast on 4/9 -- negative for PE; no significant fluid overload; only small b/l pleural effusions and atelectasis; it did show peripheral and basilar predominant pulmonary interstitial fibrosis with suggestion of underlying nonspecific interstitial pneumonitis.  RHC on 4/7 showed normal filling pressures.  Pt appears euvolemic.  -O2 sats down to 71% on RA; significant dyspnea with just standing up on 4/10  -suspect pt's subacute decompensation due to NSIP -- sx occurred the day after her solumedrol was deescalated to oral prednisone and CT findings are c/w NSIP.    -restarted solumedrol 40mg IV q8hrs on 4/10 with improvement -- decreased to q12 hrs today  -O2 down to 1L today  -clinically improved; no longer SOB at rest; still dyspneic with activity     Hypotension  -likely due to mild overdiuresis (lasix held x 1 dose)  -BP improved (100's-130' systolic)  -cortisol level 4/9 - normal     Coronary artery disease  -Cath 1/14/25 by Dr. Bernardo Liz (as w/u for TAVR) -- RCA non-obstructive; LM free of obst disease; LM plaque extending into ostium of LAD (20-30%); mid LAD (80-90%), cx ostium  (60-70%)  -intervention delayed until after colon resection due to need for DAPT x 6 mos after stenting   -s/p PCI/JUVENTINO of mid LAD (70%>0%) on 4/7/25 by Dr. Dutton  -cont ASA, Plavix  -increased atorvastatin from 20mg to 80mg/day (LDL 76 on 3/26/25)  -further recs per cardiology     Interstitial pneumonitis/NSIP  -dx'ed 10/2024 - presented with cough and severe hypoxia  -unresponsive to abx   -CXR 10/24/24 extensive bilateral perihilar and bilateral upper and lower lobe   -S.pneumo, legionella Ag , mycoplasma IgM/G, Fungitell-beta-D glucan negative  -ANCA and URIEL/connective tissue disease panels negative  -anti-histone and anti-[place] Ab's negative  -CT with bilateral ground glass opacities, small consolidations of BLL  -indings suspicious for NSIP (vs cryptogenic organizing pneumonia vs hypersensitivity pneumonitis); NOT thought to be c/w UIP pattern  -s/p empiric steroids (solumedrol then prednisone taper) 10/25/24 - (EOT 1/18/25)  -dyspnea and hypoxia completely resolved until this admission 3/26/25 -- POPE, hypoxia  -steroids restarted as above     Epistaxis  -mild; from left nostril  -saline nasal spray; O2 already humidified     Adenocarcinoma of colon  -Bx of transverse colon polyp 12/17/24 -- invasive, moderately differentiated adenocarcinoma  -CEA normal (1.9)  -CT a/p neg for mets  -surgery delayed in anticipation of TAVR (done 1/23/25)  -s/p robotic assisted left hemicolectomy (Dr. Cassidy) - path - tumor invades into muscularis; margins neg for tumor; all 19 LN's neg for mets (0/19); pT2, pN0  -next appt with Dr. Cassidy 4/16/25     Hx  GI bleed  -EGD 11/26/24 (Dr. Nuno) -15mm nonbleeding gastric antral ulcer; 3mm gastric antral AVM s/p APC  -recurrence in 12/2024  -colonoscopy and repeat EGD 12/17/24 by Dr. Staton -- grade 1 esophagitis; duod ulcers healing; large flat polyp in transverse colon (carcinoma)   -received total of 3 units PRBCs   -Xarelto stopped in 12/2024  -s/p omeprazole 20mg BID x 8 weeks  (EOT 1/22/25), now on omeprazole 20mg daily -- plan has been to continue until she completed her coronary stenting.  Will stop at discharge.      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   -MRI brain--no acute intracranial process  -was doing PT at [facility] -- continues to improve; almost back to normal      Hx of Bilateral pulmonary emboli (10/2024)  -dx'ed at time of interstitial pneumonitis  -CT chest 10/25/24 -- acute distal segmental/subsegmental pulmonary emboli in R[place] and subsegmental PE in [place]  -unclear etiology; no recent hx of long travel; no family/personal hx of blood clots  -BLE venous dopplers negative 10/26/24  -started xarelto 20 mg po every day on 11/28/24  -repeat CT chest 12/9/24 neg PE  -stopped Xarelto 12/14/24 due to recurrent GI bleed  -repeat CT chest 3/26/25 and 4/9/25 -- neg PE     Severe aortic stenosis  -progression on serial echocardiograms  -s/p TAVR 1/23/25 (Dr. Reji Green)     Hx of Hypertension, primary  -(dyazide stopped due to NANCY)  -(amlodipine held due to low BP in 11/2024)  -BP remains normal off meds     Hx of hip OA  -s/p left THR (Dr. Lo) many years ago  -s/p elective R BEATRIZ on 10/5/23 by Dr. Diaz     Hyperlipidemia   Pt with strong family hx of high cholesterol  Started on Atorvastatin 20mg/day in 1/2025 (after noted to have CAD on cath)  Increased atorvastatin to 80mg/day as above     Osteopenia   On Fosamax from 2011 to  4/2016.     DEXA stable 5/10/17 and 2019 and 2021  DEXA 8/2024 -- osteoporosis of left forearm  -restarted Fosamax 8/2024 (on hold this admission)     Vitamin D deficiency  On vitamin D 4000u/day      VTE proph  -H        Dispo  -pt has been residing at [facility] for past several months (for respite care and PT), and is scheduled to be there until July, after which she plans to return home  -PT eval appreciated - recommending [facility] (alternatively [facility] w/PT); pt declines [facility] d/t poor experience previously; would like to return to [facility] w/PT  -desaturated to 85% with PT today (while on 2L); requiring 6L with activity        Nadia Steel DO  4/14/2025  5:56 AM

## 2025-04-14 NOTE — PLAN OF CARE
Uneventful night , oxygen @2L.  Problem: Patient Centered Care  Goal: Patient preferences are identified and integrated in the patient's plan of care  Description: Interventions:- What would you like us to know as we care for you? From [place] - Provide timely, complete, and accurate information to patient/family- Incorporate patient and family knowledge, values, beliefs, and cultural backgrounds into the planning and delivery of care- Encourage patient/family to participate in care and decision-making at the level they choose- Honor patient and family perspectives and choices  Outcome: Progressing     Problem: Patient/Family Goals  Goal: Patient/Family Long Term Goal  Description: Patient's Long Term Goal: to go home Interventions:- Monitor vital signs and labs  - Administer medications per order  - Follow MD orders  - Fall precautions  - Diagnostics as ordered  - Update / inform patient on plan of care  - Discharge planning- See additional Care Plan goals for specific interventions  Outcome: Progressing  Goal: Patient/Family Short Term Goal  Description: Patient's Short Term Goal: to feel better Interventions: -  See additional Care Plan goals for specific interventionsMonitor vital signs and labs  - Administer medications per order  - Follow MD orders  - Fall precautions  - Diagnostics as ordered  - Update / inform patient on plan of care  - Discharge planning  Outcome: Progressing     Problem: PAIN - ADULT  Goal: Verbalizes/displays adequate comfort level or patient's stated pain goal  Description: INTERVENTIONS:- Encourage pt to monitor pain and request assistance- Assess pain using appropriate pain scale- Administer analgesics based on type and severity of pain and evaluate response- Implement non-pharmacological measures as appropriate and evaluate response- Consider cultural and social influences on pain and pain management- Manage/alleviate anxiety- Utilize distraction and/or relaxation  techniques- Monitor for opioid side effects- Notify MD/LIP if interventions unsuccessful or patient reports new pain- Anticipate increased pain with activity and pre-medicate as appropriate  Outcome: Progressing     Problem: DISCHARGE PLANNING  Goal: Discharge to home or other facility with appropriate resources  Description: INTERVENTIONS:- Identify barriers to discharge w/pt and caregiver- Include patient/family/discharge partner in discharge planning- Arrange for needed discharge resources and transportation as appropriate- Identify discharge learning needs (meds, wound care, etc)- Arrange for interpreters to assist at discharge as needed- Consider post-discharge preferences of patient/family/discharge partner- Complete POLST form as appropriate- Assess patient's ability to be responsible for managing their own health- Refer to Case Management Department for coordinating discharge planning if the patient needs post-hospital services based on physician/LIP order or complex needs related to functional status, cognitive ability or social support system  Outcome: Progressing     Problem: RESPIRATORY - ADULT  Goal: Achieves optimal ventilation and oxygenation  Description: INTERVENTIONS:- Assess for changes in respiratory status- Assess for changes in mentation and behavior- Position to facilitate oxygenation and minimize respiratory effort- Oxygen supplementation based on oxygen saturation or ABGs- Provide Smoking Cessation handout, if applicable- Encourage broncho-pulmonary hygiene including cough, deep breathe, Incentive Spirometry- Assess the need for suctioning and perform as needed- Assess and instruct to report SOB or any respiratory difficulty- Respiratory Therapy support as indicated- Manage/alleviate anxiety- Monitor for signs/symptoms of CO2 retention  Outcome: Progressing     Problem: MUSCULOSKELETAL - ADULT  Goal: Return mobility to safest level of function  Description: INTERVENTIONS:- Assess patient  stability and activity tolerance for standing, transferring and ambulating w/ or w/o assistive devices- Assist with transfers and ambulation using safe patient handling equipment as needed- Ensure adequate protection for wounds/incisions during mobilization- Obtain PT/OT consults as needed- Advance activity as appropriate- Communicate ordered activity level and limitations with patient/family  Outcome: Progressing  Goal: Maintain proper alignment of affected body part  Description: INTERVENTIONS:- Support and protect limb and body alignment per provider's orders- Instruct and reinforce with patient and family use of appropriate assistive device and precautions (e.g. spinal or hip dislocation precautions)  Outcome: Progressing     Problem: Diabetes/Glucose Control  Goal: Glucose maintained within prescribed range  Description: INTERVENTIONS:- Monitor Blood Glucose as ordered- Assess for signs and symptoms of hyperglycemia and hypoglycemia- Administer ordered medications to maintain glucose within target range- Assess barriers to adequate nutritional intake and initiate nutrition consult as needed- Instruct patient on self management of diabetes  Outcome: Progressing

## 2025-04-15 NOTE — PROGRESS NOTES
[facility]  part of [facility]     Progress Note    Hoda Busby Patient Status:  Inpatient    1940 MRN [id number]   Location [facility]5W Attending Malathi Stuart MD   Hosp Day # 20 PCP Malathi Stuart MD       Subjective:   Patient seen and examined.  Admits to some improvement in dyspnea after increasing steroid dosage.  Cough improving.  Currently on 1 L nasal cannula at rest.  Dyspnea with exertion also improving  Objective:   Blood pressure 107/53, pulse 87, temperature 98.3 °F (36.8 °C), temperature source Oral, resp. rate 18, height 5' 4\" (1.626 m), weight 177 lb 9.6 oz (80.6 kg), SpO2 96%.  Intake/Output:   Last 3 shifts: I/O last 3 completed shifts:  In: 1230 [P.O.:1220; I.V.:10]  Out: -    This shift: I/O this shift:  In: 440 [P.O.:440]  Out: -      Vent Settings:      Hemodynamic parameters (last 24 hours):      Scheduled Meds:   Current Facility-Administered Medications   Medication Dose Route Frequency    methylPREDNISolone sodium succinate (Solu-MEDROL) injection 40 mg  40 mg Intravenous Q12H    sodium chloride (Saline Mist) 0.65 % nasal solution 1 spray  1 spray Each Nare Q3H PRN    atorvastatin (Lipitor) tab 80 mg  80 mg Oral Nightly    clopidogrel (Plavix) tab 75 mg  75 mg Oral Daily    aspirin DR tab 81 mg  81 mg Oral Daily    acetaminophen (Tylenol) tab 650 mg  650 mg Oral Q6H PRN    furosemide (Lasix) tab 40 mg  40 mg Oral Daily    polyethylene glycol (PEG 3350) (Miralax) 17 g oral packet 17 g  17 g Oral Daily PRN    guaiFENesin (Robitussin) 100 MG/5 ML oral liquid 200 mg  200 mg Oral Q4H PRN    benzocaine-menthol (Cepacol) lozenge 1 lozenge  1 lozenge Oral PRN    pantoprazole (Protonix) DR tab 40 mg  40 mg Oral QAM AC    traMADol (Ultram) tab 50 mg  50 mg Oral Q6H PRN    ipratropium-albuterol (Duoneb) 0.5-2.5 (3) MG/3ML inhalation solution 3 mL  3 mL Nebulization Q6H PRN    heparin (Porcine) 5000 UNIT/ML injection 5,000 Units  5,000 Units Subcutaneous  2 times per day    glucose (Dex4) 15 GM/59ML oral liquid 15 g  15 g Oral Q15 Min PRN    Or    glucose (Glutose) 40% oral gel 15 g  15 g Oral Q15 Min PRN    Or    glucose-vitamin C (Dex-4) chewable tab 4 tablet  4 tablet Oral Q15 Min PRN    Or    dextrose 50% injection 50 mL  50 mL Intravenous Q15 Min PRN    Or    glucose (Dex4) 15 GM/59ML oral liquid 30 g  30 g Oral Q15 Min PRN    Or    glucose (Glutose) 40% oral gel 30 g  30 g Oral Q15 Min PRN    Or    glucose-vitamin C (Dex-4) chewable tab 8 tablet  8 tablet Oral Q15 Min PRN    insulin aspart (NovoLOG) 100 Units/mL FlexPen 1-7 Units  1-7 Units Subcutaneous TID CC    ferrous sulfate DR tab 325 mg  325 mg Oral Daily with breakfast    docusate sodium (Colace) cap 100 mg  100 mg Oral BID       Continuous Infusions:     Physical Exam  Constitutional: no acute distress  Eyes: PERRL  ENT: nares pateint  Neck: supple, no JVD  Cardio: RRR, S1 S2  Respiratory: Basilar crackles  GI: abdomen soft, non tender, active bowel sounds, no organomegaly  Extremities: no clubbing, cyanosis, + trace lower extremity edema   Neurologic: no gross motor deficits  Skin: warm, dry      Results:     Lab Results   Component Value Date    CREATSERUM 0.85 04/15/2025    BUN 32 04/15/2025     04/15/2025    K 4.2 04/15/2025     04/15/2025    CO2 25.0 04/15/2025    GLU 85 04/15/2025    CA 8.0 04/15/2025           No results found.            Assessment   1.  Acute hypoxemic respiratory failure  2.  ILD  3.  Small pleural effusions  4.  Pulmonary edema  5.  Colon adenocarcinoma status post hemicolectomy  6.  Coronary artery disease  7.  Prior history of GI bleed  8.  Prior VTE  9.  History of severe aortic stenosis status post TAVR  10.  Hypertension     Plan   -Patient presents with progressive worsening dyspnea and acute hypoxemic respiratory failure.  -CT chest on presentation on 3/26/2025 with no evidence of acute pulmonary embolism seen.  Evidence of small pleural effusions with  groundglass opacities suggestive more likely of edema with worsening compared to 6 days prior from CT chest.  Some background of fibrotic changes present.  -Repeat CT high-resolution chest on 4/4/2025 with improvement in pulmonary edema and pleural effusions.  Underlying ILD changes present.  -CT chest with contrast on 4/9/2025 with no evidence of pulmonary embolism seen.  Fibrotic changes seen otherwise with small pleural effusions and mild edema.  -Discussed with primary care physician increased dosage to Solu-Medrol 40 mg every 8 hours on 4/10/2025.  Patient admits to some improvement in dyspnea after steroid dosage increase.  Will gradually wean.  Likely transition of prednisone tomorrow with slow taper  -Diuresis as need be.  -Prior history of underlying ILD with hospitalization October 2024 responded to tapering steroid therapy at the time.  -Pulmonary function testing from February 2025 with mild restriction seen with significant decrease in diffusion capacity.  -Wean oxygen as tolerated.  Admits to ongoing dyspnea currently on 1 L oxygen at rest with desaturation with activity.  Will need to evaluate for home oxygen prior to discharge.  Oxygenation requirements gradually improving  -Completed course of antibiotic therapy with Augmentin.  -Status post PCI of mLAD on 4/7/2025  -DVT prophylaxis: Heparin      Cody Holloway DO  Pulmonary Critical Care Medicine  [facility]

## 2025-04-15 NOTE — PHYSICAL THERAPY NOTE
PHYSICAL THERAPY TREATMENT NOTE - INPATIENT     Room Number: 303/303-A       Presenting Problem: HF exacerbation, aortic valve stenosis, acute hypoxic respiratory failure  Co-Morbidities : PE, cancer, colon cancer, CAD, L foot drop, HTN, TAVR 1/2025, OA, macular degeneration, osteopenia, L BEATRIZ 2006, S/P colon resection 2/27/25    Problem List  Principal Problem:    Acute hypoxic respiratory failure (HCC)  Active Problems:    Pneumonia due to infectious organism    Acute on chronic congestive heart failure, unspecified heart failure type (HCC)    Aortic valve stenosis, etiology of cardiac valve disease unspecified    Coronary artery disease involving native coronary artery of native heart without angina pectoris      PHYSICAL THERAPY ASSESSMENT   Patient demonstrates fair progress this session, goals  remain in progress.      Patient is requiring contact guard assist and minimal assist as a result of the following impairments: decreased endurance/aerobic capacity, impaired standing balance, decreased muscular endurance, medical status, increased O2 needs from baseline, and impaired respiratory muscle use/increased accessory muscle use .     Patient continues to function below baseline with bed mobility, transfers, gait, stair negotiation, and standing prolonged periods.  Next session anticipate patient to progress gait, stair negotiation, and standing prolonged periods.  Physical Therapy will continue to follow patient for duration of hospitalization.    Patient continues to benefit from continued skilled PT services: to promote return to prior level of function and safety with continuous assistance and gradual rehabilitative therapy .    PLAN DURING HOSPITALIZATION  Nursing Mobility Recommendation : 1 Assist  PT Device Recommendation: Rolling walker  PT Treatment Plan: Bed mobility, Body mechanics, Endurance, Energy conservation, Patient education, Family education, Gait training, Strengthening, Stair training,  Transfer training, Balance training  Frequency (Obs): 5x/week     SUBJECTIVE  \"I am feeling better but still needing a lot of oxygen when I'm moving\"    OBJECTIVE  Precautions: Cardiac    PAIN ASSESSMENT   Ratin  Location: no complaints  Management Techniques: Activity promotion, Repositioning    BALANCE  Static Sitting: Good  Dynamic Sitting: Good  Static Standing: Fair  Dynamic Standing: Fair -    ACTIVITY TOLERANCE  Pulse: 86  Heart Rate Source: Monitor     BP: 116/58  BP Location: Right arm  BP Method: Automatic  Patient Position: Sitting     O2 WALK  Oxygen Therapy  SPO2% on Room Air at Rest: 94  SPO2% on Oxygen at Rest: 96  At rest oxygen flow (liters per minute): 2  SPO2% Ambulation on Oxygen: 82 (recovers in 3-4 min of PLB)  Ambulation oxygen flow (liters per minute): 6    AM-PAC '6-Clicks' INPATIENT SHORT FORM - BASIC MOBILITY  How much difficulty does the patient currently have...  Patient Difficulty: Turning over in bed (including adjusting bedclothes, sheets and blankets)?: None   Patient Difficulty: Sitting down on and standing up from a chair with arms (e.g., wheelchair, bedside commode, etc.): A Little   Patient Difficulty: Moving from lying on back to sitting on the side of the bed?: A Little   How much help from another person does the patient currently need...   Help from Another: Moving to and from a bed to a chair (including a wheelchair)?: A Little   Help from Another: Need to walk in hospital room?: A Little   Help from Another: Climbing 3-5 steps with a railing?: A Lot     AM-PAC Score:  Raw Score: 18   Approx Degree of Impairment: 46.58%   Standardized Score (AM-PAC Scale): 43.63   CMS Modifier (G-Code): CK    FUNCTIONAL ABILITY STATUS  Functional Mobility/Gait Assessment  Gait Assistance: Contact guard assist, Supervision  Distance (ft): 40' x 2 with seated rest break  Assistive Device: Rolling walker, Other (Comment) (6L O2)  Pattern: Within Functional Limits (slow steady pace, cues for  PLB during mobility on 6L)    Sit to Stand: supervision    Skilled Therapy Provided: CHIARA Nieves approves participation in therapy session. Patient presents sitting up in chair and agreeable to therapy. Reports she is feeling better each day and hopeful to keep weaning off of oxygen. She is currently on 1.5-2: at rest and needs 6L with mobility and 3-4 min to recover to 90% SpO2, drops to 82% with walking and standing activity. She demonstrates improved breathing mechanics compared to last session but still has impaired diaphragmatic breathing. Ongoing facilitated breathing exs this session. She is making progress but still with significant O2 need so recommend GRACIE to maximize return to plf without supplemental O2. RN aware of session, pt up in chair visiting with family at end of session.    The patient's Approx Degree of Impairment: 46.58% has been calculated based on documentation in the [facility] '6 clicks' Inpatient Daily Activity Short Form.  Research supports that patients with this level of impairment may benefit from home with HHPT. HOWEVER, pt still with significant O2 need of 6L with mobility and would benefit from skilled PT/OT to wean down and hopefully off of supplemental O2 prior to dc home.   Final disposition will be made by interdisciplinary medical team.    THERAPEUTIC EXERCISES  Lower Extremity Alternating marching  Heel raises  Toe raises    Standing deep breathing/PLB without UE support - very difficult, pt needs frequent rest breaks     Position Standing       Patient End of Session: Up in chair, Needs met, Call light within reach, RN aware of session/findings, All patient questions and concerns addressed, Hospital anti-slip socks, Family present    CURRENT GOALS     Goals to be met by: 4/20/25  Patient Goal Patient's self-stated goal is: return home safely   Goal #1 Patient is able to demonstrate supine - sit EOB @ level: independent      Goal #1   Current Status NT, received up in chair   Goal #2  Patient is able to demonstrate transfers EOB to/from BSC at assistance level: modified independent with least restrictive assistive device       Goal #2  Current Status CGA with RW, cues for PLB, O2 increased to 6L for mobility   Goal #3 Patient is able to ambulate 150 feet with assist device: least restrictive assistive device at assistance level: modified independent   Goal #3   Current Status CGA to SBA with RW 2x40' with one standing rest break during walk and seated rest break between. On 6L O2 for mobility, takes 3-4 min to recover, drops to 82%   Goal #4 Patient is able to demonstrate transfers sit to/from stand at assistance level: modified independent with least restrictive assistive device       Goal #4   Current Status CGA to SBA with RW   Goal #5 Patient to demonstrate independence with home activity/exercise instructions provided to patient in preparation for discharge.   Goal #5   Current Status Ongoing   Goal #6        Therapeutic Activity: 16 minutes

## 2025-04-15 NOTE — CM/SW NOTE
09:55AM  SW met w/ pt at bedside to f/up on HH choice.  Pt provided SW w/ written information for [facility] onsite PT and preferred HH provider.    SW inquired which type of PT pt wanted. Pt stating this was left by her niece Vonda and she was instructed to give to SW when we came by.    Per pt's permission, SW contacted her niece Vonda via speaker phone. Vonda confirmed w/ pt over speaker phone - they are all now in agreement to pursue GR[facility] at [facility].    SW explained that new referral will need to be sent due to their previous decline of GRACIE.    Per pt's niece, top choice is [facility] if available. Vonda also requested to have referrals sent to facilities near [id number].    SW sent updates to [facility] referral via [employer]. Facilities near [id number] included in referral. Message sent to [facility] requesting review and response. Informed facilities of possible DC today or tomorrow.    PASRR previously completed & uploaded. [facility] sent and reviewed on 4/10.    12:00PM  Confirmed [facility] has semi-private room available today. Unsure about tomorrow.    MD confirmed plan for DC tomorrow.    GRACIE list of quality data printed and left at bedside w/ pt.  Per her permission. SW contacted pt's Niece Vonda via phone. Informed her of above. Encouraged them to review [facility] list and have 2nd choice as back up in case [facility] no longer has bed available when medically cleared.    PLAN: [facility] GRACIE vs 2nd choice/back up GRACIE - pending bed availability at [facility], 2nd choice/back up if needed, & med clear       SW/CM to remain available for support and/or discharge planning.       MS DoriW, LSW [id number]

## 2025-04-15 NOTE — PLAN OF CARE
Patient is alert and oriented times 4. On 1L NC. No complains of pain at this time. Walk test re eval completed. Plan to switch IV steroid to oral.     Problem: SAFETY ADULT - FALL  Goal: Free from fall injury  Description: INTERVENTIONS:- Assess pt frequently for physical needs- Identify cognitive and physical deficits and behaviors that affect risk of falls.- [place] fall precautions as indicated by assessment.- Educate pt/family on patient safety including physical limitations- Instruct pt to call for assistance with activity based on assessment- Modify environment to reduce risk of injury- Provide assistive devices as appropriate- Consider OT/PT consult to assist with strengthening/mobility- Encourage toileting schedule  Outcome: Progressing

## 2025-04-15 NOTE — OCCUPATIONAL THERAPY NOTE
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  Prior to admission, patient's baseline is independent with ADLs and mobility.  Patient is currently functioning below baseline with lower body dressing, transfers, functional standing tolerance, and aerobic capacity.  Patient is requiring stand-by assist and contact guard assist as a result of the following impairments: decreased functional strength, decreased endurance, decreased muscular endurance, medical status, and increased O2 needs from baseline. Occupational Therapy will continue to follow for duration of hospitalization.    Patient will benefit from continued skilled OT Services to promote return to prior level of function and safety with continuous assistance and gradual rehabilitative therapy.    PLAN DURING HOSPITALIZATION  OT Device Recommendations: None  OT Treatment Plan: Balance activities, Energy conservation/work simplification techniques, ADL training, Functional transfer training, Endurance training, Patient/Family education, Patient/Family training, Compensatory technique education     OCCUPATIONAL THERAPY MEDICAL/SOCIAL HISTORY   Problem List  Principal Problem:    Acute hypoxic respiratory failure (HCC)  Active Problems:    Pneumonia due to infectious organism    Acute on chronic congestive heart failure, unspecified heart failure type  (HCC)    Aortic valve stenosis, etiology of cardiac valve disease unspecified    Coronary artery disease involving native coronary artery of native heart without angina pectoris    HOME SITUATION  Type of Home: Assisted living facility  Home Layout: One level; Elevator  Lives With: Alone  Toilet and Equipment: Comfort height toilet  Shower/Tub and Equipment: Walk-in shower; Shower chair; Grab bar  Other Equipment: Reacher; Long-handled shoehorn  Patient Regularly Uses: Rolling walker; Wheelchair    Prior Level of Roll: Prior to admission pt was independent with ADLs and mobility.     SUBJECTIVE  \"I feel pretty good.\"     OCCUPATIONAL THERAPY EXAMINATION      OBJECTIVE  Precautions: Cardiac  Fall Risk: High fall risk      PAIN ASSESSMENT  Ratin      ACTIVITY TOLERANCE  Pulse: 85  Heart Rate Source: Monitor     BP: 116/58  BP Location: Right arm  BP Method: Automatic  Patient Position: Sitting    O2 SATURATIONS  Oxygen Therapy  SPO2% on Oxygen at Rest: 95  At rest oxygen flow (liters per minute): 1  SPO2% Ambulation on Oxygen: 82 (required ~4 min seated rest break and focused pursed lip breathing to recover to 90% on 6L NC)  Ambulation oxygen flow (liters per minute): 6    COGNITION  Overall Cognitive Status:  WFL - within functional limits    RANGE OF MOTION   Upper extremity ROM is within functional limits     STRENGTH ASSESSMENT  Upper extremity strength is within functional limits     COORDINATION  Gross Motor: WFL   Fine Motor: WFL     ACTIVITIES OF DAILY LIVING ASSESSMENT  AM-PAC ‘6-Clicks’ Inpatient Daily Activity Short Form  How much help from another person does the patient currently need…  -   Putting on and taking off regular lower body clothing?: A Little  -   Bathing (including washing, rinsing, drying)?: A Little  -   Toileting, which includes using toilet, bedpan or urinal? : A Little  -   Putting on and taking off regular upper body clothing?: A Little  -   Taking care of personal grooming  such as brushing teeth?: A Little  -   Eating meals?: None    AM-PAC Score:  Score: 19  Approx Degree of Impairment: 42.8%  Standardized Score (AM-PAC Scale): 40.22  CMS Modifier (G-Code): CK    FUNCTIONAL TRANSFER ASSESSMENT  Sit to Stand: Chair  Chair: Contact Guard Assist  Simulated toilet transfer: CGA at RW level        BALANCE ASSESSMENT  Static Sitting: Supervision  Static Standing: Contact Guard Assist  Dynamic sitting: SBA  Dynamic Standing: CGA     FUNCTIONAL ADL ASSESSMENT  Grooming Seated: Stand-by Assist  LB Dressing Seated: Stand-by Assist  LB Dressing Standing: Contact Guard Assist    Skilled Therapy Provided:   RN cleared pt for participation. Session coordinated with PT. Pt received in bed with no visitors present. Pt agreeable to participation in therapy. Pt tolerated treatment fairly well and completed all tasks with assist levels listed above; overall limited by decreased aerobic capacity during functional tasks. Oxygen monitored throughout session; pt initially on 1L NC at rest. RN approved increased to 6L NC with activity. Pt completed functional transfers, mobility, and standing ADL tasks with CGA for safety and RW for support. After brief chair to door and back mobility to simulate home distances, oxygen monitored and noted to be at 82% on 6L. Pt demo increased work of breathing but reports that she does not feel SOB. Pt required ~4 min seated rest break with cues for pursed lip breathing to recover to 90% on 6L NC. Once pt saturating at 95% on 6L, therapist slowly decreased oxygen back to 1L; pt remained at 95% on 1L NC at end of session. Pt remained in recliner with all questions answered and family present. RN aware.       EDUCATION PROVIDED  Patient Education : Role of Occupational Therapy; Plan of Care; Functional Transfer Techniques; Fall Prevention; Posture/Positioning; Energy Conservation; Proper Body Mechanics  Patient's Response to Education: Verbalized Understanding; Returned  Demonstration  Family/Caregiver Education : Role of Occupational Therapy; Plan of Care  Family/Caregiver's Response to Education: Verbalized Understanding    The patient's Approx Degree of Impairment: 42.8% has been calculated based on documentation in the [facility] '6 clicks' Inpatient Daily Activity Short Form.  Research supports that patients with this level of impairment may benefit from HHOT but anticipate pt could benefit from gradual rehab to maximize return to PLOF.  Final disposition will be made by interdisciplinary medical team.     Patient End of Session: Up in chair, Needs met, Call light within reach, RN aware of session/findings, All patient questions and concerns addressed, Hospital anti-slip socks, Family present    OT Goals  Patients self stated goal is: none stated      Patient will complete functional transfer with mod I   Comment:     Patient will complete toileting with mod I   Comment:     Patient will tolerate standing for 5 minutes in prep for adls with mod I    Comment:    Patient will complete item retrieval with mod I   Comment:          Goals  on: 25  Frequency: 3x/week    Patient Evaluation Complexity Level:   Occupational Profile/Medical History MODERATE - Expanded review of history including review of medical or therapy record   Specific performance deficits impacting engagement in ADL/IADL MODERATE  3 - 5 performance deficits   Client Assessment/Performance Deficits MODERATE - Comorbidities and min to mod modifications of tasks    Clinical Decision Making MODERATE - Analysis of occupational profile, detailed assessments, several treatment options    Overall Complexity MODERATE     OT Session Time: 15 minutes  Self-Care Home Management: 15 minutes

## 2025-04-15 NOTE — PROGRESS NOTES
[facility]  part of [facility]    Progress Note    Hoda Busby Patient Status:  Inpatient    1940 MRN [id number]   Location [facility] 3W/SW Attending Malathi Stuart MD   Hosp Day # 20 PCP Malathi Stuart MD       SUBJECTIVE:  Feels a little better each day; walked to bathroom w/o SOB this am    OBJECTIVE:  Vital signs in last 24 hours:  /62 (BP Location: Right arm)   Pulse 86   Temp 98 °F (36.7 °C) (Oral)   Resp 18   Ht 5' 4\" (1.626 m)   Wt 177 lb 9.6 oz (80.6 kg)   SpO2 96%   BMI 30.48 kg/m²     Intake/Output:    Intake/Output Summary (Last 24 hours) at 4/15/2025 0853  Last data filed at 4/15/2025 0837  Gross per 24 hour   Intake 1170 ml   Output --   Net 1170 ml       Wt Readings from Last 3 Encounters:   04/15/25 177 lb 9.6 oz (80.6 kg)   25 180 lb (81.6 kg)   25 173 lb (78.5 kg)       EXAM:  GENERAL: well developed, well nourished, in no apparent distress  LUNGS: coarse crackles and squeak b/l (1/2 up)  CARDIO: RRR, normal S1S2, 1/6 WADE  GI: soft, NT, ND, NABS  EXTREMITIES: tr-1+ BLE edema    Data Review:     Labs:   Lab Results   Component Value Date    CREATSERUM 0.85 04/15/2025    BUN 32 04/15/2025     04/15/2025    K 4.2 04/15/2025     04/15/2025    CO2 25.0 04/15/2025    GLU 85 04/15/2025    CA 8.0 04/15/2025         Imaging:  No results found.   CARD ECHO 2D DOPPLER (CPT=93306)  Result Date: 3/27/2025  Transthoracic Echocardiogram Name:Hoda Busby Date: 2025 :  1940 Ht:  (64in)  BP: 137 / 81 MRN:  [id number]    Age:  84years    Wt:  (187lb) HR: 75bpm Loc:  [facility]       Gndr: F          BSA: 1.9m^2 Sonographer: Casper JOHNSON Ordering:    Malathi Stuart Consulting:  Martita Baez ---------------------------------------------------------------------------- History/Indications:   Congestive heart failure.  Risk factors: Hypertension. TAVR-23 mm Martha 3 Resilia bioprosthesis. Acute hypoxic respiratory  failure. ---------------------------------------------------------------------------- Procedure information:  A transthoracic complete 2D study was performed. Additional evaluation included M-mode, complete spectral Doppler, and color Doppler.  Patient status:  Inpatient.  Location:  Bedside.    Comparison was made to the study of 02/05/2025.    This was a routine study. Transthoracic echocardiography for diagnosis and ventricular function evaluation. Image quality was adequate. ECG rhythm:   Normal sinus ---------------------------------------------------------------------------- Conclusions: 1. Left ventricle: The cavity size was normal. Wall thickness was normal.    Systolic function was normal. The estimated ejection fraction was 60-65%.    No diagnostic evidence for regional wall motion abnormalities. Doppler    parameters are consistent with abnormal left ventricular relaxation -    grade 1 diastolic dysfunction. 2. Right ventricle: The cavity size was increased. Systolic function was    normal. 3. Left atrium: The atrium was mildly to moderately dilated. The left atrial    volume was mildly to moderately increased. 4. Aortic valve: A bioprosthetic valve is present. Mcnamara PUNEET S3 23mm    (TAVR) bioprosthesis was present. The peak systolic velocity was    2.69m/sec. The mean systolic gradient was 15mm Hg. The valve area (VTI)    was 1.52cm^2. 5. Pulmonary arteries: Systolic pressure was moderately increased, estimated    to be 52mm Hg. * ---------------------------------------------------------------------------- * Findings: Left ventricle:  The cavity size was normal. Wall thickness was normal. Systolic function was normal. The estimated ejection fraction was 60-65%. No diagnostic evidence for regional wall motion abnormalities. Doppler parameters are consistent with abnormal left ventricular relaxation - grade 1 diastolic dysfunction. Left atrium:  The atrium was mildly to moderately dilated. The left  atrial volume was mildly to moderately increased. Right ventricle:  The cavity size was increased. Systolic function was normal. Right atrium:  Well visualized. The atrium was normal in size. Mitral valve:  The annulus was moderately calcified. The leaflets were mildly thickened and mildly to moderately calcified. Leaflet separation was normal.  Doppler:  Transvalvular velocity was within the normal range. There was no evidence for stenosis. There was no significant regurgitation. Aortic valve:  A bioprosthetic valve is present. Mcnamara PUNEET S3 23mm (TAVR) bioprosthesis was present. Cusp separation was normal.  Doppler: Transvalvular velocity was within the normal range. There was no evidence for stenosis. There was no significant regurgitation.    The valve area (VTI) was 1.52cm^2. The valve area (VTI) index was 0.8cm^2/m^2.    The mean systolic gradient was 15mm Hg. The peak systolic gradient was 29mm Hg. Tricuspid valve:  The valve is structurally normal. Leaflet separation was normal.  Doppler:  Transvalvular velocity was within the normal range. There was no evidence for stenosis. There was mild regurgitation. Pulmonic valve:   The valve is structurally normal. Cusp separation was normal.  Doppler:  Transvalvular velocity was within the normal range. There was no evidence for stenosis. There was no significant regurgitation. Pericardium:   There was no pericardial effusion. Aorta: Aortic root: The aortic root was normal. Ascending aorta: The ascending aorta was normal. Pulmonary arteries: Systolic pressure was moderately increased, estimated to be 52mm Hg. Systemic veins:  Central venous respirophasic diameter changes are blunted (< 50%). Inferior vena cava: The IVC was dilated. ---------------------------------------------------------------------------- Measurements  Left ventricle       Value          Ref       01/24/2025  IVS thickness,   (H) 1.0   cm       0.6 - 0.9 1.0  ED, PLAX  LV ID, ED, PLAX  (H) 5.3    cm       3.8 - 5.2 5.0  LV ID, ES, PLAX      3.5   cm       2.2 - 3.5 3.1  LV PW thickness, (H) 1.0   cm       0.6 - 0.9 1.0  ED, PLAX  IVS/LV PW ratio,     1.00           --------- 1.02  ED, PLAX  LV PW/LV ID          0.19           --------- 0.2  ratio, ED, PLAX  LV area, ES, A4C     17.5  cm^2     --------- ----------  LV ejection          62    %        54 - 74   67  fraction  Stroke               45    ml/m^2   --------- 53  volume/bsa, 2D  LV end-diastolic     126   ml       48 - 140  ----------  volume, 1-p A4C  LV end-systolic      49    ml       12 - 60   ----------  volume, 1-p A4C  LV ejection          63    %        46 - 78   ----------  fraction, 1-p  A4C  Stroke volume,       79    ml       --------- ----------  1-p A4C  LV end-diastolic     66    ml/m^2   30 - 82   ----------  volume/bsa, 1-p  A4C  LV end-systolic      26    ml/m^2   7 - 35    ----------  volume/bsa, 1-p  A4C  Stroke               42    ml/m^2   --------- ----------  volume/bsa, 1-p  A4C  LV end-diastolic (H) 113   ml       46 - 106  ----------  volume, 2-p  LV end-systolic      42    ml       14 - 42   ----------  volume, 2-p  LV ejection          63    %        54 - 74   ----------  fraction, 2-p  Stroke volume,       72    ml       --------- ----------  2-p  LV end-diastolic     59    ml/m^2   29 - 61   ----------  volume/bsa, 2-p  LV end-systolic      22    ml/m^2   8 - 24    ----------  volume/bsa, 2-p  Stroke               37.6  ml/m^2   --------- ----------  volume/bsa, 2-p  LV e', lateral   (L) 4.9   cm/sec   >=10.0    ----------  LV E/e', lateral (H) 19             <=13      ----------  LV e', medial    (L) 3.2   cm/sec   >=7.0     ----------  LV E/e', medial      30             --------- ----------  LV e', average       4.0   cm/sec   --------- ----------  LV E/e', average (H) 23             <=14      ----------  LVOT                 Value          Ref       01/24/2025  LVOT ID              1.9   cm       --------- 2.2   LVOT peak            1.29  m/sec    --------- 1.31  velocity, S  LVOT VTI, S          29.9  cm       --------- 27.6  LVOT peak            7     mm Hg    --------- 7  gradient, S  LVOT mean            4     mm Hg    --------- 3  gradient, S  Stroke volume        85    ml       --------- 105  (SV), LVOT DP  Stroke index         45    ml/m^2   --------- 53  (SV/bsa), LVOT  DP  Aortic valve         Value          Ref       01/24/2025  Aortic valve         2.69  m/sec    --------- 2.4  peak velocity, S  Aortic valve         55.7  cm       --------- 51.6  VTI, S  Aortic mean          15    mm Hg    --------- 12  gradient, S  Aortic peak          29    mm Hg    --------- 23  gradient, S  Aortic valve         1.52  cm^2     --------- 2.03  area, VTI  Aortic valve         0.8   cm^2/m^2 --------- 1.03  area/bsa, VTI  Velocity ratio,      0.48           --------- 0.55  peak, LVOT/AV  Ascending aorta      Value          Ref       01/24/2025  Ascending aorta      3.5   cm       1.9 - 3.5 ----------  ID  Left atrium          Value          Ref       01/24/2025  LA ID, A-P, ES       3.3   cm       2.7 - 3.8 4.0  LA volume/bsa, S (H) 39    ml/m^2   16 - 34   56  LA volume, ES,   (H) 74    ml       22 - 52   101  1-p A4C  Mitral valve         Value          Ref       01/24/2025  Mitral E-wave        0.94  m/sec    --------- ----------  peak velocity  Mitral A-wave        1.24  m/sec    --------- ----------  peak velocity  Mitral               190   ms       --------- ----------  deceleration  time  Mitral peak          4     mm Hg    --------- ----------  gradient, D  Mitral E/A           0.8            --------- ----------  ratio, peak  Pulmonary artery     Value          Ref       01/24/2025  PA pressure, S,      52    mm Hg    --------- ----------  DP  Tricuspid valve      Value          Ref       01/24/2025  Tricuspid regurg (H) 3.06  m/sec    <=2.8     ----------  peak velocity  Tricuspid peak       37    mm Hg    ---------  ----------  RV-RA gradient  Right atrium         Value          Ref       01/24/2025  RA ID, S-I, ES,  (H) 6.3   cm       3.4 - 5.3 ----------  A4C  RA ID/bsa, S-I,  (H) 3.3   cm/m^2   1.9 - 3.1 ----------  ES, A4C  RA area, ES, A4C     17    cm^2     10 - 18   ----------  RA volume, ES,       38    ml       --------- ----------  1-p A4C  RA volume/bsa,       20    ml/m^2   9 - 33    ----------  ES, 1-p A4C  Systemic veins       Value          Ref       01/24/2025  Estimated CVP        15    mm Hg    --------- ----------  Inferior vena        Value          Ref       01/24/2025  cava  ID               (H) 2.7   cm       <=2.1     ----------  Right ventricle      Value          Ref       01/24/2025  TAPSE, 2D            2.74  cm       >=1.70    ----------  TAPSE, MM            2.74  cm       >=1.70    ----------  RV pressure, S,      52    mm Hg    --------- ----------  DP  RV s', lateral       14.7  cm/sec   >=9.5     ---------- Legend: (L)  and  (H)  migel values outside specified reference range. ---------------------------------------------------------------------------- Prepared and electronically signed by Bran Salcedo 03/27/2025 16:38          Meds:   Current Hospital Medications[1]    Assessment & Plan    Acute hypoxic respiratory failure (HCC)  -multifactorial: acute HFpEF, recurrent interstitial pneumonitis, CAP  -CT chest in ED 3/26/25 - no PE; worsening of pleural effusions and interstitial edema  -required BiPAP transiently; was up to 15L O2  -echo 2/5/25 - normal LV systolic function (EF 60-65%); grade 2 diastolic dysfunction; normally functioning bioprosthetic TAVR; sl incr PAP 44mmHg  -repeat echo 3/27/25 stable from 2/2025 except incr PAP to 52mmHg  -hi res CT chest 4/4/25 --pulmonary edema and bilateral pleural effusions significantly improved.  There is persistent trace right pleural effusion.  Advanced interstitial lung disease is noted in an NSIP (nonspecific insterstitial pneumonitis)  pattern.   -s/p ceftriaxone/azithro then augmentin (stopped 4/7)  -s/p IV lasix; now lasix 40mg po daily (for HFpEF)  -on steroids  -initially with clinical improvement - decreased O2 requirement, improvement in dyspnea; however, pt noted an acute worsening in her sx on 4/8.  CT chest w/contrast on 4/9 -- negative for PE; no significant fluid overload; only small b/l pleural effusions and atelectasis; it did show peripheral and basilar predominant pulmonary interstitial fibrosis with suggestion of underlying nonspecific interstitial pneumonitis.  RHC on 4/7 showed normal filling pressures.  Pt appears euvolemic.  -suspect pt's subacute decompensation due to NSIP -- sx occurred the day after her solumedrol was deescalated to oral prednisone on 4/7 and CT findings are c/w NSIP.    -restarted solumedrol 40mg IV q8hrs on 4/10 with improvement -- now q12 hrs  -per pulm, anticipate deescalating to prednisone today  -O2 down to 1L   -clinically improved; no longer SOB at rest; but desaturated to 84% on 6L with ambulation with PT yesterday; she walked to the bathroom today without dyspnea.     Coronary artery disease  -Cath 1/14/25 by Dr. Bernardo Liz (as w/u for TAVR) -- RCA non-obstructive; LM free of obst disease; LM plaque extending into ostium of LAD (20-30%); mid LAD (80-90%), cx ostium (60-70%)  -intervention delayed until after colon resection due to need for DAPT x 6 mos after stenting   -s/p PCI/JUEVNTINO of mid LAD (70%>0%) on 4/7/25 by Dr. Dutton  -cont ASA, Plavix  -increased atorvastatin from 20mg to 80mg/day (LDL 76 on 3/26/25)  -to f/u with Dr. Bird as outpt     Interstitial pneumonitis/NSIP  -dx'ed 10/2024 - presented with cough and severe hypoxia  -unresponsive to abx   -CXR 10/24/24 extensive bilateral perihilar and bilateral upper and lower lobe   -S.pneumo, legionella Ag , mycoplasma IgM/G, Fungitell-beta-D glucan negative  -ANCA and URIEL/connective tissue disease panels negative  -anti-histone and anti-Keara  Ab's negative  -CT with bilateral ground glass opacities, small consolidations of BLL  -indings suspicious for NSIP (vs cryptogenic organizing pneumonia vs hypersensitivity pneumonitis); NOT thought to be c/w UIP pattern  -s/p empiric steroids (solumedrol then prednisone taper) 10/25/24 - (EOT 1/18/25)  -dyspnea and hypoxia completely resolved until this admission 3/26/25 -- POPE, hypoxia  -steroids restarted as above; on solumedrol 40mg IV q12 hrs -- anticipate change to oral prednisone today with taper per pulm     Hx of Bilateral pulmonary emboli (10/2024)  -dx'ed at time of interstitial pneumonitis  -CT chest 10/25/24 -- acute distal segmental/subsegmental pulmonary emboli in [place] and subsegmental PE in [place]  -unclear etiology; no recent hx of long travel; no family/personal hx of blood clots  -BLE venous dopplers negative 10/26/24  -started xarelto 20 mg po every day on 11/28/24  -repeat CT chest 12/9/24 neg PE  -stopped Xarelto 12/14/24 due to recurrent GI bleed  -repeat CT chest 3/26/25 and 4/9/25 -- neg PE     Severe aortic stenosis  -progression on serial echocardiograms  -s/p TAVR 1/23/25 (Dr. Reji Green)     Adenocarcinoma of colon  -Bx of transverse colon polyp 12/17/24 -- invasive, moderately differentiated adenocarcinoma  -CEA normal (1.9)  -CT a/p neg for mets  -surgery delayed in anticipation of TAVR (done 1/23/25)  -s/p robotic assisted left hemicolectomy (Dr. Cassidy) on 2/27/25 - path - tumor invades into muscularis; margins neg for tumor; all 19 LN's neg for mets (0/19); pT2, pN0  -next appt with Dr. Cassidy 4/16/25 - to be rescheduled     Hx  GI bleed  -EGD 11/26/24 (Dr. Nuno) -15mm nonbleeding gastric antral ulcer; 3mm gastric antral AVM s/p APC  -recurrence in 12/2024  -colonoscopy and repeat EGD 12/17/24 by Dr. Staton -- grade 1 esophagitis; duod ulcers healing; large flat polyp in transverse colon (carcinoma)   -received total of 3 units PRBCs   -Xarelto stopped in 12/2024  -s/p  omeprazole 20mg BID x 8 weeks (EOT 1/22/25), now on omeprazole 20mg daily -- plan has been to continue until she completed her coronary stenting.  Will stop once off steroids.     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   -MRI brain--no acute intracranial process  -was doing PT at [facility] -- continues to improve; almost back to normal       Hx of Hypertension, primary  -(dyazide stopped due to NANCY)  -(amlodipine held due to low BP in 11/2024)  -BP remains normal off meds     Hx of hip OA  -s/p left THR (Dr. Lo) many years ago  -s/p elective R BEATRIZ on 10/5/23 by Dr. Diaz     Hyperlipidemia   on atorvastatin to 80mg/day as above     Osteopenia   On Fosamax from 2011 to 4/2016.     DEXA stable 5/10/17 and 2019 and 2021  DEXA 8/2024 -- osteoporosis of left forearm  -restarted Fosamax 8/2024 (on hold this admission); restart at discharge     Vitamin D deficiency  On vitamin D 4000u/day      VTE proph  -SQH        Dispo  -pt has been residing at [facility] assisted [facility] for past several months (for respite care and PT), and is scheduled to be there until July, after which she plans to return home  -PT eval appreciated - recommending [facility] (alternatively [facility] w/PT); pt declines [facility] d/t poor experience previously; would like to return to [facility] w/PT (done in-house at [facility]) and home health (used [facility])  -anticipate discharge poss  tomorrow         D/w pt, CHIARA Kc MD  4/15/2025  8:53 AM       [1]   Current Facility-Administered Medications   Medication Dose Route Frequency    methylPREDNISolone sodium succinate (Solu-MEDROL) injection 40 mg  40 mg Intravenous Q12H    sodium chloride (Saline Mist) 0.65 % nasal solution 1 spray  1 spray Each Nare Q3H PRN    atorvastatin (Lipitor) tab 80 mg  80 mg Oral Nightly    clopidogrel (Plavix) tab 75 mg  75 mg Oral Daily    aspirin DR tab 81 mg  81 mg Oral Daily    acetaminophen (Tylenol) tab 650 mg  650 mg Oral Q6H PRN    furosemide (Lasix) tab 40 mg  40 mg Oral Daily    polyethylene glycol (PEG 3350) (Miralax) 17 g oral packet 17 g  17 g Oral Daily PRN    guaiFENesin (Robitussin) 100 MG/5 ML oral liquid 200 mg  200 mg Oral Q4H PRN    benzocaine-menthol (Cepacol) lozenge 1 lozenge  1 lozenge Oral PRN    vancomycin (Vancocin) cap 125 mg  125 mg Oral Daily    pantoprazole (Protonix) DR tab 40 mg  40 mg Oral QAM AC    traMADol (Ultram) tab 50 mg  50 mg Oral Q6H PRN    ipratropium-albuterol (Duoneb) 0.5-2.5 (3) MG/3ML inhalation solution 3 mL  3 mL Nebulization Q6H PRN    heparin (Porcine) 5000 UNIT/ML injection 5,000 Units  5,000 Units Subcutaneous 2 times per day    glucose (Dex4) 15 GM/59ML oral liquid 15 g  15 g Oral Q15 Min PRN    Or    glucose (Glutose) 40% oral gel 15 g  15 g Oral Q15 Min PRN    Or    glucose-vitamin C (Dex-4) chewable tab 4 tablet  4 tablet Oral Q15 Min PRN    Or    dextrose 50% injection 50 mL  50 mL Intravenous Q15 Min PRN    Or    glucose (Dex4) 15 GM/59ML oral liquid 30 g  30 g Oral Q15 Min PRN    Or    glucose (Glutose) 40% oral gel 30 g  30 g Oral Q15 Min PRN    Or    glucose-vitamin C (Dex-4) chewable tab 8 tablet  8 tablet Oral Q15 Min PRN    insulin aspart (NovoLOG) 100 Units/mL FlexPen 1-7 Units  1-7 Units Subcutaneous TID CC    ferrous sulfate DR tab 325 mg  325 mg Oral Daily with breakfast    docusate sodium (Colace) cap 100 mg  100  mg Oral BID

## 2025-04-15 NOTE — PROGRESS NOTES
Patient's O2 sat on room air is 88% at rest. Pt's O2 sat on room is 83% when ambulating, and 91% on 6 liter while ambulating.

## 2025-04-15 NOTE — PLAN OF CARE
Problem: Patient Centered Care  Goal: Patient preferences are identified and integrated in the patient's plan of care  Description: Interventions:- What would you like us to know as we care for you? From [facility] [place] - Provide timely, complete, and accurate information to patient/family- Incorporate patient and family knowledge, values, beliefs, and cultural backgrounds into the planning and delivery of care- Encourage patient/family to participate in care and decision-making at the level they choose- Honor patient and family perspectives and choices  Outcome: Progressing     Problem: Patient/Family Goals  Goal: Patient/Family Long Term Goal  Description: Patient's Long Term Goal: to go home Interventions:- Monitor vital signs and labs  - Administer medications per order  - Follow MD orders  - Fall precautions  - Diagnostics as ordered  - Update / inform patient on plan of care  - Discharge planning- See additional Care Plan goals for specific interventions  Outcome: Progressing  Goal: Patient/Family Short Term Goal  Description: Patient's Short Term Goal: to feel better Interventions: -  See additional Care Plan goals for specific interventionsMonitor vital signs and labs  - Administer medications per order  - Follow MD orders  - Fall precautions  - Diagnostics as ordered  - Update / inform patient on plan of care  - Discharge planning  Outcome: Progressing     Problem: PAIN - ADULT  Goal: Verbalizes/displays adequate comfort level or patient's stated pain goal  Description: INTERVENTIONS:- Encourage pt to monitor pain and request assistance- Assess pain using appropriate pain scale- Administer analgesics based on type and severity of pain and evaluate response- Implement non-pharmacological measures as appropriate and evaluate response- Consider cultural and social influences on pain and pain management- Manage/alleviate anxiety- Utilize distraction and/or relaxation techniques- Monitor for opioid side  effects- Notify MD/LIP if interventions unsuccessful or patient reports new pain- Anticipate increased pain with activity and pre-medicate as appropriate  Outcome: Progressing     Problem: RISK FOR INFECTION - ADULT  Goal: Absence of fever/infection during anticipated neutropenic period  Description: INTERVENTIONS- Monitor WBC- Administer growth factors as ordered- Implement neutropenic guidelines  Outcome: Progressing     Problem: SAFETY ADULT - FALL  Goal: Free from fall injury  Description: INTERVENTIONS:- Assess pt frequently for physical needs- Identify cognitive and physical deficits and behaviors that affect risk of falls.- [place] fall precautions as indicated by assessment.- Educate pt/family on patient safety including physical limitations- Instruct pt to call for assistance with activity based on assessment- Modify environment to reduce risk of injury- Provide assistive devices as appropriate- Consider OT/PT consult to assist with strengthening/mobility- Encourage toileting schedule  Outcome: Progressing     Problem: DISCHARGE PLANNING  Goal: Discharge to home or other facility with appropriate resources  Description: INTERVENTIONS:- Identify barriers to discharge w/pt and caregiver- Include patient/family/discharge partner in discharge planning- Arrange for needed discharge resources and transportation as appropriate- Identify discharge learning needs (meds, wound care, etc)- Arrange for interpreters to assist at discharge as needed- Consider post-discharge preferences of patient/family/discharge partner- Complete POLST form as appropriate- Assess patient's ability to be responsible for managing their own health- Refer to Case Management Department for coordinating discharge planning if the patient needs post-hospital services based on physician/LIP order or complex needs related to functional status, cognitive ability or social support system  Outcome: Progressing     Problem: RESPIRATORY -  ADULT  Goal: Achieves optimal ventilation and oxygenation  Description: INTERVENTIONS:- Assess for changes in respiratory status- Assess for changes in mentation and behavior- Position to facilitate oxygenation and minimize respiratory effort- Oxygen supplementation based on oxygen saturation or ABGs- Provide Smoking Cessation handout, if applicable- Encourage broncho-pulmonary hygiene including cough, deep breathe, Incentive Spirometry- Assess the need for suctioning and perform as needed- Assess and instruct to report SOB or any respiratory difficulty- Respiratory Therapy support as indicated- Manage/alleviate anxiety- Monitor for signs/symptoms of CO2 retention  Outcome: Progressing     Problem: MUSCULOSKELETAL - ADULT  Goal: Return mobility to safest level of function  Description: INTERVENTIONS:- Assess patient stability and activity tolerance for standing, transferring and ambulating w/ or w/o assistive devices- Assist with transfers and ambulation using safe patient handling equipment as needed- Ensure adequate protection for wounds/incisions during mobilization- Obtain PT/OT consults as needed- Advance activity as appropriate- Communicate ordered activity level and limitations with patient/family  Outcome: Progressing  Goal: Maintain proper alignment of affected body part  Description: INTERVENTIONS:- Support and protect limb and body alignment per provider's orders- Instruct and reinforce with patient and family use of appropriate assistive device and precautions (e.g. spinal or hip dislocation precautions)  Outcome: Progressing     Problem: Diabetes/Glucose Control  Goal: Glucose maintained within prescribed range  Description: INTERVENTIONS:- Monitor Blood Glucose as ordered- Assess for signs and symptoms of hyperglycemia and hypoglycemia- Administer ordered medications to maintain glucose within target range- Assess barriers to adequate nutritional intake and initiate nutrition consult as needed-  Instruct patient on self management of diabetes  Outcome: Progressing

## 2025-04-16 NOTE — PLAN OF CARE
AxOx4, 1L NC sitting 4L NC ambulating, NSR, Cont x2, x1 walker ACHS.   PLAN: start PO prednisone, possible discharge tomorrow on oxygen to [facility]  Problem: Patient Centered Care  Goal: Patient preferences are identified and integrated in the patient's plan of care  Description: Interventions:- What would you like us to know as we care for you? From [facility] - Provide timely, complete, and accurate information to patient/family- Incorporate patient and family knowledge, values, beliefs, and cultural backgrounds into the planning and delivery of care- Encourage patient/family to participate in care and decision-making at the level they choose- Honor patient and family perspectives and choices  Outcome: Progressing     Problem: Patient/Family Goals  Goal: Patient/Family Long Term Goal  Description: Patient's Long Term Goal: to go home Interventions:- Monitor vital signs and labs  - Administer medications per order  - Follow MD orders  - Fall precautions  - Diagnostics as ordered  - Update / inform patient on plan of care  - Discharge planning- See additional Care Plan goals for specific interventions  Outcome: Progressing  Goal: Patient/Family Short Term Goal  Description: Patient's Short Term Goal: to feel better Interventions: -  See additional Care Plan goals for specific interventionsMonitor vital signs and labs  - Administer medications per order  - Follow MD orders  - Fall precautions  - Diagnostics as ordered  - Update / inform patient on plan of care  - Discharge planning  Outcome: Progressing     Problem: PAIN - ADULT  Goal: Verbalizes/displays adequate comfort level or patient's stated pain goal  Description: INTERVENTIONS:- Encourage pt to monitor pain and request assistance- Assess pain using appropriate pain scale- Administer analgesics based on type and severity of pain and evaluate response- Implement non-pharmacological measures as appropriate and evaluate response- Consider cultural and social  influences on pain and pain management- Manage/alleviate anxiety- Utilize distraction and/or relaxation techniques- Monitor for opioid side effects- Notify MD/LIP if interventions unsuccessful or patient reports new pain- Anticipate increased pain with activity and pre-medicate as appropriate  Outcome: Progressing     Problem: RISK FOR INFECTION - ADULT  Goal: Absence of fever/infection during anticipated neutropenic period  Description: INTERVENTIONS- Monitor WBC- Administer growth factors as ordered- Implement neutropenic guidelines  Outcome: Progressing     Problem: SAFETY ADULT - FALL  Goal: Free from fall injury  Description: INTERVENTIONS:- Assess pt frequently for physical needs- Identify cognitive and physical deficits and behaviors that affect risk of falls.- [place] fall precautions as indicated by assessment.- Educate pt/family on patient safety including physical limitations- Instruct pt to call for assistance with activity based on assessment- Modify environment to reduce risk of injury- Provide assistive devices as appropriate- Consider OT/PT consult to assist with strengthening/mobility- Encourage toileting schedule  Outcome: Progressing     Problem: DISCHARGE PLANNING  Goal: Discharge to home or other facility with appropriate resources  Description: INTERVENTIONS:- Identify barriers to discharge w/pt and caregiver- Include patient/family/discharge partner in discharge planning- Arrange for needed discharge resources and transportation as appropriate- Identify discharge learning needs (meds, wound care, etc)- Arrange for interpreters to assist at discharge as needed- Consider post-discharge preferences of patient/family/discharge partner- Complete POLST form as appropriate- Assess patient's ability to be responsible for managing their own health- Refer to Case Management Department for coordinating discharge planning if the patient needs post-hospital services based on physician/LIP order or  complex needs related to functional status, cognitive ability or social support system  Outcome: Progressing     Problem: RESPIRATORY - ADULT  Goal: Achieves optimal ventilation and oxygenation  Description: INTERVENTIONS:- Assess for changes in respiratory status- Assess for changes in mentation and behavior- Position to facilitate oxygenation and minimize respiratory effort- Oxygen supplementation based on oxygen saturation or ABGs- Provide Smoking Cessation handout, if applicable- Encourage broncho-pulmonary hygiene including cough, deep breathe, Incentive Spirometry- Assess the need for suctioning and perform as needed- Assess and instruct to report SOB or any respiratory difficulty- Respiratory Therapy support as indicated- Manage/alleviate anxiety- Monitor for signs/symptoms of CO2 retention  Outcome: Progressing     Problem: MUSCULOSKELETAL - ADULT  Goal: Return mobility to safest level of function  Description: INTERVENTIONS:- Assess patient stability and activity tolerance for standing, transferring and ambulating w/ or w/o assistive devices- Assist with transfers and ambulation using safe patient handling equipment as needed- Ensure adequate protection for wounds/incisions during mobilization- Obtain PT/OT consults as needed- Advance activity as appropriate- Communicate ordered activity level and limitations with patient/family  Outcome: Progressing  Goal: Maintain proper alignment of affected body part  Description: INTERVENTIONS:- Support and protect limb and body alignment per provider's orders- Instruct and reinforce with patient and family use of appropriate assistive device and precautions (e.g. spinal or hip dislocation precautions)  Outcome: Progressing     Problem: Diabetes/Glucose Control  Goal: Glucose maintained within prescribed range  Description: INTERVENTIONS:- Monitor Blood Glucose as ordered- Assess for signs and symptoms of hyperglycemia and hypoglycemia- Administer ordered medications  to maintain glucose within target range- Assess barriers to adequate nutritional intake and initiate nutrition consult as needed- Instruct patient on self management of diabetes  Outcome: Progressing

## 2025-04-16 NOTE — PROGRESS NOTES
[facility]  part of Merged with [facility]     Progress Note    Hoda Busby Patient Status:  Inpatient    1940 MRN [id number]   Location [facility]5W Attending Malathi Stuart MD   Hosp Day # 21 PCP Malathi Stuart MD       Subjective:   Patient seen and examined.  Admits to less dyspnea with exertion.  On 1 L oxygen.  Cough improving  Objective:   Blood pressure 123/62, pulse 77, temperature 97.6 °F (36.4 °C), temperature source Oral, resp. rate 18, height 5' 4\" (1.626 m), weight 178 lb 9.2 oz (81 kg), SpO2 97%.  Intake/Output:   Last 3 shifts: I/O last 3 completed shifts:  In: 680 [P.O.:680]  Out: 350 [Urine:350]   This shift: I/O this shift:  In: 300 [P.O.:300]  Out: 800 [Urine:800]     Vent Settings:      Hemodynamic parameters (last 24 hours):      Scheduled Meds:   Current Facility-Administered Medications   Medication Dose Route Frequency    predniSONE (Deltasone) tab 40 mg  40 mg Oral Daily with breakfast    sodium chloride (Saline Mist) 0.65 % nasal solution 1 spray  1 spray Each Nare Q3H PRN    atorvastatin (Lipitor) tab 80 mg  80 mg Oral Nightly    clopidogrel (Plavix) tab 75 mg  75 mg Oral Daily    aspirin DR tab 81 mg  81 mg Oral Daily    acetaminophen (Tylenol) tab 650 mg  650 mg Oral Q6H PRN    furosemide (Lasix) tab 40 mg  40 mg Oral Daily    polyethylene glycol (PEG 3350) (Miralax) 17 g oral packet 17 g  17 g Oral Daily PRN    guaiFENesin (Robitussin) 100 MG/5 ML oral liquid 200 mg  200 mg Oral Q4H PRN    benzocaine-menthol (Cepacol) lozenge 1 lozenge  1 lozenge Oral PRN    pantoprazole (Protonix) DR tab 40 mg  40 mg Oral QAM AC    traMADol (Ultram) tab 50 mg  50 mg Oral Q6H PRN    ipratropium-albuterol (Duoneb) 0.5-2.5 (3) MG/3ML inhalation solution 3 mL  3 mL Nebulization Q6H PRN    heparin (Porcine) 5000 UNIT/ML injection 5,000 Units  5,000 Units Subcutaneous 2 times per day    glucose (Dex4) 15 GM/59ML oral liquid 15 g  15 g Oral Q15 Min PRN    Or    glucose  (Glutose) 40% oral gel 15 g  15 g Oral Q15 Min PRN    Or    glucose-vitamin C (Dex-4) chewable tab 4 tablet  4 tablet Oral Q15 Min PRN    Or    dextrose 50% injection 50 mL  50 mL Intravenous Q15 Min PRN    Or    glucose (Dex4) 15 GM/59ML oral liquid 30 g  30 g Oral Q15 Min PRN    Or    glucose (Glutose) 40% oral gel 30 g  30 g Oral Q15 Min PRN    Or    glucose-vitamin C (Dex-4) chewable tab 8 tablet  8 tablet Oral Q15 Min PRN    insulin aspart (NovoLOG) 100 Units/mL FlexPen 1-7 Units  1-7 Units Subcutaneous TID CC    ferrous sulfate DR tab 325 mg  325 mg Oral Daily with breakfast    docusate sodium (Colace) cap 100 mg  100 mg Oral BID       Continuous Infusions:     Physical Exam  Constitutional: no acute distress  Eyes: PERRL  ENT: nares pateint  Neck: supple, no JVD  Cardio: RRR, S1 S2  Respiratory: Basilar crackles  GI: abdomen soft, non tender, active bowel sounds, no organomegaly  Extremities: no clubbing, cyanosis, + trace lower extremity edema   Neurologic: no gross motor deficits  Skin: warm, dry      Results:     Lab Results   Component Value Date    WBC 12.2 04/16/2025    HGB 9.3 04/16/2025    HCT 30.0 04/16/2025    .0 04/16/2025           No results found.            Assessment   1.  Acute hypoxemic respiratory failure  2.  ILD  3.  Small pleural effusions  4.  Pulmonary edema  5.  Colon adenocarcinoma status post hemicolectomy  6.  Coronary artery disease  7.  Prior history of GI bleed  8.  Prior VTE  9.  History of severe aortic stenosis status post TAVR  10.  Hypertension     Plan   -Patient presents with progressive worsening dyspnea and acute hypoxemic respiratory failure.  -CT chest on presentation on 3/26/2025 with no evidence of acute pulmonary embolism seen.  Evidence of small pleural effusions with groundglass opacities suggestive more likely of edema with worsening compared to 6 days prior from CT chest.  Some background of fibrotic changes present.  -Repeat CT high-resolution chest on  4/4/2025 with improvement in pulmonary edema and pleural effusions.  Underlying ILD changes present.  -CT chest with contrast on 4/9/2025 with no evidence of pulmonary embolism seen.  Fibrotic changes seen otherwise with small pleural effusions and mild edema.  -Clinically improved after steroid dosage increased last week.  Will transition to prednisone 40 mg today.  Slow taper as outpatient.  -Diuresis as need be.  -Prior history of underlying ILD with hospitalization October 2024 responded to tapering steroid therapy at the time.  -Pulmonary function testing from February 2025 with mild restriction seen with significant decrease in diffusion capacity.  -Wean oxygen as tolerated.  Admits to ongoing dyspnea currently on 1 L oxygen at rest with desaturation with activity.  Will set up with home oxygen upon discharge..  Oxygenation requirements gradually improving  -Completed course of antibiotic therapy with Augmentin.  -Status post PCI of mLAD on 4/7/2025  -DVT prophylaxis: Heparin  - Discussed with primary care physician.  Anticipate likely discharge tomorrow      Cody Holloway,   Pulmonary Critical Care Medicine  [facility]

## 2025-04-16 NOTE — PROGRESS NOTES
[facility]  part of [facility]    Progress Note    Hoda Busby Patient Status:  Inpatient    1940 MRN [id number]   Location [facility] 3W/SW Attending Malathi Stuart MD   Hosp Day # 21 PCP Malathi Stuart MD       SUBJECTIVE:  Feels better; breathing better w/walking    OBJECTIVE:  Vital signs in last 24 hours:  /58 (BP Location: Right arm)   Pulse 97   Temp 97.5 °F (36.4 °C) (Oral)   Resp 18   Ht 5' 4\" (1.626 m)   Wt 178 lb 9.2 oz (81 kg)   SpO2 96%   BMI 30.65 kg/m²     Intake/Output:    Intake/Output Summary (Last 24 hours) at 2025 0900  Last data filed at 2025 0723  Gross per 24 hour   Intake 440 ml   Output 950 ml   Net -510 ml       Wt Readings from Last 3 Encounters:   25 178 lb 9.2 oz (81 kg)   25 180 lb (81.6 kg)   25 173 lb (78.5 kg)       EXAM:  GENERAL: well developed, well nourished, in no apparent distress  LUNGS: bll coarse crackles (decreased today)  CARDIO: RRR, normal S1S2, 2/6 WADE  EXTREMITIES: tr-1+B LE edema; CRISTA hose in place    Data Review:     Labs:   Lab Results   Component Value Date    WBC 12.2 2025    HGB 9.3 2025    HCT 30.0 2025    .0 2025         Imaging:  No results found.   CARD ECHO 2D DOPPLER (CPT=93306)  Result Date: 3/27/2025  Transthoracic Echocardiogram Name:Hoda Busby Date: 2025 :  1940 Ht:  (64in)  BP: 137 / 81 MRN:  [id number]    Age:  84years    Wt:  (187lb) HR: 75bpm Loc:  [facility]       Gndr: F          BSA: 1.9m^2 Sonographer: Casper JOHNSON Ordering:    Malathi Stuart Consulting:  Martita Baez ---------------------------------------------------------------------------- History/Indications:   Congestive heart failure.  Risk factors: Hypertension. TAVR-23 mm Martha 3 Resilia bioprosthesis. Acute hypoxic respiratory failure. ---------------------------------------------------------------------------- Procedure information:  A  transthoracic complete 2D study was performed. Additional evaluation included M-mode, complete spectral Doppler, and color Doppler.  Patient status:  Inpatient.  Location:  Bedside.    Comparison was made to the study of 02/05/2025.    This was a routine study. Transthoracic echocardiography for diagnosis and ventricular function evaluation. Image quality was adequate. ECG rhythm:   Normal sinus ---------------------------------------------------------------------------- Conclusions: 1. Left ventricle: The cavity size was normal. Wall thickness was normal.    Systolic function was normal. The estimated ejection fraction was 60-65%.    No diagnostic evidence for regional wall motion abnormalities. Doppler    parameters are consistent with abnormal left ventricular relaxation -    grade 1 diastolic dysfunction. 2. Right ventricle: The cavity size was increased. Systolic function was    normal. 3. Left atrium: The atrium was mildly to moderately dilated. The left atrial    volume was mildly to moderately increased. 4. Aortic valve: A bioprosthetic valve is present. Mcnamara PUNEET S3 23mm    (TAVR) bioprosthesis was present. The peak systolic velocity was    2.69m/sec. The mean systolic gradient was 15mm Hg. The valve area (VTI)    was 1.52cm^2. 5. Pulmonary arteries: Systolic pressure was moderately increased, estimated    to be 52mm Hg. * ---------------------------------------------------------------------------- * Findings: Left ventricle:  The cavity size was normal. Wall thickness was normal. Systolic function was normal. The estimated ejection fraction was 60-65%. No diagnostic evidence for regional wall motion abnormalities. Doppler parameters are consistent with abnormal left ventricular relaxation - grade 1 diastolic dysfunction. Left atrium:  The atrium was mildly to moderately dilated. The left atrial volume was mildly to moderately increased. Right ventricle:  The cavity size was increased. Systolic  function was normal. Right atrium:  Well visualized. The atrium was normal in size. Mitral valve:  The annulus was moderately calcified. The leaflets were mildly thickened and mildly to moderately calcified. Leaflet separation was normal.  Doppler:  Transvalvular velocity was within the normal range. There was no evidence for stenosis. There was no significant regurgitation. Aortic valve:  A bioprosthetic valve is present. Mcnamara PUNEET S3 23mm (TAVR) bioprosthesis was present. Cusp separation was normal.  Doppler: Transvalvular velocity was within the normal range. There was no evidence for stenosis. There was no significant regurgitation.    The valve area (VTI) was 1.52cm^2. The valve area (VTI) index was 0.8cm^2/m^2.    The mean systolic gradient was 15mm Hg. The peak systolic gradient was 29mm Hg. Tricuspid valve:  The valve is structurally normal. Leaflet separation was normal.  Doppler:  Transvalvular velocity was within the normal range. There was no evidence for stenosis. There was mild regurgitation. Pulmonic valve:   The valve is structurally normal. Cusp separation was normal.  Doppler:  Transvalvular velocity was within the normal range. There was no evidence for stenosis. There was no significant regurgitation. Pericardium:   There was no pericardial effusion. Aorta: Aortic root: The aortic root was normal. Ascending aorta: The ascending aorta was normal. Pulmonary arteries: Systolic pressure was moderately increased, estimated to be 52mm Hg. Systemic veins:  Central venous respirophasic diameter changes are blunted (< 50%). Inferior vena cava: The IVC was dilated. ---------------------------------------------------------------------------- Measurements  Left ventricle       Value          Ref       01/24/2025  IVS thickness,   (H) 1.0   cm       0.6 - 0.9 1.0  ED, PLAX  LV ID, ED, PLAX  (H) 5.3   cm       3.8 - 5.2 5.0  LV ID, ES, PLAX      3.5   cm       2.2 - 3.5 3.1  LV PW thickness, (H) 1.0   cm        0.6 - 0.9 1.0  ED, PLAX  IVS/LV PW ratio,     1.00           --------- 1.02  ED, PLAX  LV PW/LV ID          0.19           --------- 0.2  ratio, ED, PLAX  LV area, ES, A4C     17.5  cm^2     --------- ----------  LV ejection          62    %        54 - 74   67  fraction  Stroke               45    ml/m^2   --------- 53  volume/bsa, 2D  LV end-diastolic     126   ml       48 - 140  ----------  volume, 1-p A4C  LV end-systolic      49    ml       12 - 60   ----------  volume, 1-p A4C  LV ejection          63    %        46 - 78   ----------  fraction, 1-p  A4C  Stroke volume,       79    ml       --------- ----------  1-p A4C  LV end-diastolic     66    ml/m^2   30 - 82   ----------  volume/bsa, 1-p  A4C  LV end-systolic      26    ml/m^2   7 - 35    ----------  volume/bsa, 1-p  A4C  Stroke               42    ml/m^2   --------- ----------  volume/bsa, 1-p  A4C  LV end-diastolic (H) 113   ml       46 - 106  ----------  volume, 2-p  LV end-systolic      42    ml       14 - 42   ----------  volume, 2-p  LV ejection          63    %        54 - 74   ----------  fraction, 2-p  Stroke volume,       72    ml       --------- ----------  2-p  LV end-diastolic     59    ml/m^2   29 - 61   ----------  volume/bsa, 2-p  LV end-systolic      22    ml/m^2   8 - 24    ----------  volume/bsa, 2-p  Stroke               37.6  ml/m^2   --------- ----------  volume/bsa, 2-p  LV e', lateral   (L) 4.9   cm/sec   >=10.0    ----------  LV E/e', lateral (H) 19             <=13      ----------  LV e', medial    (L) 3.2   cm/sec   >=7.0     ----------  LV E/e', medial      30             --------- ----------  LV e', average       4.0   cm/sec   --------- ----------  LV E/e', average (H) 23             <=14      ----------  LVOT                 Value          Ref       01/24/2025  LVOT ID              1.9   cm       --------- 2.2  LVOT peak            1.29  m/sec    --------- 1.31  velocity, S  LVOT VTI, S          29.9  cm       ---------  27.6  LVOT peak            7     mm Hg    --------- 7  gradient, S  LVOT mean            4     mm Hg    --------- 3  gradient, S  Stroke volume        85    ml       --------- 105  (SV), LVOT DP  Stroke index         45    ml/m^2   --------- 53  (SV/bsa), LVOT  DP  Aortic valve         Value          Ref       01/24/2025  Aortic valve         2.69  m/sec    --------- 2.4  peak velocity, S  Aortic valve         55.7  cm       --------- 51.6  VTI, S  Aortic mean          15    mm Hg    --------- 12  gradient, S  Aortic peak          29    mm Hg    --------- 23  gradient, S  Aortic valve         1.52  cm^2     --------- 2.03  area, VTI  Aortic valve         0.8   cm^2/m^2 --------- 1.03  area/bsa, VTI  Velocity ratio,      0.48           --------- 0.55  peak, LVOT/AV  Ascending aorta      Value          Ref       01/24/2025  Ascending aorta      3.5   cm       1.9 - 3.5 ----------  ID  Left atrium          Value          Ref       01/24/2025  LA ID, A-P, ES       3.3   cm       2.7 - 3.8 4.0  LA volume/bsa, S (H) 39    ml/m^2   16 - 34   56  LA volume, ES,   (H) 74    ml       22 - 52   101  1-p A4C  Mitral valve         Value          Ref       01/24/2025  Mitral E-wave        0.94  m/sec    --------- ----------  peak velocity  Mitral A-wave        1.24  m/sec    --------- ----------  peak velocity  Mitral               190   ms       --------- ----------  deceleration  time  Mitral peak          4     mm Hg    --------- ----------  gradient, D  Mitral E/A           0.8            --------- ----------  ratio, peak  Pulmonary artery     Value          Ref       01/24/2025  PA pressure, S,      52    mm Hg    --------- ----------  DP  Tricuspid valve      Value          Ref       01/24/2025  Tricuspid regurg (H) 3.06  m/sec    <=2.8     ----------  peak velocity  Tricuspid peak       37    mm Hg    --------- ----------  RV-RA gradient  Right atrium         Value          Ref       01/24/2025  RA ID, S-I, ES,  (H) 6.3    cm       3.4 - 5.3 ----------  A4C  RA ID/bsa, S-I,  (H) 3.3   cm/m^2   1.9 - 3.1 ----------  ES, A4C  RA area, ES, A4C     17    cm^2     10 - 18   ----------  RA volume, ES,       38    ml       --------- ----------  1-p A4C  RA volume/bsa,       20    ml/m^2   9 - 33    ----------  ES, 1-p A4C  Systemic veins       Value          Ref       01/24/2025  Estimated CVP        15    mm Hg    --------- ----------  Inferior vena        Value          Ref       01/24/2025  cava  ID               (H) 2.7   cm       <=2.1     ----------  Right ventricle      Value          Ref       01/24/2025  TAPSE, 2D            2.74  cm       >=1.70    ----------  TAPSE, MM            2.74  cm       >=1.70    ----------  RV pressure, S,      52    mm Hg    --------- ----------  DP  RV s', lateral       14.7  cm/sec   >=9.5     ---------- Legend: (L)  and  (H)  migel values outside specified reference range. ---------------------------------------------------------------------------- Prepared and electronically signed by Bran Salcedo 03/27/2025 16:38          Meds:   Current Hospital Medications[1]    Assessment & Plan    Acute hypoxic respiratory failure (HCC)  -multifactorial: acute HFpEF, recurrent interstitial pneumonitis, CAP  -CT chest in ED 3/26/25 - no PE; worsening of pleural effusions and interstitial edema  -required BiPAP transiently; was up to 15L O2  -echo 2/5/25 - normal LV systolic function (EF 60-65%); grade 2 diastolic dysfunction; normally functioning bioprosthetic TAVR; sl incr PAP 44mmHg  -repeat echo 3/27/25 stable from 2/2025 except incr PAP to 52mmHg  -hi res CT chest 4/4/25 --pulmonary edema and bilateral pleural effusions significantly improved.  There is persistent trace right pleural effusion.  Advanced interstitial lung disease is noted in an NSIP (nonspecific insterstitial pneumonitis) pattern.   -s/p ceftriaxone/azithro then augmentin (stopped 4/7)  -s/p IV lasix; now lasix 40mg po daily (for  HFpEF)  -on steroids  -initially with clinical improvement - decreased O2 requirement, improvement in dyspnea; however, pt noted an acute worsening in her sx on 4/8.  CT chest w/contrast on 4/9 -- negative for PE; no significant fluid overload; only small b/l pleural effusions and atelectasis; it did show peripheral and basilar predominant pulmonary interstitial fibrosis with suggestion of underlying nonspecific interstitial pneumonitis.  RHC on 4/7 showed normal filling pressures.  Pt appears euvolemic.  -suspect pt's subacute decompensation due to NSIP -- sx occurred the day after her solumedrol was deescalated to oral prednisone on 4/7 and CT findings are c/w NSIP.    -restarted solumedrol (40mg IV q8h) on 4/10 with improvement   -O2 down to 1L   -clinically improved; no longer SOB at rest (and much improved with walking); but still desats to 80's% on 6L w/ambulation  -solumedrol changed to po prednisone today     Coronary artery disease  -Cath 1/14/25 by Dr. Bernardo Liz (as w/u for TAVR) -- RCA non-obstructive; LM free of obst disease; LM plaque extending into ostium of LAD (20-30%); mid LAD (80-90%), cx ostium (60-70%)  -intervention delayed until after colon resection due to need for DAPT x 6 mos after stenting   -s/p PCI/JUVENTINO of mid LAD (70%>0%) on 4/7/25 by Dr. Dutton  -cont ASA, Plavix  -increased atorvastatin from 20mg to 80mg/day (LDL 76 on 3/26/25)  -to f/u with Dr. Bird as outpt     Interstitial pneumonitis/NSIP  -dx'ed 10/2024 - presented with cough and severe hypoxia  -unresponsive to abx   -CXR 10/24/24 extensive bilateral perihilar and bilateral upper and lower lobe   -S.pneumo, legionella Ag , mycoplasma IgM/G, Fungitell-beta-D glucan negative  -ANCA and URIEL/connective tissue disease panels negative  -anti-histone and anti-Keara Ab's negative  -CT with bilateral ground glass opacities, small consolidations of BLL  -indings suspicious for NSIP (vs cryptogenic organizing pneumonia vs hypersensitivity  pneumonitis); NOT thought to be c/w UIP pattern  -s/p empiric steroids (solumedrol then prednisone taper) 10/25/24 - (EOT 1/18/25)  -dyspnea and hypoxia completely resolved until this admission 3/26/25 -- POPE, hypoxia  -steroids restarted as above; on solumedrol 40mg IV q12 hrs -- anticipate change to oral prednisone today with taper per pulm     Hx of Bilateral pulmonary emboli (10/2024)  -dx'ed at time of interstitial pneumonitis  -CT chest 10/25/24 -- acute distal segmental/subsegmental pulmonary emboli in RUL and subsegmental PE in [place]  -unclear etiology; no recent hx of long travel; no family/personal hx of blood clots  -BLE venous dopplers negative 10/26/24  -started xarelto 20 mg po every day on 11/28/24  -repeat CT chest 12/9/24 neg PE  -stopped Xarelto 12/14/24 due to recurrent GI bleed  -repeat CT chest 3/26/25 and 4/9/25 -- neg PE     Severe aortic stenosis  -progression on serial echocardiograms  -s/p TAVR 1/23/25 (Dr. Reji Green)     Adenocarcinoma of colon  -Bx of transverse colon polyp 12/17/24 -- invasive, moderately differentiated adenocarcinoma  -CEA normal (1.9)  -CT a/p neg for mets  -surgery delayed in anticipation of TAVR (done 1/23/25)  -s/p robotic assisted left hemicolectomy (Dr. Cassidy) on 2/27/25 - path - tumor invades into muscularis; margins neg for tumor; all 19 LN's neg for mets (0/19); pT2, pN0  -next appt with Dr. Cassidy 4/16/25 - to be rescheduled     Hx  GI bleed  -EGD 11/26/24 (Dr. Nuno) -15mm nonbleeding gastric antral ulcer; 3mm gastric antral AVM s/p APC  -recurrence in 12/2024  -colonoscopy and repeat EGD 12/17/24 by Dr. Staton -- grade 1 esophagitis; duod ulcers healing; large flat polyp in transverse colon (carcinoma)   -received total of 3 units PRBCs   -Xarelto stopped in 12/2024  -s/p omeprazole 20mg BID x 8 weeks (EOT 1/22/25), now on omeprazole 20mg daily -- plan has been to continue until she completed her coronary stenting.  Will stop once off steroids.      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   -MRI brain--no acute intracranial process  -was doing PT at [facility] -- continues to improve; almost back to normal       Hx of Hypertension, primary  -(dyazide stopped due to NANCY)  -(amlodipine held due to low BP in 11/2024)  -BP remains normal off meds     Hx of hip OA  -s/p left THR (Dr. Lo) many years ago  -s/p elective R BEATRIZ on 10/5/23 by Dr. Diaz     Hyperlipidemia   on atorvastatin to 80mg/day as above     Osteopenia   On Fosamax from 2011 to 4/2016.     DEXA stable 5/10/17 and 2019 and 2021  DEXA 8/2024 -- osteoporosis of left forearm  -restarted Fosamax 8/2024 (on hold this admission); restart at discharge     Vitamin D deficiency  On vitamin D 4000u/day      VTE proph  -SQH        Dispo  -pt has been residing at [facility] assisted [facility] for past several months (for respite care and PT), and is scheduled to be there until July, after which she plans to return home  -PT eval appreciated - recommending GRACIE; pt initially declined but now agrees to GRACIE; SW assisting ([facility])   -prednisone just started today; would like to observe for a day; anticipate discharge poss tomorrow 4/17            Malathi Stuart MD  4/16/2025  9:00 AM       [1]   Current Facility-Administered Medications   Medication Dose Route Frequency    predniSONE (Deltasone) tab 40 mg  40 mg Oral Daily with breakfast    sodium  chloride (Saline Mist) 0.65 % nasal solution 1 spray  1 spray Each Nare Q3H PRN    atorvastatin (Lipitor) tab 80 mg  80 mg Oral Nightly    clopidogrel (Plavix) tab 75 mg  75 mg Oral Daily    aspirin DR tab 81 mg  81 mg Oral Daily    acetaminophen (Tylenol) tab 650 mg  650 mg Oral Q6H PRN    furosemide (Lasix) tab 40 mg  40 mg Oral Daily    polyethylene glycol (PEG 3350) (Miralax) 17 g oral packet 17 g  17 g Oral Daily PRN    guaiFENesin (Robitussin) 100 MG/5 ML oral liquid 200 mg  200 mg Oral Q4H PRN    benzocaine-menthol (Cepacol) lozenge 1 lozenge  1 lozenge Oral PRN    pantoprazole (Protonix) DR tab 40 mg  40 mg Oral QAM AC    traMADol (Ultram) tab 50 mg  50 mg Oral Q6H PRN    ipratropium-albuterol (Duoneb) 0.5-2.5 (3) MG/3ML inhalation solution 3 mL  3 mL Nebulization Q6H PRN    heparin (Porcine) 5000 UNIT/ML injection 5,000 Units  5,000 Units Subcutaneous 2 times per day    glucose (Dex4) 15 GM/59ML oral liquid 15 g  15 g Oral Q15 Min PRN    Or    glucose (Glutose) 40% oral gel 15 g  15 g Oral Q15 Min PRN    Or    glucose-vitamin C (Dex-4) chewable tab 4 tablet  4 tablet Oral Q15 Min PRN    Or    dextrose 50% injection 50 mL  50 mL Intravenous Q15 Min PRN    Or    glucose (Dex4) 15 GM/59ML oral liquid 30 g  30 g Oral Q15 Min PRN    Or    glucose (Glutose) 40% oral gel 30 g  30 g Oral Q15 Min PRN    Or    glucose-vitamin C (Dex-4) chewable tab 8 tablet  8 tablet Oral Q15 Min PRN    insulin aspart (NovoLOG) 100 Units/mL FlexPen 1-7 Units  1-7 Units Subcutaneous TID CC    ferrous sulfate DR tab 325 mg  325 mg Oral Daily with breakfast    docusate sodium (Colace) cap 100 mg  100 mg Oral BID

## 2025-04-16 NOTE — PLAN OF CARE
Pt is A&OX4; VSS; Pt is sleeping comfortably in her room; no significant issues; will continue to monitor  Problem: Patient Centered Care  Goal: Patient preferences are identified and integrated in the patient's plan of care  Description: Interventions:- What would you like us to know as we care for you? From [facility] [place] - Provide timely, complete, and accurate information to patient/family- Incorporate patient and family knowledge, values, beliefs, and cultural backgrounds into the planning and delivery of care- Encourage patient/family to participate in care and decision-making at the level they choose- Honor patient and family perspectives and choices  Outcome: Progressing     Problem: Patient/Family Goals  Goal: Patient/Family Long Term Goal  Description: Patient's Long Term Goal: to go home Interventions:- Monitor vital signs and labs  - Administer medications per order  - Follow MD orders  - Fall precautions  - Diagnostics as ordered  - Update / inform patient on plan of care  - Discharge planning- See additional Care Plan goals for specific interventions  Outcome: Progressing  Goal: Patient/Family Short Term Goal  Description: Patient's Short Term Goal: to feel better Interventions: -  See additional Care Plan goals for specific interventionsMonitor vital signs and labs  - Administer medications per order  - Follow MD orders  - Fall precautions  - Diagnostics as ordered  - Update / inform patient on plan of care  - Discharge planning  Outcome: Progressing     Problem: PAIN - ADULT  Goal: Verbalizes/displays adequate comfort level or patient's stated pain goal  Description: INTERVENTIONS:- Encourage pt to monitor pain and request assistance- Assess pain using appropriate pain scale- Administer analgesics based on type and severity of pain and evaluate response- Implement non-pharmacological measures as appropriate and evaluate response- Consider cultural and social influences on pain and pain  management- Manage/alleviate anxiety- Utilize distraction and/or relaxation techniques- Monitor for opioid side effects- Notify MD/LIP if interventions unsuccessful or patient reports new pain- Anticipate increased pain with activity and pre-medicate as appropriate  Outcome: Progressing     Problem: RISK FOR INFECTION - ADULT  Goal: Absence of fever/infection during anticipated neutropenic period  Description: INTERVENTIONS- Monitor WBC- Administer growth factors as ordered- Implement neutropenic guidelines  Outcome: Progressing     Problem: SAFETY ADULT - FALL  Goal: Free from fall injury  Description: INTERVENTIONS:- Assess pt frequently for physical needs- Identify cognitive and physical deficits and behaviors that affect risk of falls.- [place] fall precautions as indicated by assessment.- Educate pt/family on patient safety including physical limitations- Instruct pt to call for assistance with activity based on assessment- Modify environment to reduce risk of injury- Provide assistive devices as appropriate- Consider OT/PT consult to assist with strengthening/mobility- Encourage toileting schedule  Outcome: Progressing     Problem: DISCHARGE PLANNING  Goal: Discharge to home or other facility with appropriate resources  Description: INTERVENTIONS:- Identify barriers to discharge w/pt and caregiver- Include patient/family/discharge partner in discharge planning- Arrange for needed discharge resources and transportation as appropriate- Identify discharge learning needs (meds, wound care, etc)- Arrange for interpreters to assist at discharge as needed- Consider post-discharge preferences of patient/family/discharge partner- Complete POLST form as appropriate- Assess patient's ability to be responsible for managing their own health- Refer to Case Management Department for coordinating discharge planning if the patient needs post-hospital services based on physician/LIP order or complex needs related to  functional status, cognitive ability or social support system  Outcome: Progressing     Problem: RESPIRATORY - ADULT  Goal: Achieves optimal ventilation and oxygenation  Description: INTERVENTIONS:- Assess for changes in respiratory status- Assess for changes in mentation and behavior- Position to facilitate oxygenation and minimize respiratory effort- Oxygen supplementation based on oxygen saturation or ABGs- Provide Smoking Cessation handout, if applicable- Encourage broncho-pulmonary hygiene including cough, deep breathe, Incentive Spirometry- Assess the need for suctioning and perform as needed- Assess and instruct to report SOB or any respiratory difficulty- Respiratory Therapy support as indicated- Manage/alleviate anxiety- Monitor for signs/symptoms of CO2 retention  Outcome: Progressing     Problem: MUSCULOSKELETAL - ADULT  Goal: Return mobility to safest level of function  Description: INTERVENTIONS:- Assess patient stability and activity tolerance for standing, transferring and ambulating w/ or w/o assistive devices- Assist with transfers and ambulation using safe patient handling equipment as needed- Ensure adequate protection for wounds/incisions during mobilization- Obtain PT/OT consults as needed- Advance activity as appropriate- Communicate ordered activity level and limitations with patient/family  Outcome: Progressing  Goal: Maintain proper alignment of affected body part  Description: INTERVENTIONS:- Support and protect limb and body alignment per provider's orders- Instruct and reinforce with patient and family use of appropriate assistive device and precautions (e.g. spinal or hip dislocation precautions)  Outcome: Progressing     Problem: Diabetes/Glucose Control  Goal: Glucose maintained within prescribed range  Description: INTERVENTIONS:- Monitor Blood Glucose as ordered- Assess for signs and symptoms of hyperglycemia and hypoglycemia- Administer ordered medications to maintain glucose within  target range- Assess barriers to adequate nutritional intake and initiate nutrition consult as needed- Instruct patient on self management of diabetes  Outcome: Progressing

## 2025-04-16 NOTE — CM/SW NOTE
Message sent to [facility] inquiring about bed availability today.     then received message from Dr. Stuart stating Dr. Holloway would like to keep pt until tomorrow 4/17. Per MD, pt was just re-started on PO prednisone and last time they attempted that, pt became SOB and needed to be placed on IV steroids again.    MARCIA sent update to [facility] via [employer] messenger.      PLAN: [facility] vs 2nd choice/back up GRACIE - pending bed availability at [place], 2nd choice/back up if needed, & med clear         SW/CM to remain available for support and/or discharge planning.         Dori MSW, LSW [id number]

## 2025-04-17 NOTE — PROGRESS NOTES
[facility]  part of [facility]     Hospitalist Progress Note     Hoda Busby Patient Status:  Inpatient    1940 MRN [id number]   Location [facility] 3W/SW Attending Malathi Stuart MD   Hosp Day # 22 PCP Malathi Stuart MD     Subjective:     RRT called due to hypoxia while ambulating with assistance. The patient stated she felt lightheaded and like she was going to pass out. She was helped back into the bed.   The patient subsequently returned to her baseline mentation and her pulse ox improved with supplemental O2.       Objective:    Review of Systems:   ROS completed; pertinent positive and negatives stated in subjective.      Vital signs:  Temp:  [97.4 °F (36.3 °C)-97.7 °F (36.5 °C)] 97.6 °F (36.4 °C)  Pulse:  [67-77] 68  Resp:  [18] 18  BP: (105-135)/(50-82) 105/50  SpO2:  [95 %-98 %] 95 %      Physical Exam:    Gen: NAD AO x3  Chest: good air entry CTABL  CVS: normal s1 and s2 RR  Abd: NABS soft NT ND  Neuro: CN 2-12 grossly intact  Ext: trace to 1+ edema in bilateral LE      Diagnostic Data:    Labs:  Recent Labs   Lab 25  0709   WBC 12.2*   HGB 9.3*   MCV 85.0   .0       Recent Labs   Lab 25  0535 25  0701 04/15/25  0622   GLU 99 82 85   BUN 30* 33* 32*   CREATSERUM 0.86 0.89 0.85   CA 8.1* 8.1* 8.0*    137 138   K 4.7 4.6 4.2    107 106   CO2 23.0 24.0 25.0       Estimated Creatinine Clearance: 42.5 mL/min (based on SCr of 0.85 mg/dL).    No results for input(s): \"PTP\", \"INR\" in the last 168 hours.           Imaging: Imaging data reviewed in Epic.    Medications: Scheduled Medications[1]    Assessment & Plan:     Orthostasis vs vasovagal response  Near syncope  Hypoxia   NSR on examination and no change in telemetry  Vitals WNL  Accucheck WNL  Patient currently mentating at baseline  Trop, EKG, CXR ordered  500 ml NS bolus x1  Monitor vitals, tele and orthostatic vitals  Acute hypoxic resp failure 2/2 CHF  exacerbation  CAD  Management per primary and pulm    Critical care time ~45 minutes    Plan of care discussed with patient or family at bedside.        Estimated date of discharge: TBD  Discharge is dependent on: clinical stability  At this point Ms. Busby is expected to be discharge to: home                   Jaelyn Richardson MD  Hospitalist    MDM: High, I personally reviewed the available laboratories, imaging. I discussed the case with RN. I ordered laboratories, studies.   Medical decision making high, risk is high.       The 21st Century Cures Act makes medical notes like these available to patients in the interest of transparency. Please be advised this is a medical document. Medical documents are intended to carry relevant information, facts as evident, and the clinical opinion of the practitioner. The medical note is intended as peer to peer communication and may appear blunt or direct. It is written in medical language and may contain abbreviations or verbiage that are unfamiliar.        [1]    sodium chloride  500 mL Intravenous Once    predniSONE  40 mg Oral Daily with breakfast    atorvastatin  80 mg Oral Nightly    clopidogrel  75 mg Oral Daily    aspirin  81 mg Oral Daily    furosemide  40 mg Oral Daily    pantoprazole  40 mg Oral QAM AC    heparin  5,000 Units Subcutaneous 2 times per day    insulin aspart  1-7 Units Subcutaneous TID CC    ferrous sulfate  325 mg Oral Daily with breakfast    docusate sodium  100 mg Oral BID

## 2025-04-17 NOTE — SPIRITUAL CARE NOTE
Spiritual Care Visit Note    Patient Name: Hoda Busby Date of Spiritual Care Visit: 25   : 1940 Primary Dx: Acute hypoxic respiratory failure (HCC)       Referred By: Referral From: Care Coordination    Spiritual Care Taxonomy:    Intended Effects: Demonstrate caring and concern    Methods: Collaborate with care team member, Offer support    Interventions: Silent prayer    Visit Type/Summary:     - Spiritual Care: Responded to a request via the on call phone Writer responded to RRT.  remains available as needed for follow up.     Ron Whittaker, [facility]   [id number]     Spiritual Care support can be requested via an [facility] consult. For urgent/immediate needs, please contact the On Call  at: [place]: ext [phone]

## 2025-04-17 NOTE — PROGRESS NOTES
[facility]  part of [facility]     Progress Note    Hoda Busby Patient Status:  Inpatient    1940 MRN [id number]   Location [facility]5W Attending Malathi Stuart MD   Hosp Day # 22 PCP Malathi Stuart MD       Subjective:   Patient seen and examined.  Rapid response called earlier today after evidence of some lightheadedness and hypoxia with exertion.  Objective:   Blood pressure 94/44, pulse 82, temperature 97.3 °F (36.3 °C), resp. rate 18, height 5' 4\" (1.626 m), weight 179 lb 3.7 oz (81.3 kg), SpO2 90%.  Intake/Output:   Last 3 shifts: I/O last 3 completed shifts:  In: 807 [P.O.:797; I.V.:10]  Out: 1400 [Urine:1400]   This shift: I/O this shift:  In: 240 [P.O.:240]  Out: -      Vent Settings:      Hemodynamic parameters (last 24 hours):      Scheduled Meds:   Current Facility-Administered Medications   Medication Dose Route Frequency    [START ON 2025] furosemide (Lasix) tab 20 mg  20 mg Oral Daily    predniSONE (Deltasone) tab 40 mg  40 mg Oral Daily with breakfast    sodium chloride (Saline Mist) 0.65 % nasal solution 1 spray  1 spray Each Nare Q3H PRN    atorvastatin (Lipitor) tab 80 mg  80 mg Oral Nightly    clopidogrel (Plavix) tab 75 mg  75 mg Oral Daily    aspirin DR tab 81 mg  81 mg Oral Daily    acetaminophen (Tylenol) tab 650 mg  650 mg Oral Q6H PRN    polyethylene glycol (PEG 3350) (Miralax) 17 g oral packet 17 g  17 g Oral Daily PRN    guaiFENesin (Robitussin) 100 MG/5 ML oral liquid 200 mg  200 mg Oral Q4H PRN    benzocaine-menthol (Cepacol) lozenge 1 lozenge  1 lozenge Oral PRN    pantoprazole (Protonix) DR tab 40 mg  40 mg Oral QAM AC    traMADol (Ultram) tab 50 mg  50 mg Oral Q6H PRN    ipratropium-albuterol (Duoneb) 0.5-2.5 (3) MG/3ML inhalation solution 3 mL  3 mL Nebulization Q6H PRN    heparin (Porcine) 5000 UNIT/ML injection 5,000 Units  5,000 Units Subcutaneous 2 times per day    glucose (Dex4) 15 GM/59ML oral liquid 15 g  15 g Oral Q15 Min  PRN    Or    glucose (Glutose) 40% oral gel 15 g  15 g Oral Q15 Min PRN    Or    glucose-vitamin C (Dex-4) chewable tab 4 tablet  4 tablet Oral Q15 Min PRN    Or    dextrose 50% injection 50 mL  50 mL Intravenous Q15 Min PRN    Or    glucose (Dex4) 15 GM/59ML oral liquid 30 g  30 g Oral Q15 Min PRN    Or    glucose (Glutose) 40% oral gel 30 g  30 g Oral Q15 Min PRN    Or    glucose-vitamin C (Dex-4) chewable tab 8 tablet  8 tablet Oral Q15 Min PRN    insulin aspart (NovoLOG) 100 Units/mL FlexPen 1-7 Units  1-7 Units Subcutaneous TID CC    ferrous sulfate DR tab 325 mg  325 mg Oral Daily with breakfast    docusate sodium (Colace) cap 100 mg  100 mg Oral BID       Continuous Infusions:     Physical Exam  Constitutional: no acute distress  Eyes: PERRL  ENT: nares pateint  Neck: supple, no JVD  Cardio: RRR, S1 S2  Respiratory: Basilar crackles  GI: abdomen soft, non tender, active bowel sounds, no organomegaly  Extremities: no clubbing, cyanosis, + trace lower extremity edema   Neurologic: no gross motor deficits  Skin: warm, dry      Results:     Lab Results   Component Value Date    WBC 21.3 04/17/2025    HGB 9.6 04/17/2025    HCT 30.1 04/17/2025    .0 04/17/2025           No results found.      EKG 12 Lead  Result Date: 4/17/2025  Normal sinus rhythm Incomplete right bundle branch block Borderline ECG When compared with ECG of 31-MAR-2025 02:58, CO interval has decreased Criteria for Septal infarct are no longer Present      Assessment   1.  Acute hypoxemic respiratory failure  2.  ILD  3.  Small pleural effusions  4.  Pulmonary edema  5.  Colon adenocarcinoma status post hemicolectomy  6.  Coronary artery disease  7.  Prior history of GI bleed  8.  Prior VTE  9.  History of severe aortic stenosis status post TAVR  10.  Hypertension     Plan   -Patient presents with progressive worsening dyspnea and acute hypoxemic respiratory failure.  -CT chest on presentation on 3/26/2025 with no evidence of acute pulmonary  embolism seen.  Evidence of small pleural effusions with groundglass opacities suggestive more likely of edema with worsening compared to 6 days prior from CT chest.  Some background of fibrotic changes present.  -Repeat CT high-resolution chest on 4/4/2025 with improvement in pulmonary edema and pleural effusions.  Underlying ILD changes present.  -CT chest with contrast on 4/9/2025 with no evidence of pulmonary embolism seen.  Fibrotic changes seen otherwise with small pleural effusions and mild edema.  -Clinically improved after steroid dosage increased last week.  Will transition to prednisone 40 mg today.  Slow taper as outpatient.  -Diuresis as need be.  -Prior history of underlying ILD with hospitalization October 2024 responded to tapering steroid therapy at the time.  -Pulmonary function testing from February 2025 with mild restriction seen with significant decrease in diffusion capacity.  -Rapid response called on 4/17/2025 with worsening lightheadedness and some hypoxia with ambulation.  Currently receiving 500 mL normal saline bolus.  Oxygenation requirements in creased in the meantime we will try to titrate down throughout course of day.  Hold off on potential discharge for today.  -Completed course of antibiotic therapy with Augmentin.  -Status post PCI of mLAD on 4/7/2025  -DVT prophylaxis: Heparin        Cody Holloway,   Pulmonary Critical Care Medicine  [facility]

## 2025-04-17 NOTE — PLAN OF CARE
Problem: Patient Centered Care  Goal: Patient preferences are identified and integrated in the patient's plan of care  Description: Interventions:- What would you like us to know as we care for you? From [place] - Provide timely, complete, and accurate information to patient/family- Incorporate patient and family knowledge, values, beliefs, and cultural backgrounds into the planning and delivery of care- Encourage patient/family to participate in care and decision-making at the level they choose- Honor patient and family perspectives and choices  Outcome: Progressing     Problem: Patient/Family Goals  Goal: Patient/Family Long Term Goal  Description: Patient's Long Term Goal: to go home Interventions:- Monitor vital signs and labs  - Administer medications per order  - Follow MD orders  - Fall precautions  - Diagnostics as ordered  - Update / inform patient on plan of care  - Discharge planning- See additional Care Plan goals for specific interventions  Outcome: Progressing  Goal: Patient/Family Short Term Goal  Description: Patient's Short Term Goal: to feel better Interventions: -  See additional Care Plan goals for specific interventionsMonitor vital signs and labs  - Administer medications per order  - Follow MD orders  - Fall precautions  - Diagnostics as ordered  - Update / inform patient on plan of care  - Discharge planning  Outcome: Progressing     Problem: PAIN - ADULT  Goal: Verbalizes/displays adequate comfort level or patient's stated pain goal  Description: INTERVENTIONS:- Encourage pt to monitor pain and request assistance- Assess pain using appropriate pain scale- Administer analgesics based on type and severity of pain and evaluate response- Implement non-pharmacological measures as appropriate and evaluate response- Consider cultural and social influences on pain and pain management- Manage/alleviate anxiety- Utilize distraction and/or relaxation techniques- Monitor for opioid side  effects- Notify MD/LIP if interventions unsuccessful or patient reports new pain- Anticipate increased pain with activity and pre-medicate as appropriate  Outcome: Progressing     Problem: RISK FOR INFECTION - ADULT  Goal: Absence of fever/infection during anticipated neutropenic period  Description: INTERVENTIONS- Monitor WBC- Administer growth factors as ordered- Implement neutropenic guidelines  Outcome: Progressing     Problem: SAFETY ADULT - FALL  Goal: Free from fall injury  Description: INTERVENTIONS:- Assess pt frequently for physical needs- Identify cognitive and physical deficits and behaviors that affect risk of falls.- [place] fall precautions as indicated by assessment.- Educate pt/family on patient safety including physical limitations- Instruct pt to call for assistance with activity based on assessment- Modify environment to reduce risk of injury- Provide assistive devices as appropriate- Consider OT/PT consult to assist with strengthening/mobility- Encourage toileting schedule  Outcome: Progressing     Problem: DISCHARGE PLANNING  Goal: Discharge to home or other facility with appropriate resources  Description: INTERVENTIONS:- Identify barriers to discharge w/pt and caregiver- Include patient/family/discharge partner in discharge planning- Arrange for needed discharge resources and transportation as appropriate- Identify discharge learning needs (meds, wound care, etc)- Arrange for interpreters to assist at discharge as needed- Consider post-discharge preferences of patient/family/discharge partner- Complete POLST form as appropriate- Assess patient's ability to be responsible for managing their own health- Refer to Case Management Department for coordinating discharge planning if the patient needs post-hospital services based on physician/LIP order or complex needs related to functional status, cognitive ability or social support system  Outcome: Progressing     Problem: RESPIRATORY -  ADULT  Goal: Achieves optimal ventilation and oxygenation  Description: INTERVENTIONS:- Assess for changes in respiratory status- Assess for changes in mentation and behavior- Position to facilitate oxygenation and minimize respiratory effort- Oxygen supplementation based on oxygen saturation or ABGs- Provide Smoking Cessation handout, if applicable- Encourage broncho-pulmonary hygiene including cough, deep breathe, Incentive Spirometry- Assess the need for suctioning and perform as needed- Assess and instruct to report SOB or any respiratory difficulty- Respiratory Therapy support as indicated- Manage/alleviate anxiety- Monitor for signs/symptoms of CO2 retention  Outcome: Progressing     Problem: MUSCULOSKELETAL - ADULT  Goal: Return mobility to safest level of function  Description: INTERVENTIONS:- Assess patient stability and activity tolerance for standing, transferring and ambulating w/ or w/o assistive devices- Assist with transfers and ambulation using safe patient handling equipment as needed- Ensure adequate protection for wounds/incisions during mobilization- Obtain PT/OT consults as needed- Advance activity as appropriate- Communicate ordered activity level and limitations with patient/family  Outcome: Progressing  Goal: Maintain proper alignment of affected body part  Description: INTERVENTIONS:- Support and protect limb and body alignment per provider's orders- Instruct and reinforce with patient and family use of appropriate assistive device and precautions (e.g. spinal or hip dislocation precautions)  Outcome: Progressing     Problem: Diabetes/Glucose Control  Goal: Glucose maintained within prescribed range  Description: INTERVENTIONS:- Monitor Blood Glucose as ordered- Assess for signs and symptoms of hyperglycemia and hypoglycemia- Administer ordered medications to maintain glucose within target range- Assess barriers to adequate nutritional intake and initiate nutrition consult as needed-  Instruct patient on self management of diabetes  Outcome: Progressing

## 2025-04-17 NOTE — PLAN OF CARE
AxOx4, 1L NC at rest and 4L NC ambulating, NSR, Cont x2, x1 walker. ACHS.   Problem: Patient Centered Care  Goal: Patient preferences are identified and integrated in the patient's plan of care  Description: Interventions:- What would you like us to know as we care for you? From [facility] - Provide timely, complete, and accurate information to patient/family- Incorporate patient and family knowledge, values, beliefs, and cultural backgrounds into the planning and delivery of care- Encourage patient/family to participate in care and decision-making at the level they choose- Honor patient and family perspectives and choices  Outcome: Progressing     Problem: Patient/Family Goals  Goal: Patient/Family Long Term Goal  Description: Patient's Long Term Goal: to go home Interventions:- Monitor vital signs and labs  - Administer medications per order  - Follow MD orders  - Fall precautions  - Diagnostics as ordered  - Update / inform patient on plan of care  - Discharge planning- See additional Care Plan goals for specific interventions  Outcome: Progressing  Goal: Patient/Family Short Term Goal  Description: Patient's Short Term Goal: to feel better Interventions: -  See additional Care Plan goals for specific interventionsMonitor vital signs and labs  - Administer medications per order  - Follow MD orders  - Fall precautions  - Diagnostics as ordered  - Update / inform patient on plan of care  - Discharge planning  Outcome: Progressing     Problem: PAIN - ADULT  Goal: Verbalizes/displays adequate comfort level or patient's stated pain goal  Description: INTERVENTIONS:- Encourage pt to monitor pain and request assistance- Assess pain using appropriate pain scale- Administer analgesics based on type and severity of pain and evaluate response- Implement non-pharmacological measures as appropriate and evaluate response- Consider cultural and social influences on pain and pain management- Manage/alleviate anxiety-  Utilize distraction and/or relaxation techniques- Monitor for opioid side effects- Notify MD/LIP if interventions unsuccessful or patient reports new pain- Anticipate increased pain with activity and pre-medicate as appropriate  Outcome: Progressing     Problem: RISK FOR INFECTION - ADULT  Goal: Absence of fever/infection during anticipated neutropenic period  Description: INTERVENTIONS- Monitor WBC- Administer growth factors as ordered- Implement neutropenic guidelines  Outcome: Progressing     Problem: SAFETY ADULT - FALL  Goal: Free from fall injury  Description: INTERVENTIONS:- Assess pt frequently for physical needs- Identify cognitive and physical deficits and behaviors that affect risk of falls.- [place] fall precautions as indicated by assessment.- Educate pt/family on patient safety including physical limitations- Instruct pt to call for assistance with activity based on assessment- Modify environment to reduce risk of injury- Provide assistive devices as appropriate- Consider OT/PT consult to assist with strengthening/mobility- Encourage toileting schedule  Outcome: Progressing     Problem: DISCHARGE PLANNING  Goal: Discharge to home or other facility with appropriate resources  Description: INTERVENTIONS:- Identify barriers to discharge w/pt and caregiver- Include patient/family/discharge partner in discharge planning- Arrange for needed discharge resources and transportation as appropriate- Identify discharge learning needs (meds, wound care, etc)- Arrange for interpreters to assist at discharge as needed- Consider post-discharge preferences of patient/family/discharge partner- Complete POLST form as appropriate- Assess patient's ability to be responsible for managing their own health- Refer to Case Management Department for coordinating discharge planning if the patient needs post-hospital services based on physician/LIP order or complex needs related to functional status, cognitive ability or social  support system  Outcome: Progressing     Problem: RESPIRATORY - ADULT  Goal: Achieves optimal ventilation and oxygenation  Description: INTERVENTIONS:- Assess for changes in respiratory status- Assess for changes in mentation and behavior- Position to facilitate oxygenation and minimize respiratory effort- Oxygen supplementation based on oxygen saturation or ABGs- Provide Smoking Cessation handout, if applicable- Encourage broncho-pulmonary hygiene including cough, deep breathe, Incentive Spirometry- Assess the need for suctioning and perform as needed- Assess and instruct to report SOB or any respiratory difficulty- Respiratory Therapy support as indicated- Manage/alleviate anxiety- Monitor for signs/symptoms of CO2 retention  Outcome: Progressing     Problem: MUSCULOSKELETAL - ADULT  Goal: Return mobility to safest level of function  Description: INTERVENTIONS:- Assess patient stability and activity tolerance for standing, transferring and ambulating w/ or w/o assistive devices- Assist with transfers and ambulation using safe patient handling equipment as needed- Ensure adequate protection for wounds/incisions during mobilization- Obtain PT/OT consults as needed- Advance activity as appropriate- Communicate ordered activity level and limitations with patient/family  Outcome: Progressing  Goal: Maintain proper alignment of affected body part  Description: INTERVENTIONS:- Support and protect limb and body alignment per provider's orders- Instruct and reinforce with patient and family use of appropriate assistive device and precautions (e.g. spinal or hip dislocation precautions)  Outcome: Progressing     Problem: Diabetes/Glucose Control  Goal: Glucose maintained within prescribed range  Description: INTERVENTIONS:- Monitor Blood Glucose as ordered- Assess for signs and symptoms of hyperglycemia and hypoglycemia- Administer ordered medications to maintain glucose within target range- Assess barriers to adequate  nutritional intake and initiate nutrition consult as needed- Instruct patient on self management of diabetes  Outcome: Progressing

## 2025-04-17 NOTE — PROGRESS NOTES
04/17/25 0910 04/17/25 0914 04/17/25 0916   Vital Signs   Temp  --   --  97.3 °F (36.3 °C)   Pulse 78 85 82   Heart Rate Source Monitor Monitor Monitor   Cardiac Rhythm NSR NSR NSR   Resp 18 18 18   Respiratory Quality Normal Normal Normal   /55 114/46 94/44   MAP (mmHg) 75 67 (!) 60   BP Location Right arm Right arm Right arm   BP Method Automatic Automatic Automatic   Patient Position Lying Sitting Standing   Oxygen Therapy   SpO2 99 % 97 % 90 %   O2 Device Nasal cannula Nasal cannula Nasal cannula   O2 Flow Rate (L/min) 5 L/min 5 L/min 5 L/min     Orthostatic BP

## 2025-04-17 NOTE — PROGRESS NOTES
[facility]  part of Located within [facility]    Progress Note    Hoda Busby Patient Status:  Inpatient    1940 MRN [id number]   Location [facility] 3W/SW Attending Malathi Stuart MD   Hosp Day # 22 PCP Malathi Stuart MD       SUBJECTIVE:   About an hour ago, while walking with staff in her room, she felt lightheaded and almost passed out; staff was able to keep her upright; she initially did not respond, but then came to quickly; no confusion. RRT called.  Mildly orthostatic (123/55>114/46>94/44).  Just received 500cc bolus.  Glucose 131.  EKG NSR, no ischemic changes  Pt does not recall what happened.  Feels fine currently.     OBJECTIVE:  Vital signs in last 24 hours:  /58 (BP Location: Right arm)   Pulse 73   Temp 97.4 °F (36.3 °C) (Oral)   Resp 18   Ht 5' 4\" (1.626 m)   Wt 179 lb 3.7 oz (81.3 kg)   SpO2 98%   BMI 30.77 kg/m²     Intake/Output:    Intake/Output Summary (Last 24 hours) at 2025 0658  Last data filed at 2025 0600  Gross per 24 hour   Intake 807 ml   Output 1050 ml   Net -243 ml       Wt Readings from Last 3 Encounters:   25 179 lb 3.7 oz (81.3 kg)   25 180 lb (81.6 kg)   25 173 lb (78.5 kg)       EXAM:  GENERAL: well developed, well nourished, in no apparent distress  LUNGS: coarse crackles R>L, no wheezing  CARDIO: RRR, normal S1S2, 2/6 WADE   GI: soft, NT, ND, NABS  EXTREMITIES: trace BLE edema    Data Review:     Labs:   Lab Results   Component Value Date    WBC 12.2 2025    HGB 9.3 2025    HCT 30.0 2025    .0 2025         Imaging:  No results found.   CARD ECHO 2D DOPPLER (CPT=93306)  Result Date: 3/27/2025  Transthoracic Echocardiogram Name:Hoda Busby Date: 2025 :  1940 Ht:  (64in)  BP: 137 / 81 MRN:  [id number]    Age:  84years    Wt:  (187lb) HR: 75bpm Loc:  [facility]       Gndr: F          BSA: 1.9m^2 Sonographer: Casper JOHNSON Ordering:    Malathi Stuart Consulting:   Martita Baez O ---------------------------------------------------------------------------- History/Indications:   Congestive heart failure.  Risk factors: Hypertension. TAVR-23 mm Martha 3 Resilia bioprosthesis. Acute hypoxic respiratory failure. ---------------------------------------------------------------------------- Procedure information:  A transthoracic complete 2D study was performed. Additional evaluation included M-mode, complete spectral Doppler, and color Doppler.  Patient status:  Inpatient.  Location:  Bedside.    Comparison was made to the study of 02/05/2025.    This was a routine study. Transthoracic echocardiography for diagnosis and ventricular function evaluation. Image quality was adequate. ECG rhythm:   Normal sinus ---------------------------------------------------------------------------- Conclusions: 1. Left ventricle: The cavity size was normal. Wall thickness was normal.    Systolic function was normal. The estimated ejection fraction was 60-65%.    No diagnostic evidence for regional wall motion abnormalities. Doppler    parameters are consistent with abnormal left ventricular relaxation -    grade 1 diastolic dysfunction. 2. Right ventricle: The cavity size was increased. Systolic function was    normal. 3. Left atrium: The atrium was mildly to moderately dilated. The left atrial    volume was mildly to moderately increased. 4. Aortic valve: A bioprosthetic valve is present. Mcnamara MARTHA S3 23mm    (TAVR) bioprosthesis was present. The peak systolic velocity was    2.69m/sec. The mean systolic gradient was 15mm Hg. The valve area (VTI)    was 1.52cm^2. 5. Pulmonary arteries: Systolic pressure was moderately increased, estimated    to be 52mm Hg. * ---------------------------------------------------------------------------- * Findings: Left ventricle:  The cavity size was normal. Wall thickness was normal. Systolic function was normal. The estimated ejection fraction was 60-65%. No  diagnostic evidence for regional wall motion abnormalities. Doppler parameters are consistent with abnormal left ventricular relaxation - grade 1 diastolic dysfunction. Left atrium:  The atrium was mildly to moderately dilated. The left atrial volume was mildly to moderately increased. Right ventricle:  The cavity size was increased. Systolic function was normal. Right atrium:  Well visualized. The atrium was normal in size. Mitral valve:  The annulus was moderately calcified. The leaflets were mildly thickened and mildly to moderately calcified. Leaflet separation was normal.  Doppler:  Transvalvular velocity was within the normal range. There was no evidence for stenosis. There was no significant regurgitation. Aortic valve:  A bioprosthetic valve is present. Mcnamara PUNEET S3 23mm (TAVR) bioprosthesis was present. Cusp separation was normal.  Doppler: Transvalvular velocity was within the normal range. There was no evidence for stenosis. There was no significant regurgitation.    The valve area (VTI) was 1.52cm^2. The valve area (VTI) index was 0.8cm^2/m^2.    The mean systolic gradient was 15mm Hg. The peak systolic gradient was 29mm Hg. Tricuspid valve:  The valve is structurally normal. Leaflet separation was normal.  Doppler:  Transvalvular velocity was within the normal range. There was no evidence for stenosis. There was mild regurgitation. Pulmonic valve:   The valve is structurally normal. Cusp separation was normal.  Doppler:  Transvalvular velocity was within the normal range. There was no evidence for stenosis. There was no significant regurgitation. Pericardium:   There was no pericardial effusion. Aorta: Aortic root: The aortic root was normal. Ascending aorta: The ascending aorta was normal. Pulmonary arteries: Systolic pressure was moderately increased, estimated to be 52mm Hg. Systemic veins:  Central venous respirophasic diameter changes are blunted (< 50%). Inferior vena cava: The IVC was  dilated. ---------------------------------------------------------------------------- Measurements  Left ventricle       Value          Ref       01/24/2025  IVS thickness,   (H) 1.0   cm       0.6 - 0.9 1.0  ED, PLAX  LV ID, ED, PLAX  (H) 5.3   cm       3.8 - 5.2 5.0  LV ID, ES, PLAX      3.5   cm       2.2 - 3.5 3.1  LV PW thickness, (H) 1.0   cm       0.6 - 0.9 1.0  ED, PLAX  IVS/LV PW ratio,     1.00           --------- 1.02  ED, PLAX  LV PW/LV ID          0.19           --------- 0.2  ratio, ED, PLAX  LV area, ES, A4C     17.5  cm^2     --------- ----------  LV ejection          62    %        54 - 74   67  fraction  Stroke               45    ml/m^2   --------- 53  volume/bsa, 2D  LV end-diastolic     126   ml       48 - 140  ----------  volume, 1-p A4C  LV end-systolic      49    ml       12 - 60   ----------  volume, 1-p A4C  LV ejection          63    %        46 - 78   ----------  fraction, 1-p  A4C  Stroke volume,       79    ml       --------- ----------  1-p A4C  LV end-diastolic     66    ml/m^2   30 - 82   ----------  volume/bsa, 1-p  A4C  LV end-systolic      26    ml/m^2   7 - 35    ----------  volume/bsa, 1-p  A4C  Stroke               42    ml/m^2   --------- ----------  volume/bsa, 1-p  A4C  LV end-diastolic (H) 113   ml       46 - 106  ----------  volume, 2-p  LV end-systolic      42    ml       14 - 42   ----------  volume, 2-p  LV ejection          63    %        54 - 74   ----------  fraction, 2-p  Stroke volume,       72    ml       --------- ----------  2-p  LV end-diastolic     59    ml/m^2   29 - 61   ----------  volume/bsa, 2-p  LV end-systolic      22    ml/m^2   8 - 24    ----------  volume/bsa, 2-p  Stroke               37.6  ml/m^2   --------- ----------  volume/bsa, 2-p  LV e', lateral   (L) 4.9   cm/sec   >=10.0    ----------  LV E/e', lateral (H) 19             <=13      ----------  LV e', medial    (L) 3.2   cm/sec   >=7.0     ----------  LV E/e', medial      30              --------- ----------  LV e', average       4.0   cm/sec   --------- ----------  LV E/e', average (H) 23             <=14      ----------  LVOT                 Value          Ref       01/24/2025  LVOT ID              1.9   cm       --------- 2.2  LVOT peak            1.29  m/sec    --------- 1.31  velocity, S  LVOT VTI, S          29.9  cm       --------- 27.6  LVOT peak            7     mm Hg    --------- 7  gradient, S  LVOT mean            4     mm Hg    --------- 3  gradient, S  Stroke volume        85    ml       --------- 105  (SV), LVOT DP  Stroke index         45    ml/m^2   --------- 53  (SV/bsa), LVOT  DP  Aortic valve         Value          Ref       01/24/2025  Aortic valve         2.69  m/sec    --------- 2.4  peak velocity, S  Aortic valve         55.7  cm       --------- 51.6  VTI, S  Aortic mean          15    mm Hg    --------- 12  gradient, S  Aortic peak          29    mm Hg    --------- 23  gradient, S  Aortic valve         1.52  cm^2     --------- 2.03  area, VTI  Aortic valve         0.8   cm^2/m^2 --------- 1.03  area/bsa, VTI  Velocity ratio,      0.48           --------- 0.55  peak, LVOT/AV  Ascending aorta      Value          Ref       01/24/2025  Ascending aorta      3.5   cm       1.9 - 3.5 ----------  ID  Left atrium          Value          Ref       01/24/2025  LA ID, A-P, ES       3.3   cm       2.7 - 3.8 4.0  LA volume/bsa, S (H) 39    ml/m^2   16 - 34   56  LA volume, ES,   (H) 74    ml       22 - 52   101  1-p A4C  Mitral valve         Value          Ref       01/24/2025  Mitral E-wave        0.94  m/sec    --------- ----------  peak velocity  Mitral A-wave        1.24  m/sec    --------- ----------  peak velocity  Mitral               190   ms       --------- ----------  deceleration  time  Mitral peak          4     mm Hg    --------- ----------  gradient, D  Mitral E/A           0.8            --------- ----------  ratio, peak  Pulmonary artery     Value          Ref        01/24/2025  PA pressure, S,      52    mm Hg    --------- ----------  DP  Tricuspid valve      Value          Ref       01/24/2025  Tricuspid regurg (H) 3.06  m/sec    <=2.8     ----------  peak velocity  Tricuspid peak       37    mm Hg    --------- ----------  RV-RA gradient  Right atrium         Value          Ref       01/24/2025  RA ID, S-I, ES,  (H) 6.3   cm       3.4 - 5.3 ----------  A4C  RA ID/bsa, S-I,  (H) 3.3   cm/m^2   1.9 - 3.1 ----------  ES, A4C  RA area, ES, A4C     17    cm^2     10 - 18   ----------  RA volume, ES,       38    ml       --------- ----------  1-p A4C  RA volume/bsa,       20    ml/m^2   9 - 33    ----------  ES, 1-p A4C  Systemic veins       Value          Ref       01/24/2025  Estimated CVP        15    mm Hg    --------- ----------  Inferior vena        Value          Ref       01/24/2025  cava  ID               (H) 2.7   cm       <=2.1     ----------  Right ventricle      Value          Ref       01/24/2025  TAPSE, 2D            2.74  cm       >=1.70    ----------  TAPSE, MM            2.74  cm       >=1.70    ----------  RV pressure, S,      52    mm Hg    --------- ----------  DP  RV s', lateral       14.7  cm/sec   >=9.5     ---------- Legend: (L)  and  (H)  migel values outside specified reference range. ---------------------------------------------------------------------------- Prepared and electronically signed by Bran Salcedo 03/27/2025 16:38          Meds:   Current Hospital Medications[1]    Assessment & Plan    Acute hypoxic respiratory failure (HCC)  -multifactorial: acute HFpEF, recurrent interstitial pneumonitis, CAP  -CT chest in ED 3/26/25 - no PE; worsening of pleural effusions and interstitial edema  -required BiPAP transiently; was up to 15L O2  -echo 2/5/25 - normal LV systolic function (EF 60-65%); grade 2 diastolic dysfunction; normally functioning bioprosthetic TAVR; sl incr PAP 44mmHg  -repeat echo 3/27/25 stable from 2/2025 except incr PAP to  52mmHg  -hi res CT chest 4/4/25 --pulmonary edema and bilateral pleural effusions significantly improved.  There is persistent trace right pleural effusion.  Advanced interstitial lung disease is noted in an NSIP (nonspecific insterstitial pneumonitis) pattern.   -s/p ceftriaxone/azithro then augmentin (stopped 4/7)  -s/p IV lasix; now lasix 40mg po daily (for HFpEF)  -on steroids  -initially with clinical improvement - decreased O2 requirement, improvement in dyspnea; however, pt noted an acute worsening in her sx on 4/8.  CT chest w/contrast on 4/9 -- negative for PE; no significant fluid overload; only small b/l pleural effusions and atelectasis; it did show peripheral and basilar predominant pulmonary interstitial fibrosis with suggestion of underlying nonspecific interstitial pneumonitis.  RHC on 4/7 showed normal filling pressures.  Pt appears euvolemic.  -suspect pt's subacute decompensation due to NSIP -- sx occurred the day after her solumedrol was deescalated to oral prednisone on 4/7 and CT findings are c/w NSIP.    -restarted solumedrol (40mg IV q8h) on 4/10 with improvement   -suspect majority of sx/hypoxia due to ILD/NSIP  -O2 down to 1L - sats 98%  -clinically improved; no longer SOB at rest (and much improved with walking); but still desats to 80's% on 6L w/ambulation  -solumedrol changed to po prednisone 4/16    Probable vasovagal episode  -mild orthostasis  -just received 500cc bolus  -check repeat orthostatics  -decrease lasix to 20mg/day (already received 40mg earlier this am)     Coronary artery disease  -Cath 1/14/25 by Dr. Bernardo Liz (as w/u for TAVR) -- RCA non-obstructive; LM free of obst disease; LM plaque extending into ostium of LAD (20-30%); mid LAD (80-90%), cx ostium (60-70%)  -intervention delayed until after colon resection due to need for DAPT x 6 mos after stenting   -s/p PCI/JUVENTINO of mid LAD (70%>0%) on 4/7/25 by Dr. Dutton  -cont ASA, Plavix  -increased atorvastatin from 20mg to  80mg/day (LDL 76 on 3/26/25)  -to f/u with Dr. Bird as outpt     Interstitial pneumonitis/NSIP  -dx'ed 10/2024 - presented with cough and severe hypoxia  -unresponsive to abx   -CXR 10/24/24 extensive bilateral perihilar and bilateral upper and lower lobe   -S.pneumo, legionella Ag , mycoplasma IgM/G, Fungitell-beta-D glucan negative  -ANCA and URIEL/connective tissue disease panels negative  -anti-histone and anti-Keara Ab's negative  -CT with bilateral ground glass opacities, small consolidations of BLL  -indings suspicious for NSIP (vs cryptogenic organizing pneumonia vs hypersensitivity pneumonitis); NOT thought to be c/w UIP pattern  -s/p empiric steroids (solumedrol then prednisone taper) 10/25/24 - (EOT 1/18/25)  -dyspnea and hypoxia completely resolved until this admission 3/26/25 -- POPE, hypoxia  -steroids as above  -transitioned from IV solumedrol to prednisone 40mg/day on 4/16/25 - slow taper per Dr. Holloway     Hx of Bilateral pulmonary emboli (10/2024)  -dx'ed at time of interstitial pneumonitis  -CT chest 10/25/24 -- acute distal segmental/subsegmental pulmonary emboli in [place] and subsegmental PE in [place]  -unclear etiology; no recent hx of long travel; no family/personal hx of blood clots  -BLE venous dopplers negative 10/26/24  -started xarelto 20 mg po every day on 11/28/24  -repeat CT chest 12/9/24 neg PE  -stopped Xarelto 12/14/24 due to recurrent GI bleed  -repeat CT chest 3/26/25 and 4/9/25 -- neg PE     Severe aortic stenosis  -progression on serial echocardiograms  -s/p TAVR 1/23/25 (Dr. Reji Green)     Adenocarcinoma of colon  -Bx of transverse colon polyp 12/17/24 -- invasive, moderately differentiated adenocarcinoma  -CEA normal (1.9)  -CT a/p neg for mets  -surgery delayed in anticipation of TAVR (done 1/23/25)  -s/p robotic assisted left hemicolectomy (Dr. Cassidy) on 2/27/25 - path - tumor invades into muscularis; margins neg for tumor; all 19 LN's neg for mets (0/19); pT2, pN0  -next  appt with Dr. Cassidy 4/16/25 - to be rescheduled     Hx  GI bleed  -EGD 11/26/24 (Dr. Nuno) -15mm nonbleeding gastric antral ulcer; 3mm gastric antral AVM s/p APC  -recurrence in 12/2024  -colonoscopy and repeat EGD 12/17/24 by Dr. Staton -- grade 1 esophagitis; duod ulcers healing; large flat polyp in transverse colon (carcinoma)   -received total of 3 units PRBCs   -Xarelto stopped in 12/2024  -s/p omeprazole 20mg BID x 8 weeks (EOT 1/22/25), now on omeprazole 20mg daily -- plan has been to continue until she completed her coronary stenting.  Will stop once off steroids.     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   -MRI brain--no acute intracranial process  -was doing PT at [facility] -- continues to improve; almost back to normal       Hx of Hypertension, primary  -(dyazide stopped due to NANCY)  -(amlodipine held due to low BP in 11/2024)  -BP remains normal off meds     Hx of hip OA  -s/p left THR (Dr. Lo) many years ago  -s/p elective R BEATRIZ on 10/5/23 by Dr. Diaz     Hyperlipidemia   on atorvastatin to 80mg/day as above     Osteopenia   On Fosamax from 2011 to 4/2016.     DEXA stable 5/10/17 and 2019 and 2021  DEXA 8/2024 -- osteoporosis of left forearm  -restarted Fosamax 8/2024 (on hold this admission); restart at discharge     Vitamin D deficiency  On vitamin D 4000u/day      VTE proph  -SQH        Dispo  -pt has been residing at [facility]  assisted living facility for past several months (for respite care and PT), and is scheduled to be there until July, after which she plans to return home  -PT gricelda appreciated - recommending GRACIE; pt initially declined but now agrees to GRACIE; SW assisting ([facility])   -given above episode                      Malathi Stuart MD  4/17/2025  6:58 AM         [1]   Current Facility-Administered Medications   Medication Dose Route Frequency    predniSONE (Deltasone) tab 40 mg  40 mg Oral Daily with breakfast    sodium chloride (Saline Mist) 0.65 % nasal solution 1 spray  1 spray Each Nare Q3H PRN    atorvastatin (Lipitor) tab 80 mg  80 mg Oral Nightly    clopidogrel (Plavix) tab 75 mg  75 mg Oral Daily    aspirin DR tab 81 mg  81 mg Oral Daily    acetaminophen (Tylenol) tab 650 mg  650 mg Oral Q6H PRN    furosemide (Lasix) tab 40 mg  40 mg Oral Daily    polyethylene glycol (PEG 3350) (Miralax) 17 g oral packet 17 g  17 g Oral Daily PRN    guaiFENesin (Robitussin) 100 MG/5 ML oral liquid 200 mg  200 mg Oral Q4H PRN    benzocaine-menthol (Cepacol) lozenge 1 lozenge  1 lozenge Oral PRN    pantoprazole (Protonix) DR tab 40 mg  40 mg Oral QAM AC    traMADol (Ultram) tab 50 mg  50 mg Oral Q6H PRN    ipratropium-albuterol (Duoneb) 0.5-2.5 (3) MG/3ML inhalation solution 3 mL  3 mL Nebulization Q6H PRN    heparin (Porcine) 5000 UNIT/ML injection 5,000 Units  5,000 Units Subcutaneous 2 times per day    glucose (Dex4) 15 GM/59ML oral liquid 15 g  15 g Oral Q15 Min PRN    Or    glucose (Glutose) 40% oral gel 15 g  15 g Oral Q15 Min PRN    Or    glucose-vitamin C (Dex-4) chewable tab 4 tablet  4 tablet Oral Q15 Min PRN    Or    dextrose 50% injection 50 mL  50 mL Intravenous Q15 Min PRN    Or    glucose (Dex4) 15 GM/59ML oral liquid 30 g  30 g Oral Q15 Min PRN    Or    glucose (Glutose) 40% oral gel 30 g  30 g Oral Q15 Min PRN    Or    glucose-vitamin C (Dex-4) chewable tab 8 tablet  8 tablet Oral Q15 Min PRN    insulin aspart  (NovoLOG) 100 Units/mL FlexPen 1-7 Units  1-7 Units Subcutaneous TID CC    ferrous sulfate DR tab 325 mg  325 mg Oral Daily with breakfast    docusate sodium (Colace) cap 100 mg  100 mg Oral BID

## 2025-04-17 NOTE — PROGRESS NOTES
RRT    *See RRT Documentation Record*    Reason the RRT was called: near syncope while ambulating   Assessment of patient leading up to RRT: near syncope  Interventions/Testing: transferred to bed, BP, neuro checks  Patient Outcome/Disposition: Pt returned to baseline, IV bolus given, ortho static BP  Family Notified: yes  Name of family notified: pushpa

## 2025-04-17 NOTE — PROGRESS NOTES
04/17/25 1129 04/17/25 1131 04/17/25 1133   Vital Signs   Pulse 69 85 82   Heart Rate Source Monitor Monitor Monitor   Cardiac Rhythm NSR NSR NSR   Resp 18 18 18   Respiratory Quality Normal Normal Normal   /51 103/46 (!) 78/51   MAP (mmHg) 68 (!) 63 (!) 59   BP Location Right arm Right arm Right arm   BP Method Automatic Automatic Automatic   Patient Position Lying Sitting Standing   Oxygen Therapy   SpO2 98 % 96 % 93 %   O2 Device Nasal cannula Nasal cannula Nasal cannula   O2 Flow Rate (L/min) 5 L/min 4 L/min 4 L/min      04/17/25 1134   Vital Signs   Pulse 72   Heart Rate Source Monitor   Cardiac Rhythm NSR   Resp 18   Respiratory Quality Normal   BP 92/53  (recover)   MAP (mmHg) 67   BP Location Right arm   BP Method Automatic   Patient Position Sitting   Oxygen Therapy   SpO2  --    O2 Device  --    O2 Flow Rate (L/min)  --      2nd orthostatic BP

## 2025-04-17 NOTE — CM/SW NOTE
Per RN rounds, pt likely ready for DC today.    Message sent to [facility] via [facility] - pending confirmation on bed availability.    SW spoke to pt's tres Ferrari via phone. Confirmed 2nd choice is [facility] if [facility] is unavailable.    Message sent to [facility] as well to confirm bed availability.    UPDATE: [facility] and [facility] both have rooms available today. Per [facility], they would need rapid COVID due to it being a shared room.    MD and RN updated. Per Md, pt w/ near syncopal episode and RRT this AM. Now pending if cleared today or tomorrow.     [facility] and [facility] updated.    PLAN: [facility] vs [facility] - pending bed availability at DC         SW/CM to remain available for support and/or discharge planning.         MS DoriW, LSW [id number]

## 2025-04-17 NOTE — PLAN OF CARE
AxOx4, 1L NC resting, 4L NC ambulating, NSR, Cont x2, x1 walker. ACHS. Near syncope today while ambulating. 500ml bolus given.  PLAN: lowered PO lasix dose, ortho bp  Problem: Patient Centered Care  Goal: Patient preferences are identified and integrated in the patient's plan of care  Description: Interventions:- What would you like us to know as we care for you? From [facility] - Provide timely, complete, and accurate information to patient/family- Incorporate patient and family knowledge, values, beliefs, and cultural backgrounds into the planning and delivery of care- Encourage patient/family to participate in care and decision-making at the level they choose- Honor patient and family perspectives and choices  Outcome: Progressing     Problem: Patient/Family Goals  Goal: Patient/Family Long Term Goal  Description: Patient's Long Term Goal: to go home Interventions:- Monitor vital signs and labs  - Administer medications per order  - Follow MD orders  - Fall precautions  - Diagnostics as ordered  - Update / inform patient on plan of care  - Discharge planning- See additional Care Plan goals for specific interventions  Outcome: Progressing  Goal: Patient/Family Short Term Goal  Description: Patient's Short Term Goal: to feel better Interventions: -  See additional Care Plan goals for specific interventionsMonitor vital signs and labs  - Administer medications per order  - Follow MD orders  - Fall precautions  - Diagnostics as ordered  - Update / inform patient on plan of care  - Discharge planning  Outcome: Progressing     Problem: PAIN - ADULT  Goal: Verbalizes/displays adequate comfort level or patient's stated pain goal  Description: INTERVENTIONS:- Encourage pt to monitor pain and request assistance- Assess pain using appropriate pain scale- Administer analgesics based on type and severity of pain and evaluate response- Implement non-pharmacological measures as appropriate and evaluate response-  Consider cultural and social influences on pain and pain management- Manage/alleviate anxiety- Utilize distraction and/or relaxation techniques- Monitor for opioid side effects- Notify MD/LIP if interventions unsuccessful or patient reports new pain- Anticipate increased pain with activity and pre-medicate as appropriate  Outcome: Progressing     Problem: RISK FOR INFECTION - ADULT  Goal: Absence of fever/infection during anticipated neutropenic period  Description: INTERVENTIONS- Monitor WBC- Administer growth factors as ordered- Implement neutropenic guidelines  Outcome: Progressing     Problem: SAFETY ADULT - FALL  Goal: Free from fall injury  Description: INTERVENTIONS:- Assess pt frequently for physical needs- Identify cognitive and physical deficits and behaviors that affect risk of falls.- [place] fall precautions as indicated by assessment.- Educate pt/family on patient safety including physical limitations- Instruct pt to call for assistance with activity based on assessment- Modify environment to reduce risk of injury- Provide assistive devices as appropriate- Consider OT/PT consult to assist with strengthening/mobility- Encourage toileting schedule  Outcome: Progressing     Problem: DISCHARGE PLANNING  Goal: Discharge to home or other facility with appropriate resources  Description: INTERVENTIONS:- Identify barriers to discharge w/pt and caregiver- Include patient/family/discharge partner in discharge planning- Arrange for needed discharge resources and transportation as appropriate- Identify discharge learning needs (meds, wound care, etc)- Arrange for interpreters to assist at discharge as needed- Consider post-discharge preferences of patient/family/discharge partner- Complete POLST form as appropriate- Assess patient's ability to be responsible for managing their own health- Refer to Case Management Department for coordinating discharge planning if the patient needs post-hospital services based on  physician/LIP order or complex needs related to functional status, cognitive ability or social support system  Outcome: Progressing     Problem: RESPIRATORY - ADULT  Goal: Achieves optimal ventilation and oxygenation  Description: INTERVENTIONS:- Assess for changes in respiratory status- Assess for changes in mentation and behavior- Position to facilitate oxygenation and minimize respiratory effort- Oxygen supplementation based on oxygen saturation or ABGs- Provide Smoking Cessation handout, if applicable- Encourage broncho-pulmonary hygiene including cough, deep breathe, Incentive Spirometry- Assess the need for suctioning and perform as needed- Assess and instruct to report SOB or any respiratory difficulty- Respiratory Therapy support as indicated- Manage/alleviate anxiety- Monitor for signs/symptoms of CO2 retention  Outcome: Progressing     Problem: MUSCULOSKELETAL - ADULT  Goal: Return mobility to safest level of function  Description: INTERVENTIONS:- Assess patient stability and activity tolerance for standing, transferring and ambulating w/ or w/o assistive devices- Assist with transfers and ambulation using safe patient handling equipment as needed- Ensure adequate protection for wounds/incisions during mobilization- Obtain PT/OT consults as needed- Advance activity as appropriate- Communicate ordered activity level and limitations with patient/family  Outcome: Progressing  Goal: Maintain proper alignment of affected body part  Description: INTERVENTIONS:- Support and protect limb and body alignment per provider's orders- Instruct and reinforce with patient and family use of appropriate assistive device and precautions (e.g. spinal or hip dislocation precautions)  Outcome: Progressing     Problem: Diabetes/Glucose Control  Goal: Glucose maintained within prescribed range  Description: INTERVENTIONS:- Monitor Blood Glucose as ordered- Assess for signs and symptoms of hyperglycemia and hypoglycemia-  Administer ordered medications to maintain glucose within target range- Assess barriers to adequate nutritional intake and initiate nutrition consult as needed- Instruct patient on self management of diabetes  Outcome: Progressing

## 2025-04-18 NOTE — PROGRESS NOTES
[facility]  part of [facility]    Progress Note    Hoda Busby Patient Status:  Inpatient    1940 MRN [id number]   Location [facility] 3W/SW Attending Malathi Stuart MD   Hosp Day # 23 PCP Malathi Stuart MD       SUBJECTIVE:  Feels well this am    OBJECTIVE:  Vital signs in last 24 hours:  /48 (BP Location: Right arm)   Pulse 100   Temp 98 °F (36.7 °C) (Oral)   Resp 22   Ht 5' 4\" (1.626 m)   Wt 180 lb 3.2 oz (81.7 kg)   SpO2 91%   BMI 30.93 kg/m²     Intake/Output:    Intake/Output Summary (Last 24 hours) at 2025 0724  Last data filed at 2025 0016  Gross per 24 hour   Intake 720 ml   Output 450 ml   Net 270 ml       Wt Readings from Last 3 Encounters:   25 180 lb 3.2 oz (81.7 kg)   25 180 lb (81.6 kg)   25 173 lb (78.5 kg)       EXAM:  GENERAL: well developed, well nourished, in no apparent distress  LUNGS: coarse crackles and squeak on R 1/2 up and L base  CARDIO: RRR, normal S1S2, 2/6 WADE  EXTREMITIES: 1+ BLE edema    Data Review:     Labs:   Lab Results   Component Value Date    WBC 21.3 2025    HGB 9.6 2025    HCT 30.1 2025    .0 2025    CREATSERUM 0.91 2025    BUN 30 2025     2025    K 4.0 2025     2025    CO2 27.0 2025    GLU 98 2025    CA 8.3 2025         Imaging:  No results found.   CARD ECHO 2D DOPPLER (CPT=93306)  Result Date: 3/27/2025  Transthoracic Echocardiogram Name:Hoda Busby Date: 2025 :  1940 Ht:  (64in)  BP: 137 / 81 MRN:  [id number]    Age:  84years    Wt:  (187lb) HR: 75bpm Loc:  [facility]       Gndr: F          BSA: 1.9m^2 Sonographer: Casper JOHNSON Ordering:    Malathi Stuart Consulting:  Martita Baez ---------------------------------------------------------------------------- History/Indications:   Congestive heart failure.  Risk factors: Hypertension. TAVR-23 mm Martha 3 Resilia bioprosthesis.  Acute hypoxic respiratory failure. ---------------------------------------------------------------------------- Procedure information:  A transthoracic complete 2D study was performed. Additional evaluation included M-mode, complete spectral Doppler, and color Doppler.  Patient status:  Inpatient.  Location:  Bedside.    Comparison was made to the study of 02/05/2025.    This was a routine study. Transthoracic echocardiography for diagnosis and ventricular function evaluation. Image quality was adequate. ECG rhythm:   Normal sinus ---------------------------------------------------------------------------- Conclusions: 1. Left ventricle: The cavity size was normal. Wall thickness was normal.    Systolic function was normal. The estimated ejection fraction was 60-65%.    No diagnostic evidence for regional wall motion abnormalities. Doppler    parameters are consistent with abnormal left ventricular relaxation -    grade 1 diastolic dysfunction. 2. Right ventricle: The cavity size was increased. Systolic function was    normal. 3. Left atrium: The atrium was mildly to moderately dilated. The left atrial    volume was mildly to moderately increased. 4. Aortic valve: A bioprosthetic valve is present. Mcnamara PUNEET S3 23mm    (TAVR) bioprosthesis was present. The peak systolic velocity was    2.69m/sec. The mean systolic gradient was 15mm Hg. The valve area (VTI)    was 1.52cm^2. 5. Pulmonary arteries: Systolic pressure was moderately increased, estimated    to be 52mm Hg. * ---------------------------------------------------------------------------- * Findings: Left ventricle:  The cavity size was normal. Wall thickness was normal. Systolic function was normal. The estimated ejection fraction was 60-65%. No diagnostic evidence for regional wall motion abnormalities. Doppler parameters are consistent with abnormal left ventricular relaxation - grade 1 diastolic dysfunction. Left atrium:  The atrium was mildly to  moderately dilated. The left atrial volume was mildly to moderately increased. Right ventricle:  The cavity size was increased. Systolic function was normal. Right atrium:  Well visualized. The atrium was normal in size. Mitral valve:  The annulus was moderately calcified. The leaflets were mildly thickened and mildly to moderately calcified. Leaflet separation was normal.  Doppler:  Transvalvular velocity was within the normal range. There was no evidence for stenosis. There was no significant regurgitation. Aortic valve:  A bioprosthetic valve is present. Mcnamara PUNEET S3 23mm (TAVR) bioprosthesis was present. Cusp separation was normal.  Doppler: Transvalvular velocity was within the normal range. There was no evidence for stenosis. There was no significant regurgitation.    The valve area (VTI) was 1.52cm^2. The valve area (VTI) index was 0.8cm^2/m^2.    The mean systolic gradient was 15mm Hg. The peak systolic gradient was 29mm Hg. Tricuspid valve:  The valve is structurally normal. Leaflet separation was normal.  Doppler:  Transvalvular velocity was within the normal range. There was no evidence for stenosis. There was mild regurgitation. Pulmonic valve:   The valve is structurally normal. Cusp separation was normal.  Doppler:  Transvalvular velocity was within the normal range. There was no evidence for stenosis. There was no significant regurgitation. Pericardium:   There was no pericardial effusion. Aorta: Aortic root: The aortic root was normal. Ascending aorta: The ascending aorta was normal. Pulmonary arteries: Systolic pressure was moderately increased, estimated to be 52mm Hg. Systemic veins:  Central venous respirophasic diameter changes are blunted (< 50%). Inferior vena cava: The IVC was dilated. ---------------------------------------------------------------------------- Measurements  Left ventricle       Value          Ref       01/24/2025  IVS thickness,   (H) 1.0   cm       0.6 - 0.9 1.0  ED,  PLAX  LV ID, ED, PLAX  (H) 5.3   cm       3.8 - 5.2 5.0  LV ID, ES, PLAX      3.5   cm       2.2 - 3.5 3.1  LV PW thickness, (H) 1.0   cm       0.6 - 0.9 1.0  ED, PLAX  IVS/LV PW ratio,     1.00           --------- 1.02  ED, PLAX  LV PW/LV ID          0.19           --------- 0.2  ratio, ED, PLAX  LV area, ES, A4C     17.5  cm^2     --------- ----------  LV ejection          62    %        54 - 74   67  fraction  Stroke               45    ml/m^2   --------- 53  volume/bsa, 2D  LV end-diastolic     126   ml       48 - 140  ----------  volume, 1-p A4C  LV end-systolic      49    ml       12 - 60   ----------  volume, 1-p A4C  LV ejection          63    %        46 - 78   ----------  fraction, 1-p  A4C  Stroke volume,       79    ml       --------- ----------  1-p A4C  LV end-diastolic     66    ml/m^2   30 - 82   ----------  volume/bsa, 1-p  A4C  LV end-systolic      26    ml/m^2   7 - 35    ----------  volume/bsa, 1-p  A4C  Stroke               42    ml/m^2   --------- ----------  volume/bsa, 1-p  A4C  LV end-diastolic (H) 113   ml       46 - 106  ----------  volume, 2-p  LV end-systolic      42    ml       14 - 42   ----------  volume, 2-p  LV ejection          63    %        54 - 74   ----------  fraction, 2-p  Stroke volume,       72    ml       --------- ----------  2-p  LV end-diastolic     59    ml/m^2   29 - 61   ----------  volume/bsa, 2-p  LV end-systolic      22    ml/m^2   8 - 24    ----------  volume/bsa, 2-p  Stroke               37.6  ml/m^2   --------- ----------  volume/bsa, 2-p  LV e', lateral   (L) 4.9   cm/sec   >=10.0    ----------  LV E/e', lateral (H) 19             <=13      ----------  LV e', medial    (L) 3.2   cm/sec   >=7.0     ----------  LV E/e', medial      30             --------- ----------  LV e', average       4.0   cm/sec   --------- ----------  LV E/e', average (H) 23             <=14      ----------  LVOT                 Value          Ref       01/24/2025  LVOT ID               1.9   cm       --------- 2.2  LVOT peak            1.29  m/sec    --------- 1.31  velocity, S  LVOT VTI, S          29.9  cm       --------- 27.6  LVOT peak            7     mm Hg    --------- 7  gradient, S  LVOT mean            4     mm Hg    --------- 3  gradient, S  Stroke volume        85    ml       --------- 105  (SV), LVOT DP  Stroke index         45    ml/m^2   --------- 53  (SV/bsa), LVOT  DP  Aortic valve         Value          Ref       01/24/2025  Aortic valve         2.69  m/sec    --------- 2.4  peak velocity, S  Aortic valve         55.7  cm       --------- 51.6  VTI, S  Aortic mean          15    mm Hg    --------- 12  gradient, S  Aortic peak          29    mm Hg    --------- 23  gradient, S  Aortic valve         1.52  cm^2     --------- 2.03  area, VTI  Aortic valve         0.8   cm^2/m^2 --------- 1.03  area/bsa, VTI  Velocity ratio,      0.48           --------- 0.55  peak, LVOT/AV  Ascending aorta      Value          Ref       01/24/2025  Ascending aorta      3.5   cm       1.9 - 3.5 ----------  ID  Left atrium          Value          Ref       01/24/2025  LA ID, A-P, ES       3.3   cm       2.7 - 3.8 4.0  LA volume/bsa, S (H) 39    ml/m^2   16 - 34   56  LA volume, ES,   (H) 74    ml       22 - 52   101  1-p A4C  Mitral valve         Value          Ref       01/24/2025  Mitral E-wave        0.94  m/sec    --------- ----------  peak velocity  Mitral A-wave        1.24  m/sec    --------- ----------  peak velocity  Mitral               190   ms       --------- ----------  deceleration  time  Mitral peak          4     mm Hg    --------- ----------  gradient, D  Mitral E/A           0.8            --------- ----------  ratio, peak  Pulmonary artery     Value          Ref       01/24/2025  PA pressure, S,      52    mm Hg    --------- ----------  DP  Tricuspid valve      Value          Ref       01/24/2025  Tricuspid regurg (H) 3.06  m/sec    <=2.8     ----------  peak velocity  Tricuspid peak        37    mm Hg    --------- ----------  RV-RA gradient  Right atrium         Value          Ref       01/24/2025  RA ID, S-I, ES,  (H) 6.3   cm       3.4 - 5.3 ----------  A4C  RA ID/bsa, S-I,  (H) 3.3   cm/m^2   1.9 - 3.1 ----------  ES, A4C  RA area, ES, A4C     17    cm^2     10 - 18   ----------  RA volume, ES,       38    ml       --------- ----------  1-p A4C  RA volume/bsa,       20    ml/m^2   9 - 33    ----------  ES, 1-p A4C  Systemic veins       Value          Ref       01/24/2025  Estimated CVP        15    mm Hg    --------- ----------  Inferior vena        Value          Ref       01/24/2025  cava  ID               (H) 2.7   cm       <=2.1     ----------  Right ventricle      Value          Ref       01/24/2025  TAPSE, 2D            2.74  cm       >=1.70    ----------  TAPSE, MM            2.74  cm       >=1.70    ----------  RV pressure, S,      52    mm Hg    --------- ----------  DP  RV s', lateral       14.7  cm/sec   >=9.5     ---------- Legend: (L)  and  (H)  migel values outside specified reference range. ---------------------------------------------------------------------------- Prepared and electronically signed by Bran Salcedo 03/27/2025 16:38          Meds:   Current Hospital Medications[1]    Assessment & Plan    Acute hypoxic respiratory failure (HCC)  -multifactorial: acute HFpEF, recurrent interstitial pneumonitis, CAP  -CT chest in ED 3/26/25 - no PE; worsening of pleural effusions and interstitial edema  -required BiPAP transiently; was up to 15L O2  -echo 2/5/25 - normal LV systolic function (EF 60-65%); grade 2 diastolic dysfunction; normally functioning bioprosthetic TAVR; sl incr PAP 44mmHg  -repeat echo 3/27/25 stable from 2/2025 except incr PAP to 52mmHg  -hi res CT chest 4/4/25 --pulmonary edema and bilateral pleural effusions significantly improved.  There is persistent trace right pleural effusion.  Advanced interstitial lung disease is noted in an NSIP (nonspecific  insterstitial pneumonitis) pattern.   -s/p ceftriaxone/azithro then augmentin (stopped 4/7)  -s/p IV lasix; now lasix 40mg po daily (for HFpEF)  -on steroids  -initially with clinical improvement - decreased O2 requirement, improvement in dyspnea; however, pt noted an acute worsening in her sx on 4/8.  CT chest w/contrast on 4/9 -- negative for PE; no significant fluid overload; only small b/l pleural effusions and atelectasis; it did show peripheral and basilar predominant pulmonary interstitial fibrosis with suggestion of underlying nonspecific interstitial pneumonitis.  RHC on 4/7 showed normal filling pressures.  Pt appears euvolemic.  -suspect pt's subacute decompensation due to NSIP -- sx occurred the day after her solumedrol was deescalated to oral prednisone on 4/7 and CT findings are c/w NSIP.    -restarted solumedrol (40mg IV q8h) on 4/10 with improvement   -suspect majority of sx/hypoxia due to ILD/NSIP  -O2 down to 1L   -clinically improved; no longer SOB at rest (and much improved with walking); but still desats w/ambulation  -solumedrol changed to po prednisone 40mg/day on 4/16     Probable vasovagal episode on 4/17  -mild orthostasis  - received 500cc bolus  -was still orthostatic though asymptomatic on repeat standing BP  -decreased lasix to 20mg/day (already received 40mg earlier this am)  -received additional IVF's 50cc/hr (1L)  -d/w RN; will try to walk pt again this am     Coronary artery disease  -Cath 1/14/25 by Dr. Bernardo Liz (as w/u for TAVR) -- RCA non-obstructive; LM free of obst disease; LM plaque extending into ostium of LAD (20-30%); mid LAD (80-90%), cx ostium (60-70%)  -intervention delayed until after colon resection due to need for DAPT x 6 mos after stenting   -s/p PCI/JUVENTINO of mid LAD (70%>0%) on 4/7/25 by Dr. Dutton  -cont ASA, Plavix  -increased atorvastatin from 20mg to 80mg/day (LDL 76 on 3/26/25)  -to f/u with Dr. Bird as outpt     Interstitial pneumonitis/NSIP  -dx'ed 10/2024  - presented with cough and severe hypoxia  -unresponsive to abx   -CXR 10/24/24 extensive bilateral perihilar and bilateral upper and lower lobe   -S.pneumo, legionella Ag , mycoplasma IgM/G, Fungitell-beta-D glucan negative  -ANCA and URIEL/connective tissue disease panels negative  -anti-histone and anti-Keara Ab's negative  -CT with bilateral ground glass opacities, small consolidations of BLL  -indings suspicious for NSIP (vs cryptogenic organizing pneumonia vs hypersensitivity pneumonitis); NOT thought to be c/w UIP pattern  -s/p empiric steroids (solumedrol then prednisone taper) 10/25/24 - (EOT 1/18/25)  -dyspnea and hypoxia completely resolved until this admission 3/26/25 -- POPE, hypoxia  -steroids as above  -transitioned from IV solumedrol to prednisone 40mg/day on 4/16/25 - slow taper per Dr. Holloway     Hx of Bilateral pulmonary emboli (10/2024)  -dx'ed at time of interstitial pneumonitis  -CT chest 10/25/24 -- acute distal segmental/subsegmental pulmonary emboli in RUL and subsegmental PE in [place]  -unclear etiology; no recent hx of long travel; no family/personal hx of blood clots  -BLE venous dopplers negative 10/26/24  -started xarelto 20 mg po every day on 11/28/24  -repeat CT chest 12/9/24 neg PE  -stopped Xarelto 12/14/24 due to recurrent GI bleed  -repeat CT chest 3/26/25 and 4/9/25 -- neg PE     Severe aortic stenosis  -progression on serial echocardiograms  -s/p TAVR 1/23/25 (Dr. Reji Green)     Adenocarcinoma of colon  -Bx of transverse colon polyp 12/17/24 -- invasive, moderately differentiated adenocarcinoma  -CEA normal (1.9)  -CT a/p neg for mets  -surgery delayed in anticipation of TAVR (done 1/23/25)  -s/p robotic assisted left hemicolectomy (Dr. Cassidy) on 2/27/25 - path - tumor invades into muscularis; margins neg for tumor; all 19 LN's neg for mets (0/19); pT2, pN0  -next appt with Dr. Cassidy 4/16/25 - to be rescheduled     Hx  GI bleed  -EGD 11/26/24 (Dr. Nuno) -15mm nonbleeding  gastric antral ulcer; 3mm gastric antral AVM s/p APC  -recurrence in 12/2024  -colonoscopy and repeat EGD 12/17/24 by Dr. Staton -- grade 1 esophagitis; duod ulcers healing; large flat polyp in transverse colon (carcinoma)   -received total of 3 units PRBCs   -Xarelto stopped in 12/2024  -s/p omeprazole 20mg BID x 8 weeks (EOT 1/22/25), now on omeprazole 20mg daily -- plan has been to continue until she completed her coronary stenting.  Will stop once off steroids.     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   -MRI brain--no acute intracranial process  -was doing PT at [facility] -- continues to improve; almost back to normal       Hx of Hypertension, primary  -(dyazide stopped due to NANCY)  -(amlodipine held due to low BP in 11/2024)  -BP remains normal off meds     Hx of hip OA  -s/p left THR (Dr. Lo) many years ago  -s/p elective R BEATRIZ on 10/5/23 by Dr. Diaz     Hyperlipidemia   on atorvastatin to 80mg/day as above     Osteopenia   On Fosamax from 2011 to 4/2016.     DEXA stable 5/10/17 and 2019 and 2021  DEXA 8/2024 -- osteoporosis of left forearm  -restarted Fosamax 8/2024 (on hold this admission); restart at discharge     Vitamin D deficiency  On vitamin D 4000u/day      VTE proph  -SQH        Dispo  -pt has been residing at [facility] for past several months (for respite care and PT), and is scheduled to be there until  July, after which she plans to return home  -PT gricelda appreciated - recommending [facility]; pt initially declined but now agrees to [facility]; SW assisting ([facility])   -will attempt to walk pt again today after receiving IVF's overnight.  If feels okay, can discharge to [facility]       D/w pt and RN; LM on  for tres Vonda Stuart MD  4/18/2025  7:24 AM       [1]   Current Facility-Administered Medications   Medication Dose Route Frequency    furosemide (Lasix) tab 20 mg  20 mg Oral Daily    sodium chloride 0.9% infusion   Intravenous Continuous    predniSONE (Deltasone) tab 40 mg  40 mg Oral Daily with breakfast    sodium chloride (Saline Mist) 0.65 % nasal solution 1 spray  1 spray Each Nare Q3H PRN    atorvastatin (Lipitor) tab 80 mg  80 mg Oral Nightly    clopidogrel (Plavix) tab 75 mg  75 mg Oral Daily    aspirin DR tab 81 mg  81 mg Oral Daily    acetaminophen (Tylenol) tab 650 mg  650 mg Oral Q6H PRN    polyethylene glycol (PEG 3350) (Miralax) 17 g oral packet 17 g  17 g Oral Daily PRN    guaiFENesin (Robitussin) 100 MG/5 ML oral liquid 200 mg  200 mg Oral Q4H PRN    benzocaine-menthol (Cepacol) lozenge 1 lozenge  1 lozenge Oral PRN    pantoprazole (Protonix) DR tab 40 mg  40 mg Oral QAM AC    traMADol (Ultram) tab 50 mg  50 mg Oral Q6H PRN    ipratropium-albuterol (Duoneb) 0.5-2.5 (3) MG/3ML inhalation solution 3 mL  3 mL Nebulization Q6H PRN    heparin (Porcine) 5000 UNIT/ML injection 5,000 Units  5,000 Units Subcutaneous 2 times per day    glucose (Dex4) 15 GM/59ML oral liquid 15 g  15 g Oral Q15 Min PRN    Or    glucose (Glutose) 40% oral gel 15 g  15 g Oral Q15 Min PRN    Or    glucose-vitamin C (Dex-4) chewable tab 4 tablet  4 tablet Oral Q15 Min PRN    Or    dextrose 50% injection 50 mL  50 mL Intravenous Q15 Min PRN    Or    glucose (Dex4) 15 GM/59ML oral liquid 30 g  30 g Oral Q15 Min PRN    Or    glucose (Glutose) 40% oral gel 30 g  30 g Oral Q15 Min PRN    Or    glucose-vitamin C (Dex-4)  chewable tab 8 tablet  8 tablet Oral Q15 Min PRN    insulin aspart (NovoLOG) 100 Units/mL FlexPen 1-7 Units  1-7 Units Subcutaneous TID CC    ferrous sulfate DR tab 325 mg  325 mg Oral Daily with breakfast    docusate sodium (Colace) cap 100 mg  100 mg Oral BID

## 2025-04-18 NOTE — CM/SW NOTE
04/18/25 1138   Discharge disposition   Expected discharge disposition subacute   Post Acute Care Provider [facility]   Discharge transportation [facility]     Received confirmation from liaison Peggy lenz/ [facility]ana [facility] - they can accept around PM.    RN Scarlett, RN Michaelle, and TL RN Rubi updated.    Pt's cheyenne Ferrari updated via phone - agreeable to DC plans and time.    SW spoke to [facility] ([facility]) set for ETA 2PM. PCS completed in Epic - pt's RN to print w/ pt's AVS.    Report phone #: [phone]      PLAN: [place] [facility]ulance ETA 2PM, PCS done          MS DoriW, LSW [id number]

## 2025-04-18 NOTE — PROGRESS NOTES
[facility]  part of [facility]     Progress Note    Hoda Busby Patient Status:  Inpatient    1940 MRN [id number]   Location [facility]5W Attending Malathi Stuart MD   Hosp Day # 23 PCP Malathi Stuart MD       Subjective:   Patient seen and examined.  Denies worsening dyspnea over the course the last day.  Objective:   Blood pressure 106/45, pulse 100, temperature 97.5 °F (36.4 °C), temperature source Oral, resp. rate 18, height 5' 4\" (1.626 m), weight 180 lb 3.2 oz (81.7 kg), SpO2 94%.  Intake/Output:   Last 3 shifts: I/O last 3 completed shifts:  In: 990 [P.O.:980; I.V.:10]  Out: 700 [Urine:700]   This shift: I/O this shift:  In: 100 [P.O.:100]  Out: 200 [Urine:200]     Vent Settings:      Hemodynamic parameters (last 24 hours):      Scheduled Meds:   Current Facility-Administered Medications   Medication Dose Route Frequency    furosemide (Lasix) tab 20 mg  20 mg Oral Daily    predniSONE (Deltasone) tab 40 mg  40 mg Oral Daily with breakfast    sodium chloride (Saline Mist) 0.65 % nasal solution 1 spray  1 spray Each Nare Q3H PRN    atorvastatin (Lipitor) tab 80 mg  80 mg Oral Nightly    clopidogrel (Plavix) tab 75 mg  75 mg Oral Daily    aspirin DR tab 81 mg  81 mg Oral Daily    acetaminophen (Tylenol) tab 650 mg  650 mg Oral Q6H PRN    polyethylene glycol (PEG 3350) (Miralax) 17 g oral packet 17 g  17 g Oral Daily PRN    guaiFENesin (Robitussin) 100 MG/5 ML oral liquid 200 mg  200 mg Oral Q4H PRN    benzocaine-menthol (Cepacol) lozenge 1 lozenge  1 lozenge Oral PRN    pantoprazole (Protonix) DR tab 40 mg  40 mg Oral QAM AC    traMADol (Ultram) tab 50 mg  50 mg Oral Q6H PRN    ipratropium-albuterol (Duoneb) 0.5-2.5 (3) MG/3ML inhalation solution 3 mL  3 mL Nebulization Q6H PRN    heparin (Porcine) 5000 UNIT/ML injection 5,000 Units  5,000 Units Subcutaneous 2 times per day    glucose (Dex4) 15 GM/59ML oral liquid 15 g  15 g Oral Q15 Min PRN    Or    glucose (Glutose)  40% oral gel 15 g  15 g Oral Q15 Min PRN    Or    glucose-vitamin C (Dex-4) chewable tab 4 tablet  4 tablet Oral Q15 Min PRN    Or    dextrose 50% injection 50 mL  50 mL Intravenous Q15 Min PRN    Or    glucose (Dex4) 15 GM/59ML oral liquid 30 g  30 g Oral Q15 Min PRN    Or    glucose (Glutose) 40% oral gel 30 g  30 g Oral Q15 Min PRN    Or    glucose-vitamin C (Dex-4) chewable tab 8 tablet  8 tablet Oral Q15 Min PRN    insulin aspart (NovoLOG) 100 Units/mL FlexPen 1-7 Units  1-7 Units Subcutaneous TID CC    ferrous sulfate DR tab 325 mg  325 mg Oral Daily with breakfast    docusate sodium (Colace) cap 100 mg  100 mg Oral BID       Continuous Infusions:     Physical Exam  Constitutional: no acute distress  Eyes: PERRL  ENT: nares pateint  Neck: supple, no JVD  Cardio: RRR, S1 S2  Respiratory: Basilar crackles  GI: abdomen soft, non tender, active bowel sounds, no organomegaly  Extremities: no clubbing, cyanosis, + trace lower extremity edema   Neurologic: no gross motor deficits  Skin: warm, dry      Results:                No results found.      EKG 12 Lead  Result Date: 4/17/2025  Normal sinus rhythm Incomplete right bundle branch block Borderline ECG When compared with ECG of 31-MAR-2025 02:58, PA interval has decreased Criteria for Septal infarct are no longer Present Confirmed by JENNIFER HANNAH, WILLARD ([id number]) on 4/17/2025 3:49:57 PM      Assessment   1.  Acute hypoxemic respiratory failure  2.  ILD  3.  Small pleural effusions  4.  Pulmonary edema  5.  Colon adenocarcinoma status post hemicolectomy  6.  Coronary artery disease  7.  Prior history of GI bleed  8.  Prior VTE  9.  History of severe aortic stenosis status post TAVR  10.  Hypertension     Plan   -Patient presents with progressive worsening dyspnea and acute hypoxemic respiratory failure.  -CT chest on presentation on 3/26/2025 with no evidence of acute pulmonary embolism seen.  Evidence of small pleural effusions with groundglass opacities suggestive more  likely of edema with worsening compared to 6 days prior from CT chest.  Some background of fibrotic changes present.  -Repeat CT high-resolution chest on 4/4/2025 with improvement in pulmonary edema and pleural effusions.  Underlying ILD changes present.  -CT chest with contrast on 4/9/2025 with no evidence of pulmonary embolism seen.  Fibrotic changes seen otherwise with small pleural effusions and mild edema.  -Clinically improved after steroid dosage increased last week.  Will transition to prednisone 40 mg today.  Slow taper as outpatient.  -Diuresis as need be.  -Prior history of underlying ILD with hospitalization October 2024 responded to tapering steroid therapy at the time.  -Pulmonary function testing from February 2025 with mild restriction seen with significant decrease in diffusion capacity.  -Currently on 1 L nasal cannula oxygen at rest.  Increased requirements with exertion.  -Completed course of antibiotic therapy with Augmentin.  -Status post PCI of mLAD on 4/7/2025  -DVT prophylaxis: Heparin  - Discharged on 40 mg prednisone daily continue over the next 2 weeks.  Follow-up in pulmonary clinic in 2 weeks time.  Will arrange for our physician assistant patient at [facility].        Cody Holloway,   Pulmonary Critical Care Medicine  [facility]

## 2025-04-18 NOTE — CM/SW NOTE
Confirmed w/ MD - pt is cleared for DC today.    Confirmed [facility] does NOT have a bed today and will not til Monday.  [facility] DOES have a bed today.    Pt's tres Ferrari updated and agreeable to DC via Ambulance to [facility] today. Informed her pt will be in semi-private room. Explained they can request private and if one becomes available, pt can be moved.    MARCIA sent message to Peggy lenz/ [facility] - pending response for appropriate time.    RN updated.    PLAN: [facility] - pending time from facility      SW/DANIEL to remain available for support and/or discharge planning.       MS DoriW, LSW [id number]

## 2025-04-18 NOTE — PLAN OF CARE
Problem: Patient Centered Care  Goal: Patient preferences are identified and integrated in the patient's plan of care  Description: Interventions:- What would you like us to know as we care for you? From [place] - Provide timely, complete, and accurate information to patient/family- Incorporate patient and family knowledge, values, beliefs, and cultural backgrounds into the planning and delivery of care- Encourage patient/family to participate in care and decision-making at the level they choose- Honor patient and family perspectives and choices  Outcome: Progressing     Problem: Patient/Family Goals  Goal: Patient/Family Long Term Goal  Description: Patient's Long Term Goal: to go home Interventions:- Monitor vital signs and labs  - Administer medications per order  - Follow MD orders  - Fall precautions  - Diagnostics as ordered  - Update / inform patient on plan of care  - Discharge planning- See additional Care Plan goals for specific interventions  Outcome: Progressing  Goal: Patient/Family Short Term Goal  Description: Patient's Short Term Goal: to feel better Interventions: -  See additional Care Plan goals for specific interventionsMonitor vital signs and labs  - Administer medications per order  - Follow MD orders  - Fall precautions  - Diagnostics as ordered  - Update / inform patient on plan of care  - Discharge planning  Outcome: Progressing     Problem: PAIN - ADULT  Goal: Verbalizes/displays adequate comfort level or patient's stated pain goal  Description: INTERVENTIONS:- Encourage pt to monitor pain and request assistance- Assess pain using appropriate pain scale- Administer analgesics based on type and severity of pain and evaluate response- Implement non-pharmacological measures as appropriate and evaluate response- Consider cultural and social influences on pain and pain management- Manage/alleviate anxiety- Utilize distraction and/or relaxation techniques- Monitor for opioid side  effects- Notify MD/LIP if interventions unsuccessful or patient reports new pain- Anticipate increased pain with activity and pre-medicate as appropriate  Outcome: Progressing     Problem: RISK FOR INFECTION - ADULT  Goal: Absence of fever/infection during anticipated neutropenic period  Description: INTERVENTIONS- Monitor WBC- Administer growth factors as ordered- Implement neutropenic guidelines  Outcome: Progressing     Problem: SAFETY ADULT - FALL  Goal: Free from fall injury  Description: INTERVENTIONS:- Assess pt frequently for physical needs- Identify cognitive and physical deficits and behaviors that affect risk of falls.- [place] fall precautions as indicated by assessment.- Educate pt/family on patient safety including physical limitations- Instruct pt to call for assistance with activity based on assessment- Modify environment to reduce risk of injury- Provide assistive devices as appropriate- Consider OT/PT consult to assist with strengthening/mobility- Encourage toileting schedule  Outcome: Progressing     Problem: DISCHARGE PLANNING  Goal: Discharge to home or other facility with appropriate resources  Description: INTERVENTIONS:- Identify barriers to discharge w/pt and caregiver- Include patient/family/discharge partner in discharge planning- Arrange for needed discharge resources and transportation as appropriate- Identify discharge learning needs (meds, wound care, etc)- Arrange for interpreters to assist at discharge as needed- Consider post-discharge preferences of patient/family/discharge partner- Complete POLST form as appropriate- Assess patient's ability to be responsible for managing their own health- Refer to Case Management Department for coordinating discharge planning if the patient needs post-hospital services based on physician/LIP order or complex needs related to functional status, cognitive ability or social support system  Outcome: Progressing     Problem: RESPIRATORY -  ADULT  Goal: Achieves optimal ventilation and oxygenation  Description: INTERVENTIONS:- Assess for changes in respiratory status- Assess for changes in mentation and behavior- Position to facilitate oxygenation and minimize respiratory effort- Oxygen supplementation based on oxygen saturation or ABGs- Provide Smoking Cessation handout, if applicable- Encourage broncho-pulmonary hygiene including cough, deep breathe, Incentive Spirometry- Assess the need for suctioning and perform as needed- Assess and instruct to report SOB or any respiratory difficulty- Respiratory Therapy support as indicated- Manage/alleviate anxiety- Monitor for signs/symptoms of CO2 retention  Outcome: Progressing     Problem: MUSCULOSKELETAL - ADULT  Goal: Return mobility to safest level of function  Description: INTERVENTIONS:- Assess patient stability and activity tolerance for standing, transferring and ambulating w/ or w/o assistive devices- Assist with transfers and ambulation using safe patient handling equipment as needed- Ensure adequate protection for wounds/incisions during mobilization- Obtain PT/OT consults as needed- Advance activity as appropriate- Communicate ordered activity level and limitations with patient/family  Outcome: Progressing  Goal: Maintain proper alignment of affected body part  Description: INTERVENTIONS:- Support and protect limb and body alignment per provider's orders- Instruct and reinforce with patient and family use of appropriate assistive device and precautions (e.g. spinal or hip dislocation precautions)  Outcome: Progressing     Problem: Diabetes/Glucose Control  Goal: Glucose maintained within prescribed range  Description: INTERVENTIONS:- Monitor Blood Glucose as ordered- Assess for signs and symptoms of hyperglycemia and hypoglycemia- Administer ordered medications to maintain glucose within target range- Assess barriers to adequate nutritional intake and initiate nutrition consult as needed-  Instruct patient on self management of diabetes  Outcome: Progressing

## 2025-04-18 NOTE — PLAN OF CARE
Patient is alert and oriented x4. On 1 liter of O2 via nasal cannula. Continent x2. Up with 1 and a front wheel walker. Standby assist. Walked in room today and tolerated well. Report given to Kyle GUADARRAMA from [place]. Patient transferring to [place]. IV removed. AVS completed. Instructions gone over with patient and patient verbalizes understanding.     Problem: Patient Centered Care  Goal: Patient preferences are identified and integrated in the patient's plan of care  Description: Interventions:- What would you like us to know as we care for you? From [facility] [place] - Provide timely, complete, and accurate information to patient/family- Incorporate patient and family knowledge, values, beliefs, and cultural backgrounds into the planning and delivery of care- Encourage patient/family to participate in care and decision-making at the level they choose- Honor patient and family perspectives and choices  Outcome: Adequate for Discharge     Problem: Patient/Family Goals  Goal: Patient/Family Long Term Goal  Description: Patient's Long Term Goal: to go home Interventions:- Monitor vital signs and labs  - Administer medications per order  - Follow MD orders  - Fall precautions  - Diagnostics as ordered  - Update / inform patient on plan of care  - Discharge planning- See additional Care Plan goals for specific interventions  Outcome: Adequate for Discharge  Goal: Patient/Family Short Term Goal  Description: Patient's Short Term Goal: to feel better Interventions: -  See additional Care Plan goals for specific interventionsMonitor vital signs and labs  - Administer medications per order  - Follow MD orders  - Fall precautions  - Diagnostics as ordered  - Update / inform patient on plan of care  - Discharge planning  Outcome: Adequate for Discharge     Problem: PAIN - ADULT  Goal: Verbalizes/displays adequate comfort level or patient's stated pain goal  Description: INTERVENTIONS:- Encourage pt to monitor pain  and request assistance- Assess pain using appropriate pain scale- Administer analgesics based on type and severity of pain and evaluate response- Implement non-pharmacological measures as appropriate and evaluate response- Consider cultural and social influences on pain and pain management- Manage/alleviate anxiety- Utilize distraction and/or relaxation techniques- Monitor for opioid side effects- Notify MD/LIP if interventions unsuccessful or patient reports new pain- Anticipate increased pain with activity and pre-medicate as appropriate  Outcome: Adequate for Discharge     Problem: RISK FOR INFECTION - ADULT  Goal: Absence of fever/infection during anticipated neutropenic period  Description: INTERVENTIONS- Monitor WBC- Administer growth factors as ordered- Implement neutropenic guidelines  Outcome: Adequate for Discharge     Problem: SAFETY ADULT - FALL  Goal: Free from fall injury  Description: INTERVENTIONS:- Assess pt frequently for physical needs- Identify cognitive and physical deficits and behaviors that affect risk of falls.- [place] fall precautions as indicated by assessment.- Educate pt/family on patient safety including physical limitations- Instruct pt to call for assistance with activity based on assessment- Modify environment to reduce risk of injury- Provide assistive devices as appropriate- Consider OT/PT consult to assist with strengthening/mobility- Encourage toileting schedule  Outcome: Adequate for Discharge     Problem: DISCHARGE PLANNING  Goal: Discharge to home or other facility with appropriate resources  Description: INTERVENTIONS:- Identify barriers to discharge w/pt and caregiver- Include patient/family/discharge partner in discharge planning- Arrange for needed discharge resources and transportation as appropriate- Identify discharge learning needs (meds, wound care, etc)- Arrange for interpreters to assist at discharge as needed- Consider post-discharge preferences of  patient/family/discharge partner- Complete POLST form as appropriate- Assess patient's ability to be responsible for managing their own health- Refer to Case Management Department for coordinating discharge planning if the patient needs post-hospital services based on physician/LIP order or complex needs related to functional status, cognitive ability or social support system  Outcome: Adequate for Discharge     Problem: RESPIRATORY - ADULT  Goal: Achieves optimal ventilation and oxygenation  Description: INTERVENTIONS:- Assess for changes in respiratory status- Assess for changes in mentation and behavior- Position to facilitate oxygenation and minimize respiratory effort- Oxygen supplementation based on oxygen saturation or ABGs- Provide Smoking Cessation handout, if applicable- Encourage broncho-pulmonary hygiene including cough, deep breathe, Incentive Spirometry- Assess the need for suctioning and perform as needed- Assess and instruct to report SOB or any respiratory difficulty- Respiratory Therapy support as indicated- Manage/alleviate anxiety- Monitor for signs/symptoms of CO2 retention  Outcome: Adequate for Discharge     Problem: MUSCULOSKELETAL - ADULT  Goal: Return mobility to safest level of function  Description: INTERVENTIONS:- Assess patient stability and activity tolerance for standing, transferring and ambulating w/ or w/o assistive devices- Assist with transfers and ambulation using safe patient handling equipment as needed- Ensure adequate protection for wounds/incisions during mobilization- Obtain PT/OT consults as needed- Advance activity as appropriate- Communicate ordered activity level and limitations with patient/family  Outcome: Adequate for Discharge  Goal: Maintain proper alignment of affected body part  Description: INTERVENTIONS:- Support and protect limb and body alignment per provider's orders- Instruct and reinforce with patient and family use of appropriate assistive device and  precautions (e.g. spinal or hip dislocation precautions)  Outcome: Adequate for Discharge     Problem: Diabetes/Glucose Control  Goal: Glucose maintained within prescribed range  Description: INTERVENTIONS:- Monitor Blood Glucose as ordered- Assess for signs and symptoms of hyperglycemia and hypoglycemia- Administer ordered medications to maintain glucose within target range- Assess barriers to adequate nutritional intake and initiate nutrition consult as needed- Instruct patient on self management of diabetes  Outcome: Adequate for Discharge

## 2025-04-29 NOTE — DISCHARGE SUMMARY
[facility]  part of [facility]    Discharge Summary    Hoda Busby Patient Status:  Inpatient    1940 MRN [id number]   Location [facility] 3W/SW Attending No att. providers found   Hosp Day # 23 PCP Malathi Stuart MD     Date of Admission: 3/26/2025   Date of Discharge: 2025     Admitting Diagnosis: Aortic valve stenosis, etiology of cardiac valve disease unspecified [I35.0]  Acute on chronic congestive heart failure, unspecified heart failure type (HCC) [I50.9]  Acute hypoxic respiratory failure (HCC) [J96.01]    Disposition: Transfer to Skilled Nursing Facility:      Discharge Diagnosis: .Principal Problem:    Acute hypoxic respiratory failure (HCC)  Active Problems:    Pneumonia due to infectious organism    Acute on chronic congestive heart failure, unspecified heart failure type (HCC)    Aortic valve stenosis, etiology of cardiac valve disease unspecified    Coronary artery disease involving native coronary artery of native heart without angina pectoris      Hospital Course:   Reason for Admission:   Acute resp failure 2/2 ILD    Discharge Physical Exam:  Vital Signs:  Blood pressure 130/55, pulse 100, temperature 98 °F (36.7 °C), temperature source Axillary, resp. rate 18, height 5' 4\" (1.626 m), weight 180 lb 3.2 oz (81.7 kg), SpO2 99%.     See prog note    Hospital Course:        Acute hypoxic respiratory failure (HCC)  -multifactorial: acute HFpEF, recurrent interstitial pneumonitis, CAP  -CT chest in ED 3/26/25 - no PE; worsening of pleural effusions and interstitial edema  -required BiPAP transiently; was up to 15L O2  -echo 25 - normal LV systolic function (EF 60-65%); grade 2 diastolic dysfunction; normally functioning bioprosthetic TAVR; sl incr PAP 44mmHg  -hi res CT chest 25 --pulmonary edema and bilateral pleural effusions significantly improved.  There is persistent trace right pleural effusion.  Advanced interstitial lung disease is noted in  an NSIP (nonspecific insterstitial pneumonitis) pattern.   -s/p ceftriaxone/azithro then augmentin (stopped 4/7)  -s/p IV lasix; now lasix 40mg po daily (for HFpEF)  -s/p solumedrol - transitioned to prednisone  -suspect majority of sx/hypoxia due to ILD/NSIP  -O2 down to 1L   -clinically improved     Coronary artery disease  -Cath 1/14/25 by Dr. Bernardo Liz (as w/u for TAVR) -- RCA non-obstructive; LM free of obst disease; LM plaque extending into ostium of LAD (20-30%); mid LAD (80-90%), cx ostium (60-70%)  -intervention delayed until after colon resection due to need for DAPT x 6 mos after stenting   -s/p PCI/JUVENTINO of mid LAD (70%>0%) on 4/7/25 by Dr. Dutton  -cont ASA, Plavix  -increased atorvastatin from 20mg to 80mg/day (LDL 76 on 3/26/25)  -to f/u with Dr. Bird as outpt     Interstitial pneumonitis/NSIP  -dx'ed 10/2024 - presented with cough and severe hypoxia  -unresponsive to abx   -CXR 10/24/24 extensive bilateral perihilar and bilateral upper and lower lobe   -S.pneumo, legionella Ag , mycoplasma IgM/G, Fungitell-beta-D glucan negative  -ANCA and URIEL/connective tissue disease panels negative  -anti-histone and anti-Keara Ab's negative  -CT with bilateral ground glass opacities, small consolidations of BLL  -indings suspicious for NSIP (vs cryptogenic organizing pneumonia vs hypersensitivity pneumonitis); NOT thought to be c/w UIP pattern  -s/p empiric steroids (solumedrol then prednisone taper) 10/25/24 - (EOT 1/18/25)  -dyspnea and hypoxia completely resolved until this admission 3/26/25 -- POPE, hypoxia  -steroids as above  -transitioned from IV solumedrol to prednisone 40mg/day on 4/16/25 - slow taper per Dr. Holloway     Hx of Bilateral pulmonary emboli (10/2024)  -dx'ed at time of interstitial pneumonitis  -CT chest 10/25/24 -- acute distal segmental/subsegmental pulmonary emboli in RUL and subsegmental PE in [place]  -unclear etiology; no recent hx of long travel; no family/personal hx of blood clots  -BLE  venous dopplers negative 10/26/24  -started xarelto 20 mg po every day on 11/28/24  -repeat CT chest 12/9/24 neg PE  -stopped Xarelto 12/14/24 due to recurrent GI bleed  -repeat CT chest 3/26/25 and 4/9/25 -- neg PE     Severe aortic stenosis  -progression on serial echocardiograms  -s/p TAVR 1/23/25 (Dr. Reji Green)     Adenocarcinoma of colon  -Bx of transverse colon polyp 12/17/24 -- invasive, moderately differentiated adenocarcinoma  -CEA normal (1.9)  -CT a/p neg for mets  -surgery delayed in anticipation of TAVR (done 1/23/25)  -s/p robotic assisted left hemicolectomy (Dr. Cassidy) on 2/27/25 - path - tumor invades into muscularis; margins neg for tumor; all 19 LN's neg for mets (0/19); pT2, pN0  -next appt with Dr. Cassidy 4/16/25 - to be rescheduled     Hx  GI bleed  -EGD 11/26/24 (Dr. Nuno) -15mm nonbleeding gastric antral ulcer; 3mm gastric antral AVM s/p APC  -recurrence in 12/2024  -colonoscopy and repeat EGD 12/17/24 by Dr. Staton -- grade 1 esophagitis; duod ulcers healing; large flat polyp in transverse colon (carcinoma)   -received total of 3 units PRBCs   -Xarelto stopped in 12/2024  -s/p omeprazole 20mg BID x 8 weeks (EOT 1/22/25), now on omeprazole 20mg daily -- plan has been to continue until she completed her coronary stenting.  Will stop once off steroids.     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   -MRI brain--no acute  intracranial process  -was doing PT at [facility] -- continues to improve; almost back to normal       Hx of Hypertension, primary  -(dyazide stopped due to NANCY)  -(amlodipine held due to low BP in 11/2024)  -BP remains normal off meds     Hx of hip OA  -s/p left THR (Dr. Lo) many years ago  -s/p elective R BEATRIZ on 10/5/23 by Dr. Diaz     Hyperlipidemia   on atorvastatin to 80mg/day as above     Osteopenia   On Fosamax from 2011 to 4/2016.     DEXA stable 5/10/17 and 2019 and 2021  DEXA 8/2024 -- osteoporosis of left forearm  -restarted Fosamax 8/2024 (on hold this admission); restart at discharge     Vitamin D deficiency  On vitamin D 4000u/day     Tx to [facility]       Consultants         Provider   Role Specialty     Martita Baez MD      Consulting Physician PULMONARY DISEASES     Rhys Eli MD      Consulting Physician Interventional, Cardiology                Discharge Plan:   Discharge Condition: Stable    Discharge Medication List as of 4/18/2025  2:09 PM        New Orders    Details   predniSONE 20 MG Oral Tab Take 2 tablets (40 mg total) by mouth daily with breakfast., Normal, Disp-30 tablet, R-0      docusate sodium 100 MG Oral Cap Take 100 mg by mouth 2 (two) times daily., Normal, Disp-60 capsule, R-0      ferrous sulfate 325 (65 FE) MG Oral Tab EC Take 1 tablet (325 mg total) by mouth daily with breakfast., Normal, Disp-30 tablet, R-0      guaiFENesin 100 MG/5 ML Oral Take 10 mL (200 mg total) by mouth every 4 (four) hours as needed., Normal, Disp-10 mL, R-0      ipratropium-albuterol 0.5-2.5 (3) MG/3ML Inhalation Solution Take 3 mL by nebulization every 6 (six) hours as needed., Normal, Disp-3 mL, R-0      pantoprazole 40 MG Oral Tab EC Take 1 tablet (40 mg total) by mouth every morning before breakfast., Normal, Disp-30 tablet, R-0      polyethylene glycol, PEG 3350, 17 g Oral Powd Pack Take 17 g by mouth daily as needed., Normal, Disp-1 packet, R-0      sodium chloride 0.65 %  Nasal Solution 1 spray by Nasal route every 3 (three) hours as needed., Normal, Disp-1 each, R-0      clopidogrel 75 MG Oral Tab Take 1 tablet (75 mg total) by mouth daily., Normal, Disp-30 tablet, R-1           Home Meds - Modified    Details   furosemide 20 MG Oral Tab Take 1 tablet (20 mg total) by mouth daily., Normal, Disp-30 tablet, R-0      atorvastatin 80 MG Oral Tab Take 1 tablet (80 mg total) by mouth at bedtime., Normal, Disp-30 tablet, R-1           Home Meds - Unchanged    Details   aspirin 81 MG Oral Tab EC Take 1 tablet (81 mg total) by mouth daily., Print Script, Disp-30 tablet, R-11                 Discharge Diet: Cardiac diet      Discharge Activity: As tolerated    Follow up:      Follow-up Information       Viktor Bird MD Follow up.    Specialties: Cardiovascular Diseases, CARDIOLOGY  Why: Office will call to schedule follow up  Contact information:  [address]  [address]  [facility] [address]  [phone]               Cody Holloway, DO Follow up in 2 week(s).    Specialty: PULMONARY DISEASES  Contact information:  [address]  [address]  [facility] [address]  [phone]                                 >30 min were spent counseling patient and coordinating care    Malathi Stuart MD  4/28/2025

## 2025-05-08 NOTE — TELEPHONE ENCOUNTER
Vonda states patient needs to be seen right away when she is released from the rehab on May 12th. The schedule is currently booked until August please call

## 2025-05-12 NOTE — TELEPHONE ENCOUNTER
Dr Holloway- could patient be added on this week or next week?     Hospitalized from 3/26-4/18.    Discharge Diagnosis: .Principal Problem:    Acute hypoxic respiratory failure (HCC)  Active Problems:    Pneumonia due to infectious organism    Acute on chronic congestive heart failure, unspecified heart failure type (HCC)    Aortic valve stenosis, etiology of cardiac valve disease unspecified    Coronary artery disease involving native coronary artery of native heart without angina pectoris

## 2025-05-14 NOTE — TELEPHONE ENCOUNTER
Vonda accepted appointment for patient with Dr. Holloway on 5/20/25 9:45 am ([facility]). Appointment info given. Reassured her to follow up with home health regarding POC for patient since they are already working on this and oxygen order was through [facility]. Explained her oxygen needs may be reevaluated during upcoming appointment. She voiced understanding.

## 2025-05-20 NOTE — PROGRESS NOTES
Referring Physician  Malathi Stuart MD    History of Present Illness  Patient is a 84-year-old female with underlying history of ILD who presents today for preoperative pulmonary clearance for upcoming colon resection surgery February 27.  States dyspnea symptoms significantly improved over the last several months.  Completed slowly taper of prednisone with no worsening dyspnea symptoms.  Denies significant cough.  Tolerating some ambulation.    Medications  Medications Ordered Prior to Encounter[1]    Allergies  Allergies[2]    Physical Exam  Constitutional: no acute distress  HEENT: PERRL  Neck: supple, no JVD  Cardio: RRR, S1 S2  Respiratory: Faint basilar crackles  GI: abdomen soft, non tender, active bowel sounds, no organomegaly  Extremities: no clubbing, cyanosis, edema  Neurologic: no gross motor deficits  Skin: warm, dry  Lymphatic: no supraclavicular lymphadenopathy     Assessment  1.  ILD  2.  Prior history of pulmonary embolism  3.  Colon adenocarcinoma  4.  Severe aortic stenosis  5.  Chronic hypoxemic respiratory failure    Plan  -Patient presents today for follow-up visit pulmonary clinic.  Lengthy hospitalization discharged April 2025 after prolonged hospitalization for acute hypoxemic respiratory failure.  CT chest during hospital course with no evidence of pulmonary embolism seen.  Fibrotic changes seen with mild edema.  Currently on slow taper of prednisone therapy will transition from 40 mg to 30 mg daily.  Await CT chest repeat if improving will gradually taper to 30 mg and 20 mg and 10 mg before potentially discontinuing.  - Evaluate need for portable oxygen concentrator.  I have placed order for portable oxygen concentrator for the patient.  Okay to monitor on room air without exertion.      Cody Holloway,   Pulmonary Critical Care Medicine  [facility]           [1]   Current Outpatient Medications on File Prior to Visit   Medication Sig Dispense Refill    potassium chloride 10 MEQ  Oral Tab CR Take 1 tablet (10 mEq total) by mouth 2 (two) times daily.      predniSONE 20 MG Oral Tab Take 2 tablets (40 mg total) by mouth daily with breakfast. 30 tablet 0    furosemide 20 MG Oral Tab Take 1 tablet (20 mg total) by mouth daily. 30 tablet 0    docusate sodium 100 MG Oral Cap Take 100 mg by mouth 2 (two) times daily. 60 capsule 0    ferrous sulfate 325 (65 FE) MG Oral Tab EC Take 1 tablet (325 mg total) by mouth daily with breakfast. 30 tablet 0    guaiFENesin 100 MG/5 ML Oral Take 10 mL (200 mg total) by mouth every 4 (four) hours as needed. (Patient not taking: Reported on 5/20/2025) 10 mL 0    ipratropium-albuterol 0.5-2.5 (3) MG/3ML Inhalation Solution Take 3 mL by nebulization every 6 (six) hours as needed. 3 mL 0    pantoprazole 40 MG Oral Tab EC Take 1 tablet (40 mg total) by mouth every morning before breakfast. 30 tablet 0    polyethylene glycol, PEG 3350, 17 g Oral Powd Pack Take 17 g by mouth daily as needed. 1 packet 0    sodium chloride 0.65 % Nasal Solution 1 spray by Nasal route every 3 (three) hours as needed. 1 each 0    atorvastatin 80 MG Oral Tab Take 1 tablet (80 mg total) by mouth at bedtime. 30 tablet 1    clopidogrel 75 MG Oral Tab Take 1 tablet (75 mg total) by mouth daily. 30 tablet 1    aspirin 81 MG Oral Tab EC Take 1 tablet (81 mg total) by mouth daily. 30 tablet 11     No current facility-administered medications on file prior to visit.   [2] No Known Allergies

## 2025-05-21 NOTE — TELEPHONE ENCOUNTER
Received fax from [facility] requesting clarification on patient supplemental oxygen. Faxed office visit note and copy of oxygen order. Confirmation received

## 2025-05-21 NOTE — TELEPHONE ENCOUNTER
Patient niece states patient needs to be seen within 6 weeks for follow up. The schedule is currently booked until July 15th please call

## 2025-06-05 NOTE — TELEPHONE ENCOUNTER
Hector Vincent is following up on order for oxygen concentrator. She states the oxygen company is waiting on a new order please follow up and Vonda would like an update please call

## 2025-06-06 NOTE — TELEPHONE ENCOUNTER
Called [employer], informed oxygen unit is being delivered today.  Called Vonda Young, per JEFF, verified name and , informed her unit is being delivered today. Niece verified she spoke to [employer] yesterday. Call complete.

## 2025-06-09 NOTE — TELEPHONE ENCOUNTER
----- Message from Cody Holloway sent at 6/6/2025  3:32 PM CDT -----  You may let the patient know that CT chest appears to be similar to previous CT from 4/4/2025.  Evidence of some fibrotic changes seen.  What dose of prednisone is she currently on?  Also how is her   breathing doing overall?  Depending on how she is feeling I will make further recommendations of tapering course of prednisone.   ----- Message -----  From: Fidencio Shepard In  Sent: 6/4/2025   4:36 PM CDT  To: Cody Holloway, DO

## 2025-06-12 NOTE — TELEPHONE ENCOUNTER
Discussed results and recommendations with patient. Patient verbalized understanding  Patient states breathing is improved. Patient states she will have to call her nurse to find out her dose of prednisone. Patient told to call office back with dose.

## 2025-06-13 NOTE — TELEPHONE ENCOUNTER
Bibi nguyen from [facility], states regards Prednisone dosage. Currently takes 10MG 3 tablets, 30MG once a day. Please call.    Disabled

## 2025-06-18 NOTE — TELEPHONE ENCOUNTER
I would switch her to 20 mg daily for 10 days followed by 10 mg daily for 10 days before discontinuing altogether.  Does she need additional prescriptions?

## 2025-06-20 NOTE — TELEPHONE ENCOUNTER
Patient notified and states order needs to go through nurses at [facility]. Left voicemail for Bibi at [facility] to call office back

## 2025-06-24 NOTE — HISTORICAL OFFICE NOTE
CHIEF COMPLAINT    CHIEF COMPLAINT  Reason for Visit/Chief Complaint   f/u   Patient is an 83-year-old female with a history of HTN and aortic stenosis who presents for preoperative clearance for planned right total hip arthroplasty on 10/5/2023. In general she reports feeling well, her primary issue is hip pain when she ambulates and is able to walk about a block before having to stop. She denies any chest pain, shortness of breath, palpitations, edema, dizziness, or syncope. Her last echocardiogram was on 10/2022 which noted moderate aortic stenosis with mean gradient of 30 mmHg. She then had a nuclear stress test a week ago that came back unremarkable.9/23/2024  Overall feels well, no cardiac symptoms. Mobility much improved since hip arthroplasty. No chest pain or shortness of breath.1/15/2025  Patient was hospitalized end of October with pneumonitis, also found to have small pulmonary emboli; discharged on steroids and Xarelto. Came back to the hospital end of November with anemia hemoglobin 6.1, underwent GI workup which revealed nonbleeding gastric ulcer and gastric antral AVM. She had a colonic polyp with a biopsy which came back as adenocarcinoma. Recommendation for surgical resection however felt she was high risk given evidence of severe aortic stenosis. Was then expedited workup for TAVR and had a coronary angiogram yesterday which noted severe ostial LCx and LAD-D disease. Overall doing well, breathing has improved and denies any chest pain. Minimal lower EXTR edema. Continues with physical therapy.Cardiac history:  Severe aortic stenosis  CAD, severe ostial LCx and LAD-D on 1/14/2025 [facility]  Bilateral PE  HTN  HLD     PROBLEMS  Reconcile with Patient's ChartPROBLEMS  Problem Effective Dates Date resolved Problem Status   History of mitral valve insufficiency, [SNOMED-CT: 845653904] 9/22/2023 - Active   Hypercholesterolemia, familial , [SNOMED-CT: 648483945] 9/22/2023 - Active   Hypertension (HTN),  primary, [SNOMED-CT: 63007123] 9/22/2023 - Active     ENCOUNTER    ENCOUNTER  Problem Effective Dates Date resolved Problem Status   Aortic stenosis, moderate, [SNOMED-CT: 953916307] 9/25/2023 - Active   Hypertension (HTN), primary, [SNOMED-CT: 19522602] 9/22/2023 - Active     VITAL SIGNS    VITAL SIGNS  Date / Time: 1/15/2025   BP Systolic 130 mmHg   BP Diastolic 66 mmHg   Height 69 inches   Weight 179 lbs   Pulse Rate 78 bpm   BSA (Body Surface Area) 2 cc/m2   BMI (Body Mass Index) 26.4 cc/m2   Blood Pressure 130 / 66 mmHg     PHYSICAL EXAMINATION    PHYSICAL EXAMINATION  Header Details   Constitutional 98%o2sat   Vitals Left Arm Sitting  / 66 mmHg, Pulse rate 78 bpm, Height in 5' 9\", BMI: 26.4, Weight in 178.57 lbs (or) 81 kgs, BSA : 2 cc/m²   General Appearance No Acute Distress, Normal Body habitus   Cardiovascular      ALLERGIES, ADVERSE REACTIONS, ALERTS    No data available    MEDICATIONS ADMINISTERED DURING VISIT    No data available    MEDICATIONS    MEDICATIONS  Medication Start Date Route/Frequency Status   alendronate 70 MG Oral Tab, [RxNorm: 867634] 1/15/2025 Take 1 tablet (70 mg total) by mouth every 7 days. Active   atorvastatin 20 MG Oral Tab, [RxNorm: 171889] 1/15/2025 Take 1 tablet (20 mg total) by mouth at bedtime. Active   Calcium 600 + D(3) 600 mg-10 mcg (400 unit) tablet, [RxNorm: 178699] 9/25/2023 Take 1 tablet orally once a day. Active   ferrous sulfate 325 (65 FE) MG Oral Tab EC, [RxNorm: 601959] 1/15/2025 Take 1 tablet (325 mg total) by mouth daily. Active   furosemide (LASIX) 20 MG Oral Tab, [RxNorm: 507388] 1/15/2025 Take 1 tablet (20 mg total) by mouth daily. Take 40mg daily x 3 days (12/19-12/21/24), then 20mg daily thereafter Active   Multiple Vitamins-Minerals (MULTI-VITAMIN/MINERALS) Oral Tab, [RxNorm: 0] 1/15/2025 Take 1 tablet by mouth daily. Active   omeprazole 20 MG Oral Capsule Delayed Release, [RxNorm: 052470] 1/15/2025 Take 1 capsule (20 mg total) by mouth 2 (two) times  daily. Active   polyethylene glycol, PEG 3350, 17 g Oral Powd Pack, [RxNorm: 025871] 1/15/2025 Take 17 g by mouth daily. Active   predniSONE 10 MG Oral Tab, [RxNorm: 156471] 1/15/2025 Take 3 tablets (30 mg total) by mouth daily with breakfast for 9 days, THEN 2 tablets (20 mg total) daily with breakfast for 10 days, THEN 1 tablet (10 mg total) daily with breakfast for 10 days. Active   PreserVision AREDS-2 250 mg-90 mg-40 mg-1 mg capsule, [RxNorm: 0] 9/25/2023 Take 1 capsule orally once a day. Active   sulfamethoxazole-trimethoprim -160 MG Oral Tab per tablet, [RxNorm: 797972] 1/15/2025 Take 1 tablet by mouth 3 (three) times a week. Monday, Wednesday, Friday Active   Vitamin B-12 500 mcg tablet, [RxNorm: 947039] 9/22/2023 Take 1 tablet orally once a day. Active   Vitamin D3 50 mcg (2,000 unit) tablet, [RxNorm: 413394] 9/25/2023 Take 1 tablet orally once a day. Active     ASSESSMENT    Severe aortic stenosis  - Currently having expedited workup given need for colonic resection for recently diagnosed adenocarcinoma end of November  - Patient is already established in the valve clinic, tentative scheduled for TAVR early FebruaryCAD, severe ostial LCx and LAD-D on 1/14/2025  -No chest pain, shortness of breath improving  - Given need for colon resection defer intervention to avoid DAPT, can schedule for PCI after surgery  - Continue atorvastatin 40 mg dailyBilateral PE  - Noted small subsegmental bilateral pulmonary emboli on chest CT 10/2025  - No longer on anticoagulation given GI bleed secondary to colon adenocarcinomaHLD  - , triglycerides 78 on 7/16/2024  - Continue atorvastatin 40 mg daily  - Will need repeat lipid panel in future now that she is taking atorvastatinPlan  Follow-up with me in 2 months or sooner as needed     FAMILY HISTORY    FAMILY HISTORY  Relationship Age Diagnosis   Mother 0 Stroke; Hypertension   Brother 0 Heart problem; HHD (hypertensive heart disease)   She had 2 brothers had  bypass surgery in the 50s. Mother  of congestive heart failure in her 50s.     GENERAL STATUS    No data available    PAST MEDICAL HISTORY    PAST MEDICAL HISTORY  Problem Date diagonsed Date resolved Status   History of mitral valve insufficiency, [SNOMED-CT: 662783048] 2023 - Active   Hypercholesterolemia, familial , [SNOMED-CT: 578369859] 2023 - Active   Hypertension (HTN), primary, [SNOMED-CT: 68482725] 2023 - Active     HISTORY OF PRESENT ILLNESS    Patient is an 83-year-old female with a history of HTN and aortic stenosis who presents for preoperative clearance for planned right total hip arthroplasty on 10/5/2023. In general she reports feeling well, her primary issue is hip pain when she ambulates and is able to walk about a block before having to stop. She denies any chest pain, shortness of breath, palpitations, edema, dizziness, or syncope. Her last echocardiogram was on 10/2022 which noted moderate aortic stenosis with mean gradient of 30 mmHg. She then had a nuclear stress test a week ago that came back unremarkable.2024  Overall feels well, no cardiac symptoms. Mobility much improved since hip arthroplasty. No chest pain or shortness of breath.1/15/2025  Patient was hospitalized end of October with pneumonitis, also found to have small pulmonary emboli; discharged on steroids and Xarelto. Came back to the hospital end of November with anemia hemoglobin 6.1, underwent GI workup which revealed nonbleeding gastric ulcer and gastric antral AVM. She had a colonic polyp with a biopsy which came back as adenocarcinoma. Recommendation for surgical resection however felt she was high risk given evidence of severe aortic stenosis. Was then expedited workup for TAVR and had a coronary angiogram yesterday which noted severe ostial LCx and LAD-D disease. Overall doing well, breathing has improved and denies any chest pain. Minimal lower EXTR edema. Continues with physical therapy.Cardiac  history:  Severe aortic stenosis  CAD, severe ostial LCx and LAD-D on 1/14/2025 [facility]  Bilateral PE  HTN  HLD     IMMUNIZATIONS    No data available    PLAN OF CARE    PLAN OF CARE  Planned Care Date   Referral Visit - Malathi Deysiish ([email or web address]) : 1/1/1900     PROCEDURES    No data available    RESULTS    RESULTS  Name Result Date Location - Ordered By   GLUCOSE [LOINC: 2339-0] 92 mg/dL 01/02/2025 09:31:00 AM [facility] LAB ([facility])  Address: [address] [place] [address]  [address]  tel:   SODIUM [LOINC: 2951-2] 139 mmol/L 01/02/2025 09:31:00 AM [facility] ([facility])  Address: [address] [place] [address]  [address]  tel:   POTASSIUM [LOINC: 2823-3] 4.2 mmol/L 01/02/2025 09:31:00 AM [facility] ([facility])  Address: [address] [place] [address]  [address]  tel:   CHLORIDE [LOINC: 2075-0] 107 mmol/L 01/02/2025 09:31:00 AM [facility] ([facility])  Address: [address] [place] [address]  [address]  tel:   CO2 [LOINC: 2028-9] 27.0 mmol/L 01/02/2025 09:31:00 AM [facility] ([facility])  Address: [address] [place] [address]  [address]  tel:   ANION GAP [LOINC: 33037-3] 5 mmol/L 01/02/2025 09:31:00 AM [facility] ([facility])  Address: [address] [place] [address]  [address]  tel:   BUN [LOINC: 6299-2] 23 mg/dL 01/02/2025 09:31:00 AM [facility] ([facility])  Address: [address] [place] [address]  [address]  tel:   CREATININE [LOINC: 63389-2] 0.98 mg/dL 01/02/2025 09:31:00 AM [facility] ([facility])  Address: [address] [place] [address]  tel:   CALCIUM [LOINC: 67542-8] 9.1 mg/dL 01/02/2025 09:31:00 AM [facility] ([facility])  Address: [address] [place] [address]  tel:   OSMOLALITY CALCULATED [LOINC: 14003-3] 291 mOsm/kg 01/02/2025 09:31:00 AM [facility] ([facility])  Address: [address] [place] [address]  [address]  tel:   E GFR CR [LOINC: 09285-4] 57 mL/min/1.73m2 [Low] 01/02/2025 09:31:00 AM [facility] LAB ([facility])  Address: [address] [place] [address]  [address]  tel:   FASTING PATIENT BMP ANSWER [LOINC: 95668-7] No 01/02/2025 09:31:00 AM [facility] LAB ([facility])  Address: [address] [place] [address]  [address]  tel:   WBC [LOINC: 6690-2] 11.4 x10(3) uL [High] 01/02/2025 09:31:00 AM [facility] ([facility])  Address: [address] [place] [address]  tel:   RED BLOOD COUNT [LOINC: 789-8] 3.92 x10(6)uL 01/02/2025 09:31:00 AM [facility] ([facility])  Address: [address] [place] [address]  [address]  tel:   HGB [LOINC: 718-7] 10.6 g/dL [Low] 01/02/2025 09:31:00 AM [facility] ([facility])  Address: [address] [place] [address]  [address]  tel:   HCT [LOINC: 4544-3] 34.8 % [Low] 01/02/2025 09:31:00 AM [facility] ([facility])  Address: [address] [place] [address]  [address]  tel:   PLATELETS [LOINC: 777-3] 490.0 10(3)uL [High] 01/02/2025 09:31:00 AM [facility] ([facility])  Address: [address] [place] [address]  [address]  tel:   MEAN CELL VOLUME [LOINC: 787-2] 88.8 fL 01/02/2025 09:31:00 AM [facility] ([facility])  Address: [address] [place] [address]  [address]  tel:   MEAN CORPUSCULAR HEMOGLOBIN [LOINC: 785-6] 27.0 pg 01/02/2025 09:31:00 AM [facility] ([facility])  Address: [address] [place] [address]  [address]  tel:   MEAN CORPUSCULAR HGB CONC [LOINC: 786-4] 30.5 g/dL [Low] 01/02/2025 09:31:00 AM [facility] ([facility])  Address: [address] [place] [address]  [address]  tel:   RED CELL DISTRIBUTION WIDTH CV [LOINC: 788-0] 17.7 % 01/02/2025 09:31:00 AM [facility]  LAB ([facility])  Address: [address] [place] [address]  [address]  tel:   Trans Thoracic Echocardiogram 1.The left ventricle is normal in size. Left ventricular wall thickness is mildly increased. Global left ventricular systolic function is normal. The left ventricular ejection fraction is 65%. No regional wall motion abnormalities. The left ventricle diastolic function is impaired (Grade II) with an elevated left atrial pressure.2.Moderately to severely calcified and restricted trileaflet aortic valve. Severe aortic stenosis. Peak velocity 4.42 m/s. Mean gradient 47 mm Hg. KIKE (VTI) 0.91 cm². DI 0.29. Trivial aortic regurgitation.3.Moderate mitral annular calcification. Mild to moderate mitral regurgitation.4.The left atrium is moderately to severely dilated.5.Mild to moderate tricuspid regurgitation.6.The pulmonary artery systolic pressure is mildly increased, estimated at 45 mm Hg. 10/18/2024 12:30:00 PM Viktor Bird MD     REVIEW OF SYSTEMS    REVIEW OF SYSTEMS  Header Details   Cardiovascular No history of Chest pain, Palpitations, Edema   Respiratory No history of SOB   Neurological No history of Numbness, Limb weakness     SOCIAL HISTORY    SOCIAL HISTORY  Social History Element Description Effective Dates   Smoking status Former smoker -     FUNCTIONAL STATUS    No data available    MEDICAL EQUIPMENT    No data available    Goals Sections    No data available    REASON FOR REFERRAL             Health Concerns Section    No data available    COGNITIVE/MENTAL STATUS    No data available    Patient Demographics    Patient Demographics  Patient Address Patient Name Communication   [address] [place] [address] Hoda Busby [phone] (Mobile)     Patient Demographics  Language Race / Ethnicity Marital Status   English - Spoken (Preferred) White / Not  or       Document Information    Primary Care Provider Other Service Providers Document Coverage Dates    Viktor Bird  NPI: [id number]  [phone] (Work)  [address], [address]  [place] [address]  [place] [address]  [employer]ting Phys[employer]  [facility]  [phone] (Work)  [address] [place] [address] Apryllady MARTINO Wilkins  NPI: [id number]  [phone] (Work)  [address], [address]  [place] [address]  [place] [address]  Nurses     Amena Guzmanereca  NPI: [id number]  [phone] (Work)  [address], [address]  [place] [address]  [place] [address]  Nurses Elmer. 15, 2025January 15, 2025      Organization   [facility]  [phone] (Work)  [address], [address]  [place] [address]  [place] [address]     Encounter Providers Encounter Date    Elmer. 15, 2025January 15, 2025     Legal Authenticator    Viktor Bird  NPI: [id number]  [phone] (Work)  [address], [address]  [place] [address]  [place] [address]       No

## 2025-06-24 NOTE — TELEPHONE ENCOUNTER
Dr Holloway- spoke with Bibi GUADARRAMA from [facility], pended order for sign off to [facility]. States patient does not need any additional prescriptions at this time. She requesting additional orders/prescriptions for their staff to be notified and not patient. Phone number for [facility] is [phone]. Fax [phone].

## 2025-07-01 NOTE — PROGRESS NOTES
Referring Physician  Malathi Stuart MD    History of Present Illness  Patient is a 84-year-old female with underlying history of ILD who presents today follow-up visit to pulmonary clinic.  Some ongoing mild dyspnea with exertion.  Denies worsening dyspnea symptoms since last visit.  Using 3 L nasal cannula oxygen.  Currently on tapering course of prednisone currently 20 mg.    Medications  Medications Ordered Prior to Encounter[1]    Allergies  Allergies[2]    Physical Exam  Constitutional: no acute distress  HEENT: PERRL  Neck: supple, no JVD  Cardio: RRR, S1 S2  Respiratory: Faint basilar crackles  GI: abdomen soft, non tender, active bowel sounds, no organomegaly  Extremities: no clubbing, cyanosis, edema  Neurologic: no gross motor deficits  Skin: warm, dry  Lymphatic: no supraclavicular lymphadenopathy     Assessment  1.  ILD  2.  Prior history of pulmonary embolism  3.  Colon adenocarcinoma  4.  Severe aortic stenosis  5.  Chronic hypoxemic respiratory failure    Plan  -Patient presents today for follow-up visit pulmonary clinic.  Lengthy hospitalization discharged April 2025 after prolonged hospitalization for acute hypoxemic respiratory failure.  CT chest during hospital course with no evidence of pulmonary embolism seen.  Fibrotic changes seen with mild edema.  Currently on slow taper of prednisone therapy currently on 20 mg will transition to 10 mg after completion of 10 days and complete course after 10 days of 10 mg.  I reviewed most recent CT chest with fibrotic changes with no significant progression seen.  Follow-up pulmonary function testing ordered for August 2025.  - Okay to monitor off oxygen at rest as long as saturation above 88%.  Overnight pulse oximetry study ordered.  Follow-up in pulmonary clinic in 2 months time      Cody Holloway DO  Pulmonary Critical Care Medicine  [facility]           [1]   Current Outpatient Medications on File Prior to Visit   Medication Sig Dispense  Refill    predniSONE 10 MG Oral Tab Take 2 tabs (20 mg) daily for 10 days, then take 1 tabs (10 mg) daily for 10 days, then discontinue altogether. 30 tablet 0    potassium chloride 10 MEQ Oral Tab CR Take 1 tablet (10 mEq total) by mouth 2 (two) times daily.      predniSONE 10 MG Oral Tab Take 3 tablets 20 mg daily 60 tablet 0    predniSONE 20 MG Oral Tab Take 2 tablets (40 mg total) by mouth daily with breakfast. 30 tablet 0    furosemide 20 MG Oral Tab Take 1 tablet (20 mg total) by mouth daily. 30 tablet 0    docusate sodium 100 MG Oral Cap Take 100 mg by mouth 2 (two) times daily. 60 capsule 0    ferrous sulfate 325 (65 FE) MG Oral Tab EC Take 1 tablet (325 mg total) by mouth daily with breakfast. 30 tablet 0    ipratropium-albuterol 0.5-2.5 (3) MG/3ML Inhalation Solution Take 3 mL by nebulization every 6 (six) hours as needed. 3 mL 0    pantoprazole 40 MG Oral Tab EC Take 1 tablet (40 mg total) by mouth every morning before breakfast. 30 tablet 0    polyethylene glycol, PEG 3350, 17 g Oral Powd Pack Take 17 g by mouth daily as needed. 1 packet 0    sodium chloride 0.65 % Nasal Solution 1 spray by Nasal route every 3 (three) hours as needed. 1 each 0    atorvastatin 80 MG Oral Tab Take 1 tablet (80 mg total) by mouth at bedtime. 30 tablet 1    clopidogrel 75 MG Oral Tab Take 1 tablet (75 mg total) by mouth daily. 30 tablet 1    aspirin 81 MG Oral Tab EC Take 1 tablet (81 mg total) by mouth daily. 30 tablet 11    guaiFENesin 100 MG/5 ML Oral Take 10 mL (200 mg total) by mouth every 4 (four) hours as needed. (Patient not taking: Reported on 7/1/2025) 10 mL 0     No current facility-administered medications on file prior to visit.   [2] No Known Allergies

## 2025-07-01 NOTE — PROGRESS NOTES
Nocturnal oxygen order, demographic face sheet, and chart notes faxed to kenyetta at #[phone] with confirmation received.

## 2025-07-01 NOTE — TELEPHONE ENCOUNTER
Attempted to call patient. Left message to call office. In regards to scheduling follow up appointment.

## 2025-07-01 NOTE — TELEPHONE ENCOUNTER
Pt saw dr rowe today. Needs to return for a follow up in 2 months. Next available was 09/25. Please contact pt for sooner appt

## 2025-07-09 NOTE — TELEPHONE ENCOUNTER
Patient requests RN call her niece, Vonda, (p. #[phone]) to schedule appointment because her niece will bring her to the appointment.    Last office visit 7/1/25.    Vonda accepted appointment for patient with Dr. Holloway on 8/28 11:30 am ([facility]). Appointment info given. She voiced understanding.

## 2025-07-16 NOTE — ED INITIAL ASSESSMENT (HPI)
Pt to the ed for complaints of shortness of breath that started this morning  Hx of copd, normally on 4L nc  Denies fever or significant cough

## 2025-07-16 NOTE — H&P
[facility]    PATIENT'S NAME: ANUPAMA CARNEY   ATTENDING PHYSICIAN: Viktor Lennon DO   PATIENT ACCOUNT#:   [id number]    LOCATION:  [address]  MEDICAL RECORD #:   [id number]       YOB: 1940  ADMISSION DATE:       07/16/2025    HISTORY AND PHYSICAL EXAMINATION    CHIEF COMPLAINT:  Acute hypoxic respiratory failure.    HISTORY OF PRESENT ILLNESS:  Patient is an 85-year-old  female with underlying interstitial lung disease and heart failure with preserved ejection fraction, chronically on 4L oxygen at home.  Patient was brought in today for progressive shortness of breath for the last 2 to 3 days.  Paramedics report mid-80s pulse ox on room air.  She was brought in the emergency room on nonrebreather saturating 95%.  Pulse was 120, sinus tachycardia with frequent premature atrial contraction.  CBC showed white blood cell count of 17.4 with left shift.  Patient had chronic leukocytosis.  She is on long-term prednisone treatment.  ProBNP 2200.  Chemistry and liver function tests were unremarkable.  GFR is 57 which is slightly below her baseline.  Chest x-ray showed mild cardiomegaly with prominent central vascular component, bilateral diffuse interstitial prominence, superimposed multifocal alveolitis in the right perihilar and basilar region with slight progression.  Patient was given Solu-Medrol, Lasix, and nebulizer treatment, and she will be admitted to the hospital for further management.    PAST MEDICAL HISTORY:  Interstitial lung disease with interstitial pneumonitis on long-term tapering dose steroids, currently on 10 mg prednisone; chronic leukocytosis; coronary artery disease status post LAD-PCI intervention, currently on dual antiplatelet therapy; anemia of chronic disease; pulmonary embolism.  She had developed gastrointestinal bleed secondary to peptic ulcer disease; generalized osteoarthritis; osteoporosis; hypertension; hyperlipidemia; heart failure with  preserved ejection fraction with moderate pulmonary artery hypertension.    PAST SURGICAL HISTORY:  Aortic stenosis status post TAVR procedure, left hemicolectomy for colon cancer, bilateral cataract procedure, left total hip arthroplasty.    MEDICATIONS:  Please see medication reconciliation list.    ALLERGIES:  No known drug allergies.    FAMILY HISTORY:  Mother had cerebrovascular accident, diabetes mellitus type 2, and hypertension.  Father had bone cancer.    SOCIAL HISTORY:  Ex-tobacco user.  No current tobacco, alcohol, or drug use.  Currently resides in a nursing facility.  Home oxygen dependent.  Usually independent for basic activities of daily living.    REVIEW OF SYSTEMS:  Progressive fatigue and dyspnea on exertion, increased leg edema.  She does have chronic dry cough.  No fever or chills.  No sick contacts.  Patient said she eats whatever is provided at the nursing facility, and she tried to comply with low salt diet, but she had limited food option over there.      PHYSICAL EXAMINATION:    GENERAL:  Alert and oriented to time, place and person.  Moderate distress.  VITAL SIGNS:  Temperature 98.2.  Pulse 121.  Respiratory rate 22.  Blood pressure 152/91.  Pulse ox 97% on 6L nasal cannula oxygen, reportedly mid-80s on room air.  HEENT:  Atraumatic.  Oropharynx clear.  Dry mucous membranes.  Ears and nose normal.  Eyes:  Anicteric sclerae.  NECK:  Supple.  No lymphadenopathy.  Trachea midline.  Positive jugular venous distention.   LUNGS:  Faint crackles auscultated on both lung fields.  HEART:  Regular rate and rhythm.  S1 and S2 auscultated.  Tachycardic.  Sinus tachycardia with frequent PACs on monitor and EKG.  ABDOMEN:  Soft, nondistended.  No tenderness.  Positive bowel sounds.  EXTREMITIES:  +1 to 2 edema, both legs.  No clubbing or cyanosis.  NEUROLOGIC:  Motor and sensory intact.    ASSESSMENT:    1.   Acute on chronic hypoxic respiratory failure.  2.   Acute on chronic heart failure with  preserved ejection fraction.  3.   Interstitial lung disease, exacerbation component.  4.   Essential hypertension.  5.   Coronary artery disease.  6.   Anemia of chronic disease.  7.   Leukocytosis.  No clear evidence of infection.    PLAN:  Patient will be admitted to telemetry floor.  Obtain 2D echocardiogram with Doppler.  IV Lasix.  IV Solu-Medrol.  Nebulizer treatment with albuterol as needed.  Obtain Pulmonary and Cardiology consult.  Continue oxygen support.  DVT prophylaxis.  Monitor respiratory status.  Further recommendations to follow.    Dictated By Jonathon Mccormick MD  d: 07/16/2025 11:21:21  t: 07/16/2025 12:43:05  Job [id number]  FB/    cc: Viktor Lennon DO

## 2025-07-16 NOTE — DIETARY NOTE
NUTRITION EDUCATION NOTE     Received consult for \"heart failure diet education.\" Verbally reviewed basic low sodium diet restrictions. Provided with Low Sodium Nutrition Therapy handout to reinforce. Receptive to instruction. May benefit from outpt f/u. Noted pt seen by RD upon previous admit in April 2025 for heart health diet education. Expect good compliance.      Nena Guevara, MS, RD, LDN  Clinical Dietitian  [id number]

## 2025-07-16 NOTE — HISTORICAL OFFICE NOTE
CHIEF COMPLAINT    CHIEF COMPLAINT  Reason for Visit/Chief Complaint   f/u visit   Patient is an 83-year-old female with a history of HTN and aortic stenosis who presents for preoperative clearance for planned right total hip arthroplasty on 10/5/2023. In general she reports feeling well, her primary issue is hip pain when she ambulates and is able to walk about a block before having to stop. She denies any chest pain, shortness of breath, palpitations, edema, dizziness, or syncope. Her last echocardiogram was on 10/2022 which noted moderate aortic stenosis with mean gradient of 30 mmHg. She then had a nuclear stress test a week ago that came back unremarkable.9/23/2024  Overall feels well, no cardiac symptoms. Mobility much improved since hip arthroplasty. No chest pain or shortness of breath.1/15/2025  Patient was hospitalized end of October with pneumonitis, also found to have small pulmonary emboli; discharged on steroids and Xarelto. Came back to the hospital end of November with anemia hemoglobin 6.1, underwent GI workup which revealed nonbleeding gastric ulcer and gastric antral AVM. She had a colonic polyp with a biopsy which came back as adenocarcinoma. Recommendation for surgical resection however felt she was high risk given evidence of severe aortic stenosis. Was then expedited workup for TAVR and had a coronary angiogram yesterday which noted severe ostial LCx and LAD-D disease. Overall doing well, breathing has improved and denies any chest pain. Minimal lower extremity edema. Continues with physical therapy.3/19/2025  Patient successfully underwent TAVR on 1/23/2025, then had colon resection on 2/27/2025. Found to be stage I colon cancer. Had been doing well however over the last week shortness of breath has worsened, desaturate into the 70s during ambulating here 2 hours today. She denies any change in cough or lower extremity edema. No chest pain, orthopnea, weight gain.6/25/2025  Patient had  prolonged hospitalization in March and April for acute hypoxic respiratory failure likely secondary to combination of pneumonia, interstitial pneumonitis/lung disease, as well as HFpEF exacerbation. She was diuresed with IV Lasix, treated with IV antibiotics, as well as steroids which she is still tapering currently on 20 mg daily of prednisone. Now in assisted living facility on 5 L oxygen. Blood pressures been normal at home. No shortness of breath subjectively, chest pain, dizziness, worsening. 1+ lower extremity edema bilaterally. States edema especially improving.    Cardiac history:  Severe aortic stenosis s/p TAVR with 23 mm S3 PUNEET valve 1/23/2025  CAD, s/p PCI mid LAD on 4/7/2025  Bilateral PE on 10/2024  Interstitial lung disease  HFpEF  HTN  HLD     PROBLEMS  Reconcile with Patient's ChartPROBLEMS  Problem Effective Dates Date resolved Problem Status   SOB (shortness of breath), [SNOMED-CT: 135243174] 3/19/2025 - Active   Pre-operative clearance, [SNOMED-CT: 11591075] 2/4/2025 - Active   History of mitral valve insufficiency, [SNOMED-CT: 675911397] 9/22/2023 - Active   Hypercholesterolemia, familial , [SNOMED-CT: 897929059] 9/22/2023 - Active   Hypertension (HTN), primary, [SNOMED-CT: 77147553] 9/22/2023 - Active     ENCOUNTER    ENCOUNTER  Problem Effective Dates Date resolved Problem Status   Aortic stenosis, moderate, [SNOMED-CT: 441964134] 9/25/2023 - Active   Hypertension (HTN), primary, [SNOMED-CT: 83234348] 9/22/2023 - Active     VITAL SIGNS    VITAL SIGNS  Date / Time: 6/25/2025   BP Systolic 144 mmHg   BP Diastolic 74 mmHg   Height 68 inches   Weight 172 lbs   Pulse Rate 76 bpm   BSA (Body Surface Area) 2 cc/m2   BMI (Body Mass Index) 26.1 cc/m2   Blood Pressure 144 / 74 mmHg     PHYSICAL EXAMINATION    PHYSICAL EXAMINATION  Header Details   Constitutional 98% O2   Vitals Right Arm Sitting  / 74 mmHg, Pulse rate 76 bpm, Height in 5' 8\", BMI: 26.1, Weight in 171.96 lbs (or) 78 kgs, BSA :  1.95 cc/m²   General Appearance No Acute Distress, Normal Body habitus   Cardiovascular      ALLERGIES, ADVERSE REACTIONS, ALERTS    No data available    MEDICATIONS ADMINISTERED DURING VISIT    No data available    MEDICATIONS  Reconcile with Patient's ChartMEDICATIONS  Medication Start Date Route/Frequency Status   aspirin 81 mg tablet,delayed release, [RxNorm: 586252] 2/4/2025 Take 1 tablet orally once a day. Active   atorvastatin 20 MG Oral Tab, [RxNorm: 303799] 1/15/2025 Take 1 tablet (20 mg total) by mouth at bedtime. Active   Calcium 600 + D(3) 600 mg-10 mcg (400 unit) tablet, [RxNorm: 535302] 9/25/2023 Take 1 tablet orally once a day. Active   Deep Sea Nasal 0.65 % spray aerosol, [RxNorm: 309675] 6/25/2025 spray (intranasal) every 3 hours a day as needed Active   docusate sodium 100 mg capsule, [RxNorm: 6762900] 6/25/2025 Take 1 capsule orally once a day. Active   FeroSuL 325 mg (65 mg iron) tablet, [RxNorm: 204011] 6/25/2025 Take 1 tablet orally once a day. Active   furosemide 20 mg tablet, [RxNorm: 962380] 2/4/2025 Take 1 tablet orally once a day. Active   Almaz-Tussin 100 mg/5 mL oral liquid, [RxNorm: 476278] 6/25/2025 (oral) every 4-6 hours as needed. Active   ipratropium 0.5 mg-albuteroL 3 mg (2.5 mg base)/3 mL nebulization soln, [RxNorm: 4307125] 6/25/2025 Take 3 mL (inhalation) every 6 hours as needed. Active   Multiple Vitamins-Minerals (MULTI-VITAMIN/MINERALS) Oral Tab, [RxNorm: 0] 1/15/2025 Take 1 tablet by mouth daily. Active   pantoprazole 40 mg tablet,delayed release, [RxNorm: 346394] 6/25/2025 Take 1 tablet orally once a day. Active   Plavix 75 mg tablet, [RxNorm: 326773] 6/25/2025 Take 1 tablet orally once a day. Active   polyethylene glycoL 3350 17 gram/dose oral powder, [RxNorm: 977743] 6/25/2025 Take 17 gm (oral) once a day. Active   potassium chloride ER 10 mEq capsule,extended release, [RxNorm: 350028] 6/25/2025 Take 1 capsule orally once a day. Active   predniSONE 20 mg tablet, [RxNorm:  235560] 2025 Take 1 tablet orally once a day. Active   PreserVision AREDS-2 250 mg-90 mg-40 mg-1 mg capsule, [RxNorm: 0] 2023 Take 1 capsule orally once a day. Active     ASSESSMENT    Severe aortic stenosis s/p TAVR with 23 mm S3 PUNEET valve 2025  - Post TAVR TTE unremarkable, continue TAVR protocol for echo monitoring  - Continue aspirin 81 mg dailyCAD, s/p PCI mid LAD on 2025  - No chest pain, stable shortness of breath  - Continue aspirin 81 mg daily, Plavix and 5 mg daily, atorvastatin 80 mg dailyHFpEF  - NYHA class II-III symptoms, 1+ lower extremity edema  - Reports slowly improving edema  - Continue Lasix 20 mg once dailyHLD  - , triglycerides 78 on 2024  - Continue atorvastatin 80 mg daily  - Check lipid panel and LP(a) todayPlan  BMP, CBC, lipid panel, LP(a)  Follow-up in 3 months or sooner as needed     FAMILY HISTORY    FAMILY HISTORY  Relationship Age Diagnosis   Mother 0 Stroke; Hypertension   Brother 0 HHD (hypertensive heart disease); Heart problem   She had 2 brothers had bypass surgery in the 50s. Mother  of congestive heart failure in her 50s.     GENERAL STATUS    No data available    PAST MEDICAL HISTORY    PAST MEDICAL HISTORY  Problem Date diagonsed Date resolved Status   SOB (shortness of breath), [SNOMED-CT: 650008369] 3/19/2025 - Active   Pre-operative clearance, [SNOMED-CT: 22314752] 2025 - Active   History of mitral valve insufficiency, [SNOMED-CT: 501151904] 2023 - Active   Hypercholesterolemia, familial , [SNOMED-CT: 576118867] 2023 - Active   Hypertension (HTN), primary, [SNOMED-CT: 05953291] 2023 - Active     HISTORY OF PRESENT ILLNESS    Patient is an 83-year-old female with a history of HTN and aortic stenosis who presents for preoperative clearance for planned right total hip arthroplasty on 10/5/2023. In general she reports feeling well, her primary issue is hip pain when she ambulates and is able to walk about a block before  having to stop. She denies any chest pain, shortness of breath, palpitations, edema, dizziness, or syncope. Her last echocardiogram was on 10/2022 which noted moderate aortic stenosis with mean gradient of 30 mmHg. She then had a nuclear stress test a week ago that came back unremarkable.9/23/2024  Overall feels well, no cardiac symptoms. Mobility much improved since hip arthroplasty. No chest pain or shortness of breath.1/15/2025  Patient was hospitalized end of October with pneumonitis, also found to have small pulmonary emboli; discharged on steroids and Xarelto. Came back to the hospital end of November with anemia hemoglobin 6.1, underwent GI workup which revealed nonbleeding gastric ulcer and gastric antral AVM. She had a colonic polyp with a biopsy which came back as adenocarcinoma. Recommendation for surgical resection however felt she was high risk given evidence of severe aortic stenosis. Was then expedited workup for TAVR and had a coronary angiogram yesterday which noted severe ostial LCx and LAD-D disease. Overall doing well, breathing has improved and denies any chest pain. Minimal lower extremity edema. Continues with physical therapy.3/19/2025  Patient successfully underwent TAVR on 1/23/2025, then had colon resection on 2/27/2025. Found to be stage I colon cancer. Had been doing well however over the last week shortness of breath has worsened, desaturate into the 70s during ambulating here 2 hours today. She denies any change in cough or lower extremity edema. No chest pain, orthopnea, weight gain.6/25/2025  Patient had prolonged hospitalization in March and April for acute hypoxic respiratory failure likely secondary to combination of pneumonia, interstitial pneumonitis/lung disease, as well as HFpEF exacerbation. She was diuresed with IV Lasix, treated with IV antibiotics, as well as steroids which she is still tapering currently on 20 mg daily of prednisone. Now in assisted living facility on 5 L  oxygen. Blood pressures been normal at home. No shortness of breath subjectively, chest pain, dizziness, worsening. 1+ lower extremity edema bilaterally. States edema especially improving.    Cardiac history:  Severe aortic stenosis s/p TAVR with 23 mm S3 PUNEET valve 1/23/2025  CAD, s/p PCI mid LAD on 4/7/2025  Bilateral PE on 10/2024  Interstitial lung disease  HFpEF  HTN  HLD     IMMUNIZATIONS  Reconcile with Patient's ChartNo data available    PLAN OF CARE    PLAN OF CARE  Planned Care Date   Referral Visit - Malathi Stuart [email or web address]) : 1/1/1900     PROCEDURES    No data available    RESULTS    RESULTS  Name Result Date Location - Ordered By   ABO BLOOD TYPE [LOINC: 77887126] A 01/20/2025 10:55:00 AM [facility] BLOOD BANK LAB ([facility])  Address: [address] [place] [address]  tel:   RH BLOOD TYPE [LOINC: 94830551] Positive 01/20/2025 10:55:00 AM [facility] BLOOD BANK LAB ([facility])  Address: [address] [place] [address]  tel:   ANTIBODY SCREEN [LOINC: 0533616] Negative 01/20/2025 10:55:00 AM [facility] BLOOD BANK LAB ([facility])  Address: [address] [place] [address]  tel:   GLUCOSE [LOINC: 2339-0] 121 mg/dL [High] 01/20/2025 10:55:00 AM [facility] LAB ([facility])  Address: [address]  [facility]  [address]  tel:   SODIUM [LOINC: 2951-2] 141 mmol/L 01/20/2025 10:55:00 AM [facility] LAB ([facility])  Address: [address]  [facility]  [address]  tel:   POTASSIUM [LOINC: 2823-3] 3.7 mmol/L 01/20/2025 10:55:00 AM [facility] LAB ([facility])  Address: [address]  [facility]  [address]  tel:   CHLORIDE [LOINC: 2075-0] 109 mmol/L 01/20/2025 10:55:00 AM [facility] LAB ([facility])  Address: [address] [facility]  [address]  tel:   CO2 [LOINC: 2028-9] 25.0 mmol/L 01/20/2025 10:55:00 AM  [facility] LAB ([facility])  Address: [address]  [facility]  [address]  tel:   ANION GAP [LOINC: 33037-3] 7 mmol/L 01/20/2025 10:55:00 AM [facility] LAB ([facility])  Address: [address]  [facility]  [address]  tel:   BUN [LOINC: 6299-2] 21 mg/dL 01/20/2025 10:55:00 AM [facility] LAB ([facility])  Address: [address]  [facility]  [address]  tel:   CREATININE [LOINC: 03953-0] 1.04 mg/dL [High] 01/20/2025 10:55:00 AM [facility] LAB ([facility])  Address: [address]  [facility]  [address]  tel:   BUN/ CREAT RATIO [LOINC: 3097-3] 20.2 [High] 01/20/2025 10:55:00 AM [facility] LAB ([facility])  Address: [address]  [facility]  [address]  tel:   CALCIUM [LOINC: 38682-5] 9.3 mg/dL 01/20/2025 10:55:00 AM [facility] ([facility])  Address: [address]  [facility]  [address]  tel:   OSMOLALITY CALCULATED [LOINC: 11230-4] 296 mOsm/kg [High] 01/20/2025 10:55:00 AM [facility] LAB ([facility])  Address: [address]  [facility]  [address]  tel:   E GFR CR [LOINC: 76739-2] 53 mL/min/1.73m2 [Low] 01/20/2025 10:55:00 AM [facility] ([facility])  Address: [address]  [facility]  [address]  tel:   ALT [LOINC: 1742-6] 19 U/L 01/20/2025 10:55:00 AM [facility] LAB ([facility])  Address: [address]  [facility]  [address]  tel:   AST [LOINC: 1920-8] 23 U/L 01/20/2025 10:55:00 AM [facility] ([facility])  Address: [address]  [facility]  [address]  tel:   ALKALINE PHOSPHATASE [LOINC: 1779-8] 70 U/L 01/20/2025 10:55:00 AM [facility] ([facility])  Address: [address]  [facility]  [address]  tel:   BILIRUBIN, TOTAL [LOINC: 1975-2] 0.5 mg/dL 01/20/2025 10:55:00 AM [facility] LAB ([facility])  Address: [address]  [place]  [facility]  [address]  tel:   TOTAL PROTEIN [LOINC: 2885-2] 6.0 g/dL 01/20/2025 10:55:00 AM [facility] LAB ([facility])  Address: [address]  [facility]  [address]  tel:   ALBUMIN [LOINC: 1751-7] 3.9 g/dL 01/20/2025 10:55:00 AM [facility] LAB ([facility])  Address: [address]  [facility]  [address]  tel:   GLOBULIN [LOINC: 51991-5] 2.1 g/dL 01/20/2025 10:55:00 AM [facility] LAB ([facility])  Address: [address]  [facility]  [address]  tel:   A/G RATIO [LOINC: 1759-0] 1.9 01/20/2025 10:55:00 AM [facility] LAB ([facility])  Address: [address]  [facility]  [address]  tel:   FASTING PATIENT CMP ANSWER [LOINC: 69946-4] No 01/20/2025 10:55:00 AM [facility] LAB ([facility])  Address: [address]  [facility]  [address]  tel:   PRO-BETA NATRIURETIC PEPTIDE [LOINC: 53606-4] 755 pg/mL [High] 01/20/2025 10:55:00 AM [facility] LAB ([facility])  Address: [address]  [facility]  [address]  tel:   GLUCOSE [LOINC: 2339-0] 92 mg/dL 01/02/2025 09:31:00 AM [facility] LAB ([facility])  Address: [address] [facility]  [address]  tel:   SODIUM [LOINC: 2951-2] 139 mmol/L 01/02/2025 09:31:00 AM [facility] ([facility])  Address: [address] [place] [address]  [address]  tel:   POTASSIUM [LOINC: 2823-3] 4.2 mmol/L 01/02/2025 09:31:00 AM [facility] ([facility])  Address: [address] [place] [address]  [address]  tel:   CHLORIDE [LOINC: 2075-0] 107 mmol/L 01/02/2025 09:31:00 AM [facility] ([facility])  Address: [address] [place] [address]  [address]  tel:   CO2 [LOINC: 2028-9] 27.0 mmol/L 01/02/2025 09:31:00 AM [facility] ([facility])  Address: [address] [place] [address]  [address]  tel:   ANION GAP [LOINC: 33037-3] 5 mmol/L 01/02/2025 09:31:00 AM  [facility] LAB ([facility])  Address: [address] [place] [address]  [address]  tel:   BUN [LOINC: 6299-2] 23 mg/dL 01/02/2025 09:31:00 AM [facility] LAB ([facility])  Address: [address] [place] [address]  tel:   CREATININE [LOINC: 30317-2] 0.98 mg/dL 01/02/2025 09:31:00 AM [facility] LAB ([facility])  Address: [address] [place] [address]  tel:   CALCIUM [LOINC: 47820-6] 9.1 mg/dL 01/02/2025 09:31:00 AM [facility] LAB ([facility])  Address: [address] [place] [address]  tel:   OSMOLALITY CALCULATED [LOINC: 24372-3] 291 mOsm/kg 01/02/2025 09:31:00 AM [facility] LAB ([facility])  Address: [address] [place] [address]  tel:   E GFR CR [LOINC: 76479-3] 57 mL/min/1.73m2 [Low] 01/02/2025 09:31:00 AM [facility] LAB ([facility])  Address: [address] [place] [address]  tel:   FASTING PATIENT BMP ANSWER [LOINC: 16827-5] No 01/02/2025 09:31:00 AM [facility] LAB ([facility])  Address: [address] [place] [address]  tel:   WBC [LOINC: 6690-2] 11.4 x10(3) uL [High] 01/02/2025 09:31:00 AM [facility] LAB ([facility])  Address: [address] [place] [address]  tel:   RED BLOOD COUNT [LOINC: 789-8] 3.92 x10(6)uL 01/02/2025 09:31:00 AM [facility] ([facility])  Address: [address] [place] [address]  [address]  tel:   HGB [LOINC: 718-7] 10.6 g/dL [Low] 01/02/2025 09:31:00 AM [facility] LAB ([facility])  Address: [address] [place] [address]  [address]  tel:   HCT [LOINC: 4544-3] 34.8 % [Low] 01/02/2025 09:31:00 AM [facility] LAB ([facility])  Address: [address] [place] [address]  [address]  tel:   PLATELETS [LOINC: 777-3] 490.0 10(3)uL [High] 01/02/2025 09:31:00 AM [facility] LAB ([facility])  Address:  [address] [place] [address]  [address]  tel:   MEAN CELL VOLUME [LOINC: 787-2] 88.8 fL 01/02/2025 09:31:00 AM [facility])  Address: [address] [place] [address]  tel:   MEAN CORPUSCULAR HEMOGLOBIN [LOINC: 785-6] 27.0 pg 01/02/2025 09:31:00 AM [facility])  Address: [address] [place] [address]  tel:   MEAN CORPUSCULAR HGB CONC [LOINC: 786-4] 30.5 g/dL [Low] 01/02/2025 09:31:00 AM [facility])  Address: [address] [place] [address]  tel:   RED CELL DISTRIBUTION WIDTH CV [LOINC: 788-0] 17.7 % 01/02/2025 09:31:00 AM [facility])  Address: [address] [place] [address]  tel:   Trans Thoracic Echocardiogram 1.The left ventricle is normal in size. Left ventricular wall thickness is normal. Global left ventricular systolic function is normal. The left ventricular ejection fraction is 60-65%. No regional wall motion abnormalities. The left ventricle diastolic function is impaired (Grade II) with an elevated left atrial pressure.2.There is a normal functioning bioprosthetic aortic valve (TAVR). Peak velocity 2.46 m/s. Mean gradient 13 mm Hg. KIKE (VTI) 1.18. DI 0.42.3.Moderate mitral annular calcification. Mild mitral regurgitation4.The left atrium is severely dilated.5.The pulmonary artery systolic pressure is midly increased, estimated at 36 mm Hg. 6/6/2025 12:30:00 PM Viktor Bird MD     REVIEW OF SYSTEMS    REVIEW OF SYSTEMS  Header Details   Cardiovascular POPE, Edema  No history of Chest pain, Palpitations, Syncope   Respiratory No history of SOB     SOCIAL HISTORY    SOCIAL HISTORY  Social History Element Description Effective Dates   Smoking status Former smoker -     FUNCTIONAL STATUS    No data available    MEDICAL EQUIPMENT    No data available    Goals Sections    No data available    REASON FOR REFERRAL                  Health Concerns Section    No data available    COGNITIVE/MENTAL STATUS    No data available    Patient Demographics    Patient Demographics  Patient Address Patient Name Communication   [address] [place] [address] Hoda Busby [phone] (Mobile)     Patient Demographics  Language Race / Ethnicity Marital Status   English - Spoken (Preferred) White / Not  or       Document Information    Primary Care Provider Other Service Providers Document Coverage Dates   Viktor Bird  NPI: [id number]  [phone] (Work)  [address], [address]  [place] [address]  [place] [address]  Interpreting Physicians  [facility]  [phone] (Work)  [address] [place] [address] Apryllady Wilkins  NPI: [id number]  [phone] (Work)  [address], [address]  [place] [address]  [place] [address]  Nurses     Reta Smyth  NPI: [id number]  [phone] (Work)  [address], [address]  [place] [address]  [place] [address]  Nurses Jun. 25, 2025June 25, 2025      Organization   [facility]  [phone] (Work)  [address], [address]  [place] [address]  [place] [address]     Encounter Providers Encounter Date    Jun. 25, 2025June 25, 2025     Legal Authenticator    Viktor Bird  NPI: [id number]  [phone] (Work)  [address], [address]  [place] [address]  [place] [address]

## 2025-07-16 NOTE — CONSULTS
[facility]  part of [facility]    Consult Note    Date:  7/16/2025  Date of Admission:  7/16/2025    Chief Complaint:   Hoda Busby is a(n) 85 year old female with desaturations.    HPI:   The patient has a history of congestive heart failure as well as nonspecific interstitial pneumonitis.  She is on chronic supplemental oxygen and her oxygen saturations dipped down to 83% and her assisted living facility referred her to the emergency room.  They had also increased her supplemental flow of oxygen to flow 4 L.  She denies fever, chills, shakes, hemoptysis.  She does admit to a slight increase in her baseline shortness of breath.  The patient has a history of pulmonary embolism dating back to October of last year.  She bled from the bowel while on full anticoagulation and was found to have a colon malignancy which was successfully resected.  She subsequently underwent PCI to the LAD and stent was deployed.  She is on prednisone 10 mg daily.  She quit smoking 30 years ago after 1 pack/day for 25 years.  There is no history of TB exposure.  Single, no children, point of contact his niece Vonda,    History   Past Medical History[1]  Past Surgical History[2]  Family History[3]  Social History: Quit smoking 30 years ago after 1 pack/day for 25 years, occasional wine, office work  Short Social Hx on File[4]  Allergies/Medications:   Allergies: Allergies[5]  Prescriptions Prior to Admission[6]    Review of Systems:   Review of Systems:  Vision normal. Ear nose and throat normal. Bowel normal. Bladder function normal. No depression. No thyroid disease. No lymphatic system concerns.  No rash. Muscles and joints unremarkable. No weight loss no weight gain.    Physical Exam:   Vital Signs:  Blood pressure (!) 152/91, pulse (!) 124, temperature 98.9 °F (37.2 °C), temperature source Oral, resp. rate (!) 30, weight 182 lb 15.7 oz (83 kg), SpO2 96%.     Alert white female in no distress.  Breathing  comfortably albeit tachycardic  HEENT examination is unremarkable with pupils equal round and reactive to light and accommodation.   Neck without adenopathy, thyromegaly, JVD nor bruit.   Lungs diffuse crackles to auscultation and percussion.  Cardiac regular rate and rhythm no murmur.   Abdomen nontender, without hepatosplenomegaly and no mass appreciable.   Extremities without clubbing cyanosis nor edema.   Neurologic grossly intact with symmetric tone and strength and reflex.  Skin without gross abnormality    Results:     Lab Results   Component Value Date    WBC 17.4 07/16/2025    HGB 10.1 07/16/2025    HCT 31.4 07/16/2025    .0 07/16/2025    CREATSERUM 0.97 07/16/2025    BUN 18 07/16/2025     07/16/2025    K 4.1 07/16/2025     07/16/2025    CO2 25.0 07/16/2025    GLU 95 07/16/2025    CA 8.9 07/16/2025    ALB 4.0 07/16/2025    ALKPHO 127 07/16/2025    BILT 1.1 07/16/2025    TP 6.2 07/16/2025    AST 30 07/16/2025    ALT 20 07/16/2025     Chest x-ray-diffuse interstitial prominence with chronic bilateral infiltrates    Assessment/Plan:   1.  Acute on chronic respiratory failure-the patient has congestive heart failure with concomitant nonspecific interstitial pneumonitis.  This patient is not septic.  She has leukocytosis likely related to chronic steroid.  Additionally, she has a history of pulmonary embolism dating back to October of last year and is not on full anticoagulations.  Would screen for pulmonary embolism.  Marked lower extremity swelling with edema.    Recommendations:  1.  Diuresis  2.  Steroid  3.  CT for PE  4.  Supplemental oxygen  5.  Will follow clinically.    2.  DVT prophylaxis-will screen for venous thromboembolism as above and also would begin subcutaneous heparin or Lovenox.    3.  Cardiac disease-status post TAVR, LAD PCI, history of congestive heart failure.  As per cardiology.    I am delighted to assist with Hoda's care.    Stanton Sanchez MD  Medical Director,  Critical Care, [facility]  Medical Director, [facility]  Pager: [phone]               [1]   Past Medical History:   Acute pulmonary embolism (HCC)    Cancer (HCC)    Cataract    Colon cancer (HCC)    Coronary atherosclerosis    Foot drop, left foot    Heart valve disease    High blood pressure    High cholesterol    History of blood transfusion    History of stomach ulcers    History of transcatheter aortic valve replacement (TAVR)    Macular degeneration    Osteoarthritis    Osteopenia    Primary osteoarthritis of right hip    Pulmonary embolism (HCC)    Visual impairment    Readers    White coat hypertension   [2]   Past Surgical History:  Procedure Laterality Date    Cataract  3/2&3/16 2017    Cath transcatheter aortic valve replacement      Colonoscopy N/A 2024    Procedure: COLONOSCOPY;  Surgeon: Karen Staton MD;  Location: [facility] ENDOSCOPY    Colonoscopy      Colonoscopy & polypectomy  2021         Hc tavr outpt e&m est pt level 2 w proced  2025    Hip replacement surgery Left Around     Other surgical history  [place]     Bilateral cataract surgery    Trcath replace aortic valve  2025    TAVR    Upper gi endoscopy,exam     [3]   Family History  Problem Relation Age of Onset    Cancer Father         bone (cause of death)    Stroke Mother         CVA (cause of death)    Diabetes Mother     Hypertension Mother     Dementia Brother         Alzheimer's    Heart Disease Brother         CAD - x2 brothers    Heart Disease Brother     Dementia Brother    [4]   Social History  Socioeconomic History    Marital status:    Tobacco Use    Smoking status: Former     Current packs/day: 0.00     Types: Cigarettes     Start date: 1990     Quit date: 1990     Years since quittin.5     Passive exposure: Past    Smokeless tobacco: Never   Vaping Use    Vaping status: Never Used   Substance and Sexual Activity    Alcohol use: Not Currently     Alcohol/week: 5.0  standard drinks of alcohol     Types: 5 Glasses of wine per week     Comment: wine, occasionally    Drug use: No   Other Topics Concern    Caffeine Concern Yes     Comment: coffee/soda - 2cups/day     Social Drivers of Health     Food Insecurity: No Food Insecurity (3/26/2025)    NCSS - Food Insecurity     Worried About Running Out of Food in the Last Year: No     Ran Out of Food in the Last Year: No   Transportation Needs: No Transportation Needs (3/26/2025)    NCSS - Transportation     Lack of Transportation: No   Housing Stability: Not At Risk (3/26/2025)    NCSS - Housing/Utilities     Has Housing: Yes     Worried About Losing Housing: No     Unable to Get Utilities: No   [5] No Known Allergies  [6] (Not in a hospital admission)

## 2025-07-16 NOTE — ED QUICK NOTES
Orders for admission, patient is aware of plan and ready to go upstairs. Any questions, please call ED RN jero at extension [phone].     Patient Covid vaccination status: Fully vaccinated     COVID Test Ordered in ED: None    COVID Suspicion at Admission: N/A    Running Infusions: Medication Infusions[1] None    Mental Status/LOC at time of transport: a/ox4    Other pertinent information:   CIWA score: N/A   NIH score:  N/A             [1]

## 2025-07-16 NOTE — ED QUICK NOTES
Patient on 4L NC at baseline at her facility  Per medics report, spO2 was 85%. Medics placed pt on NRB, arrived with spO2 98%. Patient placed on 6L NC, spO2 remained 94-96%

## 2025-07-16 NOTE — CONSULTS
Cardiology Consult Note    Hoda Busby Patient Status:  Emergency    1940 MRN [id number]   Location [facility] EMERGENCY DEPARTMENT Attending Viktor Lennon, DO   Hosp Day # 0 PCP Malathi Stuart MD     HPI.  Hoda Busby is a 85 year old female with a history of Hypertension, aortic valve stenosis status post TAVR 2025, AVMs status post colon resection 2025, CAD with LAD PCI/, PE, interstitial lung disease HFpEF presenting with shortness of breath and mild edema.  Triage vitals pertinent for /91, pulse 121, respiratory 22, SpO2 95%. Patient is chronically on 3 L of oxygen at home which was escalated to 6 L in the ED.  EKG shows sinus tachycardia.  Lab work pertinent for WBC 17.4, hemoglobin 10.1, proBNP 2200.  Chest x-ray shows bilateral diffuse interstitial prominence, prominent central vasculature mild cardiomegaly, multifocal alveolitis.  Patient was administered IV antibiotics, Lasix 40 mg IV push, Solu-Medrol and nebulizer treatment.  She is being admitted for hypoxic respiratory failure.      Prior cardiac workup  Echocardiogram 3/27/2025    1. Left ventricle: The cavity size was normal. Wall thickness was normal.      Systolic function was normal. The estimated ejection fraction was 60-65%.      No diagnostic evidence for regional wall motion abnormalities. Doppler      parameters are consistent with abnormal left ventricular relaxation -      grade 1 diastolic dysfunction.   2. Right ventricle: The cavity size was increased. Systolic function was      normal.   3. Left atrium: The atrium was mildly to moderately dilated. The left atrial      volume was mildly to moderately increased.   4. Aortic valve: A bioprosthetic valve is present. Mcnamara PUNEET S3 23mm      (TAVR) bioprosthesis was present. The peak systolic velocity was      2.69m/sec. The mean systolic gradient was 15mm Hg. The valve area (VTI)      was 1.52cm^2.   5. Pulmonary arteries: Systolic  pressure was moderately increased, estimated      to be 52mm Hg.         --------------------------------------------------------------------------------------------------------------------------------  ROS 10 systems reviewed, pertinent findings above.  ROS    History:  Past Medical History[1]  Past Surgical History[2]  Family History[3]   reports that she quit smoking about 35 years ago. Her smoking use included cigarettes. She started smoking about 35 years ago. She has been exposed to tobacco smoke. She has never used smokeless tobacco. She reports that she does not currently use alcohol after a past usage of about 5.0 standard drinks of alcohol per week. She reports that she does not use drugs.    Objective:   Temp: 98.9 °F (37.2 °C)  Pulse: 121  Resp: 22  BP: 152/91    Intake/Output:   No intake or output data in the 24 hours ending 07/16/25 1114    Physical Exam:     General: Alert and oriented x 3, no acute distress  HEENT: Normocephalic, anicteric sclera, neck supple.  Neck: No JVD, carotids 2+, no bruits.  Cardiac: Regular rate and rhythm. S1, S2 normal. No murmur, pericardial rub, S3.  Lungs: Clear without wheezes, rales, rhonchi or dullness.  Normal excursions and effort.  Abdomen: Soft, non-tender. BS-present.  Extremities: Without clubbing, cyanosis.  +1 edema.  Peripheral pulses are 2+.  Neurologic: Non-focal  Skin: Warm and dry.       Assessment:    Acute on chronic hypoxic respiratory failure  Likely pneumonia  Sinus tachycardia  Interstitial lung disease  HFpEF  Severe aortic valve stenosis status post TAVR 2/2025  Coronary artery disease status post LAD PCI 1/2025  Hypertension  History of pulm emboli/chronic anticoagulation      Plan:  Shortness of breath is multifactorial  Combination of pneumonia, volume overload in the setting of known chronic hypoxia secondary to interstitial lung disease.  Agree with IV diuretics, continue antibiotics and steroids/nebulizer treatments  Sinus tachycardia  secondary to albuterol and infection, will monitor closely  Continue Lasix, aspirin, Plavix, statin    Thank you for allowing me to take part in the care of Hoda DAIGLE Kehinde. Please call with any questions of concerns.      Level of care: C5    Dr. Evaristo Villasenor,   July 16, 2025  11:14 AM           [1]   Past Medical History:   Acute pulmonary embolism (HCC)    Cancer (HCC)    Cataract    Colon cancer (HCC)    Coronary atherosclerosis    Foot drop, left foot    Heart valve disease    High blood pressure    High cholesterol    History of blood transfusion    History of stomach ulcers    History of transcatheter aortic valve replacement (TAVR)    Macular degeneration    Osteoarthritis    Osteopenia    Primary osteoarthritis of right hip    Pulmonary embolism (HCC)    Visual impairment    Readers    White coat hypertension   [2]   Past Surgical History:  Procedure Laterality Date    Cataract  3/2&3/16 2017    Cath transcatheter aortic valve replacement      Colonoscopy N/A 12/17/2024    Procedure: COLONOSCOPY;  Surgeon: Karen Staton MD;  Location: [facility] ENDOSCOPY    Colonoscopy      Colonoscopy & polypectomy  01/04/2021         Hc tavr outpt e&m est pt level 2 w proced  01/23/2025    Hip replacement surgery Left Around 2006    Other surgical history  [place] 2017    Bilateral cataract surgery    Trcath replace aortic valve  01/23/2025    TAVR    Upper gi endoscopy,exam     [3]   Family History  Problem Relation Age of Onset    Cancer Father         bone (cause of death)    Stroke Mother         CVA (cause of death)    Diabetes Mother     Hypertension Mother     Dementia Brother         Alzheimer's    Heart Disease Brother         CAD - x2 brothers    Heart Disease Brother     Dementia Brother

## 2025-07-16 NOTE — ED PROVIDER NOTES
Patient Seen in: [facility] Emergency Department    History     Chief Complaint   Patient presents with    Difficulty Breathing       HPI    85-year-old female with a history of hypertension, hyperlipidemia, CHF, interstitial lung disease who presents here today with shortness of breath started yesterday and progressively got worse today.  Patient states her legs are more swollen than they usually are.  Always has some swelling in the right leg but now she has swelling in both legs.  No calf tenderness.  No erythema to legs.    History reviewed. Past Medical History[1]    History reviewed. Past Surgical History[2]      Medications :  Prescriptions Prior to Admission[3]     Family History[4]    Smoking Status: Social Hx on file[5]    Constitutional and vital signs reviewed.      Social History and Family History elements reviewed from today, pertinent positives to the presenting problem noted.    Physical Exam     ED Triage Vitals [07/16/25 0910]   BP (!) 152/91   Pulse (!) 121   Resp 22   Temp 98.9 °F (37.2 °C)   Temp src Oral   SpO2 95 %   O2 Device Nasal cannula       All measures to prevent infection transmission during my interaction with the patient were taken. Handwashing was performed prior to and after the exam.  Stethoscope and any equipment used during my examination was cleaned with super sani-cloth germicidal wipes following the exam.     Physical Exam  Vitals and nursing note reviewed.   Cardiovascular:      Rate and Rhythm: Tachycardia present.   Pulmonary:      Breath sounds: Decreased breath sounds and wheezing present.   Skin:     General: Skin is warm and dry.      Capillary Refill: Capillary refill takes less than 2 seconds.      Comments: Bilateral lower extremity +2 pitting edema, right greater than left, no erythema, no calf tenderness   Neurological:      Mental Status: She is alert and oriented to person, place, and time.         ED Course        Labs Reviewed   CBC WITH DIFFERENTIAL  WITH PLATELET - Abnormal; Notable for the following components:       Result Value    WBC 17.4 (*)     RBC 3.74 (*)     HGB 10.1 (*)     HCT 31.4 (*)     RDW-SD 47.7 (*)     RDW 15.8 (*)     Neutrophil Absolute Prelim 15.43 (*)     Neutrophil Absolute 15.43 (*)     Lymphocyte Absolute 0.70 (*)     All other components within normal limits   COMP METABOLIC PANEL (14) - Abnormal; Notable for the following components:    eGFR-Cr 57 (*)     All other components within normal limits   PRO BETA NATRIURETIC PEPTIDE - Abnormal; Notable for the following components:    Pro-Beta Natriuretic Peptide 2,276 (*)     All other components within normal limits   LACTIC ACID, PLASMA - Abnormal; Notable for the following components:    Lactic Acid 2.7 (*)     All other components within normal limits   TROPONIN I HIGH SENSITIVITY - Normal   LACTIC ACID REFLEX POST POSTIVE   RAINBOW DRAW LAVENDER   RAINBOW DRAW LIGHT GREEN   RAINBOW DRAW BLUE   BLOOD CULTURE   BLOOD CULTURE     EKG    Rate, intervals and axes as noted on EKG Report.  Rate: 119  Rhythm: Sinus tachycardia  Reading: Sinus tachycardia, heart rate 119, normal intervals, normal axis           As Interpreted by me    Imaging Results Available and Reviewed while in ED: CT CHEST PE AORTA (IV ONLY) (CPT=71260)  Result Date: 7/16/2025  CONCLUSION: 1.  No evidence of acute pulmonary embolism. 2.  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 Specifically, there are relatively stable findings of a chronic interstitial lung disease/pulmonary fibrosis as well as mild-moderate emphysema. 3.  Mild diffuse groundglass density opacities throughout both lungs are new or more conspicuous since comparison high-resolution chest CT from June, 2025. Findings could relate to interstitial edema (favored) or less likely atypical/viral infection. No significant associated pleural effusion. 4.  Cardiomegaly with coronary artery calcification and aortic valve repair. 5.   Anasarca. Electronically Verified and Signed by Attending Radiologist: Shaheen Vines MD 7/16/2025 1:11 PM Workstation: [id number]    XR CHEST AP PORTABLE  (CPT=71045)  Result Date: 7/16/2025  CONCLUSION: Mild cardiomegaly. Prominent central vasculature. Bilateral diffuse interstitial prominence. Superimposed multifocal alveolitis in the right perihilar and basilar region with slight progression. Follow-up to interval resolution Electronically Verified and Signed by Attending Radiologist: Nguyễn Montenegro MD 7/16/2025 10:37 AM Workstation: [id number]    ED Medications Administered:   Medications   furosemide (Lasix) 10 mg/mL injection 40 mg (40 mg Intravenous Given 7/16/25 0948)   methylPREDNISolone sodium succinate (Solu-MEDROL) injection 125 mg (125 mg Intravenous Given 7/16/25 0948)   albuterol (Ventolin) (5 MG/ML) 0.5% nebulizer solution 10 mg (10 mg Nebulization Given 7/16/25 0929)   iopamidol 76% (ISOVUE-370) injection for power injector (80 mL Intravenous Given 7/16/25 1235)         MDM     Vitals:    07/16/25 0910 07/16/25 0929 07/16/25 1115   BP: (!) 152/91     Pulse: (!) 121  (!) 124   Resp: 22  (!) 30   Temp: 98.9 °F (37.2 °C)     TempSrc: Oral     SpO2: 95% 97% 96%   Weight: 83 kg       *I personally reviewed and interpreted all ED vitals.    Pulse Ox: 95%, Room air, Normal     Monitor Interpretation:   sinus tachycardia as interpreted by me.  The cardiac monitor was ordered since to monitor cardiac rate and rhythm.    Differential Diagnosis/ Diagnostic Considerations: CHF, COPD, pneumonia    Complicating Factors: The patient already has does not have any pertinent problems on file. to contribute to the complexity of this ED evaluation.    Medical Decision Making  Amount and/or Complexity of Data Reviewed  Independent Historian: EMS     Details: Nursing home records along with niece at the bedside to help contribute to patient medical history  External Data Reviewed: labs and notes.     Details: I  reviewed notes from patient's primary care provider Dr. Sanchez notes to help contribute to patient's medical history along with previous labs compare today look for any new and acute changes  Labs: ordered. Decision-making details documented in ED Course.  Radiology: ordered and independent interpretation performed. Decision-making details documented in ED Course.  ECG/medicine tests: ordered and independent interpretation performed. Decision-making details documented in ED Course.    Risk  Decision regarding hospitalization.    Critical Care  Total time providing critical care: 30 minutes      I reviewed all results with the patient and niece at the bedside.  Patient is in a CHF exacerbation.  Pulmonary congestion on x-ray along with a proBNP that over 2000.  Patient also has elevated WBC count.  Will send off blood cultures and get a lactate.  I reviewed the chest x-ray myself there is some pulmonary congestion was confirmed by the radiologist.  EKG was a sinus tachycardia but there is nothing else acute.  I explained that he will be admitted for further evaluation along with pulmonology and cardiology consultation.  They agree with this plan    I discussed case with Dr. Mccormick who accepts admission, will admit hold off on antibiotics at this time continue with blood cultures and lactate    I discussed case with [facility] cardiology notified of consult    I discussed case with Dr. Sanchez-pulmonology notified of consult  Condition upon leaving the department: Stable    Disposition and Plan     Clinical Impression:  1. Acute on chronic congestive heart failure, unspecified heart failure type (HCC)    2. Wheezing    3. Tachycardia        Disposition:  Admit    Follow-up:  No follow-up provider specified.    Medications Prescribed:  Current Discharge Medication List          Hospital Problems       Present on Admission  Date Reviewed: 7/1/2025          ICD-10-CM Noted POA    * (Principal) Acute on chronic congestive  heart failure, unspecified heart failure type (HCC) I50.9 3/26/2025 Unknown                       [1]   Past Medical History:   Acute pulmonary embolism (HCC)    Cancer (HCC)    Cataract    Colon cancer (HCC)    Coronary atherosclerosis    Foot drop, left foot    Heart valve disease    High blood pressure    High cholesterol    History of blood transfusion    History of stomach ulcers    History of transcatheter aortic valve replacement (TAVR)    Macular degeneration    Osteoarthritis    Osteopenia    Primary osteoarthritis of right hip    Pulmonary embolism (HCC)    Visual impairment    Readers    White coat hypertension   [2]   Past Surgical History:  Procedure Laterality Date    Cataract  3/2&3/16 2017    Cath transcatheter aortic valve replacement      Colonoscopy N/A 2024    Procedure: COLONOSCOPY;  Surgeon: Karen Staton MD;  Location: [facility] ENDOSCOPY    Colonoscopy      Colonoscopy & polypectomy  2021         Hc tavr outpt e&m est pt level 2 w proced  2025    Hip replacement surgery Left Around     Other surgical history  [place]     Bilateral cataract surgery    Trcath replace aortic valve  2025    TAVR    Upper gi endoscopy,exam     [3] (Not in a hospital admission)   [4]   Family History  Problem Relation Age of Onset    Cancer Father         bone (cause of death)    Stroke Mother         CVA (cause of death)    Diabetes Mother     Hypertension Mother     Dementia Brother         Alzheimer's    Heart Disease Brother         CAD - x2 brothers    Heart Disease Brother     Dementia Brother    [5]   Social History  Socioeconomic History    Marital status:    Tobacco Use    Smoking status: Former     Current packs/day: 0.00     Types: Cigarettes     Start date: 1990     Quit date: 1990     Years since quittin.5     Passive exposure: Past    Smokeless tobacco: Never   Vaping Use    Vaping status: Never Used   Substance and Sexual Activity    Alcohol use: Not  Currently     Alcohol/week: 5.0 standard drinks of alcohol     Types: 5 Glasses of wine per week     Comment: wine, occasionally    Drug use: No   Other Topics Concern    Caffeine Concern Yes     Comment: coffee/soda - 2cups/day

## 2025-07-17 NOTE — PROGRESS NOTES
Heart Failure Nurse  Progress Note    Patient was evaluated by the Heart Failure Nurse  for understanding, verbalization, demonstration, and recall of education related to heart failure, overall adherence to the behaviors necessary to maintain a compensated status, and risk for readmission.     Patient assessment:Patient up to chair, awake alert and oriented. O2 on. Hoda is familiar with the recommendation for HF and expressed no questions except for follow up. She did report her niece would need to take her. Will reach out to the niece for availability.    Patient is able to verbalize signs/symptoms fluid overload/impending HF exacerbation and who to contact with problems                                          _x__ yes  ___ no      Patient is following a 2000 mg sodium diet                                             _x__ yes  ___ no    If no, barriers to 2000 mg sodium diet:_______________________________________________    Patient informed of 2-Part dietician classes that is free if sign up within 30 days of discharge or $40  ___ yes  _x__ no      Patient is adherent to medication regimen                                              _x__ yes  ___ no    If no, barriers to medication regimen:    Patient has sufficient funds to purchase medication                      __x_ yes  ___ no      Patient has a scale in the home              __x_ yes  ___ no      Patient is adherent to daily weight monitoring                                        __x_ yes   ___ no    If no, barriers to daily weight monitoring:    Symptom Tracker Worksheet reviewed with patient  _x__ yes   ___ no      Patient verbalizes understanding of stoplight/heart failure zones          _x__ yes   ___ no      Patient understands the importance of 7-day follow-up appointment      __x_ yes  ___ no    Appointment Date:          Patient has adequate transportation to attend follow-up appointments    __x_ yes  ___ no    If no, was referral to  Social Work made  ___yes  ___ no      Family/Friend present during education: none    Additional consultations required: none    Arslan Kaur RN HF  XT [phone]

## 2025-07-17 NOTE — PROGRESS NOTES
Progress Note     Hoda Busby Patient Status:  Inpatient    1940 MRN [id number]   Location [facility] 3W/SW Attending Benny Singh MD   Hosp Day # 1 PCP Malathi Stuart MD     Chief Complaint:   Chief Complaint   Patient presents with    Difficulty Breathing         Subjective:   S: Patient seen and examined, chart reviewed.   Sleeping comfortably in no distress on nasal cannula at this time      Review of Systems:   10 point ROS completed and was negative, except for pertinent positive and negatives stated in subjective.                Objective:   Vital signs:  Temp:  [97.4 °F (36.3 °C)-98.2 °F (36.8 °C)] 98.2 °F (36.8 °C)  Pulse:  [78-91] 78  Resp:  [18-20] 18  BP: ()/(45-67) 104/56  SpO2:  [92 %-97 %] 97 %    Wt Readings from Last 6 Encounters:   25 173 lb 6.4 oz (78.7 kg)   25 183 lb (83 kg)   25 183 lb (83 kg)   25 180 lb 3.2 oz (81.7 kg)   25 180 lb (81.6 kg)   25 173 lb (78.5 kg)       Physical Exam:    General: No acute distress.   Respiratory: Clear to auscultation bilaterally. No wheezes. No rhonchi.  Cardiovascular: S1, S2. Regular rate and rhythm. No murmurs, rubs or gallops.   Abdomen: Soft, nontender, nondistended.  Positive bowel sounds. No rebound or guarding.  Neurologic: No focal neurological deficits.   Musculoskeletal: Moves all extremities.  Extremities: No edema.    Results:   Diagnostic Data:      Labs:    Labs Last 24 Hours:   BMP     CBC    Other     Na 139 Cl 102 BUN 21 Glu 94   Hb 8.7   PTT - Procal -   K 3.7 CO2 28.0 Cr 1.10   WBC 11.9 >< .0  INR - CRP -   Renal Lytes Endo    Hct 27.6   Trop - D dim -   eGFR - Ca 8.9 POC Gluc  -    LFT   pBNP - Lactic 3.1   eGFR AA - PO4 - A1c -   AST - APk - Prot -  LDL -      Recent Labs   Lab 25  0917 25  0550   RBC 3.74* 3.27*   HGB 10.1* 8.7*   HCT 31.4* 27.6*   MCV 84.0 84.4   MCH 27.0 26.6   MCHC 32.2 31.5   RDW 15.8* 15.9*   NEPRELIM 15.43* 9.71*   WBC  17.4* 11.9*   .0 283.0       Lab Results   Component Value Date    INR 1.18 01/24/2025    INR 1.33 (H) 01/23/2025    INR 1.06 09/08/2023       Recent Labs   Lab 07/16/25  0917 07/17/25  0550   GLU 95 94   BUN 18 21   CREATSERUM 0.97 1.10*   CA 8.9 8.9   ALB 4.0  --     139   K 4.1 3.7    102   CO2 25.0 28.0   ALKPHO 127  --    AST 30  --    ALT 20  --    BILT 1.1  --    TP 6.2  --        No results for input(s): \"JENNIFER\", \"LIP\" in the last 168 hours.    Recent Labs   Lab 07/17/25  0550   MG 2.1       Recent Labs   Lab 07/16/25  1203   BLDSMR Gram positive cocci in clusters*         XR CHEST AP PORTABLE  (CPT=71045)  Result Date: 7/17/2025  CONCLUSION: Cardiomegaly and prominent central vasculature. TAVR Diffuse pulmonary interstitial prominence. 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 Electronically Verified and Signed by Attending Radiologist: Nguyễn Montenegro MD 7/17/2025 7:32 AM Workstation: [id number]    CT CHEST PE AORTA (IV ONLY) (CPT=71260)  Result Date: 7/16/2025  CONCLUSION: 1.  No evidence of acute pulmonary embolism. 2.  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 Specifically, there are relatively stable findings of a chronic interstitial lung disease/pulmonary fibrosis as well as mild-moderate emphysema. 3.  Mild diffuse groundglass density opacities throughout both lungs are new or more conspicuous since comparison high-resolution chest CT from June, 2025. Findings could relate to interstitial edema (favored) or less likely atypical/viral infection. No significant associated pleural effusion. 4.  Cardiomegaly with coronary artery calcification and aortic valve repair. 5.  Anasarca. Electronically Verified and Signed by Attending Radiologist: Shaheen Vines MD 7/16/2025 1:11 PM Workstation: [id number]    XR CHEST AP PORTABLE  (CPT=71045)  Result Date: 7/16/2025  CONCLUSION: Mild cardiomegaly. Prominent central vasculature.  Bilateral diffuse interstitial prominence. Superimposed multifocal alveolitis in the right perihilar and basilar region with slight progression. Follow-up to interval resolution Electronically Verified and Signed by Attending Radiologist: Nguyễn Montenegro MD 7/16/2025 10:37 AM Workstation: [id number]          Pro-Calcitonin  No results for input(s): \"PCT\" in the last 168 hours.    Cardiac  Recent Labs   Lab 07/16/25  0917   PBNP 2,276*       Imaging: Imaging data reviewed in [facility].    XR CHEST AP PORTABLE  (CPT=71045)  Result Date: 7/17/2025  CONCLUSION: Cardiomegaly and prominent central vasculature. TAVR Diffuse pulmonary interstitial prominence. 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 Electronically Verified and Signed by Attending Radiologist: Nguyễn Montenegro MD 7/17/2025 7:32 AM Workstation: [id number]    CT CHEST PE AORTA (IV ONLY) (CPT=71260)  Result Date: 7/16/2025  CONCLUSION: 1.  No evidence of acute pulmonary embolism. 2.  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 Specifically, there are relatively stable findings of a chronic interstitial lung disease/pulmonary fibrosis as well as mild-moderate emphysema. 3.  Mild diffuse groundglass density opacities throughout both lungs are new or more conspicuous since comparison high-resolution chest CT from June, 2025. Findings could relate to interstitial edema (favored) or less likely atypical/viral infection. No significant associated pleural effusion. 4.  Cardiomegaly with coronary artery calcification and aortic valve repair. 5.  Anasarca. Electronically Verified and Signed by Attending Radiologist: Shaheen Vines MD 7/16/2025 1:11 PM Workstation: [id number]    XR CHEST AP PORTABLE  (CPT=71045)  Result Date: 7/16/2025  CONCLUSION: Mild cardiomegaly. Prominent central vasculature. Bilateral diffuse interstitial prominence. Superimposed multifocal alveolitis in the right perihilar and basilar  region with slight progression. Follow-up to interval resolution Electronically Verified and Signed by Attending Radiologist: Nguyễn Montenegro MD 7/16/2025 10:37 AM Workstation: [id number]         Medications: Scheduled Medications[1]    Assessment & Plan:   ASSESSMENT / PLAN:   Acute on chronic hypoxic respiratory failure (HCC)  -multifactorial: acute HFpEF, recurrent interstitial pneumonitis, ?CAP  -CT chest in ED 7/16/2025 - no PE; fibrotic changes seen with emphysematous changes. Diffuse groundglass opacities seen compared to CT from June 2025. Also pulm edema.   -required NRB in ER transiently; on home 2 LPM o2.   -echo 2/5/25 - normal LV systolic function (EF 60-65%); grade 2 diastolic dysfunction; normally functioning bioprosthetic TAVR; sl incr PAP 44mmHg  - no abx.   -IV lasix; 40 mg IV BID (for HFpEF)  -s/p solumedrol - transitioned to prednisone  -suspect majority of sx/hypoxia due to ILD/NSIP  -O2 down to31L   -clinically improved     Coronary artery disease  -Cath 1/14/25 by Dr. Bernardo Liz (as w/u for TAVR) -- RCA non-obstructive; LM free of obst disease; LM plaque extending into ostium of LAD (20-30%); mid LAD (80-90%), cx ostium (60-70%)  -intervention delayed until after colon resection due to need for DAPT x 6 mos after stenting   -s/p PCI/JUVENTINO of mid LAD (70%>0%) on 4/7/25 by Dr. Dutton  -cont ASA, Plavix  -increased atorvastatin from 20mg to 80mg/day (LDL 76 on 3/26/25)    Interstitial pneumonitis/NSIP  -dx'ed 10/2024 - presented with cough and severe hypoxia  -unresponsive to abx   -CXR 10/24/24 extensive bilateral perihilar and bilateral upper and lower lobe   -S.pneumo, legionella Ag , mycoplasma IgM/G, Fungitell-beta-D glucan negative  -ANCA and URIEL/connective tissue disease panels negative  -anti-histone and anti-[place] Ab's negative  -CT with bilateral ground glass opacities, small consolidations of BLL  -findings suspicious for NSIP (vs cryptogenic organizing pneumonia vs hypersensitivity  pneumonitis); NOT thought to be c/w UIP pattern  -s/p empiric steroids (solumedrol then prednisone taper) 10/25/24 - (EOT 1/18/25)  -dyspnea and hypoxia completely resolved until admission 3/26/25 and now again 7/16/25 -- POPE, hypoxia  -steroids as above  -transitioned from IV solumedrol to prednisone 40mg/day on 4/16/25 - slow taper per Dr. Holloway     Hx of Bilateral pulmonary emboli (10/2024)  -dx'ed at time of interstitial pneumonitis  -CT chest 10/25/24 -- acute distal segmental/subsegmental pulmonary emboli in R[place] and subsegmental PE in [place]  -unclear etiology; no recent hx of long travel; no family/personal hx of blood clots  -BLE venous dopplers negative 10/26/24  -started xarelto 20 mg po every day on 11/28/24  -repeat CT chest 12/9/24 neg PE  -stopped Xarelto 12/14/24 due to recurrent GI bleed  -repeat CT chest 3/26/25 and 4/9/25 and 7/16/25 -- all neg PE     Severe aortic stenosis  -progression on serial echocardiograms  -s/p TAVR 1/23/25 (Dr. Reji Green)     Adenocarcinoma of colon  -Bx of transverse colon polyp 12/17/24 -- invasive, moderately differentiated adenocarcinoma  -CEA normal (1.9)  -CT a/p neg for mets  -surgery delayed in anticipation of TAVR (done 1/23/25)  -s/p robotic assisted left hemicolectomy (Dr. Cassidy) on 2/27/25 - path - tumor invades into muscularis; margins neg for tumor; all 19 LN's neg for mets (0/19); pT2, pN0     Hx  GI bleed  -EGD 11/26/24 (Dr. Nuno) -15mm nonbleeding gastric antral ulcer; 3mm gastric antral AVM s/p APC  -recurrence in 12/2024  -colonoscopy and repeat EGD 12/17/24 by Dr. Staton -- grade 1 esophagitis; duod ulcers healing; large flat polyp in transverse colon (carcinoma)   -received total of 3 units PRBCs   -Xarelto stopped in 12/2024  -s/p omeprazole 20mg BID x 8 weeks (EOT 1/22/25), now on omeprazole 20mg daily -- plan has been to continue until she completed her coronary stenting.  Will stop once off steroids.     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   -MRI brain--no acute intracranial process  -was doing PT at [facility] -- continues to improve; almost back to normal       Hx of Hypertension, primary  -(dyazide stopped due to NANCY)  -(amlodipine held due to low BP in 11/2024)  -BP remains normal off meds     Hx of hip OA  -s/p left THR (Dr. Lo) many years ago  -s/p elective R BEATRIZ on 10/5/23 by Dr. Diaz     Hyperlipidemia   on atorvastatin to 80mg/day as above     Osteopenia   On Fosamax from 2011 to 4/2016.     DEXA stable 5/10/17 and 2019 and 2021  DEXA 8/2024 -- osteoporosis of left forearm  -restarted Fosamax 8/2024 (on hold this admission); restart at discharge     Vitamin D deficiency  On vitamin D 4000u/day        Level 3 care    Benny Singh MD, FAAP, FACP  [facility] Hospitalist  I respond to Epic Chat and AMS Connect         [1]    [START ON 7/18/2025] predniSONE  10 mg Oral Daily with breakfast    enoxaparin  40 mg Subcutaneous Daily    furosemide  40 mg Intravenous BID (Diuretic)    aspirin  81 mg Oral Daily    atorvastatin  80 mg Oral Nightly    clopidogrel  75 mg Oral Daily    ferrous sulfate  325 mg Oral Daily with breakfast    pantoprazole  40 mg Oral QAM AC

## 2025-07-17 NOTE — PROGRESS NOTES
[facility]  part of [facility]     Progress Note    Hoda Busby Patient Status:  Inpatient    1940 MRN [id number]   Location [facility] 3W/SW Attending Benny Singh MD   Hosp Day # 1 PCP Malathi Stuart MD       Subjective:   Patient seen and examined.  Resting in bed.  Dyspnea improved since arrival.  On 2 L oxygen.  Denies significant cough.    Objective:   Blood pressure 91/45, pulse 78, temperature 98.2 °F (36.8 °C), temperature source Oral, resp. rate 18, weight 173 lb 6.4 oz (78.7 kg), SpO2 97%.  Intake/Output:   Last 3 shifts: I/O last 3 completed shifts:  In: 660 [P.O.:660]  Out: 801 [Urine:801]   This shift: I/O this shift:  In: 240 [P.O.:240]  Out: -      Vent Settings:      Hemodynamic parameters (last 24 hours):      Scheduled Meds: Current Hospital Medications[1]    Continuous Infusions: Medication Infusions[2]    Physical Exam  Constitutional: no acute distress  Eyes: PERRL  ENT: nares pateint  Neck: supple, no JVD  Cardio: RRR, S1 S2  Respiratory: Faint crackles  GI: abdomen soft, non tender, active bowel sounds, no organomegaly  Extremities: no clubbing, cyanosis, + lower extremity edema  Neurologic: no gross motor deficits  Skin: warm, dry      Results:     Lab Results   Component Value Date    WBC 11.9 2025    HGB 8.7 2025    HCT 27.6 2025    .0 2025    CREATSERUM 1.10 2025    BUN 21 2025     2025    K 3.7 2025     2025    CO2 28.0 2025    GLU 94 2025    CA 8.9 2025    MG 2.1 2025       XR CHEST AP PORTABLE  (CPT=71045)  Result Date: 2025  CONCLUSION: Cardiomegaly and prominent central vasculature. TAVR Diffuse pulmonary interstitial prominence. 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 Electronically Verified and Signed by Attending Radiologist: Nguyễn Montenegro MD 2025 7:32 AM Workstation: [id number]    CT  CHEST PE AORTA (IV ONLY) (CPT=71260)  Result Date: 7/16/2025  CONCLUSION: 1.  No evidence of acute pulmonary embolism. 2.  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 Specifically, there are relatively stable findings of a chronic interstitial lung disease/pulmonary fibrosis as well as mild-moderate emphysema. 3.  Mild diffuse groundglass density opacities throughout both lungs are new or more conspicuous since comparison high-resolution chest CT from June, 2025. Findings could relate to interstitial edema (favored) or less likely atypical/viral infection. No significant associated pleural effusion. 4.  Cardiomegaly with coronary artery calcification and aortic valve repair. 5.  Anasarca. Electronically Verified and Signed by Attending Radiologist: Shaheen Vines MD 7/16/2025 1:11 PM Workstation: [id number]    XR CHEST AP PORTABLE  (CPT=71045)  Result Date: 7/16/2025  CONCLUSION: Mild cardiomegaly. Prominent central vasculature. Bilateral diffuse interstitial prominence. Superimposed multifocal alveolitis in the right perihilar and basilar region with slight progression. Follow-up to interval resolution Electronically Verified and Signed by Attending Radiologist: Nguyễn Montenegro MD 7/16/2025 10:37 AM Workstation: [id number]      EKG 12 Lead  Result Date: 7/16/2025  Sinus tachycardia with Premature atrial complexes Left anterior fascicular block Septal infarct , age undetermined Abnormal ECG When compared with ECG of 17-APR-2025 08:58, Premature atrial complexes are now Present The axis Shifted left Septal infarct is now Present T wave inversion now evident in Anterior leads Confirmed by ARELI FRANZ ([id number]) on 7/16/2025 5:10:19 PM      Assessment   1.  Acute on chronic respiratory failure  2.  ILD  3.  Colon adenocarcinoma  4.  Severe aortic stenosis s/p TAVR  5.  Prior history of pulmonary embolism  6.  HFpEF  7.  Likely pulmonary edema         Plan   -Patient presents with  evidence of worsening hypoxemic respiratory failure normally on 3 L nasal cannula oxygen.  Admits to worsening 1 day prior to admission..  - CT chest on presentation with no evidence of pulmonary embolism seen.  Fibrotic changes seen with some emphysematous changes present.  Diffuse groundglass opacities seen compared to CT from June 2025.  Cannot rule out component of pulmonary edema.  - Monitor response to diuresis.  - Low suspicion for underlying pneumonia.  Hold off antibiotic therapy at this time.  - Oxygenation requirements improved during hospital course.  Monitor with exertion.  - Will decrease steroid dosage to prednisone 10 mg daily.  Had been on slow outpatient taper of prednisone therapy from pulmonary perspective.  - Nebulizer treatments  - DVT prophylaxis: Lovenox      Cody Holloway,   Pulmonary Critical Care Medicine  [facility]        [1]   Current Facility-Administered Medications   Medication Dose Route Frequency    [START ON 7/18/2025] predniSONE (Deltasone) tab 10 mg  10 mg Oral Daily with breakfast    acetaminophen (Tylenol Extra Strength) tab 500 mg  500 mg Oral Q4H PRN    enoxaparin (Lovenox) 40 MG/0.4ML SUBQ injection 40 mg  40 mg Subcutaneous Daily    ondansetron (Zofran) 4 MG/2ML injection 4 mg  4 mg Intravenous Q6H PRN    metoclopramide (Reglan) 5 mg/mL injection 5 mg  5 mg Intravenous Q8H PRN    guaiFENesin (Robitussin) 100 MG/5 ML oral liquid 200 mg  200 mg Oral Q4H PRN    benzonatate (Tessalon) cap 200 mg  200 mg Oral TID PRN    furosemide (Lasix) 10 mg/mL injection 40 mg  40 mg Intravenous BID (Diuretic)    aspirin DR tab 81 mg  81 mg Oral Daily    atorvastatin (Lipitor) tab 80 mg  80 mg Oral Nightly    clopidogrel (Plavix) tab 75 mg  75 mg Oral Daily    ferrous sulfate DR tab 325 mg  325 mg Oral Daily with breakfast    pantoprazole (Protonix) DR tab 40 mg  40 mg Oral QAM AC    docusate sodium (Colace) cap 100 mg  100 mg Oral Daily PRN    ipratropium-albuterol (Duoneb) 0.5-2.5  (3) MG/3ML inhalation solution 3 mL  3 mL Nebulization Q6H PRN    polyethylene glycol (PEG 3350) (Miralax) 17 g oral packet 17 g  17 g Oral Daily PRN    sodium chloride (Saline Mist) 0.65 % nasal solution 1 spray  1 spray Nasal Q3H PRN   [2]

## 2025-07-17 NOTE — DISCHARGE INSTRUCTIONS
IV ANTIBIOTIC INFORMATION:  [facility]  [address]  [place] [address]  [phone]  Your appointment is  Wednesday 07/23/2025 at 1:30PM.       Going Home Instructions  In this section you will find the tools which will guide you through the first few days after you leave the hospital. Continued use of these tools will help you develop the skills necessary to keep your heart failure under control.     Home Care Instructions Following Heart Failure - the most important things to do every day include:   Weigh yourself and review the “Self-Check Plan” sheet every morning.   Call your cardiologist office if you are in the “Pay Attention-Use Caution” (yellow zone) or “Medical Alert-Warning!” (red zone) as outlined in the Self-Check Plan sheet.  Take your medicines as prescribed.  Limit your sodium (salt) intake.  Know when to call your cardiologist, primary doctor, or nurse.  Know when to seek emergency care.      Things for You to Remember:   1. See your doctor or healthcare provider as written on your discharge instructions.  It is important that you attend this appointment to make sure your symptoms are under control.     2. Your recommended sodium intake is [id number] mg daily.    3.  Weigh yourself every day.    4. Some exercise and activity is important to help keep your heart functioning and strong. Unless instructed not to exercise, you may walk at a slow to moderate pace for 10-15 minutes 2-3 days per week to start. Pace your activity to prevent shortness of breath or fatigue. Stop exercising if you develop chest pain, lightheadedness, or significant shortness of breath.       Call Your Cardiologist If:   You gain 2-3 pounds in one day or 5 pounds in one week.  You have more difficulty breathing.  You are getting more tired with normal activity.  You are more short of breath lying down, or awaken at night short of breath.  You have swelling of your feet or legs.  You  urinate less often during the day and more often at night.  You have cramps in your legs.  You have blurred vision or see yellowish-green halos around objects of lights.    Go to the Emergency Room If:   You have pain or tightness in your chest  You are extremely short of breath  You are coughing up pink-frothy mucus  You are traveling and develop symptoms of worsening heart failure      ** Please follow up with your cardiologist or Advanced Practice Provider as written on your discharge instructions. If you are not provided with an appointment, let your nurse know so you can get an appointment**

## 2025-07-17 NOTE — PROGRESS NOTES
[facility] Cardiology Progress Note    Hoda Busby Patient Status:  Inpatient    1940 MRN [id number]   Location [facility] 3W/SW Attending Jonathon Mccormick MD   Hosp Day # 1 PCP Malathi Stuart MD     Subjective:  Denies cp, dyspnea improved   On 2L 02 NC (baseline 3L per pt)       Objective:  /66 (BP Location: Right arm)   Pulse 78   Temp 98 °F (36.7 °C) (Oral)   Resp 18   Wt 173 lb 6.4 oz (78.7 kg)   SpO2 94%   BMI 29.76 kg/m²     Telemetry: NSR, 78 bpm       Intake/Output:    Intake/Output Summary (Last 24 hours) at 2025 0709  Last data filed at 2025 0500  Gross per 24 hour   Intake 660 ml   Output 801 ml   Net -141 ml       Last 3 Weights   25 0500 173 lb 6.4 oz (78.7 kg)   25 1343 176 lb 6.4 oz (80 kg)   25 0910 182 lb 15.7 oz (83 kg)   25 1050 183 lb (83 kg)   25 0949 183 lb (83 kg)       Labs:  Recent Labs   Lab 25  0917 25  0550   GLU 95 94   BUN 18 21   CREATSERUM 0.97 1.10*   EGFRCR 57* 49*   CA 8.9 8.9    139   K 4.1 3.7    102   CO2 25.0 28.0     Recent Labs   Lab 25  0917 25  0550   RBC 3.74* 3.27*   HGB 10.1* 8.7*   HCT 31.4* 27.6*   MCV 84.0 84.4   MCH 27.0 26.6   MCHC 32.2 31.5   RDW 15.8* 15.9*   NEPRELIM 15.43* 9.71*   WBC 17.4* 11.9*   .0 283.0         Recent Labs   Lab 25  0917   TROPHS 32       Diagnostics:     25 Echo  1.The left ventricle is normal in size. Left ventricular wall thickness is normal. Global left ventricular systolic function is normal. The left ventricular ejection fraction is 60-65%. No regional wall motion abnormalities. The left ventricle diastolic function is impaired (Grade II) with an elevated left atrial pressure.2.There is a normal functioning bioprosthetic aortic valve (TAVR). Peak velocity 2.46 m/s. Mean gradient 13 mm Hg. KIKE (VTI) 1.18. DI 0.42.3.Moderate mitral annular calcification. Mild mitral regurgitation4.The left atrium is  severely dilated.5.The pulmonary artery systolic pressure is midly increased, estimated at 36 mm Hg.     Review of Systems   Respiratory:  Positive for shortness of breath. Negative for wheezing.    Cardiovascular:  Positive for leg swelling. Negative for chest pain, palpitations and orthopnea.     Physical Exam:    General: Alert and oriented x 3. No apparent distress.   HEENT: Normocephalic, anicteric sclera, neck supple, no thyromegaly or adenopathy.  Neck: No JVD, carotids 2+, no bruits.  Cardiac: Regular rate & rhythm. S1, S2 normal. No murmur, pericardial rub, S3, or extra cardiac sounds.  Lungs: Faint crackles.  Normal excursions and effort.  Abdomen: Soft, non-tender. No organosplenomegally, mass or rebound, BS-present.  Extremities: Without clubbing or cyanosis.  +Trace -1 BLE edema   Neurologic: Alert and oriented, normal affect. No focal defects  Skin: Warm and dry.     Medications:  Scheduled Medications[1]  Medication Infusions[2]    Assessment:  85 year old female with a history of Hypertension, aortic valve stenosis status post TAVR 1/23/2025, AVMs status post colon resection 2/27/2025, CAD with LAD PCI/7/25, PE, interstitial lung disease HFpEF presenting with shortness of breath and mild edema.     Acute on chronic hypoxic respiratory failure  -Likely 2/2 poss pneumonia, hx of interstitial lung disease, mild acute HFpEF   -CT chest neg for PE   -On steroids per Pulm     Acute on chronic HFpEF, EF 60-65% in 6/2025   -pBNP 2276 . CXR w/ bilateral diffuse interstitial prominence, prominent central vasculature mild cardiomegaly, multifocal alveolitis   -On IV lasix. Scr 1.1    Severe aortic valve stenosis, s/p TAVR 1/2025 - normal functioning bioprosthetic aortic valve on 6/2025 echo   Coronary artery disease status post LAD PCI 1/2025 - denies angina. Trop neg, no acute ischemic changes on EKG. On dapt, statin  Hypertension - BP soft. Monitor closely   Hyperlipidemia - statin therapy   Sinus tachycardia  - resolved   History of pulm emboli in 10/2024    Plan:    I/o, wts possibly inaccurate. Pt reports improvement to dyspnea & BLE edema. On less than baseline 02 NC   Scr 1.1. Continue IVP lasix. Can consider PO tomorrow   Monitor strict I/o, daily wts & renal fx closely   Denies angina. Continue dapt, statin therapy     Vivian MartinezWINSTON  7/17/2025  7:09 AM  Ph [phone] ([place])  Ph [phone] ([place])      Patient seen and examined independently.  Note reviewed and labs reviewed.  Agree with above assessment and plan with the below assessment and plan adjustments:    This morning, patient comfortable, no acute distress.  On less oxygen than her baseline of 3 L nasal cannula O2.  She is sitting upright combing her hair.  Denies chest pain, shortness of breath improved and lower extremity edema minimal if anything.  She states she feels better than before but still has some shortness of breath otherwise.  She has extensive history of interstitial lung disease and follows with pulmonary as well.    Acute on chronic hypoxic respiratory failure  - Presented with signs and symptoms of shortness of breath, dyspnea, CTA negative for PE.  - Improved symptoms with IV diuretics and now weaned down to less oxygen than previously at baseline.  - Continue pulmonary management, steroids, inhalers and breathing treatments, consider antifibrotic therapies such as ILD management, defer to pulmonary, IV diuresis today and then transition to oral tomorrow.  - PT and OT, pulmonary rehab    Acute on chronic HFpEF, stage C, NYHA class III  - Do not suspect that heart failure is a sole cause of her symptoms, suspect more so ILD related changes.  - However does have mild fluid overload signs as noted objectively.  Would favor 1 more day of IV diuretics today, and then transition to oral tomorrow.  Focus on weaning oxygen as tolerated, PT and OT, ILD management as below from pulmonary.    Severe aortic valve stenosis status post  TAVR  - Per history, well-functioning bioprosthetic aortic valve on a recent echo, continue medical therapy and optimization    CAD status post LAD PCI  - No acute angina, troponin negative, no acute ischemic EKG changes, continue dual antiplatelet therapy, statin    ILD  - Appreciate pulmonary management, suspect ILD is playing a large role in her symptoms with known fibrosis and scarring on prior imaging.  Consider antifibrotic agents, defer to pulmonary    History of PE  - Recent study without any evidence of PE    Chris Berry MD   Advanced Heart Failure and Transplant Cardiology   [facility]     L3       [1]    enoxaparin  40 mg Subcutaneous Daily    furosemide  40 mg Intravenous BID (Diuretic)    methylPREDNISolone  40 mg Intravenous Q12H    aspirin  81 mg Oral Daily    atorvastatin  80 mg Oral Nightly    clopidogrel  75 mg Oral Daily    ferrous sulfate  325 mg Oral Daily with breakfast    pantoprazole  40 mg Oral QAM AC   [2]

## 2025-07-17 NOTE — PLAN OF CARE
VSS overnight, no complaints of pain. Continue IV Lasix BID and plan for repeat CXR this AM.    Problem: Patient Centered Care  Goal: Patient preferences are identified and integrated in the patient's plan of care  Description: Interventions:  - What would you like us to know as we care for you? From [facility] [place]  - Provide timely, complete, and accurate information to patient/family  - Incorporate patient and family knowledge, values, beliefs, and cultural backgrounds into the planning and delivery of care  - Encourage patient/family to participate in care and decision-making at the level they choose  - Honor patient and family perspectives and choices  Outcome: Progressing     Problem: Patient/Family Goals  Goal: Patient/Family Long Term Goal  Description: Patient's Long Term Goal: discharge from hospital     Interventions:  - monitor vital signs   - monitor appropriate labs  - pain management   - administer medications per order  - follow MD orders  - diagnostics per order  - update and inform patient and family on plan of care  - discharge planning  - See additional Care Plan goals for specific interventions  Outcome: Progressing  Goal: Patient/Family Short Term Goal  Description: Patient's Short Term Goal: improve breathing     Interventions:   - monitor vital signs   - monitor appropriate labs  - pain management   - administer medications per order  - follow MD orders  - diagnostics per order  - update and inform patient and family on plan of care  - See additional Care Plan goals for specific interventions  Outcome: Progressing     Problem: PAIN - ADULT  Goal: Verbalizes/displays adequate comfort level or patient's stated pain goal  Description: INTERVENTIONS:  - Encourage pt to monitor pain and request assistance  - Assess pain using appropriate pain scale  - Administer analgesics based on type and severity of pain and evaluate response  - Implement non-pharmacological measures as appropriate and  evaluate response  - Consider cultural and social influences on pain and pain management  - Manage/alleviate anxiety  - Utilize distraction and/or relaxation techniques  - Monitor for opioid side effects  - Notify MD/LIP if interventions unsuccessful or patient reports new pain  - Anticipate increased pain with activity and pre-medicate as appropriate  Outcome: Progressing     Problem: SAFETY ADULT - FALL  Goal: Free from fall injury  Description: INTERVENTIONS:  - Assess pt frequently for physical needs  - Identify cognitive and physical deficits and behaviors that affect risk of falls.  - [place] fall precautions as indicated by assessment.  - Educate pt/family on patient safety including physical limitations  - Instruct pt to call for assistance with activity based on assessment  - Modify environment to reduce risk of injury  - Provide assistive devices as appropriate  - Consider OT/PT consult to assist with strengthening/mobility  - Encourage toileting schedule  Outcome: Progressing     Problem: DISCHARGE PLANNING  Goal: Discharge to home or other facility with appropriate resources  Description: INTERVENTIONS:  - Identify barriers to discharge w/pt and caregiver  - Include patient/family/discharge partner in discharge planning  - Arrange for needed discharge resources and transportation as appropriate  - Identify discharge learning needs (meds, wound care, etc)  - Arrange for interpreters to assist at discharge as needed  - Consider post-discharge preferences of patient/family/discharge partner  - Complete POLST form as appropriate  - Assess patient's ability to be responsible for managing their own health  - Refer to Case Management Department for coordinating discharge planning if the patient needs post-hospital services based on physician/LIP order or complex needs related to functional status, cognitive ability or social support system  Outcome: Progressing     Problem: CARDIOVASCULAR - ADULT  Goal:  Maintains optimal cardiac output and hemodynamic stability  Description: INTERVENTIONS:  - Monitor vital signs, rhythm, and trends  - Monitor for bleeding, hypotension and signs of decreased cardiac output  - Evaluate effectiveness of vasoactive medications to optimize hemodynamic stability  - Monitor arterial and/or venous puncture sites for bleeding and/or hematoma  - Assess quality of pulses, skin color and temperature  - Assess for signs of decreased coronary artery perfusion - ex. Angina  - Evaluate fluid balance, assess for edema, trend weights  Outcome: Progressing     Problem: RESPIRATORY - ADULT  Goal: Achieves optimal ventilation and oxygenation  Description: INTERVENTIONS:  - Assess for changes in respiratory status  - Assess for changes in mentation and behavior  - Position to facilitate oxygenation and minimize respiratory effort  - Oxygen supplementation based on oxygen saturation or ABGs  - Provide Smoking Cessation handout, if applicable  - Encourage broncho-pulmonary hygiene including cough, deep breathe, Incentive Spirometry  - Assess the need for suctioning and perform as needed  - Assess and instruct to report SOB or any respiratory difficulty  - Respiratory Therapy support as indicated  - Manage/alleviate anxiety  - Monitor for signs/symptoms of CO2 retention  Outcome: Progressing     Problem: METABOLIC/FLUID AND ELECTROLYTES - ADULT  Goal: Electrolytes maintained within normal limits  Description: INTERVENTIONS:  - Monitor labs and rhythm and assess patient for signs and symptoms of electrolyte imbalances  - Administer electrolyte replacement as ordered  - Monitor response to electrolyte replacements, including rhythm and repeat lab results as appropriate  - Fluid restriction as ordered  - Instruct patient on fluid and nutrition restrictions as appropriate  Outcome: Progressing     Problem: SKIN/TISSUE INTEGRITY - ADULT  Goal: Skin integrity remains intact  Description: INTERVENTIONS  - Assess  and document risk factors for pressure ulcer development  - Assess and document skin integrity  - Monitor for areas of redness and/or skin breakdown  - Initiate interventions, skin care algorithm/standards of care as needed  Outcome: Progressing     Problem: HEMATOLOGIC - ADULT  Goal: Free from bleeding injury  Description: (Example usage: patient with low platelets)  INTERVENTIONS:  - Avoid intramuscular injections, enemas and rectal medication administration  - Ensure safe mobilization of patient  - Hold pressure on venipuncture sites to achieve adequate hemostasis  - Assess for signs and symptoms of internal bleeding  - Monitor lab trends  - Patient is to report abnormal signs of bleeding to staff  - Avoid use of toothpicks and dental floss  - Use electric shaver for shaving  - Use soft bristle tooth brush  - Limit straining and forceful nose blowing  Outcome: Progressing

## 2025-07-18 NOTE — PLAN OF CARE
Patient alert. Fall precautions in place. Assisted to chair. Weaned down to 3L O2 within patient's baseline. Denies pain. Compression stockings in place. Started on IV Vanco per ID. POC updated to patient.     Problem: Patient Centered Care  Goal: Patient preferences are identified and integrated in the patient's plan of care  Description: Interventions:  - What would you like us to know as we care for you? From [facility] [place]  - Provide timely, complete, and accurate information to patient/family  - Incorporate patient and family knowledge, values, beliefs, and cultural backgrounds into the planning and delivery of care  - Encourage patient/family to participate in care and decision-making at the level they choose  - Honor patient and family perspectives and choices  Outcome: Progressing     Problem: Patient/Family Goals  Goal: Patient/Family Long Term Goal  Description: Patient's Long Term Goal: discharge from hospital     Interventions:  - monitor vital signs   - monitor appropriate labs  - pain management   - administer medications per order  - follow MD orders  - diagnostics per order  - update and inform patient and family on plan of care  - discharge planning  - See additional Care Plan goals for specific interventions  Outcome: Progressing  Goal: Patient/Family Short Term Goal  Description: Patient's Short Term Goal: improve breathing     Interventions:   - monitor vital signs   - monitor appropriate labs  - pain management   - administer medications per order  - follow MD orders  - diagnostics per order  - update and inform patient and family on plan of care  - See additional Care Plan goals for specific interventions  Outcome: Progressing     Problem: PAIN - ADULT  Goal: Verbalizes/displays adequate comfort level or patient's stated pain goal  Description: INTERVENTIONS:  - Encourage pt to monitor pain and request assistance  - Assess pain using appropriate pain scale  - Administer analgesics based  on type and severity of pain and evaluate response  - Implement non-pharmacological measures as appropriate and evaluate response  - Consider cultural and social influences on pain and pain management  - Manage/alleviate anxiety  - Utilize distraction and/or relaxation techniques  - Monitor for opioid side effects  - Notify MD/LIP if interventions unsuccessful or patient reports new pain  - Anticipate increased pain with activity and pre-medicate as appropriate  Outcome: Progressing     Problem: SAFETY ADULT - FALL  Goal: Free from fall injury  Description: INTERVENTIONS:  - Assess pt frequently for physical needs  - Identify cognitive and physical deficits and behaviors that affect risk of falls.  - [place] fall precautions as indicated by assessment.  - Educate pt/family on patient safety including physical limitations  - Instruct pt to call for assistance with activity based on assessment  - Modify environment to reduce risk of injury  - Provide assistive devices as appropriate  - Consider OT/PT consult to assist with strengthening/mobility  - Encourage toileting schedule  Outcome: Progressing     Problem: DISCHARGE PLANNING  Goal: Discharge to home or other facility with appropriate resources  Description: INTERVENTIONS:  - Identify barriers to discharge w/pt and caregiver  - Include patient/family/discharge partner in discharge planning  - Arrange for needed discharge resources and transportation as appropriate  - Identify discharge learning needs (meds, wound care, etc)  - Arrange for interpreters to assist at discharge as needed  - Consider post-discharge preferences of patient/family/discharge partner  - Complete POLST form as appropriate  - Assess patient's ability to be responsible for managing their own health  - Refer to Case Management Department for coordinating discharge planning if the patient needs post-hospital services based on physician/LIP order or complex needs related to functional status,  cognitive ability or social support system  Outcome: Progressing     Problem: CARDIOVASCULAR - ADULT  Goal: Maintains optimal cardiac output and hemodynamic stability  Description: INTERVENTIONS:  - Monitor vital signs, rhythm, and trends  - Monitor for bleeding, hypotension and signs of decreased cardiac output  - Evaluate effectiveness of vasoactive medications to optimize hemodynamic stability  - Monitor arterial and/or venous puncture sites for bleeding and/or hematoma  - Assess quality of pulses, skin color and temperature  - Assess for signs of decreased coronary artery perfusion - ex. Angina  - Evaluate fluid balance, assess for edema, trend weights  Outcome: Progressing     Problem: RESPIRATORY - ADULT  Goal: Achieves optimal ventilation and oxygenation  Description: INTERVENTIONS:  - Assess for changes in respiratory status  - Assess for changes in mentation and behavior  - Position to facilitate oxygenation and minimize respiratory effort  - Oxygen supplementation based on oxygen saturation or ABGs  - Provide Smoking Cessation handout, if applicable  - Encourage broncho-pulmonary hygiene including cough, deep breathe, Incentive Spirometry  - Assess the need for suctioning and perform as needed  - Assess and instruct to report SOB or any respiratory difficulty  - Respiratory Therapy support as indicated  - Manage/alleviate anxiety  - Monitor for signs/symptoms of CO2 retention  Outcome: Progressing     Problem: METABOLIC/FLUID AND ELECTROLYTES - ADULT  Goal: Electrolytes maintained within normal limits  Description: INTERVENTIONS:  - Monitor labs and rhythm and assess patient for signs and symptoms of electrolyte imbalances  - Administer electrolyte replacement as ordered  - Monitor response to electrolyte replacements, including rhythm and repeat lab results as appropriate  - Fluid restriction as ordered  - Instruct patient on fluid and nutrition restrictions as appropriate  Outcome: Progressing      Problem: SKIN/TISSUE INTEGRITY - ADULT  Goal: Skin integrity remains intact  Description: INTERVENTIONS  - Assess and document risk factors for pressure ulcer development  - Assess and document skin integrity  - Monitor for areas of redness and/or skin breakdown  - Initiate interventions, skin care algorithm/standards of care as needed  Outcome: Progressing     Problem: HEMATOLOGIC - ADULT  Goal: Free from bleeding injury  Description: (Example usage: patient with low platelets)  INTERVENTIONS:  - Avoid intramuscular injections, enemas and rectal medication administration  - Ensure safe mobilization of patient  - Hold pressure on venipuncture sites to achieve adequate hemostasis  - Assess for signs and symptoms of internal bleeding  - Monitor lab trends  - Patient is to report abnormal signs of bleeding to staff  - Avoid use of toothpicks and dental floss  - Use electric shaver for shaving  - Use soft bristle tooth brush  - Limit straining and forceful nose blowing  Outcome: Progressing

## 2025-07-18 NOTE — PROGRESS NOTES
[facility]  part of [facility]     Progress Note    Hoda Busby Patient Status:  Inpatient    1940 MRN [id number]   Location [facility] 3W/SW Attending Benny Singh MD   Hosp Day # 2 PCP Malathi Stuart MD       Subjective:   Patient seen and examined.  Resting in bed.  Dyspnea improved since arrival.  Episode of some weakness while trying to walk to the bathroom and fell yesterday feeling fatigued.  States dyspnea improving however this morning.    Objective:   Blood pressure (!) 71/41, pulse 88, temperature 97.8 °F (36.6 °C), temperature source Oral, resp. rate 20, weight 173 lb 6.4 oz (78.7 kg), SpO2 90%.  Intake/Output:   Last 3 shifts: I/O last 3 completed shifts:  In: 690 [P.O.:680; I.V.:10]  Out: 1250 [Urine:1250]   This shift: I/O this shift:  In: 170 [P.O.:170]  Out: 0      Vent Settings:      Hemodynamic parameters (last 24 hours):      Scheduled Meds: Current Hospital Medications[1]    Continuous Infusions: Medication Infusions[2]    Physical Exam  Constitutional: no acute distress  Eyes: PERRL  ENT: nares pateint  Neck: supple, no JVD  Cardio: RRR, S1 S2  Respiratory: Faint crackles  GI: abdomen soft, non tender, active bowel sounds, no organomegaly  Extremities: no clubbing, cyanosis, + lower extremity edema  Neurologic: no gross motor deficits  Skin: warm, dry      Results:     Lab Results   Component Value Date    CREATSERUM 1.06 2025    BUN 23 2025     2025    K 3.6 2025     2025    CO2 29.0 2025    GLU 88 2025    CA 8.3 2025       XR CHEST AP PORTABLE  (CPT=71045)  Result Date: 2025  CONCLUSION: Cardiomegaly and prominent central vasculature. TAVR Diffuse pulmonary interstitial prominence. 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 Electronically Verified and Signed by Attending Radiologist: Nguyễn Montenegro MD 2025 7:32 AM Workstation:  [id number]    CT CHEST PE AORTA (IV ONLY) (CPT=71260)  Result Date: 7/16/2025  CONCLUSION: 1.  No evidence of acute pulmonary embolism. 2.  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 Specifically, there are relatively stable findings of a chronic interstitial lung disease/pulmonary fibrosis as well as mild-moderate emphysema. 3.  Mild diffuse groundglass density opacities throughout both lungs are new or more conspicuous since comparison high-resolution chest CT from June, 2025. Findings could relate to interstitial edema (favored) or less likely atypical/viral infection. No significant associated pleural effusion. 4.  Cardiomegaly with coronary artery calcification and aortic valve repair. 5.  Anasarca. Electronically Verified and Signed by Attending Radiologist: Shaheen Vines MD 7/16/2025 1:11 PM Workstation: [id number]    XR CHEST AP PORTABLE  (CPT=71045)  Result Date: 7/16/2025  CONCLUSION: Mild cardiomegaly. Prominent central vasculature. Bilateral diffuse interstitial prominence. Superimposed multifocal alveolitis in the right perihilar and basilar region with slight progression. Follow-up to interval resolution Electronically Verified and Signed by Attending Radiologist: Nguyễn Montenegro MD 7/16/2025 10:37 AM Workstation: [id number]      EKG 12 Lead  Result Date: 7/16/2025  Sinus tachycardia with Premature atrial complexes Left anterior fascicular block Septal infarct , age undetermined Abnormal ECG When compared with ECG of 17-APR-2025 08:58, Premature atrial complexes are now Present The axis Shifted left Septal infarct is now Present T wave inversion now evident in Anterior leads Confirmed by ARELI FRANZ ([id number]) on 7/16/2025 5:10:19 PM      Assessment   1.  Acute on chronic respiratory failure  2.  ILD  3.  Colon adenocarcinoma  4.  Severe aortic stenosis s/p TAVR  5.  Prior history of pulmonary embolism  6.  HFpEF  7.  Likely pulmonary edema  8.  S/p fall  9.   Orthostatic hypotension  10.  Hypotension         Plan   -Patient presents with evidence of worsening hypoxemic respiratory failure normally on 3 L nasal cannula oxygen.  Admits to worsening 1 day prior to admission..  - CT chest on presentation with no evidence of pulmonary embolism seen.  Fibrotic changes seen with some emphysematous changes present.  Diffuse groundglass opacities seen compared to CT from June 2025.  Cannot rule out component of pulmonary edema.  - Status post fall on 7/18/2025.  Concern for orthostatic hypotension contributing.  Hold diuretic dose this morning given hypotension.  - Low suspicion for underlying pneumonia.  Hold off antibiotic therapy at this time.  - Wean oxygen as tolerated normally on 3 L.  - Will decrease steroid dosage to prednisone 10 mg daily.  Had been on slow outpatient taper of prednisone therapy from pulmonary perspective.  - Nebulizer treatments  - DVT prophylaxis: Lovenox      Cody Holloway DO  Pulmonary Critical Care Medicine  [facility]          [1]   Current Facility-Administered Medications   Medication Dose Route Frequency    predniSONE (Deltasone) tab 10 mg  10 mg Oral Daily with breakfast    furosemide (Lasix) tab 40 mg  40 mg Oral Daily    acetaminophen (Tylenol Extra Strength) tab 500 mg  500 mg Oral Q4H PRN    enoxaparin (Lovenox) 40 MG/0.4ML SUBQ injection 40 mg  40 mg Subcutaneous Daily    ondansetron (Zofran) 4 MG/2ML injection 4 mg  4 mg Intravenous Q6H PRN    metoclopramide (Reglan) 5 mg/mL injection 5 mg  5 mg Intravenous Q8H PRN    guaiFENesin (Robitussin) 100 MG/5 ML oral liquid 200 mg  200 mg Oral Q4H PRN    benzonatate (Tessalon) cap 200 mg  200 mg Oral TID PRN    aspirin DR tab 81 mg  81 mg Oral Daily    atorvastatin (Lipitor) tab 80 mg  80 mg Oral Nightly    clopidogrel (Plavix) tab 75 mg  75 mg Oral Daily    ferrous sulfate DR tab 325 mg  325 mg Oral Daily with breakfast    pantoprazole (Protonix) DR tab 40 mg  40 mg Oral QAM AC     docusate sodium (Colace) cap 100 mg  100 mg Oral Daily PRN    ipratropium-albuterol (Duoneb) 0.5-2.5 (3) MG/3ML inhalation solution 3 mL  3 mL Nebulization Q6H PRN    polyethylene glycol (PEG 3350) (Miralax) 17 g oral packet 17 g  17 g Oral Daily PRN    sodium chloride (Saline Mist) 0.65 % nasal solution 1 spray  1 spray Nasal Q3H PRN   [2]

## 2025-07-18 NOTE — PROGRESS NOTES
[facility]  Cardiology Progress Note    Hoda Busby Patient Status:  Inpatient    1940 MRN [id number]   Location [facility] 3W/SW Attending Benny Singh MD   Hosp Day # 2 PCP Malathi Stuart MD     Subjective     Last night had episode when she got up to use the bathroom, felt very lightheaded/dizzy and had presyncopal episode falling to her knees.  Breathing today actually feels back to baseline    Objective:   Patient Vitals for the past 24 hrs:   BP Temp Temp src Pulse Resp SpO2   25 0847 (!) 71/41 97.8 °F (36.6 °C) Oral 88 20 90 %   25 0846 (!) 88/36 -- -- 85 -- --   25 0842 96/43 -- -- 85 -- 99 %   25 0652 110/49 -- -- 88 22 95 %   25 0650 (!) 87/37 -- -- 89 22 (!) 88 %   25 0648 111/53 -- -- 86 22 92 %   25 0646 100/78 98.6 °F (37 °C) Oral 88 20 95 %   25 0112 132/76 -- -- 111 22 98 %   25 0100 -- -- -- 116 -- --   25 0059 (!) 146/94 98.9 °F (37.2 °C) Oral 114 22 95 %   25 0058 -- -- -- (!) 122 -- (!) 80 %   25 2058 113/51 98.5 °F (36.9 °C) Oral 90 22 92 %   25 1507 124/64 98.3 °F (36.8 °C) Oral 86 18 95 %     Intake/Output:   Last 3 shifts:   Intake/Output                   25 07 - 25 0659 25 07 - 25 0659 25 07 - 25 0659       Intake    P.O.  660  440  170    P.O. 660 440 170    I.V.  --  10  --    I.V. -- 10 --    Total Intake 660 450 170       Output    Urine  801  450  0    Urine 801 450 0    Urine Occurrence 3 x 2 x --    Incontinent Urine Occurrence -- 1 x --    Stool  --  --  --    Stool Count Calculated for I/O 1 x -- --    Total Output 801 450 0       Net I/O     -141 0 170             Scheduled Meds: Scheduled Medications[1]    Continuous Infusions: Medication Infusions[2]    Results:     Lab Results   Component Value Date    WBC 12.1 (H) 2025    HGB 9.4 (L) 2025    HCT 29.8 (L) 2025    .0 2025     CREATSERUM 1.06 (H) 07/18/2025    BUN 23 07/18/2025     07/18/2025    K 3.6 07/18/2025     07/18/2025    CO2 29.0 07/18/2025    GLU 88 07/18/2025    CA 8.3 (L) 07/18/2025    ALB 4.0 07/16/2025    ALKPHO 127 07/16/2025    BILT 1.1 07/16/2025    TP 6.2 07/16/2025    AST 30 07/16/2025    ALT 20 07/16/2025    PTT >240.0 (HH) 01/23/2025    INR 1.18 01/24/2025    TSH 1.316 03/27/2025    DDIMER 1.19 (H) 04/09/2025    ESRML 49 (H) 10/26/2024    MG 2.1 07/17/2025    PHOS 3.0 03/01/2025    B12 349 03/20/2017       Recent Labs   Lab 07/16/25  0917 07/17/25  0550 07/18/25  0612   GLU 95 94 88   BUN 18 21 23   CREATSERUM 0.97 1.10* 1.06*   CA 8.9 8.9 8.3*    139 136   K 4.1 3.7 3.6    102 102   CO2 25.0 28.0 29.0     Recent Labs   Lab 07/16/25  0917 07/17/25  0550 07/18/25  0615   RBC 3.74* 3.27* 3.47*   HGB 10.1* 8.7* 9.4*   HCT 31.4* 27.6* 29.8*   MCV 84.0 84.4 85.9   MCH 27.0 26.6 27.1   MCHC 32.2 31.5 31.5   RDW 15.8* 15.9* 16.0*   NEPRELIM 15.43* 9.71* 9.67*   WBC 17.4* 11.9* 12.1*   .0 283.0 329.0       No results for input(s): \"BNPML\" in the last 168 hours.    No results for input(s): \"TROP\", \"CK\" in the last 168 hours.    XR CHEST AP PORTABLE  (CPT=71045)  Result Date: 7/17/2025  CONCLUSION: Cardiomegaly and prominent central vasculature. TAVR Diffuse pulmonary interstitial prominence. 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 Electronically Verified and Signed by Attending Radiologist: Nguyễn Montenegro MD 7/17/2025 7:32 AM Workstation: [id number]    CT CHEST PE AORTA (IV ONLY) (CPT=71260)  Result Date: 7/16/2025  CONCLUSION: 1.  No evidence of acute pulmonary embolism. 2.  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 Specifically, there are relatively stable findings of a chronic interstitial lung disease/pulmonary fibrosis as well as mild-moderate emphysema. 3.  Mild diffuse groundglass density opacities throughout both lungs  are new or more conspicuous since comparison high-resolution chest CT from June, 2025. Findings could relate to interstitial edema (favored) or less likely atypical/viral infection. No significant associated pleural effusion. 4.  Cardiomegaly with coronary artery calcification and aortic valve repair. 5.  Anasarca. Electronically Verified and Signed by Attending Radiologist: Shaheen Vines MD 7/16/2025 1:11 PM Workstation: [id number]    XR CHEST AP PORTABLE  (CPT=71045)  Result Date: 7/16/2025  CONCLUSION: Mild cardiomegaly. Prominent central vasculature. Bilateral diffuse interstitial prominence. Superimposed multifocal alveolitis in the right perihilar and basilar region with slight progression. Follow-up to interval resolution Electronically Verified and Signed by Attending Radiologist: Nguyễn Montenegro MD 7/16/2025 10:37 AM Workstation: [id number]               Exam:     General: Alert and oriented x 3. No apparent distress.   HEENT: Normocephalic, anicteric sclera, neck supple, no thyromegaly or adenopathy.  Neck: No JVD, carotids 2+, no bruits.  Cardiac: Regular rate and rhythm. S1, S2 normal. No murmur, pericardial rub, S3, or extra cardiac sounds.  Lungs: Clear without wheezes, rales, rhonchi or dullness.  Normal excursions and effort.  Abdomen: Soft, non-tender. No organosplenomegally, mass or rebound, BS-present.  Extremities: Without clubbing or cyanosis.  1+ right greater than left lower extremity edema.  Neurologic: Alert and oriented, normal affect. No focal defects  Skin: Warm and dry.     Assessment and Plan:   85 year old female with a history of Hypertension, aortic valve stenosis status post TAVR 1/23/2025, AVMs status post colon resection 2/27/2025, CAD with LAD PCI/7/25, PE, interstitial lung disease HFpEF presenting with shortness of breath and mild edema.      Acute on chronic hypoxic respiratory failure  -Likely 2/2 poss pneumonia, hx of interstitial lung disease, mild acute HFpEF   -CT  chest neg for PE   -On steroids per Pulm      Acute on chronic HFpEF, EF 60-65% in 6/2025   -pBNP 2276 . CXR w/ bilateral diffuse interstitial prominence, prominent central vasculature mild cardiomegaly, multifocal alveolitis      Severe aortic valve stenosis, s/p TAVR 1/2025 - normal functioning bioprosthetic aortic valve on 6/2025 echo   Coronary artery disease status post LAD PCI 1/2025 - denies angina. Trop neg, no acute ischemic changes on EKG. On dapt, statin  Hypertension - BP soft. Monitor closely   Hyperlipidemia - statin therapy   Sinus tachycardia - resolved   History of pulm emboli in 10/2024     Plan:     Respiratory symptoms likely more pulmonary in nature, defer management to pulmonary  Now with mildly elevated creatinine and orthostatic hypotension secondary to overdiuresis  Hold oral diuretics for now, reassess blood pressure and orthostatic symptoms  Once improved would resume home Lasix 20 mg daily  Continue other home cardiac medications    Viktor Bird MD  [facility]  7/18/2025         [1]    enoxaparin  40 mg Subcutaneous Daily    vancomycin  15 mg/kg Intravenous Once    ampicillin  1 g Intravenous Q12H    predniSONE  10 mg Oral Daily with breakfast    [Held by provider] furosemide  40 mg Oral Daily    aspirin  81 mg Oral Daily    atorvastatin  80 mg Oral Nightly    clopidogrel  75 mg Oral Daily    ferrous sulfate  325 mg Oral Daily with breakfast    pantoprazole  40 mg Oral QAM AC   [2]

## 2025-07-18 NOTE — SIGNIFICANT EVENT
RRT    *See RRT Documentation Record*    Reason the RRT was called: mental status change, weakness, respiratory distress  Assessment of patient leading up to RRT: Patient became weak while trying to get to toilet; staff attempted to assist her but she was not communicating or able to stand; also with labored breathing and appeared extremely fatigued.  Interventions/Testing: RRT, vital signs, increased O2  Patient Outcome/Disposition: remained on floor  Family Notified: no

## 2025-07-18 NOTE — CONSULTS
[facility]  part of [facility] ID CONSULT NOTE    Hoda Busby Patient Status:  Inpatient    1940 MRN [id number]   Location [facility] 3W/SW Attending Benny Singh MD   Hosp Day # 2 PCP Malathi Stuart MD       Reason for Consultation:  Bacteremia    ASSESSMENT:    Antibiotics: IV vancomycin    # S. Epi bacteremia in 2/2 sets with TAVR 25 - r/o IE  # Acute sepsis with hypotension  # NANCY  # Leukocytosis  # h/o PE 10/2024  # h/o colon invasive adnocarcinoma s/p L hemicolectomy 25  # HTN, HLD, CAD, CHF  # h/o interstitial lung disease on home O2 4L - on prednisone 10 mg/day      PLAN:    -  start IV vancomycin  -  discontinue ampicillin  -  repeat BCx  -  f/up BCx  -  check TTE; will need DARYL  -  Follow fever curve, wbc  -  Reviewed labs, micro, imaging reports, available old records  -  d/w patient, family, Primary, Pharmacy, staff    History of Present Illness:  Hoda Busby is a a(n) 85 year old female. Patient is 85-year-old female with a history of HTN, aortic stenosis status post TAVR 2025, was last seen 2020 for for leukocytosis with multifocal pneumonia and hypoxia.  Treated with IV Zosyn for 7 days as well as steroids.  History of interstitial pneumonitis status post steroids.  Also in 2024 found to have a gastric ulcer that was cauterized and a colonoscopy revealed a large transverse colon polyp found to be invasive adenocarcinoma.  Underwent left hemicolectomy 2025.  Now presents to hospital on  with chronic 4 L home oxygen now with progressive dyspnea exertion, shortness of breath, hypoxia requiring up to 7 L at night at the baseline.  Also with hypotension, WBC 17.4.  CT chest with no PE but findings of pulmonary fibrosis and emphysema that is stable as well as concern for pulmonary edema.  Not found to have Staph epidermidis bacteremia 2 sets.    History:  Past Medical History[1]  Past Surgical  History[2]  Family History[3]   reports that she quit smoking about 35 years ago. Her smoking use included cigarettes. She started smoking about 35 years ago. She has been exposed to tobacco smoke. She has never used smokeless tobacco. She reports that she does not currently use alcohol after a past usage of about 5.0 standard drinks of alcohol per week. She reports that she does not use drugs.    Allergies:  Allergies[4]    Medications:  Current Hospital Medications[5]    Review of Systems:  Per HPI    Physical Exam:  Vital signs: Blood pressure 106/47, pulse 85, temperature 97.7 °F (36.5 °C), temperature source Oral, resp. rate 20, weight 173 lb 6.4 oz (78.7 kg), SpO2 95%.    General: Alert, oriented, NAD  HEENT: Moist mucous membranes. EOMI  Neck: No lymphadenopathy.  Supple.  Cardiovascular: No chest wall tenderness  Respiratory: Symmetric expansion  Abdomen: Soft, nontender, nondistended.   Musculoskeletal:+BLE edema  Integument: No lesions. No erythema. BUE ecchymosis    Laboratory Data: Reviewed in EMR    Microbiology: Reviewed in EMR    Radiology: Reviewed    Thank you for allowing us to participate in the care of this patient. Please do not hesitate to call if you have any questions.     We will continue to follow with you and will make further recommendations based on his progress.    Seth Garcia MD   [facility]  [phone]  7/18/2025         [1]   Past Medical History:   Acute pulmonary embolism (HCC)    Cancer (HCC)    Cataract    Colon cancer (HCC)    Coronary atherosclerosis    Foot drop, left foot    Heart valve disease    High blood pressure    High cholesterol    History of blood transfusion    History of stomach ulcers    History of transcatheter aortic valve replacement (TAVR)    Macular degeneration    Osteoarthritis    Osteopenia    Primary osteoarthritis of right hip    Pulmonary embolism (HCC)    Visual impairment    Readers    White coat hypertension   [2]    Past Surgical History:  Procedure Laterality Date    Cataract  3/2&3/16 2017    Cath transcatheter aortic valve replacement      Colonoscopy N/A 12/17/2024    Procedure: COLONOSCOPY;  Surgeon: Karen Staton MD;  Location: [facility] ENDOSCOPY    Colonoscopy      Colonoscopy & polypectomy  01/04/2021         Hc tavr outpt e&m est pt level 2 w proced  01/23/2025    Hip replacement surgery Left Around 2006    Other surgical history  [place] 2017    Bilateral cataract surgery    Trcath replace aortic valve  01/23/2025    TAVR    Upper gi endoscopy,exam     [3]   Family History  Problem Relation Age of Onset    Cancer Father         bone (cause of death)    Stroke Mother         CVA (cause of death)    Diabetes Mother     Hypertension Mother     Dementia Brother         Alzheimer's    Heart Disease Brother         CAD - x2 brothers    Heart Disease Brother     Dementia Brother    [4] No Known Allergies  [5]   Current Facility-Administered Medications:     guaiFENesin-codeine (Robitussin AC) 100-10 MG/5ML oral solution 5 mL, 5 mL, Oral, TID PRN    enoxaparin (Lovenox) 40 MG/0.4ML SUBQ injection 40 mg, 40 mg, Subcutaneous, Daily    vancomycin (Vancocin) 1.25 g in sodium chloride 0.9% 250mL IVPB premix, 15 mg/kg, Intravenous, Q24H    predniSONE (Deltasone) tab 10 mg, 10 mg, Oral, Daily with breakfast    [Held by provider] furosemide (Lasix) tab 40 mg, 40 mg, Oral, Daily    acetaminophen (Tylenol Extra Strength) tab 500 mg, 500 mg, Oral, Q4H PRN    ondansetron (Zofran) 4 MG/2ML injection 4 mg, 4 mg, Intravenous, Q6H PRN    metoclopramide (Reglan) 5 mg/mL injection 5 mg, 5 mg, Intravenous, Q8H PRN    guaiFENesin (Robitussin) 100 MG/5 ML oral liquid 200 mg, 200 mg, Oral, Q4H PRN    benzonatate (Tessalon) cap 200 mg, 200 mg, Oral, TID PRN    aspirin DR tab 81 mg, 81 mg, Oral, Daily    atorvastatin (Lipitor) tab 80 mg, 80 mg, Oral, Nightly    clopidogrel (Plavix) tab 75 mg, 75 mg, Oral, Daily    ferrous sulfate DR tab 325 mg, 325 mg,  Oral, Daily with breakfast    pantoprazole (Protonix) DR tab 40 mg, 40 mg, Oral, QAM AC    docusate sodium (Colace) cap 100 mg, 100 mg, Oral, Daily PRN    ipratropium-albuterol (Duoneb) 0.5-2.5 (3) MG/3ML inhalation solution 3 mL, 3 mL, Nebulization, Q6H PRN    polyethylene glycol (PEG 3350) (Miralax) 17 g oral packet 17 g, 17 g, Oral, Daily PRN    sodium chloride (Saline Mist) 0.65 % nasal solution 1 spray, 1 spray, Nasal, Q3H PRN

## 2025-07-18 NOTE — PLAN OF CARE
Patient had fall/RRT overnight, see previous documentation. Patient states she feels well currently. Orthostatic vitals (+), also desats with minimal activity.    Problem: Patient Centered Care  Goal: Patient preferences are identified and integrated in the patient's plan of care  Description: Interventions:  - What would you like us to know as we care for you? From [facility]  - Provide timely, complete, and accurate information to patient/family  - Incorporate patient and family knowledge, values, beliefs, and cultural backgrounds into the planning and delivery of care  - Encourage patient/family to participate in care and decision-making at the level they choose  - Honor patient and family perspectives and choices  Outcome: Progressing     Problem: Patient/Family Goals  Goal: Patient/Family Long Term Goal  Description: Patient's Long Term Goal: discharge from hospital     Interventions:  - monitor vital signs   - monitor appropriate labs  - pain management   - administer medications per order  - follow MD orders  - diagnostics per order  - update and inform patient and family on plan of care  - discharge planning  - See additional Care Plan goals for specific interventions  Outcome: Progressing  Goal: Patient/Family Short Term Goal  Description: Patient's Short Term Goal: improve breathing     Interventions:   - monitor vital signs   - monitor appropriate labs  - pain management   - administer medications per order  - follow MD orders  - diagnostics per order  - update and inform patient and family on plan of care  - See additional Care Plan goals for specific interventions  Outcome: Progressing     Problem: PAIN - ADULT  Goal: Verbalizes/displays adequate comfort level or patient's stated pain goal  Description: INTERVENTIONS:  - Encourage pt to monitor pain and request assistance  - Assess pain using appropriate pain scale  - Administer analgesics based on type and severity of pain and evaluate  response  - Implement non-pharmacological measures as appropriate and evaluate response  - Consider cultural and social influences on pain and pain management  - Manage/alleviate anxiety  - Utilize distraction and/or relaxation techniques  - Monitor for opioid side effects  - Notify MD/LIP if interventions unsuccessful or patient reports new pain  - Anticipate increased pain with activity and pre-medicate as appropriate  Outcome: Progressing     Problem: SAFETY ADULT - FALL  Goal: Free from fall injury  Description: INTERVENTIONS:  - Assess pt frequently for physical needs  - Identify cognitive and physical deficits and behaviors that affect risk of falls.  - [place] fall precautions as indicated by assessment.  - Educate pt/family on patient safety including physical limitations  - Instruct pt to call for assistance with activity based on assessment  - Modify environment to reduce risk of injury  - Provide assistive devices as appropriate  - Consider OT/PT consult to assist with strengthening/mobility  - Encourage toileting schedule  Outcome: Progressing     Problem: DISCHARGE PLANNING  Goal: Discharge to home or other facility with appropriate resources  Description: INTERVENTIONS:  - Identify barriers to discharge w/pt and caregiver  - Include patient/family/discharge partner in discharge planning  - Arrange for needed discharge resources and transportation as appropriate  - Identify discharge learning needs (meds, wound care, etc)  - Arrange for interpreters to assist at discharge as needed  - Consider post-discharge preferences of patient/family/discharge partner  - Complete POLST form as appropriate  - Assess patient's ability to be responsible for managing their own health  - Refer to Case Management Department for coordinating discharge planning if the patient needs post-hospital services based on physician/LIP order or complex needs related to functional status, cognitive ability or social support  system  Outcome: Progressing     Problem: CARDIOVASCULAR - ADULT  Goal: Maintains optimal cardiac output and hemodynamic stability  Description: INTERVENTIONS:  - Monitor vital signs, rhythm, and trends  - Monitor for bleeding, hypotension and signs of decreased cardiac output  - Evaluate effectiveness of vasoactive medications to optimize hemodynamic stability  - Monitor arterial and/or venous puncture sites for bleeding and/or hematoma  - Assess quality of pulses, skin color and temperature  - Assess for signs of decreased coronary artery perfusion - ex. Angina  - Evaluate fluid balance, assess for edema, trend weights  Outcome: Progressing     Problem: RESPIRATORY - ADULT  Goal: Achieves optimal ventilation and oxygenation  Description: INTERVENTIONS:  - Assess for changes in respiratory status  - Assess for changes in mentation and behavior  - Position to facilitate oxygenation and minimize respiratory effort  - Oxygen supplementation based on oxygen saturation or ABGs  - Provide Smoking Cessation handout, if applicable  - Encourage broncho-pulmonary hygiene including cough, deep breathe, Incentive Spirometry  - Assess the need for suctioning and perform as needed  - Assess and instruct to report SOB or any respiratory difficulty  - Respiratory Therapy support as indicated  - Manage/alleviate anxiety  - Monitor for signs/symptoms of CO2 retention  Outcome: Progressing     Problem: METABOLIC/FLUID AND ELECTROLYTES - ADULT  Goal: Electrolytes maintained within normal limits  Description: INTERVENTIONS:  - Monitor labs and rhythm and assess patient for signs and symptoms of electrolyte imbalances  - Administer electrolyte replacement as ordered  - Monitor response to electrolyte replacements, including rhythm and repeat lab results as appropriate  - Fluid restriction as ordered  - Instruct patient on fluid and nutrition restrictions as appropriate  Outcome: Progressing     Problem: SKIN/TISSUE INTEGRITY -  ADULT  Goal: Skin integrity remains intact  Description: INTERVENTIONS  - Assess and document risk factors for pressure ulcer development  - Assess and document skin integrity  - Monitor for areas of redness and/or skin breakdown  - Initiate interventions, skin care algorithm/standards of care as needed  Outcome: Progressing     Problem: HEMATOLOGIC - ADULT  Goal: Free from bleeding injury  Description: (Example usage: patient with low platelets)  INTERVENTIONS:  - Avoid intramuscular injections, enemas and rectal medication administration  - Ensure safe mobilization of patient  - Hold pressure on venipuncture sites to achieve adequate hemostasis  - Assess for signs and symptoms of internal bleeding  - Monitor lab trends  - Patient is to report abnormal signs of bleeding to staff  - Avoid use of toothpicks and dental floss  - Use electric shaver for shaving  - Use soft bristle tooth brush  - Limit straining and forceful nose blowing  Outcome: Progressing

## 2025-07-18 NOTE — PROGRESS NOTES
Progress Note     Hoda Busby Patient Status:  Inpatient    1940 MRN [id number]   Location [facility] 3W/SW Attending Benny Singh MD   Hosp Day # 2 PCP Malathi Stuart MD     Chief Complaint:   Chief Complaint   Patient presents with    Difficulty Breathing         Subjective:   S: Patient seen and examined, chart reviewed.   She looks great, states she is weak but otherwise feeling well.  ID consulted for Staph epi bacteremia.  Started on vanco.       Review of Systems:   10 point ROS completed and was negative, except for pertinent positive and negatives stated in subjective.                Objective:   Vital signs:  Temp:  [97.7 °F (36.5 °C)-98.9 °F (37.2 °C)] 97.7 °F (36.5 °C)  Pulse:  [] 85  Resp:  [18-22] 20  BP: ()/(36-94) 106/47  SpO2:  [80 %-99 %] 95 %    Wt Readings from Last 6 Encounters:   25 173 lb 6.4 oz (78.7 kg)   25 183 lb (83 kg)   25 183 lb (83 kg)   25 180 lb 3.2 oz (81.7 kg)   25 180 lb (81.6 kg)   25 173 lb (78.5 kg)       Physical Exam:    General: No acute distress.   Respiratory: Clear to auscultation bilaterally. No wheezes. No rhonchi.  Cardiovascular: S1, S2. Regular rate and rhythm. No murmurs, rubs or gallops.   Abdomen: Soft, nontender, nondistended.  Positive bowel sounds. No rebound or guarding.  Neurologic: No focal neurological deficits.   Musculoskeletal: Moves all extremities.  Extremities: No edema.    Results:   Diagnostic Data:      Labs:    Labs Last 24 Hours:   BMP     CBC    Other     Na 136 Cl 102 BUN 23 Glu 88   Hb 9.4   PTT - Procal -   K 3.6 CO2 29.0 Cr 1.06   WBC 12.1 >< .0  INR - CRP -   Renal Lytes Endo    Hct 29.8   Trop - D dim -   eGFR - Ca 8.3 POC Gluc  -    LFT   pBNP - Lactic -   eGFR AA - PO4 - A1c -   AST - APk - Prot -  LDL -      Recent Labs   Lab 25  0917 25  0550 25  0615   RBC 3.74* 3.27* 3.47*   HGB 10.1* 8.7* 9.4*   HCT 31.4* 27.6* 29.8*   MCV 84.0  84.4 85.9   MCH 27.0 26.6 27.1   MCHC 32.2 31.5 31.5   RDW 15.8* 15.9* 16.0*   NEPRELIM 15.43* 9.71* 9.67*   WBC 17.4* 11.9* 12.1*   .0 283.0 329.0       Lab Results   Component Value Date    INR 1.18 01/24/2025    INR 1.33 (H) 01/23/2025    INR 1.06 09/08/2023       Recent Labs   Lab 07/16/25  0917 07/17/25  0550 07/18/25  0612   GLU 95 94 88   BUN 18 21 23   CREATSERUM 0.97 1.10* 1.06*   CA 8.9 8.9 8.3*   ALB 4.0  --   --     139 136   K 4.1 3.7 3.6    102 102   CO2 25.0 28.0 29.0   ALKPHO 127  --   --    AST 30  --   --    ALT 20  --   --    BILT 1.1  --   --    TP 6.2  --   --        No results for input(s): \"JENNIFER\", \"LIP\" in the last 168 hours.    Recent Labs   Lab 07/17/25  0550   MG 2.1       Recent Labs   Lab 07/16/25  1203   BLDSMR Gram positive cocci in clusters*         XR CHEST AP PORTABLE  (CPT=71045)  Result Date: 7/17/2025  CONCLUSION: Cardiomegaly and prominent central vasculature. TAVR Diffuse pulmonary interstitial prominence. 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 Electronically Verified and Signed by Attending Radiologist: Nguyễn Montenegro MD 7/17/2025 7:32 AM Workstation: [id number]          Pro-Calcitonin  No results for input(s): \"PCT\" in the last 168 hours.    Cardiac  Recent Labs   Lab 07/16/25  0917   PBNP 2,276*       Imaging: Imaging data reviewed in [facility].    XR CHEST AP PORTABLE  (CPT=71045)  Result Date: 7/17/2025  CONCLUSION: Cardiomegaly and prominent central vasculature. TAVR Diffuse pulmonary interstitial prominence. 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 Electronically Verified and Signed by Attending Radiologist: Nguyễn Montenegro MD 7/17/2025 7:32 AM Workstation: [id number]         Medications: Scheduled Medications[1]    Assessment & Plan:   ASSESSMENT / PLAN:   Staph Epi Bacteremia  - bc 7/16 x 2 positive  - repeat 7/18 pending  - started on vanco  - ID consulted  - unclear source at  this time: Possible skin source given multiple areas of traumatic skin breakdown UE and LE  - Cards consult for DARYL. Hx TAVR    Acute on chronic hypoxic respiratory failure (HCC)  -multifactorial: acute HFpEF, recurrent interstitial pneumonitis, ?CAP  -CT chest in ED 7/16/2025 - no PE; fibrotic changes seen with emphysematous changes. Diffuse groundglass opacities seen compared to CT from June 2025. Also pulm edema.   -required NRB in ER transiently; on home 2 LPM o2.   -echo 2/5/25 - normal LV systolic function (EF 60-65%); grade 2 diastolic dysfunction; normally functioning bioprosthetic TAVR; sl incr PAP 44mmHg  - no abx.   -IV lasix; 40 mg IV BID (for HFpEF)  -s/p solumedrol - transitioned to prednisone  -suspect majority of sx/hypoxia due to ILD/NSIP  -O2 down to31L   -clinically improved     Coronary artery disease  -Cath 1/14/25 by Dr. Bernardo Liz (as w/u for TAVR) -- RCA non-obstructive; LM free of obst disease; LM plaque extending into ostium of LAD (20-30%); mid LAD (80-90%), cx ostium (60-70%)  -intervention delayed until after colon resection due to need for DAPT x 6 mos after stenting   -s/p PCI/JUVENTINO of mid LAD (70%>0%) on 4/7/25 by Dr. Dutton  -cont ASA, Plavix  -increased atorvastatin from 20mg to 80mg/day (LDL 76 on 3/26/25)     Interstitial pneumonitis/NSIP  -dx'ed 10/2024 - presented with cough and severe hypoxia  -unresponsive to abx   -CXR 10/24/24 extensive bilateral perihilar and bilateral upper and lower lobe   -S.pneumo, legionella Ag , mycoplasma IgM/G, Fungitell-beta-D glucan negative  -ANCA and URIEL/connective tissue disease panels negative  -anti-histone and anti-[place] Ab's negative  -CT with bilateral ground glass opacities, small consolidations of BLL  -findings suspicious for NSIP (vs cryptogenic organizing pneumonia vs hypersensitivity pneumonitis); NOT thought to be c/w UIP pattern  -s/p empiric steroids (solumedrol then prednisone taper) 10/25/24 - (EOT 1/18/25)  -dyspnea and hypoxia  completely resolved until admission 3/26/25 and now again 7/16/25 -- OPPE, hypoxia  -steroids as above  -transitioned from IV solumedrol to prednisone 40mg/day on 4/16/25 - slow taper per Dr. Holloway     Hx of Bilateral pulmonary emboli (10/2024)  -dx'ed at time of interstitial pneumonitis  -CT chest 10/25/24 -- acute distal segmental/subsegmental pulmonary emboli in [place] and subsegmental PE in [place]  -unclear etiology; no recent hx of long travel; no family/personal hx of blood clots  -BLE venous dopplers negative 10/26/24  -started xarelto 20 mg po every day on 11/28/24  -repeat CT chest 12/9/24 neg PE  -stopped Xarelto 12/14/24 due to recurrent GI bleed  -repeat CT chest 3/26/25 and 4/9/25 and 7/16/25 -- all neg PE     Severe aortic stenosis  -progression on serial echocardiograms  -s/p TAVR 1/23/25 (Dr. Reji Green)     Adenocarcinoma of colon  -Bx of transverse colon polyp 12/17/24 -- invasive, moderately differentiated adenocarcinoma  -CEA normal (1.9)  -CT a/p neg for mets  -surgery delayed in anticipation of TAVR (done 1/23/25)  -s/p robotic assisted left hemicolectomy (Dr. Cassidy) on 2/27/25 - path - tumor invades into muscularis; margins neg for tumor; all 19 LN's neg for mets (0/19); pT2, pN0     Hx  GI bleed  -EGD 11/26/24 (Dr. Nuno) -15mm nonbleeding gastric antral ulcer; 3mm gastric antral AVM s/p APC  -recurrence in 12/2024  -colonoscopy and repeat EGD 12/17/24 by Dr. Staton -- grade 1 esophagitis; duod ulcers healing; large flat polyp in transverse colon (carcinoma)   -received total of 3 units PRBCs   -Xarelto stopped in 12/2024  -s/p omeprazole 20mg BID x 8 weeks (EOT 1/22/25), now on omeprazole 20mg daily -- plan has been to continue until she completed her coronary stenting.  Will stop once off steroids.     Anemia due to acute blood loss  -GI bleed as above  -on ferrous sulfate 325mg/day  -Hgb down but stable (8.7)      Hx chronic BLE edema  -CRISTA hose     Acute left peroneal palsy  In Nov 2024;  had numbness to anterolateral left foot and ankle as well as inability to dorsiflex left foot; etiology unclear  -neurologist Dr Richardson consulted in hosp  -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   -MRI brain--no acute intracranial process  -was doing PT at [facility] -- continues to improve; almost back to normal       Hx of Hypertension, primary  -(dyazide stopped due to NANCY)  -(amlodipine held due to low BP in 11/2024)  -BP remains normal off meds     Hx of hip OA  -s/p left THR (Dr. Lo) many years ago  -s/p elective R BEATRIZ on 10/5/23 by Dr. Diaz     Hyperlipidemia   on atorvastatin to 80mg/day as above     Osteopenia   On Fosamax from 2011 to 4/2016.     DEXA stable 5/10/17 and 2019 and 2021  DEXA 8/2024 -- osteoporosis of left forearm  -restarted Fosamax 8/2024 (on hold this admission); restart at discharge     Vitamin D deficiency  On vitamin D 4000u/day         Level 3 care      dvt prophylaxis: eliquis  code status: full code  dispo: home upon medical clearance      Estimated date of discharge: tbd  Discharge is dependent on: clearance  At this point Ms. Busby is expected to be discharge to: home    Plan of care discussed with patient    Benny Singh MD, FAAP, FACP  [facility] Hospitalist  I respond to Epic Chat and AMS Connect         [1]    enoxaparin  40 mg Subcutaneous Daily    vancomycin  15 mg/kg Intravenous Q24H    predniSONE  10 mg Oral Daily with breakfast    [Held by provider] furosemide  40 mg Oral Daily    aspirin  81 mg Oral Daily    atorvastatin  80 mg Oral Nightly    clopidogrel  75 mg Oral Daily    ferrous sulfate  325 mg Oral Daily with breakfast    pantoprazole  40 mg Oral QAM AC

## 2025-07-18 NOTE — SIGNIFICANT EVENT
Significant Event - Fall Note    Date/Time of Fall: July 18, 2025 at 0056    Fall huddle completed: Yes    Description of patient fall:     Patient fell from: Standing     Activity when fall occurred: Toileting-related activities     Where did fall occur: Bathroom     Was the fall assisted: Assisted to floor    Who witnessed the fall: Staff    Patient narrative of fall: I became weak in the bathroom.    Staff narrative of fall: Patient became weak while trying to get to toilet; staff attempted to assist her but she was not communicating or able to stand; also with labored breathing and appeared extremely fatigued.    Name of Provider notified of fall: Dr. Garcia    Family notification: Patient declined    Factors contributing to fall:     Physical: Weakness/fatigue     Psychological: Unable to follow commands     Environmental:      Medications received in the past 8 hours:   Medication(s) Administered in past 8 Hours from 07/17/2025 1805 to 07/18/2025 0205       Date/Time Order Dose Route Action Action by Comments    07/17/2025 2100 CDT atorvastatin (Lipitor) tab 80 mg 80 mg Oral Given Jackie Huber, RN --            Was patient identified as high fall risk prior to fall:                                What interventions were in place prior to fall: Assistive devices (wheelchair, commode, walker, gait belt), Fall alert wristband, [place] footwear, Patient education tool, Patient/family involved in fall prevention plan, Signage in place, and Toileting regimen    Interventions post fall: Patient placed on purwick    Additional comments: Patient returned to baseline once back to bed.

## 2025-07-18 NOTE — PROGRESS NOTES
Located within [facility] Pharmacy Dosing Service      Initial Pharmacokinetic Consult for Vancomycin Dosing     Hoda Busby is a 85 year old female who is being initiated on vancomycin therapy for bacteremia.  Pharmacy has been asked to dose vancomycin by Dr. Singh.  The initial treatment and monitoring approach will be non-AUC strategy.        Weight and Temperature:    Wt Readings from Last 1 Encounters:   25 78.7 kg (173 lb 6.4 oz)        Temp Readings from Last 1 Encounters:   25 97.8 °F (36.6 °C) (Oral)      Labs:   Recent Labs   Lab 25  0917 25  0550 25  0612   CREATSERUM 0.97 1.10* 1.06*      Estimated Creatinine Clearance: 33.5 mL/min (A) (based on SCr of 1.06 mg/dL (H)).     Recent Labs   Lab 25  0917 25  0550 25  0615   WBC 17.4* 11.9* 12.1*          The Pharmacokinetic Target is:    Trough/random 10-15 mg/L    Renal Dosing Considerations:    None     Assessment/Plan:       Maintenance dose: Pharmacy will dose vancomycin at 1250 mg IV every 24 hours    Monitorin) Plan for vancomycin trough to be obtained at steady state    2) Pharmacy will order SCr as clinically indicated to assess renal function.    3) Pharmacy will monitor for toxicity and efficacy, adjust vancomycin dose and/or frequency, and order vancomycin levels as appropriate per the Pharmacy and Therapeutics Committee approved protocol until discontinuation of the medication.       We appreciate the opportunity to assist in the care of this patient.     Carolina Causey PharmD, [facility] Pharmacy Extension: [phone]

## 2025-07-18 NOTE — CM/SW NOTE
Social work received a call from [facility] regarding IV abx. The patient is covered at %100 for in office infusion.    Social work was unaware of the need for IV abx.    Since the patient lives at [facility] they are unable to accommodate IV abx.    Infectious disease would like to address IV abx on Monday 7/21.    Social work sent Home Health, Infusion and SNF referrals to be proactive.    Social work will follow up on IV abx plan. Patient will need a PICC line placed before Discharge.    MARCIA/DANIEL to remain available for support and/or discharge planning.     Ave MURPHY, LSW  Discharge Planner [id number]

## 2025-07-19 NOTE — PROGRESS NOTES
07/19/25 0957 07/19/25 0959 07/19/25 1001   Vital Signs   Pulse 87 84 96   Heart Rate Source Monitor Monitor Monitor   /44 94/54 (!) 86/39   MAP (mmHg) (!) 63 66 (!) 52   BP Location Right arm Right arm Right arm   BP Method Automatic Automatic Automatic   Patient Position Lying Sitting Standing     Patient denies dizziness with changing positions. Blood pressure 103/47, MAP 63 when returning to semi fowlers.

## 2025-07-19 NOTE — PROGRESS NOTES
INFECTIOUS DISEASE PROGRESS NOTE  [facility]  part of [facility] ID PROGRESS NOTE    Hoda Busby Patient Status:  Inpatient    1940 MRN [id number]   Location [facility] 3W/SW Attending Benny Singh MD   Hosp Day # 3 PCP Malathi Stuart MD     Subjective:  ROS reviewed. On 3L NC which is her baseline. Sitting up in chair. Just finished breakfast    ASSESSMENT:    Antibiotics: IV vancomycin     # S. Epi bacteremia in 2/2 sets with TAVR 25 - r/o IE  # Acute sepsis with hypotension  # NANCY  # Leukocytosis  # h/o PE 10/2024  # h/o colon invasive adnocarcinoma s/p L hemicolectomy 25  # HTN, HLD, CAD, CHF  # h/o interstitial lung disease on home O2 4L - on prednisone 10 mg/day     PLAN:  -  Continue on vancomycin. FU repeat blood cx.  -  FU TTE. Will need DARYL.  -  Follow fever curve, wbc.  -  Reviewed labs, micro, imaging reports, available old records.  -  Case d/w patient, RN.     History of Present Illness:  85-year-old female with a history of HTN, aortic stenosis status post TAVR 2025, was last seen 2020 for for leukocytosis with multifocal pneumonia and hypoxia.  Treated with IV Zosyn for 7 days as well as steroids.  History of interstitial pneumonitis status post steroids.  Also in 2024 found to have a gastric ulcer that was cauterized and a colonoscopy revealed a large transverse colon polyp found to be invasive adenocarcinoma.  Underwent left hemicolectomy 2025.  Now presents to hospital on  with chronic 4 L home oxygen now with progressive dyspnea exertion, shortness of breath, hypoxia requiring up to 7 L at night at the baseline.  Also with hypotension, WBC 17.4.  CT chest with no PE but findings of pulmonary fibrosis and emphysema that is stable as well as concern for pulmonary edema.  Not found to have Staph epidermidis bacteremia 2 sets.    Physical Exam:  /47 (BP Location: Right arm)   Pulse 82    Temp 98.6 °F (37 °C) (Oral)   Resp 19   Wt 171 lb (77.6 kg)   SpO2 95%   BMI 29.35 kg/m²     Gen:   Awake, on NC  HEENT:  EOMI, neck supple  CV/lungs:  RRR, CTAB  Abdom:  Soft, no TTP  Skin/extrem:  No rashes, BLE edema  Lines:  PIV+    Laboratory Data: Reviewed    Microbiology: Reviewed    Radiology: Reviewed      ANNE Sant[facility]  [phone]  7/19/2025

## 2025-07-19 NOTE — PROGRESS NOTES
Progress Note  Hoda Busby Patient Status:  Inpatient    1940 MRN [id number]   Location [facility] 3W/SW Attending Benny Singh MD   Hosp Day # 2 PCP Malathi Stuart MD     Subjective   Sitting up in the chair. No cardiac complaints. Breathing at baseline.       Objective:   /61 (BP Location: Right arm)   Pulse 77   Temp 97.8 °F (36.6 °C) (Oral)   Resp 18   Wt 173 lb 6.4 oz (78.7 kg)   SpO2 98%   BMI 29.76 kg/m²     Telemetry: sinus rhythm, PVCs     Intake/Output:    Intake/Output Summary (Last 24 hours) at 2025 2103  Last data filed at 2025 1845  Gross per 24 hour   Intake 983 ml   Output 951 ml   Net 32 ml       Wt Readings from Last 3 Encounters:   25 173 lb 6.4 oz (78.7 kg)   25 183 lb (83 kg)   25 183 lb (83 kg)         Labs:  Recent Labs   Lab 25  0917 25  0550 25  0612   GLU 95 94 88   BUN 18 21 23   CREATSERUM 0.97 1.10* 1.06*   EGFRCR 57* 49* 51*   CA 8.9 8.9 8.3*    139 136   K 4.1 3.7 3.6    102 102   CO2 25.0 28.0 29.0     Recent Labs   Lab 25  0917 25  0550 25  0615   RBC 3.74* 3.27* 3.47*   HGB 10.1* 8.7* 9.4*   HCT 31.4* 27.6* 29.8*   MCV 84.0 84.4 85.9   MCH 27.0 26.6 27.1   MCHC 32.2 31.5 31.5   RDW 15.8* 15.9* 16.0*   NEPRELIM 15.43* 9.71* 9.67*   WBC 17.4* 11.9* 12.1*   .0 283.0 329.0         Recent Labs   Lab 25  0917   TROPHS 32         Review of Systems:     10 point review of systems completed and negative except as noted in HPI      Exam:     Physical Exam:  General: Alert and oriented x 3. No apparent distress.   HEENT: Normocephalic, neck supple, no thyromegaly or adenopathy.  Neck: No JVD, carotids 2+, no bruits.  Cardiac: S1 S2. 2/6 WADE   Lungs: Clear without wheezes, rales, rhonchi or dullness.  Normal excursions and effort.  Abdomen: Soft, non-tender. BS-present.  Extremities: Without clubbing or cyanosis. Trace edema.    Neurologic: Alert and oriented,  normal affect. No focal defects  Skin: Warm and dry.       Diagnostic Studies:     Echo pending     Assessment and Plan:     Assessment:  # acute on chronic respiratory failure - on O2 at home at baseline   # Interstitial pneumonitis/NSIP  Likely 2/2 poss pneumonia, hx of interstitial lung disease, mild acute HFpEF   Chest CT negative for PE  Pulm following   # Acute on chronic HFpEF, LVEF 60-65%, stage C, NYHA class II   Multifactorial in the setting of PNA, CXR c/w prominent central vasculature, proBNP 2,276   S/p IV diuresis, held 7/18 d/t hypotension and mild bump in Cr  On PO lasix 20 mg daily at home   Monitor strict I/Os and daily weights   # bacteremia, staph Epi   at high risk for IE with recent TAVR, will obtain echo   IV abx per ID   # acute sepsis with hypotension   # S/p TAVR 1/2025 - stable valve function on recent echo   Echo pending   # CAD s/p PCI to LAD 4/2025   Continue Dapt and statin   # HTN - currently hypotension   # Adenocarcinoma of colon s/p colon resection 2/27/25    # h/o PE in 10/2024 s/p Xarelto   # hx of GI bleed     Plan:  Blood pressure soft but asymptomatic   No marked volume overload, continue to hold diuresis for now given hypotension   Echo and labs pending   IV abx     Plan of care discussed with patient, RN.      Nilda Pastor, APRN  7/19/2025  Ph [phone] ([place])  Ph [phone] ([place])

## 2025-07-19 NOTE — PLAN OF CARE
Problem: Patient Centered Care  Goal: Patient preferences are identified and integrated in the patient's plan of care  Description: Interventions:  - What would you like us to know as we care for you? From [facility] [place]  - Provide timely, complete, and accurate information to patient/family  - Incorporate patient and family knowledge, values, beliefs, and cultural backgrounds into the planning and delivery of care  - Encourage patient/family to participate in care and decision-making at the level they choose  - Honor patient and family perspectives and choices  Outcome: Progressing     Problem: Patient/Family Goals  Goal: Patient/Family Long Term Goal  Description: Patient's Long Term Goal: discharge from hospital     Interventions:  - monitor vital signs   - monitor appropriate labs  - pain management   - administer medications per order  - follow MD orders  - diagnostics per order  - update and inform patient and family on plan of care  - discharge planning  - See additional Care Plan goals for specific interventions  Outcome: Progressing  Goal: Patient/Family Short Term Goal  Description: Patient's Short Term Goal: improve breathing     Interventions:   - monitor vital signs   - monitor appropriate labs  - pain management   - administer medications per order  - follow MD orders  - diagnostics per order  - update and inform patient and family on plan of care  - See additional Care Plan goals for specific interventions  Outcome: Progressing     Problem: PAIN - ADULT  Goal: Verbalizes/displays adequate comfort level or patient's stated pain goal  Description: INTERVENTIONS:  - Encourage pt to monitor pain and request assistance  - Assess pain using appropriate pain scale  - Administer analgesics based on type and severity of pain and evaluate response  - Implement non-pharmacological measures as appropriate and evaluate response  - Consider cultural and social influences on pain and pain management  -  Manage/alleviate anxiety  - Utilize distraction and/or relaxation techniques  - Monitor for opioid side effects  - Notify MD/LIP if interventions unsuccessful or patient reports new pain  - Anticipate increased pain with activity and pre-medicate as appropriate  Outcome: Progressing     Problem: SAFETY ADULT - FALL  Goal: Free from fall injury  Description: INTERVENTIONS:  - Assess pt frequently for physical needs  - Identify cognitive and physical deficits and behaviors that affect risk of falls.  - [place] fall precautions as indicated by assessment.  - Educate pt/family on patient safety including physical limitations  - Instruct pt to call for assistance with activity based on assessment  - Modify environment to reduce risk of injury  - Provide assistive devices as appropriate  - Consider OT/PT consult to assist with strengthening/mobility  - Encourage toileting schedule  Outcome: Progressing     Problem: DISCHARGE PLANNING  Goal: Discharge to home or other facility with appropriate resources  Description: INTERVENTIONS:  - Identify barriers to discharge w/pt and caregiver  - Include patient/family/discharge partner in discharge planning  - Arrange for needed discharge resources and transportation as appropriate  - Identify discharge learning needs (meds, wound care, etc)  - Arrange for interpreters to assist at discharge as needed  - Consider post-discharge preferences of patient/family/discharge partner  - Complete POLST form as appropriate  - Assess patient's ability to be responsible for managing their own health  - Refer to Case Management Department for coordinating discharge planning if the patient needs post-hospital services based on physician/LIP order or complex needs related to functional status, cognitive ability or social support system  Outcome: Progressing     Problem: CARDIOVASCULAR - ADULT  Goal: Maintains optimal cardiac output and hemodynamic stability  Description: INTERVENTIONS:  -  Monitor vital signs, rhythm, and trends  - Monitor for bleeding, hypotension and signs of decreased cardiac output  - Evaluate effectiveness of vasoactive medications to optimize hemodynamic stability  - Monitor arterial and/or venous puncture sites for bleeding and/or hematoma  - Assess quality of pulses, skin color and temperature  - Assess for signs of decreased coronary artery perfusion - ex. Angina  - Evaluate fluid balance, assess for edema, trend weights  Outcome: Progressing     Problem: RESPIRATORY - ADULT  Goal: Achieves optimal ventilation and oxygenation  Description: INTERVENTIONS:  - Assess for changes in respiratory status  - Assess for changes in mentation and behavior  - Position to facilitate oxygenation and minimize respiratory effort  - Oxygen supplementation based on oxygen saturation or ABGs  - Provide Smoking Cessation handout, if applicable  - Encourage broncho-pulmonary hygiene including cough, deep breathe, Incentive Spirometry  - Assess the need for suctioning and perform as needed  - Assess and instruct to report SOB or any respiratory difficulty  - Respiratory Therapy support as indicated  - Manage/alleviate anxiety  - Monitor for signs/symptoms of CO2 retention  Outcome: Progressing     Problem: METABOLIC/FLUID AND ELECTROLYTES - ADULT  Goal: Electrolytes maintained within normal limits  Description: INTERVENTIONS:  - Monitor labs and rhythm and assess patient for signs and symptoms of electrolyte imbalances  - Administer electrolyte replacement as ordered  - Monitor response to electrolyte replacements, including rhythm and repeat lab results as appropriate  - Fluid restriction as ordered  - Instruct patient on fluid and nutrition restrictions as appropriate  Outcome: Progressing     Problem: SKIN/TISSUE INTEGRITY - ADULT  Goal: Skin integrity remains intact  Description: INTERVENTIONS  - Assess and document risk factors for pressure ulcer development  - Assess and document skin  integrity  - Monitor for areas of redness and/or skin breakdown  - Initiate interventions, skin care algorithm/standards of care as needed  Outcome: Progressing     Problem: HEMATOLOGIC - ADULT  Goal: Free from bleeding injury  Description: (Example usage: patient with low platelets)  INTERVENTIONS:  - Avoid intramuscular injections, enemas and rectal medication administration  - Ensure safe mobilization of patient  - Hold pressure on venipuncture sites to achieve adequate hemostasis  - Assess for signs and symptoms of internal bleeding  - Monitor lab trends  - Patient is to report abnormal signs of bleeding to staff  - Avoid use of toothpicks and dental floss  - Use electric shaver for shaving  - Use soft bristle tooth brush  - Limit straining and forceful nose blowing  Outcome: Progressing

## 2025-07-19 NOTE — PLAN OF CARE
Problem: Patient Centered Care  Goal: Patient preferences are identified and integrated in the patient's plan of care  Description: Interventions:  - What would you like us to know as we care for you? From [facility] [place]  - Provide timely, complete, and accurate information to patient/family  - Incorporate patient and family knowledge, values, beliefs, and cultural backgrounds into the planning and delivery of care  - Encourage patient/family to participate in care and decision-making at the level they choose  - Honor patient and family perspectives and choices  Outcome: Progressing     Problem: Patient/Family Goals  Goal: Patient/Family Long Term Goal  Description: Patient's Long Term Goal: discharge from hospital     Interventions:  - monitor vital signs   - monitor appropriate labs  - pain management   - administer medications per order  - follow MD orders  - diagnostics per order  - update and inform patient and family on plan of care  - discharge planning  - See additional Care Plan goals for specific interventions  Outcome: Progressing  Goal: Patient/Family Short Term Goal  Description: Patient's Short Term Goal: improve breathing     Interventions:   - monitor vital signs   - monitor appropriate labs  - pain management   - administer medications per order  - follow MD orders  - diagnostics per order  - update and inform patient and family on plan of care  - See additional Care Plan goals for specific interventions  Outcome: Progressing     Problem: PAIN - ADULT  Goal: Verbalizes/displays adequate comfort level or patient's stated pain goal  Description: INTERVENTIONS:  - Encourage pt to monitor pain and request assistance  - Assess pain using appropriate pain scale  - Administer analgesics based on type and severity of pain and evaluate response  - Implement non-pharmacological measures as appropriate and evaluate response  - Consider cultural and social influences on pain and pain management  -  Manage/alleviate anxiety  - Utilize distraction and/or relaxation techniques  - Monitor for opioid side effects  - Notify MD/LIP if interventions unsuccessful or patient reports new pain  - Anticipate increased pain with activity and pre-medicate as appropriate  Outcome: Progressing     Problem: SAFETY ADULT - FALL  Goal: Free from fall injury  Description: INTERVENTIONS:  - Assess pt frequently for physical needs  - Identify cognitive and physical deficits and behaviors that affect risk of falls.  - [place] fall precautions as indicated by assessment.  - Educate pt/family on patient safety including physical limitations  - Instruct pt to call for assistance with activity based on assessment  - Modify environment to reduce risk of injury  - Provide assistive devices as appropriate  - Consider OT/PT consult to assist with strengthening/mobility  - Encourage toileting schedule  Outcome: Progressing     Problem: DISCHARGE PLANNING  Goal: Discharge to home or other facility with appropriate resources  Description: INTERVENTIONS:  - Identify barriers to discharge w/pt and caregiver  - Include patient/family/discharge partner in discharge planning  - Arrange for needed discharge resources and transportation as appropriate  - Identify discharge learning needs (meds, wound care, etc)  - Arrange for interpreters to assist at discharge as needed  - Consider post-discharge preferences of patient/family/discharge partner  - Complete POLST form as appropriate  - Assess patient's ability to be responsible for managing their own health  - Refer to Case Management Department for coordinating discharge planning if the patient needs post-hospital services based on physician/LIP order or complex needs related to functional status, cognitive ability or social support system  Outcome: Progressing     Problem: RESPIRATORY - ADULT  Goal: Achieves optimal ventilation and oxygenation  Description: INTERVENTIONS:  - Assess for changes in  respiratory status  - Assess for changes in mentation and behavior  - Position to facilitate oxygenation and minimize respiratory effort  - Oxygen supplementation based on oxygen saturation or ABGs  - Provide Smoking Cessation handout, if applicable  - Encourage broncho-pulmonary hygiene including cough, deep breathe, Incentive Spirometry  - Assess the need for suctioning and perform as needed  - Assess and instruct to report SOB or any respiratory difficulty  - Respiratory Therapy support as indicated  - Manage/alleviate anxiety  - Monitor for signs/symptoms of CO2 retention  Outcome: Progressing     Problem: METABOLIC/FLUID AND ELECTROLYTES - ADULT  Goal: Electrolytes maintained within normal limits  Description: INTERVENTIONS:  - Monitor labs and rhythm and assess patient for signs and symptoms of electrolyte imbalances  - Administer electrolyte replacement as ordered  - Monitor response to electrolyte replacements, including rhythm and repeat lab results as appropriate  - Fluid restriction as ordered  - Instruct patient on fluid and nutrition restrictions as appropriate  Outcome: Progressing     Problem: SKIN/TISSUE INTEGRITY - ADULT  Goal: Skin integrity remains intact  Description: INTERVENTIONS  - Assess and document risk factors for pressure ulcer development  - Assess and document skin integrity  - Monitor for areas of redness and/or skin breakdown  - Initiate interventions, skin care algorithm/standards of care as needed  Outcome: Progressing     Problem: HEMATOLOGIC - ADULT  Goal: Free from bleeding injury  Description: (Example usage: patient with low platelets)  INTERVENTIONS:  - Avoid intramuscular injections, enemas and rectal medication administration  - Ensure safe mobilization of patient  - Hold pressure on venipuncture sites to achieve adequate hemostasis  - Assess for signs and symptoms of internal bleeding  - Monitor lab trends  - Patient is to report abnormal signs of bleeding to staff  - Avoid  use of toothpicks and dental floss  - Use electric shaver for shaving  - Use soft bristle tooth brush  - Limit straining and forceful nose blowing  Outcome: Progressing     Problem: CARDIOVASCULAR - ADULT  Goal: Maintains optimal cardiac output and hemodynamic stability  Description: INTERVENTIONS:  - Monitor vital signs, rhythm, and trends  - Monitor for bleeding, hypotension and signs of decreased cardiac output  - Evaluate effectiveness of vasoactive medications to optimize hemodynamic stability  - Monitor arterial and/or venous puncture sites for bleeding and/or hematoma  - Assess quality of pulses, skin color and temperature  - Assess for signs of decreased coronary artery perfusion - ex. Angina  - Evaluate fluid balance, assess for edema, trend weights  Outcome: Not Progressing    Patient orthostatic (+), denies dizziness; MD notified - LR @ 100ml/hr ordered. Echo completed. Given intravenous antibiotics. Tolerating 3L nasal cannula and ambulation.

## 2025-07-19 NOTE — PROGRESS NOTES
Orthostatics: Pt. Denies lightheadedness/dizziness with positioning but unsteady when standing.   07/19/25 0500 07/19/25 0504 07/19/25 0507   Vital Signs   Pulse 82 92 87   Heart Rate Source Monitor Monitor Monitor   Resp 18 18  --    Respiratory Quality Normal Normal  --    /63 117/60 90/40   MAP (mmHg) 79 76 (!) 53   BP Location Right arm Right arm Right arm   BP Method Automatic Automatic Automatic   Patient Position Lying Sitting Standing   Oxygen Therapy   SpO2 94 % 93 % (!) 87 %   O2 Device Nasal cannula Nasal cannula Nasal cannula   O2 Flow Rate (L/min) 5 L/min 5 L/min 5 L/min

## 2025-07-19 NOTE — PROGRESS NOTES
[facility]  part of [facility]    Progress Note      Assessment and Plan:   1.  Staph epidermidis bacteremia-2 out of 2 blood cultures.  Patient has a history of TAVR.  No evidence of vegetation on the transthoracic echo.    Recommendations:  1.  Vancomycin  2.  DARYL  3.  Will follow clinically.    2.  History of nonspecific interstitial pneumonitis-will decrease methylprednisolone to once daily.  Breathing about the same.  Has chronic basilar crackles.    3.  History of colon cancer-no evidence residual status post resection within the last year.    4.  History of PCI to LAD        Subjective:   Hoda Busby is a(n) 85 year old female who is breathing comfortably    Objective:   Blood pressure 116/52, pulse 75, temperature 97.9 °F (36.6 °C), temperature source Oral, resp. rate 18, weight 171 lb (77.6 kg), SpO2 94%.    Physical Exam alert white female  HEENT examination is unremarkable with pupils equal round and reactive to light and accommodation.   Neck without adenopathy, thyromegaly, JVD nor bruit.   Lungs basilar crackles to auscultation and percussion.  Cardiac regular rate and rhythm no murmur.   Abdomen nontender, without hepatosplenomegaly and no mass appreciable.   Extremities without clubbing cyanosis 1+ extremity edema.   Neurologic grossly intact with symmetric tone and strength and reflex.  Skin without gross abnormality     Results:     Lab Results   Component Value Date    WBC 11.0 07/19/2025    HGB 9.6 07/19/2025    HCT 30.1 07/19/2025    .0 07/19/2025    CREATSERUM 0.95 07/19/2025    BUN 19 07/19/2025     07/19/2025    K 3.6 07/19/2025     07/19/2025    CO2 27.0 07/19/2025    GLU 90 07/19/2025    CA 8.7 07/19/2025     Chest x-ray-Cardiomegaly and prominent central vasculature. TAVR   Diffuse pulmonary interstitial prominence. 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     Stanton Sanchez MD  Medical Director, Critical  Care, [facility]  Medical Director, [facility]  Pager: [phone]

## 2025-07-19 NOTE — PROGRESS NOTES
Progress Note     Hoda Busby Patient Status:  Inpatient    1940 MRN [id number]   Location [facility] 3W/SW Attending Benny Singh MD   Hosp Day # 3 PCP Malathi Stuart MD     Chief Complaint:   Chief Complaint   Patient presents with    Difficulty Breathing         Subjective:   S: Patient seen and examined, chart reviewed.   Patient having episodes of dizziness when walking around.  Discussed with her positive blood cultures from [facility]      Review of Systems:   10 point ROS completed and was negative, except for pertinent positive and negatives stated in subjective.                Objective:   Vital signs:  Temp:  [97.7 °F (36.5 °C)-98 °F (36.7 °C)] 97.8 °F (36.6 °C)  Pulse:  [75-92] 87  Resp:  [18-20] 18  BP: ()/(36-63) 90/40  SpO2:  [87 %-99 %] 94 %    Wt Readings from Last 6 Encounters:   25 171 lb (77.6 kg)   25 183 lb (83 kg)   25 183 lb (83 kg)   25 180 lb 3.2 oz (81.7 kg)   25 180 lb (81.6 kg)   25 173 lb (78.5 kg)       Physical Exam:    General: No acute distress.   Respiratory: Clear to auscultation bilaterally. No wheezes. No rhonchi.  Cardiovascular: S1, S2. Regular rate and rhythm. No murmurs, rubs or gallops.   Abdomen: Soft, nontender, nondistended.  Positive bowel sounds. No rebound or guarding.  Neurologic: No focal neurological deficits.   Musculoskeletal: Moves all extremities.  Extremities: No edema.    Results:   Diagnostic Data:      Labs:    Labs Last 24 Hours:   BMP     CBC    Other     Na - Cl - BUN - Glu -   Hb 9.6   PTT - Procal -   K - CO2 - Cr -   WBC 11.0 >< .0  INR - CRP -   Renal Lytes Endo    Hct 30.1   Trop - D dim -   eGFR - Ca - POC Gluc  -    LFT   pBNP - Lactic -   eGFR AA - PO4 - A1c -   AST - APk - Prot -  LDL -      Recent Labs   Lab 25  0917 25  0550 25  0615 25  0714   RBC 3.74* 3.27* 3.47* 3.53*   HGB 10.1* 8.7* 9.4* 9.6*   HCT 31.4* 27.6* 29.8* 30.1*   MCV 84.0 84.4  85.9 85.3   MCH 27.0 26.6 27.1 27.2   MCHC 32.2 31.5 31.5 31.9   RDW 15.8* 15.9* 16.0* 15.9*   NEPRELIM 15.43* 9.71* 9.67*  --    WBC 17.4* 11.9* 12.1* 11.0   .0 283.0 329.0 320.0       Lab Results   Component Value Date    INR 1.18 01/24/2025    INR 1.33 (H) 01/23/2025    INR 1.06 09/08/2023       Recent Labs   Lab 07/16/25  0917 07/17/25  0550 07/18/25  0612   GLU 95 94 88   BUN 18 21 23   CREATSERUM 0.97 1.10* 1.06*   CA 8.9 8.9 8.3*   ALB 4.0  --   --     139 136   K 4.1 3.7 3.6    102 102   CO2 25.0 28.0 29.0   ALKPHO 127  --   --    AST 30  --   --    ALT 20  --   --    BILT 1.1  --   --    TP 6.2  --   --        No results for input(s): \"JENNIFER\", \"LIP\" in the last 168 hours.    Recent Labs   Lab 07/17/25  0550   MG 2.1       Recent Labs   Lab 07/18/25  1054   BLDSMR Positive Blood Culture*  Gram positive cocci in clusters*         No results found.        Pro-Calcitonin  No results for input(s): \"PCT\" in the last 168 hours.    Cardiac  Recent Labs   Lab 07/16/25  0917   PBNP 2,276*       Imaging: Imaging data reviewed in Epic.    No results found.       Medications: Scheduled Medications[1]    Assessment & Plan:   ASSESSMENT / PLAN:   Staph Epi Bacteremia  - Bcx  7/16 and 7/18,  MSSA x 2  - started on vanco   - ID consulted  - unclear source at this time: Possible skin source given multiple areas of traumatic skin breakdown UE and LE  - Cards consult for DARYL. Hx TAVR  - DARYL ordered     Acute on chronic hypoxic respiratory failure (HCC)  -multifactorial: acute HFpEF, recurrent interstitial pneumonitis, ?CAP  -CT chest in ED 7/16/2025 - no PE; fibrotic changes seen with emphysematous changes. Diffuse groundglass opacities seen compared to CT from June 2025. Also pulm edema.   -required NRB in ER transiently; on home 2 LPM o2.   -echo 2/5/25 - normal LV systolic function (EF 60-65%); grade 2 diastolic dysfunction; normally functioning bioprosthetic TAVR; sl incr PAP 44mmHg  - no abx.   -IV  lasix; 40 mg IV BID (for HFpEF)  -s/p solumedrol - transitioned to prednisone  -suspect majority of sx/hypoxia due to ILD/NSIP  -O2 down to31L   -clinically improved     Coronary artery disease  -Cath 1/14/25 by Dr. Bernardo Liz (as w/u for TAVR) -- RCA non-obstructive; LM free of obst disease; LM plaque extending into ostium of LAD (20-30%); mid LAD (80-90%), cx ostium (60-70%)  -intervention delayed until after colon resection due to need for DAPT x 6 mos after stenting   -s/p PCI/JUVENTINO of mid LAD (70%>0%) on 4/7/25 by Dr. Dutton  -cont ASA, Plavix  -increased atorvastatin from 20mg to 80mg/day (LDL 76 on 3/26/25)     Interstitial pneumonitis/NSIP  -dx'ed 10/2024 - presented with cough and severe hypoxia  -unresponsive to abx   -CXR 10/24/24 extensive bilateral perihilar and bilateral upper and lower lobe   -S.pneumo, legionella Ag , mycoplasma IgM/G, Fungitell-beta-D glucan negative  -ANCA and URIEL/connective tissue disease panels negative  -anti-histone and anti-Keara Ab's negative  -CT with bilateral ground glass opacities, small consolidations of BLL  -findings suspicious for NSIP (vs cryptogenic organizing pneumonia vs hypersensitivity pneumonitis); NOT thought to be c/w UIP pattern  -s/p empiric steroids (solumedrol then prednisone taper) 10/25/24 - (EOT 1/18/25)  -dyspnea and hypoxia completely resolved until admission 3/26/25 and now again 7/16/25 -- POPE, hypoxia  -steroids 10 mg daily, will increase to stress doses this may help BP.      Hx of Bilateral pulmonary emboli (10/2024)  -dx'ed at time of interstitial pneumonitis  -CT chest 10/25/24 -- acute distal segmental/subsegmental pulmonary emboli in [place] and subsegmental PE in [place]  -unclear etiology; no recent hx of long travel; no family/personal hx of blood clots  -BLE venous dopplers negative 10/26/24  -started xarelto 20 mg po every day on 11/28/24  -repeat CT chest 12/9/24 neg PE  -stopped Xarelto 12/14/24 due to recurrent GI bleed  -repeat CT chest 3/26/25  and 4/9/25 and 7/16/25 -- all neg PE     Severe aortic stenosis  -progression on serial echocardiograms  -s/p TAVR 1/23/25 (Dr. Reji Green)     Adenocarcinoma of colon  -Bx of transverse colon polyp 12/17/24 -- invasive, moderately differentiated adenocarcinoma  -CEA normal (1.9)  -CT a/p neg for mets  -surgery delayed in anticipation of TAVR (done 1/23/25)  -s/p robotic assisted left hemicolectomy (Dr. Cassidy) on 2/27/25 - path - tumor invades into muscularis; margins neg for tumor; all 19 LN's neg for mets (0/19); pT2, pN0     Hx  GI bleed  -EGD 11/26/24 (Dr. Nuno) -15mm nonbleeding gastric antral ulcer; 3mm gastric antral AVM s/p APC  -recurrence in 12/2024  -colonoscopy and repeat EGD 12/17/24 by Dr. Staton -- grade 1 esophagitis; duod ulcers healing; large flat polyp in transverse colon (carcinoma)   -received total of 3 units PRBCs   -Xarelto stopped in 12/2024  -s/p omeprazole 20mg BID x 8 weeks (EOT 1/22/25), now on omeprazole 20mg daily -- plan has been to continue until she completed her coronary stenting.  Will stop once off steroids.     Anemia due to acute blood loss  -GI bleed as above  -on ferrous sulfate 325mg/day  -Hgb down but stable (8.7)      Hx chronic BLE edema  -CRISTA hose     Acute left peroneal palsy  In Nov 2024; had numbness to anterolateral left foot and ankle as well as inability to dorsiflex left foot; etiology unclear  -neurologist Dr Richardson consulted in hosp  -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   -MRI brain--no acute intracranial process  -was doing PT at [facility] -- continues to improve; almost back to normal       Hx of Hypertension, primary  -(dyazide stopped due to NANCY)  -(amlodipine held due to low BP in 11/2024)  -BP remains normal off meds     Hx of hip OA  -s/p left THR (Dr. Lo) many years ago  -s/p elective R BEATRIZ on 10/5/23 by Dr. Diaz     Hyperlipidemia    on atorvastatin to 80mg/day as above     Osteopenia   On Fosamax from 2011 to 4/2016.     DEXA stable 5/10/17 and 2019 and 2021  DEXA 8/2024 -- osteoporosis of left forearm  -restarted Fosamax 8/2024 (on hold this admission); restart at discharge     Vitamin D deficiency  On vitamin D 4000u/day         Level 3 care        dvt prophylaxis: eliquis  code status: full code  dispo: home upon medical clearance        Estimated date of discharge: tbd  Discharge is dependent on: clearance  At this point Ms. Busby is expected to be discharge to: home     Plan of care discussed with patient      Benny Singh MD, FAAP, FACP  [facility] Hospitalist  I respond to Epic Chat and AMS Connect         [1]    enoxaparin  40 mg Subcutaneous Daily    vancomycin  15 mg/kg Intravenous Q24H    predniSONE  10 mg Oral Daily with breakfast    [Held by provider] furosemide  40 mg Oral Daily    aspirin  81 mg Oral Daily    atorvastatin  80 mg Oral Nightly    clopidogrel  75 mg Oral Daily    ferrous sulfate  325 mg Oral Daily with breakfast    pantoprazole  40 mg Oral QAM AC

## 2025-07-20 NOTE — PLAN OF CARE
Discharge Instructions - Bronchoscopy                                                    Pedro Salvador MD      After the Procedure and Discharge:  You will be taken to an observation room where you will remain for 1-2 hours, as ordered by the doctor.  You will not be able to eat or drink for a short time after the test.  You may have a slight sore throat, coughing, hoarseness, a small amount of bleeding, occasional chest discomfort or a feeling of sleepiness.  You may go home with a responsible adult.  Rest at home for the remainder of the day.  Resume your normal activities according to doctor’s orders.  Avoid strenuous or heavy activity for the next 24 hours.  The doctor will contact you with the results of the test. No driving for the remainder of the day.         Call Your Doctor or Go to the Emergency Room Right Away if You Have Any Of The Following:  Sudden severe chest pain.   Shortness of Breath  Large amounts of bright, red blood      If you have any questions or concerns, please contact doctor.    Physician:  Dr. Pedro Salvador                                         Office Phone:  [phone]                    1   Orthostatic BP completed. BP dropped with standing but patient remained asymptomatic. Plan for DARYL tomorrow per cardiology. Consent completed with patient and placed on chart. Patient updated on POC and verbalized understanding.     Problem: Patient Centered Care  Goal: Patient preferences are identified and integrated in the patient's plan of care  Description: Interventions:  - What would you like us to know as we care for you? From [facility] [place]  - Provide timely, complete, and accurate information to patient/family  - Incorporate patient and family knowledge, values, beliefs, and cultural backgrounds into the planning and delivery of care  - Encourage patient/family to participate in care and decision-making at the level they choose  - Honor patient and family perspectives and choices  Outcome: Progressing     Problem: Patient/Family Goals  Goal: Patient/Family Long Term Goal  Description: Patient's Long Term Goal: discharge from hospital     Interventions:  - monitor vital signs and labs  - pain management   - administer medications per order  - follow provider recommendations  - diagnostics per order  - update and inform patient and family on plan of care  - discharge planning  - See additional Care Plan goals for specific interventions  Outcome: Progressing  Goal: Patient/Family Short Term Goal  Description: Patient's Short Term Goal: improve breathing     Interventions:   - monitor vital signs and labs  - pain management   - administer medications per order  - follow provider recommendations  - diagnostics per order  - update and inform patient and family on plan of care  - See additional Care Plan goals for specific interventions  Outcome: Progressing     Problem: PAIN - ADULT  Goal: Verbalizes/displays adequate comfort level or patient's stated pain goal  Description: INTERVENTIONS:  - Encourage pt to monitor pain and request assistance  - Assess pain using appropriate pain scale  - Administer  analgesics based on type and severity of pain and evaluate response  - Implement non-pharmacological measures as appropriate and evaluate response  - Consider cultural and social influences on pain and pain management  - Manage/alleviate anxiety  - Utilize distraction and/or relaxation techniques  - Monitor for opioid side effects  - Notify MD/LIP if interventions unsuccessful or patient reports new pain  - Anticipate increased pain with activity and pre-medicate as appropriate  Outcome: Progressing     Problem: SAFETY ADULT - FALL  Goal: Free from fall injury  Description: INTERVENTIONS:  - Assess pt frequently for physical needs  - Identify cognitive and physical deficits and behaviors that affect risk of falls.  - Theodore fall precautions as indicated by assessment.  - Educate pt/family on patient safety including physical limitations  - Instruct pt to call for assistance with activity based on assessment  - Modify environment to reduce risk of injury  - Provide assistive devices as appropriate  - Consider OT/PT consult to assist with strengthening/mobility  - Encourage toileting schedule  Outcome: Progressing     Problem: DISCHARGE PLANNING  Goal: Discharge to home or other facility with appropriate resources  Description: INTERVENTIONS:  - Identify barriers to discharge w/pt and caregiver  - Include patient/family/discharge partner in discharge planning  - Arrange for needed discharge resources and transportation as appropriate  - Identify discharge learning needs (meds, wound care, etc)  - Arrange for interpreters to assist at discharge as needed  - Consider post-discharge preferences of patient/family/discharge partner  - Complete POLST form as appropriate  - Assess patient's ability to be responsible for managing their own health  - Refer to Case Management Department for coordinating discharge planning if the patient needs post-hospital services based on physician/LIP order or complex needs related to  functional status, cognitive ability or social support system  Outcome: Progressing     Problem: CARDIOVASCULAR - ADULT  Goal: Maintains optimal cardiac output and hemodynamic stability  Description: INTERVENTIONS:  - Monitor vital signs, rhythm, and trends  - Monitor for bleeding, hypotension and signs of decreased cardiac output  - Evaluate effectiveness of vasoactive medications to optimize hemodynamic stability  - Monitor arterial and/or venous puncture sites for bleeding and/or hematoma  - Assess quality of pulses, skin color and temperature  - Assess for signs of decreased coronary artery perfusion - ex. Angina  - Evaluate fluid balance, assess for edema, trend weights  Outcome: Progressing     Problem: RESPIRATORY - ADULT  Goal: Achieves optimal ventilation and oxygenation  Description: INTERVENTIONS:  - Assess for changes in respiratory status  - Assess for changes in mentation and behavior  - Position to facilitate oxygenation and minimize respiratory effort  - Oxygen supplementation based on oxygen saturation or ABGs  - Provide Smoking Cessation handout, if applicable  - Encourage broncho-pulmonary hygiene including cough, deep breathe, Incentive Spirometry  - Assess the need for suctioning and perform as needed  - Assess and instruct to report SOB or any respiratory difficulty  - Respiratory Therapy support as indicated  - Manage/alleviate anxiety  - Monitor for signs/symptoms of CO2 retention  Outcome: Progressing     Problem: METABOLIC/FLUID AND ELECTROLYTES - ADULT  Goal: Electrolytes maintained within normal limits  Description: INTERVENTIONS:  - Monitor labs and rhythm and assess patient for signs and symptoms of electrolyte imbalances  - Administer electrolyte replacement as ordered  - Monitor response to electrolyte replacements, including rhythm and repeat lab results as appropriate  - Fluid restriction as ordered  - Instruct patient on fluid and nutrition restrictions as appropriate  Outcome:  Progressing     Problem: SKIN/TISSUE INTEGRITY - ADULT  Goal: Skin integrity remains intact  Description: INTERVENTIONS  - Assess and document risk factors for pressure ulcer development  - Assess and document skin integrity  - Monitor for areas of redness and/or skin breakdown  - Initiate interventions, skin care algorithm/standards of care as needed  Outcome: Progressing     Problem: HEMATOLOGIC - ADULT  Goal: Free from bleeding injury  Description: (Example usage: patient with low platelets)  INTERVENTIONS:  - Avoid intramuscular injections, enemas and rectal medication administration  - Ensure safe mobilization of patient  - Hold pressure on venipuncture sites to achieve adequate hemostasis  - Assess for signs and symptoms of internal bleeding  - Monitor lab trends  - Patient is to report abnormal signs of bleeding to staff  - Avoid use of toothpicks and dental floss  - Use electric shaver for shaving  - Use soft bristle tooth brush  - Limit straining and forceful nose blowing  Outcome: Progressing

## 2025-07-20 NOTE — PROGRESS NOTES
[facility]  part of [facility]    Progress Note      Assessment and Plan:   1.  Staph epidermidis bacteremia-2 out of 2 blood cultures.  Patient has a history of TAVR.  No evidence of vegetation on the transthoracic echo.  The patient feels well.    Recommendations:  1.  Vancomycin  2.  DARYL in the short-term.  3.  Will follow clinically.    2.  History of nonspecific interstitial pneumonitis-will decrease methylprednisolone to once daily.  Breathing about the same.  Has chronic basilar crackles.    3.  History of colon cancer-no evidence residual status post resection within the last year.    4.  History of PCI to LAD        Subjective:   Hoda Busby is a(n) 85 year old female who is breathing comfortably and in good spirits.    Objective:   Blood pressure 116/56, pulse 79, temperature 97.4 °F (36.3 °C), temperature source Oral, resp. rate 18, weight 176 lb 8 oz (80.1 kg), SpO2 99%.    Physical Exam alert white female  HEENT examination is unremarkable with pupils equal round and reactive to light and accommodation.   Neck without adenopathy, thyromegaly, JVD nor bruit.   Lungs basilar crackles to auscultation and percussion.  Cardiac regular rate and rhythm no murmur.   Abdomen nontender, without hepatosplenomegaly and no mass appreciable.   Extremities without clubbing cyanosis 1+ extremity edema.   Neurologic grossly intact with symmetric tone and strength and reflex.  Skin without gross abnormality     Results:     Lab Results   Component Value Date    WBC 11.7 07/20/2025    HGB 9.9 07/20/2025    HCT 31.0 07/20/2025    .0 07/20/2025    CREATSERUM 0.88 07/20/2025    BUN 17 07/20/2025     07/20/2025    K 4.1 07/20/2025     07/20/2025    CO2 28.0 07/20/2025     07/20/2025    CA 8.9 07/20/2025     Chest x-ray-Cardiomegaly and prominent central vasculature. TAVR   Diffuse pulmonary interstitial prominence. Superimposed multifocal alveolitis with slight progression  of upper lobes.     Stanton Sanchez MD  Medical Director, Critical Care, [facility]  Medical Director, [facility]  Pager: [phone]

## 2025-07-20 NOTE — PROGRESS NOTES
Progress Note  Hoda Busby Patient Status:  Inpatient    1940 MRN [id number]   Location [facility] 3W/SW Attending Benny Singh MD   Hosp Day # 4 PCP Malathi Stuart MD     Subjective   Awake. No cardiac complaints. Breathing at baseline.       Objective:   /69 (BP Location: Right arm)   Pulse 78   Temp 97.3 °F (36.3 °C) (Oral)   Resp 18   Wt 171 lb (77.6 kg)   SpO2 96%   BMI 29.35 kg/m²     Telemetry: sinus rhythm, pvcs, intermittent bradycardia     Intake/Output:    Intake/Output Summary (Last 24 hours) at 2025 0536  Last data filed at 2025 0300  Gross per 24 hour   Intake 1833.67 ml   Output 1776 ml   Net 57.67 ml       Wt Readings from Last 3 Encounters:   25 171 lb (77.6 kg)   25 183 lb (83 kg)   25 183 lb (83 kg)         Labs:  Recent Labs   Lab 25  0550 25  0612 25  0714   GLU 94 88 90   BUN 21 23 19   CREATSERUM 1.10* 1.06* 0.95   EGFRCR 49* 51* 59*   CA 8.9 8.3* 8.7    136 135*   K 3.7 3.6 3.6    102 103   CO2 28.0 29.0 27.0     Recent Labs   Lab 25  0917 25  0550 25  0615 25  0714   RBC 3.74* 3.27* 3.47* 3.53*   HGB 10.1* 8.7* 9.4* 9.6*   HCT 31.4* 27.6* 29.8* 30.1*   MCV 84.0 84.4 85.9 85.3   MCH 27.0 26.6 27.1 27.2   MCHC 32.2 31.5 31.5 31.9   RDW 15.8* 15.9* 16.0* 15.9*   NEPRELIM 15.43* 9.71* 9.67*  --    WBC 17.4* 11.9* 12.1* 11.0   .0 283.0 329.0 320.0         Recent Labs   Lab 25  0917   TROPHS 32         Review of Systems:     10 point review of systems completed and negative except as noted in HPI      Exam:     Physical Exam:  General: Alert and oriented x 3. No apparent distress.   HEENT: Normocephalic, neck supple, no thyromegaly or adenopathy.  Neck: No JVD, carotids 2+, no bruits.  Cardiac: S1 S2. 2/6 WADE   Lungs: Clear without wheezes, rales, rhonchi or dullness.  Normal excursions and effort.  Abdomen: Soft, non-tender. BS-present.  Extremities: Without  clubbing or cyanosis. Trace edema.    Neurologic: Alert and oriented, normal affect. No focal defects  Skin: Warm and dry.       Diagnostic Studies:     Echo 7/19/25  Conclusions:     1. Left ventricle: The cavity size was normal. Wall thickness was normal.      Systolic function was normal. The estimated ejection fraction was 60-65%,      by biplane method of disks. No diagnostic evidence for regional wall      motion abnormalities. Features are consistent with a pseudonormal left      ventricular filling pattern, with concomitant abnormal relaxation and      increased filling pressure - grade 2 diastolic dysfunction.   2. Left atrium: The left atrial volume was markedly increased.   3. Aortic valve: Mcnamara PUNEET S3 23mm (TAVR) bioprosthesis was present.      The peak systolic velocity was 3.02m/sec. The mean systolic gradient was      20mm Hg. The valve area (VTI) was 1.61cm^2. The valve area (VTI) index      was 0.88cm^2/m^2.   4. Mitral valve: The annulus was moderately calcified. The leaflets were      mildly calcified. There was mild regurgitation.   5. Pulmonary arteries: Systolic pressure was mildly increased, estimated to      be 47mm Hg.   Impressions:  This study is compared with previous dated 6/6/2025: No   significant change since prior study.   Recommendations:  Further evaluation with DARYL, if clinically indicated.     Assessment and Plan:     Assessment:  # acute on chronic respiratory failure - on O2 at home at baseline   # Interstitial pneumonitis/NSIP  Likely 2/2 poss pneumonia, hx of interstitial lung disease, mild acute HFpEF   Chest CT negative for PE  Pulm following   # Acute on chronic HFpEF, LVEF 60-65%, stage C, NYHA class II   Multifactorial in the setting of PNA, CXR c/w prominent central vasculature, proBNP 2,276   S/p IV diuresis, held 7/18 d/t hypotension and mild bump in Cr  On PO lasix 20 mg daily at home   Monitor strict I/Os and daily weights   # bacteremia, staph Epi   Echo  showed no vegetation > plan for DARYL to confirm   IV abx per ID   # acute sepsis with hypotension - improving   # S/p TAVR 1/2025 - stable valve function on recent echo   Stable valve function, no evidence of vegetation on echo   # CAD s/p PCI to LAD 4/2025   Continue Dapt and statin   # HTN - was hypotensive, bp improving now   # Adenocarcinoma of colon s/p colon resection 2/27/25    # h/o PE in 10/2024 s/p Xarelto   # hx of GI bleed     Plan:  Blood pressure improved, resume PO lasix   Echo with stable aortic bioprosthetic valve function and no evidence of vegetation, will plan for DARYL tomorrow, she v/u. Keep npo after midnight.   Continue IV abx     Plan of care discussed with patient, RN.      Nilda Pastor, APRN  7/20/2025  Ph [phone] ([place])  Ph [phone] ([place])        =======================================================  Patient seen and examined independently.  Note reviewed and labs reviewed.  Agree with above assessment and plan except as noted below.      Plan:  Continue IV antibiotics  DARYL tomorrow    Angelito Grady MD        3

## 2025-07-20 NOTE — PROGRESS NOTES
Progress Note     Hoda Busby Patient Status:  Inpatient    1940 MRN [id number]   Location [facility] 3W/SW Attending Benny Singh MD   Hosp Day # 4 PCP Malathi Stuart MD     Chief Complaint:   Chief Complaint   Patient presents with    Difficulty Breathing         Subjective:   S: Patient seen and examined, chart reviewed.   Patient is had PVCs and bradycardia on telemetry.  Transthoracic echo was okay.  Plan for transfer esophageal echo tomorrow.  Blood cultures have been growing staph epi.  Repeat blood cultures today will be taken      Review of Systems:   10 point ROS completed and was negative, except for pertinent positive and negatives stated in subjective.                Objective:   Vital signs:  Temp:  [97.1 °F (36.2 °C)-97.9 °F (36.6 °C)] 97.5 °F (36.4 °C)  Pulse:  [47-91] 82  Resp:  [18] 18  BP: ()/(47-69) 94/47  SpO2:  [94 %-99 %] 99 %    Wt Readings from Last 6 Encounters:   25 176 lb 8 oz (80.1 kg)   25 183 lb (83 kg)   25 183 lb (83 kg)   25 180 lb 3.2 oz (81.7 kg)   25 180 lb (81.6 kg)   25 173 lb (78.5 kg)       Physical Exam:    General: No acute distress.   Respiratory: Clear to auscultation bilaterally. No wheezes. No rhonchi.  Cardiovascular: S1, S2. Regular rate and rhythm. No murmurs, rubs or gallops.   Abdomen: Soft, nontender, nondistended.  Positive bowel sounds. No rebound or guarding.  Neurologic: No focal neurological deficits.   Musculoskeletal: Moves all extremities.  Extremities: No edema.    Results:   Diagnostic Data:      Labs:    Labs Last 24 Hours:   BMP     CBC    Other     Na 139 Cl 105 BUN 17 Glu 115   Hb 9.9   PTT - Procal -   K 4.1 CO2 28.0 Cr 0.88   WBC 11.7 >< .0  INR - CRP -   Renal Lytes Endo    Hct 31.0   Trop - D dim -   eGFR - Ca 8.9 POC Gluc  -    LFT   pBNP - Lactic -   eGFR AA - PO4 - A1c -   AST - APk - Prot -  LDL -      Recent Labs   Lab 25  0550 25  0615  07/19/25  0714 07/20/25  0652   RBC 3.27* 3.47* 3.53* 3.62*   HGB 8.7* 9.4* 9.6* 9.9*   HCT 27.6* 29.8* 30.1* 31.0*   MCV 84.4 85.9 85.3 85.6   MCH 26.6 27.1 27.2 27.3   MCHC 31.5 31.5 31.9 31.9   RDW 15.9* 16.0* 15.9* 15.4*   NEPRELIM 9.71* 9.67*  --  10.52*   WBC 11.9* 12.1* 11.0 11.7*   .0 329.0 320.0 313.0       Lab Results   Component Value Date    INR 1.18 01/24/2025    INR 1.33 (H) 01/23/2025    INR 1.06 09/08/2023       Recent Labs   Lab 07/16/25  0917 07/17/25  0550 07/18/25  0612 07/19/25  0714 07/20/25  0652   GLU 95   < > 88 90 115*   BUN 18   < > 23 19 17   CREATSERUM 0.97   < > 1.06* 0.95 0.88   CA 8.9   < > 8.3* 8.7 8.9   ALB 4.0  --   --   --   --       < > 136 135* 139   K 4.1   < > 3.6 3.6 4.1      < > 102 103 105   CO2 25.0   < > 29.0 27.0 28.0   ALKPHO 127  --   --   --   --    AST 30  --   --   --   --    ALT 20  --   --   --   --    BILT 1.1  --   --   --   --    TP 6.2  --   --   --   --     < > = values in this interval not displayed.       No results for input(s): \"JENNIFER\", \"LIP\" in the last 168 hours.    Recent Labs   Lab 07/17/25  0550   MG 2.1       Recent Labs   Lab 07/18/25  1054   BLDSMR Positive Blood Culture*  Gram positive cocci in clusters*         No results found.        Pro-Calcitonin  No results for input(s): \"PCT\" in the last 168 hours.    Cardiac  Recent Labs   Lab 07/16/25  0917   PBNP 2,276*       Imaging: Imaging data reviewed in Epic.    No results found.       Medications: Scheduled Medications[1]    Assessment & Plan:   ASSESSMENT / PLAN:     Staph Epi Bacteremia  - Bcx  7/16 and 7/18,  MSSA x 2  -Repeat blood cultures today  -Continue on vanco   - ID consulted  - unclear source at this time: Possible skin source given multiple areas of traumatic skin breakdown UE and LE  - Cards consult for DARYL. Hx TAVR  - DARYL ordered  -Repeat blood cultures today     Acute on chronic hypoxic respiratory failure (HCC)  -multifactorial: acute HFpEF, recurrent  interstitial pneumonitis, ?CAP  -CT chest in ED 7/16/2025 - no PE; fibrotic changes seen with emphysematous changes. Diffuse groundglass opacities seen compared to CT from June 2025. Also pulm edema.   -required NRB in ER transiently; on home 2 LPM o2.   -echo 2/5/25 - normal LV systolic function (EF 60-65%); grade 2 diastolic dysfunction; normally functioning bioprosthetic TAVR; sl incr PAP 44mmHg  - no abx.   -IV lasix; 40 mg IV BID (for HFpEF)  -s/p solumedrol - transitioned to prednisone  -suspect majority of sx/hypoxia due to ILD/NSIP  -O2 down to31L   -clinically improved     Coronary artery disease  -Cath 1/14/25 by Dr. Bernardo Liz (as w/u for TAVR) -- RCA non-obstructive; LM free of obst disease; LM plaque extending into ostium of LAD (20-30%); mid LAD (80-90%), cx ostium (60-70%)  -intervention delayed until after colon resection due to need for DAPT x 6 mos after stenting   -s/p PCI/JUVENTINO of mid LAD (70%>0%) on 4/7/25 by Dr. Dutton  -cont ASA, Plavix  -increased atorvastatin from 20mg to 80mg/day (LDL 76 on 3/26/25)     Interstitial pneumonitis/NSIP  -dx'ed 10/2024 - presented with cough and severe hypoxia  -unresponsive to abx   -CXR 10/24/24 extensive bilateral perihilar and bilateral upper and lower lobe   -S.pneumo, legionella Ag , mycoplasma IgM/G, Fungitell-beta-D glucan negative  -ANCA and URIEL/connective tissue disease panels negative  -anti-histone and anti-K[place]a Ab's negative  -CT with bilateral ground glass opacities, small consolidations of BLL  -findings suspicious for NSIP (vs cryptogenic organizing pneumonia vs hypersensitivity pneumonitis); NOT thought to be c/w UIP pattern  -s/p empiric steroids (solumedrol then prednisone taper) 10/25/24 - (EOT 1/18/25)  -dyspnea and hypoxia completely resolved until admission 3/26/25 and now again 7/16/25 -- POPE, hypoxia  -steroids 10 mg daily, will increase to stress doses this may help BP.      Hx of Bilateral pulmonary emboli (10/2024)  -dx'ed at time of  interstitial pneumonitis  -CT chest 10/25/24 -- acute distal segmental/subsegmental pulmonary emboli in [place] and subsegmental PE in [place]  -unclear etiology; no recent hx of long travel; no family/personal hx of blood clots  -BLE venous dopplers negative 10/26/24  -started xarelto 20 mg po every day on 11/28/24  -repeat CT chest 12/9/24 neg PE  -stopped Xarelto 12/14/24 due to recurrent GI bleed  -repeat CT chest 3/26/25 and 4/9/25 and 7/16/25 -- all neg PE     Severe aortic stenosis  -progression on serial echocardiograms  -s/p TAVR 1/23/25 (Dr. Reji Green)     Adenocarcinoma of colon  -Bx of transverse colon polyp 12/17/24 -- invasive, moderately differentiated adenocarcinoma  -CEA normal (1.9)  -CT a/p neg for mets  -surgery delayed in anticipation of TAVR (done 1/23/25)  -s/p robotic assisted left hemicolectomy (Dr. Cassidy) on 2/27/25 - path - tumor invades into muscularis; margins neg for tumor; all 19 LN's neg for mets (0/19); pT2, pN0     Hx  GI bleed  -EGD 11/26/24 (Dr. Nuno) -15mm nonbleeding gastric antral ulcer; 3mm gastric antral AVM s/p APC  -recurrence in 12/2024  -colonoscopy and repeat EGD 12/17/24 by Dr. Staton -- grade 1 esophagitis; duod ulcers healing; large flat polyp in transverse colon (carcinoma)   -received total of 3 units PRBCs   -Xarelto stopped in 12/2024  -s/p omeprazole 20mg BID x 8 weeks (EOT 1/22/25), now on omeprazole 20mg daily -- plan has been to continue until she completed her coronary stenting.  Will stop once off steroids.     Anemia due to acute blood loss  -GI bleed as above  -on ferrous sulfate 325mg/day  -Hgb down but stable (8.7)      Hx chronic BLE edema  -CRISTA hose     Acute left peroneal palsy  In Nov 2024; had numbness to anterolateral left foot and ankle as well as inability to dorsiflex left foot; etiology unclear  -neurologist Dr Richardson consulted in hosp  -MRI Lumbar spine showed severe multilevel DJD   -head CT neg for acute intracranial hemorrhage, midline shift,  mass effect, or hydrocephalus.   -MRI brain--no acute intracranial process  -was doing PT at [facility] -- continues to improve; almost back to normal       Hx of Hypertension, primary  -(dyazide stopped due to NANCY)  -(amlodipine held due to low BP in 11/2024)  -BP remains normal off meds     Hx of hip OA  -s/p left THR (Dr. Lo) many years ago  -s/p elective R BEATRIZ on 10/5/23 by Dr. Diaz     Hyperlipidemia   on atorvastatin to 80mg/day as above     Osteopenia   On Fosamax from 2011 to 4/2016.     DEXA stable 5/10/17 and 2019 and 2021  DEXA 8/2024 -- osteoporosis of left forearm  -restarted Fosamax 8/2024 (on hold this admission); restart at discharge     Vitamin D deficiency  On vitamin D 4000u/day         Level 3 care        dvt prophylaxis: eliquis  code status: full code  dispo: home upon medical clearance        Estimated date of discharge: tbd  Discharge is dependent on: clearance  At this point Ms. Busby is expected to be discharge to: home     Plan of care discussed with patient        Benny Singh MD, FAAP, FACP  [facility] Hospitalist  I respond to Epic Chat and AMS Connect         [1]    furosemide  20 mg Oral Daily    methylPREDNISolone  40 mg Intravenous Q12H    enoxaparin  40 mg Subcutaneous Daily    vancomycin  15 mg/kg Intravenous Q24H    aspirin  81 mg Oral Daily    atorvastatin  80 mg Oral Nightly    clopidogrel  75 mg Oral Daily    ferrous sulfate  325 mg Oral Daily with breakfast    pantoprazole  40 mg Oral QAM AC

## 2025-07-20 NOTE — PLAN OF CARE
Problem: Patient Centered Care  Goal: Patient preferences are identified and integrated in the patient's plan of care  Description: Interventions:  - What would you like us to know as we care for you? From [facility] [place]  - Provide timely, complete, and accurate information to patient/family  - Incorporate patient and family knowledge, values, beliefs, and cultural backgrounds into the planning and delivery of care  - Encourage patient/family to participate in care and decision-making at the level they choose  - Honor patient and family perspectives and choices  Outcome: Progressing     Problem: Patient/Family Goals  Goal: Patient/Family Long Term Goal  Description: Patient's Long Term Goal: discharge from hospital     Interventions:  - monitor vital signs   - monitor appropriate labs  - pain management   - administer medications per order  - follow MD orders  - diagnostics per order  - update and inform patient and family on plan of care  - discharge planning  - See additional Care Plan goals for specific interventions  Outcome: Progressing  Goal: Patient/Family Short Term Goal  Description: Patient's Short Term Goal: improve breathing     Interventions:   - monitor vital signs   - monitor appropriate labs  - pain management   - administer medications per order  - follow MD orders  - diagnostics per order  - update and inform patient and family on plan of care  - See additional Care Plan goals for specific interventions  Outcome: Progressing     Problem: PAIN - ADULT  Goal: Verbalizes/displays adequate comfort level or patient's stated pain goal  Description: INTERVENTIONS:  - Encourage pt to monitor pain and request assistance  - Assess pain using appropriate pain scale  - Administer analgesics based on type and severity of pain and evaluate response  - Implement non-pharmacological measures as appropriate and evaluate response  - Consider cultural and social influences on pain and pain management  -  Manage/alleviate anxiety  - Utilize distraction and/or relaxation techniques  - Monitor for opioid side effects  - Notify MD/LIP if interventions unsuccessful or patient reports new pain  - Anticipate increased pain with activity and pre-medicate as appropriate  Outcome: Progressing     Problem: SAFETY ADULT - FALL  Goal: Free from fall injury  Description: INTERVENTIONS:  - Assess pt frequently for physical needs  - Identify cognitive and physical deficits and behaviors that affect risk of falls.  - [place] fall precautions as indicated by assessment.  - Educate pt/family on patient safety including physical limitations  - Instruct pt to call for assistance with activity based on assessment  - Modify environment to reduce risk of injury  - Provide assistive devices as appropriate  - Consider OT/PT consult to assist with strengthening/mobility  - Encourage toileting schedule  Outcome: Progressing     Problem: DISCHARGE PLANNING  Goal: Discharge to home or other facility with appropriate resources  Description: INTERVENTIONS:  - Identify barriers to discharge w/pt and caregiver  - Include patient/family/discharge partner in discharge planning  - Arrange for needed discharge resources and transportation as appropriate  - Identify discharge learning needs (meds, wound care, etc)  - Arrange for interpreters to assist at discharge as needed  - Consider post-discharge preferences of patient/family/discharge partner  - Complete POLST form as appropriate  - Assess patient's ability to be responsible for managing their own health  - Refer to Case Management Department for coordinating discharge planning if the patient needs post-hospital services based on physician/LIP order or complex needs related to functional status, cognitive ability or social support system  Outcome: Progressing     Problem: CARDIOVASCULAR - ADULT  Goal: Maintains optimal cardiac output and hemodynamic stability  Description: INTERVENTIONS:  -  Monitor vital signs, rhythm, and trends  - Monitor for bleeding, hypotension and signs of decreased cardiac output  - Evaluate effectiveness of vasoactive medications to optimize hemodynamic stability  - Monitor arterial and/or venous puncture sites for bleeding and/or hematoma  - Assess quality of pulses, skin color and temperature  - Assess for signs of decreased coronary artery perfusion - ex. Angina  - Evaluate fluid balance, assess for edema, trend weights  Outcome: Progressing     Problem: RESPIRATORY - ADULT  Goal: Achieves optimal ventilation and oxygenation  Description: INTERVENTIONS:  - Assess for changes in respiratory status  - Assess for changes in mentation and behavior  - Position to facilitate oxygenation and minimize respiratory effort  - Oxygen supplementation based on oxygen saturation or ABGs  - Provide Smoking Cessation handout, if applicable  - Encourage broncho-pulmonary hygiene including cough, deep breathe, Incentive Spirometry  - Assess the need for suctioning and perform as needed  - Assess and instruct to report SOB or any respiratory difficulty  - Respiratory Therapy support as indicated  - Manage/alleviate anxiety  - Monitor for signs/symptoms of CO2 retention  Outcome: Progressing     Problem: METABOLIC/FLUID AND ELECTROLYTES - ADULT  Goal: Electrolytes maintained within normal limits  Description: INTERVENTIONS:  - Monitor labs and rhythm and assess patient for signs and symptoms of electrolyte imbalances  - Administer electrolyte replacement as ordered  - Monitor response to electrolyte replacements, including rhythm and repeat lab results as appropriate  - Fluid restriction as ordered  - Instruct patient on fluid and nutrition restrictions as appropriate  Outcome: Progressing     Problem: SKIN/TISSUE INTEGRITY - ADULT  Goal: Skin integrity remains intact  Description: INTERVENTIONS  - Assess and document risk factors for pressure ulcer development  - Assess and document skin  integrity  - Monitor for areas of redness and/or skin breakdown  - Initiate interventions, skin care algorithm/standards of care as needed  Outcome: Progressing     Problem: HEMATOLOGIC - ADULT  Goal: Free from bleeding injury  Description: (Example usage: patient with low platelets)  INTERVENTIONS:  - Avoid intramuscular injections, enemas and rectal medication administration  - Ensure safe mobilization of patient  - Hold pressure on venipuncture sites to achieve adequate hemostasis  - Assess for signs and symptoms of internal bleeding  - Monitor lab trends  - Patient is to report abnormal signs of bleeding to staff  - Avoid use of toothpicks and dental floss  - Use electric shaver for shaving  - Use soft bristle tooth brush  - Limit straining and forceful nose blowing  Outcome: Progressing

## 2025-07-21 NOTE — PROGRESS NOTES
Progress Note  Hoda Busby Patient Status:  Inpatient    1940 MRN [id number]   Location [facility] 3W/SW Attending Benny Singh MD   Hosp Day # 5 PCP Malathi Stuart MD     Subjective:  Pt sitting up in chair; denies complaints. Denies SOB, orthopnea. Anxiously awaiting DARYL today.     Objective:  /55 (BP Location: Right arm)   Pulse 91   Temp 98.1 °F (36.7 °C) (Oral)   Resp 18   Wt 175 lb 1.6 oz (79.4 kg)   SpO2 94%   BMI 30.06 kg/m²     Telemetry: NSR, rare PVC    Intake/Output:    Intake/Output Summary (Last 24 hours) at 2025 0929  Last data filed at 2025 0147  Gross per 24 hour   Intake 530 ml   Output --   Net 530 ml       Last 3 Weights   25 0300 175 lb 1.6 oz (79.4 kg)   25 0614 176 lb 8 oz (80.1 kg)   25 0432 171 lb (77.6 kg)   25 0500 173 lb 6.4 oz (78.7 kg)   25 1343 176 lb 6.4 oz (80 kg)   25 0910 182 lb 15.7 oz (83 kg)   25 1050 183 lb (83 kg)   25 0949 183 lb (83 kg)       Labs:  Recent Labs   Lab 25  0612 25  0714 25  0652   GLU 88 90 115*   BUN 23 19 17   CREATSERUM 1.06* 0.95 0.88   EGFRCR 51* 59* 64   CA 8.3* 8.7 8.9    135* 139   K 3.6 3.6 4.1    103 105   CO2 29.0 27.0 28.0     Recent Labs   Lab 25  0550 25  0615 25  0714 25  0652   RBC 3.27* 3.47* 3.53* 3.62*   HGB 8.7* 9.4* 9.6* 9.9*   HCT 27.6* 29.8* 30.1* 31.0*   MCV 84.4 85.9 85.3 85.6   MCH 26.6 27.1 27.2 27.3   MCHC 31.5 31.5 31.9 31.9   RDW 15.9* 16.0* 15.9* 15.4*   NEPRELIM 9.71* 9.67*  --  10.52*   WBC 11.9* 12.1* 11.0 11.7*   .0 329.0 320.0 313.0         Recent Labs   Lab 25  0917   TROPHS 32       Diagnostics:  No results found.   Review of Systems   Cardiovascular:  Positive for leg swelling. Negative for chest pain, dyspnea on exertion and orthopnea.   Respiratory:  Negative for cough and shortness of breath.        Physical Exam:    Gen: alert, oriented x 3,  NAD  Heent: pupils equal, reactive. Mucous membranes moist.   Neck: no jvd  Cardiac: regular rate and rhythm, normal S1,S2, LLSB 2/6 murmur  Lungs: bilateral crackles  Abd: soft, NT/ND +bs  Ext: mild BLE edema  Skin: Warm, dry  Neuro: No focal deficits      Medications:    Scheduled Medications[1]  Medication Infusions[2]      Assessment:  Acute on Chronic Respiratory Failure - Multifactorial (Hx Interstitial Pneumonitis, HFpEF)  Decreased O2 requirements - on baseline 2-3L  CTA Chest neg for PE, chronic interstitial lung disease, diffused opacities   S/P IV lasix > now po  Diuresis, steroids  Pulm following  Acute on Chronic HFpEF, NYHA Class II  proBNP 2276  Echo LVEF 60-65%, no RWMA, G2DD, mild MR, PASP 47mmHg  Diuresis w/IV lasix > held d/t hypotension, now on po Lasix 20mg daily  GDMT - hx on lasix 20mg daily at home; not hx on MRA, SGLT2i  Sepsis, Staph Bacteremia   BC + Staph Epi 7/16, 7/18  IV antibx per ID  DARYL pending   Aortic Stenosis s/p TAVR 23mm Martha S3 1/23/2025  Mean grad 20mmHg  Hx CAD s/p PCI to mid LAD 4/2025  On asa, plavix, statin   Hypotension, Hx Hypertension  BP improved today  Hx Colon Cancer s/p colon resection 2/27/25  Hx PE 10/2024 - formerly on Xarelto  Hx GIB, AVMs - on PPI  Chronic Anemia - Hgb stable     Plan:  BP improved - po lasix resumed yesterday  Continue uninterrupted asa, plavix, statin   Strict I&O, daily weights (standing if possible), daily BMP, HF order set  IV antibx per ID  Pt is NPO for DARYL today     Plan of care discussed with patient, RN.    Veronica Rojas, APRN  7/21/2025  9:29 AM    ===============================================  Seen/examined patient independently.  Agree with findings, assessment and plan as outlined above.   No CP or SOB.  DARYL today with normal functioning TAVR - no evidence of vegetation.   I have personally performed the medical decision making in its entirety.   Colin Wood DO           [1]    furosemide  20 mg Oral Daily    benzocaine  1  spray Mouth/Throat Once    methylPREDNISolone  40 mg Intravenous Q12H    enoxaparin  40 mg Subcutaneous Daily    vancomycin  15 mg/kg Intravenous Q24H    aspirin  81 mg Oral Daily    atorvastatin  80 mg Oral Nightly    clopidogrel  75 mg Oral Daily    ferrous sulfate  325 mg Oral Daily with breakfast    pantoprazole  40 mg Oral QAM AC   [2]

## 2025-07-21 NOTE — PROGRESS NOTES
INFECTIOUS DISEASE PROGRESS NOTE  [facility]  part of Located within [facility] ID PROGRESS NOTE    Hoda Busby Patient Status:  Inpatient    1940 MRN [id number]   Location [facility] 3W/SW Attending Benny Singh MD   Hosp Day # 5 PCP Malathi Stuart MD     Subjective:  ROS reviewed. Awaiting DARYL.    ASSESSMENT:    Antibiotics: IV vancomycin     # S. Epi bacteremia in 2/2 sets with TAVR 25 - r/o IE  # Acute sepsis with hypotension  # NANCY  # Leukocytosis  # h/o PE 10/2024  # h/o colon invasive adnocarcinoma s/p L hemicolectomy 25  # HTN, HLD, CAD, CHF  # h/o interstitial lung disease on home O2 4L - on prednisone 10 mg/day     PLAN:  -  Continue on vancomycin. FU DARYL. Anticipate continued IV abx on DC.  -  Follow fever curve, wbc.  -  Reviewed labs, micro, imaging reports, available old records.  -  Case d/w patient, RN.     History of Present Illness:  85-year-old female with a history of HTN, aortic stenosis status post TAVR 2025, was last seen 2020 for for leukocytosis with multifocal pneumonia and hypoxia.  Treated with IV Zosyn for 7 days as well as steroids.  History of interstitial pneumonitis status post steroids.  Also in 2024 found to have a gastric ulcer that was cauterized and a colonoscopy revealed a large transverse colon polyp found to be invasive adenocarcinoma.  Underwent left hemicolectomy 2025.  Now presents to hospital on  with chronic 4 L home oxygen now with progressive dyspnea exertion, shortness of breath, hypoxia requiring up to 7 L at night at the baseline.  Also with hypotension, WBC 17.4.  CT chest with no PE but findings of pulmonary fibrosis and emphysema that is stable as well as concern for pulmonary edema.  Not found to have Staph epidermidis bacteremia 2 sets.    Physical Exam:  /55 (BP Location: Right arm)   Pulse 91   Temp 98.1 °F (36.7 °C) (Oral)   Resp 18   Wt 175 lb 1.6 oz (79.4  kg)   SpO2 94%   BMI 30.06 kg/m²     Gen:   Awake, on NC  HEENT:  EOMI, neck supple  CV/lungs:  Regular rate and rhythm, CTAB  Abdom:  Soft, no TTP  Skin/extrem:  No rashes, BLE edema  Lines:  PIV+    Laboratory Data: Reviewed    Microbiology: Reviewed    Radiology: Reviewed      [facility]  [phone]  7/21/2025

## 2025-07-21 NOTE — PROGRESS NOTES
Progress Note     Hoda Busby Patient Status:  Inpatient    1940 MRN [id number]   Location [facility] 3W/SW Attending Benny Singh MD   Hosp Day # 5 PCP Malathi Stuart MD     Chief Complaint:   Chief Complaint   Patient presents with    Difficulty Breathing         Subjective:   S: Patient seen and examined, chart reviewed.   Waiting for DARYL  BC on  and  have been NGTD  Still on IV solumedrol 40 daily  No new c/o.       Review of Systems:   10 point ROS completed and was negative, except for pertinent positive and negatives stated in subjective.                Objective:   Vital signs:  Temp:  [97.4 °F (36.3 °C)-98.1 °F (36.7 °C)] 98.1 °F (36.7 °C)  Pulse:  [74-91] 91  Resp:  [18] 18  BP: ()/(47-69) 113/55  SpO2:  [94 %-99 %] 94 %    Wt Readings from Last 6 Encounters:   25 175 lb 1.6 oz (79.4 kg)   25 183 lb (83 kg)   25 183 lb (83 kg)   25 180 lb 3.2 oz (81.7 kg)   25 180 lb (81.6 kg)   25 173 lb (78.5 kg)       Physical Exam:    General: No acute distress.   Respiratory: Clear to auscultation bilaterally. No wheezes. No rhonchi.  Cardiovascular: S1, S2. Regular rate and rhythm. No murmurs, rubs or gallops.   Abdomen: Soft, nontender, nondistended.  Positive bowel sounds. No rebound or guarding.  Neurologic: No focal neurological deficits.   Musculoskeletal: Moves all extremities.  Extremities: No edema.    Results:   Diagnostic Data:      Labs:    Labs Last 24 Hours:   BMP     CBC    Other     Na - Cl - BUN - Glu -   Hb -   PTT - Procal -   K - CO2 - Cr -   WBC - >< PLT -  INR - CRP -   Renal Lytes Endo    Hct -   Trop - D dim -   eGFR - Ca - POC Gluc  -    LFT   pBNP - Lactic -   eGFR AA - PO4 - A1c -   AST - APk - Prot -  LDL -      Recent Labs   Lab 25  0550 25  0615 25  0714 25  0652   RBC 3.27* 3.47* 3.53* 3.62*   HGB 8.7* 9.4* 9.6* 9.9*   HCT 27.6* 29.8* 30.1* 31.0*   MCV 84.4 85.9 85.3 85.6   MCH 26.6  27.1 27.2 27.3   MCHC 31.5 31.5 31.9 31.9   RDW 15.9* 16.0* 15.9* 15.4*   NEPRELIM 9.71* 9.67*  --  10.52*   WBC 11.9* 12.1* 11.0 11.7*   .0 329.0 320.0 313.0       Lab Results   Component Value Date    INR 1.18 01/24/2025    INR 1.33 (H) 01/23/2025    INR 1.06 09/08/2023       Recent Labs   Lab 07/16/25  0917 07/17/25  0550 07/18/25  0612 07/19/25  0714 07/20/25  0652   GLU 95   < > 88 90 115*   BUN 18   < > 23 19 17   CREATSERUM 0.97   < > 1.06* 0.95 0.88   CA 8.9   < > 8.3* 8.7 8.9   ALB 4.0  --   --   --   --       < > 136 135* 139   K 4.1   < > 3.6 3.6 4.1      < > 102 103 105   CO2 25.0   < > 29.0 27.0 28.0   ALKPHO 127  --   --   --   --    AST 30  --   --   --   --    ALT 20  --   --   --   --    BILT 1.1  --   --   --   --    TP 6.2  --   --   --   --     < > = values in this interval not displayed.       No results for input(s): \"JENNIFER\", \"LIP\" in the last 168 hours.    Recent Labs   Lab 07/17/25  0550   MG 2.1       Recent Labs   Lab 07/18/25  1054   BLDSMR Positive Blood Culture*  Gram positive cocci in clusters*         No results found.        Pro-Calcitonin  No results for input(s): \"PCT\" in the last 168 hours.    Cardiac  Recent Labs   Lab 07/16/25  0917   PBNP 2,276*       Imaging: Imaging data reviewed in Epic.    No results found.       Medications: Scheduled Medications[1]    Assessment & Plan:   ASSESSMENT / PLAN:     Staph Epi Bacteremia  - Bcx  7/16 and 7/18,  MSSA x 2  - Bcx 7/19 and 7/20 NGTD  -Continue on vanco for now  - ID consulted, will need to switch to PO abx on discharge and complete LOT as per ID recs  - unclear source at this time: Possible skin source given multiple areas of traumatic skin breakdown UE and LE  - Cards consult for DARYL. Hx TAVR  - DARYL today     Acute on chronic hypoxic respiratory failure (HCC)  -multifactorial: acute HFpEF, recurrent interstitial pneumonitis, ?CAP  -CT chest in ED 7/16/2025 - no PE; fibrotic changes seen with emphysematous changes.  Diffuse groundglass opacities seen compared to CT from June 2025. Also pulm edema.   -required NRB in ER transiently; on home 2 LPM o2.   -echo 2/5/25 - normal LV systolic function (EF 60-65%); grade 2 diastolic dysfunction; normally functioning bioprosthetic TAVR; sl incr PAP 44mmHg  - no abx.   -IV lasix; 40 mg IV BID (for HFpEF)  -on Pred 10 mg daily but placed on Solumedrol for stress doses during sepsis.  Now weaning.   -suspect majority of sx/hypoxia due to ILD/NSIP  -O2 down to31L   -clinically improved     Coronary artery disease  -Cath 1/14/25 by Dr. Bernardo Liz (as w/u for TAVR) -- RCA non-obstructive; LM free of obst disease; LM plaque extending into ostium of LAD (20-30%); mid LAD (80-90%), cx ostium (60-70%)  -intervention delayed until after colon resection due to need for DAPT x 6 mos after stenting   -s/p PCI/JUVENTINO of mid LAD (70%>0%) on 4/7/25 by Dr. Dutton  -cont ASA, Plavix  -increased atorvastatin from 20mg to 80mg/day (LDL 76 on 3/26/25)     Interstitial pneumonitis/NSIP  -dx'ed 10/2024 - presented with cough and severe hypoxia  -unresponsive to abx   -CXR 10/24/24 extensive bilateral perihilar and bilateral upper and lower lobe   -S.pneumo, legionella Ag , mycoplasma IgM/G, Fungitell-beta-D glucan negative  -ANCA and URIEL/connective tissue disease panels negative  -anti-histone and anti-Keara Ab's negative  -CT with bilateral ground glass opacities, small consolidations of BLL  -findings suspicious for NSIP (vs cryptogenic organizing pneumonia vs hypersensitivity pneumonitis); NOT thought to be c/w UIP pattern  -s/p empiric steroids (solumedrol then prednisone taper) 10/25/24 - (EOT 1/18/25)  -dyspnea and hypoxia completely resolved until admission 3/26/25 and now again 7/16/25 -- POPE, hypoxia  -steroids 10 mg daily was increased to stress doses this may help BP. No on IV 40 daily and will switch to PO tomorrow.       Hx of Bilateral pulmonary emboli (10/2024)  -dx'ed at time of interstitial  pneumonitis  -CT chest 10/25/24 -- acute distal segmental/subsegmental pulmonary emboli in [place] and subsegmental PE in [place]  -unclear etiology; no recent hx of long travel; no family/personal hx of blood clots  -BLE venous dopplers negative 10/26/24  -started xarelto 20 mg po every day on 11/28/24  -repeat CT chest 12/9/24 neg PE  -stopped Xarelto 12/14/24 due to recurrent GI bleed  -repeat CT chest 3/26/25 and 4/9/25 and 7/16/25 -- all neg PE     Severe aortic stenosis  -progression on serial echocardiograms  -s/p TAVR 1/23/25 (Dr. Reji Green)     Adenocarcinoma of colon  -Bx of transverse colon polyp 12/17/24 -- invasive, moderately differentiated adenocarcinoma  -CEA normal (1.9)  -CT a/p neg for mets  -surgery delayed in anticipation of TAVR (done 1/23/25)  -s/p robotic assisted left hemicolectomy (Dr. Cassidy) on 2/27/25 - path - tumor invades into muscularis; margins neg for tumor; all 19 LN's neg for mets (0/19); pT2, pN0     Hx  GI bleed  -EGD 11/26/24 (Dr. Nuno) -15mm nonbleeding gastric antral ulcer; 3mm gastric antral AVM s/p APC  -recurrence in 12/2024  -colonoscopy and repeat EGD 12/17/24 by Dr. Staton -- grade 1 esophagitis; duod ulcers healing; large flat polyp in transverse colon (carcinoma)   -received total of 3 units PRBCs   -Xarelto stopped in 12/2024  -s/p omeprazole 20mg BID x 8 weeks (EOT 1/22/25), now on omeprazole 20mg daily -- plan has been to continue until she completed her coronary stenting.  Will stop once off steroids.     Anemia due to acute blood loss  -GI bleed as above  -on ferrous sulfate 325mg/day  -Hgb down but stable (8.7)      Hx chronic BLE edema  -CRISTA hose     Acute left peroneal palsy  In Nov 2024; had numbness to anterolateral left foot and ankle as well as inability to dorsiflex left foot; etiology unclear  -neurologist Dr Richardson consulted in hosp  -MRI Lumbar spine showed severe multilevel DJD   -head CT neg for acute intracranial hemorrhage, midline shift, mass effect,  or hydrocephalus.   -MRI brain--no acute intracranial process  -was doing PT at [facility] -- continues to improve; almost back to normal       Hx of Hypertension, primary  -(dyazide stopped due to NANCY)  -(amlodipine held due to low BP in 11/2024)  -BP remains normal off meds     Hx of hip OA  -s/p left THR (Dr. Lo) many years ago  -s/p elective R BEATRIZ on 10/5/23 by Dr. iDaz     Hyperlipidemia   on atorvastatin to 80mg/day as above     Osteopenia   On Fosamax from 2011 to 4/2016.     DEXA stable 5/10/17 and 2019 and 2021  DEXA 8/2024 -- osteoporosis of left forearm  -restarted Fosamax 8/2024 (on hold this admission); restart at discharge     Vitamin D deficiency  On vitamin D 4000u/day         Level 3 care        dvt prophylaxis: eliquis  code status: full code  dispo: home upon medical clearance        Estimated date of discharge: tbd  Discharge is dependent on: clearance  At this point Ms. Busby is expected to be discharge to: home     Plan of care discussed with patient               Benny Singh MD, FAAP, FACP  [facility] Hospitalist  I respond to Epic Chat and AMS Connect       [1]    furosemide  20 mg Oral Daily    benzocaine  1 spray Mouth/Throat Once    methylPREDNISolone  40 mg Intravenous Q12H    enoxaparin  40 mg Subcutaneous Daily    vancomycin  15 mg/kg Intravenous Q24H    aspirin  81 mg Oral Daily    atorvastatin  80 mg Oral Nightly    clopidogrel  75 mg Oral Daily    ferrous sulfate  325 mg Oral Daily with breakfast    pantoprazole  40 mg Oral QAM AC

## 2025-07-21 NOTE — CM/SW NOTE
07/21/25 1500   CM/SW Referral Data   Referral Source Social Work (self-referral)   Reason for Referral Discharge planning   Informant Patient;Other  (Niece Vonda)   Medical Hx   Does patient have an established PCP? Yes   Significant Past Medical/Mental Health Hx HTN, HLD, CAD   Patient Info   Patient's Current Mental Status at Time of Assessment Alert;Oriented   Patient's Home Environment Assisted Living   Post Acute Care Provider Upon Admission Other  ([facility])   Number of Stair in Home 0   Patient lives with Alone   Patient Status Prior to Admission   Independent with ADLs and Mobility No   Pt. requires assistance with Housework;Driving   Services in place prior to admission DME/Supplies at home   Type of DME/Supplies Wheeled Walker;Home Oxygen   Discharge Needs   Anticipated D/C needs Infusion care     Social work was able to meet with the patient and her two nieces at bedside to discuss IV abx at discharge.    It is anticipated that the patient will need a weekly IV abx at discharge.  The patient is approved at 100% for In office infusion per [facility].    The patient and her nieces are agreeable to the weekly IV abx plan.    Social work will make the patient's appointment at [facility] when discharge date is known.    The patient and family have no questions or concerns at this time.    SW/CM to remain available for support and/or discharge planning.     Ave MURPHY, LSW  Discharge Planner [id number]

## 2025-07-21 NOTE — PROGRESS NOTES
[facility]  part of [facility]    Progress Note      Assessment and Plan:   1.  Staph epidermidis bacteremia-2 out of 2 blood cultures.  Patient has a history of TAVR.  No evidence of vegetation on the transthoracic echo.  The patient feels well.  Awaiting DARYL today but otherwise without new complaint.    Recommendations:  1.  Vancomycin  2.  DARYL today.  3.  Will follow clinically.    2.  History of nonspecific interstitial pneumonitis-will decrease methylprednisolone to once daily.  Breathing about the same.  Has chronic basilar crackles.    3.  History of colon cancer-no evidence residual status post resection within the last year.    4.  History of PCI to LAD        Subjective:   Hoda Busby is a(n) 85 year old female who is breathing comfortably and in good spirits.    Objective:   Blood pressure 113/55, pulse 91, temperature 98.1 °F (36.7 °C), temperature source Oral, resp. rate 18, weight 175 lb 1.6 oz (79.4 kg), SpO2 94%.    Physical Exam alert white female  HEENT examination is unremarkable with pupils equal round and reactive to light and accommodation.   Neck without adenopathy, thyromegaly, JVD nor bruit.   Lungs basilar crackles to auscultation and percussion.  Cardiac regular rate and rhythm no murmur.   Abdomen nontender, without hepatosplenomegaly and no mass appreciable.   Extremities without clubbing cyanosis 1+ extremity edema.   Neurologic grossly intact with symmetric tone and strength and reflex.  Skin without gross abnormality     Results:          Chest x-ray-Cardiomegaly and prominent central vasculature. TAVR   Diffuse pulmonary interstitial prominence. 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     Stanton Sanchez MD  Medical Director, Critical Care, [facility]  Medical Director, [facility] [place]  Pager: [phone]

## 2025-07-21 NOTE — IVS NOTE
Patient tolerated DARYL well. No complications noted. Patient awake, alert and oriented x4. Vital signs within normal range. Report given to CHIARA Venegas

## 2025-07-22 NOTE — PROGRESS NOTES
Patient was provided with discharge instructions, education, and follow up information. Patient's nieces present for discharge instructions with patient's consent. Prescription for prednisone was already sent electronically to patient's pharmacy. Patient verbalizes understanding of follow up information, specifically outpatient weekly infusions and monitoring her fluid intake . Patient has no questions after reviewing all instructions and will be going home to [facility]. Report called to Mariposa GUADARRAMA at [place]. Nieces transporting patient to [place] and brought her home oxygen tank for transport. Belongings taken with patient.

## 2025-07-22 NOTE — PROGRESS NOTES
INFECTIOUS DISEASE PROGRESS NOTE  [facility]  part of [facility] ID PROGRESS NOTE    Hoda Busby Patient Status:  Inpatient    1940 MRN [id number]   Location [facility] 3W/SW Attending Benny Singh MD   Hosp Day # 6 PCP Malathi Stuart MD     Subjective:  ROS reviewed. S/p DARYL yesterday.    ASSESSMENT:    Antibiotics: IV vancomycin     # S. Epidermidis bacteremia in 2/2 sets with TAVR 25 - r/o IE   -DARYL without vegetation  # Acute sepsis with hypotension  # NANCY  # Leukocytosis  # h/o PE 10/2024  # h/o colon invasive adnocarcinoma s/p L hemicolectomy 25  # HTN, HLD, CAD, CHF  # h/o interstitial lung disease on home O2 4L - on prednisone 10 mg/day     PLAN:  -  Currently on vancomycin. Will DC on once weekly IV dalbavancin x 6 doses with repeat blood cx one week after completion of therapy.  -  Can FU with Dr. Garcia in clinic.  -  Follow fever curve, wbc.  -  Reviewed labs, micro, imaging reports, available old records.  -  Case d/w patient, RN.     History of Present Illness:  85-year-old female with a history of HTN, aortic stenosis status post TAVR 2025, was last seen 2020 for for leukocytosis with multifocal pneumonia and hypoxia.  Treated with IV Zosyn for 7 days as well as steroids.  History of interstitial pneumonitis status post steroids.  Also in 2024 found to have a gastric ulcer that was cauterized and a colonoscopy revealed a large transverse colon polyp found to be invasive adenocarcinoma.  Underwent left hemicolectomy 2025.  Now presents to hospital on  with chronic 4 L home oxygen now with progressive dyspnea exertion, shortness of breath, hypoxia requiring up to 7 L at night at the baseline.  Also with hypotension, WBC 17.4.  CT chest with no PE but findings of pulmonary fibrosis and emphysema that is stable as well as concern for pulmonary edema.  Not found to have Staph epidermidis bacteremia  2 sets.    Physical Exam:  /48 (BP Location: Right arm)   Pulse 77   Temp 97.7 °F (36.5 °C) (Oral)   Resp 18   Wt 173 lb 9.6 oz (78.7 kg)   SpO2 97%   BMI 29.80 kg/m²     Gen:   Awake, on NC  HEENT:  EOMI, neck supple  CV/lungs:  Regular rate and rhythm, CTAB  Abdom:  Soft, no TTP  Skin/extrem:  No rashes, BLE edema  Lines:  PIV+    Laboratory Data: Reviewed    Microbiology: Reviewed    Radiology: Reviewed      [facility]  [phone]  7/22/2025

## 2025-07-22 NOTE — PLAN OF CARE
Problem: Patient Centered Care  Goal: Patient preferences are identified and integrated in the patient's plan of care  Description: Interventions:  - What would you like us to know as we care for you? From [facility]  - Provide timely, complete, and accurate information to patient/family  - Incorporate patient and family knowledge, values, beliefs, and cultural backgrounds into the planning and delivery of care  - Encourage patient/family to participate in care and decision-making at the level they choose  - Honor patient and family perspectives and choices  Outcome: Adequate for Discharge     Problem: Patient/Family Goals  Goal: Patient/Family Long Term Goal  Description: Patient's Long Term Goal: discharge from hospital     Interventions:  - monitor vital signs and labs  - pain management   - administer medications per order  - follow provider recommendations  - diagnostics per order  - update and inform patient and family on plan of care  - discharge planning  - See additional Care Plan goals for specific interventions  Outcome: Adequate for Discharge  Goal: Patient/Family Short Term Goal  Description: Patient's Short Term Goal: improve breathing     Interventions:   - monitor vital signs and labs  - pain management   - administer medications per order  - follow provider recommendations  - diagnostics per order  - update and inform patient and family on plan of care  - See additional Care Plan goals for specific interventions  Outcome: Adequate for Discharge     Problem: PAIN - ADULT  Goal: Verbalizes/displays adequate comfort level or patient's stated pain goal  Description: INTERVENTIONS:  - Encourage pt to monitor pain and request assistance  - Assess pain using appropriate pain scale  - Administer analgesics based on type and severity of pain and evaluate response  - Implement non-pharmacological measures as appropriate and evaluate response  - Consider cultural and social influences on pain and  pain management  - Manage/alleviate anxiety  - Utilize distraction and/or relaxation techniques  - Monitor for opioid side effects  - Notify MD/LIP if interventions unsuccessful or patient reports new pain  - Anticipate increased pain with activity and pre-medicate as appropriate  Outcome: Adequate for Discharge     Problem: SAFETY ADULT - FALL  Goal: Free from fall injury  Description: INTERVENTIONS:  - Assess pt frequently for physical needs  - Identify cognitive and physical deficits and behaviors that affect risk of falls.  - [place] fall precautions as indicated by assessment.  - Educate pt/family on patient safety including physical limitations  - Instruct pt to call for assistance with activity based on assessment  - Modify environment to reduce risk of injury  - Provide assistive devices as appropriate  - Consider OT/PT consult to assist with strengthening/mobility  - Encourage toileting schedule  Outcome: Adequate for Discharge     Problem: DISCHARGE PLANNING  Goal: Discharge to home or other facility with appropriate resources  Description: INTERVENTIONS:  - Identify barriers to discharge w/pt and caregiver  - Include patient/family/discharge partner in discharge planning  - Arrange for needed discharge resources and transportation as appropriate  - Identify discharge learning needs (meds, wound care, etc)  - Arrange for interpreters to assist at discharge as needed  - Consider post-discharge preferences of patient/family/discharge partner  - Complete POLST form as appropriate  - Assess patient's ability to be responsible for managing their own health  - Refer to Case Management Department for coordinating discharge planning if the patient needs post-hospital services based on physician/LIP order or complex needs related to functional status, cognitive ability or social support system  Outcome: Adequate for Discharge     Problem: CARDIOVASCULAR - ADULT  Goal: Maintains optimal cardiac output and  hemodynamic stability  Description: INTERVENTIONS:  - Monitor vital signs, rhythm, and trends  - Monitor for bleeding, hypotension and signs of decreased cardiac output  - Evaluate effectiveness of vasoactive medications to optimize hemodynamic stability  - Monitor arterial and/or venous puncture sites for bleeding and/or hematoma  - Assess quality of pulses, skin color and temperature  - Assess for signs of decreased coronary artery perfusion - ex. Angina  - Evaluate fluid balance, assess for edema, trend weights  Outcome: Adequate for Discharge     Problem: RESPIRATORY - ADULT  Goal: Achieves optimal ventilation and oxygenation  Description: INTERVENTIONS:  - Assess for changes in respiratory status  - Assess for changes in mentation and behavior  - Position to facilitate oxygenation and minimize respiratory effort  - Oxygen supplementation based on oxygen saturation or ABGs  - Provide Smoking Cessation handout, if applicable  - Encourage broncho-pulmonary hygiene including cough, deep breathe, Incentive Spirometry  - Assess the need for suctioning and perform as needed  - Assess and instruct to report SOB or any respiratory difficulty  - Respiratory Therapy support as indicated  - Manage/alleviate anxiety  - Monitor for signs/symptoms of CO2 retention  Outcome: Adequate for Discharge     Problem: METABOLIC/FLUID AND ELECTROLYTES - ADULT  Goal: Electrolytes maintained within normal limits  Description: INTERVENTIONS:  - Monitor labs and rhythm and assess patient for signs and symptoms of electrolyte imbalances  - Administer electrolyte replacement as ordered  - Monitor response to electrolyte replacements, including rhythm and repeat lab results as appropriate  - Fluid restriction as ordered  - Instruct patient on fluid and nutrition restrictions as appropriate  Outcome: Adequate for Discharge     Problem: SKIN/TISSUE INTEGRITY - ADULT  Goal: Skin integrity remains intact  Description: INTERVENTIONS  - Assess and  document risk factors for pressure ulcer development  - Assess and document skin integrity  - Monitor for areas of redness and/or skin breakdown  - Initiate interventions, skin care algorithm/standards of care as needed  Outcome: Adequate for Discharge     Problem: HEMATOLOGIC - ADULT  Goal: Free from bleeding injury  Description: (Example usage: patient with low platelets)  INTERVENTIONS:  - Avoid intramuscular injections, enemas and rectal medication administration  - Ensure safe mobilization of patient  - Hold pressure on venipuncture sites to achieve adequate hemostasis  - Assess for signs and symptoms of internal bleeding  - Monitor lab trends  - Patient is to report abnormal signs of bleeding to staff  - Avoid use of toothpicks and dental floss  - Use electric shaver for shaving  - Use soft bristle tooth brush  - Limit straining and forceful nose blowing  Outcome: Adequate for Discharge

## 2025-07-22 NOTE — PROGRESS NOTES
Progress Note  Hoda Busby Patient Status:  Inpatient    1940 MRN [id number]   Location [facility] 3W/SW Attending Benny Singh MD   Hosp Day # 6 PCP Malathi Stuart MD     Subjective:  Pt sitting up in chair - denies complaint. No SOB or orthopnea. Feels leg swelling is resolved    Objective:  /48 (BP Location: Right arm)   Pulse 77   Temp 97.7 °F (36.5 °C) (Oral)   Resp 18   Wt 173 lb 9.6 oz (78.7 kg)   SpO2 97%   BMI 29.80 kg/m²     Telemetry: SR, occ PVCs    Intake/Output:    Intake/Output Summary (Last 24 hours) at 2025 1121  Last data filed at 2025 0900  Gross per 24 hour   Intake 580 ml   Output 1600 ml   Net -1020 ml       Last 3 Weights   25 0535 173 lb 9.6 oz (78.7 kg)   25 0300 175 lb 1.6 oz (79.4 kg)   25 0614 176 lb 8 oz (80.1 kg)   25 0432 171 lb (77.6 kg)   25 0500 173 lb 6.4 oz (78.7 kg)   25 1343 176 lb 6.4 oz (80 kg)   25 0910 182 lb 15.7 oz (83 kg)   25 1050 183 lb (83 kg)   25 0949 183 lb (83 kg)       Labs:  Recent Labs   Lab 25  0612 25  0714 25  0652   GLU 88 90 115*   BUN 23 19 17   CREATSERUM 1.06* 0.95 0.88   EGFRCR 51* 59* 64   CA 8.3* 8.7 8.9    135* 139   K 3.6 3.6 4.1    103 105   CO2 29.0 27.0 28.0     Recent Labs   Lab 25  0550 25  0615 25  0714 25  0652   RBC 3.27* 3.47* 3.53* 3.62*   HGB 8.7* 9.4* 9.6* 9.9*   HCT 27.6* 29.8* 30.1* 31.0*   MCV 84.4 85.9 85.3 85.6   MCH 26.6 27.1 27.2 27.3   MCHC 31.5 31.5 31.9 31.9   RDW 15.9* 16.0* 15.9* 15.4*   NEPRELIM 9.71* 9.67*  --  10.52*   WBC 11.9* 12.1* 11.0 11.7*   .0 329.0 320.0 313.0         Recent Labs   Lab 25  0917   TROPHS 32       Diagnostics:  CARD ECHO DARYL (CPT=93320/05227)  Addendum Date: 2025  Transesophageal Echocardiogram (Report amended ) Name:Hoda Busby Date: 2025 :  1940 Ht:  (64in)  BP: 149 / 68 MRN:  [id number]    Age:   85years    Wt:  (175lb) HR: 74bpm Loc:  [facility]       Gndr: F          BSA: 1.85m^2 Sonographer: Piyush Ordering:    Nilda Pastor Consulting:  Papito Holland --------------------------------------------------------------------------- - History/Indications:   Bacteremia. --------------------------------------------------------------------------- - Procedure information:  Diagnostic transesophageal echocardiogram. Additional evaluation included 2D and Doppler.  Patient status:  Inpatient. Location:  Cath lab recovery.    This was a routine study. The risks, benefits, and alternatives to the procedure were explained and informed consent was obtained. Initial setup. The patient arrived at the laboratory. Surface ECG leads, blood pressure measurements, and pulse oximetric signals were monitored. Sedation. Moderate sedation was administered. Transesophageal echocardiography. Topical anesthesia was obtained using Hurricaine. A transesophageal probe was inserted by the attending cardiologist without difficulty. Image quality was adequate. The transesophageal probe was removed.  Study completion:  The patient tolerated the procedure well. There were no complications.  Administered medications: Fentanyl, 50 mcg.  Midazolam , 2 mg. --------------------------------------------------------------------------- - Conclusions: 1. Left ventricle: The cavity size was normal. Systolic function was normal. 2. Left atrium: There is no evidence of a thrombus in the atrial cavity or    appendage. 3. Right atrium: There was no evidence of a thrombus in the atrial cavity or    appendage. 4. Aortic valve: A bioprosthetic valve is present. There was no evidence of    a vegetation. 5. Mitral valve: There was no evidence of vegetation. 6. Tricuspid valve: There was no evidence of a vegetation. Impressions:  No evidence of valvular vegetation. Bioprosthetic aortic valve (TAVR) appears normal. *  --------------------------------------------------------------------------- - * Findings: Left ventricle:  The cavity size was normal. Systolic function was normal. Left atrium:  The atrium was normal in size.  There is no evidence of a thrombus in the atrial cavity or appendage. Right ventricle:  The cavity size was normal. Systolic function was normal. Right atrium:  The atrium was normal in size.  There was no evidence of a thrombus in the atrial cavity or appendage. Mitral valve:  The valve was structurally normal. Leaflet separation was normal.  There was no evidence of vegetation.  Doppler:  There was no regurgitation. Aortic valve:  A bioprosthetic valve is present. The leaflets were normal thickness. Cusp separation was normal. Mobility was not restricted.  There was no evidence of a vegetation. Tricuspid valve:  The valve is structurally normal. Leaflet separation was normal.  There was no evidence of a vegetation.  Doppler:  There was trivial regurgitation. Pulmonic valve:   Poorly visualized. --------------------------------------------------------------------------- - Niurka CrowColin pepper 07/21/2025 16:52      Review of Systems   Cardiovascular:  Negative for chest pain, dyspnea on exertion and leg swelling.   Respiratory:  Negative for cough and shortness of breath.        Physical Exam:    Gen: alert, oriented x 3, NAD  Heent: pupils equal, reactive. Mucous membranes moist.   Neck: no jvd  Cardiac: regular rate and rhythm, normal S1,S2, +3/6 RUSB murmur, no clicks, rub or gallop  Lungs: bilateral crackles scattered   Abd: soft, NT/ND +bs  Ext: trace ankle edema  Skin: Warm, dry  Neuro: No focal deficits      Medications:    Scheduled Medications[1]  Medication Infusions[2]      Assessment:  Acute on Chronic Respiratory Failure - Multifactorial (Hx Interstitial Pneumonitis, HFpEF)  Decreased O2 requirements - on baseline O2  CTA Chest neg for PE, chronic interstitial lung disease, diffused opacities    S/P IV lasix > now po Lasix   Diuresis, steroids  Pulm following  Acute on Chronic HFpEF, NYHA Class II  proBNP 2276  Echo LVEF 60-65%, no RWMA, G2DD, mild MR, PASP 47mmHg  Diuresis w/IV lasix > held d/t hypotension, now on po Lasix 20mg daily  GDMT - hx on lasix 20mg daily at home; not hx on MRA, SGLT2i  Appears compensated on exam - wt 173lbs today (bed scale)  Sepsis, Staph Bacteremia   BC + Staph Epi 7/16, 7/18  IV antibx per ID  DARYL w/o evidence of vegetation   Aortic Stenosis s/p TAVR 23mm Martha S3 1/23/2025  Mean grad 20mmHg  Normal functioning valve by DARYL  Hx CAD s/p PCI to mid LAD 4/2025  On asa, plavix, statin   Hypotension, Hx Hypertension  BP improving  Hx Colon Cancer s/p colon resection 2/27/25  Hx PE 10/2024 - formerly on Xarelto  Hx GIB, AVMs - on PPI  Chronic Anemia - Hgb stable     Plan:  Continue uninterrupted asa, plavix, high intensity statin   Continue lasix 20mg po daily  BP remains on low side - evaluate for possible MRA, SGLT2i as outpatient once recovered from acute infection   DARYL without evidence of vegetation   Antibiotics per ID  Pt is stable for discharge from cardiology standpoint. F/U in [facility] clinic as scheduled with APN on 7/28/25    Plan of care discussed with patient, RN, Dr Bird.    Veronica Rojas, APRN  7/22/2025  11:21 AM             [1]    predniSONE  20 mg Oral Daily with breakfast    [START ON 7/27/2025] predniSONE  10 mg Oral Daily with breakfast    furosemide  20 mg Oral Daily    enoxaparin  40 mg Subcutaneous Daily    vancomycin  15 mg/kg Intravenous Q24H    aspirin  81 mg Oral Daily    atorvastatin  80 mg Oral Nightly    clopidogrel  75 mg Oral Daily    ferrous sulfate  325 mg Oral Daily with breakfast    pantoprazole  40 mg Oral QAM AC   [2]

## 2025-07-22 NOTE — DISCHARGE SUMMARY
[facility]  part of [facility]    Discharge Summary    Hoda Busby Patient Status:  Inpatient    1940 MRN [id number]   Location [facility] 3W/SW Attending Benny Singh MD   Hosp Day # 6 PCP Malathi Stuart MD     Date of Admission: 2025 Disposition: SNF Subacute Rehab     Date of Discharge: 2025     Admitting Diagnosis: Wheezing [R06.2]  Tachycardia [R00.0]  Acute on chronic congestive heart failure, unspecified heart failure type (HCC) [I50.9]    Hospital Discharge Diagnoses:  Staph Epi Bacteremia  Acute on chronic hypoxic respiratory failure (HCC)  Coronary artery disease  -s/p PCI/JUVENTINO of mid LAD (70%>0%) on 25    Interstitial pneumonitis/NSIP  Hx of Bilateral pulmonary emboli (10/2024)  Severe aortic stenosis  Adenocarcinoma of colon  Hx  GI bleed  Anemia due to acute blood loss  Hx chronic BLE edema  Hx of Hypertension, primary  Hx of hip OA  Hyperlipidemia   Osteopenia       Lace+ Score: 80  59-90 High Risk  29-58 Medium Risk  0-28   Low Risk.    TCM Follow-Up Recommendation:  LACE > 58: High Risk of readmission after discharge from the hospital.      Problem List: Problem List[1]    Reason for Admission: resp failrue    Physical Exam:   General appearance: alert, appears stated age and cooperative  Pulmonary:  clear to auscultation bilaterally  Cardiovascular: S1, S2 normal, no murmur, click, rub or gallop, regular rate and rhythm  Abdominal: soft, non-tender; bowel sounds normal; no masses,  no organomegaly  Extremities: extremities normal, atraumatic, no cyanosis or edema  Psychiatric: calm      History of Present Illness:  Patient is an 85-year-old  female with underlying interstitial lung disease and heart failure with preserved ejection fraction, chronically on 4L oxygen at home.  Patient was brought in today for progressive shortness of breath for the last 2 to 3 days.  Paramedics report mid-80s pulse ox on room air.  She was  brought in the emergency room on nonrebreather saturating 95%.  Pulse was 120, sinus tachycardia with frequent premature atrial contraction.  CBC showed white blood cell count of 17.4 with left shift.  Patient had chronic leukocytosis.  She is on long-term prednisone treatment.  ProBNP 2200.  Chemistry and liver function tests were unremarkable.  GFR is 57 which is slightly below her baseline.  Chest x-ray showed mild cardiomegaly with prominent central vascular component, bilateral diffuse interstitial prominence, superimposed multifocal alveolitis in the right perihilar and basilar region with slight progression.  Patient was given Solu-Medrol, Lasix, and nebulizer treatment, and she will be admitted to the hospital for further management.       Hospital Course: Staph Epi Bacteremia  - Bcx  7/16 and 7/18,  MSSA x 2  - Bcx 7/19 and 7/20 NGTD  -Continue on dalvance for 6 doses as outpt.   - ID consulted, will need to switch to PO abx on discharge and complete LOT as per ID recs  - unclear source at this time: Possible skin source given multiple areas of traumatic skin breakdown UE and LE  - Cards consult for DARYL. Hx TAVR  - DARYL neg     Acute on chronic hypoxic respiratory failure (HCC)  -multifactorial: acute HFpEF, recurrent interstitial pneumonitis, ?CAP  -CT chest in ED 7/16/2025 - no PE; fibrotic changes seen with emphysematous changes. Diffuse groundglass opacities seen compared to CT from June 2025. Also pulm edema.   -required NRB in ER transiently; on home 2 LPM o2.   -echo 2/5/25 - normal LV systolic function (EF 60-65%); grade 2 diastolic dysfunction; normally functioning bioprosthetic TAVR; sl incr PAP 44mmHg  - no abx.   -IV lasix; 40 mg IV BID (for HFpEF)  -on Pred 10 mg daily but placed on Solumedrol for stress doses during sepsis.  Now weaning.   -suspect majority of sx/hypoxia due to ILD/NSIP  -O2 down to31L   -clinically improved     Coronary artery disease  -Cath 1/14/25 by Dr. Bernardo Liz (as w/u  for TAVR) -- RCA non-obstructive; LM free of obst disease; LM plaque extending into ostium of LAD (20-30%); mid LAD (80-90%), cx ostium (60-70%)  -intervention delayed until after colon resection due to need for DAPT x 6 mos after stenting   -s/p PCI/JUVENTINO of mid LAD (70%>0%) on 4/7/25 by Dr. Dutton  -cont ASA, Plavix  -increased atorvastatin from 20mg to 80mg/day (LDL 76 on 3/26/25)     Interstitial pneumonitis/NSIP  -dx'ed 10/2024 - presented with cough and severe hypoxia  -unresponsive to abx   -CXR 10/24/24 extensive bilateral perihilar and bilateral upper and lower lobe   -S.pneumo, legionella Ag , mycoplasma IgM/G, Fungitell-beta-D glucan negative  -ANCA and URIEL/connective tissue disease panels negative  -anti-histone and anti-Keara Ab's negative  -CT with bilateral ground glass opacities, small consolidations of BLL  -findings suspicious for NSIP (vs cryptogenic organizing pneumonia vs hypersensitivity pneumonitis); NOT thought to be c/w UIP pattern  -s/p empiric steroids (solumedrol then prednisone taper) 10/25/24 - (EOT 1/18/25)  -dyspnea and hypoxia completely resolved until admission 3/26/25 and now again 7/16/25 -- POPE, hypoxia  -steroids 10 mg daily was increased to stress doses this may help BP. No on IV 40 daily and will switch to PO tomorrow.       Hx of Bilateral pulmonary emboli (10/2024)  -dx'ed at time of interstitial pneumonitis  -CT chest 10/25/24 -- acute distal segmental/subsegmental pulmonary emboli in R[place] and subsegmental PE in [place]  -unclear etiology; no recent hx of long travel; no family/personal hx of blood clots  -BLE venous dopplers negative 10/26/24  -started xarelto 20 mg po every day on 11/28/24  -repeat CT chest 12/9/24 neg PE  -stopped Xarelto 12/14/24 due to recurrent GI bleed  -repeat CT chest 3/26/25 and 4/9/25 and 7/16/25 -- all neg PE     Severe aortic stenosis  -progression on serial echocardiograms  -s/p TAVR 1/23/25 (Dr. Reji Green)     Adenocarcinoma of colon  -Bx of  transverse colon polyp 12/17/24 -- invasive, moderately differentiated adenocarcinoma  -CEA normal (1.9)  -CT a/p neg for mets  -surgery delayed in anticipation of TAVR (done 1/23/25)  -s/p robotic assisted left hemicolectomy (Dr. Cassidy) on 2/27/25 - path - tumor invades into muscularis; margins neg for tumor; all 19 LN's neg for mets (0/19); pT2, pN0     Hx  GI bleed  -EGD 11/26/24 (Dr. Nuno) -15mm nonbleeding gastric antral ulcer; 3mm gastric antral AVM s/p APC  -recurrence in 12/2024  -colonoscopy and repeat EGD 12/17/24 by Dr. Staton -- grade 1 esophagitis; duod ulcers healing; large flat polyp in transverse colon (carcinoma)   -received total of 3 units PRBCs   -Xarelto stopped in 12/2024  -s/p omeprazole 20mg BID x 8 weeks (EOT 1/22/25), now on omeprazole 20mg daily -- plan has been to continue until she completed her coronary stenting.  Will stop once off steroids.     Anemia due to acute blood loss  -GI bleed as above  -on ferrous sulfate 325mg/day  -Hgb down but stable (8.7)      Hx chronic BLE edema  -CRISTA hose     Acute left peroneal palsy  In Nov 2024; had numbness to anterolateral left foot and ankle as well as inability to dorsiflex left foot; etiology unclear  -neurologist Dr Richardson consulted in hosp  -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   -MRI brain--no acute intracranial process  -was doing PT at [facility] -- continues to improve; almost back to normal       Hx of Hypertension, primary  -(dyazide stopped due to NANCY)  -(amlodipine held due to low BP in 11/2024)  -BP remains normal off meds     Hx of hip OA  -s/p left THR (Dr. Lo) many years ago  -s/p elective R BEATRIZ on 10/5/23 by Dr. Diaz     Hyperlipidemia   on atorvastatin to 80mg/day as above     Osteopenia   On Fosamax from 2011 to 4/2016.     DEXA stable 5/10/17 and 2019 and 2021  DEXA 8/2024 -- osteoporosis of left  forearm  -restarted Fosamax 8/2024 (on hold this admission); restart at discharge     Vitamin D deficiency  On vitamin D 4000u/day     Consultations: Cardiology, ID    Procedures: TTE, DARYL    Complications: none    Discharge Condition: Good    Discharge Medications:      Discharge Medications        START taking these medications        Instructions Prescription details   predniSONE 10 MG Tabs  Commonly known as: Deltasone  Start taking on: July 27, 2025      Take 1 tablet (10 mg total) by mouth daily with breakfast for 10 days.   Stop taking on: August 6, 2025  Quantity: 10 tablet  Refills: 0            CONTINUE taking these medications        Instructions Prescription details   acetaminophen 325 MG Tabs  Commonly known as: Tylenol      Take 2 tablets (650 mg total) by mouth every 4 (four) hours as needed for Pain.   Refills: 0     aspirin 81 MG Tbec      Take 1 tablet (81 mg total) by mouth daily.   Quantity: 30 tablet  Refills: 11     atorvastatin 80 MG Tabs  Commonly known as: Lipitor      Take 1 tablet (80 mg total) by mouth at bedtime.   Quantity: 30 tablet  Refills: 1     clopidogrel 75 MG Tabs  Commonly known as: Plavix      Take 1 tablet (75 mg total) by mouth daily.   Quantity: 30 tablet  Refills: 1     Dalvance 500 MG Solr  Generic drug: dalbavancin      Inject into the vein.   Refills: 0     docusate sodium 100 MG Caps  Commonly known as: Colace      Take 1 capsule (100 mg total) by mouth daily as needed for constipation.   Refills: 0     ferrous sulfate 325 (65 FE) MG Tbec      Take 1 tablet (325 mg total) by mouth daily with breakfast.   Quantity: 30 tablet  Refills: 0     furosemide 20 MG Tabs  Commonly known as: Lasix      Take 1 tablet (20 mg total) by mouth daily.   Quantity: 30 tablet  Refills: 0     guaiFENesin 100 MG/5 ML  Commonly known as: Robitussin      Take 10 mL (200 mg total) by mouth every 4 (four) hours as needed.   Quantity: 10 mL  Refills: 0     ipratropium-albuterol 0.5-2.5 (3)  MG/3ML Soln  Commonly known as: Duoneb      Take 3 mL by nebulization every 6 (six) hours as needed.   Quantity: 3 mL  Refills: 0     pantoprazole 40 MG Tbec  Commonly known as: Protonix      Take 1 tablet (40 mg total) by mouth every morning before breakfast.   Quantity: 30 tablet  Refills: 0     Polyethylene Glycol 3350 17 g Pack  Commonly known as: MIRALAX      Take 17 g by mouth daily as needed.   Quantity: 1 packet  Refills: 0     potassium chloride 10 MEQ Tbcr  Commonly known as: Klor-Con M10      Take 1 tablet (10 mEq total) by mouth daily.   Refills: 0     sodium chloride 0.65 % Soln  Commonly known as: Saline Mist      1 spray by Nasal route every 3 (three) hours as needed.   Quantity: 1 each  Refills: 0               Where to Get Your Medications        These medications were sent to [facility] [address] [phone], [phone] [address] [facility] [address]      Phone: [phone]   predniSONE 10 MG Tabs         Follow up Visits: Follow-up with  in 1 week       Other Discharge Instructions: continue weekly IV antibiotics at the [facility] office.     Benny Singh MD  7/22/2025  11:38 AM    > 35 min          [1]   Patient Active Problem List  Diagnosis    Osteopenia of multiple sites    Vitamin D deficiency    White coat syndrome with diagnosis of hypertension    Hypercholesterolemia    Mitral valve insufficiency    Nonrheumatic aortic valve stenosis    Macular degeneration    Primary hypertension    Age-related osteoporosis without current pathological fracture    Pneumonia due to infectious organism    Peroneal neuropathy, left    Interstitial pneumonitis (HCC)    Gastric AVM    S/P TAVR (transcatheter aortic valve replacement)    Dyspnea    Malignant neoplasm of splenic flexure (HCC)    Adenocarcinoma of colon (HCC)    Acute hypoxic respiratory failure (HCC)    Aortic valve stenosis, etiology of cardiac valve disease unspecified    Coronary artery  disease involving native coronary artery of native heart without angina pectoris

## 2025-07-22 NOTE — CM/SW NOTE
07/22/25 1100   Discharge disposition   Expected discharge disposition Home or Self   Post Acute Care Provider Home   DME/Infusion Providers [facility]   Discharge transportation Private car     IV ANTIBIOTIC INFORMATION:  [facility]  [address]  [place] [address]  [phone]  Your appointment is  Wednesday 07/23/2025 at 1:30PM.     The patient received a MDO for discharge.    The patient will be transported home via private car.    Social work confirmed with the patient and her niece Vonda regarding the above appointment time and they are agreeable.    SW/CM to remain available for support and/or discharge planning.     Ave MURPHY, LSW  Discharge Planner [id number]

## 2025-07-22 NOTE — PROGRESS NOTES
[facility]  part of [facility]     Progress Note    Hoda Busby Patient Status:  Inpatient    1940 MRN [id number]   Location [facility] 3W/SW Attending Benny Singh MD   Hosp Day # 6 PCP Malathi Stuart MD       Subjective:   Patient seen and examined.  Resting in bed.  Dyspnea improved since arrival.  Episode of some weakness while trying to walk to the bathroom and fell yesterday feeling fatigued.  States dyspnea improving however this morning.    Objective:   Blood pressure 101/48, pulse 77, temperature 97.7 °F (36.5 °C), temperature source Oral, resp. rate 18, weight 173 lb 9.6 oz (78.7 kg), SpO2 97%.  Intake/Output:   Last 3 shifts: I/O last 3 completed shifts:  In: 150 [P.O.:150]  Out: 1000 [Urine:1000]   This shift: I/O this shift:  In: 480 [P.O.:480]  Out: -      Vent Settings:      Hemodynamic parameters (last 24 hours):      Scheduled Meds: Current Hospital Medications[1]    Continuous Infusions: Medication Infusions[2]    Physical Exam  Constitutional: no acute distress  Eyes: PERRL  ENT: nares pateint  Neck: supple, no JVD  Cardio: RRR, S1 S2  Respiratory: Faint crackles  GI: abdomen soft, non tender, active bowel sounds, no organomegaly  Extremities: no clubbing, cyanosis, + lower extremity edema  Neurologic: no gross motor deficits  Skin: warm, dry      Results:            CARD ECHO DARYL (CPT=93320/68289)  Addendum Date: 2025  Transesophageal Echocardiogram (Report amended ) Name:Hoda Busby Date: 2025 :  1940 Ht:  (64in)  BP: 149 / 68 MRN:  [id number]    Age:  85years    Wt:  (175lb) HR: 74bpm Loc:  [facility]       Gndr: F          BSA: 1.85m^2 Sonographer: Piyush Ordering:    Nilda Pastor Consulting:  Papito Holland --------------------------------------------------------------------------- - History/Indications:   Bacteremia. --------------------------------------------------------------------------- - Procedure information:   Diagnostic transesophageal echocardiogram. Additional evaluation included 2D and Doppler.  Patient status:  Inpatient. Location:  Cath lab recovery.    This was a routine study. The risks, benefits, and alternatives to the procedure were explained and informed consent was obtained. Initial setup. The patient arrived at the laboratory. Surface ECG leads, blood pressure measurements, and pulse oximetric signals were monitored. Sedation. Moderate sedation was administered. Transesophageal echocardiography. Topical anesthesia was obtained using Hurricaine. A transesophageal probe was inserted by the attending cardiologist without difficulty. Image quality was adequate. The transesophageal probe was removed.  Study completion:  The patient tolerated the procedure well. There were no complications.  Administered medications: Fentanyl, 50 mcg.  Midazolam , 2 mg. --------------------------------------------------------------------------- - Conclusions: 1. Left ventricle: The cavity size was normal. Systolic function was normal. 2. Left atrium: There is no evidence of a thrombus in the atrial cavity or    appendage. 3. Right atrium: There was no evidence of a thrombus in the atrial cavity or    appendage. 4. Aortic valve: A bioprosthetic valve is present. There was no evidence of    a vegetation. 5. Mitral valve: There was no evidence of vegetation. 6. Tricuspid valve: There was no evidence of a vegetation. Impressions:  No evidence of valvular vegetation. Bioprosthetic aortic valve (TAVR) appears normal. * --------------------------------------------------------------------------- - * Findings: Left ventricle:  The cavity size was normal. Systolic function was normal. Left atrium:  The atrium was normal in size.  There is no evidence of a thrombus in the atrial cavity or appendage. Right ventricle:  The cavity size was normal. Systolic function was normal. Right atrium:  The atrium was normal in size.  There was no evidence  of a thrombus in the atrial cavity or appendage. Mitral valve:  The valve was structurally normal. Leaflet separation was normal.  There was no evidence of vegetation.  Doppler:  There was no regurgitation. Aortic valve:  A bioprosthetic valve is present. The leaflets were normal thickness. Cusp separation was normal. Mobility was not restricted.  There was no evidence of a vegetation. Tricuspid valve:  The valve is structurally normal. Leaflet separation was normal.  There was no evidence of a vegetation.  Doppler:  There was trivial regurgitation. Pulmonic valve:   Poorly visualized. --------------------------------------------------------------------------- - Colin Ahmadi 07/21/2025 16:52               Assessment   1.  Acute on chronic respiratory failure  2.  ILD  3.  Colon adenocarcinoma  4.  Severe aortic stenosis s/p TAVR  5.  Staph epidermidis bacteremia  6.  HFpEF  7.  Likely pulmonary edema  8.  S/p fall  9.  Orthostatic hypotension  10.  Hypotension         Plan   -Patient presents with evidence of worsening hypoxemic respiratory failure normally on 3 L nasal cannula oxygen.  Admits to worsening 1 day prior to admission..  -Blood cultures positive for Staph epidermidis.  Antibiotics per ID.  - CT chest on presentation with no evidence of pulmonary embolism seen.  Fibrotic changes seen with some emphysematous changes present.  Diffuse groundglass opacities seen compared to CT from June 2025.  Cannot rule out component of pulmonary edema.  - DARYL with no evidence of vegetation seen.   - Wean oxygen as tolerated   - Will decrease steroid dosage to prednisone 10 mg daily upon discharge.  - Nebulizer treatments  - DVT prophylaxis: Lovenox  - Okay for discharge from pulmonary perspective.  Recommend prednisone 10 mg daily for 10 days upon discharge and then discontinue.  Follow-up in pulmonary clinic in 2 weeks time      Cody Holloway DO  Pulmonary Critical Care Medicine  [facility]           [1]   Current Facility-Administered Medications   Medication Dose Route Frequency    predniSONE (Deltasone) tab 20 mg  20 mg Oral Daily with breakfast    [START ON 7/27/2025] predniSONE (Deltasone) tab 10 mg  10 mg Oral Daily with breakfast    furosemide (Lasix) tab 20 mg  20 mg Oral Daily    sodium chloride 0.9% 0.9% flush injection 10 mL  10 mL Intravenous PRN    guaiFENesin-codeine (Robitussin AC) 100-10 MG/5ML oral solution 5 mL  5 mL Oral TID PRN    enoxaparin (Lovenox) 40 MG/0.4ML SUBQ injection 40 mg  40 mg Subcutaneous Daily    vancomycin (Vancocin) 1.25 g in sodium chloride 0.9% 250mL IVPB premix  15 mg/kg Intravenous Q24H    acetaminophen (Tylenol Extra Strength) tab 500 mg  500 mg Oral Q4H PRN    ondansetron (Zofran) 4 MG/2ML injection 4 mg  4 mg Intravenous Q6H PRN    metoclopramide (Reglan) 5 mg/mL injection 5 mg  5 mg Intravenous Q8H PRN    guaiFENesin (Robitussin) 100 MG/5 ML oral liquid 200 mg  200 mg Oral Q4H PRN    benzonatate (Tessalon) cap 200 mg  200 mg Oral TID PRN    aspirin DR tab 81 mg  81 mg Oral Daily    atorvastatin (Lipitor) tab 80 mg  80 mg Oral Nightly    clopidogrel (Plavix) tab 75 mg  75 mg Oral Daily    ferrous sulfate DR tab 325 mg  325 mg Oral Daily with breakfast    pantoprazole (Protonix) DR tab 40 mg  40 mg Oral QAM AC    docusate sodium (Colace) cap 100 mg  100 mg Oral Daily PRN    ipratropium-albuterol (Duoneb) 0.5-2.5 (3) MG/3ML inhalation solution 3 mL  3 mL Nebulization Q6H PRN    polyethylene glycol (PEG 3350) (Miralax) 17 g oral packet 17 g  17 g Oral Daily PRN    sodium chloride (Saline Mist) 0.65 % nasal solution 1 spray  1 spray Nasal Q3H PRN   [2]

## 2025-07-23 NOTE — TELEPHONE ENCOUNTER
Per niece patient was discharged from the hospital and Dr. Holloway wants a 2 week follow up. No appointment available. Please call

## 2025-07-24 NOTE — PROGRESS NOTES
PCP appointment request (discharged 07/22)    [address] [place] [address]  [phone]  Patient requested we call her niece, Vonda; she makes all the appointments  Called patient's niece, Vonda  Per Vonda, patient's niece, patient see's the Primary MD at her Assisted Living Facility and is following up with them  Closing encounter      Patient's PCP is at patient's Assisted Living Facility; please update chart

## 2025-07-25 NOTE — TELEPHONE ENCOUNTER
Spoke to Vonda and scheduled patient for 8/12/25 at 1:15pm. Reviewed time, date, place and where to park. Vonda verbalized understanding

## 2025-07-28 NOTE — PROGRESS NOTES
The patient follows outside pcp. TCM closed.    Litzy Davidson      7/24/25 10:49 AM  Note     PCP appointment request (discharged 07/22)     [address] [place] [id number]  [phone]  Patient requested we call her niece, Vonda; she makes all the appointments  Called patient's niece, Vonda  Per Vonda, patient's niece, patient see's the Primary MD at her Assisted Living Facility and is following up with them  Closing encounter

## (undated) DEVICE — SOLUTION IRRIG 1000ML 0.9% NACL USP BTL

## (undated) DEVICE — SYRINGE, LUER SLIP, STERILE, 60ML: Brand: MEDLINE

## (undated) DEVICE — PINNACLE INTRODUCER SHEATH: Brand: PINNACLE

## (undated) DEVICE — KIT ENDO ORCAPOD 160/180/190

## (undated) DEVICE — MEDI-VAC NON-CONDUCTIVE SUCTION TUBING 6MM X 1.8M (6FT.) L: Brand: CARDINAL HEALTH

## (undated) DEVICE — CONMED SCOPE SAVER BITE BLOCK, 20X27 MM: Brand: SCOPE SAVER

## (undated) DEVICE — ELECTRODE DEFIB AD SLD GEL MULTFUNC RADLUC

## (undated) DEVICE — DRAPE SRG 70X60IN SPLT U IMPRV

## (undated) DEVICE — 12 FOOT DISPOSABLE EXTENSION CABLE WITH SAFE CONNECT / ALLIGATOR CLIP

## (undated) DEVICE — GOWN SURG XL DISP LEV 3 AERO BLU RAGLAN SL

## (undated) DEVICE — SPONGE 4X4 10PK

## (undated) DEVICE — CO2 CANNULA,SSOFT,ADLT,7O2,4CO2,FEMALE: Brand: MEDLINE

## (undated) DEVICE — MEDI-VAC NON-CONDUCTIVE SUCTION TUBING: Brand: CARDINAL HEALTH

## (undated) DEVICE — ENCORE® LATEX MICRO SIZE 8.5, STERILE LATEX POWDER-FREE SURGICAL GLOVE: Brand: ENCORE

## (undated) DEVICE — FIXED CORE WIRE GUIDE SAFE-T-J, CURVED: Brand: COOK

## (undated) DEVICE — GIJAW SINGLE-USE BIOPSY FORCEPS WITH NEEDLE: Brand: GIJAW

## (undated) DEVICE — SKIN PREP TRAY 4 COMPARTM TRAY: Brand: MEDLINE INDUSTRIES, INC.

## (undated) DEVICE — 60 ML SYRINGE REGULAR TIP: Brand: MONOJECT

## (undated) DEVICE — 3M™ BAIR HUGGER® UNDERBODY BLANKET, FULL ACCESS, 10 PER CASE 63500: Brand: BAIR HUGGER™

## (undated) DEVICE — REM POLYHESIVE ADULT PATIENT RETURN ELECTRODE: Brand: VALLEYLAB

## (undated) DEVICE — GAMMEX® NON-LATEX PI ORTHO SIZE 8.5, STERILE POLYISOPRENE POWDER-FREE SURGICAL GLOVE: Brand: GAMMEX

## (undated) DEVICE — SYRINGE MED 30ML STD CLR PLAS LL TIP N CTRL

## (undated) DEVICE — GOWN,SIRUS,FABRIC-REINFORCED,X-LARGE: Brand: MEDLINE

## (undated) DEVICE — 2T11 #2 PDO 36 X 36: Brand: 2T11 #2 PDO 36 X 36

## (undated) DEVICE — Device: Brand: JELCO

## (undated) DEVICE — PACK UNIVERSAL DRAPE

## (undated) DEVICE — ANGIOGRAPHIC CATHETER: Brand: IMPULSE™

## (undated) DEVICE — HOOD: Brand: FLYTE

## (undated) DEVICE — PREMIERPRO LAP SPONGE 18"X18" STERILE, 5/PK: Brand: PREMIERPRO

## (undated) DEVICE — Device

## (undated) DEVICE — V2 SPECIMEN COLLECTION MANIFOLD KIT: Brand: NEPTUNE

## (undated) DEVICE — Device: Brand: STABLECUT®

## (undated) DEVICE — 20 ML SYRINGE LUER-LOCK TIP: Brand: MONOJECT

## (undated) DEVICE — PACK ANGIOGRAPHY CUSTOM

## (undated) DEVICE — TRAY CATH 16FR F INCLUDE BARDX IC COMPLT CARE

## (undated) DEVICE — SHEET,DRAPE,70X100,STERILE: Brand: MEDLINE

## (undated) DEVICE — DRAPE SHEET LG

## (undated) DEVICE — SUTURE MCRYL SZ 2-0 L36IN ABSRB UD L36MM CT-1

## (undated) DEVICE — 3M™ IOBAN™ 2 ANTIMICROBIAL INCISE DRAPE 6650EZ: Brand: IOBAN™ 2

## (undated) DEVICE — KIT CLEAN ENDOKIT 1.1OZ GOWNX2

## (undated) DEVICE — TIBURON DRAPE TOWELS: Brand: CONVERTORS

## (undated) DEVICE — ENCORE® LATEX MICRO SIZE 6.5, STERILE LATEX POWDER-FREE SURGICAL GLOVE: Brand: ENCORE

## (undated) DEVICE — SOLUTION PREP 26ML 0.7% POVACRYLEX 74% ISO

## (undated) DEVICE — CATH F6 ST+ JR 4 100CM: Brand: SUPERTORQUE

## (undated) DEVICE — WIRE NAMIC STR 035X150

## (undated) DEVICE — OR TOWEL, 17" X 26" STERILE, BLUE: Brand: PREMIERPRO

## (undated) DEVICE — NEEDLE INJ 25GA CATH 230CM CHN 2.8MM ACUJECT

## (undated) DEVICE — GUIDEWIRE: Brand: AMPLATZ SUPER STIFF™

## (undated) DEVICE — SUTURE ETHBND EXCEL SZ 2 L30IN NONABSORBABLE MX69G

## (undated) DEVICE — PILLOW FX 56X38X15CM MED HIP

## (undated) DEVICE — YANKAUER,BULB TIP,W/O VENT,RIGID,STERILE: Brand: MEDLINE

## (undated) DEVICE — GAMMEX® PI HYBRID SIZE 8.5, STERILE POWDER-FREE SURGICAL GLOVE, POLYISOPRENE AND NEOPRENE BLEND: Brand: GAMMEX

## (undated) DEVICE — 3M™ TEGADERM™ TRANSPARENT FILM DRESSING FRAME STYLE, 1626W, 4 IN X 4-3/4 IN (10 CM X 12 CM), 50/CT 4CT/CASE: Brand: 3M™ TEGADERM™

## (undated) DEVICE — PAD N ADH W3XL4IN POLY COT SFT PERF FLM ABSRB

## (undated) DEVICE — PEN: MARKING STD PT 100/CS: Brand: MEDICAL ACTION INDUSTRIES

## (undated) DEVICE — BIT DRL L30MM DIA3.2MM DISP FOR G7 2 MOBILITY

## (undated) DEVICE — PERCLOSE™ PROSTYLE™ SUTURE-MEDIATED CLOSURE AND REPAIR SYSTEM: Brand: PERCLOSE™ PROSTYLE™

## (undated) DEVICE — ADH DRMBND PRINEO SKN CLOS TOP

## (undated) DEVICE — 1010 S-DRAPE TOWEL DRAPE 10/BX: Brand: STERI-DRAPE™

## (undated) DEVICE — X-RAY DETECTABLE SPONGES,16 PLY: Brand: VISTEC

## (undated) DEVICE — GAMMEX® NON-LATEX PI ORTHO SIZE 6.5, STERILE POLYISOPRENE POWDER-FREE SURGICAL GLOVE: Brand: GAMMEX

## (undated) DEVICE — 3M(TM) TEGADERM(TM) TRANSPARENT FILM DRESSING FRAME STYLE 1628: Brand: 3M™ TEGADERM™

## (undated) DEVICE — COVER,TABLE,44X90,STERILE: Brand: MEDLINE

## (undated) DEVICE — WIRESAVVY X-SMALL 2.9 X 3.2CM

## (undated) DEVICE — WIRE J .035X260

## (undated) DEVICE — PACK CDS TOTAL HIP

## (undated) DEVICE — SPONGE GZ 4XL4IN 100% COT 12 PLY TYP VII WVN

## (undated) DEVICE — MARKER ENDOSCP TISS TATTOO C BLK SUSP PREFIL

## (undated) DEVICE — SUT PERMA- 0 18IN FSL NABSRB BLK L30MM 3/8

## (undated) DEVICE — SOLUTION IRRIG 3000ML 0.9% NACL FLX CONT

## (undated) DEVICE — KIT MAINFOLD 3 PORT NAMIC CUST

## (undated) DEVICE — FIAPC® PROBE W/ FILTER 2200 A OD 2.3MM/6.9FR; L 2.2M/7.2FT: Brand: ERBE

## (undated) DEVICE — 3M™ IOBAN™ 2 ANTIMICROBIAL INCISE DRAPE 6651EZ: Brand: IOBAN™ 2

## (undated) NOTE — ED AVS SNAPSHOT
[facility] in [address] [address] Raymon [place]le    Phone:  [phone]    Fax:  [phone] [facility]   MRN: [id number]    Department:  [facility] in [address] [place] [address]   Date of Visit: You can get these medications from any pharmacy     Bring a paper prescription for each of these medications    - guaiFENesin-codeine 100-10 MG/5ML Soln            Discharge References/Attachments     ACUTE BRONCHITIS, WHAT IS (ENGLISH)      Disclosure you may call the [place] [address] Physician Referral and Class Registration line at [phone] or find a doctor online by visiting [email or web address].    IF THERE IS ANY CHANGE OR WORSENING OF YOUR CONDITION, CALL YOUR PRIMARY CARE PHYSICIAN AT [place] [address] Additional Information       We are concerned for your overall well being:    - If you are a smoker or have smoked in the last 12 months, we encourage you to explore options for quitting.     - If you have concerns related to behavioral health issues or th

## (undated) NOTE — LETTER
[place] [address]  Authorization for Invasive Procedures  Date: 04/07/25           Time: 0700    I hereby authorize  Angelito Grady MD my physician and his/her assistants (if applicable), which may include medical students, residents, and/or fellows, to perform the following surgical operation/ procedure and administer such anesthesia as may be determined necessary by my physician: Cardiac catheterization, left ventricular cineangiography, bilateral selective coronary arteriography and/or right heart catheterization.  Possible percutaneous transluminal coronary angioplasty, coronary atherectomy, coronary stent, intracoronary thrombolytic therapy on Hoda Busby  2.   I recognize that during the surgical operation/procedure, unforeseen conditions may necessitate additional or different procedures than those listed above.  I, therefore, further authorize and request that the above-named surgeon, assistants, or designees perform such procedures as are, in their judgment, necessary and desirable.    3.   My surgeon/physician has discussed prior to my surgery the potential benefits, risks and side effects of this procedure; the likelihood of achieving goals; and potential problems that might occur during recuperation.  They also discussed reasonable alternatives to the procedure, including risks, benefits, and side effects related to the alternatives and risks related to not receiving this procedure.  I have had all my questions answered and I acknowledge that no guarantee has been made as to the result that may be obtained.    4.   Should the need arise during my operation/procedure, which includes change of level of care prior to discharge, I also consent to the administration of blood and/or blood products.  Further, I understand that despite careful testing and screening of blood or blood products by collecting agencies, I may still be subject to ill effects as a result of receiving a  blood transfusion and/or blood products.  The following are some, but not all, of the potential risks that can occur: fever and allergic reactions, hemolytic reactions, transmission of diseases such as Hepatitis, AIDS and Cytomegalovirus (CMV) and fluid overload.  In the event that I wish to have an autologous transfusion of my own blood, or a directed donor transfusion, I will discuss this with my physician.   Check only if Refusing Blood or Blood Products  I understand refusal of blood or blood products as deemed necessary by my physician may have serious consequences to my condition to include possible death. I hereby assume responsibility for my refusal and release the hospital, its personnel, and my physicians from any responsibility for the consequences of my refusal.         o  Refuse         5.   I authorize the use of any specimen, organs, tissues, body parts or foreign objects that may be removed from my body during the operation/procedure for diagnosis, research or teaching purposes and their subsequent disposal by hospital authorities.  I also authorize the release of specimen test results and/or written reports to my treating physician on the hospital medical staff or other referring or consulting physicians involved in my care, at the discretion of the Pathologist or my treating physician.    6.   I consent to the photographing or videotaping of the operations or procedures to be performed, including appropriate portions of my body for medical, scientific, or educational purposes, provided my identity is not revealed by the pictures or by descriptive texts accompanying them.  If the procedure has been photographed/videotaped, the surgeon will obtain the original picture, image, videotape or CD.  The hospital will not be responsible for storage, release or maintenance of the picture, image, tape or CD.    7.   I consent to the presence of a  or observers in the operating room as deemed  necessary by my physician or their designees.    8.   I recognize that in the event my procedure results in extended X-Ray/fluoroscopy time, I may develop a skin reaction.    9. If I have a Do Not Attempt Resuscitation (DNAR) order in place, that status will be suspended while in the operating room, procedural suite, and during the recovery period unless otherwise explicitly stated by me (or a person authorized to consent on my behalf). The surgeon or my attending physician will determine when the applicable recovery period ends for purposes of reinstating the DNAR order.  10. Patients having a sterilization procedure: I understand that if the procedure is successful the results will be permanent and it will therefore be impossible for me to inseminate, conceive, or bear children.  I also understand that the procedure is intended to result in sterility, although the result has not been guaranteed.   11. I acknowledge that my physician has explained sedation/analgesia administration to me including the risk and benefits I consent to the administration of sedation/analgesia as may be necessary or desirable in the judgment of my physician.    I CERTIFY THAT I HAVE READ AND FULLY UNDERSTAND THE ABOVE CONSENT TO OPERATION and/or OTHER PROCEDURE.        ____________________________________       _________________________________      ______________________________  Signature of Patient         Signature of Responsible Person        Printed Name of Responsible Person        ____________________________________      _________________________________      ______________________________       Signature of Witness          Relationship to Patient                       Date                                       Time  Patient Name: Hoda DAIGLE Kehinde  : 1940    Reviewed: 2024   Printed: 2025  Medical Record #: [id number] Page 1 of 2             STATEMENT OF PHYSICIAN My signature below affirms that prior to  the time of the procedure; I have explained to the patient and/or his/her legal representative, the risks and benefits involved in the proposed treatment and any reasonable alternative to the proposed treatment. I have also explained the risks and benefits involved in refusal of the proposed treatment and alternatives to the proposed treatment and have answered the patient's questions. If I have a significant financial interest in a co-management agreement or a significant financial interest in any product or implant, or other significant relationship used in this procedure/surgery, I have disclosed this and had a discussion with my patient.     _______________________________________________________________ _____________________________  (Signature of Physician)                                                                                         (Date)                                   (Time)  Patient Name: Hoda DAIGLE Kehinde  : 1940    Reviewed: 2024   Printed: 2025  Medical Record #: [id number] Page 2 of 2

## (undated) NOTE — Clinical Note
[facility]  [address], [place], [place]    Authorization for Surgical Operation and Procedure                               1. I hereby authorize * Surgery not found *, my physician and his/her assistants (if applicable), which may include medical students, residents, and/or fellows, to perform the following surgical operation/ procedure and administer such anesthesia as may be determined necessary by my physician: Operation/Procedure name (s)  on Hoda Busby   2.   I recognize that during the surgical operation/procedure, unforeseen conditions may necessitate additional or different procedures than those listed above.  I, therefore, further authorize and request that the above-named surgeon, assistants, or designees perform such procedures as are, in their judgment, necessary and desirable.    3.   My surgeon/physician has discussed prior to my surgery the potential benefits, risks and side effects of this procedure; the likelihood of achieving goals; and potential problems that might occur during recuperation.  They also discussed reasonable alternatives to the procedure, including risks, benefits, and side effects related to the alternatives and risks related to not receiving this procedure.  I have had all my questions answered and I acknowledge that no guarantee has been made as to the result that may be obtained.    4.   Should the need arise during my operation/procedure, which includes change of level of care prior to discharge, I also consent to the administration of blood and/or blood products.  Further, I understand that despite careful testing and screening of blood or blood products by collecting agencies, I may still be subject to ill effects as a result of receiving a blood transfusion and/or blood products.  The following are some, but not all, of the potential risks that can occur: fever and allergic reactions, hemolytic reactions, transmission of diseases such as  Hepatitis, AIDS and Cytomegalovirus (CMV) and fluid overload.  In the event that I wish to have an autologous transfusion of my own blood, or a directed donor transfusion, I will discuss this with my physician.  Check only if Refusing Blood or Blood Products  I understand refusal of blood or blood products as deemed necessary by my physician may have serious consequences to my condition to include possible death. I hereby assume responsibility for my refusal and release the hospital, its personnel, and my physicians from any responsibility for the consequences of my refusal.    o  Refuse   5.   I authorize the use of any specimen, organs, tissues, body parts or foreign objects that may be removed from my body during the operation/procedure for diagnosis, research or teaching purposes and their subsequent disposal by hospital authorities.  I also authorize the release of specimen test results and/or written reports to my treating physician on the hospital medical staff or other referring or consulting physicians involved in my care, at the discretion of the Pathologist or my treating physician.    6.   I consent to the photographing or videotaping of the operations or procedures to be performed, including appropriate portions of my body for medical, scientific, or educational purposes, provided my identity is not revealed by the pictures or by descriptive texts accompanying them.  If the procedure has been photographed/videotaped, the surgeon will obtain the original picture, image, videotape or CD.  The hospital will not be responsible for storage, release or maintenance of the picture, image, tape or CD.    7.   I consent to the presence of a  or observers in the operating room as deemed necessary by my physician or their designees.    8.   I recognize that in the event my procedure results in extended X-Ray/fluoroscopy time, I may develop a skin reaction.    9. If I have a Do Not Attempt  Resuscitation (DNAR) order in place, that status will be suspended while in the operating room, procedural suite, and during the recovery period unless otherwise explicitly stated by me (or a person authorized to consent on my behalf). The surgeon or my attending physician will determine when the applicable recovery period ends for purposes of reinstating the DNAR order.  10. Patients having a sterilization procedure: I understand that if the procedure is successful the results will be permanent and it will therefore be impossible for me to inseminate, conceive, or bear children.  I also understand that the procedure is intended to result in sterility, although the result has not been guaranteed.   11. I acknowledge that my physician has explained sedation/analgesia administration to me including the risk and benefits I consent to the administration of sedation/analgesia as may be necessary or desirable in the judgment of my physician.    I CERTIFY THAT I HAVE READ AND FULLY UNDERSTAND THE ABOVE CONSENT TO OPERATION and/or OTHER PROCEDURE.     ____________________________________  _________________________________        ______________________________  Signature of Patient    Signature of Responsible Person                Printed Name of Responsible Person                                      ____________________________________  _____________________________                ________________________________  Signature of Witness        Date  Time         Relationship to Patient    STATEMENT OF PHYSICIAN My signature below affirms that prior to the time of the procedure; I have explained to the patient and/or his/her legal representative, the risks and benefits involved in the proposed treatment and any reasonable alternative to the proposed treatment. I have also explained the risks and benefits involved in refusal of the proposed treatment and alternatives to the proposed treatment and have answered the patient's  questions. If I have a significant financial interest in a co-management agreement or a significant financial interest in any product or implant, or other significant relationship used in this procedure/surgery, I have disclosed this and had a discussion with my patient.     _____________________________________________________              _____________________________  (Signature of Physician)                                                                                         (Date)                                   (Time)  Patient Name: Hoda Busby      : 1940      Printed: 2025     Medical Record #: [id number]                                      Page 1 of 1

## (undated) NOTE — LETTER
[facility] 5SW/SE  [address]  [facility] [address]  [phone]    Blood Transfusion Consent    In the course of your treatment, it may become necessary to administer a transfusion of blood or blood components. This form provides basic information concerning this procedure and, if signed by you, authorizes its administration. By signing this form, you agree that all of your questions about the administration of blood or blood products have been answered by the ordering medical professional or designee.    Description of Procedure  Blood is introduced into one of your veins, commonly in the arm, using a sterilized disposable needle. The amount of blood transfused, and whether the transfusion will be of blood or blood components is a judgement the physician will make based on your particular needs.    Risks  The transfusion is a common procedure of low risk.  MINOR AND TEMPORARY REACTIONS ARE NOT UNCOMMON, including a slight bruise, swelling or local reaction in the area where the needle pierces your skin, or a nonserious reaction to the transfused material itself, including headache, fever or mild skin reaction, such as rash.  Serious reactions are possible, though very unlikely, and include severe allergic reaction (shock) and destruction (hemolysis) of transfused blood cells.  Infectious diseases which are known to be transmitted by blood transfusion include certain types of viral Hepatitis(liver infection from a virus), Human Immunodeficiency Virus (HIV-1,2) infection, a viral infection known to cause Acquired Immunodeficiency Syndrome (AIDS), as well as certain other bacterial, viral, and parasitic diseases. While a minimal risk of acquiring an infectious disease from transfused blood exists, in accordance with the Federal and State law, all due care has been taken in donor selection and testing to avoid transmission of disease.    Alternatives  If loss of blood poses serious threats during your  treatment, THERE IS NO EFFECTIVE ALTERNATIVE TO BLOOD TRANSFUSION. However, if you have any further questions on this matter, your provider will fully explain the alternatives to you if it has not already been done.    I, ______________________________, have read/had read to me the above. I understand the matters bearing on the decision whether or not to authorize a transfusion of blood or blood components. I have no questions which have not been answered to my full satisfaction. I hereby consent to such transfusion as my physician may deem necessary or advisable in the course of my treatment.    ______________________________________________                    ___________________________  (Signature of Patient or Responsible party in case of minor,                 (Printed Name of Patient or incompetent, or unconscious patient)              Responsible Party)    ___________________________               _____________________  (Relationship to Patient if not self)                                    (Date and Time)    __________________________                                                           ______________________              (Signature of Witness)               (Printed Name of Witness)     Language line ()    Telephone/Verbal/Video Consent    __________________________                     ____________________  (Signature of 2nd Witness           (Printed Name of 2nd  Telephone/Verbal/Video Consent)           Witness)    Patient Name: Hoda Busby     : 1940                 Printed: 2024     Medical Record #: [id number]      Rev: 2023

## (undated) NOTE — LETTER
[facility]  Administration of Anesthesia  DAVE Hoda DAIGLE Kehinde agree to be cared for by a physician anesthesiologist alone and/or with a nurse anesthetist, who is specially trained to monitor me and give me medicine to put me to sleep or keep me comfortable during my procedure    I understand that my anesthesiologist and/or anesthetist is not an employee or agent of [facility] or [employer]. He or she works for [facility]    As the patient asking for anesthesia services, I agree to:  Allow the anesthesiologist (anesthesia doctor) to give me medicine and do additional procedures as necessary. Some examples are: Starting or using an “IV” to give me medicine, fluids or blood during my procedure, and having a breathing tube placed to help me breathe when I’m asleep (intubation). In the event that my heart stops working properly, I understand that my anesthesiologist will make every effort to sustain my life, unless otherwise directed by [facility] Do Not Resuscitate documents.  Tell my anesthesia doctor before my procedure:  If I am pregnant.  The last time that I ate or drank.  iii. All of the medicines I take (including prescriptions, herbal supplements, and pills I can buy without a prescription (including street drugs/illegal medications). Failure to inform my anesthesiologist about these medicines may increase my risk of anesthetic complications.  iv.If I am allergic to anything or have had a reaction to anesthesia before.  I understand how the anesthesia medicine will help me (benefits).  I understand that with any type of anesthesia medicine there are risks:  The most common risks are: nausea, vomiting, sore throat, muscle soreness, damage to my eyes, mouth, or teeth (from breathing tube placement).  Rare risks include: remembering what happened during my procedure, allergic reactions to medications, injury to my airway, heart, lungs, vision, nerves, or  muscles and in extremely rare instances death.  My doctor has explained to me other choices available to me for my care (alternatives).  Pregnant Patients (“epidural”):  I understand that the risks of having an epidural (medicine given into my back to help control pain during labor), include itching, low blood pressure, difficulty urinating, headache or slowing of the baby’s heart. Very rare risks include infection, bleeding, seizure, irregular heart rhythms and nerve injury.  Regional Anesthesia (“spinal”, “epidural”, & “nerve blocks”):  I understand that rare but potential complications include headache, bleeding, infection, seizure, irregular heart rhythms, and nerve injury.    _____________________________________________________________________________  Patient (or Representative) Signature/Relationship to Patient  Date   Time    _____________________________________________________________________________   Name (if used)    Language/Organization   Time    _____________________________________________________________________________  Nurse Anesthetist Signature     Date   Time  _____________________________________________________________________________  Anesthesiologist Signature     Date   Time  I have discussed the procedure and information above with the patient (or patient’s representative) and answered their questions. The patient or their representative has agreed to have anesthesia services.    _____________________________________________________________________________  Witness        Date   Time  I have verified that the signature is that of the patient or patient’s representative, and that it was signed before the procedure  Patient Name: Hoda Busby     : 1940                 Printed: 2024 at 10:58 AM    Medical Record #: [id number]                                            Page 1 of 1  ----------ANESTHESIA CONSENT----------

## (undated) NOTE — LETTER
[facility]  [address], [place], [place]    Authorization for Surgical Operation and Procedure                               I hereby authorize Boston Nuno MD, my physician and his/her assistants (if applicable), which may include medical students, residents, and/or fellows, to perform the following surgical operation/ procedure and administer such anesthesia as may be determined necessary by my physician: Operation/Procedure name (s) ESOPHAGOGASTRODUODENOSCOPY (EGD) on Hoda Busby   2.   I recognize that during the surgical operation/procedure, unforeseen conditions may necessitate additional or different procedures than those listed above.  I, therefore, further authorize and request that the above-named surgeon, assistants, or designees perform such procedures as are, in their judgment, necessary and desirable.    3.   My surgeon/physician has discussed prior to my surgery the potential benefits, risks and side effects of this procedure; the likelihood of achieving goals; and potential problems that might occur during recuperation.  They also discussed reasonable alternatives to the procedure, including risks, benefits, and side effects related to the alternatives and risks related to not receiving this procedure.  I have had all my questions answered and I acknowledge that no guarantee has been made as to the result that may be obtained.    4.   Should the need arise during my operation/procedure, which includes change of level of care prior to discharge, I also consent to the administration of blood and/or blood products.  Further, I understand that despite careful testing and screening of blood or blood products by collecting agencies, I may still be subject to ill effects as a result of receiving a blood transfusion and/or blood products.  The following are some, but not all, of the potential risks that can occur: fever and allergic reactions, hemolytic reactions, transmission  of diseases such as Hepatitis, AIDS and Cytomegalovirus (CMV) and fluid overload.  In the event that I wish to have an autologous transfusion of my own blood, or a directed donor transfusion, I will discuss this with my physician.  Check only if Refusing Blood or Blood Products  I understand refusal of blood or blood products as deemed necessary by my physician may have serious consequences to my condition to include possible death. I hereby assume responsibility for my refusal and release the hospital, its personnel, and my physicians from any responsibility for the consequences of my refusal.    o  Refuse   5.   I authorize the use of any specimen, organs, tissues, body parts or foreign objects that may be removed from my body during the operation/procedure for diagnosis, research or teaching purposes and their subsequent disposal by hospital authorities.  I also authorize the release of specimen test results and/or written reports to my treating physician on the hospital medical staff or other referring or consulting physicians involved in my care, at the discretion of the Pathologist or my treating physician.    6.   I consent to the photographing or videotaping of the operations or procedures to be performed, including appropriate portions of my body for medical, scientific, or educational purposes, provided my identity is not revealed by the pictures or by descriptive texts accompanying them.  If the procedure has been photographed/videotaped, the surgeon will obtain the original picture, image, videotape or CD.  The hospital will not be responsible for storage, release or maintenance of the picture, image, tape or CD.    7.   I consent to the presence of a  or observers in the operating room as deemed necessary by my physician or their designees.    8.   I recognize that in the event my procedure results in extended X-Ray/fluoroscopy time, I may develop a skin reaction.    9. If I have a Do  Not Attempt Resuscitation (DNAR) order in place, that status will be suspended while in the operating room, procedural suite, and during the recovery period unless otherwise explicitly stated by me (or a person authorized to consent on my behalf). The surgeon or my attending physician will determine when the applicable recovery period ends for purposes of reinstating the DNAR order.  10. Patients having a sterilization procedure: I understand that if the procedure is successful the results will be permanent and it will therefore be impossible for me to inseminate, conceive, or bear children.  I also understand that the procedure is intended to result in sterility, although the result has not been guaranteed.   11. I acknowledge that my physician has explained sedation/analgesia administration to me including the risk and benefits I consent to the administration of sedation/analgesia as may be necessary or desirable in the judgment of my physician.    I CERTIFY THAT I HAVE READ AND FULLY UNDERSTAND THE ABOVE CONSENT TO OPERATION and/or OTHER PROCEDURE.     ____________________________________  _________________________________        ______________________________  Signature of Patient    Signature of Responsible Person                Printed Name of Responsible Person                                      ____________________________________  _____________________________                ________________________________  Signature of Witness        Date  Time         Relationship to Patient    STATEMENT OF PHYSICIAN My signature below affirms that prior to the time of the procedure; I have explained to the patient and/or his/her legal representative, the risks and benefits involved in the proposed treatment and any reasonable alternative to the proposed treatment. I have also explained the risks and benefits involved in refusal of the proposed treatment and alternatives to the proposed treatment and have answered the  patient's questions. If I have a significant financial interest in a co-management agreement or a significant financial interest in any product or implant, or other significant relationship used in this procedure/surgery, I have disclosed this and had a discussion with my patient.     _____________________________________________________              _____________________________  (Signature of Physician)                                                                                         (Date)                                   (Time)  Patient Name: Hoda DAIGLE Kehinde      : 1940      Printed: 2024     Medical Record #: [id number]                                      Page 1 of 1

## (undated) NOTE — LETTER
[place] [address]  Authorization for Invasive Procedures  Date: 7/20/25           Time: 1646    I hereby authorize Dr. Colin Wood, my physician and his/her assistants (if applicable), which may include medical students, residents, and/or fellows, to perform the following surgical operation/ procedure and administer such anesthesia as may be determined necessary by my physician: Transesophageal Echocardiogram on Hoda Busby  2.   I recognize that during the surgical operation/procedure, unforeseen conditions may necessitate additional or different procedures than those listed above.  I, therefore, further authorize and request that the above-named surgeon, assistants, or designees perform such procedures as are, in their judgment, necessary and desirable.    3.   My surgeon/physician has discussed prior to my surgery the potential benefits, risks and side effects of this procedure; the likelihood of achieving goals; and potential problems that might occur during recuperation.  They also discussed reasonable alternatives to the procedure, including risks, benefits, and side effects related to the alternatives and risks related to not receiving this procedure.  I have had all my questions answered and I acknowledge that no guarantee has been made as to the result that may be obtained.    4.   Should the need arise during my operation/procedure, which includes change of level of care prior to discharge, I also consent to the administration of blood and/or blood products.  Further, I understand that despite careful testing and screening of blood or blood products by collecting agencies, I may still be subject to ill effects as a result of receiving a blood transfusion and/or blood products.  The following are some, but not all, of the potential risks that can occur: fever and allergic reactions, hemolytic reactions, transmission of diseases such as Hepatitis, AIDS and Cytomegalovirus  (CMV) and fluid overload.  In the event that I wish to have an autologous transfusion of my own blood, or a directed donor transfusion, I will discuss this with my physician.   Check only if Refusing Blood or Blood Products  I understand refusal of blood or blood products as deemed necessary by my physician may have serious consequences to my condition to include possible death. I hereby assume responsibility for my refusal and release the hospital, its personnel, and my physicians from any responsibility for the consequences of my refusal.         o  Refuse         5.   I authorize the use of any specimen, organs, tissues, body parts or foreign objects that may be removed from my body during the operation/procedure for diagnosis, research or teaching purposes and their subsequent disposal by hospital authorities.  I also authorize the release of specimen test results and/or written reports to my treating physician on the hospital medical staff or other referring or consulting physicians involved in my care, at the discretion of the Pathologist or my treating physician.    6.   I consent to the photographing or videotaping of the operations or procedures to be performed, including appropriate portions of my body for medical, scientific, or educational purposes, provided my identity is not revealed by the pictures or by descriptive texts accompanying them.  If the procedure has been photographed/videotaped, the surgeon will obtain the original picture, image, videotape or CD.  The hospital will not be responsible for storage, release or maintenance of the picture, image, tape or CD.    7.   I consent to the presence of a  or observers in the operating room as deemed necessary by my physician or their designees.    8.   I recognize that in the event my procedure results in extended X-Ray/fluoroscopy time, I may develop a skin reaction.    9. If I have a Do Not Attempt Resuscitation (DNAR) order in  place, that status will be suspended while in the operating room, procedural suite, and during the recovery period unless otherwise explicitly stated by me (or a person authorized to consent on my behalf). The surgeon or my attending physician will determine when the applicable recovery period ends for purposes of reinstating the DNAR order.  10. Patients having a sterilization procedure: I understand that if the procedure is successful the results will be permanent and it will therefore be impossible for me to inseminate, conceive, or bear children.  I also understand that the procedure is intended to result in sterility, although the result has not been guaranteed.   11. I acknowledge that my physician has explained sedation/analgesia administration to me including the risk and benefits I consent to the administration of sedation/analgesia as may be necessary or desirable in the judgment of my physician.    I CERTIFY THAT I HAVE READ AND FULLY UNDERSTAND THE ABOVE CONSENT TO OPERATION and/or OTHER PROCEDURE.        ____________________________________       _________________________________      ______________________________  Signature of Patient         Signature of Responsible Person        Printed Name of Responsible Person        ____________________________________      _________________________________      ______________________________       Signature of Witness          Relationship to Patient                       Date                                       Time  Patient Name: Hoda DAIGLE Kehinde  : 1940    Reviewed: 2024   Printed: 2025  Medical Record #: [id number] Page 1 of 2             STATEMENT OF PHYSICIAN My signature below affirms that prior to the time of the procedure; I have explained to the patient and/or his/her legal representative, the risks and benefits involved in the proposed treatment and any reasonable alternative to the proposed treatment. I have also explained  the risks and benefits involved in refusal of the proposed treatment and alternatives to the proposed treatment and have answered the patient's questions. If I have a significant financial interest in a co-management agreement or a significant financial interest in any product or implant, or other significant relationship used in this procedure/surgery, I have disclosed this and had a discussion with my patient.     _______________________________________________________________ _____________________________  (Signature of Physician)                                                                                         (Date)                                   (Time)  Patient Name: Hoda DAIGLE Kehinde  : 1940    Reviewed: 2024   Printed: 2025  Medical Record #: [id number] Page 2 of 2

## (undated) NOTE — MR AVS SNAPSHOT
[facility]  [facility] [address] [address]  [phone]               Thank you for choosing us for your health care visit with Clau Gay MD.  We are glad to serve you and happy to provide you with this summary of your visit.   Yogesh It is the patient's responsibility to check with and follow their insurance company's guidelines for prior authorization for this test.  You may be held responsible for payment in full if proper authorization is not acquired. Please contact the Patient Iván [facility] Health/[facility]  Diagnostics Main [facility] Parking) (Yellow Parking)  [address]   [address] [facility],  [place], [address] [facility]  [address]  Main Take 1,000 Units by mouth daily. Where to Get Your Medications      Information about where to get these medications is not yet available     !  Ask your nurse or doctor about these medications    - Vitamin D 1000 units Tabs            Immuni

## (undated) NOTE — LETTER
[facility] 4W/SW/SE  [address]  [facility] [address]  [phone]    Blood Transfusion Consent    In the course of your treatment, it may become necessary to administer a transfusion of blood or blood components. This form provides basic information concerning this procedure and, if signed by you, authorizes its administration. By signing this form, you agree that all of your questions about the administration of blood or blood products have been answered by the ordering medical professional or designee.    Description of Procedure  Blood is introduced into one of your veins, commonly in the arm, using a sterilized disposable needle. The amount of blood transfused, and whether the transfusion will be of blood or blood components is a judgement the physician will make based on your particular needs.    Risks  The transfusion is a common procedure of low risk.  MINOR AND TEMPORARY REACTIONS ARE NOT UNCOMMON, including a slight bruise, swelling or local reaction in the area where the needle pierces your skin, or a nonserious reaction to the transfused material itself, including headache, fever or mild skin reaction, such as rash.  Serious reactions are possible, though very unlikely, and include severe allergic reaction (shock) and destruction (hemolysis) of transfused blood cells.  Infectious diseases which are known to be transmitted by blood transfusion include certain types of viral Hepatitis(liver infection from a virus), Human Immunodeficiency Virus (HIV-1,2) infection, a viral infection known to cause Acquired Immunodeficiency Syndrome (AIDS), as well as certain other bacterial, viral, and parasitic diseases. While a minimal risk of acquiring an infectious disease from transfused blood exists, in accordance with the Federal and State law, all due care has been taken in donor selection and testing to avoid transmission of disease.    Alternatives  If loss of blood poses serious threats during your  treatment, THERE IS NO EFFECTIVE ALTERNATIVE TO BLOOD TRANSFUSION. However, if you have any further questions on this matter, your provider will fully explain the alternatives to you if it has not already been done.    I, ______________________________, have read/had read to me the above. I understand the matters bearing on the decision whether or not to authorize a transfusion of blood or blood components. I have no questions which have not been answered to my full satisfaction. I hereby consent to such transfusion as my physician may deem necessary or advisable in the course of my treatment.    ______________________________________________                    ___________________________  (Signature of Patient or Responsible party in case of minor,                 (Printed Name of Patient or incompetent, or unconscious patient)              Responsible Party)    ___________________________               _____________________  (Relationship to Patient if not self)                                    (Date and Time)    __________________________                                                           ______________________              (Signature of Witness)               (Printed Name of Witness)     Language line ()    Telephone/Verbal/Video Consent    __________________________                     ____________________  (Signature of 2nd Witness           (Printed Name of 2nd  Telephone/Verbal/Video Consent)           Witness)    Patient Name: Hoda Busby     : 1940                 Printed: 2024     Medical Record #: [id number]      Rev: 2023

## (undated) NOTE — Clinical Note
[facility]  Administration of Anesthesia  1. I Hoda Busby agree to be cared for by a physician anesthesiologist alone and/or with a nurse anesthetist, who is specially trained to monitor me and give me medicine to put me to sleep or keep me comfortable during my procedure    I understand that my anesthesiologist and/or anesthetist is not an employee or agent of [facility] or [email or web address]. He or she works for [facility]    2. As the patient asking for anesthesia services, I agree to:  a. Allow the anesthesiologist (anesthesia doctor) to give me medicine and do additional procedures as necessary. Some examples are: Starting or using an “IV” to give me medicine, fluids or blood during my procedure, and having a breathing tube placed to help me breathe when I’m asleep (intubation). In the event that my heart stops working properly, I understand that my anesthesiologist will make every effort to sustain my life, unless otherwise directed by [facility] Do Not Resuscitate documents.  b. Tell my anesthesia doctor before my procedure:  i. If I am pregnant.  ii. The last time that I ate or drank.  iii. All of the medicines I take (including prescriptions, herbal supplements, and pills I can buy without a prescription (including street drugs/illegal medications). Failure to inform my anesthesiologist about these medicines may increase my risk of anesthetic complications.  iv.If I am allergic to anything or have had a reaction to anesthesia before.  3. I understand how the anesthesia medicine will help me (benefits).  4. I understand that with any type of anesthesia medicine there are risks:  a. The most common risks are: nausea, vomiting, sore throat, muscle soreness, damage to my eyes, mouth, or teeth (from breathing tube placement).  b. Rare risks include: remembering what happened during my procedure, allergic reactions to medications, injury to my airway,  heart, lungs, vision, nerves, or muscles and in extremely rare instances death.  5. My doctor has explained to me other choices available to me for my care (alternatives).  6. Pregnant Patients (“epidural”):  I understand that the risks of having an epidural (medicine given into my back to help control pain during labor), include itching, low blood pressure, difficulty urinating, headache or slowing of the baby’s heart. Very rare risks include infection, bleeding, seizure, irregular heart rhythms and nerve injury.  7. Regional Anesthesia (“spinal”, “epidural”, & “nerve blocks”):  I understand that rare but potential complications include headache, bleeding, infection, seizure, irregular heart rhythms, and nerve injury.    _____________________________________________________________________________  Patient (or Representative) Signature/Relationship to Patient  Date   Time    _____________________________________________________________________________   Name (if used)    Language/Organization   Time    _____________________________________________________________________________  Nurse Anesthetist Signature     Date   Time  _____________________________________________________________________________  Anesthesiologist Signature     Date   Time  I have discussed the procedure and information above with the patient (or patient’s representative) and answered their questions. The patient or their representative has agreed to have anesthesia services.    _____________________________________________________________________________  Witness        Date   Time  I have verified that the signature is that of the patient or patient’s representative, and that it was signed before the procedure  Patient Name: Hoda Busby     : 1940                 Printed: 2024 at 11:44 AM    Medical Record #: [id number]                                            Page 1 of 1  ----------ANESTHESIA  CONSENT----------

## (undated) NOTE — LETTER
[facility]  [address], [place], [place]    Authorization for Surgical Operation and Procedure                               I hereby authorize Karen Staton MD, my physician and his/her assistants (if applicable), which may include medical students, residents, and/or fellows, to perform the following surgical operation/ procedure and administer such anesthesia as may be determined necessary by my physician: Operation/Procedure name (s) PUSH ENEROSCOPY WITH ESOPHAGOGASTRODUODENOSCOPY (EGD) and COLONOSCOPY on Hoda Busby   2.   I recognize that during the surgical operation/procedure, unforeseen conditions may necessitate additional or different procedures than those listed above.  I, therefore, further authorize and request that the above-named surgeon, assistants, or designees perform such procedures as are, in their judgment, necessary and desirable.    3.   My surgeon/physician has discussed prior to my surgery the potential benefits, risks and side effects of this procedure; the likelihood of achieving goals; and potential problems that might occur during recuperation.  They also discussed reasonable alternatives to the procedure, including risks, benefits, and side effects related to the alternatives and risks related to not receiving this procedure.  I have had all my questions answered and I acknowledge that no guarantee has been made as to the result that may be obtained.    4.   Should the need arise during my operation/procedure, which includes change of level of care prior to discharge, I also consent to the administration of blood and/or blood products.  Further, I understand that despite careful testing and screening of blood or blood products by collecting agencies, I may still be subject to ill effects as a result of receiving a blood transfusion and/or blood products.  The following are some, but not all, of the potential risks that can occur: fever and allergic reactions,  hemolytic reactions, transmission of diseases such as Hepatitis, AIDS and Cytomegalovirus (CMV) and fluid overload.  In the event that I wish to have an autologous transfusion of my own blood, or a directed donor transfusion, I will discuss this with my physician.  Check only if Refusing Blood or Blood Products  I understand refusal of blood or blood products as deemed necessary by my physician may have serious consequences to my condition to include possible death. I hereby assume responsibility for my refusal and release the hospital, its personnel, and my physicians from any responsibility for the consequences of my refusal.    o  Refuse   5.   I authorize the use of any specimen, organs, tissues, body parts or foreign objects that may be removed from my body during the operation/procedure for diagnosis, research or teaching purposes and their subsequent disposal by hospital authorities.  I also authorize the release of specimen test results and/or written reports to my treating physician on the hospital medical staff or other referring or consulting physicians involved in my care, at the discretion of the Pathologist or my treating physician.    6.   I consent to the photographing or videotaping of the operations or procedures to be performed, including appropriate portions of my body for medical, scientific, or educational purposes, provided my identity is not revealed by the pictures or by descriptive texts accompanying them.  If the procedure has been photographed/videotaped, the surgeon will obtain the original picture, image, videotape or CD.  The hospital will not be responsible for storage, release or maintenance of the picture, image, tape or CD.    7.   I consent to the presence of a  or observers in the operating room as deemed necessary by my physician or their designees.    8.   I recognize that in the event my procedure results in extended X-Ray/fluoroscopy time, I may develop a  skin reaction.    9. If I have a Do Not Attempt Resuscitation (DNAR) order in place, that status will be suspended while in the operating room, procedural suite, and during the recovery period unless otherwise explicitly stated by me (or a person authorized to consent on my behalf). The surgeon or my attending physician will determine when the applicable recovery period ends for purposes of reinstating the DNAR order.  10. Patients having a sterilization procedure: I understand that if the procedure is successful the results will be permanent and it will therefore be impossible for me to inseminate, conceive, or bear children.  I also understand that the procedure is intended to result in sterility, although the result has not been guaranteed.   11. I acknowledge that my physician has explained sedation/analgesia administration to me including the risk and benefits I consent to the administration of sedation/analgesia as may be necessary or desirable in the judgment of my physician.    I CERTIFY THAT I HAVE READ AND FULLY UNDERSTAND THE ABOVE CONSENT TO OPERATION and/or OTHER PROCEDURE.     ____________________________________  _________________________________        ______________________________  Signature of Patient    Signature of Responsible Person                Printed Name of Responsible Person                                      ____________________________________  _____________________________                ________________________________  Signature of Witness        Date  Time         Relationship to Patient    STATEMENT OF PHYSICIAN My signature below affirms that prior to the time of the procedure; I have explained to the patient and/or his/her legal representative, the risks and benefits involved in the proposed treatment and any reasonable alternative to the proposed treatment. I have also explained the risks and benefits involved in refusal of the proposed treatment and alternatives to the  proposed treatment and have answered the patient's questions. If I have a significant financial interest in a co-management agreement or a significant financial interest in any product or implant, or other significant relationship used in this procedure/surgery, I have disclosed this and had a discussion with my patient.     _____________________________________________________              _____________________________  (Signature of Physician)                                                                                         (Date)                                   (Time)  Patient Name: Hoda OXANA Busby      : 1940      Printed: 2024     Medical Record #: [id number]                                      Page 1 of 1